# Patient Record
Sex: MALE | Race: WHITE | Employment: FULL TIME | URBAN - METROPOLITAN AREA
[De-identification: names, ages, dates, MRNs, and addresses within clinical notes are randomized per-mention and may not be internally consistent; named-entity substitution may affect disease eponyms.]

---

## 2018-07-02 PROBLEM — L08.9 DIABETIC FOOT INFECTION (HCC): Status: ACTIVE | Noted: 2018-07-02

## 2018-07-02 PROBLEM — E11.628 DIABETIC FOOT INFECTION (HCC): Status: ACTIVE | Noted: 2018-07-02

## 2018-07-02 PROBLEM — A48.0 GAS GANGRENE (HCC): Status: ACTIVE | Noted: 2018-07-02

## 2022-09-09 ENCOUNTER — HOSPITAL ENCOUNTER (INPATIENT)
Age: 52
LOS: 33 days | Discharge: NURSING, REHAB OR LONG-TERM CARE | DRG: 853 | End: 2022-10-13
Attending: EMERGENCY MEDICINE | Admitting: INTERNAL MEDICINE
Payer: COMMERCIAL

## 2022-09-09 ENCOUNTER — APPOINTMENT (OUTPATIENT)
Dept: CT IMAGING | Age: 52
DRG: 853 | End: 2022-09-09
Attending: EMERGENCY MEDICINE
Payer: COMMERCIAL

## 2022-09-09 ENCOUNTER — APPOINTMENT (OUTPATIENT)
Dept: GENERAL RADIOLOGY | Age: 52
DRG: 853 | End: 2022-09-09
Attending: EMERGENCY MEDICINE
Payer: COMMERCIAL

## 2022-09-09 DIAGNOSIS — R19.7 DIARRHEA, UNSPECIFIED TYPE: ICD-10-CM

## 2022-09-09 DIAGNOSIS — K62.89 RECTAL PAIN: ICD-10-CM

## 2022-09-09 DIAGNOSIS — K60.4 RECTAL FISTULA: ICD-10-CM

## 2022-09-09 DIAGNOSIS — K66.8 PNEUMOPERITONEUM: Primary | ICD-10-CM

## 2022-09-09 DIAGNOSIS — R62.7 FAILURE TO THRIVE IN ADULT: ICD-10-CM

## 2022-09-09 DIAGNOSIS — E11.69 TYPE 2 DIABETES MELLITUS WITH OTHER SPECIFIED COMPLICATION, UNSPECIFIED WHETHER LONG TERM INSULIN USE (HCC): ICD-10-CM

## 2022-09-09 DIAGNOSIS — R10.32 ABDOMINAL PAIN, LLQ (LEFT LOWER QUADRANT): ICD-10-CM

## 2022-09-09 DIAGNOSIS — A41.9 SEPSIS, DUE TO UNSPECIFIED ORGANISM, UNSPECIFIED WHETHER ACUTE ORGAN DYSFUNCTION PRESENT (HCC): ICD-10-CM

## 2022-09-09 DIAGNOSIS — D64.9 CHRONIC ANEMIA: ICD-10-CM

## 2022-09-09 DIAGNOSIS — N18.6 ESRD (END STAGE RENAL DISEASE) (HCC): ICD-10-CM

## 2022-09-09 DIAGNOSIS — R10.84 GENERALIZED ABDOMINAL PAIN: ICD-10-CM

## 2022-09-09 LAB
ALBUMIN SERPL-MCNC: 1.5 G/DL (ref 3.5–5)
ALBUMIN/GLOB SERPL: 0.3 {RATIO} (ref 1.1–2.2)
ALP SERPL-CCNC: 115 U/L (ref 45–117)
ALT SERPL-CCNC: <6 U/L (ref 12–78)
ANION GAP SERPL CALC-SCNC: 3 MMOL/L (ref 5–15)
AST SERPL-CCNC: 12 U/L (ref 15–37)
BASOPHILS # BLD: 0 K/UL (ref 0–0.1)
BASOPHILS NFR BLD: 0 % (ref 0–1)
BILIRUB SERPL-MCNC: 0.7 MG/DL (ref 0.2–1)
BUN SERPL-MCNC: 12 MG/DL (ref 6–20)
BUN/CREAT SERPL: 7 (ref 12–20)
CALCIUM SERPL-MCNC: 7.7 MG/DL (ref 8.5–10.1)
CHLORIDE SERPL-SCNC: 97 MMOL/L (ref 97–108)
CO2 SERPL-SCNC: 35 MMOL/L (ref 21–32)
COMMENT, HOLDF: NORMAL
CREAT SERPL-MCNC: 1.8 MG/DL (ref 0.7–1.3)
DIFFERENTIAL METHOD BLD: ABNORMAL
EOSINOPHIL # BLD: 0.3 K/UL (ref 0–0.4)
EOSINOPHIL NFR BLD: 1 % (ref 0–7)
ERYTHROCYTE [DISTWIDTH] IN BLOOD BY AUTOMATED COUNT: 17.2 % (ref 11.5–14.5)
GLOBULIN SER CALC-MCNC: 5.4 G/DL (ref 2–4)
GLUCOSE SERPL-MCNC: 133 MG/DL (ref 65–100)
HCT VFR BLD AUTO: 27.7 % (ref 36.6–50.3)
HEMOCCULT STL QL: POSITIVE
HGB BLD-MCNC: 8.7 G/DL (ref 12.1–17)
IMM GRANULOCYTES # BLD AUTO: 0.3 K/UL (ref 0–0.04)
IMM GRANULOCYTES NFR BLD AUTO: 1 % (ref 0–0.5)
LACTATE SERPL-SCNC: 1.2 MMOL/L (ref 0.4–2)
LYMPHOCYTES # BLD: 1 K/UL (ref 0.8–3.5)
LYMPHOCYTES NFR BLD: 4 % (ref 12–49)
MCH RBC QN AUTO: 30.7 PG (ref 26–34)
MCHC RBC AUTO-ENTMCNC: 31.4 G/DL (ref 30–36.5)
MCV RBC AUTO: 97.9 FL (ref 80–99)
MONOCYTES # BLD: 1.2 K/UL (ref 0–1)
MONOCYTES NFR BLD: 5 % (ref 5–13)
NEUTS SEG # BLD: 22.1 K/UL (ref 1.8–8)
NEUTS SEG NFR BLD: 89 % (ref 32–75)
NRBC # BLD: 0 K/UL (ref 0–0.01)
NRBC BLD-RTO: 0 PER 100 WBC
PLATELET # BLD AUTO: 463 K/UL (ref 150–400)
PMV BLD AUTO: 9.7 FL (ref 8.9–12.9)
POTASSIUM SERPL-SCNC: 3.4 MMOL/L (ref 3.5–5.1)
PROT SERPL-MCNC: 6.9 G/DL (ref 6.4–8.2)
RBC # BLD AUTO: 2.83 M/UL (ref 4.1–5.7)
RBC MORPH BLD: ABNORMAL
SAMPLES BEING HELD,HOLD: NORMAL
SODIUM SERPL-SCNC: 135 MMOL/L (ref 136–145)
WBC # BLD AUTO: 24.9 K/UL (ref 4.1–11.1)

## 2022-09-09 PROCEDURE — 82272 OCCULT BLD FECES 1-3 TESTS: CPT

## 2022-09-09 PROCEDURE — 86900 BLOOD TYPING SEROLOGIC ABO: CPT

## 2022-09-09 PROCEDURE — 83605 ASSAY OF LACTIC ACID: CPT

## 2022-09-09 PROCEDURE — 87040 BLOOD CULTURE FOR BACTERIA: CPT

## 2022-09-09 PROCEDURE — 80053 COMPREHEN METABOLIC PANEL: CPT

## 2022-09-09 PROCEDURE — 86920 COMPATIBILITY TEST SPIN: CPT

## 2022-09-09 PROCEDURE — 99285 EMERGENCY DEPT VISIT HI MDM: CPT

## 2022-09-09 PROCEDURE — 36415 COLL VENOUS BLD VENIPUNCTURE: CPT

## 2022-09-09 PROCEDURE — 71045 X-RAY EXAM CHEST 1 VIEW: CPT

## 2022-09-09 PROCEDURE — 85025 COMPLETE CBC W/AUTO DIFF WBC: CPT

## 2022-09-10 ENCOUNTER — ANESTHESIA (OUTPATIENT)
Dept: SURGERY | Age: 52
DRG: 853 | End: 2022-09-10
Payer: COMMERCIAL

## 2022-09-10 ENCOUNTER — ANESTHESIA EVENT (OUTPATIENT)
Dept: SURGERY | Age: 52
DRG: 853 | End: 2022-09-10
Payer: COMMERCIAL

## 2022-09-10 ENCOUNTER — APPOINTMENT (OUTPATIENT)
Dept: CT IMAGING | Age: 52
DRG: 853 | End: 2022-09-10
Attending: INTERNAL MEDICINE
Payer: COMMERCIAL

## 2022-09-10 PROBLEM — B99.9 INTRA-ABDOMINAL INFECTION: Status: ACTIVE | Noted: 2022-09-10

## 2022-09-10 LAB
ALBUMIN SERPL-MCNC: 1.7 G/DL (ref 3.5–5)
ALBUMIN/GLOB SERPL: 0.4 {RATIO} (ref 1.1–2.2)
ALP SERPL-CCNC: 86 U/L (ref 45–117)
ALT SERPL-CCNC: <6 U/L (ref 12–78)
ANION GAP SERPL CALC-SCNC: 7 MMOL/L (ref 5–15)
AST SERPL-CCNC: 9 U/L (ref 15–37)
BASOPHILS # BLD: 0 K/UL (ref 0–0.1)
BASOPHILS # BLD: 0 K/UL (ref 0–0.1)
BASOPHILS NFR BLD: 0 % (ref 0–1)
BASOPHILS NFR BLD: 0 % (ref 0–1)
BILIRUB SERPL-MCNC: 0.7 MG/DL (ref 0.2–1)
BNP SERPL-MCNC: 5733 PG/ML
BUN SERPL-MCNC: 17 MG/DL (ref 6–20)
BUN/CREAT SERPL: 8 (ref 12–20)
C DIFF GDH STL QL: NEGATIVE
C DIFF TOX A+B STL QL IA: NEGATIVE
CALCIUM SERPL-MCNC: 7.4 MG/DL (ref 8.5–10.1)
CHLORIDE SERPL-SCNC: 106 MMOL/L (ref 97–108)
CO2 SERPL-SCNC: 27 MMOL/L (ref 21–32)
CREAT SERPL-MCNC: 2.16 MG/DL (ref 0.7–1.3)
DIFFERENTIAL METHOD BLD: ABNORMAL
DIFFERENTIAL METHOD BLD: ABNORMAL
EOSINOPHIL # BLD: 0 K/UL (ref 0–0.4)
EOSINOPHIL # BLD: 0 K/UL (ref 0–0.4)
EOSINOPHIL NFR BLD: 0 % (ref 0–7)
EOSINOPHIL NFR BLD: 0 % (ref 0–7)
ERYTHROCYTE [DISTWIDTH] IN BLOOD BY AUTOMATED COUNT: 17.9 % (ref 11.5–14.5)
ERYTHROCYTE [DISTWIDTH] IN BLOOD BY AUTOMATED COUNT: 18.6 % (ref 11.5–14.5)
EST. AVERAGE GLUCOSE BLD GHB EST-MCNC: ABNORMAL MG/DL
FERRITIN SERPL-MCNC: 715 NG/ML (ref 26–388)
FOLATE SERPL-MCNC: 4.1 NG/ML (ref 5–21)
GLOBULIN SER CALC-MCNC: 4.1 G/DL (ref 2–4)
GLUCOSE BLD STRIP.AUTO-MCNC: 100 MG/DL (ref 65–117)
GLUCOSE BLD STRIP.AUTO-MCNC: 105 MG/DL (ref 65–117)
GLUCOSE BLD STRIP.AUTO-MCNC: 112 MG/DL (ref 65–117)
GLUCOSE BLD STRIP.AUTO-MCNC: 151 MG/DL (ref 65–117)
GLUCOSE BLD STRIP.AUTO-MCNC: 163 MG/DL (ref 65–117)
GLUCOSE SERPL-MCNC: 95 MG/DL (ref 65–100)
HBA1C MFR BLD: <3.8 % (ref 4–5.6)
HCT VFR BLD AUTO: 22.5 % (ref 36.6–50.3)
HCT VFR BLD AUTO: 23.5 % (ref 36.6–50.3)
HGB BLD-MCNC: 6.9 G/DL (ref 12.1–17)
HGB BLD-MCNC: 7.6 G/DL (ref 12.1–17)
HISTORY CHECKED?,CKHIST: NORMAL
IMM GRANULOCYTES # BLD AUTO: 0 K/UL
IMM GRANULOCYTES # BLD AUTO: 0.2 K/UL (ref 0–0.04)
IMM GRANULOCYTES NFR BLD AUTO: 0 %
IMM GRANULOCYTES NFR BLD AUTO: 1 % (ref 0–0.5)
INTERPRETATION: NORMAL
IRON SATN MFR SERPL: 9 % (ref 20–50)
IRON SERPL-MCNC: 12 UG/DL (ref 35–150)
IRON SERPL-MCNC: 12 UG/DL (ref 35–150)
LIPASE SERPL-CCNC: 54 U/L (ref 73–393)
LYMPHOCYTES # BLD: 0.4 K/UL (ref 0.8–3.5)
LYMPHOCYTES # BLD: 0.7 K/UL (ref 0.8–3.5)
LYMPHOCYTES NFR BLD: 2 % (ref 12–49)
LYMPHOCYTES NFR BLD: 4 % (ref 12–49)
MAGNESIUM SERPL-MCNC: 1.5 MG/DL (ref 1.6–2.4)
MCH RBC QN AUTO: 30 PG (ref 26–34)
MCH RBC QN AUTO: 30.5 PG (ref 26–34)
MCHC RBC AUTO-ENTMCNC: 30.7 G/DL (ref 30–36.5)
MCHC RBC AUTO-ENTMCNC: 32.3 G/DL (ref 30–36.5)
MCV RBC AUTO: 92.9 FL (ref 80–99)
MCV RBC AUTO: 99.6 FL (ref 80–99)
MONOCYTES # BLD: 0.6 K/UL (ref 0–1)
MONOCYTES # BLD: 0.7 K/UL (ref 0–1)
MONOCYTES NFR BLD: 3 % (ref 5–13)
MONOCYTES NFR BLD: 4 % (ref 5–13)
NEUTS BAND NFR BLD MANUAL: 1 % (ref 0–6)
NEUTS SEG # BLD: 15.6 K/UL (ref 1.8–8)
NEUTS SEG # BLD: 18.7 K/UL (ref 1.8–8)
NEUTS SEG NFR BLD: 91 % (ref 32–75)
NEUTS SEG NFR BLD: 94 % (ref 32–75)
NRBC # BLD: 0 K/UL (ref 0–0.01)
NRBC # BLD: 0 K/UL (ref 0–0.01)
NRBC BLD-RTO: 0 PER 100 WBC
NRBC BLD-RTO: 0 PER 100 WBC
PHOSPHATE SERPL-MCNC: 1.3 MG/DL (ref 2.6–4.7)
PLATELET # BLD AUTO: 324 K/UL (ref 150–400)
PLATELET # BLD AUTO: 361 K/UL (ref 150–400)
PMV BLD AUTO: 9.4 FL (ref 8.9–12.9)
PMV BLD AUTO: 9.8 FL (ref 8.9–12.9)
POTASSIUM SERPL-SCNC: 3 MMOL/L (ref 3.5–5.1)
PROT SERPL-MCNC: 5.8 G/DL (ref 6.4–8.2)
RBC # BLD AUTO: 2.26 M/UL (ref 4.1–5.7)
RBC # BLD AUTO: 2.53 M/UL (ref 4.1–5.7)
RBC MORPH BLD: ABNORMAL
RBC MORPH BLD: ABNORMAL
SERVICE CMNT-IMP: ABNORMAL
SERVICE CMNT-IMP: ABNORMAL
SERVICE CMNT-IMP: NORMAL
SODIUM SERPL-SCNC: 140 MMOL/L (ref 136–145)
TIBC SERPL-MCNC: 141 UG/DL (ref 250–450)
TROPONIN-HIGH SENSITIVITY: 6 NG/L (ref 0–76)
TSH SERPL DL<=0.05 MIU/L-ACNC: 1.43 UIU/ML (ref 0.36–3.74)
VIT B12 SERPL-MCNC: 729 PG/ML (ref 193–986)
WBC # BLD AUTO: 17.2 K/UL (ref 4.1–11.1)
WBC # BLD AUTO: 19.7 K/UL (ref 4.1–11.1)

## 2022-09-10 PROCEDURE — 77030008462 HC STPLR SKN PROX J&J -A: Performed by: SURGERY

## 2022-09-10 PROCEDURE — 74011250636 HC RX REV CODE- 250/636: Performed by: NURSE ANESTHETIST, CERTIFIED REGISTERED

## 2022-09-10 PROCEDURE — 83735 ASSAY OF MAGNESIUM: CPT

## 2022-09-10 PROCEDURE — 74011636637 HC RX REV CODE- 636/637: Performed by: SURGERY

## 2022-09-10 PROCEDURE — 83880 ASSAY OF NATRIURETIC PEPTIDE: CPT

## 2022-09-10 PROCEDURE — 83550 IRON BINDING TEST: CPT

## 2022-09-10 PROCEDURE — 83036 HEMOGLOBIN GLYCOSYLATED A1C: CPT

## 2022-09-10 PROCEDURE — 74011000250 HC RX REV CODE- 250: Performed by: NURSE ANESTHETIST, CERTIFIED REGISTERED

## 2022-09-10 PROCEDURE — 0DPD4UZ REMOVAL OF FEEDING DEVICE FROM LOWER INTESTINAL TRACT, PERCUTANEOUS ENDOSCOPIC APPROACH: ICD-10-PCS | Performed by: SURGERY

## 2022-09-10 PROCEDURE — 83540 ASSAY OF IRON: CPT

## 2022-09-10 PROCEDURE — 88304 TISSUE EXAM BY PATHOLOGIST: CPT

## 2022-09-10 PROCEDURE — 77030002966 HC SUT PDS J&J -A: Performed by: SURGERY

## 2022-09-10 PROCEDURE — 77030009968 HC RELD STPLR ENDOSC J&J -D: Performed by: SURGERY

## 2022-09-10 PROCEDURE — 0JD83ZZ EXTRACTION OF ABDOMEN SUBCUTANEOUS TISSUE AND FASCIA, PERCUTANEOUS APPROACH: ICD-10-PCS | Performed by: SURGERY

## 2022-09-10 PROCEDURE — 10061 I&D ABSCESS COMP/MULTIPLE: CPT | Performed by: SURGERY

## 2022-09-10 PROCEDURE — 77030008606 HC TRCR ENDOSC KII AMR -B: Performed by: SURGERY

## 2022-09-10 PROCEDURE — 77030008756 HC TU IRR SUC STRY -B: Performed by: SURGERY

## 2022-09-10 PROCEDURE — 77030026438 HC STYL ET INTUB CARD -A: Performed by: ANESTHESIOLOGY

## 2022-09-10 PROCEDURE — 71250 CT THORAX DX C-: CPT

## 2022-09-10 PROCEDURE — 65660000001 HC RM ICU INTERMED STEPDOWN

## 2022-09-10 PROCEDURE — 77030031139 HC SUT VCRL2 J&J -A: Performed by: SURGERY

## 2022-09-10 PROCEDURE — 76060000037 HC ANESTHESIA 3 TO 3.5 HR: Performed by: SURGERY

## 2022-09-10 PROCEDURE — 0D1N4Z4 BYPASS SIGMOID COLON TO CUTANEOUS, PERCUTANEOUS ENDOSCOPIC APPROACH: ICD-10-PCS | Performed by: SURGERY

## 2022-09-10 PROCEDURE — 82746 ASSAY OF FOLIC ACID SERUM: CPT

## 2022-09-10 PROCEDURE — 74011250636 HC RX REV CODE- 250/636: Performed by: INTERNAL MEDICINE

## 2022-09-10 PROCEDURE — P9016 RBC LEUKOCYTES REDUCED: HCPCS

## 2022-09-10 PROCEDURE — 76210000017 HC OR PH I REC 1.5 TO 2 HR: Performed by: SURGERY

## 2022-09-10 PROCEDURE — 82728 ASSAY OF FERRITIN: CPT

## 2022-09-10 PROCEDURE — 74011000258 HC RX REV CODE- 258: Performed by: SURGERY

## 2022-09-10 PROCEDURE — 96374 THER/PROPH/DIAG INJ IV PUSH: CPT

## 2022-09-10 PROCEDURE — 99254 IP/OBS CNSLTJ NEW/EST MOD 60: CPT | Performed by: SURGERY

## 2022-09-10 PROCEDURE — 36430 TRANSFUSION BLD/BLD COMPNT: CPT

## 2022-09-10 PROCEDURE — 83690 ASSAY OF LIPASE: CPT

## 2022-09-10 PROCEDURE — 84100 ASSAY OF PHOSPHORUS: CPT

## 2022-09-10 PROCEDURE — 77030013079 HC BLNKT BAIR HGGR 3M -A: Performed by: ANESTHESIOLOGY

## 2022-09-10 PROCEDURE — 74011250636 HC RX REV CODE- 250/636: Performed by: ANESTHESIOLOGY

## 2022-09-10 PROCEDURE — 77030007955 HC PCH ENDOSC SPEC J&J -B: Performed by: SURGERY

## 2022-09-10 PROCEDURE — 84484 ASSAY OF TROPONIN QUANT: CPT

## 2022-09-10 PROCEDURE — 76010000133 HC OR TIME 3 TO 3.5 HR: Performed by: SURGERY

## 2022-09-10 PROCEDURE — 2709999900 HC NON-CHARGEABLE SUPPLY: Performed by: SURGERY

## 2022-09-10 PROCEDURE — 77030002933 HC SUT MCRYL J&J -A: Performed by: SURGERY

## 2022-09-10 PROCEDURE — 82607 VITAMIN B-12: CPT

## 2022-09-10 PROCEDURE — 84443 ASSAY THYROID STIM HORMONE: CPT

## 2022-09-10 PROCEDURE — 74011250636 HC RX REV CODE- 250/636: Performed by: EMERGENCY MEDICINE

## 2022-09-10 PROCEDURE — 44188 LAP COLOSTOMY: CPT | Performed by: SURGERY

## 2022-09-10 PROCEDURE — 80053 COMPREHEN METABOLIC PANEL: CPT

## 2022-09-10 PROCEDURE — 77030011808 HC STPLR ENDOSCOPIC J&J -D: Performed by: SURGERY

## 2022-09-10 PROCEDURE — 74011250636 HC RX REV CODE- 250/636: Performed by: SURGERY

## 2022-09-10 PROCEDURE — 77030008684 HC TU ET CUF COVD -B: Performed by: ANESTHESIOLOGY

## 2022-09-10 PROCEDURE — C9113 INJ PANTOPRAZOLE SODIUM, VIA: HCPCS | Performed by: INTERNAL MEDICINE

## 2022-09-10 PROCEDURE — 82962 GLUCOSE BLOOD TEST: CPT

## 2022-09-10 PROCEDURE — 36415 COLL VENOUS BLD VENIPUNCTURE: CPT

## 2022-09-10 PROCEDURE — P9045 ALBUMIN (HUMAN), 5%, 250 ML: HCPCS | Performed by: NURSE ANESTHETIST, CERTIFIED REGISTERED

## 2022-09-10 PROCEDURE — 87324 CLOSTRIDIUM AG IA: CPT

## 2022-09-10 PROCEDURE — 77030036731 HC STPLR ENDOSC J&J -F: Performed by: SURGERY

## 2022-09-10 PROCEDURE — 74011250636 HC RX REV CODE- 250/636: Performed by: STUDENT IN AN ORGANIZED HEALTH CARE EDUCATION/TRAINING PROGRAM

## 2022-09-10 PROCEDURE — 77030033639 HC SHR ENDO COAG HARM 36 J&J -E: Performed by: SURGERY

## 2022-09-10 PROCEDURE — 74011000250 HC RX REV CODE- 250: Performed by: INTERNAL MEDICINE

## 2022-09-10 PROCEDURE — 77030010513 HC APPL CLP LIG J&J -C: Performed by: SURGERY

## 2022-09-10 PROCEDURE — 74011000258 HC RX REV CODE- 258: Performed by: EMERGENCY MEDICINE

## 2022-09-10 PROCEDURE — 77030020829: Performed by: SURGERY

## 2022-09-10 PROCEDURE — 85025 COMPLETE CBC W/AUTO DIFF WBC: CPT

## 2022-09-10 RX ORDER — ONDANSETRON 2 MG/ML
4 INJECTION INTRAMUSCULAR; INTRAVENOUS
Status: DISCONTINUED | OUTPATIENT
Start: 2022-09-10 | End: 2022-09-16

## 2022-09-10 RX ORDER — KETAMINE HYDROCHLORIDE 10 MG/ML
INJECTION, SOLUTION INTRAMUSCULAR; INTRAVENOUS AS NEEDED
Status: DISCONTINUED | OUTPATIENT
Start: 2022-09-10 | End: 2022-09-10 | Stop reason: HOSPADM

## 2022-09-10 RX ORDER — ONDANSETRON 4 MG/1
4 TABLET, ORALLY DISINTEGRATING ORAL
Status: DISCONTINUED | OUTPATIENT
Start: 2022-09-10 | End: 2022-10-13 | Stop reason: HOSPADM

## 2022-09-10 RX ORDER — ACETAMINOPHEN 325 MG/1
650 TABLET ORAL
Status: DISCONTINUED | OUTPATIENT
Start: 2022-09-10 | End: 2022-09-10

## 2022-09-10 RX ORDER — SODIUM CHLORIDE 0.9 % (FLUSH) 0.9 %
5-40 SYRINGE (ML) INJECTION AS NEEDED
Status: DISCONTINUED | OUTPATIENT
Start: 2022-09-10 | End: 2022-10-13 | Stop reason: HOSPADM

## 2022-09-10 RX ORDER — ONDANSETRON 2 MG/ML
4 INJECTION INTRAMUSCULAR; INTRAVENOUS AS NEEDED
Status: DISCONTINUED | OUTPATIENT
Start: 2022-09-10 | End: 2022-09-10 | Stop reason: HOSPADM

## 2022-09-10 RX ORDER — FENTANYL CITRATE 50 UG/ML
INJECTION, SOLUTION INTRAMUSCULAR; INTRAVENOUS AS NEEDED
Status: DISCONTINUED | OUTPATIENT
Start: 2022-09-10 | End: 2022-09-10 | Stop reason: HOSPADM

## 2022-09-10 RX ORDER — SODIUM CHLORIDE 9 MG/ML
250 INJECTION, SOLUTION INTRAVENOUS AS NEEDED
Status: DISCONTINUED | OUTPATIENT
Start: 2022-09-10 | End: 2022-09-23 | Stop reason: ALTCHOICE

## 2022-09-10 RX ORDER — ROCURONIUM BROMIDE 10 MG/ML
INJECTION, SOLUTION INTRAVENOUS AS NEEDED
Status: DISCONTINUED | OUTPATIENT
Start: 2022-09-10 | End: 2022-09-10 | Stop reason: HOSPADM

## 2022-09-10 RX ORDER — FENTANYL CITRATE 50 UG/ML
50 INJECTION, SOLUTION INTRAMUSCULAR; INTRAVENOUS ONCE
Status: DISPENSED | OUTPATIENT
Start: 2022-09-10 | End: 2022-09-10

## 2022-09-10 RX ORDER — DIPHENHYDRAMINE HYDROCHLORIDE 50 MG/ML
12.5 INJECTION, SOLUTION INTRAMUSCULAR; INTRAVENOUS
Status: DISCONTINUED | OUTPATIENT
Start: 2022-09-10 | End: 2022-10-13 | Stop reason: HOSPADM

## 2022-09-10 RX ORDER — SODIUM CHLORIDE 0.9 % (FLUSH) 0.9 %
5-40 SYRINGE (ML) INJECTION EVERY 8 HOURS
Status: DISCONTINUED | OUTPATIENT
Start: 2022-09-10 | End: 2022-09-10 | Stop reason: HOSPADM

## 2022-09-10 RX ORDER — ACETAMINOPHEN 325 MG/1
650 TABLET ORAL
Status: DISCONTINUED | OUTPATIENT
Start: 2022-09-10 | End: 2022-10-13 | Stop reason: HOSPADM

## 2022-09-10 RX ORDER — SODIUM CHLORIDE, SODIUM LACTATE, POTASSIUM CHLORIDE, CALCIUM CHLORIDE 600; 310; 30; 20 MG/100ML; MG/100ML; MG/100ML; MG/100ML
50 INJECTION, SOLUTION INTRAVENOUS CONTINUOUS
Status: DISCONTINUED | OUTPATIENT
Start: 2022-09-10 | End: 2022-09-12

## 2022-09-10 RX ORDER — POLYETHYLENE GLYCOL 3350 17 G/17G
17 POWDER, FOR SOLUTION ORAL DAILY PRN
Status: DISCONTINUED | OUTPATIENT
Start: 2022-09-10 | End: 2022-10-13 | Stop reason: HOSPADM

## 2022-09-10 RX ORDER — SODIUM CHLORIDE 0.9 % (FLUSH) 0.9 %
5-40 SYRINGE (ML) INJECTION AS NEEDED
Status: CANCELLED | OUTPATIENT
Start: 2022-09-10

## 2022-09-10 RX ORDER — MIDAZOLAM HYDROCHLORIDE 1 MG/ML
INJECTION, SOLUTION INTRAMUSCULAR; INTRAVENOUS AS NEEDED
Status: DISCONTINUED | OUTPATIENT
Start: 2022-09-10 | End: 2022-09-10 | Stop reason: HOSPADM

## 2022-09-10 RX ORDER — SODIUM CHLORIDE 0.9 % (FLUSH) 0.9 %
5-40 SYRINGE (ML) INJECTION EVERY 8 HOURS
Status: DISCONTINUED | OUTPATIENT
Start: 2022-09-10 | End: 2022-10-13 | Stop reason: HOSPADM

## 2022-09-10 RX ORDER — MAGNESIUM SULFATE 100 %
4 CRYSTALS MISCELLANEOUS AS NEEDED
Status: DISCONTINUED | OUTPATIENT
Start: 2022-09-10 | End: 2022-09-25

## 2022-09-10 RX ORDER — HYDROMORPHONE HYDROCHLORIDE 1 MG/ML
.5-1 INJECTION, SOLUTION INTRAMUSCULAR; INTRAVENOUS; SUBCUTANEOUS
Status: DISCONTINUED | OUTPATIENT
Start: 2022-09-10 | End: 2022-09-12

## 2022-09-10 RX ORDER — ONDANSETRON 2 MG/ML
4 INJECTION INTRAMUSCULAR; INTRAVENOUS
Status: DISCONTINUED | OUTPATIENT
Start: 2022-09-10 | End: 2022-10-13 | Stop reason: HOSPADM

## 2022-09-10 RX ORDER — SODIUM CHLORIDE 0.9 % (FLUSH) 0.9 %
5-40 SYRINGE (ML) INJECTION AS NEEDED
Status: DISCONTINUED | OUTPATIENT
Start: 2022-09-10 | End: 2022-09-10 | Stop reason: HOSPADM

## 2022-09-10 RX ORDER — SODIUM CHLORIDE, SODIUM LACTATE, POTASSIUM CHLORIDE, CALCIUM CHLORIDE 600; 310; 30; 20 MG/100ML; MG/100ML; MG/100ML; MG/100ML
25 INJECTION, SOLUTION INTRAVENOUS CONTINUOUS
Status: CANCELLED | OUTPATIENT
Start: 2022-09-10 | End: 2022-09-11

## 2022-09-10 RX ORDER — INSULIN LISPRO 100 [IU]/ML
INJECTION, SOLUTION INTRAVENOUS; SUBCUTANEOUS
Status: DISCONTINUED | OUTPATIENT
Start: 2022-09-10 | End: 2022-09-10

## 2022-09-10 RX ORDER — ACETAMINOPHEN 650 MG/1
650 SUPPOSITORY RECTAL
Status: DISCONTINUED | OUTPATIENT
Start: 2022-09-10 | End: 2022-09-25

## 2022-09-10 RX ORDER — ONDANSETRON 2 MG/ML
INJECTION INTRAMUSCULAR; INTRAVENOUS AS NEEDED
Status: DISCONTINUED | OUTPATIENT
Start: 2022-09-10 | End: 2022-09-10 | Stop reason: HOSPADM

## 2022-09-10 RX ORDER — SODIUM CHLORIDE 0.9 % (FLUSH) 0.9 %
5-40 SYRINGE (ML) INJECTION EVERY 8 HOURS
Status: CANCELLED | OUTPATIENT
Start: 2022-09-10

## 2022-09-10 RX ORDER — ALBUMIN HUMAN 50 G/1000ML
SOLUTION INTRAVENOUS AS NEEDED
Status: DISCONTINUED | OUTPATIENT
Start: 2022-09-10 | End: 2022-09-10 | Stop reason: HOSPADM

## 2022-09-10 RX ORDER — SODIUM CHLORIDE 9 MG/ML
INJECTION, SOLUTION INTRAVENOUS
Status: DISCONTINUED | OUTPATIENT
Start: 2022-09-10 | End: 2022-09-10 | Stop reason: HOSPADM

## 2022-09-10 RX ORDER — CEFAZOLIN SODIUM 1 G/3ML
INJECTION, POWDER, FOR SOLUTION INTRAMUSCULAR; INTRAVENOUS AS NEEDED
Status: DISCONTINUED | OUTPATIENT
Start: 2022-09-10 | End: 2022-09-10 | Stop reason: HOSPADM

## 2022-09-10 RX ORDER — DEXAMETHASONE SODIUM PHOSPHATE 4 MG/ML
INJECTION, SOLUTION INTRA-ARTICULAR; INTRALESIONAL; INTRAMUSCULAR; INTRAVENOUS; SOFT TISSUE AS NEEDED
Status: DISCONTINUED | OUTPATIENT
Start: 2022-09-10 | End: 2022-09-10 | Stop reason: HOSPADM

## 2022-09-10 RX ORDER — FENTANYL CITRATE 50 UG/ML
25 INJECTION, SOLUTION INTRAMUSCULAR; INTRAVENOUS
Status: DISCONTINUED | OUTPATIENT
Start: 2022-09-10 | End: 2022-09-10 | Stop reason: HOSPADM

## 2022-09-10 RX ORDER — INSULIN LISPRO 100 [IU]/ML
INJECTION, SOLUTION INTRAVENOUS; SUBCUTANEOUS
Status: DISCONTINUED | OUTPATIENT
Start: 2022-09-10 | End: 2022-09-25

## 2022-09-10 RX ORDER — POTASSIUM CHLORIDE 7.45 MG/ML
10 INJECTION INTRAVENOUS ONCE
Status: COMPLETED | OUTPATIENT
Start: 2022-09-10 | End: 2022-09-10

## 2022-09-10 RX ORDER — VANCOMYCIN/0.9 % SOD CHLORIDE 1.5G/250ML
1500 PLASTIC BAG, INJECTION (ML) INTRAVENOUS ONCE
Status: COMPLETED | OUTPATIENT
Start: 2022-09-10 | End: 2022-09-10

## 2022-09-10 RX ORDER — PROPOFOL 10 MG/ML
INJECTION, EMULSION INTRAVENOUS AS NEEDED
Status: DISCONTINUED | OUTPATIENT
Start: 2022-09-10 | End: 2022-09-10 | Stop reason: HOSPADM

## 2022-09-10 RX ORDER — CALCIUM GLUCONATE 94 MG/ML
INJECTION, SOLUTION INTRAVENOUS AS NEEDED
Status: DISCONTINUED | OUTPATIENT
Start: 2022-09-10 | End: 2022-09-10 | Stop reason: HOSPADM

## 2022-09-10 RX ORDER — HYDROMORPHONE HYDROCHLORIDE 1 MG/ML
0.2 INJECTION, SOLUTION INTRAMUSCULAR; INTRAVENOUS; SUBCUTANEOUS
Status: DISCONTINUED | OUTPATIENT
Start: 2022-09-10 | End: 2022-09-10 | Stop reason: HOSPADM

## 2022-09-10 RX ORDER — MAGNESIUM SULFATE 100 %
4 CRYSTALS MISCELLANEOUS AS NEEDED
Status: DISCONTINUED | OUTPATIENT
Start: 2022-09-10 | End: 2022-09-21 | Stop reason: SDUPTHER

## 2022-09-10 RX ORDER — SODIUM CHLORIDE, SODIUM LACTATE, POTASSIUM CHLORIDE, CALCIUM CHLORIDE 600; 310; 30; 20 MG/100ML; MG/100ML; MG/100ML; MG/100ML
INJECTION, SOLUTION INTRAVENOUS
Status: DISCONTINUED | OUTPATIENT
Start: 2022-09-10 | End: 2022-09-10 | Stop reason: HOSPADM

## 2022-09-10 RX ORDER — LIDOCAINE HYDROCHLORIDE 20 MG/ML
INJECTION, SOLUTION EPIDURAL; INFILTRATION; INTRACAUDAL; PERINEURAL AS NEEDED
Status: DISCONTINUED | OUTPATIENT
Start: 2022-09-10 | End: 2022-09-10 | Stop reason: HOSPADM

## 2022-09-10 RX ORDER — SUCCINYLCHOLINE CHLORIDE 20 MG/ML
INJECTION INTRAMUSCULAR; INTRAVENOUS AS NEEDED
Status: DISCONTINUED | OUTPATIENT
Start: 2022-09-10 | End: 2022-09-10 | Stop reason: HOSPADM

## 2022-09-10 RX ADMIN — ROCURONIUM BROMIDE 45 MG: 10 SOLUTION INTRAVENOUS at 02:30

## 2022-09-10 RX ADMIN — Medication 50 MG: at 02:26

## 2022-09-10 RX ADMIN — FENTANYL CITRATE 25 MCG: 50 INJECTION, SOLUTION INTRAMUSCULAR; INTRAVENOUS at 06:27

## 2022-09-10 RX ADMIN — CALCIUM GLUCONATE 250 MG: 94 INJECTION, SOLUTION INTRAVENOUS at 03:34

## 2022-09-10 RX ADMIN — ALBUMIN (HUMAN) 250 ML: 12.5 INJECTION, SOLUTION INTRAVENOUS at 02:50

## 2022-09-10 RX ADMIN — SODIUM CHLORIDE, PRESERVATIVE FREE 40 MG: 5 INJECTION INTRAVENOUS at 09:28

## 2022-09-10 RX ADMIN — PROPOFOL 50 MG: 10 INJECTION, EMULSION INTRAVENOUS at 02:26

## 2022-09-10 RX ADMIN — PIPERACILLIN AND TAZOBACTAM 3.38 G: 3; .375 INJECTION, POWDER, FOR SOLUTION INTRAVENOUS at 07:08

## 2022-09-10 RX ADMIN — HYDROMORPHONE HYDROCHLORIDE 1 MG: 1 INJECTION, SOLUTION INTRAMUSCULAR; INTRAVENOUS; SUBCUTANEOUS at 18:37

## 2022-09-10 RX ADMIN — SODIUM CHLORIDE 2040 ML: 9 INJECTION, SOLUTION INTRAVENOUS at 01:06

## 2022-09-10 RX ADMIN — HYDROMORPHONE HYDROCHLORIDE 1 MG: 1 INJECTION, SOLUTION INTRAMUSCULAR; INTRAVENOUS; SUBCUTANEOUS at 11:18

## 2022-09-10 RX ADMIN — LIDOCAINE HYDROCHLORIDE 70 MG: 20 INJECTION, SOLUTION EPIDURAL; INFILTRATION; INTRACAUDAL; PERINEURAL at 02:26

## 2022-09-10 RX ADMIN — SUCCINYLCHOLINE CHLORIDE 140 MG: 20 INJECTION, SOLUTION INTRAMUSCULAR; INTRAVENOUS at 02:27

## 2022-09-10 RX ADMIN — MIDAZOLAM 2 MG: 1 INJECTION INTRAMUSCULAR; INTRAVENOUS at 02:18

## 2022-09-10 RX ADMIN — CEFAZOLIN 2 G: 330 INJECTION, POWDER, FOR SOLUTION INTRAMUSCULAR; INTRAVENOUS at 03:02

## 2022-09-10 RX ADMIN — ONDANSETRON 4 MG: 2 INJECTION INTRAMUSCULAR; INTRAVENOUS at 11:18

## 2022-09-10 RX ADMIN — POTASSIUM CHLORIDE 10 MEQ: 7.46 INJECTION, SOLUTION INTRAVENOUS at 15:41

## 2022-09-10 RX ADMIN — FENTANYL CITRATE 50 MCG: 50 INJECTION, SOLUTION INTRAMUSCULAR; INTRAVENOUS at 03:05

## 2022-09-10 RX ADMIN — PHENYLEPHRINE HYDROCHLORIDE 50 MCG/MIN: 10 INJECTION INTRAVENOUS at 02:26

## 2022-09-10 RX ADMIN — SODIUM CHLORIDE, PRESERVATIVE FREE 10 ML: 5 INJECTION INTRAVENOUS at 09:29

## 2022-09-10 RX ADMIN — SODIUM CHLORIDE: 900 INJECTION, SOLUTION INTRAVENOUS at 02:11

## 2022-09-10 RX ADMIN — ALBUMIN (HUMAN) 250 ML: 12.5 INJECTION, SOLUTION INTRAVENOUS at 04:28

## 2022-09-10 RX ADMIN — CALCIUM GLUCONATE 250 MG: 94 INJECTION, SOLUTION INTRAVENOUS at 03:09

## 2022-09-10 RX ADMIN — PIPERACILLIN AND TAZOBACTAM 3.38 G: 3; .375 INJECTION, POWDER, FOR SOLUTION INTRAVENOUS at 17:13

## 2022-09-10 RX ADMIN — VANCOMYCIN HYDROCHLORIDE 1500 MG: 10 INJECTION, POWDER, LYOPHILIZED, FOR SOLUTION INTRAVENOUS at 09:29

## 2022-09-10 RX ADMIN — FENTANYL CITRATE 50 MCG: 50 INJECTION, SOLUTION INTRAMUSCULAR; INTRAVENOUS at 02:26

## 2022-09-10 RX ADMIN — SODIUM CHLORIDE, POTASSIUM CHLORIDE, SODIUM LACTATE AND CALCIUM CHLORIDE: 600; 310; 30; 20 INJECTION, SOLUTION INTRAVENOUS at 02:11

## 2022-09-10 RX ADMIN — SODIUM CHLORIDE, PRESERVATIVE FREE 40 MG: 5 INJECTION INTRAVENOUS at 21:40

## 2022-09-10 RX ADMIN — PIPERACILLIN AND TAZOBACTAM 4.5 G: 4; .5 INJECTION, POWDER, LYOPHILIZED, FOR SOLUTION INTRAVENOUS at 00:56

## 2022-09-10 RX ADMIN — ROCURONIUM BROMIDE 10 MG: 10 SOLUTION INTRAVENOUS at 04:28

## 2022-09-10 RX ADMIN — CALCIUM GLUCONATE 250 MG: 94 INJECTION, SOLUTION INTRAVENOUS at 04:00

## 2022-09-10 RX ADMIN — SODIUM CHLORIDE 1000 ML: 9 INJECTION, SOLUTION INTRAVENOUS at 02:05

## 2022-09-10 RX ADMIN — Medication 2 UNITS: at 07:41

## 2022-09-10 RX ADMIN — SODIUM CHLORIDE, PRESERVATIVE FREE 10 ML: 5 INJECTION INTRAVENOUS at 14:56

## 2022-09-10 RX ADMIN — PIPERACILLIN AND TAZOBACTAM 3.38 G: 3; .375 INJECTION, POWDER, FOR SOLUTION INTRAVENOUS at 23:10

## 2022-09-10 RX ADMIN — CALCIUM GLUCONATE 250 MG: 94 INJECTION, SOLUTION INTRAVENOUS at 03:00

## 2022-09-10 RX ADMIN — SUGAMMADEX 200 MG: 100 INJECTION, SOLUTION INTRAVENOUS at 05:14

## 2022-09-10 RX ADMIN — DEXAMETHASONE SODIUM PHOSPHATE 4 MG: 4 INJECTION, SOLUTION INTRAMUSCULAR; INTRAVENOUS at 02:52

## 2022-09-10 RX ADMIN — FENTANYL CITRATE 25 MCG: 50 INJECTION, SOLUTION INTRAMUSCULAR; INTRAVENOUS at 07:42

## 2022-09-10 RX ADMIN — ROCURONIUM BROMIDE 5 MG: 10 SOLUTION INTRAVENOUS at 02:26

## 2022-09-10 RX ADMIN — ONDANSETRON HYDROCHLORIDE 4 MG: 2 INJECTION, SOLUTION INTRAMUSCULAR; INTRAVENOUS at 05:10

## 2022-09-10 RX ADMIN — SODIUM CHLORIDE, PRESERVATIVE FREE 10 ML: 5 INJECTION INTRAVENOUS at 21:31

## 2022-09-10 RX ADMIN — HYDROMORPHONE HYDROCHLORIDE 0.2 MG: 1 INJECTION, SOLUTION INTRAMUSCULAR; INTRAVENOUS; SUBCUTANEOUS at 06:28

## 2022-09-10 NOTE — PROGRESS NOTES
I have discussed with the spouse the rationale for blood component transfusion; its benefits in treating or preventing fatigue, organ damage, or death; and its risk which includes mild transfusion reactions, rare risk of blood borne infection, or more serious but rare reactions. I have discussed the alternatives to transfusion, including the risk and consequences of not receiving transfusion. The spouse had an opportunity to ask questions and had agreed to proceed with transfusion of blood components.

## 2022-09-10 NOTE — CONSULTS
Surgery Consult    Subjective:      Kevin Chris is a 45 y.o. male who was sent by [facility] for possible bowel perforation. The patient was recently at another hospital where he apparently underwent an amputation. Afterwards he had aspiration pneumonia with respiratory failure there required prolonged intubation. He was eventually extubated but then had a loculated effusion and underwent a decortication and right thoracotomy. Apparently during this time so went into renal failure it is now on dialysis. Additionally he has noted explosive persistent diarrhea throughout this time. Patient states that began after they used a rectal manager on him that did not go well. He underwent a J-tube placement 1 week ago shows J-tube as a feeding tube. They began to note drainage from around it and sent him for a CT scan that showed rectal perforation and possible intraperitoneal perforation. Patient Active Problem List    Diagnosis Date Noted    Gas gangrene ([facility] [address] 07/02/2018    Uncontrolled diabetes mellitus 07/02/2018    Diabetic foot infection ([facility] [address] 07/02/2018     Past Medical History:   Diagnosis Date    Diabetes ([facility] [address]       No past surgical history on file. Social History     Tobacco Use    Smoking status: Never    Smokeless tobacco: Never   Substance Use Topics    Alcohol use: No      No family history on file. Current Facility-Administered Medications   Medication Dose Route Frequency    piperacillin-tazobactam (ZOSYN) 4.5 g in 0.9% sodium chloride (MBP/ADV) 100 mL MBP  4.5 g IntraVENous NOW     Current Outpatient Medications   Medication Sig    acetaminophen (TYLENOL) 325 mg tablet Take 2 Tabs by mouth every four (4) hours as needed. insulin lispro (HUMALOG) 100 unit/mL injection 9 units Breakfast, 11 units with lunch and 9  Units at dinner    insulin glargine (LANTUS) 100 unit/mL injection 26 units QHS SC    ertapenem 1 gram 1 g IVPB 1 g by IntraVENous route every twenty-four (24) hours.     bisacodyl (DULCOLAX) 5 mg EC tablet Take 2 Tabs by mouth daily as needed for Constipation. Allergies   Allergen Reactions    Bee Sting [Sting, Bee] Unknown (comments)       Review of Systems:    Pertinent items are noted in the History of Present Illness. Objective:      Visit Vitals  BP (!) 95/48   Pulse 90   Temp 98.5 °F (36.9 °C)   Resp 13   Ht 5' 7\" (1.702 m)   Wt 68 kg (149 lb 14.6 oz)   SpO2 (!) 89%   BMI 23.48 kg/m²       Physical Exam:  GENERAL: alert, cooperative, no distress, appears stated age, EYE: negative, THROAT & NECK: normal and no erythema or exudates noted. , LUNG: clear to auscultation bilaterally, HEART: regular rate and rhythm, ABDOMEN: Soft very loose skin tenderness around the J-tube site with significant drainage, Rectum-there is a fistula in the prone 12 o'clock position and there is sacral decubitus ulcers that are excoriated with excoriated skin EXTREMITIES: Bilateral BKA, SKIN: Sacral decubitus ulcers with excoriation, NEUROLOGIC: negative, PSYCH: non focal    Imaging:  images and reports reviewed  Preliminary report. 1. Status post jejunostomy tube placement in the left lower quadrant. Significant amount of pneumoperitoneum which may in part be related to jejunostomy placement. No evidence of contrast extravasation. However, given the degree of pneumoperitoneum, cannot exclude viscus injury. 2. Circumferential rectal wall thickening with focal defect and free air within the perirectal region as detailed above. Findings are concerning for rectal wall perforation and/or fistulous connection with the subjacent posterior subcutaneous soft tissues given the degree of inflammatory changes seen. 3. Complex fluid and air collection within the right-sided pleural space given the appearance of a split pleura concerning for empyema. 4. Other nonacute findings as above. I agree with outside CT report. Pneumoperitoneum is out of proportion to recent J tube placement.  Concern for perforation of Jejunum at J tube entry site. Proctitis, Rectal fistula and contained gas are likely old. R pleural empyema or post surgical gas may be subacute or chronic. Lab/Data Review: All lab results for the last 24 hours reviewed. Recent Results (from the past 24 hour(s))   CBC WITH AUTOMATED DIFF    Collection Time: 09/09/22  8:54 PM   Result Value Ref Range    WBC 24.9 (H) 4.1 - 11.1 K/uL    RBC 2.83 (L) 4.10 - 5.70 M/uL    HGB 8.7 (L) 12.1 - 17.0 g/dL    HCT 27.7 (L) 36.6 - 50.3 %    MCV 97.9 80.0 - 99.0 FL    MCH 30.7 26.0 - 34.0 PG    MCHC 31.4 30.0 - 36.5 g/dL    RDW 17.2 (H) 11.5 - 14.5 %    PLATELET 042 (H) 829 - 400 K/uL    MPV 9.7 8.9 - 12.9 FL    NRBC 0.0 0  WBC    ABSOLUTE NRBC 0.00 0.00 - 0.01 K/uL    NEUTROPHILS 89 (H) 32 - 75 %    LYMPHOCYTES 4 (L) 12 - 49 %    MONOCYTES 5 5 - 13 %    EOSINOPHILS 1 0 - 7 %    BASOPHILS 0 0 - 1 %    IMMATURE GRANULOCYTES 1 (H) 0.0 - 0.5 %    ABS. NEUTROPHILS 22.1 (H) 1.8 - 8.0 K/UL    ABS. LYMPHOCYTES 1.0 0.8 - 3.5 K/UL    ABS. MONOCYTES 1.2 (H) 0.0 - 1.0 K/UL    ABS. EOSINOPHILS 0.3 0.0 - 0.4 K/UL    ABS. BASOPHILS 0.0 0.0 - 0.1 K/UL    ABS. IMM. GRANS. 0.3 (H) 0.00 - 0.04 K/UL    DF SMEAR SCANNED      RBC COMMENTS ANISOCYTOSIS  1+       METABOLIC PANEL, COMPREHENSIVE    Collection Time: 09/09/22  8:54 PM   Result Value Ref Range    Sodium 135 (L) 136 - 145 mmol/L    Potassium 3.4 (L) 3.5 - 5.1 mmol/L    Chloride 97 97 - 108 mmol/L    CO2 35 (H) 21 - 32 mmol/L    Anion gap 3 (L) 5 - 15 mmol/L    Glucose 133 (H) 65 - 100 mg/dL    BUN 12 6 - 20 MG/DL    Creatinine 1.80 (H) 0.70 - 1.30 MG/DL    BUN/Creatinine ratio 7 (L) 12 - 20      GFR est AA 48 (L) >60 ml/min/1.73m2    GFR est non-AA 40 (L) >60 ml/min/1.73m2    Calcium 7.7 (L) 8.5 - 10.1 MG/DL    Bilirubin, total 0.7 0.2 - 1.0 MG/DL    ALT (SGPT) <6 (L) 12 - 78 U/L    AST (SGOT) 12 (L) 15 - 37 U/L    Alk.  phosphatase 115 45 - 117 U/L    Protein, total 6.9 6.4 - 8.2 g/dL    Albumin 1.5 (L) 3.5 - 5.0 g/dL Globulin 5.4 (H) 2.0 - 4.0 g/dL    A-G Ratio 0.3 (L) 1.1 - 2.2     SAMPLES BEING HELD    Collection Time: 09/09/22  8:54 PM   Result Value Ref Range    SAMPLES BEING HELD 1 BLUE 1 RED     COMMENT        Add-on orders for these samples will be processed based on acceptable specimen integrity and analyte stability, which may vary by analyte. LACTIC ACID    Collection Time: 09/09/22  9:10 PM   Result Value Ref Range    Lactic acid 1.2 0.4 - 2.0 MMOL/L   OCCULT BLOOD, STOOL    Collection Time: 09/09/22 11:02 PM   Result Value Ref Range    Occult blood, stool Positive (A) NEG         Assessment:Plan    65-year-old male with multiple surgical issues. #1 at most immediate is the leaking J-tube. It is unclear to me why J-tube was chosen as opposed to PEG for his feedings. Additionally he has significant subcutaneous abscess in that area as well. This is also leading to pneumoperitoneum though he is not particularly peritonitis at this point. His second issue is this rectal fistula giving him diarrhea excoriation and making his sacral decubitus ulcers worse. Discussed with patient and family these issues. I think he needs to have his j -tube removed and his abdominal wall drained. We will start off laparoscopically and see if there is free perforation. If there is then the portion where his J-tube is may need to be removed. Additionally as the patient is a high risk surgical candidate given his possible empyema and recent thoracotomy and prolonged intubation we will proceed with colostomy even though this is unlikely his most immediate issue. Risks and benefits of the procedure have been discussed with him and his family at length and they agree to proceed. Operatively may need to be on TPN for some time depending on how he does with eating. Eventually if he really is unable to eat may need to have a PEG tube placed once the abdominal wall is a little more cleaned up.

## 2022-09-10 NOTE — ED NOTES
ED Course as of 09/10/22 0311   Fri Sep 09, 2022   2240 10:40 PM  Change of shift. Care of patient taken over from Dr. Faina Vang; H&P reviewed, bedside handoff complete. Awaiting surgery recommendations. [ZD]   Sat Sep 10, 2022   0014 J-tube was placed at [facility] on September 2nd [ZD]      ED Course User Index  [ZD] Mat Rondon MD     Patient with multiple medical problems including failure to thrive, diabetes with recent foot ulcer, end-stage renal disease on hemodialysis, recent J-tube placed now having complication concern for infection with outpatient CAT scan showing pneumoperitoneum. Had radiologist take a look at outpatient films confirming diagnosis. Patient also has an empyema which has previous history of. Patient started on broad-spectrum antibiotics for concern for sepsis. Patient going to the OR with general surgery and will be admitted to the hospitalist for further work-up and evaluation. Code Sepsis Reassessment & Plan    - Sepsis order set entered: NO  - Broad Spectrum Antibiotics given: Zosyn  - Repeat lactic acid ordered for time N/A  - Re-assessment performed at time 0035 and clinical condition stable.     - Actions taken: Surgery, IVF, ABX, admission.  - Hypotension or Lactic Acidosis present (SBP<90, MAP<65, Lactate >4): NO   - IVF: 30 cc/kg actual Body Weight  - Persistent Septic Shock present (Hypotension despite IVF resuscitation): NO  Vasopressors: Not indicated due to septic shock not present  - Disposition: Admit to Step down    Total critical care time (not including time spent performing separately reportable procedures): 40min                  Recent Results (from the past 24 hour(s))   CBC WITH AUTOMATED DIFF    Collection Time: 09/09/22  8:54 PM   Result Value Ref Range    WBC 24.9 (H) 4.1 - 11.1 K/uL    RBC 2.83 (L) 4.10 - 5.70 M/uL    HGB 8.7 (L) 12.1 - 17.0 g/dL    HCT 27.7 (L) 36.6 - 50.3 %    MCV 97.9 80.0 - 99.0 FL    MCH 30.7 26.0 - 34.0 PG    MCHC 31.4 30.0 - 36.5 g/dL    RDW 17.2 (H) 11.5 - 14.5 %    PLATELET 461 (H) 364 - 400 K/uL    MPV 9.7 8.9 - 12.9 FL    NRBC 0.0 0  WBC    ABSOLUTE NRBC 0.00 0.00 - 0.01 K/uL    NEUTROPHILS 89 (H) 32 - 75 %    LYMPHOCYTES 4 (L) 12 - 49 %    MONOCYTES 5 5 - 13 %    EOSINOPHILS 1 0 - 7 %    BASOPHILS 0 0 - 1 %    IMMATURE GRANULOCYTES 1 (H) 0.0 - 0.5 %    ABS. NEUTROPHILS 22.1 (H) 1.8 - 8.0 K/UL    ABS. LYMPHOCYTES 1.0 0.8 - 3.5 K/UL    ABS. MONOCYTES 1.2 (H) 0.0 - 1.0 K/UL    ABS. EOSINOPHILS 0.3 0.0 - 0.4 K/UL    ABS. BASOPHILS 0.0 0.0 - 0.1 K/UL    ABS. IMM. GRANS. 0.3 (H) 0.00 - 0.04 K/UL    DF SMEAR SCANNED      RBC COMMENTS ANISOCYTOSIS  1+       METABOLIC PANEL, COMPREHENSIVE    Collection Time: 09/09/22  8:54 PM   Result Value Ref Range    Sodium 135 (L) 136 - 145 mmol/L    Potassium 3.4 (L) 3.5 - 5.1 mmol/L    Chloride 97 97 - 108 mmol/L    CO2 35 (H) 21 - 32 mmol/L    Anion gap 3 (L) 5 - 15 mmol/L    Glucose 133 (H) 65 - 100 mg/dL    BUN 12 6 - 20 MG/DL    Creatinine 1.80 (H) 0.70 - 1.30 MG/DL    BUN/Creatinine ratio 7 (L) 12 - 20      GFR est AA 48 (L) >60 ml/min/1.73m2    GFR est non-AA 40 (L) >60 ml/min/1.73m2    Calcium 7.7 (L) 8.5 - 10.1 MG/DL    Bilirubin, total 0.7 0.2 - 1.0 MG/DL    ALT (SGPT) <6 (L) 12 - 78 U/L    AST (SGOT) 12 (L) 15 - 37 U/L    Alk. phosphatase 115 45 - 117 U/L    Protein, total 6.9 6.4 - 8.2 g/dL    Albumin 1.5 (L) 3.5 - 5.0 g/dL    Globulin 5.4 (H) 2.0 - 4.0 g/dL    A-G Ratio 0.3 (L) 1.1 - 2.2     SAMPLES BEING HELD    Collection Time: 09/09/22  8:54 PM   Result Value Ref Range    SAMPLES BEING HELD 1 BLUE 1 RED     COMMENT        Add-on orders for these samples will be processed based on acceptable specimen integrity and analyte stability, which may vary by analyte.    LACTIC ACID    Collection Time: 09/09/22  9:10 PM   Result Value Ref Range    Lactic acid 1.2 0.4 - 2.0 MMOL/L   OCCULT BLOOD, STOOL    Collection Time: 09/09/22 11:02 PM   Result Value Ref Range    Occult blood, stool Positive (A) NEG         XR CHEST PORT    Result Date: 9/9/2022  EXAM: XR CHEST PORT INDICATION: abdominal pain COMPARISON: None. FINDINGS: A portable AP radiograph of the chest was obtained at 2237 hours. The patient is on a cardiac monitor. There is a right subclavian line and a right IJ line, both of which terminate in the upper right atrium. There is no pneumothorax. There is a right pleural effusion with associated accentuation of the right lung interstitial markings. The heart size is normal. The bones and soft tissues are grossly within normal limits. Lines as described. No new pneumothorax. Right pleural effusion       Perfect Serve Consult for Admission  1:16 AM    ED Room Number: ER07/07  Patient Name and age:  Micheal Dennis 45 y.o.  male  Working Diagnosis:   1. Pneumoperitoneum    2. Diarrhea, unspecified type    3. Abdominal pain, LLQ (left lower quadrant)    4. Rectal fistula    5. Sepsis, due to unspecified organism, unspecified whether acute organ dysfunction present ([address]    6. Type 2 diabetes mellitus with other specified complication, unspecified whether long term insulin use ([address]    7. Chronic anemia    8. Failure to thrive in adult    9. ESRD (end stage renal disease) ([facility] [address]        COVID-19 Suspicion:  no  Sepsis present:  yes  Reassessment needed: yes  Code Status:  Full Code  Readmission: no  Isolation Requirements:  no  Recommended Level of Care:  step down  Department:[facility] Adult ED - 21   Other:         Total critical care time (not including time spent performing separately reportable procedures): 45min

## 2022-09-10 NOTE — ED NOTES
TRANSFER - OUT REPORT:    Verbal report given to Nicholas Haddad RN(name) on Violeta Weathers  being transferred to [facility] for routine progression of care       Report consisted of patients Situation, Background, Assessment and   Recommendations(SBAR). Information from the following report(s) SBAR, Kardex, ED Summary, [facility] and Recent Results was reviewed with the receiving nurse. Lines:   PICC Double Lumen 09/09/22 Right (Active)       Double Lumen 09/09/22 Anterior;Right Neck (Active)        Opportunity for questions and clarification was provided.       Patient transported with:   Monitor

## 2022-09-10 NOTE — OP NOTES
Operative Note    Patient: Colletta Clamp MRN: [id number]  SSN: [id number]    YOB: 1970  Age: 45 y.o. Sex: male      Date of Surgery: 9/10/2022     Preoperative Diagnosis: FREE AIR AND PERIRECTAL FISTULA     Postoperative Diagnosis: ABDOMINAL WALL ABCESS FROM LEAKING J TUBE AND PERIRECTAL FISTULA     Surgeon(s) and Role:     Nahum Ortega MD - Primary    Surgical Assistant:     Anesthesia: General     Procedure: Procedure(s):  LAPAROSCOPY GENERAL DIAGNOSTIC, LAPAROSCOPIC  TAKEDOWN AND REMOVAL J-TUBE, LAPAROSCOPIC COLOSTOMY, I & D ABDOMINAL WALL    Indications: The patient was admitted to the hospital with free air. He recently underwent NG tube placement. His white blood cell count had been going up he had a lot of of purulent and feculent drainage from his G-tube site. CT scan was concerning for free perforation secondary to the J-tube. Additionally the patient was noted to have a perianal fistula with leakage and excoriation of his skin with sacral decubitus ulcer. .   Exploration and colostomy for the above      Discussed the risk of surgery including infection, hematoma, bleeding, bile duct or bowel injury,The patient understands the risk that the procedure could be an open procedure. The patient understands the risks, any and all questions were answered to the patient's satisfaction, and they freely signed the consent for operation. Procedure in Detail: Patient was brought to the operating placed operative table supine position. General endotracheal anesthesia was then established. He was then prepped and draped in usual sterile fashion. 5 mm right subcostal incision was then made. A 5 mm Optiview trocar was then placed. The abdomen was insufflated to 15 mmHg an additional 5 mm and eventually a 12 mm trocar were all placed on the right side of the abdomen. The J-tube site was identified. There did not appear to be any leaking from it.   However given the amount of subcutaneous air and abscess from the tube itself the decision was made to remove it. Skin stitch at the tube was then cut and the tube was removed. The silk sutures holding up the bowel were then removed. We were able to free the piece of bowel off of the abdominal wall that the only thing holding up to the abdominal wall was the tube insertion site that was leaking into the abdominal wall. We ensured that this was all 1 piece of bowel and this was only one edge of it going up to the abdominal wall. We then introduced an Endo JUDITH blue load and fired thus freeing the bowel completely from the abdominal wall and closing the defect. We then inspected the bowel that we had removed and there did not appear to be any stenosis or leaking. Checked this several times throughout the case. The remaining portion of bowel that was stuck up into the abdominal wall was then removed from the abdominal wall placed in an Endobag and taken out of the abdomen. As previously noted the patient had a perirectal fistula that was excoriating his skin and constantly draining. We had decided to do a colostomy in order to help wound healing. The distal sigmoid colon was identified. We also identified an area on the abdominal wall that seemed amenable for the colostomy. A window was made in the distal sigmoid colon mesentery and a JUDITH blue load 2 firings were introduced and fired. We then mobilized the mesentery using the harmonic scalpel and cautery. We did have to mobilize the colon down off the white line of Toldt. Eventually we were able to free it up such that it was able to come up to our area for colostomy without tension. The distal stump was marked using clips. We irrigated no bleeding or leakage was seen. Once we had freed up the bowel we then turned our attention to the skin. The area was first cut circumferentially with a knife we then dissected down through the abdominal wall until we got onto the fascia.   We then once at the fascia made a cruciate incision and then once through that opened up the peritoneum. We initially brought the bowel up but it seems somewhat tight. We then began to open up the fascia a little bit more. During this we damaged it is assumed to be the epigastric vessel on that side. There was some bleeding. This was grabbed using clamps. Once the bleeding was under control. The vessel was tied off using 3-0 Vicryl suture. No further bleeding from this was identified. We then brought the bowel out through the whole and it seemed to come through easily without any sign of tension. We then turned our attention back into the abdomen and irrigated no further bleeding was identified we once again checked the bowel from the J-tube resection and this seemed to be okay. No bleeding was seen from the epigastric at this point. We then turned our attention to the abdominal wall where the abscess was. This was opened. There appeared to be some gangrenous soft tissue within the abdominal wall presumably from soilage from the J-tube. This was resected using cautery. We then turned our attention to the colostomy. Along the staple line we opened up the colon. The colostomy was then matured using 3-0 Vicryl suture grabbing skin seromuscular and mucosa. At the end of the procedure it appeared to be pink and viable. We once again looked inside did not appear to be any tension on the ostomy. The 12 mm trocar was removed and the fascia was closed using 0 Vicryl suture and the Endo fascial closure device. The abdomen was desufflated and the other trochars were removed. The ostomy appliance was applied the abscess site was packed with gauze staples and dressings were applied.   Patient was woken the OR and taken to PACU in stable condition    Estimated Blood Loss:  100    Findings  J-tube leaking into the abdominal wall  Rectal fistula    Tourniquet Time: * No tourniquets in log *      Implants: * No implants in log *            Specimens:   ID Type Source Tests Collected by Time Destination   1 : J-tube insertion sited Fresh Jejunum  Shae George MD 9/10/2022 4619 Pathology           Drains: None                Complications: None    Counts: Sponge and needle counts were correct times two.

## 2022-09-10 NOTE — PROGRESS NOTES
[address] [facility] Adult  Hospitalist Group                                                                                          Hospitalist Progress Note  Puja Pulliam MD  Answering service: [phone] or [phone] from in house phone        Date of Service:  9/10/2022  NAME:  Kevin Chris  :  1970  MRN:  [id number]      Admission Summary: This is a 76-year-old man with a past medical history significant for type 2 diabetes, who presented at the emergency room from [address] [place] [address] with abdominal pain. The patient was recently admitted to another hospital and underwent left below-knee amputation. The hospitalization became complicated with respiratory failure which required prolonged intubation. The respiratory failure was attributed to aspiration pneumonia. The hospitalization was also complicated with lobulated effusion, for which the patient underwent decortication and right thoracotomy. The patient also went into acute renal failure for which he has been started on hemodialysis. J-tube was placed for supplemental nutrition. The patient was subsequently transferred to [address] [place] [address] for continuation of care. He was doing relatively well in [address] [place] [address] until the day of presentation at the emergency room when the patient complained of abdominal pain at the facility. CT scan of the abdomen and pelvis was obtained. The CT scan shows evidence of bowel perforation. The patient was then sent to [facility] emergency room for further evaluation. When the patient arrived at the emergency room, based on his clinical presentation and lab work, Code Sepsis was triggered. The patient received fluid therapy as per sepsis protocol. The emergency room physician consulted general surgeon on-call. The patient was seen by the surgeon and the patient is planned for immediate surgical intervention.   No record of prior admission to this hospital, but the patient was recently admitted to another hospital.    Interval history / Subjective:   Patient seen and examined for follow up of perforated bowel. Patient is hypotensive and in pain. Pain treated within limits of BP. He has improved after a unit of PRBC. He is somnolent. Wife at bedside. Assessment & Plan:     Intractable abdominal pain  LAPAROSCOPY GENERAL DIAGNOSTIC, LAPAROSCOPIC  TAKEDOWN AND REMOVAL J-TUBE, LAPAROSCOPIC COLOSTOMY, I & D ABDOMINAL WALL today  [id number] zosyn  Continue pain control    Pneumoperitoneum  As above  General surgery on board    Abdominal wall abscess  S/p drainage  Continue zosyn    Possible empyema  H/o recent thoracotomy and prolonged intubation  Continue antibiotics  Pulmonology consult  patient may require chest tube placement. Rectal fistula  S/p surgery    B/L AKA  Stable    Severe sepsis  Resolved  Continue antibiotics    Anemia  Transfused 1 unit PRBC    HTN  Holding    DM2  SSI    Suspected acute GIB  GI consulted    Diarrhea  C. Diff     Code status: full  Prophylaxis: hold, SCDs  Care Plan discussed with: patient, family, nurse  Anticipated Disposition: TBD     Hospital Problems  Date Reviewed: 9/10/2022            Codes Class Noted POA    * (Principal) Intra-abdominal infection ICD-10-CM: B99.9  ICD-9-CM: 136.9  9/10/2022 Yes             Review of Systems:   A comprehensive review of systems was negative except for that written in the HPI. Vital Signs:    Last 24hrs VS reviewed since prior progress note.  Most recent are:  Visit Vitals  BP (!) 121/59   Pulse 72   Temp 98.8 °F (37.1 °C)   Resp 17   Ht 5' 7\" (1.702 m)   Wt 68 kg (149 lb 14.6 oz)   SpO2 100%   BMI 23.48 kg/m²         Intake/Output Summary (Last 24 hours) at 9/10/2022 1815  Last data filed at 9/10/2022 1700  Gross per 24 hour   Intake 2539.17 ml   Output 100 ml   Net 2439.17 ml        Physical Examination:     I had a face to face encounter with this patient and independently examined them on 9/10/2022 as outlined below:          Constitutional:  No acute distress, somnolent   ENT:  Oral mucosa moist, oropharynx benign. Resp:  CTA bilaterally. No wheezing/rhonchi/rales. No accessory muscle use. CV:  Regular rhythm, normal rate, no murmurs, gallops, rubs    GI:  Colostomy. Painful to palpation. Musculoskeletal:  No edema, warm, 2+ pulses throughout    Neurologic:  Moves all extremities. AAOx3, CN II-XII reviewed            Data Review:    Review and/or order of clinical lab test  Review and/or order of tests in the radiology section of CPT  Review and/or order of tests in the medicine section of CPT      Labs:     Recent Labs     09/10/22  1200 09/09/22  2054   WBC 19.7* 24.9*   HGB 6.9* 8.7*   HCT 22.5* 27.7*    463*     Recent Labs     09/10/22  1200 09/09/22  2054    135*   K 3.0* 3.4*    97   CO2 27 35*   BUN 17 12   CREA 2.16* 1.80*   GLU 95 133*   CA 7.4* 7.7*   MG 1.5*  --    PHOS 1.3*  --      Recent Labs     09/10/22  1200 09/09/22  2054   ALT <6* <6*   AP 86 115   TBILI 0.7 0.7   TP 5.8* 6.9   ALB 1.7* 1.5*   GLOB 4.1* 5.4*   LPSE 54*  --      No results for input(s): INR, PTP, APTT, INREXT in the last 72 hours. Recent Labs     09/10/22  1200   TIBC 141*   PSAT 9*   FERR 715*      Lab Results   Component Value Date/Time    Folate 4.1 (L) 09/10/2022 12:00 PM      No results for input(s): PH, PCO2, PO2 in the last 72 hours. No results for input(s): CPK, CKNDX, TROIQ in the last 72 hours.     No lab exists for component: CPKMB  No results found for: CHOL, CHOLX, CHLST, CHOLV, HDL, HDLP, LDL, LDLC, DLDLP, TGLX, TRIGL, TRIGP, CHHD, CHHDX  Lab Results   Component Value Date/Time    Glucose (POC) 112 09/10/2022 05:11 PM    Glucose (POC) 100 09/10/2022 12:23 PM    Glucose (POC) 163 (H) 09/10/2022 07:36 AM    Glucose (POC) 151 (H) 09/10/2022 06:02 AM    Glucose (POC) 106 (H) 07/06/2018 12:15 PM     No results found for: COLOR, APPRN, SPGRU, REFSG, WILL, PROTU, GLUCU, KETU, BILU, UROU, MISHA, Ayo Ma, EPSU, BACTU, WBCU, RBCU, CASTS, UCRY      Medications Reviewed:     Current Facility-Administered Medications   Medication Dose Route Frequency    0.9% sodium chloride infusion 250 mL  250 mL IntraVENous PRN    lactated Ringers infusion  75 mL/hr IntraVENous CONTINUOUS    HYDROmorphone (DILAUDID) injection 0.5-1 mg  0.5-1 mg IntraVENous Q3H PRN    piperacillin-tazobactam (ZOSYN) 3.375 g in 0.9% sodium chloride (MBP/ADV) 100 mL MBP  3.375 g IntraVENous Q8H    glucose chewable tablet 16 g  4 Tablet Oral PRN    glucagon (GLUCAGEN) injection 1 mg  1 mg IntraMUSCular PRN    dextrose 10 % infusion 0-250 mL  0-250 mL IntraVENous PRN    ondansetron (ZOFRAN) injection 4 mg  4 mg IntraVENous Q4H PRN    diphenhydrAMINE (BENADRYL) injection 12.5 mg  12.5 mg IntraVENous Q6H PRN    sodium chloride (NS) flush 5-40 mL  5-40 mL IntraVENous Q8H    sodium chloride (NS) flush 5-40 mL  5-40 mL IntraVENous PRN    acetaminophen (TYLENOL) tablet 650 mg  650 mg Oral Q6H PRN    Or    acetaminophen (TYLENOL) suppository 650 mg  650 mg Rectal Q6H PRN    polyethylene glycol (MIRALAX) packet 17 g  17 g Oral DAILY PRN    ondansetron (ZOFRAN ODT) tablet 4 mg  4 mg Oral Q8H PRN    Or    ondansetron (ZOFRAN) injection 4 mg  4 mg IntraVENous Q6H PRN    L.acidophilus-paracasei-S.thermophil-bifidobacter (RISAQUAD) 8 billion cell capsule  1 Capsule Oral DAILY    [START ON 9/11/2022] vancomycin (VANCOCIN) 1,000 mg in 0.9% sodium chloride 250 mL ([id number])  1 g IntraVENous Q24H    insulin lispro (HUMALOG) injection   SubCUTAneous AC&HS    glucose chewable tablet 16 g  4 Tablet Oral PRN    glucagon (GLUCAGEN) injection 1 mg  1 mg IntraMUSCular PRN    pantoprazole (PROTONIX) 40 mg in 0.9% sodium chloride 10 mL injection  40 mg IntraVENous Q12H    dextrose 10 % infusion 0-250 mL  0-250 mL IntraVENous PRN    Vancomycin - pharmacy to dose   Other Rx Dosing/Monitoring    0.9% sodium chloride infusion 250 mL  250 mL IntraVENous PRN ______________________________________________________________________  EXPECTED LENGTH OF STAY: - - -  ACTUAL LENGTH OF STAY:          0                 Angie Hickman MD

## 2022-09-10 NOTE — ANESTHESIA POSTPROCEDURE EVALUATION
Procedure(s):  LAPAROSCOPY GENERAL DIAGNOSTIC, LAPAROSCOPIC  TAKEDOWN AND REMOVAL J-TUBE, LAPAROSCOPIC COLOSTOMY, I & D ABDOMINAL WALL. general    Anesthesia Post Evaluation      Multimodal analgesia: multimodal analgesia not used between 6 hours prior to anesthesia start to PACU discharge  Patient location during evaluation: bedside  Patient participation: complete - patient participated  Level of consciousness: awake and sleepy but conscious  Pain score: 0  Pain management: adequate  Airway patency: patent  Anesthetic complications: no  Cardiovascular status: acceptable  Respiratory status: acceptable  Hydration status: acceptable  Post anesthesia nausea and vomiting:  none  Final Post Anesthesia Temperature Assessment:  Normothermia (36.0-37.5 degrees C)      INITIAL Post-op Vital signs:   Vitals Value Taken Time   /73 09/10/22 0600   Temp 36.8 °C (98.3 °F) 09/10/22 0540   Pulse 72 09/10/22 0613   Resp 17 09/10/22 0613   SpO2 100 % 09/10/22 0613   Vitals shown include unvalidated device data.

## 2022-09-10 NOTE — PERIOP NOTES
0716 TRANSFER - OUT REPORT:    Verbal report given to DOMINGA Navarro(name) on Owen Marrow  being transferred to Room 442(unit) for routine post - op       Report consisted of patients Situation, Background, Assessment and   Recommendations(SBAR). Time Pre op antibiotic given:0302  Anesthesia Stop time: 0533    Information from the following report(s) SBAR, Kardex, ED Summary, OR Summary, Intake/Output, MAR, Recent Results, and Cardiac Rhythm Sinus Rhythm with PAC's  was reviewed with the receiving nurse. Opportunity for questions and clarification was provided. Is the patient on 02? YES       L/Min 2       Other     Is the patient on a monitor? YES    Is the nurse transporting with the patient? YES    Surgical Waiting Area notified of patient's transfer from PACU? NO  MD updated family post op      The following personal items collected during your admission accompanied patient upon transfer:   Dental Appliance: Dental Appliances: None  Vision:    Hearing Aid:    Jewelry:    Clothing:    Other Valuables:    Valuables sent to safe:      Stool sent from ostomy bag for C-Diff. Pt ordered to be on enteric precautions.

## 2022-09-10 NOTE — PROGRESS NOTES
I agree with outside CT report. Pneumoperitoneum is out of proportion to recent J tube placement. Concern for perforation of Jejunum at J tube entry site. Proctitis, Rectal fistula and contained gas are likely old. R pleural empyema or post surgical gas may be subacute or chronic. I d/w Dr Judy Jim.

## 2022-09-10 NOTE — H&P
[address] [facility]  HISTORY AND PHYSICAL    Name:  Eliezer Velasco  MR#:  [id number]  :  1970  ACCOUNT #:  [de-identified]  ADMIT DATE:  2022      The patient was seen, evaluated, and admitted by me on 09/10/2022. PRIMARY CARE PHYSICIAN:  Unknown. SOURCE OF INFORMATION:  Patient and review of ED and old electronic medical record. CHIEF COMPLAINT:  Abdominal pain. HISTORY OF PRESENT ILLNESS:  This is a 58-year-old man with a past medical history significant for type 2 diabetes, who presented at the emergency room from [facility] with abdominal pain. The patient was recently admitted to another hospital and underwent left below-knee amputation. The hospitalization became complicated with respiratory failure which required prolonged intubation. The respiratory failure was attributed to aspiration pneumonia. The hospitalization was also complicated with lobulated effusion, for which the patient underwent decortication and right thoracotomy. The patient also went into acute renal failure for which he has been started on hemodialysis. J-tube was placed for supplemental nutrition. The patient was subsequently transferred to [facility] for continuation of care. He was doing relatively well in [facility] until the day of presentation at the emergency room when the patient complained of abdominal pain at the facility. CT scan of the abdomen and pelvis was obtained. The CT scan shows evidence of bowel perforation. The patient was then sent to [facility] emergency room for further evaluation. When the patient arrived at the emergency room, based on his clinical presentation and lab work, Code Sepsis was triggered. The patient received fluid therapy as per sepsis protocol. The emergency room physician consulted general surgeon on-call. The patient was seen by the surgeon and the patient is planned for immediate surgical intervention.   No record of prior admission to this hospital, but the patient was recently admitted to another hospital.    PAST MEDICAL HISTORY:  Type 2 diabetes. ALLERGIES:  THE PATIENT IS ALLERGIC TO BEE-STING. MEDICATIONS:  1. Bactrim Double Strength 1 tablet twice daily. 2.  Sucralfate 1 g, dosage as directed. 3.  Sodium bicarbonate 650 mg daily. 4.  Zantac 150 mg twice daily. 5.  Potassium chloride 20 mEq daily. 6.  Percocet 5/325 one tablet every 8 hours as needed for pain. 7.  Zofran 4 mg 3 times daily as needed for nausea. 8.  Prilosec 40 mg daily. 9.  Losartan/hydrochlorothiazide 100/25 one tablet daily. 10.  Lisinopril 20 mg daily. 11.  Neurontin 100 mg twice daily. 12.  Lasix 40 mg daily. 13.  Norvasc 10 mg daily. FAMILY HISTORY:  This was reviewed. His father had hypertension. PAST SURGICAL HISTORY:  This is significant for bilateral above-knee amputation. SOCIAL HISTORY:  No recent history of alcohol or tobacco abuse. REVIEW OF SYSTEMS:  HEAD, EYES, EARS, NOSE, AND THROAT:  No headache, no dizziness, no blurring of vision, no photophobia. RESPIRATORY:  This is positive for shortness of breath. No cough, no hemoptysis. CARDIOVASCULAR:  No chest pain, no orthopnea, no palpitation. GASTROINTESTINAL:  This is positive for abdominal pain and nausea. No vomiting. No diarrhea. No constipation. GENITOURINARY:  No dysuria, no urgency, and no frequency. All other systems are reviewed and they are negative. PHYSICAL EXAMINATION:  GENERAL APPEARANCE:  The patient appeared ill, in moderate distress. VITAL SIGNS:  On arrival at the emergency room, temperature 98.5, pulse 90, respiratory rate 22, blood pressure 115/52, and oxygen saturation 100% on room air. HEAD;  Normocephalic, atraumatic. EYES:  Normal eye movement. No redness, no drainage, no discharge. EARS:  Normal external ears with no obvious drainage. NOSE:  No deformity and no drainage. MOUTH AND THROAT:  No visible oral lesion. Dry oral mucosa. NECK:  Neck is supple. No JVD, no thyromegaly. CHEST:  Clear breath sounds. No wheezing, no crackles. HEART:  Normal S1 and S2, regular. No clinically appreciable murmur. ABDOMEN:  Diffuse tenderness. No rebound tenderness. No guarding. Slight drainage from the J-tube placement. CENTRAL NERVOUS SYSTEM:  Alert and oriented x3. No gross focal neurological deficit. EXTREMITIES:  Bilateral above-knee amputation noted. MUSCULOSKELETAL:  Bilateral above-knee amputation noted. SKIN:  Sacral decubitus ulcer noted. PSYCHIATRIC:  Normal mood and affect. LYMPHATIC:  No cervical lymphadenopathy. DIAGNOSTIC DATA:  Chest x-ray shows right pleural effusion, no pneumothorax. CT scan of the abdomen and pelvis done at an outside facility shows findings suggestive of bowel perforation. LABORATORY DATA:  Hematology, WBC 24.9, hemoglobin 8.7, hematocrit 27.7, platelets 553. Lactic acid level 1.2. Chemistry, sodium 135, potassium 3.4, chloride 97, CO2 of 35, glucose 133, BUN 12, creatinine 1.80, calcium 7.7. Total bilirubin 0.7, ALT less than 6, AST 12, alkaline phosphatase 115, total protein 6.9, albumin level 1.5, globulin 5.4. Stool occult blood positive. ASSESSMENT:  1. Intra-abdominal infection. 2.  Perforated bowel. 3.  Acute renal failure, on dialysis. 4.  Type 2 diabetes. 5.  Suspected acute gastrointestinal bleed. 6.  Acute blood loss anemia. 7.  Sacral decubitus ulcer. 8.  Thrombocytosis. 9.  Hypokalemia. 10.  Empyema. 11.  Bilateral above-knee amputation. 12.  Diarrhea. 13.  Suspected rectal fistula. 14.  Severe sepsis without septic shock. 15.  Hypertension. PLAN:  1. Intractable abdominal pain. We will admit the patient for further evaluation and treatment. This is THE most likely source of the patient's sepsis which will be discussed below. The patient will be started on vancomycin and Zosyn. We will await blood culture results. We will await further recommendation from the surgeon.   2. Perforated bowel. The patient has been scheduled for surgery for the perforated bowel. We will await further recommendation from the surgeon. 3.  Acute renal failure, on dialysis. The patient was recently started dialysis because of the acute renal failure. Nephrology group will be consulted for continuation of treatment during hospitalization. 4.  Type 2 diabetes. The patient will be placed on sliding scale with insulin coverage. We will check hemoglobin A1c level. 5.  Suspected acute GI bleed. The patient is guaiac positive. Gastroenterology consult will be requested to assist in further evaluation and treatment. We will start the patient on Protonix. The patient will be n.p.o. in anticipation of intervention by Gastroenterology. 6.  Acute blood loss anemia. We will monitor the patient's hemoglobin and hematocrit closely. We will transfuse as indicated. We will also carry out anemia workup. 7.  Sacral decubitus ulcer. Wound Care consult will be requested for local wound care. 8.  Thrombocytosis. This is most likely reactive thrombocytosis. We will monitor the patient's platelet count. 9.  Hypokalemia. We will replete potassium and repeat potassium level. We will also check magnesium level. 10.  Empyema. This is based on CT scan of the abdomen and pelvis result. We will obtain CT scan of the chest for further evaluation. Depending on the result of the CT scan of the chest, the patient may require chest tube placement. 11.  Bilateral above-knee amputation. This is secondary to recurrent infection. We will continue to monitor. 12.  Diarrhea. We will send stool for C. diff antigen. 13.  Suspected rectal fistula. The patient may require Colorectal Surgery consult eventually. 14.  Severe sepsis without septic shock. As stated above, intra-abdominal infection is the most likely source.   We will cover the patient with vancomycin and Zosyn while awaiting blood culture as well as urine culture results. We will also await the results of CT scan of the chest to evaluate the patient for other possible sources of infection. 15.  Hypertension. We will hold antihypertensive medication in the setting suspected sepsis and monitor the patient's blood pressure closely. 16.  Other issues; Code status, the patient is a full code. The patient has bilateral above-knee amputation. The patient will not be able to have compression stocking for DVT prophylaxis. FUNCTIONAL STATUS PRIOR TO ADMISSION; The patient came from [facility].  The patient is ambulatory with assistive device. COVID PRECAUTION:  The patient was wearing a face mask. I was wearing a face mask and gloves for this patient's encounter. SEPSIS REASSESSMENT:  This was completed at 01:00 a.m. on 09/10/2022. The patient has improved cardiopulmonary examination and normal capillary refill.         Che Segura MD      [id number]  D:  09/10/2022 5:52  T:  09/10/2022 7:27  JOB #:  [id number]

## 2022-09-10 NOTE — ED PROVIDER NOTES
This is a 50-year-old male with a history of diabetes. He was sent here by [facility] hospital for further evaluation by surgery for possible bowel perforation and/or rectal perforation. He has chronic renal disease and type 2 diabetes. He presents with right BKA and a chronic left leg wound from an amputation as well. These were as result of diabetic foot ulcers. Patient had developed explosive diarrhea and had been on antibiotics including vancomycin and cefepime in mid-to-late August.  Wai Nicole did an aspiration pneumonia with respiratory failure that required ventilation. Was eventually extubated had a loculated effusion in mid August and had a partial decortication and right thoracotomy. Patient has had multiple transfusions and developed gastritis and esophagitis was put on PPIs. He was admitted to [facility] for wounds and malnutrition. It is also noted that the patient has excoriated lesions around the sacrum and rectum due to this persistent diarrhea. Today, the patient was noted to have a white cell count of 20,000 with a hemoglobin of 8.7. His creatinine was 3.3 and BUN was 33. Electrolytes otherwise normal.  He had a CT done today at [facility] because of concerns of possible leakage from the PEG tube. There was some pus noted coming from around the PEG tube. Not running any fever. He was tender around the PEG tube site and subsequently underwent a CT of the abdomen pelvis with oral contrast only. He continues to have diarrhea and was noted to have some components in the stool that appeared to be food. Based on the CT, [facility] decided to send the patient to [facility] for surgical consultation being concerned that there was perforated bowel. It is also noted the patient has wounds in the sacral area which are chronic in nature. He continues to have diarrhea. Short, the patient is sent here for surgical consultation.        Past Medical History:   Diagnosis Date    Diabetes ([address]        No past surgical history on file. No family history on file. 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 He has 1 child and a wife. Social Determinants of Health     Financial Resource Strain: Not on file   Food Insecurity: Not on file   Transportation Needs: Not on file   Physical Activity: Not on file   Stress: Not on file   Social Connections: Not on file   Intimate Partner Violence: Not on file   Housing Stability: Not on file         ALLERGIES: Bee sting [sting, bee]    Review of Systems   Constitutional:  Negative for activity change, appetite change, chills, fatigue and fever. HENT:  Negative for ear pain, facial swelling, sore throat and trouble swallowing. Eyes:  Negative for pain, discharge and visual disturbance. Respiratory:  Negative for chest tightness, shortness of breath and wheezing. Cardiovascular:  Negative for chest pain and palpitations. Gastrointestinal:  Positive for abdominal pain, diarrhea and rectal pain. Negative for blood in stool, nausea and vomiting. Genitourinary:  Negative for difficulty urinating, flank pain and hematuria. Musculoskeletal:  Negative for arthralgias, joint swelling, myalgias and neck pain. Persistent stump pain     Skin:  Negative for color change and rash. Neurological:  Negative for dizziness, weakness, numbness and headaches. Hematological:  Negative for adenopathy. Does not bruise/bleed easily.    Psychiatric/Behavioral:  Negative for behavioral problems, confusion and sleep disturbance. Depression     All other systems reviewed and are negative. Vitals:    09/09/22 2008   BP: (!) 115/52   Pulse: 90   Resp: 22   Temp: 98.5 °F (36.9 °C)   SpO2: 100%   Weight: 68 kg (149 lb 14.6 oz)   Height: 5' 7\" (1.702 m)            Physical Exam  Vitals and nursing note reviewed. Constitutional:       General: He is in acute distress (With abdominal pain). Appearance: He is well-developed. He is ill-appearing (Chronic). Comments: This is a bilateral amputee whose patient currently at [facility] and was sent here specifically for evaluation of perforated viscus. Vital signs are as noted. HENT:      Head: Normocephalic and atraumatic. Nose: Nose normal.      Mouth/Throat:      Mouth: Mucous membranes are moist.   Eyes:      General: No scleral icterus. Conjunctiva/sclera: Conjunctivae normal.      Pupils: Pupils are equal, round, and reactive to light. Neck:      Thyroid: No thyromegaly. Vascular: No JVD. Trachea: No tracheal deviation. Comments: No carotid bruits noted. Cardiovascular:      Rate and Rhythm: Normal rate and regular rhythm. Heart sounds: Normal heart sounds. No murmur heard. No friction rub. No gallop. Pulmonary:      Effort: Pulmonary effort is normal. No respiratory distress. Breath sounds: Normal breath sounds. No wheezing, rhonchi or rales. Comments: Some diminished breath sounds on the right. Chest:      Chest wall: No tenderness. Abdominal:      General: Abdomen is flat. There is no distension. Palpations: Abdomen is soft. There is no mass. Tenderness: There is abdominal tenderness (PEG tube is noted in the right upper quadrant and there is localized pain around the base of the same. Pain LLQ and Suprapubically with gurding). There is guarding (Some voluntary guarding is noted). There is no rebound. Comments:  Bowel sounds are diminished   Genitourinary: Comments: There are sacral partial-thickness ulcerations noted. These are erythematous with some degree of eschar. Is extending down towards the rectum  There appears to be a rectal tear/fistula at 7 o'clock position. Could represent rectal perf and fistula  Musculoskeletal:         General: No tenderness. Cervical back: Normal range of motion and neck supple. Comments: Bilateral lower leg amputations noted. Pressure wound dressing on the left stump   Lymphadenopathy:      Cervical: No cervical adenopathy. Skin:     General: Skin is warm and dry. Capillary Refill: Capillary refill takes 2 to 3 seconds. Coloration: Skin is pale. Findings: No erythema or rash. Neurological:      General: No focal deficit present. Mental Status: He is alert and oriented to person, place, and time. Cranial Nerves: No cranial nerve deficit. Coordination: Coordination normal.      Deep Tendon Reflexes: Reflexes are normal and symmetric. Psychiatric:         Mood and Affect: Mood normal.         Behavior: Behavior normal.         Thought Content: Thought content normal.         Judgment: Judgment normal.        MDM     Amount and/or Complexity of Data Reviewed  Clinical lab tests: reviewed and ordered  Decide to obtain previous medical records or to obtain history from someone other than the patient: yes  Review and summarize past medical records: yes  Discuss the patient with other providers: yes    Risk of Complications, Morbidity, and/or Mortality  Presenting problems: high  Diagnostic procedures: high  Management options: high    Patient Progress  Patient progress: stable    ED Course as of 09/09/22 2248   Fri Sep 09, 2022   2240 10:40 PM  Change of shift. Care of patient taken over from Dr. Cheyanne Crowe; H&P reviewed, bedside handoff complete. Awaiting surgery recommendations.    [ZD]      ED Course User Index  [ZD] Imelda Camacho MD       Procedures    Is noted that the patient has been on vancomycin Monday Wednesday and Friday of this week and Azactam every day this week. Consult is placed for general surgery. Lauriemarkus Mable is attempting to send fax a copy of the CT scan report to the hospital here. I have spoken with Dr. Genie Rodriguez with general surgery and he would like repeat CT of the abdomen and pelvis since Osvaldo did not send any of the films or documentation with the patient. We will add 25 to 50 cc of water-soluble contrast to the PEG tube for scan. Dr. Elena Jimenez will see. Rectal contrast is also ordered for the scan. Patient may require colorectal surgery if fistula/rectal perf documented. Change of shift, care turned over to Dr. Linh Matthew.

## 2022-09-10 NOTE — CONSULTS
[facility]   [address] [facility], [address] [place] [address]  Phone: [phone]   [phone] NOTE     Patient: Colletta Clamp MRN: [id number]  PCP: Yuan Nguyen MD   :     1970  Age:   45 y.o. Sex:  male      Referring physician: Denzel Husain, Jennifer Ba, *  Reason for consultation: 45 y.o. male with Intra-abdominal infection [T42.1] complicated by JEMIMA   Admission Date: 2022  8:00 PM  LOS: 0 days      ASSESSMENT and PLAN :   1 Jemima/on HD Now   2 S/p lap sx for J tube removal and laproscopic Colostomy, I and D  of abd wall   3 Severe PCM   4 Anemia of acute blood loss   5 Sepsis  6 Empyema   7 h/o HTN   8 DM   9 Sacral decub   10 Above knee amputation     Plan  1 No acute indication for HD at this time   2 Plan HD Monday, unless dictated by labs and exam earlier   3 follow In and out   4 Has temp Kurt cath and need cath care  5 epogen eventually to assist with anemia   6 nutrition support   7 Nep will follow     Care Plan discussed with:  pts wife         Thank you for consulting [facility] in the care of your patient. Subjective:   HPI: Colletta Clamp is a 45 y.o.  male who has been admitted to the hospital for abd perforation and pneumoperitoneum. Pt was send from [place] for abd pain and CT scan showed pneumoperitoneum, empyema, as well as perianal fistula and decub ulcer. Pt was taken to or  and had lap sx for J tube removal and laproscopic Colostomy, I and D  of abd wall . Pt seen in step down. Sleepy at the time of eval.  Wife  at bed side . No new complains. No acute indication for HD today . Past Medical Hx:   Past Medical History:   Diagnosis Date    Diabetes [facility])         Past Surgical Hx:   History reviewed. No pertinent surgical history. Allergies   Allergen Reactions    Bee Sting [Sting, Bee] Unknown (comments)       Social Hx:  reports that he has never smoked.  He has never used smokeless tobacco. He reports that he does not drink alcohol and does not use drugs. History reviewed. No pertinent family history. Review of Systems:  A thorough twelve point review of system was performed today. Pertinent positives and negatives are mentioned in the HPI. The reminder of the ROS is negative and noncontributory. Objective:    Vitals:    Vitals:    09/10/22 1516 09/10/22 1531 09/10/22 1601 09/10/22 1630   BP: (!) 105/52 (!) 106/52 (!) 113/58 (!) 114/57   Pulse: 73 68 69 72   Resp: 20 19 19 20   Temp:  99.5 °F (37.5 °C)  98.6 °F (37 °C)   SpO2:  100%  100%   Weight:       Height:         I&O's:  09/09 0701 - 09/10 0700  In: 2073.8 [I.V.:2073.8]  Out: 100   Visit Vitals  BP (!) 114/57   Pulse 72   Temp 98.6 °F (37 °C)   Resp 20   Ht 5' 7\" (1.702 m)   Wt 68 kg (149 lb 14.6 oz)   SpO2 100%   BMI 23.48 kg/m²       Physical Exam:  General:  No apparent Distress  HEENT: PERRL,  No Pallor , No Icterus  Neck: Supple,no mass palpable  Lungs : CTA  CVS: RRR, S1 S2 normal, No murmur   Abdomen: abd dressing and also g tube removed. Colostomy   Extremities:  no Edema  Skin: No rash or lesions.   MS: No joint swelling, erythema, warmth  Neurologic: sleepy at the time of eval   Psych:  Unable to assess    Laboratory Results:    Recent Labs     09/10/22  1200 09/09/22  2054    135*   K 3.0* 3.4*    97   CO2 27 35*   GLU 95 133*   BUN 17 12   CREA 2.16* 1.80*   CA 7.4* 7.7*   MG 1.5*  --    PHOS 1.3*  --    ALB 1.7* 1.5*   ALT <6* <6*     Recent Labs     09/10/22  1200 09/09/22  2054   WBC 19.7* 24.9*   HGB 6.9* 8.7*   HCT 22.5* 27.7*    463*     No results found for: SDES  Lab Results   Component Value Date/Time    Culture result: NO GROWTH AFTER 7 HOURS 09/09/2022 08:52 PM     Recent Results (from the past 24 hour(s))   CULTURE, BLOOD, PAIRED    Collection Time: 09/09/22  8:52 PM    Specimen: Blood   Result Value Ref Range    Special Requests: NO SPECIAL REQUESTS      Culture result: NO GROWTH AFTER 7 HOURS     TYPE & SCREEN    Collection Time: 09/09/22  8:52 PM   Result Value Ref Range    Crossmatch Expiration 09/12/2022,2359     ABO/Rh(D) O POSITIVE     Antibody screen NEG     Unit number [id number]     Blood component type [facility]     Unit division 00     Status of unit ISSUED     Crossmatch result Compatible     Unit number [id number]     Blood component type [facility]     Unit division 00     Status of unit ALLOCATED     Crossmatch result Compatible    CBC WITH AUTOMATED DIFF    Collection Time: 09/09/22  8:54 PM   Result Value Ref Range    WBC 24.9 (H) 4.1 - 11.1 K/uL    RBC 2.83 (L) 4.10 - 5.70 M/uL    HGB 8.7 (L) 12.1 - 17.0 g/dL    HCT 27.7 (L) 36.6 - 50.3 %    MCV 97.9 80.0 - 99.0 FL    MCH 30.7 26.0 - 34.0 PG    MCHC 31.4 30.0 - 36.5 g/dL    RDW 17.2 (H) 11.5 - 14.5 %    PLATELET 031 (H) 589 - 400 K/uL    MPV 9.7 8.9 - 12.9 FL    NRBC 0.0 0  WBC    ABSOLUTE NRBC 0.00 0.00 - 0.01 K/uL    NEUTROPHILS 89 (H) 32 - 75 %    LYMPHOCYTES 4 (L) 12 - 49 %    MONOCYTES 5 5 - 13 %    EOSINOPHILS 1 0 - 7 %    BASOPHILS 0 0 - 1 %    IMMATURE GRANULOCYTES 1 (H) 0.0 - 0.5 %    ABS. NEUTROPHILS 22.1 (H) 1.8 - 8.0 K/UL    ABS. LYMPHOCYTES 1.0 0.8 - 3.5 K/UL    ABS. MONOCYTES 1.2 (H) 0.0 - 1.0 K/UL    ABS. EOSINOPHILS 0.3 0.0 - 0.4 K/UL    ABS. BASOPHILS 0.0 0.0 - 0.1 K/UL    ABS. IMM.  GRANS. 0.3 (H) 0.00 - 0.04 K/UL    DF SMEAR SCANNED      RBC COMMENTS ANISOCYTOSIS  1+       METABOLIC PANEL, COMPREHENSIVE    Collection Time: 09/09/22  8:54 PM   Result Value Ref Range    Sodium 135 (L) 136 - 145 mmol/L    Potassium 3.4 (L) 3.5 - 5.1 mmol/L    Chloride 97 97 - 108 mmol/L    CO2 35 (H) 21 - 32 mmol/L    Anion gap 3 (L) 5 - 15 mmol/L    Glucose 133 (H) 65 - 100 mg/dL    BUN 12 6 - 20 MG/DL    Creatinine 1.80 (H) 0.70 - 1.30 MG/DL    BUN/Creatinine ratio 7 (L) 12 - 20      GFR est AA 48 (L) >60 ml/min/1.73m2    GFR est non-AA 40 (L) >60 ml/min/1.73m2    Calcium 7.7 (L) 8.5 - 10.1 MG/DL    Bilirubin, total 0.7 0.2 - 1.0 MG/DL    ALT (SGPT) <6 (L) 12 - 78 U/L    AST (SGOT) 12 (L) 15 - 37 U/L    Alk. phosphatase 115 45 - 117 U/L    Protein, total 6.9 6.4 - 8.2 g/dL    Albumin 1.5 (L) 3.5 - 5.0 g/dL    Globulin 5.4 (H) 2.0 - 4.0 g/dL    A-G Ratio 0.3 (L) 1.1 - 2.2     SAMPLES BEING HELD    Collection Time: 09/09/22  8:54 PM   Result Value Ref Range    SAMPLES BEING HELD 1 BLUE 1 RED     COMMENT        Add-on orders for these samples will be processed based on acceptable specimen integrity and analyte stability, which may vary by analyte. LACTIC ACID    Collection Time: 09/09/22  9:10 PM   Result Value Ref Range    Lactic acid 1.2 0.4 - 2.0 MMOL/L   OCCULT BLOOD, STOOL    Collection Time: 09/09/22 11:02 PM   Result Value Ref Range    Occult blood, stool Positive (A) NEG     RBC, ALLOCATE    Collection Time: 09/10/22  2:00 AM   Result Value Ref Range    HISTORY CHECKED?  Historical check performed    HEMOGLOBIN A1C WITH EAG    Collection Time: 09/10/22  5:50 AM   Result Value Ref Range    Hemoglobin A1c <3.8 (L) 4.0 - 5.6 %    Est. average glucose Cannot be calculated mg/dL   GLUCOSE, POC    Collection Time: 09/10/22  6:02 AM   Result Value Ref Range    Glucose (POC) 151 (H) 65 - 117 mg/dL    Performed by Jorje Wilson    GLUCOSE, POC    Collection Time: 09/10/22  7:36 AM   Result Value Ref Range    Glucose (POC) 163 (H) 65 - 117 mg/dL    Performed by Vickie, COMPREHENSIVE    Collection Time: 09/10/22 12:00 PM   Result Value Ref Range    Sodium 140 136 - 145 mmol/L    Potassium 3.0 (L) 3.5 - 5.1 mmol/L    Chloride 106 97 - 108 mmol/L    CO2 27 21 - 32 mmol/L    Anion gap 7 5 - 15 mmol/L    Glucose 95 65 - 100 mg/dL    BUN 17 6 - 20 MG/DL    Creatinine 2.16 (H) 0.70 - 1.30 MG/DL    BUN/Creatinine ratio 8 (L) 12 - 20      GFR est AA 39 (L) >60 ml/min/1.73m2    GFR est non-AA 32 (L) >60 ml/min/1.73m2    Calcium 7.4 (L) 8.5 - 10.1 MG/DL    Bilirubin, total 0.7 0.2 - 1.0 MG/DL    ALT (SGPT) <6 (L) 12 - 78 U/L    AST (SGOT) 9 (L) 15 - 37 U/L    Alk. phosphatase 86 45 - 117 U/L    Protein, total 5.8 (L) 6.4 - 8.2 g/dL    Albumin 1.7 (L) 3.5 - 5.0 g/dL    Globulin 4.1 (H) 2.0 - 4.0 g/dL    A-G Ratio 0.4 (L) 1.1 - 2.2     CBC WITH AUTOMATED DIFF    Collection Time: 09/10/22 12:00 PM   Result Value Ref Range    WBC 19.7 (H) 4.1 - 11.1 K/uL    RBC 2.26 (L) 4.10 - 5.70 M/uL    HGB 6.9 (L) 12.1 - 17.0 g/dL    HCT 22.5 (L) 36.6 - 50.3 %    MCV 99.6 (H) 80.0 - 99.0 FL    MCH 30.5 26.0 - 34.0 PG    MCHC 30.7 30.0 - 36.5 g/dL    RDW 17.9 (H) 11.5 - 14.5 %    PLATELET 851 329 - 985 K/uL    MPV 9.8 8.9 - 12.9 FL    NRBC 0.0 0  WBC    ABSOLUTE NRBC 0.00 0.00 - 0.01 K/uL    NEUTROPHILS 94 (H) 32 - 75 %    BAND NEUTROPHILS 1 0 - 6 %    LYMPHOCYTES 2 (L) 12 - 49 %    MONOCYTES 3 (L) 5 - 13 %    EOSINOPHILS 0 0 - 7 %    BASOPHILS 0 0 - 1 %    IMMATURE GRANULOCYTES 0 %    ABS. NEUTROPHILS 18.7 (H) 1.8 - 8.0 K/UL    ABS. LYMPHOCYTES 0.4 (L) 0.8 - 3.5 K/UL    ABS. MONOCYTES 0.6 0.0 - 1.0 K/UL    ABS. EOSINOPHILS 0.0 0.0 - 0.4 K/UL    ABS. BASOPHILS 0.0 0.0 - 0.1 K/UL    ABS. IMM.  GRANS. 0.0 K/UL    DF MANUAL      RBC COMMENTS ANISOCYTOSIS  1+       TSH 3RD GENERATION    Collection Time: 09/10/22 12:00 PM   Result Value Ref Range    TSH 1.43 0.36 - 3.74 uIU/mL   MAGNESIUM    Collection Time: 09/10/22 12:00 PM   Result Value Ref Range    Magnesium 1.5 (L) 1.6 - 2.4 mg/dL   PHOSPHORUS    Collection Time: 09/10/22 12:00 PM   Result Value Ref Range    Phosphorus 1.3 (L) 2.6 - 4.7 MG/DL   LIPASE    Collection Time: 09/10/22 12:00 PM   Result Value Ref Range    Lipase 54 (L) 73 - 393 U/L   FOLATE    Collection Time: 09/10/22 12:00 PM   Result Value Ref Range    Folate 4.1 (L) 5.0 - 21.0 ng/mL   VITAMIN B12    Collection Time: 09/10/22 12:00 PM   Result Value Ref Range    Vitamin B12 729 193 - 986 pg/mL   IRON    Collection Time: 09/10/22 12:00 PM   Result Value Ref Range    Iron 12 (L) 35 - 150 ug/dL   IRON PROFILE Collection Time: 09/10/22 12:00 PM   Result Value Ref Range    Iron 12 (L) 35 - 150 ug/dL    TIBC 141 (L) 250 - 450 ug/dL    Iron % saturation 9 (L) 20 - 50 %   FERRITIN    Collection Time: 09/10/22 12:00 PM   Result Value Ref Range    Ferritin 715 (H) 26 - 388 NG/ML   NT-PRO BNP    Collection Time: 09/10/22 12:00 PM   Result Value Ref Range    NT pro-BNP 5,733 (H) <125 PG/ML   TROPONIN-HIGH SENSITIVITY    Collection Time: 09/10/22 12:00 PM   Result Value Ref Range    Troponin-High Sensitivity 6 0 - 76 ng/L   GLUCOSE, POC    Collection Time: 09/10/22 12:23 PM   Result Value Ref Range    Glucose (POC) 100 65 - 117 mg/dL    Performed by NAMITA FERRELL(CON)          Urine dipstick:   No results found for: COLOR, APPRN, SPGRU, REFSG, WILL, PROTU, GLUCU, KETU, BILU, UROU, MISHA, LEUKU, GLUKE, EPSU, BACTU, WBCU, RBCU, CASTS, UCRY    I have reviewed the following: All pertinent labs, microbiology data, radiology imaging for my assessment     Medications list Personally Reviewed   [x]      Yes     []               No       Medications:  Prior to Admission medications    Medication Sig Start Date End Date Taking? Authorizing Provider   acetaminophen (TYLENOL) 325 mg tablet Take 2 Tabs by mouth every four (4) hours as needed. 7/6/18   Gagan Diaz MD   insulin lispro (HUMALOG) 100 unit/mL injection 9 units Breakfast, 11 units with lunch and 9  Units at dinner 7/6/18   Gagan Diaz MD   insulin glargine (LANTUS) 100 unit/mL injection 26 units QHS SC 7/6/18   Gagan Diaz MD   ertapenem 1 gram 1 g IVPB 1 g by IntraVENous route every twenty-four (24) hours. 7/6/18   Gagan Diaz MD   bisacodyl (DULCOLAX) 5 mg EC tablet Take 2 Tabs by mouth daily as needed for Constipation. 7/6/18   Gagan Diaz MD        Thank you for allowing us to participate in the care of this patient. We will follow patient.  Please dont hesitate to call with any questions    Rachel Cannon MD  [facility]   [address] [facility] [address] [place] [address]  Phone - [phone]   Fax - [phone]  [email or web address]

## 2022-09-10 NOTE — ANESTHESIA PREPROCEDURE EVALUATION
Relevant Problems   ENDOCRINE   (+) Uncontrolled diabetes mellitus       Anesthetic History               Review of Systems / Medical History  Patient summary reviewed, nursing notes reviewed and pertinent labs reviewed    Pulmonary          Pneumonia      Comments: Hx thoracotmy, pleural effusions, recent pneumonia    A portable AP radiograph of the chest was obtained at 2237 hours. The  patient is on a cardiac monitor. There is a right subclavian line and a right IJ  line, both of which terminate in the upper right atrium. There is no  pneumothorax. There is a right pleural effusion with associated accentuation of  the right lung interstitial markings. The heart size is normal. The bones and  soft tissues are grossly within normal limits. Neuro/Psych              Cardiovascular                  Exercise tolerance: <4 METS  Comments: 1. Status post jejunostomy tube placement in the left lower quadrant. Significant amount of pneumoperitoneum which may in part be related to jejunostomy placement. No evidence of contrast extravasation. However, given the degree of pneumoperitoneum, cannot exclude viscus injury. 2. Circumferential rectal wall thickening with focal defect and free air within the perirectal region as detailed above. Findings are concerning for rectal wall perforation and/or fistulous connection with the subjacent posterior subcutaneous soft tissues given the degree of inflammatory changes seen. 3. Complex fluid and air collection within the right-sided pleural space given the appearance of a split pleura concerning for empyema. 4. Other nonacute findings as above. GI/Hepatic/Renal         Renal disease      Comments: abdominal pain, diarrhea and rectal pain    PEG tube is noted in the right upper quadrant and there is localized pain around the base of the same.  Pain LLQ and Suprapubically with gurding Endo/Other    Diabetes: poorly controlled         Other Findings   Comments: Labs:    9/9/22 20:54  HGB: 8.7 (L)  HCT: 27.7 (L)  MCV: 97.9  MCH: 30.7  MCHC: 31.4  RDW: 17.2 (H)  PLATELET: 812 (H)    9/9/22 20:54  Sodium: 135 (L)  Potassium: 3.4 (L)  Chloride: 97  CO2: 35 (H)  Anion gap: 3 (L)  Glucose: 133 (H)  BUN: 12  Creatinine: 1.80 (H)      (L): Data is abnormally low  (H): Data is abnormally high  (L): Data is abnormally low  (H): Data is abnormally high         Physical Exam    Airway  Mallampati: II  TM Distance: 4 - 6 cm         Cardiovascular    Rhythm: regular  Rate: normal         Dental         Pulmonary                 Abdominal         Other Findings            Anesthetic Plan    ASA: 4, emergent  Anesthesia type: general          Induction: Intravenous  Anesthetic plan and risks discussed with: Patient

## 2022-09-10 NOTE — ED NOTES
Ag Pierre, spoke w/ nursing supervisor. Per doctor's progress note, pt is here for peritoneal and/or rectal perforation evaluation. He will be sending over pt's abd CT imaging.

## 2022-09-10 NOTE — PROGRESS NOTES
Pharmacist Note - Vancomycin Dosing    Consult provided for this 45 y.o. male for indication of sepsis 2/2 bowel perforation. Antibiotic regimen(s): vanc + Zosyn  Patient on vancomycin PTA? NO     Recent Labs     22   WBC 24.9*   CREA 1.80*   BUN 12     Frequency of BMP: daily  Height: 170.2 cm  Weight: 68 kg  Est CrCl: 45 ml/min; UO: -- ml/kg/hr  Temp (24hrs), Av.5 °F (36.9 °C), Min:98 °F (36.7 °C), Max:99.1 °F (37.3 °C)    Cultures: pending      MRSA Swab ordered (if applicable)? N/A    The plan below is expected to result in a target range of AUC/COLE 400-600    Therapy will be initiated with a loading dose of 1500 mg IV x 1 to be followed by a maintenance dose of 1000 mg IV every 24 hours. Pharmacy to follow patient daily and order levels / make dose adjustments as appropriate. *Vancomycin has been dosed used Bayesian kinetics software to target an AUC/COLE of 400-600, which provides adequate exposure for an assumed infection due to MRSA with an COLE of 1 or less while reducing the risk of nephrotoxicity as seen with traditional trough based dosing goals.

## 2022-09-10 NOTE — PERIOP NOTES
TRANSFER - IN REPORT:    Verbal report received from DOMINGA Calwdell(name) on [place]  being received from ER(unit) for routine progression of care      Report consisted of patients Situation, Background, Assessment and   Recommendations(SBAR). Information from the following report(s) SBAR, Kardex, ED Summary, Intake/Output, MAR, and Cardiac Rhythm Sinus Rhythm  was reviewed with the receiving nurse. Opportunity for questions and clarification was provided. Assessment completed upon patients arrival to unit and care assumed.

## 2022-09-11 ENCOUNTER — APPOINTMENT (OUTPATIENT)
Dept: ULTRASOUND IMAGING | Age: 52
DRG: 853 | End: 2022-09-11
Attending: INTERNAL MEDICINE
Payer: COMMERCIAL

## 2022-09-11 LAB
ABO + RH BLD: NORMAL
ANION GAP SERPL CALC-SCNC: 5 MMOL/L (ref 5–15)
BASOPHILS # BLD: 0 K/UL (ref 0–0.1)
BASOPHILS NFR BLD: 0 % (ref 0–1)
BLD PROD TYP BPU: NORMAL
BLD PROD TYP BPU: NORMAL
BLOOD GROUP ANTIBODIES SERPL: NORMAL
BPU ID: NORMAL
BPU ID: NORMAL
BUN SERPL-MCNC: 23 MG/DL (ref 6–20)
BUN/CREAT SERPL: 9 (ref 12–20)
CALCIUM SERPL-MCNC: 7.5 MG/DL (ref 8.5–10.1)
CHLORIDE SERPL-SCNC: 107 MMOL/L (ref 97–108)
CO2 SERPL-SCNC: 29 MMOL/L (ref 21–32)
CREAT SERPL-MCNC: 2.67 MG/DL (ref 0.7–1.3)
CROSSMATCH RESULT,%XM: NORMAL
CROSSMATCH RESULT,%XM: NORMAL
DIFFERENTIAL METHOD BLD: ABNORMAL
EOSINOPHIL # BLD: 0.2 K/UL (ref 0–0.4)
EOSINOPHIL NFR BLD: 1 % (ref 0–7)
ERYTHROCYTE [DISTWIDTH] IN BLOOD BY AUTOMATED COUNT: 19.1 % (ref 11.5–14.5)
GLUCOSE BLD STRIP.AUTO-MCNC: 103 MG/DL (ref 65–117)
GLUCOSE BLD STRIP.AUTO-MCNC: 89 MG/DL (ref 65–117)
GLUCOSE BLD STRIP.AUTO-MCNC: 90 MG/DL (ref 65–117)
GLUCOSE BLD STRIP.AUTO-MCNC: 95 MG/DL (ref 65–117)
GLUCOSE SERPL-MCNC: 89 MG/DL (ref 65–100)
HCT VFR BLD AUTO: 23.8 % (ref 36.6–50.3)
HGB BLD-MCNC: 7.5 G/DL (ref 12.1–17)
IMM GRANULOCYTES # BLD AUTO: 0.1 K/UL (ref 0–0.04)
IMM GRANULOCYTES NFR BLD AUTO: 1 % (ref 0–0.5)
LYMPHOCYTES # BLD: 0.8 K/UL (ref 0.8–3.5)
LYMPHOCYTES NFR BLD: 5 % (ref 12–49)
MAGNESIUM SERPL-MCNC: 1.6 MG/DL (ref 1.6–2.4)
MCH RBC QN AUTO: 29.5 PG (ref 26–34)
MCHC RBC AUTO-ENTMCNC: 31.5 G/DL (ref 30–36.5)
MCV RBC AUTO: 93.7 FL (ref 80–99)
MONOCYTES # BLD: 0.6 K/UL (ref 0–1)
MONOCYTES NFR BLD: 4 % (ref 5–13)
NEUTS SEG # BLD: 14.3 K/UL (ref 1.8–8)
NEUTS SEG NFR BLD: 89 % (ref 32–75)
NRBC # BLD: 0 K/UL (ref 0–0.01)
NRBC BLD-RTO: 0 PER 100 WBC
PHOSPHATE SERPL-MCNC: 2.2 MG/DL (ref 2.6–4.7)
PLATELET # BLD AUTO: 352 K/UL (ref 150–400)
PMV BLD AUTO: 9.5 FL (ref 8.9–12.9)
POTASSIUM SERPL-SCNC: 3 MMOL/L (ref 3.5–5.1)
POTASSIUM SERPL-SCNC: 3.2 MMOL/L (ref 3.5–5.1)
RBC # BLD AUTO: 2.54 M/UL (ref 4.1–5.7)
SERVICE CMNT-IMP: NORMAL
SODIUM SERPL-SCNC: 141 MMOL/L (ref 136–145)
SPECIMEN EXP DATE BLD: NORMAL
STATUS OF UNIT,%ST: NORMAL
STATUS OF UNIT,%ST: NORMAL
UNIT DIVISION, %UDIV: 0
UNIT DIVISION, %UDIV: 0
WBC # BLD AUTO: 16 K/UL (ref 4.1–11.1)

## 2022-09-11 PROCEDURE — 80048 BASIC METABOLIC PNL TOTAL CA: CPT

## 2022-09-11 PROCEDURE — 36415 COLL VENOUS BLD VENIPUNCTURE: CPT

## 2022-09-11 PROCEDURE — 74011000250 HC RX REV CODE- 250: Performed by: INTERNAL MEDICINE

## 2022-09-11 PROCEDURE — 85025 COMPLETE CBC W/AUTO DIFF WBC: CPT

## 2022-09-11 PROCEDURE — 76770 US EXAM ABDO BACK WALL COMP: CPT

## 2022-09-11 PROCEDURE — C9113 INJ PANTOPRAZOLE SODIUM, VIA: HCPCS | Performed by: INTERNAL MEDICINE

## 2022-09-11 PROCEDURE — 65660000001 HC RM ICU INTERMED STEPDOWN

## 2022-09-11 PROCEDURE — 74011250637 HC RX REV CODE- 250/637: Performed by: INTERNAL MEDICINE

## 2022-09-11 PROCEDURE — 82962 GLUCOSE BLOOD TEST: CPT

## 2022-09-11 PROCEDURE — 74011250636 HC RX REV CODE- 250/636: Performed by: INTERNAL MEDICINE

## 2022-09-11 PROCEDURE — 83735 ASSAY OF MAGNESIUM: CPT

## 2022-09-11 PROCEDURE — 74011250636 HC RX REV CODE- 250/636: Performed by: STUDENT IN AN ORGANIZED HEALTH CARE EDUCATION/TRAINING PROGRAM

## 2022-09-11 PROCEDURE — 74011250636 HC RX REV CODE- 250/636: Performed by: SURGERY

## 2022-09-11 PROCEDURE — 84132 ASSAY OF SERUM POTASSIUM: CPT

## 2022-09-11 PROCEDURE — 84100 ASSAY OF PHOSPHORUS: CPT

## 2022-09-11 PROCEDURE — 99024 POSTOP FOLLOW-UP VISIT: CPT | Performed by: SURGERY

## 2022-09-11 PROCEDURE — 74011000258 HC RX REV CODE- 258: Performed by: SURGERY

## 2022-09-11 RX ORDER — POTASSIUM CHLORIDE 7.45 MG/ML
10 INJECTION INTRAVENOUS ONCE
Status: DISCONTINUED | OUTPATIENT
Start: 2022-09-11 | End: 2022-09-11

## 2022-09-11 RX ORDER — MAGNESIUM SULFATE 1 G/100ML
1 INJECTION INTRAVENOUS ONCE
Status: COMPLETED | OUTPATIENT
Start: 2022-09-11 | End: 2022-09-11

## 2022-09-11 RX ORDER — POTASSIUM CHLORIDE 7.45 MG/ML
10 INJECTION INTRAVENOUS ONCE
Status: COMPLETED | OUTPATIENT
Start: 2022-09-11 | End: 2022-09-11

## 2022-09-11 RX ADMIN — Medication 1 CAPSULE: at 10:00

## 2022-09-11 RX ADMIN — DIPHENHYDRAMINE HYDROCHLORIDE 12.5 MG: 50 INJECTION, SOLUTION INTRAMUSCULAR; INTRAVENOUS at 23:15

## 2022-09-11 RX ADMIN — VANCOMYCIN HYDROCHLORIDE 1000 MG: 1 INJECTION, POWDER, LYOPHILIZED, FOR SOLUTION INTRAVENOUS at 09:59

## 2022-09-11 RX ADMIN — POTASSIUM PHOSPHATE, MONOBASIC AND POTASSIUM PHOSPHATE, DIBASIC: 224; 236 INJECTION, SOLUTION, CONCENTRATE INTRAVENOUS at 15:35

## 2022-09-11 RX ADMIN — PIPERACILLIN AND TAZOBACTAM 3.38 G: 3; .375 INJECTION, POWDER, FOR SOLUTION INTRAVENOUS at 06:31

## 2022-09-11 RX ADMIN — PIPERACILLIN AND TAZOBACTAM 3.38 G: 3; .375 INJECTION, POWDER, FOR SOLUTION INTRAVENOUS at 23:08

## 2022-09-11 RX ADMIN — SODIUM CHLORIDE, PRESERVATIVE FREE 10 ML: 5 INJECTION INTRAVENOUS at 23:09

## 2022-09-11 RX ADMIN — HYDROMORPHONE HYDROCHLORIDE 1 MG: 1 INJECTION, SOLUTION INTRAMUSCULAR; INTRAVENOUS; SUBCUTANEOUS at 13:00

## 2022-09-11 RX ADMIN — HYDROMORPHONE HYDROCHLORIDE 0.5 MG: 1 INJECTION, SOLUTION INTRAMUSCULAR; INTRAVENOUS; SUBCUTANEOUS at 09:58

## 2022-09-11 RX ADMIN — POTASSIUM CHLORIDE 10 MEQ: 7.46 INJECTION, SOLUTION INTRAVENOUS at 13:00

## 2022-09-11 RX ADMIN — SODIUM CHLORIDE, PRESERVATIVE FREE 10 ML: 5 INJECTION INTRAVENOUS at 15:36

## 2022-09-11 RX ADMIN — SODIUM CHLORIDE, PRESERVATIVE FREE 40 MG: 5 INJECTION INTRAVENOUS at 23:08

## 2022-09-11 RX ADMIN — SODIUM CHLORIDE, PRESERVATIVE FREE 40 MG: 5 INJECTION INTRAVENOUS at 09:59

## 2022-09-11 RX ADMIN — MAGNESIUM SULFATE 1 G: 1 INJECTION INTRAVENOUS at 15:35

## 2022-09-11 RX ADMIN — SODIUM CHLORIDE, POTASSIUM CHLORIDE, SODIUM LACTATE AND CALCIUM CHLORIDE 75 ML/HR: 600; 310; 30; 20 INJECTION, SOLUTION INTRAVENOUS at 05:27

## 2022-09-11 RX ADMIN — PIPERACILLIN AND TAZOBACTAM 3.38 G: 3; .375 INJECTION, POWDER, FOR SOLUTION INTRAVENOUS at 15:47

## 2022-09-11 RX ADMIN — SODIUM CHLORIDE, PRESERVATIVE FREE 10 ML: 5 INJECTION INTRAVENOUS at 06:31

## 2022-09-11 RX ADMIN — HYDROMORPHONE HYDROCHLORIDE 0.5 MG: 1 INJECTION, SOLUTION INTRAMUSCULAR; INTRAVENOUS; SUBCUTANEOUS at 23:08

## 2022-09-11 RX ADMIN — HYDROMORPHONE HYDROCHLORIDE 1 MG: 1 INJECTION, SOLUTION INTRAMUSCULAR; INTRAVENOUS; SUBCUTANEOUS at 03:50

## 2022-09-11 RX ADMIN — ACETAMINOPHEN 650 MG: 325 TABLET, FILM COATED ORAL at 16:59

## 2022-09-11 NOTE — PROGRESS NOTES
0800: Bedside shift change report given to Rupal Byrne (oncoming nurse) by Vishal Gloria (offgoing nurse). Report included the following information SBAR, Kardex, ED Summary, Intake/Output, MAR, Recent Results, and Cardiac Rhythm NSR . Problem: Risk for Spread of Infection  Goal: Prevent transmission of infectious organism to others  Description: Prevent the transmission of infectious organisms to other patients, staff members, and visitors. Outcome: Progressing Towards Goal     Problem: Patient Education:  Go to Education Activity  Goal: Patient/Family Education  Outcome: Progressing Towards Goal     Problem: Falls - Risk of  Goal: *Absence of Falls  Description: Document Petermarquise Knox Fall Risk and appropriate interventions in the flowsheet.   Outcome: Progressing Towards Goal  Note: Fall Risk Interventions:     Medication Interventions: Patient to call before getting OOB, Teach patient to arise slowly    Problem: Patient Education: Go to Patient Education Activity  Goal: Patient/Family Education  Outcome: Progressing Towards Goal

## 2022-09-11 NOTE — CONSULTS
Pulmonary Consultation    Assessment / Plan:    Abnormal chest imaging with right pleural fluid collection with rind and air collections. While this may represent residual empyema, patient did have thoracic surgery and may be residual air in the space from that. According to family members, the case was difficult and unable to remove all of the fluid from the pleural space. He is currently on brought antibiotics including Zosyn and vancomycin for intra-abdominal process. This would provide additional coverage for the lung if there is ongoing infection. Unfortunately, I do not think any percutaneous drainage would be successful at removing fluid as I suppose it is very thick and organized at this time. Would only recommend following clinically and radiographically. If he was to worsen or his right-sided effusion worsened he would need to be reevaluated by his thoracic surgeon at [facility] for additional drainage. History of respiratory failure secondary to aspiration pneumonia and sepsis status post mechanical ventilation [id number] on room air  Pneumoperitoneum-General surgery following  Abdominal wall abscess  End-stage renal disease on dialysis  Recent left BKA  [facility]    --continue abx for abd process  --follow clinically and if needed repeat chest ct and if worsening would need re-evaluation by thoracic surgery as percutaneous drainage of the R pleural collection would be futile    Will be available to see again as needed    Will be a    History / Subjective:  Reason:  Empyema  Requesting Provider:  Dr Lida Crenshaw reviewed / labs and tests reviewed / pertinent images independently viewed. Records from [facility] reviewed  Records and images from [address] [place] [address] reviewed,    Gracie Carmona is a 45 y.o.  male admitted 9/9/2022 with abdominal pain and perforated viscus from [facility].   Procedure(s) with comments:  LAPAROSCOPY GENERAL DIAGNOSTIC, LAPAROSCOPIC  TAKEDOWN AND REMOVAL J-TUBE, LAPAROSCOPIC COLOSTOMY, I & D ABDOMINAL WALL - pt with recent discharge from [address] [place] [address].  leaking J-tube site and reddened and warm to touch abdomen. 9/10/2022    Chest CT - continued RLL pleural fluid collection with air bubbles and rind. Pulmonary consulted regarding CT findings    Patient evaluated and discussed with family member at bedside who provided hx    Patient has a history of diabetes mellitus and was initially treated for a diabetic foot infection. He eventually required left BKA. He had a long course at [facility].  His course was complicated by aspiration and respiratory failure. He required intubation x2 during that hospitalization. He also developed what appeared to be empyema in the right chest.  He underwent VATS with partial decortication and partial right thoracotomy. Apparently, according to family member at bedside it was difficult and unable to remove all the fluid. They were informed by thoracic surgery that all the fluid that could be removed was removed. He is currently on room air and not in any respiratory distress. He is on broad antibiotics including Zosyn and vancomycin for his intra-abdominal process. Allergies   Allergen Reactions    Bee Sting [Sting, Bee] Unknown (comments)     Past Medical History:   Diagnosis Date    Diabetes ([facility] [address]       History reviewed. No pertinent surgical history. History reviewed. No pertinent family history. Social History     Tobacco Use    Smoking status: Never    Smokeless tobacco: Never   Substance Use Topics    Alcohol use: No      ROS:  Review of systems not obtained due to patient factors.     Objective:  Patient Vitals for the past 4 hrs:   BP Temp Pulse Resp SpO2 Weight   22 0814 (!) 121/55 97.8 °F (36.6 °C) 67 25 98 % --   22 0638 -- -- -- -- -- 74.9 kg (165 lb 2 oz)   22 0600 -- -- 70 -- -- --     Temp (24hrs), Av.6 °F (37 °C), Min:97.8 °F (36.6 °C), Max:99.5 °F (37.5 °C)      Intake/Output Summary (Last 24 hours) at 9/11/2022 0851  Last data filed at 9/10/2022 1700  Gross per 24 hour   Intake 265.42 ml   Output --   Net 265.42 ml       Exam:  Lying comfortably in bed on RA and in no resp dsitress  Anicteric  MMM  No accessory use  Chest symmetrical in expansion  Diminished bs at R base  RRR  Warm and dry  L BKA    Data:     XR Results :  Results from Hospital Encounter encounter on 09/09/22    XR CHEST PORT    Narrative  EXAM: XR CHEST PORT    INDICATION: abdominal pain    COMPARISON: None. FINDINGS: A portable AP radiograph of the chest was obtained at 2237 hours. The  patient is on a cardiac monitor. There is a right subclavian line and a right IJ  line, both of which terminate in the upper right atrium. There is no  pneumothorax. There is a right pleural effusion with associated accentuation of  the right lung interstitial markings. The heart size is normal. The bones and  soft tissues are grossly within normal limits. Impression  Lines as described. No new pneumothorax. Right pleural effusion      CT Results :  Results from Hospital Encounter encounter on 09/09/22    CT CHEST WO CONT    Narrative  INDICATION: Empyema    Direct comparison is made to prior chest x-ray dated September 9, 2022 and prior  CT of the abdomen and pelvis, including the lower chest, dated September 19, 2022. Chest:    Lungs/pleura: There is right lung volume loss There is a small right basilar gas  and fluid containing pleural collection with a peripheral soft tissue rind. There is right basilar patchy airspace consolidation. There is very mild left  basilar atelectasis. There is no left pleural fluid. Lymph nodes: There is no axillary, mediastinal or hilar lymphadenopathy. Heart: The heart is of normal size and there is no pericardial effusion. Bones: Visualized osseous structures are intact. Upper abdomen:  There is free intraperitoneal gas within the visualized upper  abdomen, as seen on prior CT dated September 9, 2022. The visualized abdominal  structures are otherwise normal.    Impression  1. Small right basilar gas and fluid containing pleural collection with a  peripheral soft tissue rind. Finding may represent an empyema and clinical  correlation is recommended. Recommend direct comparison to any additional prior  imaging. 2. Free intraperitoneal gas, seen on prior CT of the abdomen and pelvis. Lab:  Recent Labs     09/11/22  0432 09/10/22  1930 09/10/22  1200 09/09/22 2110 09/09/22 2054   WBC 16.0* 17.2* 19.7*  --  24.9*   HGB 7.5* 7.6* 6.9*  --  8.7*    324 361  --  463*     --  140  --  135*   K 3.0*  --  3.0*  --  3.4*     --  106  --  97   CO2 29  --  27  --  35*   BUN 23*  --  17  --  12   CREA 2.67*  --  2.16*  --  1.80*   GLU 89  --  95  --  133*   CA 7.5*  --  7.4*  --  7.7*   MG 1.6  --  1.5*  --   --    PHOS 2.2*  --  1.3*  --   --    TROPHS  --   --  6  --   --    BNPNT  --   --  5,733*  --   --    TBILI  --   --  0.7  --  0.7   LAC  --   --   --  1.2  --      ABG:  No results for input(s): PHI, PCO2I, PO2I, HCO3I, SO2I, FIO2I in the last 72 hours.   Results       Procedure Component Value Units Date/Time    C. DIFFICILE AG & TOXIN A/B [[id number]] Collected: 09/10/22 0639    Order Status: Completed Specimen: Stool Updated: 09/10/22 1914     GDH ANTIGEN Negative        C. difficile toxin Negative        INTERPRETATION       NEGATIVE FOR TOXIGENIC C. DIFFICILE          CULTURE, BLOOD, PAIRED [[id number]] Collected: 09/09/22 2052    Order Status: Completed Specimen: Blood Updated: 09/11/22 0521     Special Requests: NO SPECIAL REQUESTS        Culture result: NO GROWTH 2 Reg Lim MD

## 2022-09-11 NOTE — PROGRESS NOTES
[facility]   [address], [address] [facility]  Phone: [phone]   [phone]       Nephrology Progress Note  [place]     1970     [id number]  Date of Admission : 9/9/2022 09/11/22    CC:  Follow up for ALFREDO    Assessment and Plan   1 Alfredo/on HD Now   2 S/p lap sx for J tube removal and laproscopic Colostomy, I and D  of abd wall   3 Severe PCM   4 Anemia of acute blood loss   5 Sepsis  6 Empyema   7 h/o HTN   8 DM   9 Sacral decub   10 Above knee amputation      Plan  1 No acute indication for HD at this time   2 Plan HD Monday, pt anuric   3 follow In and out   4 Has temp Kurt cath and need cath care  5 epogen eventually to assist with anemia   6 nutrition support   7 Nep will follow   8 k replaced         Interval History:    Seen in am. Has pain from sx. Tolerating clears so far   Review of Systems: A comprehensive review of systems was negative. Current Medications:   Current Facility-Administered Medications   Medication Dose Route Frequency    potassium chloride 10 mEq in 100 ml IVPB  10 mEq IntraVENous ONCE    potassium phosphate 20 mmol in 0.9% sodium chloride 250 mL infusion   IntraVENous ONCE    magnesium sulfate 1 g/100 ml IVPB (premix or compounded)  1 g IntraVENous ONCE    [START ON 9/12/2022] Vancomycin, Random - Please draw on Monday, 9/12 @ 0400. Thanks!    Other ONCE    0.9% sodium chloride infusion 250 mL  250 mL IntraVENous PRN    lactated Ringers infusion  75 mL/hr IntraVENous CONTINUOUS    HYDROmorphone (DILAUDID) injection 0.5-1 mg  0.5-1 mg IntraVENous Q3H PRN    piperacillin-tazobactam (ZOSYN) 3.375 g in 0.9% sodium chloride (MBP/ADV) 100 mL MBP  3.375 g IntraVENous Q8H    glucose chewable tablet 16 g  4 Tablet Oral PRN    glucagon (GLUCAGEN) injection 1 mg  1 mg IntraMUSCular PRN    dextrose 10 % infusion 0-250 mL  0-250 mL IntraVENous PRN    ondansetron (ZOFRAN) injection 4 mg  4 mg IntraVENous Q4H PRN    diphenhydrAMINE (BENADRYL) injection 12.5 mg  12.5 mg IntraVENous Q6H PRN    sodium chloride (NS) flush 5-40 mL  5-40 mL IntraVENous Q8H    sodium chloride (NS) flush 5-40 mL  5-40 mL IntraVENous PRN    acetaminophen (TYLENOL) tablet 650 mg  650 mg Oral Q6H PRN    Or    acetaminophen (TYLENOL) suppository 650 mg  650 mg Rectal Q6H PRN    polyethylene glycol (MIRALAX) packet 17 g  17 g Oral DAILY PRN    ondansetron (ZOFRAN ODT) tablet 4 mg  4 mg Oral Q8H PRN    Or    ondansetron (ZOFRAN) injection 4 mg  4 mg IntraVENous Q6H PRN    L.acidophilus-paracasei-S.thermophil-bifidobacter (RISAQUAD) 8 billion cell capsule  1 Capsule Oral DAILY    insulin lispro (HUMALOG) injection   SubCUTAneous AC&HS    glucose chewable tablet 16 g  4 Tablet Oral PRN    glucagon (GLUCAGEN) injection 1 mg  1 mg IntraMUSCular PRN    pantoprazole (PROTONIX) 40 mg in 0.9% sodium chloride 10 mL injection  40 mg IntraVENous Q12H    dextrose 10 % infusion 0-250 mL  0-250 mL IntraVENous PRN    Vancomycin - pharmacy to dose   Other Rx Dosing/Monitoring    0.9% sodium chloride infusion 250 mL  250 mL IntraVENous PRN      Allergies   Allergen Reactions    Bee Sting [Sting, Bee] Unknown (comments)       Objective:  Vitals:    Vitals:    09/11/22 0814 09/11/22 1000 09/11/22 1118 09/11/22 1200   BP: (!) 121/55 106/75 (!) 95/50 (!) 94/52   Pulse: 67 74 65 68   Resp: 25 27 17 17   Temp: 97.8 °F (36.6 °C)  98.5 °F (36.9 °C)    SpO2: 98%  97%    Weight:       Height:         Intake and Output:  09/11 0701 - 09/11 1900  In: 600 [I.V.:600]  Out: -   09/09 1901 - 09/11 0700  In: 2539.2 [I.V.:2273.8]  Out: 100     Physical Examination:  Pt intubated   No  General: NAD,Conversant   Neck:  Supple, no mass  Resp:  Lungs CTA B/L, no wheezing , normal respiratory effort  CV:  RRR,  no murmur or rub, no LE edema  GI:  Soft, NT, + Bowel sounds, no hepatosplenomegaly  Neurologic:  Non focal  Psych:             AAO x 3 appropriate affect   Skin:  No Rash  : No zimmer     [x]    High complexity decision making was performed  [x]    Patient is at high-risk of decompensation with multiple organ involvement    Lab Data Personally Reviewed: I have reviewed all the pertinent labs, microbiology data and radiology studies during assessment. Recent Labs     09/11/22  0432 09/10/22  1200 09/09/22  2054    140 135*   K 3.0* 3.0* 3.4*    106 97   CO2 29 27 35*   GLU 89 95 133*   BUN 23* 17 12   CREA 2.67* 2.16* 1.80*   CA 7.5* 7.4* 7.7*   MG 1.6 1.5*  --    PHOS 2.2* 1.3*  --    ALB  --  1.7* 1.5*   ALT  --  <6* <6*     Recent Labs     09/11/22  0432 09/10/22  1930 09/10/22  1200 09/09/22  2054   WBC 16.0* 17.2* 19.7* 24.9*   HGB 7.5* 7.6* 6.9* 8.7*   HCT 23.8* 23.5* 22.5* 27.7*    324 361 463*     No results found for: SDES  Lab Results   Component Value Date/Time    Culture result: NO GROWTH 2 DAYS 09/09/2022 08:52 PM     Recent Results (from the past 24 hour(s))   GLUCOSE, POC    Collection Time: 09/10/22  5:11 PM   Result Value Ref Range    Glucose (POC) 112 65 - 117 mg/dL    Performed by Aleja Parnell    CBC WITH AUTOMATED DIFF    Collection Time: 09/10/22  7:30 PM   Result Value Ref Range    WBC 17.2 (H) 4.1 - 11.1 K/uL    RBC 2.53 (L) 4.10 - 5.70 M/uL    HGB 7.6 (L) 12.1 - 17.0 g/dL    HCT 23.5 (L) 36.6 - 50.3 %    MCV 92.9 80.0 - 99.0 FL    MCH 30.0 26.0 - 34.0 PG    MCHC 32.3 30.0 - 36.5 g/dL    RDW 18.6 (H) 11.5 - 14.5 %    PLATELET 604 145 - 672 K/uL    MPV 9.4 8.9 - 12.9 FL    NRBC 0.0 0  WBC    ABSOLUTE NRBC 0.00 0.00 - 0.01 K/uL    NEUTROPHILS 91 (H) 32 - 75 %    LYMPHOCYTES 4 (L) 12 - 49 %    MONOCYTES 4 (L) 5 - 13 %    EOSINOPHILS 0 0 - 7 %    BASOPHILS 0 0 - 1 %    IMMATURE GRANULOCYTES 1 (H) 0.0 - 0.5 %    ABS. NEUTROPHILS 15.6 (H) 1.8 - 8.0 K/UL    ABS. LYMPHOCYTES 0.7 (L) 0.8 - 3.5 K/UL    ABS. MONOCYTES 0.7 0.0 - 1.0 K/UL    ABS. EOSINOPHILS 0.0 0.0 - 0.4 K/UL    ABS. BASOPHILS 0.0 0.0 - 0.1 K/UL    ABS. IMM.  GRANS. 0.2 (H) 0.00 - 0.04 K/UL    DF SMEAR SCANNED RBC COMMENTS ANISOCYTOSIS  1+       GLUCOSE, POC    Collection Time: 09/10/22  9:30 PM   Result Value Ref Range    Glucose (POC) 105 65 - 117 mg/dL    Performed by Aurelia Lee    CBC WITH AUTOMATED DIFF    Collection Time: 09/11/22  4:32 AM   Result Value Ref Range    WBC 16.0 (H) 4.1 - 11.1 K/uL    RBC 2.54 (L) 4.10 - 5.70 M/uL    HGB 7.5 (L) 12.1 - 17.0 g/dL    HCT 23.8 (L) 36.6 - 50.3 %    MCV 93.7 80.0 - 99.0 FL    MCH 29.5 26.0 - 34.0 PG    MCHC 31.5 30.0 - 36.5 g/dL    RDW 19.1 (H) 11.5 - 14.5 %    PLATELET 784 747 - 796 K/uL    MPV 9.5 8.9 - 12.9 FL    NRBC 0.0 0  WBC    ABSOLUTE NRBC 0.00 0.00 - 0.01 K/uL    NEUTROPHILS 89 (H) 32 - 75 %    LYMPHOCYTES 5 (L) 12 - 49 %    MONOCYTES 4 (L) 5 - 13 %    EOSINOPHILS 1 0 - 7 %    BASOPHILS 0 0 - 1 %    IMMATURE GRANULOCYTES 1 (H) 0.0 - 0.5 %    ABS. NEUTROPHILS 14.3 (H) 1.8 - 8.0 K/UL    ABS. LYMPHOCYTES 0.8 0.8 - 3.5 K/UL    ABS. MONOCYTES 0.6 0.0 - 1.0 K/UL    ABS. EOSINOPHILS 0.2 0.0 - 0.4 K/UL    ABS. BASOPHILS 0.0 0.0 - 0.1 K/UL    ABS. IMM.  GRANS. 0.1 (H) 0.00 - 0.04 K/UL    DF AUTOMATED     METABOLIC PANEL, BASIC    Collection Time: 09/11/22  4:32 AM   Result Value Ref Range    Sodium 141 136 - 145 mmol/L    Potassium 3.0 (L) 3.5 - 5.1 mmol/L    Chloride 107 97 - 108 mmol/L    CO2 29 21 - 32 mmol/L    Anion gap 5 5 - 15 mmol/L    Glucose 89 65 - 100 mg/dL    BUN 23 (H) 6 - 20 MG/DL    Creatinine 2.67 (H) 0.70 - 1.30 MG/DL    BUN/Creatinine ratio 9 (L) 12 - 20      GFR est AA 31 (L) >60 ml/min/1.73m2    GFR est non-AA 25 (L) >60 ml/min/1.73m2    Calcium 7.5 (L) 8.5 - 10.1 MG/DL   MAGNESIUM    Collection Time: 09/11/22  4:32 AM   Result Value Ref Range    Magnesium 1.6 1.6 - 2.4 mg/dL   PHOSPHORUS    Collection Time: 09/11/22  4:32 AM   Result Value Ref Range    Phosphorus 2.2 (L) 2.6 - 4.7 MG/DL   GLUCOSE, POC    Collection Time: 09/11/22  6:30 AM   Result Value Ref Range    Glucose (POC) 89 65 - 117 mg/dL    Performed by [address], POC Collection Time: 09/11/22 11:45 AM   Result Value Ref Range    Glucose (POC) 95 65 - 117 mg/dL    Performed by [address]            Total time spent with patient:  xxx   min. Care Plan discussed with:  Patient     Family      RN      Consulting Physician [facility],         I have reviewed the flowsheets. Chart and Pertinent Notes have been reviewed. No change in PMH ,family and social history from Consult note.       Adina Short MD

## 2022-09-11 NOTE — PROGRESS NOTES
[address] [facility] Adult  Hospitalist Group                                                                                          Hospitalist Progress Note  Ken Knight MD  Answering service: [phone] from in house phone        Date of Service:  2022  NAME:  León Brunner  :  1970  MRN:  [id number]      Admission Summary: This is a 58-year-old man with a past medical history significant for type 2 diabetes, who presented at the emergency room from [facility] with abdominal pain. The patient was recently admitted to another hospital and underwent left below-knee amputation. The hospitalization became complicated with respiratory failure which required prolonged intubation. The respiratory failure was attributed to aspiration pneumonia. The hospitalization was also complicated with lobulated effusion, for which the patient underwent decortication and right thoracotomy. The patient also went into acute renal failure for which he has been started on hemodialysis. J-tube was placed for supplemental nutrition. The patient was subsequently transferred to [facility] for continuation of care. He was doing relatively well in [facility] until the day of presentation at the emergency room when the patient complained of abdominal pain at the facility. CT scan of the abdomen and pelvis was obtained. The CT scan shows evidence of bowel perforation. The patient was then sent to [facility] emergency room for further evaluation. When the patient arrived at the emergency room, based on his clinical presentation and lab work, Code Sepsis was triggered. The patient received fluid therapy as per sepsis protocol. The emergency room physician consulted general surgeon on-call. The patient was seen by the surgeon and the patient is planned for immediate surgical intervention.   No record of prior admission to this hospital, but the patient was recently admitted to another hospital.    Interval history / Subjective:   Patient seen and examined for follow up of perforated bowel. Patient is hypotensive and in pain. Pain treated within limits of BP. Patient looking somewhat improved today. Improved color and more awake. Still in a good amount of pain. Assessment & Plan:     Intractable abdominal pain  LAPAROSCOPY GENERAL DIAGNOSTIC, LAPAROSCOPIC  TAKEDOWN AND REMOVAL J-TUBE, LAPAROSCOPIC COLOSTOMY, I & D ABDOMINAL WALL today  [id number] zosyn  Continue pain control    Pneumoperitoneum  As above  General surgery on board    Abdominal wall abscess  S/p drainage  Continue zosyn and vancomycin    Possible empyema  H/o recent thoracotomy and prolonged intubation  Continue antibiotics  Appreciate pulmonology recommendations. Continue antibiotics. We will follow with chest x-rays. Rectal fistula  S/p surgery    B/L AKA  Stable    Severe sepsis  Resolved  Continue antibiotics    Hypokalemia  Gave 10 mEq IV potassium yesterday and another 10 mEq today  Recheck pending  I cannot give the patient oral potassium at this time due to his abdominal surgery  Monitor carefully on telemetry as IV potassium and renal disease is risky    Anemia  Transfused 1 unit PRBC 9/10    HTN  Holding    DM2  SSI    Suspected acute GIB  GI consulted    Diarrhea  C. Diff     Code status: full  Prophylaxis: hold, SCDs  Care Plan discussed with: patient, family, nurse  Anticipated Disposition: TBD     Hospital Problems  Date Reviewed: 9/10/2022            Codes Class Noted POA    * (Principal) Intra-abdominal infection ICD-10-CM: B99.9  ICD-9-CM: 136.9  9/10/2022 Yes           Review of Systems:   A comprehensive review of systems was negative except for that written in the HPI. Vital Signs:    Last 24hrs VS reviewed since prior progress note.  Most recent are:  Visit Vitals  BP (!) 91/56   Pulse 63   Temp 98.3 °F (36.8 °C)   Resp 13   Ht 5' 7\" (1.702 m)   Wt 74.9 kg (165 lb 2 oz)   SpO2 95%   BMI 25.86 kg/m²         Intake/Output Summary (Last 24 hours) at 9/11/2022 1643  Last data filed at 9/11/2022 1547  Gross per 24 hour   Intake 1785.84 ml   Output --   Net 1785.84 ml          Physical Examination:     I had a face to face encounter with this patient and independently examined them on 9/11/2022 as outlined below:          Constitutional:  No acute distress, somnolent   ENT:  Oral mucosa moist, oropharynx benign. Resp:  CTA bilaterally. No wheezing/rhonchi/rales. No accessory muscle use. CV:  Regular rhythm, normal rate, no murmurs, gallops, rubs    GI:  Colostomy. Painful to palpation. Musculoskeletal:  No edema, warm, 2+ pulses throughout    Neurologic:  Moves all extremities. AAOx3, CN II-XII reviewed            Data Review:    Review and/or order of clinical lab test  Review and/or order of tests in the radiology section of CPT  Review and/or order of tests in the medicine section of CPT      Labs:     Recent Labs     09/11/22  0432 09/10/22  1930   WBC 16.0* 17.2*   HGB 7.5* 7.6*   HCT 23.8* 23.5*    324       Recent Labs     09/11/22  0432 09/10/22  1200 09/09/22  2054    140 135*   K 3.0* 3.0* 3.4*    106 97   CO2 29 27 35*   BUN 23* 17 12   CREA 2.67* 2.16* 1.80*   GLU 89 95 133*   CA 7.5* 7.4* 7.7*   MG 1.6 1.5*  --    PHOS 2.2* 1.3*  --        Recent Labs     09/10/22  1200 09/09/22 2054   ALT <6* <6*   AP 86 115   TBILI 0.7 0.7   TP 5.8* 6.9   ALB 1.7* 1.5*   GLOB 4.1* 5.4*   LPSE 54*  --        No results for input(s): INR, PTP, APTT, INREXT, INREXT in the last 72 hours. Recent Labs     09/10/22  1200   TIBC 141*   PSAT 9*   FERR 715*        Lab Results   Component Value Date/Time    Folate 4.1 (L) 09/10/2022 12:00 PM        No results for input(s): PH, PCO2, PO2 in the last 72 hours. No results for input(s): CPK, CKNDX, TROIQ in the last 72 hours.     No lab exists for component: CPKMB  No results found for: CHOL, CHOLX, CHLST, CHOLV, HDL, HDLP, LDL, LDLC, DLDLP, [facility], [facility], [facility]  Lab Results   Component Value Date/Time    Glucose (POC) 95 09/11/2022 11:45 AM    Glucose (POC) 89 09/11/2022 06:30 AM    Glucose (POC) 105 09/10/2022 09:30 PM    Glucose (POC) 112 09/10/2022 05:11 PM    Glucose (POC) 100 09/10/2022 12:23 PM     No results found for: COLOR, APPRN, SPGRU, REFSG, WILL, PROTU, GLUCU, KETU, BILU, UROU, MISHA, LEUKU, GLUKE, EPSU, BACTU, WBCU, RBCU, CASTS, UCRY      Medications Reviewed:     Current Facility-Administered Medications   Medication Dose Route Frequency    potassium phosphate 20 mmol in 0.9% sodium chloride 250 mL infusion   IntraVENous ONCE    magnesium sulfate 1 g/100 ml IVPB (premix or compounded)  1 g IntraVENous ONCE    [START ON 9/12/2022] Vancomycin, Random - Please draw on Monday, 9/12 @ 0400. Thanks!    Other ONCE    0.9% sodium chloride infusion 250 mL  250 mL IntraVENous PRN    lactated Ringers infusion  50 mL/hr IntraVENous CONTINUOUS    HYDROmorphone (DILAUDID) injection 0.5-1 mg  0.5-1 mg IntraVENous Q3H PRN    piperacillin-tazobactam (ZOSYN) 3.375 g in 0.9% sodium chloride (MBP/ADV) 100 mL MBP  3.375 g IntraVENous Q8H    glucose chewable tablet 16 g  4 Tablet Oral PRN    glucagon (GLUCAGEN) injection 1 mg  1 mg IntraMUSCular PRN    dextrose 10 % infusion 0-250 mL  0-250 mL IntraVENous PRN    ondansetron (ZOFRAN) injection 4 mg  4 mg IntraVENous Q4H PRN    diphenhydrAMINE (BENADRYL) injection 12.5 mg  12.5 mg IntraVENous Q6H PRN    sodium chloride (NS) flush 5-40 mL  5-40 mL IntraVENous Q8H    sodium chloride (NS) flush 5-40 mL  5-40 mL IntraVENous PRN    acetaminophen (TYLENOL) tablet 650 mg  650 mg Oral Q6H PRN    Or    acetaminophen (TYLENOL) suppository 650 mg  650 mg Rectal Q6H PRN    polyethylene glycol (MIRALAX) packet 17 g  17 g Oral DAILY PRN    ondansetron (ZOFRAN ODT) tablet 4 mg  4 mg Oral Q8H PRN    Or    ondansetron (ZOFRAN) injection 4 mg  4 mg IntraVENous Q6H PRN    L.acidophilus-paracasei-S.thermophil-bifidobacter (RISAQUAD) 8 billion cell capsule  1 Capsule Oral DAILY    insulin lispro (HUMALOG) injection   SubCUTAneous AC&HS    glucose chewable tablet 16 g  4 Tablet Oral PRN    glucagon (GLUCAGEN) injection 1 mg  1 mg IntraMUSCular PRN    pantoprazole (PROTONIX) 40 mg in 0.9% sodium chloride 10 mL injection  40 mg IntraVENous Q12H    dextrose 10 % infusion 0-250 mL  0-250 mL IntraVENous PRN    Vancomycin - pharmacy to dose   Other Rx Dosing/Monitoring    0.9% sodium chloride infusion 250 mL  250 mL IntraVENous PRN     ______________________________________________________________________  EXPECTED LENGTH OF STAY: - - -  ACTUAL LENGTH OF STAY:          [address] Ether Chris, MD

## 2022-09-11 NOTE — PROGRESS NOTES
Bedside shift change report given to Genevieve Barber RN (oncoming nurse) by Escobar chi RN (offgoing nurse). Report included the following information SBAR, Kardex, ED Summary, OR Summary, Procedure Summary, Intake/Output, MAR, Accordion, Recent Results, and Cardiac Rhythm NSR .

## 2022-09-11 NOTE — PROGRESS NOTES
Progress Note    Patient: Rozina Godwin MRN: [id number]  SSN: [id number]    YOB: 1970  Age: 45 y.o. Sex: male      Admit Date: 2022    1 Day Post-Op    Procedure:  Procedure(s):  LAPAROSCOPY GENERAL DIAGNOSTIC, LAPAROSCOPIC  TAKEDOWN AND REMOVAL J-TUBE, LAPAROSCOPIC COLOSTOMY, I & D ABDOMINAL WALL    Subjective:     Patient complaining of buttocks pain and some abdominal pain. Apparently has been eating a few frosted flakes and they have been going well    Objective:     Visit Vitals  BP (!) 95/50 (BP 1 Location: Left upper arm, BP Patient Position: At rest;Lying)   Pulse 65   Temp 98.5 °F (36.9 °C)   Resp 17   Ht 5' 7\" (1.702 m)   Wt 74.9 kg (165 lb 2 oz)   SpO2 97%   BMI 25.86 kg/m²       Temp (24hrs), Av.6 °F (37 °C), Min:97.8 °F (36.6 °C), Max:99.5 °F (37.5 °C)      Physical Exam:    General alert no acute distress  Abdomen-left upper quadrant I&D site still with a small amount of necrotic tissue minimal drainage repacked  Ostomy is dark red but viable currently draining stool  Sacrum and buttocks-still with drainage from the fistula site and some excoriation  Sacral decubitus unchanged  Left leg amputation site with some necrotic tissue along the staple line but otherwise intact    Data Review: images and reports reviewed    Lab Review: All lab results for the last 24 hours reviewed.   Recent Results (from the past 24 hour(s))   GLUCOSE, POC    Collection Time: 09/10/22 12:23 PM   Result Value Ref Range    Glucose (POC) 100 65 - 117 mg/dL    Performed by Michael FERRELL(CON)    GLUCOSE, POC    Collection Time: 09/10/22  5:11 PM   Result Value Ref Range    Glucose (POC) 112 65 - 117 mg/dL    Performed by Amy Gaviria    CBC WITH AUTOMATED DIFF    Collection Time: 09/10/22  7:30 PM   Result Value Ref Range    WBC 17.2 (H) 4.1 - 11.1 K/uL    RBC 2.53 (L) 4.10 - 5.70 M/uL    HGB 7.6 (L) 12.1 - 17.0 g/dL    HCT 23.5 (L) 36.6 - 50.3 %    MCV 92.9 80.0 - 99.0 FL    MCH 30.0 26.0 - 34.0 PG MCHC 32.3 30.0 - 36.5 g/dL    RDW 18.6 (H) 11.5 - 14.5 %    PLATELET 324 471 - 138 K/uL    MPV 9.4 8.9 - 12.9 FL    NRBC 0.0 0  WBC    ABSOLUTE NRBC 0.00 0.00 - 0.01 K/uL    NEUTROPHILS 91 (H) 32 - 75 %    LYMPHOCYTES 4 (L) 12 - 49 %    MONOCYTES 4 (L) 5 - 13 %    EOSINOPHILS 0 0 - 7 %    BASOPHILS 0 0 - 1 %    IMMATURE GRANULOCYTES 1 (H) 0.0 - 0.5 %    ABS. NEUTROPHILS 15.6 (H) 1.8 - 8.0 K/UL    ABS. LYMPHOCYTES 0.7 (L) 0.8 - 3.5 K/UL    ABS. MONOCYTES 0.7 0.0 - 1.0 K/UL    ABS. EOSINOPHILS 0.0 0.0 - 0.4 K/UL    ABS. BASOPHILS 0.0 0.0 - 0.1 K/UL    ABS. IMM. GRANS. 0.2 (H) 0.00 - 0.04 K/UL    DF SMEAR SCANNED      RBC COMMENTS ANISOCYTOSIS  1+       GLUCOSE, POC    Collection Time: 09/10/22  9:30 PM   Result Value Ref Range    Glucose (POC) 105 65 - 117 mg/dL    Performed by Tang Mike    CBC WITH AUTOMATED DIFF    Collection Time: 09/11/22  4:32 AM   Result Value Ref Range    WBC 16.0 (H) 4.1 - 11.1 K/uL    RBC 2.54 (L) 4.10 - 5.70 M/uL    HGB 7.5 (L) 12.1 - 17.0 g/dL    HCT 23.8 (L) 36.6 - 50.3 %    MCV 93.7 80.0 - 99.0 FL    MCH 29.5 26.0 - 34.0 PG    MCHC 31.5 30.0 - 36.5 g/dL    RDW 19.1 (H) 11.5 - 14.5 %    PLATELET 850 107 - 240 K/uL    MPV 9.5 8.9 - 12.9 FL    NRBC 0.0 0  WBC    ABSOLUTE NRBC 0.00 0.00 - 0.01 K/uL    NEUTROPHILS 89 (H) 32 - 75 %    LYMPHOCYTES 5 (L) 12 - 49 %    MONOCYTES 4 (L) 5 - 13 %    EOSINOPHILS 1 0 - 7 %    BASOPHILS 0 0 - 1 %    IMMATURE GRANULOCYTES 1 (H) 0.0 - 0.5 %    ABS. NEUTROPHILS 14.3 (H) 1.8 - 8.0 K/UL    ABS. LYMPHOCYTES 0.8 0.8 - 3.5 K/UL    ABS. MONOCYTES 0.6 0.0 - 1.0 K/UL    ABS. EOSINOPHILS 0.2 0.0 - 0.4 K/UL    ABS. BASOPHILS 0.0 0.0 - 0.1 K/UL    ABS. IMM.  GRANS. 0.1 (H) 0.00 - 0.04 K/UL    DF AUTOMATED     METABOLIC PANEL, BASIC    Collection Time: 09/11/22  4:32 AM   Result Value Ref Range    Sodium 141 136 - 145 mmol/L    Potassium 3.0 (L) 3.5 - 5.1 mmol/L    Chloride 107 97 - 108 mmol/L    CO2 29 21 - 32 mmol/L    Anion gap 5 5 - 15 mmol/L Glucose 89 65 - 100 mg/dL    BUN 23 (H) 6 - 20 MG/DL    Creatinine 2.67 (H) 0.70 - 1.30 MG/DL    BUN/Creatinine ratio 9 (L) 12 - 20      GFR est AA 31 (L) >60 ml/min/1.73m2    GFR est non-AA 25 (L) >60 ml/min/1.73m2    Calcium 7.5 (L) 8.5 - 10.1 MG/DL   MAGNESIUM    Collection Time: 09/11/22  4:32 AM   Result Value Ref Range    Magnesium 1.6 1.6 - 2.4 mg/dL   PHOSPHORUS    Collection Time: 09/11/22  4:32 AM   Result Value Ref Range    Phosphorus 2.2 (L) 2.6 - 4.7 MG/DL   GLUCOSE, POC    Collection Time: 09/11/22  6:30 AM   Result Value Ref Range    Glucose (POC) 89 65 - 117 mg/dL    Performed by [address] Pr[address] Barrera, POC    Collection Time: 09/11/22 11:45 AM   Result Value Ref Range    Glucose (POC) 95 65 - 117 mg/dL    Performed by [facility]        Assessment:     Hospital Problems  Date Reviewed: 9/10/2022            Codes Class Noted POA    * (Principal) Intra-abdominal infection ICD-10-CM: B99.9  ICD-9-CM: 136.9  9/10/2022 Yes           Plan/Recommendations/Medical Decision Making:   Overall seems to be doing as expected. Will advance diet to full liquids and see how he does.   Continue ostomy care-WOCN to see tomorrow  Continue wet-to-dry to abdominal wound WOCN tomorrow  Skin protector and to perianal area dry dressing to ulcers until WOCN can see tomorrow  Continue antibiotics  Will eventually need colorectal for perirectal fistula

## 2022-09-12 LAB
ANION GAP SERPL CALC-SCNC: 9 MMOL/L (ref 5–15)
BASOPHILS # BLD: 0.1 K/UL (ref 0–0.1)
BASOPHILS NFR BLD: 0 % (ref 0–1)
BUN SERPL-MCNC: 29 MG/DL (ref 6–20)
BUN/CREAT SERPL: 9 (ref 12–20)
CALCIUM SERPL-MCNC: 7.3 MG/DL (ref 8.5–10.1)
CHLORIDE SERPL-SCNC: 110 MMOL/L (ref 97–108)
CO2 SERPL-SCNC: 24 MMOL/L (ref 21–32)
CREAT SERPL-MCNC: 3.15 MG/DL (ref 0.7–1.3)
DIFFERENTIAL METHOD BLD: ABNORMAL
EOSINOPHIL # BLD: 0.3 K/UL (ref 0–0.4)
EOSINOPHIL NFR BLD: 2 % (ref 0–7)
ERYTHROCYTE [DISTWIDTH] IN BLOOD BY AUTOMATED COUNT: 18.9 % (ref 11.5–14.5)
GLUCOSE BLD STRIP.AUTO-MCNC: 71 MG/DL (ref 65–117)
GLUCOSE BLD STRIP.AUTO-MCNC: 76 MG/DL (ref 65–117)
GLUCOSE BLD STRIP.AUTO-MCNC: 77 MG/DL (ref 65–117)
GLUCOSE BLD STRIP.AUTO-MCNC: 87 MG/DL (ref 65–117)
GLUCOSE SERPL-MCNC: 81 MG/DL (ref 65–100)
HBV SURFACE AB SER QL: NONREACTIVE
HBV SURFACE AB SER-ACNC: <3.1 MIU/ML
HBV SURFACE AG SER QL: <0.1 INDEX
HBV SURFACE AG SER QL: NEGATIVE
HCT VFR BLD AUTO: 23.9 % (ref 36.6–50.3)
HGB BLD-MCNC: 7.5 G/DL (ref 12.1–17)
IMM GRANULOCYTES # BLD AUTO: 0.1 K/UL (ref 0–0.04)
IMM GRANULOCYTES NFR BLD AUTO: 1 % (ref 0–0.5)
LYMPHOCYTES # BLD: 0.9 K/UL (ref 0.8–3.5)
LYMPHOCYTES NFR BLD: 6 % (ref 12–49)
MAGNESIUM SERPL-MCNC: 1.8 MG/DL (ref 1.6–2.4)
MCH RBC QN AUTO: 30 PG (ref 26–34)
MCHC RBC AUTO-ENTMCNC: 31.4 G/DL (ref 30–36.5)
MCV RBC AUTO: 95.6 FL (ref 80–99)
MONOCYTES # BLD: 0.5 K/UL (ref 0–1)
MONOCYTES NFR BLD: 3 % (ref 5–13)
NEUTS SEG # BLD: 13.8 K/UL (ref 1.8–8)
NEUTS SEG NFR BLD: 88 % (ref 32–75)
NRBC # BLD: 0 K/UL (ref 0–0.01)
NRBC BLD-RTO: 0 PER 100 WBC
PHOSPHATE SERPL-MCNC: 3.6 MG/DL (ref 2.6–4.7)
PLATELET # BLD AUTO: 380 K/UL (ref 150–400)
PMV BLD AUTO: 9.5 FL (ref 8.9–12.9)
POTASSIUM SERPL-SCNC: 3.3 MMOL/L (ref 3.5–5.1)
RBC # BLD AUTO: 2.5 M/UL (ref 4.1–5.7)
SERVICE CMNT-IMP: NORMAL
SODIUM SERPL-SCNC: 143 MMOL/L (ref 136–145)
VANCOMYCIN SERPL-MCNC: 33.5 UG/ML
WBC # BLD AUTO: 15.8 K/UL (ref 4.1–11.1)

## 2022-09-12 PROCEDURE — 80202 ASSAY OF VANCOMYCIN: CPT

## 2022-09-12 PROCEDURE — 74011250636 HC RX REV CODE- 250/636: Performed by: INTERNAL MEDICINE

## 2022-09-12 PROCEDURE — 74011250636 HC RX REV CODE- 250/636: Performed by: HOSPITALIST

## 2022-09-12 PROCEDURE — 74011250636 HC RX REV CODE- 250/636: Performed by: SURGERY

## 2022-09-12 PROCEDURE — 77030037878 HC DRSG MEPILEX >48IN BORD MOLN -B

## 2022-09-12 PROCEDURE — 65660000001 HC RM ICU INTERMED STEPDOWN

## 2022-09-12 PROCEDURE — 74011000258 HC RX REV CODE- 258: Performed by: SURGERY

## 2022-09-12 PROCEDURE — 82962 GLUCOSE BLOOD TEST: CPT

## 2022-09-12 PROCEDURE — 85025 COMPLETE CBC W/AUTO DIFF WBC: CPT

## 2022-09-12 PROCEDURE — 83735 ASSAY OF MAGNESIUM: CPT

## 2022-09-12 PROCEDURE — 74011250637 HC RX REV CODE- 250/637: Performed by: INTERNAL MEDICINE

## 2022-09-12 PROCEDURE — 90935 HEMODIALYSIS ONE EVALUATION: CPT

## 2022-09-12 PROCEDURE — 87340 HEPATITIS B SURFACE AG IA: CPT

## 2022-09-12 PROCEDURE — 74011000250 HC RX REV CODE- 250: Performed by: INTERNAL MEDICINE

## 2022-09-12 PROCEDURE — 84100 ASSAY OF PHOSPHORUS: CPT

## 2022-09-12 PROCEDURE — 99356 PR PROLONGED SVC I/P OR OBS SETTING 1ST HOUR: CPT | Performed by: CLINICAL NURSE SPECIALIST

## 2022-09-12 PROCEDURE — 36415 COLL VENOUS BLD VENIPUNCTURE: CPT

## 2022-09-12 PROCEDURE — 77030037877 HC DRSG MEPILEX >48IN BORD MOLN -A

## 2022-09-12 PROCEDURE — 86706 HEP B SURFACE ANTIBODY: CPT

## 2022-09-12 PROCEDURE — C9113 INJ PANTOPRAZOLE SODIUM, VIA: HCPCS | Performed by: INTERNAL MEDICINE

## 2022-09-12 PROCEDURE — 80048 BASIC METABOLIC PNL TOTAL CA: CPT

## 2022-09-12 PROCEDURE — 51798 US URINE CAPACITY MEASURE: CPT

## 2022-09-12 PROCEDURE — 5A1D70Z PERFORMANCE OF URINARY FILTRATION, INTERMITTENT, LESS THAN 6 HOURS PER DAY: ICD-10-PCS | Performed by: INTERNAL MEDICINE

## 2022-09-12 PROCEDURE — 99024 POSTOP FOLLOW-UP VISIT: CPT | Performed by: SURGERY

## 2022-09-12 PROCEDURE — 99233 SBSQ HOSP IP/OBS HIGH 50: CPT | Performed by: CLINICAL NURSE SPECIALIST

## 2022-09-12 RX ORDER — HYDROMORPHONE HYDROCHLORIDE 1 MG/ML
.5-1 INJECTION, SOLUTION INTRAMUSCULAR; INTRAVENOUS; SUBCUTANEOUS
Status: DISCONTINUED | OUTPATIENT
Start: 2022-09-12 | End: 2022-09-20

## 2022-09-12 RX ADMIN — HYDROMORPHONE HYDROCHLORIDE 1 MG: 1 INJECTION, SOLUTION INTRAMUSCULAR; INTRAVENOUS; SUBCUTANEOUS at 12:30

## 2022-09-12 RX ADMIN — ACETAMINOPHEN 650 MG: 325 TABLET, FILM COATED ORAL at 22:17

## 2022-09-12 RX ADMIN — SODIUM CHLORIDE, PRESERVATIVE FREE 10 ML: 5 INJECTION INTRAVENOUS at 06:33

## 2022-09-12 RX ADMIN — SODIUM CHLORIDE, PRESERVATIVE FREE 40 MG: 5 INJECTION INTRAVENOUS at 12:30

## 2022-09-12 RX ADMIN — HYDROMORPHONE HYDROCHLORIDE 1 MG: 1 INJECTION, SOLUTION INTRAMUSCULAR; INTRAVENOUS; SUBCUTANEOUS at 17:28

## 2022-09-12 RX ADMIN — SODIUM CHLORIDE, POTASSIUM CHLORIDE, SODIUM LACTATE AND CALCIUM CHLORIDE 50 ML/HR: 600; 310; 30; 20 INJECTION, SOLUTION INTRAVENOUS at 06:35

## 2022-09-12 RX ADMIN — HYDROMORPHONE HYDROCHLORIDE 1 MG: 1 INJECTION, SOLUTION INTRAMUSCULAR; INTRAVENOUS; SUBCUTANEOUS at 06:23

## 2022-09-12 RX ADMIN — EPOETIN ALFA-EPBX 10000 UNITS: 10000 INJECTION, SOLUTION INTRAVENOUS; SUBCUTANEOUS at 21:50

## 2022-09-12 RX ADMIN — HYDROMORPHONE HYDROCHLORIDE 1 MG: 1 INJECTION, SOLUTION INTRAMUSCULAR; INTRAVENOUS; SUBCUTANEOUS at 19:17

## 2022-09-12 RX ADMIN — HYDROMORPHONE HYDROCHLORIDE 1 MG: 1 INJECTION, SOLUTION INTRAMUSCULAR; INTRAVENOUS; SUBCUTANEOUS at 15:14

## 2022-09-12 RX ADMIN — PIPERACILLIN AND TAZOBACTAM 3.38 G: 3; .375 INJECTION, POWDER, FOR SOLUTION INTRAVENOUS at 19:17

## 2022-09-12 RX ADMIN — PIPERACILLIN AND TAZOBACTAM 3.38 G: 3; .375 INJECTION, POWDER, FOR SOLUTION INTRAVENOUS at 06:32

## 2022-09-12 RX ADMIN — Medication 1 CAPSULE: at 12:30

## 2022-09-12 RX ADMIN — DIPHENHYDRAMINE HYDROCHLORIDE 12.5 MG: 50 INJECTION, SOLUTION INTRAMUSCULAR; INTRAVENOUS at 06:23

## 2022-09-12 RX ADMIN — SODIUM CHLORIDE, PRESERVATIVE FREE 10 ML: 5 INJECTION INTRAVENOUS at 14:00

## 2022-09-12 RX ADMIN — HYDROMORPHONE HYDROCHLORIDE 1 MG: 1 INJECTION, SOLUTION INTRAMUSCULAR; INTRAVENOUS; SUBCUTANEOUS at 22:17

## 2022-09-12 RX ADMIN — SODIUM CHLORIDE, PRESERVATIVE FREE 40 MG: 5 INJECTION INTRAVENOUS at 22:11

## 2022-09-12 RX ADMIN — HYDROMORPHONE HYDROCHLORIDE 1 MG: 1 INJECTION, SOLUTION INTRAMUSCULAR; INTRAVENOUS; SUBCUTANEOUS at 03:37

## 2022-09-12 NOTE — PROGRESS NOTES
Day #3 of Vancomycin  Indication:  sepsis 2/2 bowel perforation. Current regimen:  1000 mg IV Q 24hrs - ON HOLD  Abx regimen:  Vancomycin and Zosyn    Recent Labs     22  0341 22  0432 09/10/22  1930 09/10/22  1200   WBC 15.8* 16.0* 17.2* 19.7*   CREA 3.15* 2.67*  --  2.16*   BUN 29* 23*  --  17     Est CrCl: JEMIMA on HD  Temp (24hrs), Av.3 °F (36.8 °C), Min:97.8 °F (36.6 °C), Max:98.7 °F (37.1 °C)    Cultures:    Blood - NGTD     Goal target range 20 - 25 mcg/mL for therapeutic goal of 15 - 20 mcg/mL as approximately 35% of drug will be removed by HD filtration. Date Pre HD level Dialysis Dose    33.5                                Assessment/Plan:    Random level this am = 33.5 ~ 18 hrs p last dose. Patient to have a HD treatment today. Will hold p tx dose as pre HD level is higher than goal.  Pharmacy to monitor patient daily for dosage adjustments as needed.

## 2022-09-12 NOTE — PROGRESS NOTES
Nutrition Note    Pt screened this AM and noted to be on clear liquids with mildly thick restriction. Chart reviewed. Noted he presented from [facility] with J-tube in place which was being used for supplemental feeds. Was also on pureed diet and mildly thick liquids while at [facility]. J-tube removed this admission on 9/10 d/t abdominal wall abscess. Colostomy placed. Multiple other medical problems including recent BKA and PCM that would warrant RD involvement. Currently pt ordered for clear liquids, mildly thick, but has several packages of dry cereal at bedside and a red Gatorade (unthickened). Discussed in rounds with RN and with Dr. Sade Figueroa. Pt is reportedly tolerating both of these. I discussed with SLP - recommends unless MD, surgery, or pulmonary thinks SLP warranted, to continue to advance diet as tolerated. Per surgery note yesterday, OK for fulls, but remains on clears. Surgery note today also states to advance diet. Therefore, recommend diet advancement as medically feasible/tolerated. If any current concerns for dysphagia or aspiration, recommend SLP involvement. RD will see pt for full assessment tomorrow.     Roel Owens RD  Available via [employer]

## 2022-09-12 NOTE — DIABETES MGMT
I attest that I personally saw this patient along with RUEL Parnell. I individually reviewed their medical record, assessed the patient and agree with  assessment and treatment recommendations as stated below. [facility]    CLINICAL NURSE SPECIALIST CONSULT     Initial Presentation   Krystyna Patel is a 45 y.o. male admitted on 09/09/22 after experiencing abdominal pain. After a recent prolonged and complicated hospitalization (at a different hospital), he has been residing at [facility]. CT scan at [facility] showed bowel perforation and he was sent to [facility] for emergency surgery. LAB: , A1c< 3.8%, GFR 40, WBC 24.9, Hgb 8.7, Lactic 1.2  CXR:There is a right pleural effusion with associated accentuation of  the right lung interstitial markings  CT/MRI: from [facility]    HX:   Past Medical History:   Diagnosis Date    Diabetes ([facility] [address]         INITIAL DX:   Intra-abdominal infection [B99.9]     Current Treatment     TX: IVF per sepsis protocol, Abx., Diagnostic Laparoscopy - takedown of J-tube, colostomy, I&D of abdominal wall, pain management    Consulted by Provider for advanced diabetes nursing assessment and care for:   [] Transitioning off [facility] [place]   [x] Inpatient management strategy  [x] Home management assessment  [] Survival skill education    Hospital Course   Clinical progress has been complicated by sepsis, which is now resolving. Diabetes History   Type 2 diabetes for about 15 years. Strong family history.     Diabetes-related Medical History  Microvascular disease  ESRD - on HD  Macrovascular disease  Foot wounds , left BKA 2022, right BKA 2021      Diabetes Medication History  Key Antihyperglycemic Medications               insulin lispro (HUMALOG) 100 unit/mL injection 9 units Breakfast, 11 units with lunch and 9  Units at dinner    insulin glargine (LANTUS) 100 unit/mL injection 26 units QHS SC             Diabetes self-management practices:   Eating pattern   [x] Not eating a carbohydrate-controlled mealplan  [x] Breakfast Cereal, breakfast sandwich  [x] Lunch  [place], hamburger with french fries  [x] Dinner  Frozen pizza  [x] Bedtime Sometimes ice cream or popcorn    [x] Beverages Pepsi, Gatorade (regular)  Physical activity pattern   [x] Not employing a physical activity program to control BG  Bilateral BKAs  Monitoring pattern   [x] Not testing BGs sufficiently to inform self-management adjustments  Only checking when he is not feeling well  Taking medications pattern  Has not taken insulin in 3 or 4 years    Overall evaluation:    [x] A1c inaccurate at this time due to transfusions and HD    Subjective   I want to start eating some real food.      Objective   Physical exam  General Normal weight male in no acute distress. Conversant and cooperative  Neuro  Alert, oriented   Vital Signs Visit Vitals  BP (!) 157/65   Pulse 66   Temp 97.9 °F (36.6 °C)   Resp 20   Ht 5' 7\" (1.702 m)   Wt 75.2 kg (165 lb 12.6 oz)   SpO2 98%   BMI 25.97 kg/m²     Skin  Warm and dry. Extremities Bilateral BKA        Laboratory  Recent Labs     09/12/22  0341 09/11/22  0432 09/10/22  1930 09/10/22  1200 09/09/22 2054   GLU 81 89  --  95 133*   AGAP 9 5  --  7 3*   WBC 15.8* 16.0* 17.2* 19.7* 24.9*   CREA 3.15* 2.67*  --  2.16* 1.80*   GFRNA 21* 25*  --  32* 40*   AST  --   --   --  9* 12*   ALT  --   --   --  <6* <6*       Factors impacting BG management  Factor Dose Comments   Nutrition:  Standard meals     Clear liquid (nectar consistency)    Drugs:  Epogen  Blood transfusion(s)     HD MWF  1 unit on 09/10   A1cs inaccurate  A1cs inaccurate   Pain PRN Dilaudid    Infection Zosyn and Vancomycin Leukocytosis improving   Other:   Kidney function   ESRD      Blood glucose pattern      Significant diabetes-related events over the past 24-72 hours  Requiring minimal correctional insulin; BG     Evaluation   This 45 y.o.  [place]uc[place] male with Type 2 diabetes and ESRD after prolonged hospitalization earlier this year was admitted from [address] [place] [address] for emergent abdominal surgery. He states he has not been on insulin for about 3 or 4 years and has not seen his PCP since last year. He is currently requiring only minimal correctional insulin, if any insulin at all, and BGs have been running in the low 100's. He has been started on liquid diet since surgery but has not advanced to regular food. Would suggest just continuing corrective approach unless his needs change going forward. Difficult to truly assess home management since he has been hospitalized or at [address] [place] [address] since May, but will most certainly need to make sure he has follow up with managing his diabetes. This patient would benefit from diabetes self-management education and support RONNY RAMÍREZ [facility]) after discharge. Recommendations     [x] Use of Subcutaneous Insulin Order set (5977)  Insulin Dosing Specific recommendation   Corrective                                       (Useful in adjusting insulin dosing) [] Normal sensitivity  [x] HIGH sensitivity  [] Insulin-resistant sensitivity      A1c not accurate - consider sending serum fructosamine with next set of labs         Billing Code(s)   [x] 87237 IP subsequent hospital care - 35 minutes [x] 82333 Prolonged Services - 65 minutes [] 42576 Prolonged Services - 110 minutes  [] 56005 IP subsequent hospital care - 25 minutes [] 33691 Prolonged Services - 55 minutes [] 56092 Prolonged Services - 100 minutes  [] 05189 IP subsequent hospital care - 15 minutes [] 03508 Prolonged Services - 45 minutes [] 79574 Prolonged Services - 90 minutes    Before making these care recommendations, I personally reviewed the hospitalization record, including notes, laboratory & diagnostic data and current medications, and examined the patient at the bedside (circumstances permitting) before making care recommendations.  More than fifty (50) percent of the time was spent in patient counseling and/or care coordination.   Total minutes: 75    RUEL Walter  Diabetes Clinical Nurse Specialist  [facility]  Access via [employer]

## 2022-09-12 NOTE — PROGRESS NOTES
Faculty or Preceptor Review of Student Work    9/12/2022  - Shift times - 0730 to 1300    The student documentation of patient care for Kevin Chris has been reviewed and approved. All medications have been administered under the direct supervision of the faculty or preceptor.     Yoko Minor RN

## 2022-09-12 NOTE — PROGRESS NOTES
Physician Progress Note      Mary Barragan  CSN #:                  [id number]  :                       1970  ADMIT DATE:       2022 8:00 PM  [address] [place] DATE:  RESPONDING  PROVIDER #:        Lesia Nation MD          QUERY TEXT:    Patient admitted with sepsis. Noted documentation of Jemima/on HD Now on Hospitalist and Nephrology notes and 'End-stage renal disease on dialysis' on pulmonary consult notes. If possible, please document in progress notes and discharge summary if you are evaluating and /or treating any of the following: The medical record reflects the following:  Risk Factors: 52yo with HTN and DM    Clinical Indicators:  H&P  pt was recently admitted to another hospital...  also went into acute renal failure for which he has been started on hemodialysis. Nephrology  JEMIMA on HD:  - HD MWF for now while here  - daily labs     Pulmonary consult note  End-stage renal disease on dialysis    Treatment: HD, nephrology following, daily labs    Thank you,  [facility]  [phone]  I am also available via Perfect Serve. Options provided:  -- Acute kidney injury only, note ESRD  -- ESRD only, not JEMIMA  -- Other - I will add my own diagnosis  -- Disagree - Not applicable / Not valid  -- Disagree - Clinically unable to determine / Unknown  -- Refer to Clinical Documentation Reviewer    PROVIDER RESPONSE TEXT:    This patient has ESRD only, not acute kindey injury.     Query created by: Janelle Chandler on 2022 1:14 PM      Electronically signed by:  Lesia Nation MD 2022 3:39 PM

## 2022-09-12 NOTE — PROGRESS NOTES
Comprehensive Nutrition Assessment    Type and Reason for Visit: Initial, Wound, Positive nutrition screen    Nutrition Recommendations/Plan:     Recommend Phos repletion    Recommend starting folic acid for deficiency    Recommend supplemental iron     Recommend Nephrocap    Diet advancement to full liquids today, continue to advance as tolerated. Monitor PO intake closely to determine if needs supplemental alternative means of nutrition (J-tube was placed for supplemental feeds per chart and pt)    If dysphagia or aspiration of concern, recommend SLP eval (was on pureed/ mildly thick liquids per [facility] records, but has been eating regular texture/ thin liquids here without overt signs of intolerance). Malnutrition Assessment:  Malnutrition Status:  Severe malnutrition (09/13/22 1116)    Context:  Chronic illness     Findings of the 6 clinical characteristics of malnutrition:   Energy Intake:  Unable to assess  Weight Loss:  Unable to assess     Body Fat Loss:  Severe body fat loss, Triceps, Orbital   Muscle Mass Loss:  Severe muscle mass loss, Temples (temporalis), Clavicles (pectoralis &deltoids)  Fluid Accumulation:  No significant fluid accumulation,     Strength:  Not performed        Nutrition Assessment:    PMHx includes DM, bilateral BKAs, JEMIMA on HD  HPI: pt presented at the emergency room from [facility] with abdominal pain. . was recently admitted to another hospital and underwent L BKA, hospitalization c/b respiratory failure which required prolonged intubation. The respiratory failure was attributed to aspiration pneumonia as well as lobulated effusion, for which the patient underwent decortication and R thoracotomy. The patient also went into acute renal failure for which he has been started on HD. J-tube was placed for supplemental nutrition. The patient was subsequently transferred to [facility] for continuation of care.   He was doing relatively well at [facility] until the day of presentation to our ER with c/o abdominal pain. CTAP showed evidence of bowel perforation. Admitted with pneumoperitoneum, abdominal wall abscess, severe sepsis, possible empyema, FOBT+, JEMIMA on HD, rectal fistula. Surgery consulted on admission. Underwent takedown and removal of J-tube, lap colostomy placement, and I&D of abdominal wall on 9/10/22. Recommends colorectal surgery, likely OP, for management of rectal fistula. Pt was initially screen d/t diet order thickened clear liquids x 3 days. [facility], pt was on pureed diet with mildly thick liquids. However, despite our diet order, pt had dried cereal (brought in from outside) he was eating yesterday and drinking thin liquids. RN stated he was tolerating fine. I addressed this with Dr. Amy Escudero yesterday and asked about SLP eval and he also did not seem that concerned as he also saw that pt was tolerating these items. I did reach out to speech and chart was reviewed, but given MD, surgery, and pulmonary all without concerns for aspiration or dysphagia, they did not see an indication for evaluation and to advance diet as tolerated as we usually would. I spoke with pt in room today. He states he eats chicken, burgers, steaks, etc - basically everything. He stated he coughed once and they placed him on a modified diet weeks ago, but has been doing fine eating whatever he wants. He said J-tube was placed (not sure why J-tube over PEG) for supplemental nutrition, but they only used it 3 times since he was still eating. Pt does have significant muscle wasting and fat loss. He states he used to weigh >350 lb and started to try and lose weight many years ago, however it is unclear how long ago. He stated more recently it has not been intentional weight loss, but he is unable to state when all this occurred. Wife not present at the time. He refused any ONS and just wants to eat. He did state he cannot eat a lot at once.   I suspect maybe tube feeds started to help promote wound healing of BKA and other Pis with poor intake, severe PCM, but this is still unclear. I reached to Dr. Ramos November as surgery had said advance diet for past 2 days and it had not been done. Again, asked if he wanted SLP eval first and he declined, just to advance to full liquids and then regular as tolerated. Multiple PI wounds as below. Confirmed height of 5'7\" before amputations. Adjust IBW with bilat BKA ~ 130 lb. Suspect pt's admission weight of 149 lb is closer to his ABW. Therefore, he is ~114% of his adj IBW and will use for assessment purposes. NFPE reveals he is clearly malnourished and would not put him in \"overweight\" category despite what the flowsheets are classifying him as (even using admission weight of 149 lb). Nutritionally Significant Medications:  Epo, risaquad, protonix. Zosyn, vancomyin, Effer-K  PRN: dilaudid, zofran    Estimated Daily Nutrient Needs:  Energy Requirements Based On: Kcal/kg  Weight Used for Energy Requirements: Admission (68 kg)  Energy (kcal/day): 2619-1267 (30-33 kcal/kg)  Weight Used for Protein Requirements: Admission  Protein (g/day): 100 (1.5 gm/kg)  Method Used for Fluid Requirements: Standard renal  Fluid (ml/day): 500 mL + OP    Nutrition Related Findings:   Edema: No data recorded    Last BM: 09/13/22, Watery, Loose - via colostomy    Wounds: Multiple, Pressure injury, Stage III, Partial thickness  Per WOCN on 9/12:  1. Left abdomen, open surgical wound/ former J tube site:  2. POA Sacrum, Pressure Injury Stage 3  3. POA Left buttocks, Pressure Injury Stage 3  4. POA Right buttocks, Pressure Injury Stage 3  5. POA Right upper back, partial thickness wound  6. POA Right upper back, partial thickness wound  7.   POA Left BKA site with dried crusted incision with sutures      Current Nutrition Therapies:  Diet: clear liquids, mildly thick  Supplements: none  Meal intake: Patient Vitals for the past 168 hrs:   % Diet Eaten 09/13/22 1038 0%     Supplement intake: No data found. Nutrition Support: none at this time      Anthropometric Measures:  Height: 5' 7\" (170.2 cm)  Ideal Body Weight (IBW): 148 lbs (67 kg)  Admission Body Weight: 149 lb 14.6 oz (68 kg)  Current Body Wt:  75.5 kg (166 lb 7.2 oz), 112.5 % IBW.  Bed scale  Current BMI (kg/m2): 26.1        Weight Adjustment: Amputation  Total Amputation Percentage: 11.8  Adjusted Ideal Body Weight (lbs) (Calculated): 130.5  Adjusted Ideal Body Weight (kg) (Calculated): 59.32 kg  Adjusted % IBW (Calculated): 127.5  Adjusted BMI (Calculated): 29.2  BMI Category: Underweight (BMI less than 18.5) (clinical judgement)    Wt Readings from Last 15 Encounters:   09/12/22 75.2 kg (165 lb 12.6 oz) - bed   09/09/22 68 kg (149 lb 14.6 oz) - admission, no source   07/02/18 104 kg (229 lb 4.5 oz)     Weight hx from other outside records:  04/2021 = 189 lb  10/2020 = 221 lb      Nutrition Diagnosis:   Inadequate oral intake related to increased demand for energy/nutrients, renal dysfunction, acute injury/trauma as evidenced by poor intake prior to admission, dialysis, Criteria as identified in malnutrition assessment  Increased nutrient needs related to increased demand for energy/nutrients as evidenced by wounds, dialysis    Nutrition Interventions:   Food and/or Nutrient Delivery: Modify current diet  Nutrition Education/Counseling: No recommendations at this time  Coordination of Nutrition Care: Continue to monitor while inpatient, Speech therapy, Interdisciplinary rounds       Goals:     Goals: other (specify)  Specify Other Goals: PO intake >/=75% of meals over next 7 days    Nutrition Monitoring and Evaluation:   Behavioral-Environmental Outcomes: None identified  Food/Nutrient Intake Outcomes: Diet advancement/tolerance, Food and nutrient intake, Vitamin/mineral intake  Physical Signs/Symptoms Outcomes: Biochemical data, GI status, Meal time behavior, Nutrition focused physical findings, Weight, Skin, Chewing or swallowing    Discharge Planning: Too soon to determine    Recent Labs     09/13/22  0528 09/12/22  0341 09/11/22  2310 09/11/22  0432   GLU 78 81  --  89   BUN 16 29*  --  23*   CREA 2.13* 3.15*  --  2.67*    143  --  141   K 2.9* 3.3* 3.2* 3.0*    110*  --  107   CO2 28 24  --  29   CA 7.3* 7.3*  --  7.5*   PHOS 1.9* 3.6  --  2.2*   MG 1.7 1.8  --  1.6       No results for input(s): ALT, AST, AP, TBIL, TBILI, TP, ALB, GLOB, GGT, AML, LPSE in the last 72 hours. No lab exists for component: SGOT, GPT, AMYP, HLPSE      No results for input(s): LAC in the last 72 hours.       Recent Labs     09/13/22 0528 09/12/22  0341   WBC 15.6* 15.8*   HGB 7.3* 7.5*   HCT 23.0* 23.9*    380       Recent Labs     09/13/22  1106 09/13/22  0837 09/12/22  2224 09/12/22  1737 09/12/22  1203 09/12/22  0707 09/11/22  2313 09/11/22  1805 09/11/22  1145 09/11/22  0630 09/10/22  2130 09/10/22  1711   GLUCPOC 76 73 71 77 76 87 90 103 95 89 105 112       Lab Results   Component Value Date/Time    Hemoglobin A1c <3.8 (L) 09/10/2022 05:50 AM    Hemoglobin A1c 14.7 (H) 07/03/2018 07:13 AM       Iron   Date Value Ref Range Status   09/10/2022 12 (L) 35 - 150 ug/dL Final   09/10/2022 12 (L) 35 - 150 ug/dL Final     TIBC   Date Value Ref Range Status   09/10/2022 141 (L) 250 - 450 ug/dL Final     Iron % saturation   Date Value Ref Range Status   09/10/2022 9 (L) 20 - 50 % Final       Ferritin   Date Value Ref Range Status   09/10/2022 715 (H) 26 - 388 NG/ML Final       B Vitamins  Folate   Date Value Ref Range Status   09/10/2022 4.1 (L) 5.0 - 21.0 ng/mL Final     Vitamin B12   Date Value Ref Range Status   09/10/2022 729 193 - 986 pg/mL Final         Jyoti Mackey RD  Available via FND

## 2022-09-12 NOTE — PROGRESS NOTES
8155 Pt is calling out in pain 30 mins before pain medication is due. States abdominal pain is uncontrolled. MD notified via perfect serve.

## 2022-09-12 NOTE — PROGRESS NOTES
Renal Dosing/Monitoring  Medication: Zosyn   Current regimen:  3.375 gm IV every 8 hr  Recent Labs     09/12/22  0341 09/11/22  0432 09/10/22  1200   CREA 3.15* 2.67* 2.16*   BUN 29* 23* 17     Estimated CrCl:  JEMIMA on IHD  Plan:   Adjust to 3.375 gm IV q12h over 4 hrs per protocol for dialysis patients.

## 2022-09-12 NOTE — PROGRESS NOTES
Nephrology Progress Note  Carlie Flower  Date of Admission : 9/9/2022    CC:  Follow up for JEMIMA       Assessment and Plan     JEMIMA on HD:  - HD MWF for now while here  - daily labs    Hypokalemia:  - 4 k bath    Anemia:  - start MICHAEL MWF    PAD s/p BKA    Recent sepsis  Empyema  J-tube removal, colostomy, I&D of abd wound  Sacral decub       Interval History:  Seen and examined on dialysis. Feeling ok. No complaints. BP stable. No cp, sob, n/v/d reported. Current Medications: all current  Medications have been eviewed in EPIC  Review of Systems: Pertinent items are noted in HPI. Objective:  Vitals:    Vitals:    09/12/22 0845 09/12/22 0900 09/12/22 0915 09/12/22 0930   BP: 139/86 (!) 162/75 (!) 151/65 (!) 164/79   Pulse: 63 61 66 63   Resp: 16 20 17 20   Temp:       SpO2: 98% 98% 94% 97%   Weight:       Height:         Intake and Output:  No intake/output data recorded. 09/10 1901 - 09/12 0700  In: 1520.4 [I.V.:1520.4]  Out: -     Physical Examination:  Pt intubated     no  General: NAD,Conversant   Neck:  Supple, no mass  Resp:  Lungs CTA B/L, no wheezing , normal respiratory effort  CV:  RRR,  no murmur or rub, no LE edema  GI:  Soft, NT, + Bowel sounds, no hepatosplenomegaly  Neurologic:  Non focal  Psych:             AAO x 3 appropriate affect  Skin:  No Rash  Access:           KRYS zambrano    []    High complexity decision making was performed  []    Patient is at high-risk of decompensation with multiple organ involvement    Lab Data Personally Reviewed: I have reviewed all the pertinent labs, microbiology data and radiology studies during assessment.     Recent Labs     09/12/22  0341 09/11/22  2310 09/11/22  0432 09/10/22  1200 09/09/22 2054     --  141 140 135*   K 3.3* 3.2* 3.0* 3.0* 3.4*   *  --  107 106 97   CO2 24  --  29 27 35*   GLU 81  --  89 95 133*   BUN 29*  --  23* 17 12   CREA 3.15*  --  2.67* 2.16* 1.80*   CA 7.3*  --  7.5* 7.4* 7.7*   MG 1.8  --  1.6 1.5*  --    PHOS 3.6 --  2.2* 1.3*  --    ALB  --   --   --  1.7* 1.5*   ALT  --   --   --  <6* <6*     Recent Labs     09/12/22  0341 09/11/22  0432 09/10/22  1930   WBC 15.8* 16.0* 17.2*   HGB 7.5* 7.5* 7.6*   HCT 23.9* 23.8* 23.5*    352 324     No results found for: [facility]  Lab Results   Component Value Date/Time    Culture result: NO GROWTH 3 DAYS 09/09/2022 08:52 PM     Recent Results (from the past 24 hour(s))   GLUCOSE, POC    Collection Time: 09/11/22 11:45 AM   Result Value Ref Range    Glucose (POC) 95 65 - 117 mg/dL    Performed by [address], POC    Collection Time: 09/11/22  6:05 PM   Result Value Ref Range    Glucose (POC) 103 65 - 117 mg/dL    Performed by [address] [place] [address]    POTASSIUM    Collection Time: 09/11/22 11:10 PM   Result Value Ref Range    Potassium 3.2 (L) 3.5 - 5.1 mmol/L   GLUCOSE, POC    Collection Time: 09/11/22 11:13 PM   Result Value Ref Range    Glucose (POC) 90 65 - 117 mg/dL    Performed by Norman ferguson RN    CBC WITH AUTOMATED DIFF    Collection Time: 09/12/22  3:41 AM   Result Value Ref Range    WBC 15.8 (H) 4.1 - 11.1 K/uL    RBC 2.50 (L) 4.10 - 5.70 M/uL    HGB 7.5 (L) 12.1 - 17.0 g/dL    HCT 23.9 (L) 36.6 - 50.3 %    MCV 95.6 80.0 - 99.0 FL    MCH 30.0 26.0 - 34.0 PG    MCHC 31.4 30.0 - 36.5 g/dL    RDW 18.9 (H) 11.5 - 14.5 %    PLATELET 173 047 - 089 K/uL    MPV 9.5 8.9 - 12.9 FL    NRBC 0.0 0  WBC    ABSOLUTE NRBC 0.00 0.00 - 0.01 K/uL    NEUTROPHILS 88 (H) 32 - 75 %    LYMPHOCYTES 6 (L) 12 - 49 %    MONOCYTES 3 (L) 5 - 13 %    EOSINOPHILS 2 0 - 7 %    BASOPHILS 0 0 - 1 %    IMMATURE GRANULOCYTES 1 (H) 0.0 - 0.5 %    ABS. NEUTROPHILS 13.8 (H) 1.8 - 8.0 K/UL    ABS. LYMPHOCYTES 0.9 0.8 - 3.5 K/UL    ABS. MONOCYTES 0.5 0.0 - 1.0 K/UL    ABS. EOSINOPHILS 0.3 0.0 - 0.4 K/UL    ABS. BASOPHILS 0.1 0.0 - 0.1 K/UL    ABS. IMM.  GRANS. 0.1 (H) 0.00 - 0.04 K/UL    DF AUTOMATED     METABOLIC PANEL, BASIC    Collection Time: 09/12/22  3:41 AM   Result Value Ref Range    Sodium 143 136 - 145 mmol/L    Potassium 3.3 (L) 3.5 - 5.1 mmol/L    Chloride 110 (H) 97 - 108 mmol/L    CO2 24 21 - 32 mmol/L    Anion gap 9 5 - 15 mmol/L    Glucose 81 65 - 100 mg/dL    BUN 29 (H) 6 - 20 MG/DL    Creatinine 3.15 (H) 0.70 - 1.30 MG/DL    BUN/Creatinine ratio 9 (L) 12 - 20      GFR est AA 25 (L) >60 ml/min/1.73m2    GFR est non-AA 21 (L) >60 ml/min/1.73m2    Calcium 7.3 (L) 8.5 - 10.1 MG/DL   MAGNESIUM    Collection Time: 09/12/22  3:41 AM   Result Value Ref Range    Magnesium 1.8 1.6 - 2.4 mg/dL   VANCOMYCIN, RANDOM    Collection Time: 09/12/22  3:41 AM   Result Value Ref Range    Vancomycin, random 33.5 UG/ML   PHOSPHORUS    Collection Time: 09/12/22  3:41 AM   Result Value Ref Range    Phosphorus 3.6 2.6 - 4.7 MG/DL   GLUCOSE, POC    Collection Time: 09/12/22  7:07 AM   Result Value Ref Range    Glucose (POC) 87 65 - 117 mg/dL    Performed by Saravanan Velasquez MD  [address]  Phone - [phone]   Fax - [phone]  www. [facility]

## 2022-09-12 NOTE — WOUND CARE
WOCN Note:     New consult placed for assessment of wounds to sacrum, buttocks, back, left abdomen, left BKA and new colostomy care. Chart reviewed. Assessed in room 442. Admitted DX:  Intra-abdominal infection  Past Medical History:   Diagnosis Date    Diabetes ([facility] [address]      Assessment:   Patient is A&O x 4, communicative and mobile independently in bed. Bed: issac gel  Patient pre-medicated for pain by RN. 1. Left abdomen, open surgical wound/ former J tube site:  3 x 2 x 2.6 cm; 90% yellow 10% red; small serous exudate; no odor or erythema. 2.   POA Sacrum, Pressure Injury Stage 3: 4 x 4 x 2 cm  100% yellow. Small serous exudate; no odor. Periwound with blanchng red erythema. 3.  POA Left buttocks, Pressure Injury Stage 3:  5 x 3 x 0.2 cm; 50% yellow 50% pink; small serous exudate; no odor. Periwound without erythema. 4. POA Right buttocks, Pressure Injury Stage 3:  1 x 1 x 0.1 cm; 75% pink; 25% yellow; small serous exudate; no odor. Periwound without erythema. 5.  POA Right upper back, partial thickness wound:  0.5 x 1 x 0.1 cm; 100% yellow; small serous exudate; no odor. Periwound without erythema. 6.  POA Right upper back, partial thickness wound:  1.5 x 0.5 x 0.3 cm; 100% yellow; small serous exudate; no odor. Periwound without erythema. 7. POA Left BKA site with dried crusted incision with sutures:  19 x 1 x 0.1 cm. Recommendations:   Left abdomen:  Daily irrigate with saline; pack with saline moist gauze; cover with dry dressing. Sacrum and buttocks:  Daily cleanse with saline; apply Santyl and saline moist gauze; cover with dry dressing. Right upper back:  Daily cleanse with saline; apply honey gel; cover with dry dressing. Left BKA site:  Daily cleanse with saline; apply Xeroform; cover with dry dressing. Ostomy  First postoperative visit.   Surgery: Colostomy  Date of Surgery: 9.10.22      Surgeon: Dr. Mendoza  Location: left quadrant    Ostomy Assessment:  Stoma appearance: maroon/red; slightly dusky  Mucocutaneous Junction: intact  Peristomal skin: intact  Wound: trocar sites  Output: small tan  Current appliance:  2 piece pouch with ring    Treatments:  Pouch removed, stoma and peristomal skin cleaned and assessed, new pouch prepared and applied. Recommendations:  1. Empty appliance when 1/3 full and PRN. Encourage patient/family to notify nurse and  assist w/ pouch emptying to promote self-care. Change appliance twice weekly and PRN for leaking ASAP. DO NOT REINFORCE LEAKS to avoid skin breakdown. Transition of Care: Will follow routinely.     ARNOLDO NevilleN RN [facility] Inpatient Wound [facility]  Available on Perfect Serve  Office [phone]

## 2022-09-12 NOTE — PROCEDURES
Hemodialysis / [phone]    Vitals Pre Post Assessment Pre Post   BP BP: (!) 155/84 (09/12/22 0830)   157/65 LOC A&Ox4 A&Ox4   HR Pulse (Heart Rate): 60 (09/12/22 0830) 68 Lungs Diminished Diminished   Resp Resp Rate: 20 (09/12/22 0830) 20 Cardiac Nrr  Nrr    Temp Temp: 98.9 °F (37.2 °C) (09/12/22 0800) 97.9 Skin Warm and dry Warm and dry   Weight  N/a N/a Edema Trace  Trace    Tele status Monitor  Monitor  Pain Pain Intensity 1: 0 (09/11/22 2103) 0     Orders   Duration: Start: 0830 End: 1693 Total: 3 hrs   Dialyzer: Dialyzer/Set Up Inspection: Revaclear (09/12/22 0830)   K Bath: Dialysate K (mEq/L): 4 (09/12/22 0830)   Ca Bath: Dialysate CA (mEq/L): 2.5 (09/12/22 0830)   Na: Dialysate NA (mEq/L): 138 (09/12/22 0830)   Bicarb: Dialysate HCO3 (mEq/L): 35 (09/12/22 0830)   Target Fluid Removal: Goal/Amount of Fluid to Remove (mL): 1000 mL (09/12/22 0830)     Access   Type & Location:    Comments:                                  Ozzy Cui / PC : Dressing CDI. No s/s of infection. Both lumens aspirate & flush well. Running well at .        Labs   HBsAg (Antigen) / date:                       Neg 9/12/22                        HBsAb (Antibody) / date: Billie 9/12/22   Source: Epic    Obtained/Reviewed  Critical Results Called HGB   Date Value Ref Range Status   09/12/2022 7.5 (L) 12.1 - 17.0 g/dL Final     Potassium   Date Value Ref Range Status   09/12/2022 3.3 (L) 3.5 - 5.1 mmol/L Final     Calcium   Date Value Ref Range Status   09/12/2022 7.3 (L) 8.5 - 10.1 MG/DL Final     BUN   Date Value Ref Range Status   09/12/2022 29 (H) 6 - 20 MG/DL Final     Creatinine   Date Value Ref Range Status   09/12/2022 3.15 (H) 0.70 - 1.30 MG/DL Final        Meds Given   Name Dose Route   None                  Adequacy / Fluid    Total Liters Process: 61L   Net Fluid Removed: 1000 ml      Comments   Time Out Done:   (Time) 0825   Admitting Diagnosis: JEMIMA, S/p lap sx for J tube removal and laproscopic Colostomy, I and D  of abd wall    Consent obtained/signed: Informed Consent Verified: Yes (09/12/22 0830)   Machine / RO # Machine Number: [id number] (09/12/22 0830)   Primary Nurse Rpt Pre: Tre Pearl RN   Primary Nurse Rpt Post: Tre Pearl RN   Pt Education: Access care   Care Plan: To continue hd   Pts outpatient clinic: N/a     Tx Summary   Comments:                     SBAR received from Primary RN. Pt at bedside A&Ox4. Consent signed & on file. 0830: Each catheter limb disinfected per p&p, caps removed, hubs disinfected per p&p. Each lumen aspirated for blood return and flushed with Normal Saline per policy. Labs drawn per request/ order. VSS. Dialysis Tx initiated. 1136: Tx ended. VSS. Each dialysis catheter limb disinfected per p&p, all possible blood returned per p&p, and each dialysis hub disinfected per p&p. Each lumen flushed, post dialysis catheter Heparin dwell instilled per order, and caps applied. Bed locked and in the lowest position, call bell and belongings in reach. SBAR given to Primary, RN. Patient is stable at time of their/ my departure. All Dialysis related medications have been reviewed.

## 2022-09-12 NOTE — PROGRESS NOTES
Admit Date: 2022      POD 2 Days Post-Op  9/10/2022      Procedure:  Procedure(s) with comments:  LAPAROSCOPY GENERAL DIAGNOSTIC, LAPAROSCOPIC  TAKEDOWN AND REMOVAL J-TUBE, LAPAROSCOPIC COLOSTOMY, I & D ABDOMINAL WALL - pt with recent discharge from [address] [place] [address].  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 Cell count 15,000. Patient having some problems with ostomy leaking. Wound care to see patient later today  Temp:  [97.8 °F (36.6 °C)-99.5 °F (37.5 °C)]   Pulse (Heart Rate):  [57-90]   BP: ()/()   Resp Rate:  [12-27]   O2 Sat (%):  [89 %-100 %]   Weight:  [68 kg (149 lb 14.6 oz)-75.2 kg (165 lb 12.6 oz)]       Intake and Output:  Current Shift: No intake/output data recorded. Last three shifts: 09/10 1901 -  0700  In: 1520.4 [I.V.:1520.4]  Out: -      Blood pressure (!) 152/69, pulse 66, temperature 98.9 °F (37.2 °C), resp. rate 17, height 5' 7\" (1.702 m), weight 75.2 kg (165 lb 12.6 oz), SpO2 98 %. Temp (24hrs), Av.5 °F (36.9 °C), Min:98.2 °F (36.8 °C), Max:98.9 °F (37.2 °C)        Review of Systems   Constitutional:         See HPI, normal expected abdominal pain no nausea and vomiting       Physical Exam  Vitals and nursing note reviewed. Exam conducted with a chaperone present (NP Tete Andrade). Cardiovascular:      Rate and Rhythm: Normal rate. Pulmonary:      Effort: Pulmonary effort is normal.   Abdominal:      Comments: Flat and soft, colostomy appliance leaking, ostomy covered with stool not well visualized, wound care to see later today and let me know if any concerns for ostomy issues.   Left upper abdomen jejunostomy previous site, with some dusky tissue at the periphery of a 1-1/2 cm open wound, abdomen mildly tender   Neurological:      General: No focal deficit present. Mental Status: He is alert and oriented to person, place, and time. Recent Results (from the past 48 hour(s))   METABOLIC PANEL, COMPREHENSIVE    Collection Time: 09/10/22 12:00 PM   Result Value Ref Range    Sodium 140 136 - 145 mmol/L    Potassium 3.0 (L) 3.5 - 5.1 mmol/L    Chloride 106 97 - 108 mmol/L    CO2 27 21 - 32 mmol/L    Anion gap 7 5 - 15 mmol/L    Glucose 95 65 - 100 mg/dL    BUN 17 6 - 20 MG/DL    Creatinine 2.16 (H) 0.70 - 1.30 MG/DL    BUN/Creatinine ratio 8 (L) 12 - 20      GFR est AA 39 (L) >60 ml/min/1.73m2    GFR est non-AA 32 (L) >60 ml/min/1.73m2    Calcium 7.4 (L) 8.5 - 10.1 MG/DL    Bilirubin, total 0.7 0.2 - 1.0 MG/DL    ALT (SGPT) <6 (L) 12 - 78 U/L    AST (SGOT) 9 (L) 15 - 37 U/L    Alk. phosphatase 86 45 - 117 U/L    Protein, total 5.8 (L) 6.4 - 8.2 g/dL    Albumin 1.7 (L) 3.5 - 5.0 g/dL    Globulin 4.1 (H) 2.0 - 4.0 g/dL    A-G Ratio 0.4 (L) 1.1 - 2.2     CBC WITH AUTOMATED DIFF    Collection Time: 09/10/22 12:00 PM   Result Value Ref Range    WBC 19.7 (H) 4.1 - 11.1 K/uL    RBC 2.26 (L) 4.10 - 5.70 M/uL    HGB 6.9 (L) 12.1 - 17.0 g/dL    HCT 22.5 (L) 36.6 - 50.3 %    MCV 99.6 (H) 80.0 - 99.0 FL    MCH 30.5 26.0 - 34.0 PG    MCHC 30.7 30.0 - 36.5 g/dL    RDW 17.9 (H) 11.5 - 14.5 %    PLATELET 774 687 - 486 K/uL    MPV 9.8 8.9 - 12.9 FL    NRBC 0.0 0  WBC    ABSOLUTE NRBC 0.00 0.00 - 0.01 K/uL    NEUTROPHILS 94 (H) 32 - 75 %    BAND NEUTROPHILS 1 0 - 6 %    LYMPHOCYTES 2 (L) 12 - 49 %    MONOCYTES 3 (L) 5 - 13 %    EOSINOPHILS 0 0 - 7 %    BASOPHILS 0 0 - 1 %    IMMATURE GRANULOCYTES 0 %    ABS. NEUTROPHILS 18.7 (H) 1.8 - 8.0 K/UL    ABS. LYMPHOCYTES 0.4 (L) 0.8 - 3.5 K/UL    ABS. MONOCYTES 0.6 0.0 - 1.0 K/UL    ABS. EOSINOPHILS 0.0 0.0 - 0.4 K/UL    ABS. BASOPHILS 0.0 0.0 - 0.1 K/UL    ABS. IMM.  GRANS. 0.0 K/UL    DF MANUAL      RBC COMMENTS ANISOCYTOSIS  1+       TSH 3RD GENERATION    Collection Time: 09/10/22 12:00 PM   Result Value Ref Range    TSH 1.43 0.36 - 3.74 uIU/mL   MAGNESIUM    Collection Time: 09/10/22 12:00 PM   Result Value Ref Range    Magnesium 1.5 (L) 1.6 - 2.4 mg/dL   PHOSPHORUS    Collection Time: 09/10/22 12:00 PM   Result Value Ref Range    Phosphorus 1.3 (L) 2.6 - 4.7 MG/DL   LIPASE    Collection Time: 09/10/22 12:00 PM   Result Value Ref Range    Lipase 54 (L) 73 - 393 U/L   FOLATE    Collection Time: 09/10/22 12:00 PM   Result Value Ref Range    Folate 4.1 (L) 5.0 - 21.0 ng/mL   VITAMIN B12    Collection Time: 09/10/22 12:00 PM   Result Value Ref Range    Vitamin B12 729 193 - 986 pg/mL   IRON    Collection Time: 09/10/22 12:00 PM   Result Value Ref Range    Iron 12 (L) 35 - 150 ug/dL   IRON PROFILE    Collection Time: 09/10/22 12:00 PM   Result Value Ref Range    Iron 12 (L) 35 - 150 ug/dL    TIBC 141 (L) 250 - 450 ug/dL    Iron % saturation 9 (L) 20 - 50 %   FERRITIN    Collection Time: 09/10/22 12:00 PM   Result Value Ref Range    Ferritin 715 (H) 26 - 388 NG/ML   NT-PRO BNP    Collection Time: 09/10/22 12:00 PM   Result Value Ref Range    NT pro-BNP 5,733 (H) <125 PG/ML   TROPONIN-HIGH SENSITIVITY    Collection Time: 09/10/22 12:00 PM   Result Value Ref Range    Troponin-High Sensitivity 6 0 - 76 ng/L   GLUCOSE, POC    Collection Time: 09/10/22 12:23 PM   Result Value Ref Range    Glucose (POC) 100 65 - 117 mg/dL    Performed by Iman FERRELL(CON)    GLUCOSE, POC    Collection Time: 09/10/22  5:11 PM   Result Value Ref Range    Glucose (POC) 112 65 - 117 mg/dL    Performed by Anali Mahoney    CBC WITH AUTOMATED DIFF    Collection Time: 09/10/22  7:30 PM   Result Value Ref Range    WBC 17.2 (H) 4.1 - 11.1 K/uL    RBC 2.53 (L) 4.10 - 5.70 M/uL    HGB 7.6 (L) 12.1 - 17.0 g/dL    HCT 23.5 (L) 36.6 - 50.3 %    MCV 92.9 80.0 - 99.0 FL    MCH 30.0 26.0 - 34.0 PG    MCHC 32.3 30.0 - 36.5 g/dL    RDW 18.6 (H) 11.5 - 14.5 %    PLATELET 654 678 - 130 K/uL    MPV 9.4 8.9 - 12.9 FL    NRBC 0.0 0  WBC    ABSOLUTE NRBC 0.00 0.00 - 0.01 K/uL    NEUTROPHILS 91 (H) 32 - 75 %    LYMPHOCYTES 4 (L) 12 - 49 %    MONOCYTES 4 (L) 5 - 13 %    EOSINOPHILS 0 0 - 7 %    BASOPHILS 0 0 - 1 %    IMMATURE GRANULOCYTES 1 (H) 0.0 - 0.5 %    ABS. NEUTROPHILS 15.6 (H) 1.8 - 8.0 K/UL    ABS. LYMPHOCYTES 0.7 (L) 0.8 - 3.5 K/UL    ABS. MONOCYTES 0.7 0.0 - 1.0 K/UL    ABS. EOSINOPHILS 0.0 0.0 - 0.4 K/UL    ABS. BASOPHILS 0.0 0.0 - 0.1 K/UL    ABS. IMM. GRANS. 0.2 (H) 0.00 - 0.04 K/UL    DF SMEAR SCANNED      RBC COMMENTS ANISOCYTOSIS  1+       GLUCOSE, POC    Collection Time: 09/10/22  9:30 PM   Result Value Ref Range    Glucose (POC) 105 65 - 117 mg/dL    Performed by Oz Fraga    CBC WITH AUTOMATED DIFF    Collection Time: 09/11/22  4:32 AM   Result Value Ref Range    WBC 16.0 (H) 4.1 - 11.1 K/uL    RBC 2.54 (L) 4.10 - 5.70 M/uL    HGB 7.5 (L) 12.1 - 17.0 g/dL    HCT 23.8 (L) 36.6 - 50.3 %    MCV 93.7 80.0 - 99.0 FL    MCH 29.5 26.0 - 34.0 PG    MCHC 31.5 30.0 - 36.5 g/dL    RDW 19.1 (H) 11.5 - 14.5 %    PLATELET 237 022 - 207 K/uL    MPV 9.5 8.9 - 12.9 FL    NRBC 0.0 0  WBC    ABSOLUTE NRBC 0.00 0.00 - 0.01 K/uL    NEUTROPHILS 89 (H) 32 - 75 %    LYMPHOCYTES 5 (L) 12 - 49 %    MONOCYTES 4 (L) 5 - 13 %    EOSINOPHILS 1 0 - 7 %    BASOPHILS 0 0 - 1 %    IMMATURE GRANULOCYTES 1 (H) 0.0 - 0.5 %    ABS. NEUTROPHILS 14.3 (H) 1.8 - 8.0 K/UL    ABS. LYMPHOCYTES 0.8 0.8 - 3.5 K/UL    ABS. MONOCYTES 0.6 0.0 - 1.0 K/UL    ABS. EOSINOPHILS 0.2 0.0 - 0.4 K/UL    ABS. BASOPHILS 0.0 0.0 - 0.1 K/UL    ABS. IMM.  GRANS. 0.1 (H) 0.00 - 0.04 K/UL    DF AUTOMATED     METABOLIC PANEL, BASIC    Collection Time: 09/11/22  4:32 AM   Result Value Ref Range    Sodium 141 136 - 145 mmol/L    Potassium 3.0 (L) 3.5 - 5.1 mmol/L    Chloride 107 97 - 108 mmol/L    CO2 29 21 - 32 mmol/L    Anion gap 5 5 - 15 mmol/L    Glucose 89 65 - 100 mg/dL    BUN 23 (H) 6 - 20 MG/DL Creatinine 2.67 (H) 0.70 - 1.30 MG/DL    BUN/Creatinine ratio 9 (L) 12 - 20      GFR est AA 31 (L) >60 ml/min/1.73m2    GFR est non-AA 25 (L) >60 ml/min/1.73m2    Calcium 7.5 (L) 8.5 - 10.1 MG/DL   MAGNESIUM    Collection Time: 09/11/22  4:32 AM   Result Value Ref Range    Magnesium 1.6 1.6 - 2.4 mg/dL   PHOSPHORUS    Collection Time: 09/11/22  4:32 AM   Result Value Ref Range    Phosphorus 2.2 (L) 2.6 - 4.7 MG/DL   GLUCOSE, POC    Collection Time: 09/11/22  6:30 AM   Result Value Ref Range    Glucose (POC) 89 65 - 117 mg/dL    Performed by [address], POC    Collection Time: 09/11/22 11:45 AM   Result Value Ref Range    Glucose (POC) 95 65 - 117 mg/dL    Performed by [address], POC    Collection Time: 09/11/22  6:05 PM   Result Value Ref Range    Glucose (POC) 103 65 - 117 mg/dL    Performed by [facility]    POTASSIUM    Collection Time: 09/11/22 11:10 PM   Result Value Ref Range    Potassium 3.2 (L) 3.5 - 5.1 mmol/L   GLUCOSE, POC    Collection Time: 09/11/22 11:13 PM   Result Value Ref Range    Glucose (POC) 90 65 - 117 mg/dL    Performed by Abraham ferguson RN    CBC WITH AUTOMATED DIFF    Collection Time: 09/12/22  3:41 AM   Result Value Ref Range    WBC 15.8 (H) 4.1 - 11.1 K/uL    RBC 2.50 (L) 4.10 - 5.70 M/uL    HGB 7.5 (L) 12.1 - 17.0 g/dL    HCT 23.9 (L) 36.6 - 50.3 %    MCV 95.6 80.0 - 99.0 FL    MCH 30.0 26.0 - 34.0 PG    MCHC 31.4 30.0 - 36.5 g/dL    RDW 18.9 (H) 11.5 - 14.5 %    PLATELET 780 715 - 938 K/uL    MPV 9.5 8.9 - 12.9 FL    NRBC 0.0 0  WBC    ABSOLUTE NRBC 0.00 0.00 - 0.01 K/uL    NEUTROPHILS 88 (H) 32 - 75 %    LYMPHOCYTES 6 (L) 12 - 49 %    MONOCYTES 3 (L) 5 - 13 %    EOSINOPHILS 2 0 - 7 %    BASOPHILS 0 0 - 1 %    IMMATURE GRANULOCYTES 1 (H) 0.0 - 0.5 %    ABS. NEUTROPHILS 13.8 (H) 1.8 - 8.0 K/UL    ABS. LYMPHOCYTES 0.9 0.8 - 3.5 K/UL    ABS. MONOCYTES 0.5 0.0 - 1.0 K/UL    ABS. EOSINOPHILS 0.3 0.0 - 0.4 K/UL    ABS.  BASOPHILS 0.1 0.0 - 0.1 K/UL    ABS. IMM. GRANS. 0.1 (H) 0.00 - 0.04 K/UL    DF AUTOMATED     METABOLIC PANEL, BASIC    Collection Time: 09/12/22  3:41 AM   Result Value Ref Range    Sodium 143 136 - 145 mmol/L    Potassium 3.3 (L) 3.5 - 5.1 mmol/L    Chloride 110 (H) 97 - 108 mmol/L    CO2 24 21 - 32 mmol/L    Anion gap 9 5 - 15 mmol/L    Glucose 81 65 - 100 mg/dL    BUN 29 (H) 6 - 20 MG/DL    Creatinine 3.15 (H) 0.70 - 1.30 MG/DL    BUN/Creatinine ratio 9 (L) 12 - 20      GFR est AA 25 (L) >60 ml/min/1.73m2    GFR est non-AA 21 (L) >60 ml/min/1.73m2    Calcium 7.3 (L) 8.5 - 10.1 MG/DL   MAGNESIUM    Collection Time: 09/12/22  3:41 AM   Result Value Ref Range    Magnesium 1.8 1.6 - 2.4 mg/dL   VANCOMYCIN, RANDOM    Collection Time: 09/12/22  3:41 AM   Result Value Ref Range    Vancomycin, random 33.5 UG/ML   PHOSPHORUS    Collection Time: 09/12/22  3:41 AM   Result Value Ref Range    Phosphorus 3.6 2.6 - 4.7 MG/DL   GLUCOSE, POC    Collection Time: 09/12/22  7:07 AM   Result Value Ref Range    Glucose (POC) 87 65 - 117 mg/dL    Performed by Salazar Yoon    HEP B SURFACE AB    Collection Time: 09/12/22  8:29 AM   Result Value Ref Range    Hepatitis B surface Ab <3.10 mIU/mL    Hep B surface Ab Interp. NONREACTIVE NR     HEP B SURFACE AG    Collection Time: 09/12/22  8:29 AM   Result Value Ref Range    Hepatitis B surface Ag <0.10 Index    Hep B surface Ag Interp. Negative NEG           XR Results (maximum last 3): Results from [place] encounter on 09/09/22    XR CHEST PORT    Impression  Lines as described. No new pneumothorax. Right pleural effusion      CT Results (maximum last 3): Results from [place] encounter on 09/09/22    CT CHEST WO CONT    Impression  1. Small right basilar gas and fluid containing pleural collection with a  peripheral soft tissue rind. Finding may represent an empyema and clinical  correlation is recommended. Recommend direct comparison to any additional prior  imaging.   2. Free intraperitoneal gas, seen on prior CT of the abdomen and pelvis. MRI Results (maximum last 3): Results from [place] encounter on 07/01/18    MRI LOW EXT LT W WO CONT    Impression  IMPRESSION:  1. Severe cellulitis. 2.  Extensive edema with multiple foci of low signal intensity seen throughout  the soft tissues and plantar fascial regions. Findings are concerning for soft  tissue gas in the setting of necrotizing soft tissue infection/fasciitis. Please  correlate clinically. 3.  Severe plantar myositis. 4.  Severe disorganization, destruction, and erosive change involving the  midfoot and tarsometatarsal junctions. Findings likely reflect neuropathic joint  (Charcot joint). Other differential considerations could include chronic  osteomyelitis and sequela from previous trauma. 5.  Patchy bone marrow edema involving the cuboid and bases of fourth/fifth  metatarsals. This probably reflects reactive stress changes. Early osteomyelitis  is not excluded. Nuclear Medicine Results (maximum last 3): No results found for this or any previous visit. US Results (maximum last 3): Results from [place] encounter on 09/09/22    US RETROPERITONEUM COMP    Impression  No hydronephrosis or stones. Principal Problem:    Intra-abdominal infection (9/10/2022)          ASSESSMENT/PLAN    Continue wound care to the left upper abdomen open wound, wound care to see patient regarding this sacral and BKA skin breakdown sites, and ostomy reappliance, will continue to follow, continue antibiotics, follow-up white blood cell count. Advance diet.     FACE TO FACE time including review of any indicated imaging, discussion with patient, and other providers, exam and discussion with patient:  22          minutes    END:

## 2022-09-12 NOTE — PROGRESS NOTES
CAROLYN:  RUR: 13%    Disposition:   IPR [facility] ([place] [phone])    11:04am  CM received call from Chalo Lamar ([facility] [phone]) and was informed that patient admitted here from [facility]. And has been approved for admit to [facility] (once medically stable). Camille is the contact for [facility] ([place]). Care Management Interventions  PCP Verified by CM: No  Mode of Transport at Discharge: BLS  Transition of Care Consult (CM Consult): Acute Rehab  Discharge Durable Medical Equipment: No  Physical Therapy Consult: Yes  Occupational Therapy Consult: Yes  Speech Therapy Consult: No  Support Systems: Spouse/Significant Other  Confirm Follow Up Transport: Family  The Plan for Transition of Care is Related to the Following Treatment Goals : transfer to [facility] once medically stable  The Patient and/or Patient Representative was Provided with a Choice of Provider and Agrees with the Discharge Plan?: Yes  Discharge Location  Patient Expects to be Discharged to[de-identified] Rehab hospital/unit acute     Medicare pt has received, reviewed, and signed 1st IM letter informing them of their right to appeal the discharge. Signed copied has been placed on pt bedside chart. Chart reviewed. Patient's demographic sheet has no insurance listed. However, per this CM's review (account notes-[facility] and Transfer form from [facility]), the patient has insurance via his spouse's employer ([employer]) and his Medicare Part A only. CM met with the patient and his wife and introduced self and role. The patient has been hospitalized in the [place] area since the last week of June 2022. .  This started at [facility]. The patient was transferred from [facility] to [facility] last week. Per the wife, \"He was only there 2-3 days and d/c'd to [facility]. CM reviewed the demographic sheet. The patient is no longer employed and receives disability.   He lives with his wife Brandon)-(m)[phone] in a one story home. He was independent in ADL's (prior to June of this year). Following his first orthopedic surgery (r) AKA he received a (r) prosthetic device and ambulated with a cane. Pharmacy:  [facility]  Insurance:  Medicare [id number] effec. 2/1/22    Reason for Admission:  intra-abdominal infection  PMHX:  CAD, HD, DM , (R) AKA       THE PATIENT HAS PART A ONLY. RUR Score:   13% LOW                  Plan for utilizing home health:   not indicated at this time       PCP: First and Last name:  Other, Kenroy, MD     Name of Practice:    Are you a current patient: Yes/No:    Approximate date of last visit:    Can you participate in a virtual visit with your PCP:                     Current Advanced Directive/Advance Care Plan: Full Code      Healthcare Decision Maker:   Click here to complete [address] including selection of the Healthcare Decision Maker Relationship (ie \"Primary\")  Wife--Lindsay Ross                             Transition of Care Plan:    Per call from [facility], plan [facility] once medically stable. CM will continue to follow.     Mindy Canavan, [address], [address]

## 2022-09-12 NOTE — PROGRESS NOTES
[address] [facility] Adult  Hospitalist Group                                                                                          Hospitalist Progress Note  Lauren Youngblood MD  Answering service: [phone] OR [phone] from in house phone        Date of Service:  2022  NAME:  Holly Milton  :  1970  MRN:  [id number]      Admission Summary: This is a 58-year-old man with a past medical history significant for type 2 diabetes, who presented at the emergency room from [facility] with abdominal pain. The patient was recently admitted to another hospital and underwent left below-knee amputation. The hospitalization became complicated with respiratory failure which required prolonged intubation. The respiratory failure was attributed to aspiration pneumonia. The hospitalization was also complicated with lobulated effusion, for which the patient underwent decortication and right thoracotomy. The patient also went into acute renal failure for which he has been started on hemodialysis. J-tube was placed for supplemental nutrition. The patient was subsequently transferred to [facility] for continuation of care. He was doing relatively well in [facility] until the day of presentation at the emergency room when the patient complained of abdominal pain at the facility. CT scan of the abdomen and pelvis was obtained. The CT scan shows evidence of bowel perforation. The patient was then sent to [facility] emergency room for further evaluation. When the patient arrived at the emergency room, based on his clinical presentation and lab work, Code Sepsis was triggered. The patient received fluid therapy as per sepsis protocol. The emergency room physician consulted general surgeon on-call. The patient was seen by the surgeon and the patient is planned for immediate surgical intervention.   No record of prior admission to this hospital, but the patient was recently admitted to another hospital.    Interval history / Subjective: Follow up Intraperitoneal perforation  S/p surgery 9/10  Getting HD  Comfortably laying in bed     Assessment & Plan:     Intractable abdominal pain  Pneumoperitoneum  Abdominal wall abscess  Severe sepsis  -s/p 9/10 LAPAROSCOPY GENERAL DIAGNOSTIC, LAPAROSCOPIC  TAKEDOWN AND REMOVAL J-TUBE, LAPAROSCOPIC COLOSTOMY, I & D ABDOMINAL WALL   -no fever, leukocytosis improving  -Cultures no growth   Continue zosyn/Vanc  Continue pain control    Possible empyema  -H/o recent thoracotomy and prolonged intubation at [facility]  -Continue antibiotics  -Appreciate pulmonology recommendations. Continue antibiotics. We will follow with chest x-rays. FOBT positive, awaiting GI. Protonix BID    JEMIMA now on HD, appreciate Nephrology, HD today  Hypokalemia: replace as needed  Rectal fistula: S/p surgery  B/L AKA: Stable  Acute blood loss Anemia: s/p 1 unit PRBC 9/10. Transfuse for hb<7  HTN: holding antihypertensives for now  DM2:SSI, monitor    Diarrhea: C. Diff negative    Clears     Code status: full  Prophylaxis: hold, SCDs  PTA: Vibra    Plan: Continue IV antibiotics, HD per nephrology  Care Plan discussed with: patient, family, nurse  Anticipated Disposition: TBD     Hospital Problems  Date Reviewed: 9/10/2022            Codes Class Noted POA    * (Principal) Intra-abdominal infection ICD-10-CM: B99.9  ICD-9-CM: 136.9  9/10/2022 Yes         Review of Systems:   A comprehensive review of systems was negative except for that written in the HPI. Vital Signs:    Last 24hrs VS reviewed since prior progress note.  Most recent are:  Visit Vitals  BP (!) 159/81   Pulse 72   Temp 98.9 °F (37.2 °C)   Resp 21   Ht 5' 7\" (1.702 m)   Wt 75.2 kg (165 lb 12.6 oz)   SpO2 99%   BMI 25.97 kg/m²         Intake/Output Summary (Last 24 hours) at 9/12/2022 1033  Last data filed at 9/11/2022 1547  Gross per 24 hour   Intake 920.42 ml   Output --   Net 920.42 ml          Physical Examination: I had a face to face encounter with this patient and independently examined them on 9/12/2022 as outlined below:          Constitutional:  No acute distress,   ENT:  Oral mucosa moist, oropharynx benign. Resp:  CTA bilaterally. No wheezing/rhonchi/rales. No accessory muscle use. CV:  Regular rhythm, normal rate, no murmurs, gallops, rubs    GI:  Colostomy. Painful to palpation. Musculoskeletal:  No edema, warm, 2+ pulses throughout    Neurologic:  Moves all extremities. AAOx3, CN II-XII reviewed            Data Review:    Review and/or order of clinical lab test  Review and/or order of tests in the radiology section of CPT  Review and/or order of tests in the medicine section of CPT      Labs:     Recent Labs     09/12/22  0341 09/11/22  0432   WBC 15.8* 16.0*   HGB 7.5* 7.5*   HCT 23.9* 23.8*    352       Recent Labs     09/12/22  0341 09/11/22  2310 09/11/22  0432 09/10/22  1200     --  141 140   K 3.3* 3.2* 3.0* 3.0*   *  --  107 106   CO2 24  --  29 27   BUN 29*  --  23* 17   CREA 3.15*  --  2.67* 2.16*   GLU 81  --  89 95   CA 7.3*  --  7.5* 7.4*   MG 1.8  --  1.6 1.5*   PHOS 3.6  --  2.2* 1.3*       Recent Labs     09/10/22  1200 09/09/22 2054   ALT <6* <6*   AP 86 115   TBILI 0.7 0.7   TP 5.8* 6.9   ALB 1.7* 1.5*   GLOB 4.1* 5.4*   LPSE 54*  --        No results for input(s): INR, PTP, APTT, INREXT, INREXT in the last 72 hours. Recent Labs     09/10/22  1200   TIBC 141*   PSAT 9*   FERR 715*        Lab Results   Component Value Date/Time    Folate 4.1 (L) 09/10/2022 12:00 PM        No results for input(s): PH, PCO2, PO2 in the last 72 hours. No results for input(s): CPK, CKNDX, TROIQ in the last 72 hours.     No lab exists for component: CPKMB  No results found for: CHOL, CHOLX, CHLST, CHOLV, HDL, HDLP, LDL, LDLC, DLDLP, TGLX, TRIGL, TRIGP, CHHD, CHHDX  Lab Results   Component Value Date/Time    Glucose (POC) 87 09/12/2022 07:07 AM    Glucose (POC) 90 09/11/2022 11:13 PM Glucose (POC) 103 09/11/2022 06:05 PM    Glucose (POC) 95 09/11/2022 11:45 AM    Glucose (POC) 89 09/11/2022 06:30 AM     No results found for: COLOR, APPRN, SPGRU, REFSG, WILL, PROTU, GLUCU, KETU, BILU, UROU, MISHA, LEUKU, GLUKE, EPSU, BACTU, WBCU, RBCU, CASTS, UCRY      Medications Reviewed:     Current Facility-Administered Medications   Medication Dose Route Frequency    epoetin vera-epbx (RETACRIT) injection 10,000 Units  10,000 Units SubCUTAneous DIALYSIS MON, WED & FRI    0.9% sodium chloride infusion 250 mL  250 mL IntraVENous PRN    HYDROmorphone (DILAUDID) injection 0.5-1 mg  0.5-1 mg IntraVENous Q3H PRN    piperacillin-tazobactam (ZOSYN) 3.375 g in 0.9% sodium chloride (MBP/ADV) 100 mL MBP  3.375 g IntraVENous Q8H    glucose chewable tablet 16 g  4 Tablet Oral PRN    glucagon (GLUCAGEN) injection 1 mg  1 mg IntraMUSCular PRN    dextrose 10 % infusion 0-250 mL  0-250 mL IntraVENous PRN    ondansetron (ZOFRAN) injection 4 mg  4 mg IntraVENous Q4H PRN    diphenhydrAMINE (BENADRYL) injection 12.5 mg  12.5 mg IntraVENous Q6H PRN    sodium chloride (NS) flush 5-40 mL  5-40 mL IntraVENous Q8H    sodium chloride (NS) flush 5-40 mL  5-40 mL IntraVENous PRN    acetaminophen (TYLENOL) tablet 650 mg  650 mg Oral Q6H PRN    Or    acetaminophen (TYLENOL) suppository 650 mg  650 mg Rectal Q6H PRN    polyethylene glycol (MIRALAX) packet 17 g  17 g Oral DAILY PRN    ondansetron (ZOFRAN ODT) tablet 4 mg  4 mg Oral Q8H PRN    Or    ondansetron (ZOFRAN) injection 4 mg  4 mg IntraVENous Q6H PRN    L.acidophilus-paracasei-S.thermophil-bifidobacter (RISAQUAD) 8 billion cell capsule  1 Capsule Oral DAILY    insulin lispro (HUMALOG) injection   SubCUTAneous AC&HS    glucose chewable tablet 16 g  4 Tablet Oral PRN    glucagon (GLUCAGEN) injection 1 mg  1 mg IntraMUSCular PRN    pantoprazole (PROTONIX) 40 mg in 0.9% sodium chloride 10 mL injection  40 mg IntraVENous Q12H    dextrose 10 % infusion 0-250 mL  0-250 mL IntraVENous PRN Vancomycin - pharmacy to dose   Other Rx Dosing/Monitoring    0.9% sodium chloride infusion 250 mL  250 mL IntraVENous PRN     ______________________________________________________________________  EXPECTED LENGTH OF STAY: - - -  ACTUAL LENGTH OF STAY:          2                 Aneta Thompson MD

## 2022-09-13 PROBLEM — E43 SEVERE PROTEIN-CALORIE MALNUTRITION (HCC): Status: ACTIVE | Noted: 2022-09-13

## 2022-09-13 LAB
ANION GAP SERPL CALC-SCNC: 5 MMOL/L (ref 5–15)
BASOPHILS # BLD: 0.1 K/UL (ref 0–0.1)
BASOPHILS NFR BLD: 0 % (ref 0–1)
BUN SERPL-MCNC: 16 MG/DL (ref 6–20)
BUN/CREAT SERPL: 8 (ref 12–20)
CALCIUM SERPL-MCNC: 7.3 MG/DL (ref 8.5–10.1)
CHLORIDE SERPL-SCNC: 107 MMOL/L (ref 97–108)
CO2 SERPL-SCNC: 28 MMOL/L (ref 21–32)
CREAT SERPL-MCNC: 2.13 MG/DL (ref 0.7–1.3)
DIFFERENTIAL METHOD BLD: ABNORMAL
EOSINOPHIL # BLD: 0.2 K/UL (ref 0–0.4)
EOSINOPHIL NFR BLD: 2 % (ref 0–7)
ERYTHROCYTE [DISTWIDTH] IN BLOOD BY AUTOMATED COUNT: 18.4 % (ref 11.5–14.5)
GLUCOSE BLD STRIP.AUTO-MCNC: 110 MG/DL (ref 65–117)
GLUCOSE BLD STRIP.AUTO-MCNC: 73 MG/DL (ref 65–117)
GLUCOSE BLD STRIP.AUTO-MCNC: 76 MG/DL (ref 65–117)
GLUCOSE BLD STRIP.AUTO-MCNC: 85 MG/DL (ref 65–117)
GLUCOSE SERPL-MCNC: 78 MG/DL (ref 65–100)
HCT VFR BLD AUTO: 23 % (ref 36.6–50.3)
HGB BLD-MCNC: 7.3 G/DL (ref 12.1–17)
IMM GRANULOCYTES # BLD AUTO: 0.1 K/UL (ref 0–0.04)
IMM GRANULOCYTES NFR BLD AUTO: 1 % (ref 0–0.5)
LYMPHOCYTES # BLD: 0.9 K/UL (ref 0.8–3.5)
LYMPHOCYTES NFR BLD: 6 % (ref 12–49)
MAGNESIUM SERPL-MCNC: 1.7 MG/DL (ref 1.6–2.4)
MCH RBC QN AUTO: 29.8 PG (ref 26–34)
MCHC RBC AUTO-ENTMCNC: 31.7 G/DL (ref 30–36.5)
MCV RBC AUTO: 93.9 FL (ref 80–99)
MONOCYTES # BLD: 0.7 K/UL (ref 0–1)
MONOCYTES NFR BLD: 4 % (ref 5–13)
NEUTS SEG # BLD: 13.7 K/UL (ref 1.8–8)
NEUTS SEG NFR BLD: 87 % (ref 32–75)
NRBC # BLD: 0 K/UL (ref 0–0.01)
NRBC BLD-RTO: 0 PER 100 WBC
PHOSPHATE SERPL-MCNC: 1.9 MG/DL (ref 2.6–4.7)
PLATELET # BLD AUTO: 328 K/UL (ref 150–400)
PMV BLD AUTO: 9.3 FL (ref 8.9–12.9)
POTASSIUM SERPL-SCNC: 2.9 MMOL/L (ref 3.5–5.1)
RBC # BLD AUTO: 2.45 M/UL (ref 4.1–5.7)
SERVICE CMNT-IMP: NORMAL
SODIUM SERPL-SCNC: 140 MMOL/L (ref 136–145)
WBC # BLD AUTO: 15.6 K/UL (ref 4.1–11.1)

## 2022-09-13 PROCEDURE — 84100 ASSAY OF PHOSPHORUS: CPT

## 2022-09-13 PROCEDURE — 74011250636 HC RX REV CODE- 250/636: Performed by: HOSPITALIST

## 2022-09-13 PROCEDURE — 36415 COLL VENOUS BLD VENIPUNCTURE: CPT

## 2022-09-13 PROCEDURE — C9113 INJ PANTOPRAZOLE SODIUM, VIA: HCPCS | Performed by: INTERNAL MEDICINE

## 2022-09-13 PROCEDURE — 74011000250 HC RX REV CODE- 250: Performed by: SURGERY

## 2022-09-13 PROCEDURE — 74011000250 HC RX REV CODE- 250: Performed by: INTERNAL MEDICINE

## 2022-09-13 PROCEDURE — 83735 ASSAY OF MAGNESIUM: CPT

## 2022-09-13 PROCEDURE — 74011250637 HC RX REV CODE- 250/637: Performed by: INTERNAL MEDICINE

## 2022-09-13 PROCEDURE — 80048 BASIC METABOLIC PNL TOTAL CA: CPT

## 2022-09-13 PROCEDURE — 74011000258 HC RX REV CODE- 258: Performed by: SURGERY

## 2022-09-13 PROCEDURE — 74011250636 HC RX REV CODE- 250/636: Performed by: SURGERY

## 2022-09-13 PROCEDURE — 82962 GLUCOSE BLOOD TEST: CPT

## 2022-09-13 PROCEDURE — 85025 COMPLETE CBC W/AUTO DIFF WBC: CPT

## 2022-09-13 PROCEDURE — 65660000001 HC RM ICU INTERMED STEPDOWN

## 2022-09-13 PROCEDURE — 82985 ASSAY OF GLYCATED PROTEIN: CPT

## 2022-09-13 PROCEDURE — 74011250636 HC RX REV CODE- 250/636: Performed by: INTERNAL MEDICINE

## 2022-09-13 PROCEDURE — 99024 POSTOP FOLLOW-UP VISIT: CPT | Performed by: SURGERY

## 2022-09-13 RX ADMIN — Medication 1 CAPSULE: at 09:29

## 2022-09-13 RX ADMIN — SODIUM CHLORIDE, PRESERVATIVE FREE 10 ML: 5 INJECTION INTRAVENOUS at 16:23

## 2022-09-13 RX ADMIN — HYDROMORPHONE HYDROCHLORIDE 1 MG: 1 INJECTION, SOLUTION INTRAMUSCULAR; INTRAVENOUS; SUBCUTANEOUS at 18:15

## 2022-09-13 RX ADMIN — HYDROMORPHONE HYDROCHLORIDE 1 MG: 1 INJECTION, SOLUTION INTRAMUSCULAR; INTRAVENOUS; SUBCUTANEOUS at 00:11

## 2022-09-13 RX ADMIN — HYDROMORPHONE HYDROCHLORIDE 1 MG: 1 INJECTION, SOLUTION INTRAMUSCULAR; INTRAVENOUS; SUBCUTANEOUS at 06:02

## 2022-09-13 RX ADMIN — HYDROMORPHONE HYDROCHLORIDE 1 MG: 1 INJECTION, SOLUTION INTRAMUSCULAR; INTRAVENOUS; SUBCUTANEOUS at 12:15

## 2022-09-13 RX ADMIN — ONDANSETRON 4 MG: 2 INJECTION INTRAMUSCULAR; INTRAVENOUS at 09:42

## 2022-09-13 RX ADMIN — HYDROMORPHONE HYDROCHLORIDE 1 MG: 1 INJECTION, SOLUTION INTRAMUSCULAR; INTRAVENOUS; SUBCUTANEOUS at 20:24

## 2022-09-13 RX ADMIN — HYDROMORPHONE HYDROCHLORIDE 1 MG: 1 INJECTION, SOLUTION INTRAMUSCULAR; INTRAVENOUS; SUBCUTANEOUS at 16:22

## 2022-09-13 RX ADMIN — PIPERACILLIN AND TAZOBACTAM 3.38 G: 3; .375 INJECTION, POWDER, FOR SOLUTION INTRAVENOUS at 06:01

## 2022-09-13 RX ADMIN — SODIUM CHLORIDE, PRESERVATIVE FREE 40 MG: 5 INJECTION INTRAVENOUS at 20:24

## 2022-09-13 RX ADMIN — POTASSIUM BICARBONATE 40 MEQ: 782 TABLET, EFFERVESCENT ORAL at 11:19

## 2022-09-13 RX ADMIN — ONDANSETRON 4 MG: 2 INJECTION INTRAMUSCULAR; INTRAVENOUS at 05:47

## 2022-09-13 RX ADMIN — SODIUM CHLORIDE, PRESERVATIVE FREE 40 MG: 5 INJECTION INTRAVENOUS at 09:29

## 2022-09-13 RX ADMIN — PIPERACILLIN AND TAZOBACTAM 3.38 G: 3; .375 INJECTION, POWDER, FOR SOLUTION INTRAVENOUS at 18:07

## 2022-09-13 RX ADMIN — HYDROMORPHONE HYDROCHLORIDE 1 MG: 1 INJECTION, SOLUTION INTRAMUSCULAR; INTRAVENOUS; SUBCUTANEOUS at 21:58

## 2022-09-13 RX ADMIN — HYDROMORPHONE HYDROCHLORIDE 1 MG: 1 INJECTION, SOLUTION INTRAMUSCULAR; INTRAVENOUS; SUBCUTANEOUS at 09:29

## 2022-09-13 RX ADMIN — COLLAGENASE SANTYL: 250 OINTMENT TOPICAL at 11:17

## 2022-09-13 NOTE — PROGRESS NOTES
Faculty or Preceptor Review of Student Work    9/13/2022  - Shift times - 0730 to 1300    The student documentation of patient care for Carlie Flower has been reviewed and approved. All medications have been administered under the direct supervision of the faculty or preceptor.     Josef Bella RN

## 2022-09-13 NOTE — PROGRESS NOTES
Progress Note    Patient: Haze Dakin MRN: [id number]  SSN: [id number]    YOB: 1970  Age: 45 y.o. Sex: male      Admit Date: 2022    3 Days Post-Op    Procedure:  Procedure(s):  LAPAROSCOPY GENERAL DIAGNOSTIC, LAPAROSCOPIC  TAKEDOWN AND REMOVAL J-TUBE, LAPAROSCOPIC COLOSTOMY, I & D ABDOMINAL WALL    Subjective:     Patient complains of some nausea but mainly complaining of pain. States he is tolerating clear liquids. Objective:     Visit Vitals  /67 (BP 1 Location: Left upper arm, BP Patient Position: At rest;Supine)   Pulse 76   Temp 99.3 °F (37.4 °C)   Resp 18   Ht 5' 7\" (1.702 m)   Wt 75.5 kg (166 lb 7.2 oz)   SpO2 96%   BMI 26.07 kg/m²       Temp (24hrs), Av.9 °F (37.2 °C), Min:98.3 °F (36.8 °C), Max:99.3 °F (37.4 °C)      Physical Exam:    General alert no acute distress  Lungs clear  Heart regular rate and rhythm  Abdomen soft-left upper quadrant wound site still with some necrotic tissue but otherwise clean. Ostomy red but viable  Rectal fistula unchanged    Data Review: images and reports reviewed    Lab Review: All lab results for the last 24 hours reviewed.   Recent Results (from the past 24 hour(s))   GLUCOSE, POC    Collection Time: 22 12:03 PM   Result Value Ref Range    Glucose (POC) 76 65 - 117 mg/dL    Performed by Janak Coelho  PCT    GLUCOSE, POC    Collection Time: 22  5:37 PM   Result Value Ref Range    Glucose (POC) 77 65 - 117 mg/dL    Performed by Casandra Avila   PCT    GLUCOSE, POC    Collection Time: 22 10:24 PM   Result Value Ref Range    Glucose (POC) 71 65 - 117 mg/dL    Performed by Ilya Marcelo    CBC WITH AUTOMATED DIFF    Collection Time: 22  5:28 AM   Result Value Ref Range    WBC 15.6 (H) 4.1 - 11.1 K/uL    RBC 2.45 (L) 4.10 - 5.70 M/uL    HGB 7.3 (L) 12.1 - 17.0 g/dL    HCT 23.0 (L) 36.6 - 50.3 %    MCV 93.9 80.0 - 99.0 FL    MCH 29.8 26.0 - 34.0 PG    MCHC 31.7 30.0 - 36.5 g/dL    RDW 18.4 (H) 11.5 - 14.5 % PLATELET 329 103 - 538 K/uL    MPV 9.3 8.9 - 12.9 FL    NRBC 0.0 0  WBC    ABSOLUTE NRBC 0.00 0.00 - 0.01 K/uL    NEUTROPHILS 87 (H) 32 - 75 %    LYMPHOCYTES 6 (L) 12 - 49 %    MONOCYTES 4 (L) 5 - 13 %    EOSINOPHILS 2 0 - 7 %    BASOPHILS 0 0 - 1 %    IMMATURE GRANULOCYTES 1 (H) 0.0 - 0.5 %    ABS. NEUTROPHILS 13.7 (H) 1.8 - 8.0 K/UL    ABS. LYMPHOCYTES 0.9 0.8 - 3.5 K/UL    ABS. MONOCYTES 0.7 0.0 - 1.0 K/UL    ABS. EOSINOPHILS 0.2 0.0 - 0.4 K/UL    ABS. BASOPHILS 0.1 0.0 - 0.1 K/UL    ABS. IMM. GRANS. 0.1 (H) 0.00 - 0.04 K/UL    DF AUTOMATED     METABOLIC PANEL, BASIC    Collection Time: 09/13/22  5:28 AM   Result Value Ref Range    Sodium 140 136 - 145 mmol/L    Potassium 2.9 (L) 3.5 - 5.1 mmol/L    Chloride 107 97 - 108 mmol/L    CO2 28 21 - 32 mmol/L    Anion gap 5 5 - 15 mmol/L    Glucose 78 65 - 100 mg/dL    BUN 16 6 - 20 MG/DL    Creatinine 2.13 (H) 0.70 - 1.30 MG/DL    BUN/Creatinine ratio 8 (L) 12 - 20      GFR est AA 40 (L) >60 ml/min/1.73m2    GFR est non-AA 33 (L) >60 ml/min/1.73m2    Calcium 7.3 (L) 8.5 - 10.1 MG/DL   MAGNESIUM    Collection Time: 09/13/22  5:28 AM   Result Value Ref Range    Magnesium 1.7 1.6 - 2.4 mg/dL   PHOSPHORUS    Collection Time: 09/13/22  5:28 AM   Result Value Ref Range    Phosphorus 1.9 (L) 2.6 - 4.7 MG/DL   GLUCOSE, POC    Collection Time: 09/13/22  8:37 AM   Result Value Ref Range    Glucose (POC) 73 65 - 117 mg/dL    Performed by [address] [place] [address], POC    Collection Time: 09/13/22 11:06 AM   Result Value Ref Range    Glucose (POC) 76 65 - 117 mg/dL    Performed by [facility] Jillian Peter Pelaez:     Hospital Problems  Date Reviewed: 9/10/2022            Codes Class Noted POA    * (Principal) Intra-abdominal infection ICD-10-CM: B99.9  ICD-9-CM: 136.9  9/10/2022 Yes           Plan/Recommendations/Medical Decision Making:   Seems to be doing okay on clear liquid diet. Will advance diet. And see how he does. Continue wound and ostomy care.   Discussed with Dr. Calvin Remy probably worthwhile having colorectal surgery see him while he is here in order to develop a plan. Explained to patient that they may not perform any surgery during this admission but at least the beginnings of a plan can start to develop. Watch how he does on a diet hopefully will not require further feeding tube.

## 2022-09-13 NOTE — DIABETES MGMT
Diabetes Management Team to sign off at this point as patient's blood glucose remains stable. Please re-consult us if patient needs change. Thank you for including us in their care.

## 2022-09-13 NOTE — PROGRESS NOTES
Physical Therapy - defer  Order received, chart reviewed in prep for evaluation, RN cleared for activity. Received pt in bed with staff in the room. Pt declined therapy assessment today citing pain and colostomy leaking (earlier today) stating \"I'm not going to do any moving today\". Will follow for evaluation as pt allows.

## 2022-09-13 NOTE — PROGRESS NOTES
[facility]  [facility]ist Group                                                                                          Hospitalist Progress Note  Mary Moore MD  Answering service: [phone] from in house phone        Date of Service:  2022  NAME:  Kevin Chris  :  1970  MRN:  [id number]      Admission Summary: This is a 42-year-old man with a past medical history significant for type 2 diabetes, who presented at the emergency room from [facility] with abdominal pain. The patient was recently admitted to another hospital and underwent left below-knee amputation. The hospitalization became complicated with respiratory failure which required prolonged intubation. The respiratory failure was attributed to aspiration pneumonia. The hospitalization was also complicated with lobulated effusion, for which the patient underwent decortication and right thoracotomy. The patient also went into acute renal failure for which he has been started on hemodialysis. J-tube was placed for supplemental nutrition. The patient was subsequently transferred to [facility] for continuation of care. He was doing relatively well in [facility] until the day of presentation at the emergency room when the patient complained of abdominal pain at the facility. CT scan of the abdomen and pelvis was obtained. The CT scan shows evidence of bowel perforation. The patient was then sent to [facility] emergency room for further evaluation. When the patient arrived at the emergency room, based on his clinical presentation and lab work, Code Sepsis was triggered. The patient received fluid therapy as per sepsis protocol. The emergency room physician consulted general surgeon on-call. The patient was seen by the surgeon and the patient is planned for immediate surgical intervention.   No record of prior admission to this hospital, but the patient was recently admitted to another hospital.    Interval history / Subjective: Follow up Intraperitoneal perforation  S/p surgery 9/10  Getting HD  Still requiring IV pain meds  Comfortably laying in bed     Assessment & Plan:     Intractable abdominal pain  Pneumoperitoneum  Abdominal wall abscess  Severe sepsis  -s/p 9/10 LAPAROSCOPY GENERAL DIAGNOSTIC, LAPAROSCOPIC  TAKEDOWN AND REMOVAL J-TUBE, LAPAROSCOPIC COLOSTOMY, I & D ABDOMINAL WALL   -no fever, leukocytosis improving  -Cultures no growth   -Pain control, will consider switching to oral in a day or two  Continue zosyn/Vanc  Continue pain control  -Appreciate discussion with Surgery    Possible empyema  -H/o recent thoracotomy and prolonged intubation at [facility]  -Continue antibiotics  -Appreciate pulmonology recommendations. Continue antibiotics. We will follow with chest x-rays. FOBT positive Protonix BID  JEMIMA now on HD, appreciate Nephrology  Hypokalemia: replace as needed  Rectal fistula: likely would need outpatient follow up. Consult CRS  B/L AKA: Stable  Acute blood loss Anemia: s/p 1 unit PRBC 9/10. Transfuse for hb<7  HTN: holding antihypertensives for now  DM2:SSI, monitor    Diarrhea: C. Diff negative    Full Liquids and advance as tolerated     Code status: full  Prophylaxis: hold, SCDs  PTA: Vibra    Plan: Continue IV antibiotics, HD per nephrology  Care Plan discussed with: patient, family, nurse  Anticipated Disposition: TBD     Hospital Problems  Date Reviewed: 9/10/2022            Codes Class Noted POA    * (Principal) Intra-abdominal infection ICD-10-CM: B99.9  ICD-9-CM: 136.9  9/10/2022 Yes         Review of Systems:   A comprehensive review of systems was negative except for that written in the HPI. Vital Signs:    Last 24hrs VS reviewed since prior progress note.  Most recent are:  Visit Vitals  /67 (BP 1 Location: Left upper arm, BP Patient Position: At rest;Supine)   Pulse 76   Temp 99.3 °F (37.4 °C)   Resp 18   Ht 5' 7\" (1.702 m)   Wt 75.5 kg (166 lb 7.2 oz)   SpO2 96%   BMI 26.07 kg/m²         Intake/Output Summary (Last 24 hours) at 9/13/2022 1140  Last data filed at 9/13/2022 1038  Gross per 24 hour   Intake --   Output 200 ml   Net -200 ml          Physical Examination:     I had a face to face encounter with this patient and independently examined them on 9/13/2022 as outlined below:          Constitutional:  No acute distress,   ENT:  Oral mucosa moist, oropharynx benign. Resp:  CTA bilaterally. No wheezing/rhonchi/rales. No accessory muscle use. CV:  Regular rhythm, normal rate, no murmurs, gallops, rubs    GI:  Colostomy. Painful to palpation. Musculoskeletal:  No edema, warm, 2+ pulses throughout    Neurologic:  Moves all extremities. AAOx3, CN II-XII reviewed            Data Review:    Review and/or order of clinical lab test  Review and/or order of tests in the radiology section of CPT  Review and/or order of tests in the medicine section of CPT      Labs:     Recent Labs     09/13/22 0528 09/12/22 0341   WBC 15.6* 15.8*   HGB 7.3* 7.5*   HCT 23.0* 23.9*    380       Recent Labs     09/13/22  0528 09/12/22  0341 09/11/22  2310 09/11/22  0432    143  --  141   K 2.9* 3.3* 3.2* 3.0*    110*  --  107   CO2 28 24  --  29   BUN 16 29*  --  23*   CREA 2.13* 3.15*  --  2.67*   GLU 78 81  --  89   CA 7.3* 7.3*  --  7.5*   MG 1.7 1.8  --  1.6   PHOS 1.9* 3.6  --  2.2*       Recent Labs     09/10/22  1200   ALT <6*   AP 86   TBILI 0.7   TP 5.8*   ALB 1.7*   GLOB 4.1*   LPSE 54*       No results for input(s): INR, PTP, APTT, INREXT, INREXT in the last 72 hours. Recent Labs     09/10/22  1200   TIBC 141*   PSAT 9*   FERR 715*        Lab Results   Component Value Date/Time    Folate 4.1 (L) 09/10/2022 12:00 PM        No results for input(s): PH, PCO2, PO2 in the last 72 hours. No results for input(s): CPK, CKNDX, TROIQ in the last 72 hours.     No lab exists for component: CPKMB  No results found for: CHOL, [facility], [facility], [facility], HDL, HDLP, LDL, LDLC, DLDLP, TGLX, TRIGL, TRIGP, CHHD, CHHDX  Lab Results   Component Value Date/Time    Glucose (POC) 76 09/13/2022 11:06 AM    Glucose (POC) 73 09/13/2022 08:37 AM    Glucose (POC) 71 09/12/2022 10:24 PM    Glucose (POC) 77 09/12/2022 05:37 PM    Glucose (POC) 76 09/12/2022 12:03 PM     No results found for: COLOR, APPRN, SPGRU, REFSG, WILL, PROTU, GLUCU, KETU, BILU, UROU, MISHA, LEUKU, GLUKE, EPSU, BACTU, WBCU, RBCU, CASTS, UCRY      Medications Reviewed:     Current Facility-Administered Medications   Medication Dose Route Frequency    [START ON 9/14/2022] vancomycin random level with am labs on 9/14 @ 04:00   Other ONCE    epoetin vera-epbx (RETACRIT) injection 10,000 Units  10,000 Units SubCUTAneous DIALYSIS MON, WED & FRI    piperacillin-tazobactam (ZOSYN) 3.375 g in 0.9% sodium chloride (MBP/ADV) 100 mL MBP  3.375 g IntraVENous Q12H    HYDROmorphone (DILAUDID) injection 0.5-1 mg  0.5-1 mg IntraVENous Q2H PRN    collagenase (SANTYL) 250 unit/gram ointment   Topical DAILY    0.9% sodium chloride infusion 250 mL  250 mL IntraVENous PRN    glucose chewable tablet 16 g  4 Tablet Oral PRN    glucagon (GLUCAGEN) injection 1 mg  1 mg IntraMUSCular PRN    dextrose 10 % infusion 0-250 mL  0-250 mL IntraVENous PRN    ondansetron (ZOFRAN) injection 4 mg  4 mg IntraVENous Q4H PRN    diphenhydrAMINE (BENADRYL) injection 12.5 mg  12.5 mg IntraVENous Q6H PRN    sodium chloride (NS) flush 5-40 mL  5-40 mL IntraVENous Q8H    sodium chloride (NS) flush 5-40 mL  5-40 mL IntraVENous PRN    acetaminophen (TYLENOL) tablet 650 mg  650 mg Oral Q6H PRN    Or    acetaminophen (TYLENOL) suppository 650 mg  650 mg Rectal Q6H PRN    polyethylene glycol (MIRALAX) packet 17 g  17 g Oral DAILY PRN    ondansetron (ZOFRAN ODT) tablet 4 mg  4 mg Oral Q8H PRN    Or    ondansetron (ZOFRAN) injection 4 mg  4 mg IntraVENous Q6H PRN    L.acidophilus-paracasei-S.thermophil-bifidobacter (RISAQUAD) 8 billion cell capsule  1 Capsule Oral DAILY    insulin lispro (HUMALOG) injection   SubCUTAneous AC&HS    glucose chewable tablet 16 g  4 Tablet Oral PRN    glucagon (GLUCAGEN) injection 1 mg  1 mg IntraMUSCular PRN    pantoprazole (PROTONIX) 40 mg in 0.9% sodium chloride 10 mL injection  40 mg IntraVENous Q12H    dextrose 10 % infusion 0-250 mL  0-250 mL IntraVENous PRN    Vancomycin - pharmacy to dose   Other Rx Dosing/Monitoring    0.9% sodium chloride infusion 250 mL  250 mL IntraVENous PRN     ______________________________________________________________________  EXPECTED LENGTH OF STAY: 9d 14h  ACTUAL LENGTH OF STAY:          Mortimer Arrow, MD

## 2022-09-13 NOTE — WOUND CARE
Ostomy Visit    Second postoperative visit. Surgery: Colostomy  Date of Surgery: 9.10.22      Surgeon: Dr. Katie Sadler Location: left quadrant  Wife at bedside for ostomy education. Ostomy Assessment:  Stoma appearance: maroon/red; slightly dusky  Mucocutaneous Junction: intact  Peristomal skin: intact  Wound: trocar sites with staples  Output: brown  Current appliance:  2 piece pouch with ring/paste     Treatments:  Pouch removed, stoma and peristomal skin cleaned and assessed, new pouch prepared and applied. Teaching/Subjects covered today:  Encouraged pt to review handout given to patient/family and ask questions regarding material on next ostomy nurse visit. - General anatomy and expectations of output  - Normal & abnormal stoma characteristics  - Normal & abnormal peristomal characteristics & changes over time  - When/how to clean stoma & peristomal skin  - When/how to empty pouch  - Crusting technique; shaving around stoma;   - When/how to change appliance  - When to notify nurse/physician  - Dietary guidelines  - Flatus management  - Where/how to obtain supplies  - Reviewed ADL's     Recommendations:  1. Empty appliance when 1/3 full and PRN. Encourage patient/family to notify nurse and  assist w/ pouch emptying to promote self-care. Change appliance twice weekly and PRN for leaking ASAP. DO NOT REINFORCE LEAKS to avoid skin breakdown. Transition of Care: Will follow routinely.      ARNOLDO GreenwoodN DOMINGA [facility]  Available on [employer]  Office [phone]

## 2022-09-13 NOTE — PROGRESS NOTES
CAROLYN:  RUR: 13%     Disposition:   [facility] [address] [facility] ([place] [phone]). CM called Camille ([facility] liaison) and left a voicemail message to return call. This patient is insured via his wife's employer and has medicare ins  Insurance:  primary: [facility] policy # [id number]   secondary: Medicare [id number] effec. 2/1/22    CM continuing to follow.   Grant Patterson, [address], [address] [place] [address]

## 2022-09-13 NOTE — PROGRESS NOTES
Occupational Therapy    Chart reviewed and pt cleared for therapy by nursing. Pt resting in bed reporting \"greater than 10/10 pain\" and frequent leaking of new colostomy bag. Requesting to defer therapy services at this time. Will follow up tomorrow as able/appropriate.      Thank you for the opportunity to participate in this pts plan of care    Reina MCFARLAND/MELISSA

## 2022-09-13 NOTE — PROGRESS NOTES
Nephrology Progress Note  Neto Duran  Date of Admission : 9/9/2022    CC:  Follow up for JEMIMA       Assessment and Plan     JEMIMA on HD:  - HD MWF for now while here  - daily labs    Hypokalemia:  - 4 k bath with HD  - oral KCL x 1 today    Anemia:  - MICHAEL MWF    PAD s/p BKA    Recent sepsis  Empyema  J-tube removal, colostomy, I&D of abd wound  Sacral decub       Interval History:  Seen and examined . Resting in bed. BP stable. No cp, sob, n/v/d reported. Tolerated hemo yesterday, UF 1 L    Current Medications: all current  Medications have been eviewed in EPIC  Review of Systems: Pertinent items are noted in HPI. Objective:  Vitals:    Vitals:    09/13/22 0500 09/13/22 0600 09/13/22 0747 09/13/22 1011   BP:   136/67    Pulse:  75 74 76   Resp:   18    Temp:   99.3 °F (37.4 °C)    SpO2:   96%    Weight: 75.5 kg (166 lb 7.2 oz)      Height:         Intake and Output:  No intake/output data recorded. 09/11 1901 - 09/13 0700  In: -   Out: 1000     Physical Examination:  Pt intubated     no  General: NAD,Conversant   Neck:  Supple, no mass  Resp:  Lungs CTA B/L, no wheezing , normal respiratory effort  CV:  RRR,  no murmur or rub, no LE edema  GI:  Soft, NT, + Bowel sounds, no hepatosplenomegaly  Neurologic:  Non focal  Psych:             AAO x 3 appropriate affect  Skin:  No Rash  Access:           KRYS zambrano    []    High complexity decision making was performed  []    Patient is at high-risk of decompensation with multiple organ involvement    Lab Data Personally Reviewed: I have reviewed all the pertinent labs, microbiology data and radiology studies during assessment.     Recent Labs     09/13/22  0528 09/12/22  0341 09/11/22  2310 09/11/22  0432 09/10/22  1200    143  --  141 140   K 2.9* 3.3* 3.2* 3.0* 3.0*    110*  --  107 106   CO2 28 24  --  29 27   GLU 78 81  --  89 95   BUN 16 29*  --  23* 17   CREA 2.13* 3.15*  --  2.67* 2.16*   CA 7.3* 7.3*  --  7.5* 7.4*   MG 1.7 1.8  --  1.6 1.5* PHOS 1.9* 3.6  --  2.2* 1.3*   ALB  --   --   --   --  1.7*   ALT  --   --   --   --  <6*       Recent Labs     09/13/22  0528 09/12/22  0341 09/11/22  0432   WBC 15.6* 15.8* 16.0*   HGB 7.3* 7.5* 7.5*   HCT 23.0* 23.9* 23.8*    380 352       No results found for: SDES  Lab Results   Component Value Date/Time    Culture result: NO GROWTH 3 DAYS 09/09/2022 08:52 PM     Recent Results (from the past 24 hour(s))   GLUCOSE, POC    Collection Time: 09/12/22 12:03 PM   Result Value Ref Range    Glucose (POC) 76 65 - 117 mg/dL    Performed by Christina Wilson  PCT    GLUCOSE, POC    Collection Time: 09/12/22  5:37 PM   Result Value Ref Range    Glucose (POC) 77 65 - 117 mg/dL    Performed by Kp Horn   PCT    GLUCOSE, POC    Collection Time: 09/12/22 10:24 PM   Result Value Ref Range    Glucose (POC) 71 65 - 117 mg/dL    Performed by Roxana Roach    CBC WITH AUTOMATED DIFF    Collection Time: 09/13/22  5:28 AM   Result Value Ref Range    WBC 15.6 (H) 4.1 - 11.1 K/uL    RBC 2.45 (L) 4.10 - 5.70 M/uL    HGB 7.3 (L) 12.1 - 17.0 g/dL    HCT 23.0 (L) 36.6 - 50.3 %    MCV 93.9 80.0 - 99.0 FL    MCH 29.8 26.0 - 34.0 PG    MCHC 31.7 30.0 - 36.5 g/dL    RDW 18.4 (H) 11.5 - 14.5 %    PLATELET 160 820 - 412 K/uL    MPV 9.3 8.9 - 12.9 FL    NRBC 0.0 0  WBC    ABSOLUTE NRBC 0.00 0.00 - 0.01 K/uL    NEUTROPHILS 87 (H) 32 - 75 %    LYMPHOCYTES 6 (L) 12 - 49 %    MONOCYTES 4 (L) 5 - 13 %    EOSINOPHILS 2 0 - 7 %    BASOPHILS 0 0 - 1 %    IMMATURE GRANULOCYTES 1 (H) 0.0 - 0.5 %    ABS. NEUTROPHILS 13.7 (H) 1.8 - 8.0 K/UL    ABS. LYMPHOCYTES 0.9 0.8 - 3.5 K/UL    ABS. MONOCYTES 0.7 0.0 - 1.0 K/UL    ABS. EOSINOPHILS 0.2 0.0 - 0.4 K/UL    ABS. BASOPHILS 0.1 0.0 - 0.1 K/UL    ABS. IMM.  GRANS. 0.1 (H) 0.00 - 0.04 K/UL    DF AUTOMATED     METABOLIC PANEL, BASIC    Collection Time: 09/13/22  5:28 AM   Result Value Ref Range    Sodium 140 136 - 145 mmol/L    Potassium 2.9 (L) 3.5 - 5.1 mmol/L    Chloride 107 97 - 108 mmol/L CO2 28 21 - 32 mmol/L    Anion gap 5 5 - 15 mmol/L    Glucose 78 65 - 100 mg/dL    BUN 16 6 - 20 MG/DL    Creatinine 2.13 (H) 0.70 - 1.30 MG/DL    BUN/Creatinine ratio 8 (L) 12 - 20      GFR est AA 40 (L) >60 ml/min/1.73m2    GFR est non-AA 33 (L) >60 ml/min/1.73m2    Calcium 7.3 (L) 8.5 - 10.1 MG/DL   MAGNESIUM    Collection Time: 09/13/22  5:28 AM   Result Value Ref Range    Magnesium 1.7 1.6 - 2.4 mg/dL   PHOSPHORUS    Collection Time: 09/13/22  5:28 AM   Result Value Ref Range    Phosphorus 1.9 (L) 2.6 - 4.7 MG/DL   GLUCOSE, POC    Collection Time: 09/13/22  8:37 AM   Result Value Ref Range    Glucose (POC) 73 65 - 117 mg/dL    Performed by Matt Tirado MD  [address]  Phone - [phone]   Fax - [phone]  www. [facility]

## 2022-09-14 ENCOUNTER — APPOINTMENT (OUTPATIENT)
Dept: CT IMAGING | Age: 52
DRG: 853 | End: 2022-09-14
Attending: COLON & RECTAL SURGERY
Payer: COMMERCIAL

## 2022-09-14 LAB
ANION GAP SERPL CALC-SCNC: 7 MMOL/L (ref 5–15)
BASOPHILS # BLD: 0.1 K/UL (ref 0–0.1)
BASOPHILS NFR BLD: 0 % (ref 0–1)
BUN SERPL-MCNC: 20 MG/DL (ref 6–20)
BUN/CREAT SERPL: 7 (ref 12–20)
CALCIUM SERPL-MCNC: 7.2 MG/DL (ref 8.5–10.1)
CHLORIDE SERPL-SCNC: 109 MMOL/L (ref 97–108)
CO2 SERPL-SCNC: 27 MMOL/L (ref 21–32)
CREAT SERPL-MCNC: 2.75 MG/DL (ref 0.7–1.3)
DIFFERENTIAL METHOD BLD: ABNORMAL
EOSINOPHIL # BLD: 0.2 K/UL (ref 0–0.4)
EOSINOPHIL NFR BLD: 1 % (ref 0–7)
ERYTHROCYTE [DISTWIDTH] IN BLOOD BY AUTOMATED COUNT: 18.6 % (ref 11.5–14.5)
FRUCTOSAMINE SERPL-SCNC: 186 UMOL/L (ref 0–285)
GLUCOSE BLD STRIP.AUTO-MCNC: 104 MG/DL (ref 65–117)
GLUCOSE BLD STRIP.AUTO-MCNC: 123 MG/DL (ref 65–117)
GLUCOSE BLD STRIP.AUTO-MCNC: 70 MG/DL (ref 65–117)
GLUCOSE BLD STRIP.AUTO-MCNC: 96 MG/DL (ref 65–117)
GLUCOSE SERPL-MCNC: 106 MG/DL (ref 65–100)
HCT VFR BLD AUTO: 22.6 % (ref 36.6–50.3)
HGB BLD-MCNC: 7.1 G/DL (ref 12.1–17)
IMM GRANULOCYTES # BLD AUTO: 0.1 K/UL (ref 0–0.04)
IMM GRANULOCYTES NFR BLD AUTO: 1 % (ref 0–0.5)
LYMPHOCYTES # BLD: 1 K/UL (ref 0.8–3.5)
LYMPHOCYTES NFR BLD: 7 % (ref 12–49)
MAGNESIUM SERPL-MCNC: 1.7 MG/DL (ref 1.6–2.4)
MCH RBC QN AUTO: 29.7 PG (ref 26–34)
MCHC RBC AUTO-ENTMCNC: 31.4 G/DL (ref 30–36.5)
MCV RBC AUTO: 94.6 FL (ref 80–99)
MONOCYTES # BLD: 0.6 K/UL (ref 0–1)
MONOCYTES NFR BLD: 4 % (ref 5–13)
NEUTS SEG # BLD: 14.1 K/UL (ref 1.8–8)
NEUTS SEG NFR BLD: 87 % (ref 32–75)
NRBC # BLD: 0 K/UL (ref 0–0.01)
NRBC BLD-RTO: 0 PER 100 WBC
PHOSPHATE SERPL-MCNC: 1.9 MG/DL (ref 2.6–4.7)
PLATELET # BLD AUTO: 370 K/UL (ref 150–400)
PMV BLD AUTO: 9.6 FL (ref 8.9–12.9)
POTASSIUM SERPL-SCNC: 3.3 MMOL/L (ref 3.5–5.1)
RBC # BLD AUTO: 2.39 M/UL (ref 4.1–5.7)
SERVICE CMNT-IMP: ABNORMAL
SERVICE CMNT-IMP: NORMAL
SODIUM SERPL-SCNC: 143 MMOL/L (ref 136–145)
VANCOMYCIN SERPL-MCNC: 23.7 UG/ML
WBC # BLD AUTO: 16 K/UL (ref 4.1–11.1)

## 2022-09-14 PROCEDURE — 74011000258 HC RX REV CODE- 258: Performed by: SURGERY

## 2022-09-14 PROCEDURE — 74011250636 HC RX REV CODE- 250/636: Performed by: INTERNAL MEDICINE

## 2022-09-14 PROCEDURE — 36415 COLL VENOUS BLD VENIPUNCTURE: CPT

## 2022-09-14 PROCEDURE — 74011250637 HC RX REV CODE- 250/637: Performed by: HOSPITALIST

## 2022-09-14 PROCEDURE — 90935 HEMODIALYSIS ONE EVALUATION: CPT

## 2022-09-14 PROCEDURE — 74011250637 HC RX REV CODE- 250/637: Performed by: INTERNAL MEDICINE

## 2022-09-14 PROCEDURE — 74011000258 HC RX REV CODE- 258: Performed by: INTERNAL MEDICINE

## 2022-09-14 PROCEDURE — 74011000250 HC RX REV CODE- 250: Performed by: SURGERY

## 2022-09-14 PROCEDURE — 74011250636 HC RX REV CODE- 250/636: Performed by: HOSPITALIST

## 2022-09-14 PROCEDURE — 74011000250 HC RX REV CODE- 250: Performed by: INTERNAL MEDICINE

## 2022-09-14 PROCEDURE — 80202 ASSAY OF VANCOMYCIN: CPT

## 2022-09-14 PROCEDURE — 83735 ASSAY OF MAGNESIUM: CPT

## 2022-09-14 PROCEDURE — 80048 BASIC METABOLIC PNL TOTAL CA: CPT

## 2022-09-14 PROCEDURE — 99024 POSTOP FOLLOW-UP VISIT: CPT | Performed by: SURGERY

## 2022-09-14 PROCEDURE — 74011250637 HC RX REV CODE- 250/637: Performed by: PHYSICAL MEDICINE & REHABILITATION

## 2022-09-14 PROCEDURE — 84100 ASSAY OF PHOSPHORUS: CPT

## 2022-09-14 PROCEDURE — 74011250636 HC RX REV CODE- 250/636: Performed by: SURGERY

## 2022-09-14 PROCEDURE — 99223 1ST HOSP IP/OBS HIGH 75: CPT | Performed by: PHYSICAL MEDICINE & REHABILITATION

## 2022-09-14 PROCEDURE — C9113 INJ PANTOPRAZOLE SODIUM, VIA: HCPCS | Performed by: INTERNAL MEDICINE

## 2022-09-14 PROCEDURE — 65660000001 HC RM ICU INTERMED STEPDOWN

## 2022-09-14 PROCEDURE — 82962 GLUCOSE BLOOD TEST: CPT

## 2022-09-14 PROCEDURE — 74176 CT ABD & PELVIS W/O CONTRAST: CPT

## 2022-09-14 PROCEDURE — 85025 COMPLETE CBC W/AUTO DIFF WBC: CPT

## 2022-09-14 RX ORDER — LORAZEPAM 0.5 MG/1
0.25 TABLET ORAL
Status: DISCONTINUED | OUTPATIENT
Start: 2022-09-14 | End: 2022-09-19

## 2022-09-14 RX ORDER — HYDROMORPHONE HYDROCHLORIDE 2 MG/ML
1.5 INJECTION, SOLUTION INTRAMUSCULAR; INTRAVENOUS; SUBCUTANEOUS ONCE
Status: COMPLETED | OUTPATIENT
Start: 2022-09-14 | End: 2022-09-14

## 2022-09-14 RX ORDER — HYDROMORPHONE HYDROCHLORIDE 5 MG/5ML
2 SOLUTION ORAL
Status: DISCONTINUED | OUTPATIENT
Start: 2022-09-14 | End: 2022-09-15

## 2022-09-14 RX ORDER — SERTRALINE HYDROCHLORIDE 50 MG/1
25 TABLET, FILM COATED ORAL DAILY
Status: DISCONTINUED | OUTPATIENT
Start: 2022-09-15 | End: 2022-10-13 | Stop reason: HOSPADM

## 2022-09-14 RX ADMIN — SODIUM CHLORIDE, PRESERVATIVE FREE 10 ML: 5 INJECTION INTRAVENOUS at 14:34

## 2022-09-14 RX ADMIN — LORAZEPAM 0.25 MG: 0.5 TABLET ORAL at 22:01

## 2022-09-14 RX ADMIN — SODIUM CHLORIDE 200 MG: 900 INJECTION, SOLUTION INTRAVENOUS at 19:10

## 2022-09-14 RX ADMIN — HYDROMORPHONE HYDROCHLORIDE 1 MG: 1 INJECTION, SOLUTION INTRAMUSCULAR; INTRAVENOUS; SUBCUTANEOUS at 06:27

## 2022-09-14 RX ADMIN — Medication 2 MG: at 14:34

## 2022-09-14 RX ADMIN — COLLAGENASE SANTYL: 250 OINTMENT TOPICAL at 10:05

## 2022-09-14 RX ADMIN — PIPERACILLIN AND TAZOBACTAM 3.38 G: 3; .375 INJECTION, POWDER, FOR SOLUTION INTRAVENOUS at 06:28

## 2022-09-14 RX ADMIN — HYDROMORPHONE HYDROCHLORIDE 1 MG: 1 INJECTION, SOLUTION INTRAMUSCULAR; INTRAVENOUS; SUBCUTANEOUS at 16:23

## 2022-09-14 RX ADMIN — Medication 2 MG: at 21:36

## 2022-09-14 RX ADMIN — EPOETIN ALFA-EPBX 10000 UNITS: 10000 INJECTION, SOLUTION INTRAVENOUS; SUBCUTANEOUS at 21:39

## 2022-09-14 RX ADMIN — Medication 1 CAPSULE: at 10:05

## 2022-09-14 RX ADMIN — SODIUM CHLORIDE, PRESERVATIVE FREE 40 MG: 5 INJECTION INTRAVENOUS at 10:05

## 2022-09-14 RX ADMIN — HYDROMORPHONE HYDROCHLORIDE 1 MG: 1 INJECTION, SOLUTION INTRAMUSCULAR; INTRAVENOUS; SUBCUTANEOUS at 22:01

## 2022-09-14 RX ADMIN — ACETAMINOPHEN 650 MG: 325 TABLET, FILM COATED ORAL at 13:15

## 2022-09-14 RX ADMIN — ONDANSETRON 4 MG: 2 INJECTION INTRAMUSCULAR; INTRAVENOUS at 16:23

## 2022-09-14 RX ADMIN — HYDROMORPHONE HYDROCHLORIDE 1 MG: 1 INJECTION, SOLUTION INTRAMUSCULAR; INTRAVENOUS; SUBCUTANEOUS at 01:10

## 2022-09-14 RX ADMIN — Medication 2 MG: at 18:18

## 2022-09-14 RX ADMIN — PIPERACILLIN AND TAZOBACTAM 3.38 G: 3; .375 INJECTION, POWDER, FOR SOLUTION INTRAVENOUS at 18:18

## 2022-09-14 RX ADMIN — HYDROMORPHONE HYDROCHLORIDE 1.5 MG: 2 INJECTION, SOLUTION INTRAMUSCULAR; INTRAVENOUS; SUBCUTANEOUS at 10:04

## 2022-09-14 RX ADMIN — VANCOMYCIN HYDROCHLORIDE 750 MG: 750 INJECTION, POWDER, LYOPHILIZED, FOR SOLUTION INTRAVENOUS at 16:23

## 2022-09-14 RX ADMIN — ONDANSETRON 4 MG: 2 INJECTION INTRAMUSCULAR; INTRAVENOUS at 10:09

## 2022-09-14 RX ADMIN — SODIUM CHLORIDE, PRESERVATIVE FREE 40 MG: 5 INJECTION INTRAVENOUS at 21:37

## 2022-09-14 RX ADMIN — HYDROMORPHONE HYDROCHLORIDE 1 MG: 1 INJECTION, SOLUTION INTRAMUSCULAR; INTRAVENOUS; SUBCUTANEOUS at 19:10

## 2022-09-14 NOTE — PROGRESS NOTES
Physical Therapy - defer  Chart reviewed in prep for therapy evaluation. Noted pt continues with increased pain now w/ necrotic stump, purulent drainage, sutures pulled. Discussed w/ RN. Will defer again today and follow.

## 2022-09-14 NOTE — PROGRESS NOTES
Colorectal Surgery Progress Note    I have reviewed the CT scan of the abdomen and pelvis. It does not reveal any pelvic abscess or definite fistula. I would like to perform an examination under anesthesia with possible debridement of the perineal wound tomorrow afternoon. I understand that the patient will be having a dialysis catheter placed tomorrow, so I will have to find out what the schedule is for that and see whether or not we can do something in the OR afterwards. I have discussed the procedure with the patient, and he is in favor of proceeding when possible.

## 2022-09-14 NOTE — PROGRESS NOTES
Progress Note    Patient: Randa Napier MRN: [id number]  SSN: [id number]    YOB: 1970  Age: 45 y.o. Sex: male      Admit Date: 2022    4 Days Post-Op    Procedure:  Procedure(s):  LAPAROSCOPY GENERAL DIAGNOSTIC, LAPAROSCOPIC  TAKEDOWN AND REMOVAL J-TUBE, LAPAROSCOPIC COLOSTOMY, I & D ABDOMINAL WALL    Subjective:     Patient still complains of some pain. Seen by colorectal surgery yesterday. On regular diet but does not like the food. Objective:     Visit Vitals  BP (!) 126/52   Pulse 73   Temp 100.4 °F (38 °C) (Oral)   Resp 18   Ht 5' 7\" (1.702 m)   Wt 79.6 kg (175 lb 7.8 oz)   SpO2 95%   BMI 27.49 kg/m²       Temp (24hrs), Av.1 °F (37.3 °C), Min:98.2 °F (36.8 °C), Max:100.4 °F (38 °C)      Physical Exam:    General alert no acute distress  Lungs clear  Heart regular rate rhythm  Abdomen soft appropriately tender mild tenderness left lower quadrant no rebound or guarding    Data Review: images and reports reviewed  CT- IMPRESSION  1. No focal intra-abdominal fluid collection to suggest abscess. 2. Free intraperitoneal gas consistent with recent intra-abdominal surgery. 3. Interval improvement in rectal thickening. Prior CT dated 2022  demonstrated a probable right posterior rectal wall defect which is no longer  visualized on today's examination. 4. Persistent, small right basilar gas and fluid containing pleural collection,  unchanged. Lab Review: All lab results for the last 24 hours reviewed.   Recent Results (from the past 24 hour(s))   GLUCOSE, POC    Collection Time: 22  4:20 PM   Result Value Ref Range    Glucose (POC) 85 65 - 117 mg/dL    Performed by Fausto Lockett, POC    Collection Time: 22  9:57 PM   Result Value Ref Range    Glucose (POC) 110 65 - 117 mg/dL    Performed by Kaylee ferguson RN    CBC WITH AUTOMATED DIFF    Collection Time: 22  1:06 AM   Result Value Ref Range    WBC 16.0 (H) 4.1 - 11.1 K/uL    RBC 2.39 (L) 4.10 - 5.70 M/uL    HGB 7.1 (L) 12.1 - 17.0 g/dL    HCT 22.6 (L) 36.6 - 50.3 %    MCV 94.6 80.0 - 99.0 FL    MCH 29.7 26.0 - 34.0 PG    MCHC 31.4 30.0 - 36.5 g/dL    RDW 18.6 (H) 11.5 - 14.5 %    PLATELET 157 966 - 748 K/uL    MPV 9.6 8.9 - 12.9 FL    NRBC 0.0 0  WBC    ABSOLUTE NRBC 0.00 0.00 - 0.01 K/uL    NEUTROPHILS 87 (H) 32 - 75 %    LYMPHOCYTES 7 (L) 12 - 49 %    MONOCYTES 4 (L) 5 - 13 %    EOSINOPHILS 1 0 - 7 %    BASOPHILS 0 0 - 1 %    IMMATURE GRANULOCYTES 1 (H) 0.0 - 0.5 %    ABS. NEUTROPHILS 14.1 (H) 1.8 - 8.0 K/UL    ABS. LYMPHOCYTES 1.0 0.8 - 3.5 K/UL    ABS. MONOCYTES 0.6 0.0 - 1.0 K/UL    ABS. EOSINOPHILS 0.2 0.0 - 0.4 K/UL    ABS. BASOPHILS 0.1 0.0 - 0.1 K/UL    ABS. IMM.  GRANS. 0.1 (H) 0.00 - 0.04 K/UL    DF AUTOMATED     METABOLIC PANEL, BASIC    Collection Time: 09/14/22  1:06 AM   Result Value Ref Range    Sodium 143 136 - 145 mmol/L    Potassium 3.3 (L) 3.5 - 5.1 mmol/L    Chloride 109 (H) 97 - 108 mmol/L    CO2 27 21 - 32 mmol/L    Anion gap 7 5 - 15 mmol/L    Glucose 106 (H) 65 - 100 mg/dL    BUN 20 6 - 20 MG/DL    Creatinine 2.75 (H) 0.70 - 1.30 MG/DL    BUN/Creatinine ratio 7 (L) 12 - 20      GFR est AA 30 (L) >60 ml/min/1.73m2    GFR est non-AA 24 (L) >60 ml/min/1.73m2    Calcium 7.2 (L) 8.5 - 10.1 MG/DL   MAGNESIUM    Collection Time: 09/14/22  1:06 AM   Result Value Ref Range    Magnesium 1.7 1.6 - 2.4 mg/dL   PHOSPHORUS    Collection Time: 09/14/22  1:06 AM   Result Value Ref Range    Phosphorus 1.9 (L) 2.6 - 4.7 MG/DL   VANCOMYCIN, RANDOM    Collection Time: 09/14/22  1:06 AM   Result Value Ref Range    Vancomycin, random 23.7 UG/ML   GLUCOSE, POC    Collection Time: 09/14/22  6:25 AM   Result Value Ref Range    Glucose (POC) 104 65 - 117 mg/dL    Performed by Yogi ferguson RN    GLUCOSE, POC    Collection Time: 09/14/22  1:07 PM   Result Value Ref Range    Glucose (POC) 96 65 - 117 mg/dL    Performed by Alis Figueroa (DOMINGA)        Assessment:     Hospital Problems  Date Reviewed: 9/10/2022            Codes Class Noted POA    Severe protein-calorie malnutrition ([facility] [address] ICD-10-CM: E43  ICD-9-CM: 981  9/13/2022 Yes        * (Principal) Intra-abdominal infection ICD-10-CM: B99.9  ICD-9-CM: 136.9  9/10/2022 Yes           Plan/Recommendations/Medical Decision Making:   Seems to slowly be improving. Appreciate colorectal input. Will add dietary supplements to his diet. Encourage p.o. intake. Should consider vascular consult if issues with the BKA stump.   Continue dressing changes to left upper quadrant incision  Wound ostomy care

## 2022-09-14 NOTE — PROGRESS NOTES
Occupational Therapy:     Chart reviewed in prep for therapy evaluation. Noted, pt continues with increased pain now w/ necrotic stump, purulent drainage, sutures pulled. Discussed with PT and RN. Will defer at this time and follow up as able and medically appropriate.      Thank you,   Mario Aguayo, OTD, OTR/L

## 2022-09-14 NOTE — PROGRESS NOTES
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 Resting in bed. BP stable. No cp, sob, n/v/d reported. For hemodialysis today    Current Medications: all current  Medications have been eviewed in EPIC  Review of Systems: Pertinent items are noted in HPI. Objective:  Vitals:    Vitals:    09/14/22 0410 09/14/22 0600 09/14/22 0647 09/14/22 0805   BP: (!) 143/71   117/71   Pulse: 71 75  76   Resp: 16   16   Temp: 98.9 °F (37.2 °C)   98.2 °F (36.8 °C)   SpO2: 97%   95%   Weight:   79.6 kg (175 lb 7.8 oz)    Height:         Intake and Output:  No intake/output data recorded. 09/12 1901 - 09/14 0700  In: 500 [P.O.:200; I.V.:300]  Out: 450 [Urine:200]    Physical Examination:  Pt intubated     no  General: NAD,Conversant   Neck:  Supple, no mass  Resp:  Lungs CTA B/L, no wheezing , normal respiratory effort  CV:  RRR,  no murmur or rub, no LE edema  GI:  Soft, NT, + Bowel sounds, + ostomy  Neurologic:  Non focal  Psych:             AAO x 3 appropriate affect  Skin:  No Rash  Access:           KRYS zambrano    []    High complexity decision making was performed  []    Patient is at high-risk of decompensation with multiple organ involvement    Lab Data Personally Reviewed: I have reviewed all the pertinent labs, microbiology data and radiology studies during assessment.     Recent Labs     09/14/22  0106 09/13/22  0528 09/12/22  0341    140 143   K 3.3* 2.9* 3.3*   * 107 110*   CO2 27 28 24   * 78 81   BUN 20 16 29*   CREA 2.75* 2.13* 3.15*   CA 7.2* 7.3* 7.3* MG 1.7 1.7 1.8   PHOS 1.9* 1.9* 3.6       Recent Labs     09/14/22  0106 09/13/22  0528 09/12/22  0341   WBC 16.0* 15.6* 15.8*   HGB 7.1* 7.3* 7.5*   HCT 22.6* 23.0* 23.9*    328 380       No results found for: SDES  Lab Results   Component Value Date/Time    Culture result: NO GROWTH 5 DAYS 09/09/2022 08:52 PM     Recent Results (from the past 24 hour(s))   GLUCOSE, POC    Collection Time: 09/13/22 11:06 AM   Result Value Ref Range    Glucose (POC) 76 65 - 117 mg/dL    Performed by Christina Wall, POC    Collection Time: 09/13/22  4:20 PM   Result Value Ref Range    Glucose (POC) 85 65 - 117 mg/dL    Performed by Justin Wasserman, POC    Collection Time: 09/13/22  9:57 PM   Result Value Ref Range    Glucose (POC) 110 65 - 117 mg/dL    Performed by Julio ferguson RN    CBC WITH AUTOMATED DIFF    Collection Time: 09/14/22  1:06 AM   Result Value Ref Range    WBC 16.0 (H) 4.1 - 11.1 K/uL    RBC 2.39 (L) 4.10 - 5.70 M/uL    HGB 7.1 (L) 12.1 - 17.0 g/dL    HCT 22.6 (L) 36.6 - 50.3 %    MCV 94.6 80.0 - 99.0 FL    MCH 29.7 26.0 - 34.0 PG    MCHC 31.4 30.0 - 36.5 g/dL    RDW 18.6 (H) 11.5 - 14.5 %    PLATELET 719 378 - 305 K/uL    MPV 9.6 8.9 - 12.9 FL    NRBC 0.0 0  WBC    ABSOLUTE NRBC 0.00 0.00 - 0.01 K/uL    NEUTROPHILS 87 (H) 32 - 75 %    LYMPHOCYTES 7 (L) 12 - 49 %    MONOCYTES 4 (L) 5 - 13 %    EOSINOPHILS 1 0 - 7 %    BASOPHILS 0 0 - 1 %    IMMATURE GRANULOCYTES 1 (H) 0.0 - 0.5 %    ABS. NEUTROPHILS 14.1 (H) 1.8 - 8.0 K/UL    ABS. LYMPHOCYTES 1.0 0.8 - 3.5 K/UL    ABS. MONOCYTES 0.6 0.0 - 1.0 K/UL    ABS. EOSINOPHILS 0.2 0.0 - 0.4 K/UL    ABS. BASOPHILS 0.1 0.0 - 0.1 K/UL    ABS. IMM.  GRANS. 0.1 (H) 0.00 - 0.04 K/UL    DF AUTOMATED     METABOLIC PANEL, BASIC    Collection Time: 09/14/22  1:06 AM   Result Value Ref Range    Sodium 143 136 - 145 mmol/L    Potassium 3.3 (L) 3.5 - 5.1 mmol/L    Chloride 109 (H) 97 - 108 mmol/L    CO2 27 21 - 32 mmol/L    Anion gap 7 5 - 15 mmol/L    Glucose 106 (H) 65 - 100 mg/dL    BUN 20 6 - 20 MG/DL    Creatinine 2.75 (H) 0.70 - 1.30 MG/DL    BUN/Creatinine ratio 7 (L) 12 - 20      GFR est AA 30 (L) >60 ml/min/1.73m2    GFR est non-AA 24 (L) >60 ml/min/1.73m2    Calcium 7.2 (L) 8.5 - 10.1 MG/DL   MAGNESIUM    Collection Time: 09/14/22  1:06 AM   Result Value Ref Range    Magnesium 1.7 1.6 - 2.4 mg/dL   PHOSPHORUS    Collection Time: 09/14/22  1:06 AM   Result Value Ref Range    Phosphorus 1.9 (L) 2.6 - 4.7 MG/DL   VANCOMYCIN, RANDOM    Collection Time: 09/14/22  1:06 AM   Result Value Ref Range    Vancomycin, random 23.7 UG/ML   GLUCOSE, POC    Collection Time: 09/14/22  6:25 AM   Result Value Ref Range    Glucose (POC) 104 65 - 117 mg/dL    Performed by Maria De Jesus Fajardo MD  [address]  Phone - [phone]   Fax - [phone]  www. [facility]

## 2022-09-14 NOTE — ROUTINE PROCESS
Patient has had large amounts of mucous leaking from the wounds near his rectum. He complains of pain and burning with the discharge. The patient also has peed only one time today. The urine looked tan/brown and was thick and very cloudy. While changing the dressing on his left leg stump I noted that there was a good amount of purulent drainage. The incision is not intact. The sutures are pulled. There is black necrotic tissue at the site and around the lateral side of the leg further up. The stump also feels boggy. I cleaned it and changed the dressing.

## 2022-09-14 NOTE — CONSULTS
----- Message from George Goodrich MD sent at 5/11/2021 12:07 PM CDT -----  Recall colonoscopy in 5 years due to family history of colon cancer     Palliative Medicine Consult  Christofer: [phone] ([phone])    Patient Name: Micheal Dennis  YOB: 1970    Date of Initial Consult: 9/14/22  Reason for Consult: Other  Requesting Provider: Romie House    Primary Care Physician: Erin, MD Kenroy     SUMMARY:   Micheal Dennis is a 45 y.o. with a past history of DM, peripheral artery disease w/ b/l BKA who was admitted on 9/9/2022 from [facility] with abdominal pain. Had recent prolonged hospital stay at [facility] (only at [facility] for several days) where he had LLE amputation, complicated by resp failure, aspiration PNA, effusion that required decortication and R thoracotomy and prolonged intubation. Also w/ JEMIMA and started on dialysis, J tube placed for TFs. Concern for bowel perforation- taken to surgery and found to have abdominal wall abscess from leaking J tube and perirectal fistula w/p laparoscopy colostomy, removal of J tube and I&D of abdominal wall. On IV abx , other issues incl sacral decub and skin breakdown at [address] sites. Current medical issues leading to Palliative Medicine involvement include: overwhelming sx. Pain control has been difficult. No hx of chronic pain or opioid use-  reviewed. Social:  to Jamil- prior to June 2022 was able to do all ADLs. They have 1 daughter and 1 grandchild. PALLIATIVE DIAGNOSES:   Acute abdominal pain s/p surgery for abscess and colectomy - slowly improving  Loose stool w/ abx   Rectal pain - may require anorectal exam under anesthesia  Debility/generalized weakness after prolonged hospital stay       PLAN:   Met w/ pt, consulted for acute pain in patient with complex hospital course. On dialysis. Pt has not had chronic pain issues, no hx of chronic opioid use. Pain started while in [facility] when had mult procedures and prolonged ICU stay.  Uncertain what medication he was taking for pain there, but states at each hospital and [facility] his pain escalates quickly sabrina when he calls out for his medications and there is a delay in getting them. Pt currently on Dilaudid 0.5-1mg IV every 2h prn and has received 7mg IV Dilaudid in past 24h w/out any sedation or confusion. States that it helps the pain as long as he stays on top of the dosing. Discuss side effects of opioids including sedation, confusion, dropping RR and BP and that we have to be safe about using them- especially now as he is on hemodialysis. Now that pt cleared for diet, discuss po pain medications as they last longer. Can schedule them to ensure they are on time. Will start at a low dose, realizing that it may not be enough to cover pain and can increase tmrw. Do not see role in long acting pain medication as acute pain has already started to improve since surgery. Acute pain:   Schedule Dilaudid liquid 2mg po every 4h while awake. Hold for sedation, confusion, RR <12, SBP >95 and patient may refuse. May give along w/ IV Dilaudid as long as doses are >30 min apart. If tolerates likely to increase to 4mg tmrw. Cont current dose of Dilaudid 0.5-1mg IV every 2h prn. No bowel regimen while on abx- already w/ loose stool. Monitor for constipation. As pt continues to heal, opioids will need to be tapered and pt aware he is not going to need these long term.    Initial consult note routed to primary continuity provider and/or primary health care team members  Communicated plan of care with: Palliative Camilo  Team incl Dr Kath Pantoja / TREATMENT PREFERENCES:     GOALS OF CARE:  Patient/Health Care Proxy Stated Goals: Prolong life    TREATMENT PREFERENCES:   Code Status: Full Code    Patient and family's personal goals include: recover       Primary Decision MakerElrenée Katz - Spouse - [phone]    Advance Care Planning:  [x] The [facility] Interdisciplinary Team has updated the ACP Navigator with [address] and Patient Capacity      Advance Care Planning 7/2/2018   Patient's Healthcare Decision Maker is: Legal Next of Kin   Primary Decision Maker Name Ganga Joyce   Primary Decision Maker Phone Number [phone]   Primary Decision Maker Relationship to Patient Spouse   Confirm Advance Directive None   Patient Would Like to Complete Advance Directive No       Medical Interventions: Full interventions       Other:    As far as possible, the palliative care team has discussed with patient / health care proxy about goals of care / treatment preferences for patient. HISTORY:     History obtained from: Pt, chart, staff    CHIEF COMPLAINT: pain     HPI/SUBJECTIVE:    The patient is:   [x] Verbal and participatory  [] Non-participatory due to:     Pt currently getting dialysis, see below for pain hx. Started prior to admission and since surgery slowly improving. Denies side effects from pain medications. +stool in ostomy bag.      Clinical Pain Assessment (nonverbal scale for severity on nonverbal patients):   Clinical Pain Assessment  Severity: 8  Location: abdomen, rectum  Character: sharp, stabbing  Duration: a few weeks  Effect: harder to sleep and move  Factors: better w/ medications, worse w/ exam  Frequency: constant     Activity (Movement): Lying quietly, normal position    Duration: for how long has pt been experiencing pain (e.g., 2 days, 1 month, years)  Frequency: how often pain is an issue (e.g., several times per day, once every few days, constant)     FUNCTIONAL ASSESSMENT:     Palliative Performance Scale (PPS):  PPS: 50       PSYCHOSOCIAL/SPIRITUAL SCREENING:     Palliative IDT has assessed this patient for cultural preferences / practices and a referral made as appropriate to needs (Cultural Services, Patient Advocacy, Ethics, etc.)    Any spiritual / Methodist concerns:  [] Yes /  [x] No   If \"Yes\" to discuss with pastoral care during IDT     Does caregiver feel burdened by caring for their loved one:   [] Yes /  [x] No /  [] No Caregiver Present/Available [] No Caregiver [] Pt [facility]  If \"Yes\" to discuss with social work during IDT    Anticipatory grief assessment:   [x] Normal  / [] Maladaptive     If \"Maladaptive\" to discuss with social work during IDT    ESAS Anxiety: Anxiety: 0    ESAS Depression: Depression: 0        REVIEW OF SYSTEMS:     Positive and pertinent negative findings in ROS are noted above in HPI. The following systems were [x] reviewed / [] unable to be reviewed as noted in HPI  Other findings are noted below. Systems: constitutional, ears/nose/mouth/throat, respiratory, gastrointestinal, genitourinary, musculoskeletal, integumentary, neurologic, psychiatric, endocrine. Positive findings noted below. Modified ESAS Completed by: provider   Fatigue: 5 Drowsiness: 0   Depression: 0 Pain: 8   Anxiety: 0 Nausea: 0   Anorexia: 7 Dyspnea: 0     Constipation: No     Stool Occurrence(s): 1        PHYSICAL EXAM:     From RN flowsheet:  Wt Readings from Last 3 Encounters:   09/14/22 175 lb 7.8 oz (79.6 kg)   07/02/18 229 lb 4.5 oz (104 kg)     Blood pressure (!) 136/43, pulse 70, temperature 100.4 °F (38 °C), temperature source Oral, resp. rate 18, height 5' 7\" (1.702 m), weight 175 lb 7.8 oz (79.6 kg), SpO2 95 %.     Pain Scale 1: Numeric (0 - 10)  Pain Intensity 1: 8  Pain Onset 1: post op  Pain Location 1: Abdomen, Rectal  Pain Orientation 1:  (general)  Pain Description 1: Aching, Sore  Pain Intervention(s) 1: Medication (see MAR)  Last bowel movement, if known: today    Constitutional: awake, alert, oriented, no sedation or confusion   Eyes: pupils equal, anicteric  ENMT: no nasal discharge, moist mucous membranes  Cardiovascular: regular rhythm  Respiratory: breathing not labored, symmetric  Gastrointestinal: soft, non distended, ostomy bag in place w/ loose green stool, incisions bandaged, TTP diffusely   Musculoskeletal: b/l BKA, LLE w/ bandage  Skin: warm, dry  Neurologic: following commands, moving all extremities  Psychiatric: full affect, no hallucinations  :       HISTORY:     Principal Problem:    Intra-abdominal infection (9/10/2022)    Active Problems:    Severe protein-calorie malnutrition ([facility] (9/13/2022)    Past Medical History:   Diagnosis Date    Diabetes ([facility]       History reviewed. No pertinent surgical history. History reviewed. No pertinent family history. History reviewed, no pertinent family history.   Social History     Tobacco Use    Smoking status: Never    Smokeless tobacco: Never   Substance Use Topics    Alcohol use: No     Allergies   Allergen Reactions    Bee Sting [Sting, Bee] Unknown (comments)      Current Facility-Administered Medications   Medication Dose Route Frequency    HYDROmorphone (DILAUDID) 1 mg/mL oral solution 2 mg  2 mg Oral Q4HWA    epoetin vera-epbx (RETACRIT) injection 10,000 Units  10,000 Units SubCUTAneous DIALYSIS MON, WED & FRI    piperacillin-tazobactam (ZOSYN) 3.375 g in 0.9% sodium chloride (MBP/ADV) 100 mL MBP  3.375 g IntraVENous Q12H    HYDROmorphone (DILAUDID) injection 0.5-1 mg  0.5-1 mg IntraVENous Q2H PRN    collagenase (SANTYL) 250 unit/gram ointment   Topical DAILY    0.9% sodium chloride infusion 250 mL  250 mL IntraVENous PRN    glucose chewable tablet 16 g  4 Tablet Oral PRN    glucagon (GLUCAGEN) injection 1 mg  1 mg IntraMUSCular PRN    dextrose 10 % infusion 0-250 mL  0-250 mL IntraVENous PRN    ondansetron (ZOFRAN) injection 4 mg  4 mg IntraVENous Q4H PRN    diphenhydrAMINE (BENADRYL) injection 12.5 mg  12.5 mg IntraVENous Q6H PRN    sodium chloride (NS) flush 5-40 mL  5-40 mL IntraVENous Q8H    sodium chloride (NS) flush 5-40 mL  5-40 mL IntraVENous PRN    acetaminophen (TYLENOL) tablet 650 mg  650 mg Oral Q6H PRN    Or    acetaminophen (TYLENOL) suppository 650 mg  650 mg Rectal Q6H PRN    polyethylene glycol (MIRALAX) packet 17 g  17 g Oral DAILY PRN    ondansetron (ZOFRAN ODT) tablet 4 mg  4 mg Oral Q8H PRN    Or    ondansetron (ZOFRAN) injection 4 mg  4 mg IntraVENous Q6H PRN L.acidophilus-paracasei-S.thermophil-bifidobacter (RISAQUAD) 8 billion cell capsule  1 Capsule Oral DAILY    insulin lispro (HUMALOG) injection   SubCUTAneous AC&HS    glucose chewable tablet 16 g  4 Tablet Oral PRN    glucagon (GLUCAGEN) injection 1 mg  1 mg IntraMUSCular PRN    pantoprazole (PROTONIX) 40 mg in 0.9% sodium chloride 10 mL injection  40 mg IntraVENous Q12H    dextrose 10 % infusion 0-250 mL  0-250 mL IntraVENous PRN    Vancomycin - pharmacy to dose   Other Rx Dosing/Monitoring    0.9% sodium chloride infusion 250 mL  250 mL IntraVENous PRN          LAB AND IMAGING FINDINGS:     Lab Results   Component Value Date/Time    WBC 16.0 (H) 09/14/2022 01:06 AM    HGB 7.1 (L) 09/14/2022 01:06 AM    PLATELET 835 20/64/0816 01:06 AM     Lab Results   Component Value Date/Time    Sodium 143 09/14/2022 01:06 AM    Potassium 3.3 (L) 09/14/2022 01:06 AM    Chloride 109 (H) 09/14/2022 01:06 AM    CO2 27 09/14/2022 01:06 AM    BUN 20 09/14/2022 01:06 AM    Creatinine 2.75 (H) 09/14/2022 01:06 AM    Calcium 7.2 (L) 09/14/2022 01:06 AM    Magnesium 1.7 09/14/2022 01:06 AM    Phosphorus 1.9 (L) 09/14/2022 01:06 AM      Lab Results   Component Value Date/Time    Alk.  phosphatase 86 09/10/2022 12:00 PM    Protein, total 5.8 (L) 09/10/2022 12:00 PM    Albumin 1.7 (L) 09/10/2022 12:00 PM    Globulin 4.1 (H) 09/10/2022 12:00 PM     No results found for: INR, PTMR, PTP, PT1, PT2, APTT, INREXT, INREXT   Lab Results   Component Value Date/Time    Iron 12 (L) 09/10/2022 12:00 PM    Iron 12 (L) 09/10/2022 12:00 PM    TIBC 141 (L) 09/10/2022 12:00 PM    Iron % saturation 9 (L) 09/10/2022 12:00 PM    Ferritin 715 (H) 09/10/2022 12:00 PM      No results found for: PH, PCO2, PO2  No components found for: GLPOC   No results found for: CPK, CKMB             Total time:   Counseling / coordination time, spent as noted above:   > 50% counseling / coordination?:     Prolonged service was provided for  []30 min   []75 min in face to face time in the presence of the patient, spent as noted above. Time Start:   Time End:   Note: this can only be billed with 61729 (initial) or 07005 (follow up). If multiple start / stop times, list each separately.

## 2022-09-14 NOTE — PROGRESS NOTES
Spiritual Care Assessment/Progress Note  [address]. [address]      NAME: Melanie Pavon      MRN: [id number]  AGE: 45 y.o.  SEX: male  Sikh Affiliation: No preference   Language: English     9/14/2022     Total Time (in minutes): 40     Spiritual Assessment begun in [address] [facility] [address] through conversation with:         [x]Patient        [] Family    [] Friend(s)        Reason for Consult: Palliative Care, Initial/Spiritual Assessment     Spiritual beliefs: (Please include comment if needed)     [x] Identifies with a azul tradition:    Zoroastrianism/Jehovah's witness      [] Supported by a azul community:       none      [] Claims no spiritual orientation:           [] Seeking spiritual identity:                [] Adheres to an individual form of spirituality:           [] Not able to assess:                           Identified resources for coping:      [x] Prayer                               [] Music                  [] Guided Imagery     [x] Family/friends                 [] Pet visits     [] Devotional reading                         [] Unknown     [] Other:                                               Interventions offered during this visit: (See comments for more details)    Patient Interventions: Affirmation of emotions/emotional suffering, Affirmation of azul, Buddhist/blessing, Catharsis/review of pertinent events in supportive environment, Coping skills reviewed/reinforced, Initial/Spiritual assessment, patient floor, Life review/legacy, Normalization of emotional/spiritual concerns, Prayer (assurance of), Prayer (actual), Sikh beliefs/image of God discussed           Plan of Care:     [] Support spiritual and/or cultural needs    [] Support AMD and/or advance care planning process      [] Support grieving process   [] Coordinate Rites and/or Rituals    [] Coordination with community clergy   [] No spiritual needs identified at this time   [] Detailed Plan of Care below (See Comments)  [] Make referral to Music Therapy  [] Make referral to Pet Therapy     [] Make referral to Addiction services  [] Make referral to [facility]  [] Make referral to Spiritual Care Partner  [] No future visits requested        [x] Contact Spiritual Care for further referrals     Comments:   Spiritual care assessment with Mr. Kayla Pelletier on 4R-485 Telemetry unit. Mr Gopi Montejo is lying in bed in discomfort and pain he said. He is welcoming to [employer] and engages in much life review. He is  with 1 grown daughter and 1 grandchild. He said he is Levels, only attending Buddhist occasionally, yet he shared that his azul is very important to him. That if it were not for his azul and family, he would not want to live. He actively pursues nourishing his azul by [address] [facility] and catching a Buddhist service on TV. He also expressed that his wife will be bringing in his laptop so he can listen to  more azul building programing through platforms such as [employer], and others. He expressed feeling that once getting the pain under control, he feels hopeful to recover to find a normal life without his feet that have been amputated. He feels like he has the support from family and friends. He asked for prayer and welcomes future visits.  listened empathetically with a caring, supportive presence and prayed for him. He expressed much gratitude.      JACKY Cha.Div.  [phone]

## 2022-09-14 NOTE — CONSULTS
Colorectal Surgery Consultation Note          NAME:  Pily Dubois   :   1970   MRN:   [id number]     Referring Physician:  Francheska Davis MD    Consultation Date: 2022    I was asked to evaluate this patient for a possible rectal fistula. I reviewed what appeared to be the patient's pertinent recent medical history and interviewed him. From what I can tell, he had a fecal management system in use at another facility and he might have sustained an iatrogenic proctologic injury. He is difficult to examine because of pain, and the last CT scan of the abdomen and pelvis was performed elsewhere prior to his admission here.       Hospital Medications:  Current Facility-Administered Medications   Medication Dose Route Frequency    [START ON 2022] vancomycin random level with am labs on  @ 04:00   Other ONCE    epoetin vera-epbx (RETACRIT) injection 10,000 Units  10,000 Units SubCUTAneous DIALYSIS MON, WED & FRI    piperacillin-tazobactam (ZOSYN) 3.375 g in 0.9% sodium chloride (MBP/ADV) 100 mL MBP  3.375 g IntraVENous Q12H    HYDROmorphone (DILAUDID) injection 0.5-1 mg  0.5-1 mg IntraVENous Q2H PRN    collagenase (SANTYL) 250 unit/gram ointment   Topical DAILY    0.9% sodium chloride infusion 250 mL  250 mL IntraVENous PRN    glucose chewable tablet 16 g  4 Tablet Oral PRN    glucagon (GLUCAGEN) injection 1 mg  1 mg IntraMUSCular PRN    dextrose 10 % infusion 0-250 mL  0-250 mL IntraVENous PRN    ondansetron (ZOFRAN) injection 4 mg  4 mg IntraVENous Q4H PRN    diphenhydrAMINE (BENADRYL) injection 12.5 mg  12.5 mg IntraVENous Q6H PRN    sodium chloride (NS) flush 5-40 mL  5-40 mL IntraVENous Q8H    sodium chloride (NS) flush 5-40 mL  5-40 mL IntraVENous PRN    acetaminophen (TYLENOL) tablet 650 mg  650 mg Oral Q6H PRN    Or    acetaminophen (TYLENOL) suppository 650 mg  650 mg Rectal Q6H PRN    polyethylene glycol (MIRALAX) packet 17 g  17 g Oral DAILY PRN    ondansetron (ZOFRAN ODT) tablet 4 mg  4 mg Oral Q8H PRN    Or    ondansetron (ZOFRAN) injection 4 mg  4 mg IntraVENous Q6H PRN    L.acidophilus-paracasei-S.thermophil-bifidobacter (RISAQUAD) 8 billion cell capsule  1 Capsule Oral DAILY    insulin lispro (HUMALOG) injection   SubCUTAneous AC&HS    glucose chewable tablet 16 g  4 Tablet Oral PRN    glucagon (GLUCAGEN) injection 1 mg  1 mg IntraMUSCular PRN    pantoprazole (PROTONIX) 40 mg in 0.9% sodium chloride 10 mL injection  40 mg IntraVENous Q12H    dextrose 10 % infusion 0-250 mL  0-250 mL IntraVENous PRN    Vancomycin - pharmacy to dose   Other Rx Dosing/Monitoring    0.9% sodium chloride infusion 250 mL  250 mL IntraVENous PRN       Allergies: Allergies   Allergen Reactions    Bee Sting [Sting, Bee] Unknown (comments)       Family History:  History reviewed. No pertinent family history. Social History:  Social History     Tobacco Use    Smoking status: Never    Smokeless tobacco: Never   Substance Use Topics    Alcohol use: No           Objective:   Patient Vitals for the past 8 hrs:   BP Temp Pulse Resp SpO2   09/13/22 1813 -- -- 84 -- --   09/13/22 1542 123/65 99 °F (37.2 °C) 81 16 96 %   09/13/22 1416 -- -- 82 -- --     No intake/output data recorded. 09/12 0701 - 09/13 1900  In: 200 [P.O.:200]  Out: 1200 [Urine:200]       Data Review     Recent Labs     09/13/22  0528 09/12/22  0341   WBC 15.6* 15.8*   HGB 7.3* 7.5*   HCT 23.0* 23.9*    380     Recent Labs     09/13/22  0528 09/12/22  0341    143   K 2.9* 3.3*    110*   CO2 28 24   BUN 16 29*   CREA 2.13* 3.15*   GLU 78 81   PHOS 1.9* 3.6   CA 7.3* 7.3*                         Assessment and Plan:      I will order a CT scan of the abdomen and pelvis and then see the patient again after those results are available. He might need an anorectal examination under anesthesia during this hospitalization. For now, local perineal/wound care should be continued.     Principal Problem:    Intra-abdominal infection (9/10/2022)    Active Problems:    Severe protein-calorie malnutrition ([facility] (9/13/2022)

## 2022-09-14 NOTE — DIALYSIS
Hemodialysis / [phone]    Vitals Pre Post Assessment Pre Post   BP BP: 121/68 (09/14/22 1240) 138/52 LOC AxO X4 AxO X4   HR Pulse (Heart Rate): 76 (09/14/22 1240) 75 Lungs Unlabored, RA Unlabored, RA   Resp Resp Rate: 18 (09/14/22 1240) 18 Cardiac RRR RRR   Temp Temp: 100.4 °F (38 °C) (09/14/22 1240) 99.3 Skin Warm, dry Warm, dry   Weight  N/A N/A Edema None None   Tele status Bedside Tele Bedside Tele Pain Pain Intensity 1: 8 (09/13/22 1815) 8     Orders   Duration: Start: 1240 End: 1540 Total: 3 hrs   Dialyzer: Dialyzer/Set Up Inspection: Revaclear (09/14/22 1240)   K Bath: Dialysate K (mEq/L): 4 (09/14/22 1240)   Ca Bath: Dialysate CA (mEq/L): 2.5 (09/14/22 1240)   Na: Dialysate NA (mEq/L): 138 (09/14/22 1240)   Bicarb: Dialysate HCO3 (mEq/L): 35 (09/14/22 1240)   Target Fluid Removal: Goal/Amount of Fluid to Remove (mL): 1000 mL (09/14/22 1240)     Access   Type & Location: RIJ temporary CVC   Comments: Dressing CDI dated 9/7/22.  Each catheter limb disinfected per p&p, caps removed, hubs disinfected per p&p. +aspiration/+flush x2 ports                                       Labs   HBsAg (Antigen) / date: Negative 9/12/22                                              HBsAb (Antibody) / date: Susceptible 9/12/22   Source: [facility] Care   Obtained/Reviewed  Critical Results Called HGB   Date Value Ref Range Status   09/14/2022 7.1 (L) 12.1 - 17.0 g/dL Final     Potassium   Date Value Ref Range Status   09/14/2022 3.3 (L) 3.5 - 5.1 mmol/L Final     Calcium   Date Value Ref Range Status   09/14/2022 7.2 (L) 8.5 - 10.1 MG/DL Final     BUN   Date Value Ref Range Status   09/14/2022 20 6 - 20 MG/DL Final     Creatinine   Date Value Ref Range Status   09/14/2022 2.75 (H) 0.70 - 1.30 MG/DL Final        Meds Given   Name Dose Route                    Adequacy / Fluid    Total Liters Process: 63.0 L   Net Fluid Removed: 1000 ml      Comments   Time Out Done:   (Time) 1200   Admitting Diagnosis: sepsis   Consent obtained/signed: Informed Consent Verified: Yes (09/14/22 1240)   Machine / RO # Machine Number: [id number] (09/14/22 1240)   Primary Nurse Rpt Pre: Jose Ramon Felipe RN   Primary Nurse Rpt Post: Jose Ramon Felipe RN   Pt Education: CVC infection prevention & control. Care Plan: Continue current hemodialysis plan of care. Pts outpatient clinic: N/A     Tx Summary   Comments: 6355 Hemodialysis initiated. 1540 Hemodialysis completed with no issues . Each dialysis catheter limb disinfected per p&p, blood returned per p&p, each dialysis hub disinfected per p&p, post dialysis catheter dwell instilled per order, and caps applied. CVC dressing changed per policy & procedure, CDI dated 9/14/22. SBAR report given to primary RN.

## 2022-09-14 NOTE — PROGRESS NOTES
[facility] [facility]  Hospitalist Group                                                                                          Hospitalist Progress Note  Aneta Thompson MD  Answering service: [phone] from in house phone        Date of Service:  2022  NAME:  Colletta Clamp  :  1970  MRN:  [id number]      Admission Summary: This is a 74-year-old man with a past medical history significant for type 2 diabetes, who presented at the emergency room from [facility] with abdominal pain. The patient was recently admitted to another hospital and underwent left below-knee amputation. The hospitalization became complicated with respiratory failure which required prolonged intubation. The respiratory failure was attributed to aspiration pneumonia. The hospitalization was also complicated with lobulated effusion, for which the patient underwent decortication and right thoracotomy. The patient also went into acute renal failure for which he has been started on hemodialysis. J-tube was placed for supplemental nutrition. The patient was subsequently transferred to [facility] for continuation of care. He was doing relatively well in [facility] until the day of presentation at the emergency room when the patient complained of abdominal pain at the facility. CT scan of the abdomen and pelvis was obtained. The CT scan shows evidence of bowel perforation. The patient was then sent to [facility] emergency room for further evaluation. When the patient arrived at the emergency room, based on his clinical presentation and lab work, Code Sepsis was triggered. The patient received fluid therapy as per sepsis protocol. The emergency room physician consulted general surgeon on-call. The patient was seen by the surgeon and the patient is planned for immediate surgical intervention.   No record of prior admission to this hospital, but the patient was recently admitted to another hospital.    Interval history / Subjective: Follow up Intraperitoneal perforation  S/p surgery 9/10  Getting HD  Still requiring IV pain meds  Comfortably laying in bed     Assessment & Plan:     Intractable abdominal pain  Pneumoperitoneum  Abdominal wall abscess  Severe sepsis  -s/p 9/10 LAPAROSCOPY GENERAL DIAGNOSTIC, LAPAROSCOPIC  TAKEDOWN AND REMOVAL J-TUBE, LAPAROSCOPIC COLOSTOMY, I & D ABDOMINAL WALL   -no fever, leukocytosis improving  -Cultures no growth   -Pain control, will consider switching to oral in a day or two  -Continue zosyn/Vanc  -Appreciate Palliative care input for pain control  -Appreciate discussion with Surgery  -Awaiting ID    Possible empyema  -H/o recent thoracotomy and prolonged intubation at [facility]  -Continue antibiotics  -Appreciate pulmonology recommendations. Continue antibiotics. We will follow with chest x-rays. Rectal fistula:   -repeat CT abd 9/14 improvement in rectal thickening  -likely would need outpatient follow up  -Appreciate CRS    B/L AKA with ?necrosis/discharge, will consult vascular  FOBT positive Protonix BID  JEMIMA now on HD, appreciate discussion with Nephrology, not yet ESRD but permacath tomorrow  Hypokalemia: replace as needed  Acute blood loss Anemia: s/p 1 unit PRBC 9/10. Transfuse for hb<7  HTN: holding antihypertensives for now  DM2:SSI, monitor  Diarrhea: C.  Diff negative    Now on regular diet     Code status: full  Prophylaxis: hold, SCDs  PTA: Vibra    Plan: Continue IV antibiotics, follow ID rec. permacath tomorrow,  discharge to [facility] when medically stable  Care Plan discussed with: patient, family, nurse  Anticipated Disposition: TBD     Hospital Problems  Date Reviewed: 9/10/2022            Codes Class Noted POA    Severe protein-calorie malnutrition ([facility] [address] ICD-10-CM: V99  ICD-9-CM: 262  9/13/2022 Yes        * (Principal) Intra-abdominal infection ICD-10-CM: B99.9  ICD-9-CM: 136.9  9/10/2022 Yes       Review of Systems:   A comprehensive review of systems was negative except for that written in the HPI. Vital Signs:    Last 24hrs VS reviewed since prior progress note. Most recent are:  Visit Vitals  BP (!) 160/60   Pulse 66   Temp 100.4 °F (38 °C) (Oral)   Resp 18   Ht 5' 7\" (1.702 m)   Wt 79.6 kg (175 lb 7.8 oz)   SpO2 95%   BMI 27.49 kg/m²         Intake/Output Summary (Last 24 hours) at 9/14/2022 1506  Last data filed at 9/14/2022 0227  Gross per 24 hour   Intake 300 ml   Output 250 ml   Net 50 ml          Physical Examination:     I had a face to face encounter with this patient and independently examined them on 9/14/2022 as outlined below:          Constitutional:  No acute distress,   ENT:  Oral mucosa moist, oropharynx benign. Resp:  CTA bilaterally. No wheezing/rhonchi/rales. No accessory muscle use. CV:  Regular rhythm, normal rate, no murmurs, gallops, rubs    GI:  Colostomy. Painful to palpation. Musculoskeletal:  No edema, warm, 2+ pulses throughout    Neurologic:  Moves all extremities. AAOx3, CN II-XII reviewed            Data Review:    Review and/or order of clinical lab test  Review and/or order of tests in the radiology section of CPT  Review and/or order of tests in the medicine section of CPT      Labs:     Recent Labs     09/14/22 0106 09/13/22  0528   WBC 16.0* 15.6*   HGB 7.1* 7.3*   HCT 22.6* 23.0*    328       Recent Labs     09/14/22 0106 09/13/22  0528 09/12/22  0341    140 143   K 3.3* 2.9* 3.3*   * 107 110*   CO2 27 28 24   BUN 20 16 29*   CREA 2.75* 2.13* 3.15*   * 78 81   CA 7.2* 7.3* 7.3*   MG 1.7 1.7 1.8   PHOS 1.9* 1.9* 3.6       No results for input(s): ALT, AP, TBIL, TBILI, TP, ALB, GLOB, GGT, AML, LPSE in the last 72 hours. No lab exists for component: SGOT, GPT, AMYP, HLPSE    No results for input(s): INR, PTP, APTT, INREXT, INREXT in the last 72 hours. No results for input(s): FE, TIBC, PSAT, FERR in the last 72 hours.      Lab Results   Component Value Date/Time    Folate 4.1 (L) 09/10/2022 12:00 PM        No results for input(s): PH, PCO2, PO2 in the last 72 hours. No results for input(s): CPK, CKNDX, TROIQ in the last 72 hours.     No lab exists for component: CPKMB  No results found for: CHOL, CHOLX, CHLST, CHOLV, HDL, HDLP, LDL, LDLC, DLDLP, TGLX, TRIGL, TRIGP, CHHD, CHHDX  Lab Results   Component Value Date/Time    Glucose (POC) 96 09/14/2022 01:07 PM    Glucose (POC) 104 09/14/2022 06:25 AM    Glucose (POC) 110 09/13/2022 09:57 PM    Glucose (POC) 85 09/13/2022 04:20 PM    Glucose (POC) 76 09/13/2022 11:06 AM     No results found for: COLOR, APPRN, SPGRU, REFSG, WILL, PROTU, GLUCU, KETU, BILU, UROU, MISHA, LEUKU, GLUKE, EPSU, BACTU, WBCU, RBCU, CASTS, UCRY      Medications Reviewed:     Current Facility-Administered Medications   Medication Dose Route Frequency    HYDROmorphone (DILAUDID) 1 mg/mL oral solution 2 mg  2 mg Oral Q4HWA    epoetin vera-epbx (RETACRIT) injection 10,000 Units  10,000 Units SubCUTAneous DIALYSIS MON, WED & FRI    piperacillin-tazobactam (ZOSYN) 3.375 g in 0.9% sodium chloride (MBP/ADV) 100 mL MBP  3.375 g IntraVENous Q12H    HYDROmorphone (DILAUDID) injection 0.5-1 mg  0.5-1 mg IntraVENous Q2H PRN    collagenase (SANTYL) 250 unit/gram ointment   Topical DAILY    0.9% sodium chloride infusion 250 mL  250 mL IntraVENous PRN    glucose chewable tablet 16 g  4 Tablet Oral PRN    glucagon (GLUCAGEN) injection 1 mg  1 mg IntraMUSCular PRN    dextrose 10 % infusion 0-250 mL  0-250 mL IntraVENous PRN    ondansetron (ZOFRAN) injection 4 mg  4 mg IntraVENous Q4H PRN    diphenhydrAMINE (BENADRYL) injection 12.5 mg  12.5 mg IntraVENous Q6H PRN    sodium chloride (NS) flush 5-40 mL  5-40 mL IntraVENous Q8H    sodium chloride (NS) flush 5-40 mL  5-40 mL IntraVENous PRN    acetaminophen (TYLENOL) tablet 650 mg  650 mg Oral Q6H PRN    Or    acetaminophen (TYLENOL) suppository 650 mg  650 mg Rectal Q6H PRN    polyethylene glycol (MIRALAX) packet 17 g  17 g Oral DAILY PRN    ondansetron (ZOFRAN ODT) tablet 4 mg  4 mg Oral Q8H PRN    Or    ondansetron (ZOFRAN) injection 4 mg  4 mg IntraVENous Q6H PRN    L.acidophilus-paracasei-S.thermophil-bifidobacter (RISAQUAD) 8 billion cell capsule  1 Capsule Oral DAILY    insulin lispro (HUMALOG) injection   SubCUTAneous AC&HS    glucose chewable tablet 16 g  4 Tablet Oral PRN    glucagon (GLUCAGEN) injection 1 mg  1 mg IntraMUSCular PRN    pantoprazole (PROTONIX) 40 mg in 0.9% sodium chloride 10 mL injection  40 mg IntraVENous Q12H    dextrose 10 % infusion 0-250 mL  0-250 mL IntraVENous PRN    Vancomycin - pharmacy to dose   Other Rx Dosing/Monitoring    0.9% sodium chloride infusion 250 mL  250 mL IntraVENous PRN     ______________________________________________________________________  EXPECTED LENGTH OF STAY: 9d 14h  ACTUAL LENGTH OF STAY:          Ramy Root MD

## 2022-09-14 NOTE — PROGRESS NOTES
CAROLYN:  RUR: 13%     Disposition:   IPR [facility] ([place] [phone]) With HD    This patient is insured via his wife's employer and has medicare ins    Insurance:    primary: Pending sale to [facility] policy # [id number]  secondary: Medicare [id number] effec. 2/1/22  CM awaiting update on facesheet. CM received call from Camille (liaison). CM provided clinical updates and send via [address] [place] [address],[facility]. Facility fax number is [phone]. Patient started  HD on 9/12/22. [facility] is requiring that patient be established with a community HD site prior to being fully accepted for IPR. Patient can have HD done onsite at [facility] during his rehab stay. Referrals being sent to community HD sites. CM continuing to follow.     Dean Goncalves, [address], [address] [place] [address]

## 2022-09-14 NOTE — WOUND CARE
WOCN Note    Attempted follow up visit. Patient on dialysis. Will follow tomorrow.     KATHY Bruce RN [facility] Inpatient Wound Care  Available on [facility]  Office [phone]

## 2022-09-14 NOTE — PROGRESS NOTES
Problem: Risk for Spread of Infection  Goal: Prevent transmission of infectious organism to others  Description: Prevent the transmission of infectious organisms to other patients, staff members, and visitors. Outcome: Progressing Towards Goal     Problem: Falls - Risk of  Goal: *Absence of Falls  Description: Document Yumiko Don Fall Risk and appropriate interventions in the flowsheet. Outcome: Progressing Towards Goal  Note: Fall Risk Interventions:  Mobility Interventions: Communicate number of staff needed for ambulation/transfer         Medication Interventions: Bed/chair exit alarm    Elimination Interventions: Call light in reach              Problem: Nutrition Deficit  Goal: *Optimize nutritional status  Outcome: Progressing Towards Goal     Problem: Pressure Injury - Risk of  Goal: *Prevention of pressure injury  Description: Document Barber Scale and appropriate interventions in the flowsheet. Outcome: Progressing Towards Goal  Note: Pressure Injury Interventions:  Sensory Interventions: Assess need for specialty bed    Moisture Interventions: Absorbent underpads    Activity Interventions: Assess need for specialty bed    Mobility Interventions: Turn and reposition approx.  every two hours(pillow and wedges)    Nutrition Interventions: Document food/fluid/supplement intake    Friction and Shear Interventions: Apply protective barrier, creams and emollients                Problem: Patient Education: Go to Patient Education Activity  Goal: Patient/Family Education  Outcome: Progressing Towards Goal

## 2022-09-14 NOTE — CONSULTS
Infectious Disease Consult    Today's Date: 9/14/2022   Admit Date: 9/9/2022    Impression:   Dislodged feeding tube with some associated peritonitis  Recent right thoracotomy/decortication  Recent left BKA/history of right BKA  Leukocytosis   Pain issues  Dialysis    Plan:   IV antibiotic therapy  OR prn  Will add antifungal coverage of abdomen  OR prn  Left leg wound may need debriding     Anti-infectives:   Vancomycin   Zosyn     Subjective:   Date of Consultation:  September 14, 2022  Referring Physician: Dr Catina Moses    Patient is a 45 y.o. male with extremely complicated recent history. He has had a left BKA for PVD, right thoracotomy for loculated pleural fluid, VDRF after aspiration, feeding tube placement, renal failure--now on HD and he is admitted with dislodgement of the feeding tube and associated peritonitis--he has persistent leukocytosis and we are asked to see him in consultation. Lots of complaints of abdominal pain. Patient Active Problem List   Diagnosis Code    Gas gangrene ([facility] [address] A48.0    Uncontrolled diabetes mellitus [id number]    Diabetic foot infection ([facility] [address] E11.628, L08.9    Intra-abdominal infection B99.9    Severe protein-calorie malnutrition ([address] E43     Past Medical History:   Diagnosis Date    Diabetes ([facility] [address]       History reviewed. No pertinent family history. Social History     Tobacco Use    Smoking status: Never    Smokeless tobacco: Never   Substance Use Topics    Alcohol use: No     History reviewed. No pertinent surgical history. Prior to Admission medications    Medication Sig Start Date End Date Taking? Authorizing Provider   acetaminophen (TYLENOL) 325 mg tablet Take 2 Tabs by mouth every four (4) hours as needed.  7/6/18   Linda Arguello MD   insulin lispro (HUMALOG) 100 unit/mL injection 9 units Breakfast, 11 units with lunch and 9  Units at dinner 7/6/18   Linda Arguello MD   insulin glargine (LANTUS) 100 unit/mL injection 26 units QHS SC 7/6/18   Linda Arguello MD ertapenem 1 gram 1 g IVPB 1 g by IntraVENous route every twenty-four (24) hours. 18   Yelitza Hawley MD   bisacodyl (DULCOLAX) 5 mg EC tablet Take 2 Tabs by mouth daily as needed for Constipation. 18   Yelitza Hawley MD       Allergies   Allergen Reactions    Bee Sting [Sting, Bee] Unknown (comments)        Review of Systems:  Pertinent items are noted in the History of Present Illness. Objective:     Visit Vitals  BP (!) 138/52   Pulse 75   Temp 100.4 °F (38 °C) (Oral)   Resp 18   Ht 5' 7\" (1.702 m)   Wt 79.6 kg (175 lb 7.8 oz)   SpO2 99%   BMI 27.49 kg/m²     Temp (24hrs), Av.2 °F (37.3 °C), Min:98.2 °F (36.8 °C), Max:100.4 °F (38 °C)       Lines:  Hemodialysis Catheter:    and PICC:       Physical Exam:  General:  alert, cooperative, cachectic  Lungs:  diminished breath sounds R base  Heart:  regular rate and rhythm  Abdomen:  abnormal findings:  tenderness moderate in the lower abdomen  Skin:  no rash or abnormalities  L BKA wound with eschar and some drainage in the dressing    Data Review:     CBC:  Recent Labs     22  0341   WBC 16.0* 15.6* 15.8*   GRANS 87* 87* 88*   MONOS 4* 4* 3*   EOS 1 2 2   ANEU 14.1* 13.7* 13.8*   ABL 1.0 0.9 0.9   HGB 7.1* 7.3* 7.5*   HCT 22.6* 23.0* 23.9*    328 380       BMP:  Recent Labs     22  0528 22  0341   CREA 2.75* 2.13* 3.15*   BUN 20 16 29*    140 143   K 3.3* 2.9* 3.3*   * 107 110*   CO2 27 28 24   AGAP 7 5 9   * 78 81       LFTS:  No results for input(s): TBILI, ALT, AP, TP, ALB in the last 72 hours.     No lab exists for component: SGOT    Microbiology:     All Micro Results       Procedure Component Value Units Date/Time    CULTURE, BLOOD, PAIRED [[id number]] Collected: 22    Order Status: Completed Specimen: Blood Updated: 22     Special Requests: NO SPECIAL REQUESTS        Culture result: NO GROWTH 5 DAYS       C. DIFFICILE AG & TOXIN A/B [[id number]] Collected: 09/10/22 0639    Order Status: Completed Specimen: Stool Updated: 09/10/22 1914     GDH ANTIGEN Negative        C. difficile toxin Negative        INTERPRETATION       NEGATIVE FOR TOXIGENIC C. DIFFICILE                  Imaging:   Reviewed     Signed By: Leland Jacinto MD     September 14, 2022

## 2022-09-15 ENCOUNTER — ANESTHESIA EVENT (OUTPATIENT)
Dept: SURGERY | Age: 52
DRG: 853 | End: 2022-09-15
Payer: COMMERCIAL

## 2022-09-15 ENCOUNTER — ANESTHESIA (OUTPATIENT)
Dept: SURGERY | Age: 52
DRG: 853 | End: 2022-09-15
Payer: COMMERCIAL

## 2022-09-15 LAB
ABO + RH BLD: NORMAL
ANION GAP SERPL CALC-SCNC: 6 MMOL/L (ref 5–15)
BACTERIA SPEC CULT: NORMAL
BASOPHILS # BLD: 0.1 K/UL (ref 0–0.1)
BASOPHILS NFR BLD: 0 % (ref 0–1)
BLOOD GROUP ANTIBODIES SERPL: NORMAL
BUN SERPL-MCNC: 11 MG/DL (ref 6–20)
BUN/CREAT SERPL: 6 (ref 12–20)
CALCIUM SERPL-MCNC: 7.7 MG/DL (ref 8.5–10.1)
CHLORIDE SERPL-SCNC: 107 MMOL/L (ref 97–108)
CO2 SERPL-SCNC: 29 MMOL/L (ref 21–32)
CREAT SERPL-MCNC: 1.81 MG/DL (ref 0.7–1.3)
DIFFERENTIAL METHOD BLD: ABNORMAL
EOSINOPHIL # BLD: 0.3 K/UL (ref 0–0.4)
EOSINOPHIL NFR BLD: 2 % (ref 0–7)
ERYTHROCYTE [DISTWIDTH] IN BLOOD BY AUTOMATED COUNT: 18 % (ref 11.5–14.5)
GLUCOSE BLD STRIP.AUTO-MCNC: 70 MG/DL (ref 65–117)
GLUCOSE BLD STRIP.AUTO-MCNC: 79 MG/DL (ref 65–117)
GLUCOSE BLD STRIP.AUTO-MCNC: 90 MG/DL (ref 65–117)
GLUCOSE BLD STRIP.AUTO-MCNC: 99 MG/DL (ref 65–117)
GLUCOSE SERPL-MCNC: 100 MG/DL (ref 65–100)
HCT VFR BLD AUTO: 22.8 % (ref 36.6–50.3)
HGB BLD-MCNC: 7.2 G/DL (ref 12.1–17)
IMM GRANULOCYTES # BLD AUTO: 0.2 K/UL (ref 0–0.04)
IMM GRANULOCYTES NFR BLD AUTO: 1 % (ref 0–0.5)
INR PPP: 1.1 (ref 0.9–1.1)
LYMPHOCYTES # BLD: 1.1 K/UL (ref 0.8–3.5)
LYMPHOCYTES NFR BLD: 6 % (ref 12–49)
MAGNESIUM SERPL-MCNC: 1.7 MG/DL (ref 1.6–2.4)
MCH RBC QN AUTO: 29.8 PG (ref 26–34)
MCHC RBC AUTO-ENTMCNC: 31.6 G/DL (ref 30–36.5)
MCV RBC AUTO: 94.2 FL (ref 80–99)
MONOCYTES # BLD: 0.7 K/UL (ref 0–1)
MONOCYTES NFR BLD: 4 % (ref 5–13)
NEUTS SEG # BLD: 14.6 K/UL (ref 1.8–8)
NEUTS SEG NFR BLD: 87 % (ref 32–75)
NRBC # BLD: 0 K/UL (ref 0–0.01)
NRBC BLD-RTO: 0 PER 100 WBC
PHOSPHATE SERPL-MCNC: 1.2 MG/DL (ref 2.6–4.7)
PLATELET # BLD AUTO: 401 K/UL (ref 150–400)
PMV BLD AUTO: 9.7 FL (ref 8.9–12.9)
POTASSIUM SERPL-SCNC: 3.4 MMOL/L (ref 3.5–5.1)
PROTHROMBIN TIME: 11.9 SEC (ref 9–11.1)
RBC # BLD AUTO: 2.42 M/UL (ref 4.1–5.7)
SERVICE CMNT-IMP: NORMAL
SODIUM SERPL-SCNC: 142 MMOL/L (ref 136–145)
SPECIMEN EXP DATE BLD: NORMAL
WBC # BLD AUTO: 16.9 K/UL (ref 4.1–11.1)

## 2022-09-15 PROCEDURE — C9113 INJ PANTOPRAZOLE SODIUM, VIA: HCPCS | Performed by: INTERNAL MEDICINE

## 2022-09-15 PROCEDURE — 99233 SBSQ HOSP IP/OBS HIGH 50: CPT | Performed by: PHYSICAL MEDICINE & REHABILITATION

## 2022-09-15 PROCEDURE — 82962 GLUCOSE BLOOD TEST: CPT

## 2022-09-15 PROCEDURE — 99024 POSTOP FOLLOW-UP VISIT: CPT | Performed by: SURGERY

## 2022-09-15 PROCEDURE — 74011250636 HC RX REV CODE- 250/636: Performed by: NURSE ANESTHETIST, CERTIFIED REGISTERED

## 2022-09-15 PROCEDURE — 93005 ELECTROCARDIOGRAM TRACING: CPT

## 2022-09-15 PROCEDURE — 84100 ASSAY OF PHOSPHORUS: CPT

## 2022-09-15 PROCEDURE — 0DJD8ZZ INSPECTION OF LOWER INTESTINAL TRACT, VIA NATURAL OR ARTIFICIAL OPENING ENDOSCOPIC: ICD-10-PCS | Performed by: COLON & RECTAL SURGERY

## 2022-09-15 PROCEDURE — 74011250636 HC RX REV CODE- 250/636: Performed by: SURGERY

## 2022-09-15 PROCEDURE — 83735 ASSAY OF MAGNESIUM: CPT

## 2022-09-15 PROCEDURE — 85025 COMPLETE CBC W/AUTO DIFF WBC: CPT

## 2022-09-15 PROCEDURE — 80048 BASIC METABOLIC PNL TOTAL CA: CPT

## 2022-09-15 PROCEDURE — 36415 COLL VENOUS BLD VENIPUNCTURE: CPT

## 2022-09-15 PROCEDURE — 77030026438 HC STYL ET INTUB CARD -A: Performed by: NURSE ANESTHETIST, CERTIFIED REGISTERED

## 2022-09-15 PROCEDURE — 77030008684 HC TU ET CUF COVD -B: Performed by: NURSE ANESTHETIST, CERTIFIED REGISTERED

## 2022-09-15 PROCEDURE — 74011000258 HC RX REV CODE- 258: Performed by: SURGERY

## 2022-09-15 PROCEDURE — 74011000258 HC RX REV CODE- 258: Performed by: INTERNAL MEDICINE

## 2022-09-15 PROCEDURE — 65270000046 HC RM TELEMETRY

## 2022-09-15 PROCEDURE — 74011250637 HC RX REV CODE- 250/637: Performed by: HOSPITALIST

## 2022-09-15 PROCEDURE — 76210000017 HC OR PH I REC 1.5 TO 2 HR: Performed by: COLON & RECTAL SURGERY

## 2022-09-15 PROCEDURE — 97530 THERAPEUTIC ACTIVITIES: CPT

## 2022-09-15 PROCEDURE — 74011000250 HC RX REV CODE- 250: Performed by: NURSE ANESTHETIST, CERTIFIED REGISTERED

## 2022-09-15 PROCEDURE — 74011000250 HC RX REV CODE- 250: Performed by: COLON & RECTAL SURGERY

## 2022-09-15 PROCEDURE — 77030042556 HC PNCL CAUT -B: Performed by: COLON & RECTAL SURGERY

## 2022-09-15 PROCEDURE — 74011250636 HC RX REV CODE- 250/636: Performed by: ANESTHESIOLOGY

## 2022-09-15 PROCEDURE — 74011250636 HC RX REV CODE- 250/636: Performed by: HOSPITALIST

## 2022-09-15 PROCEDURE — 86900 BLOOD TYPING SEROLOGIC ABO: CPT

## 2022-09-15 PROCEDURE — 97161 PT EVAL LOW COMPLEX 20 MIN: CPT

## 2022-09-15 PROCEDURE — 2709999900 HC NON-CHARGEABLE SUPPLY: Performed by: COLON & RECTAL SURGERY

## 2022-09-15 PROCEDURE — 77030013079 HC BLNKT BAIR HGGR 3M -A: Performed by: NURSE ANESTHETIST, CERTIFIED REGISTERED

## 2022-09-15 PROCEDURE — 74011000250 HC RX REV CODE- 250: Performed by: INTERNAL MEDICINE

## 2022-09-15 PROCEDURE — 74011250636 HC RX REV CODE- 250/636: Performed by: INTERNAL MEDICINE

## 2022-09-15 PROCEDURE — 97165 OT EVAL LOW COMPLEX 30 MIN: CPT

## 2022-09-15 PROCEDURE — 76010000149 HC OR TIME 1 TO 1.5 HR: Performed by: COLON & RECTAL SURGERY

## 2022-09-15 PROCEDURE — 85610 PROTHROMBIN TIME: CPT

## 2022-09-15 PROCEDURE — 74011250636 HC RX REV CODE- 250/636: Performed by: STUDENT IN AN ORGANIZED HEALTH CARE EDUCATION/TRAINING PROGRAM

## 2022-09-15 PROCEDURE — 74011000250 HC RX REV CODE- 250: Performed by: STUDENT IN AN ORGANIZED HEALTH CARE EDUCATION/TRAINING PROGRAM

## 2022-09-15 PROCEDURE — 74011250637 HC RX REV CODE- 250/637: Performed by: INTERNAL MEDICINE

## 2022-09-15 PROCEDURE — 77030040361 HC SLV COMPR DVT MDII -B: Performed by: COLON & RECTAL SURGERY

## 2022-09-15 PROCEDURE — 65660000001 HC RM ICU INTERMED STEPDOWN

## 2022-09-15 PROCEDURE — 76060000033 HC ANESTHESIA 1 TO 1.5 HR: Performed by: COLON & RECTAL SURGERY

## 2022-09-15 PROCEDURE — 74011250637 HC RX REV CODE- 250/637: Performed by: PHYSICAL MEDICINE & REHABILITATION

## 2022-09-15 RX ORDER — BACITRACIN 500 UNIT/G
PACKET (EA) TOPICAL AS NEEDED
Status: DISCONTINUED | OUTPATIENT
Start: 2022-09-15 | End: 2022-09-15 | Stop reason: HOSPADM

## 2022-09-15 RX ORDER — HYDROMORPHONE HYDROCHLORIDE 5 MG/5ML
4 SOLUTION ORAL
Status: DISCONTINUED | OUTPATIENT
Start: 2022-09-15 | End: 2022-09-23

## 2022-09-15 RX ORDER — MIDAZOLAM HYDROCHLORIDE 1 MG/ML
0.5 INJECTION, SOLUTION INTRAMUSCULAR; INTRAVENOUS
Status: DISCONTINUED | OUTPATIENT
Start: 2022-09-15 | End: 2022-09-15 | Stop reason: HOSPADM

## 2022-09-15 RX ORDER — MIDAZOLAM HYDROCHLORIDE 1 MG/ML
1 INJECTION, SOLUTION INTRAMUSCULAR; INTRAVENOUS AS NEEDED
Status: DISCONTINUED | OUTPATIENT
Start: 2022-09-15 | End: 2022-09-15 | Stop reason: HOSPADM

## 2022-09-15 RX ORDER — NEOSTIGMINE METHYLSULFATE 1 MG/ML
INJECTION, SOLUTION INTRAVENOUS AS NEEDED
Status: DISCONTINUED | OUTPATIENT
Start: 2022-09-15 | End: 2022-09-15 | Stop reason: HOSPADM

## 2022-09-15 RX ORDER — FENTANYL CITRATE 50 UG/ML
INJECTION, SOLUTION INTRAMUSCULAR; INTRAVENOUS AS NEEDED
Status: DISCONTINUED | OUTPATIENT
Start: 2022-09-15 | End: 2022-09-15 | Stop reason: HOSPADM

## 2022-09-15 RX ORDER — SODIUM CHLORIDE 9 MG/ML
INJECTION, SOLUTION INTRAVENOUS
Status: DISCONTINUED | OUTPATIENT
Start: 2022-09-15 | End: 2022-09-15 | Stop reason: HOSPADM

## 2022-09-15 RX ORDER — PROPOFOL 10 MG/ML
INJECTION, EMULSION INTRAVENOUS AS NEEDED
Status: DISCONTINUED | OUTPATIENT
Start: 2022-09-15 | End: 2022-09-15 | Stop reason: HOSPADM

## 2022-09-15 RX ORDER — SODIUM CHLORIDE 0.9 % (FLUSH) 0.9 %
5-40 SYRINGE (ML) INJECTION AS NEEDED
Status: DISCONTINUED | OUTPATIENT
Start: 2022-09-15 | End: 2022-09-15 | Stop reason: HOSPADM

## 2022-09-15 RX ORDER — ONDANSETRON 2 MG/ML
4 INJECTION INTRAMUSCULAR; INTRAVENOUS AS NEEDED
Status: DISCONTINUED | OUTPATIENT
Start: 2022-09-15 | End: 2022-09-15 | Stop reason: HOSPADM

## 2022-09-15 RX ORDER — SODIUM CHLORIDE, SODIUM LACTATE, POTASSIUM CHLORIDE, CALCIUM CHLORIDE 600; 310; 30; 20 MG/100ML; MG/100ML; MG/100ML; MG/100ML
125 INJECTION, SOLUTION INTRAVENOUS CONTINUOUS
Status: DISCONTINUED | OUTPATIENT
Start: 2022-09-15 | End: 2022-09-15 | Stop reason: HOSPADM

## 2022-09-15 RX ORDER — MORPHINE SULFATE 2 MG/ML
2 INJECTION, SOLUTION INTRAMUSCULAR; INTRAVENOUS
Status: DISCONTINUED | OUTPATIENT
Start: 2022-09-15 | End: 2022-09-15 | Stop reason: HOSPADM

## 2022-09-15 RX ORDER — SODIUM CHLORIDE 9 MG/ML
25 INJECTION, SOLUTION INTRAVENOUS CONTINUOUS
Status: DISCONTINUED | OUTPATIENT
Start: 2022-09-15 | End: 2022-09-15 | Stop reason: HOSPADM

## 2022-09-15 RX ORDER — LIDOCAINE HYDROCHLORIDE 10 MG/ML
0.1 INJECTION, SOLUTION EPIDURAL; INFILTRATION; INTRACAUDAL; PERINEURAL AS NEEDED
Status: DISCONTINUED | OUTPATIENT
Start: 2022-09-15 | End: 2022-09-15 | Stop reason: HOSPADM

## 2022-09-15 RX ORDER — SODIUM CHLORIDE 0.9 % (FLUSH) 0.9 %
5-40 SYRINGE (ML) INJECTION EVERY 8 HOURS
Status: DISCONTINUED | OUTPATIENT
Start: 2022-09-15 | End: 2022-09-15 | Stop reason: HOSPADM

## 2022-09-15 RX ORDER — FENTANYL CITRATE 50 UG/ML
25 INJECTION, SOLUTION INTRAMUSCULAR; INTRAVENOUS
Status: DISCONTINUED | OUTPATIENT
Start: 2022-09-15 | End: 2022-09-15 | Stop reason: HOSPADM

## 2022-09-15 RX ORDER — DIPHENHYDRAMINE HYDROCHLORIDE 50 MG/ML
12.5 INJECTION, SOLUTION INTRAMUSCULAR; INTRAVENOUS AS NEEDED
Status: DISCONTINUED | OUTPATIENT
Start: 2022-09-15 | End: 2022-09-15 | Stop reason: HOSPADM

## 2022-09-15 RX ORDER — ROCURONIUM BROMIDE 10 MG/ML
INJECTION, SOLUTION INTRAVENOUS AS NEEDED
Status: DISCONTINUED | OUTPATIENT
Start: 2022-09-15 | End: 2022-09-15 | Stop reason: HOSPADM

## 2022-09-15 RX ORDER — MIDAZOLAM HYDROCHLORIDE 1 MG/ML
INJECTION, SOLUTION INTRAMUSCULAR; INTRAVENOUS AS NEEDED
Status: DISCONTINUED | OUTPATIENT
Start: 2022-09-15 | End: 2022-09-15 | Stop reason: HOSPADM

## 2022-09-15 RX ORDER — OXYCODONE AND ACETAMINOPHEN 5; 325 MG/1; MG/1
1 TABLET ORAL AS NEEDED
Status: DISCONTINUED | OUTPATIENT
Start: 2022-09-15 | End: 2022-09-15 | Stop reason: HOSPADM

## 2022-09-15 RX ORDER — GLYCOPYRROLATE 0.2 MG/ML
INJECTION INTRAMUSCULAR; INTRAVENOUS AS NEEDED
Status: DISCONTINUED | OUTPATIENT
Start: 2022-09-15 | End: 2022-09-15 | Stop reason: HOSPADM

## 2022-09-15 RX ORDER — ONDANSETRON 2 MG/ML
INJECTION INTRAMUSCULAR; INTRAVENOUS AS NEEDED
Status: DISCONTINUED | OUTPATIENT
Start: 2022-09-15 | End: 2022-09-15 | Stop reason: HOSPADM

## 2022-09-15 RX ORDER — DEXAMETHASONE SODIUM PHOSPHATE 4 MG/ML
INJECTION, SOLUTION INTRA-ARTICULAR; INTRALESIONAL; INTRAMUSCULAR; INTRAVENOUS; SOFT TISSUE AS NEEDED
Status: DISCONTINUED | OUTPATIENT
Start: 2022-09-15 | End: 2022-09-15 | Stop reason: HOSPADM

## 2022-09-15 RX ORDER — HYDROMORPHONE HYDROCHLORIDE 1 MG/ML
0.2 INJECTION, SOLUTION INTRAMUSCULAR; INTRAVENOUS; SUBCUTANEOUS
Status: DISCONTINUED | OUTPATIENT
Start: 2022-09-15 | End: 2022-09-15 | Stop reason: HOSPADM

## 2022-09-15 RX ORDER — LIDOCAINE HYDROCHLORIDE 20 MG/ML
INJECTION, SOLUTION EPIDURAL; INFILTRATION; INTRACAUDAL; PERINEURAL AS NEEDED
Status: DISCONTINUED | OUTPATIENT
Start: 2022-09-15 | End: 2022-09-15 | Stop reason: HOSPADM

## 2022-09-15 RX ORDER — ACETAMINOPHEN 325 MG/1
650 TABLET ORAL ONCE
Status: DISCONTINUED | OUTPATIENT
Start: 2022-09-15 | End: 2022-09-15 | Stop reason: HOSPADM

## 2022-09-15 RX ORDER — FENTANYL CITRATE 50 UG/ML
50 INJECTION, SOLUTION INTRAMUSCULAR; INTRAVENOUS AS NEEDED
Status: DISCONTINUED | OUTPATIENT
Start: 2022-09-15 | End: 2022-09-15 | Stop reason: HOSPADM

## 2022-09-15 RX ADMIN — HYDROMORPHONE HYDROCHLORIDE 1 MG: 1 INJECTION, SOLUTION INTRAMUSCULAR; INTRAVENOUS; SUBCUTANEOUS at 12:45

## 2022-09-15 RX ADMIN — Medication 1 CAPSULE: at 09:34

## 2022-09-15 RX ADMIN — ONDANSETRON HYDROCHLORIDE 4 MG: 2 INJECTION, SOLUTION INTRAMUSCULAR; INTRAVENOUS at 19:41

## 2022-09-15 RX ADMIN — POTASSIUM PHOSPHATE, MONOBASIC AND POTASSIUM PHOSPHATE, DIBASIC: 224; 236 INJECTION, SOLUTION, CONCENTRATE INTRAVENOUS at 09:45

## 2022-09-15 RX ADMIN — SODIUM CHLORIDE, PRESERVATIVE FREE 40 MG: 5 INJECTION INTRAVENOUS at 09:33

## 2022-09-15 RX ADMIN — Medication 2 MG: at 19:56

## 2022-09-15 RX ADMIN — HYDROMORPHONE HYDROCHLORIDE 1 MG: 1 INJECTION, SOLUTION INTRAMUSCULAR; INTRAVENOUS; SUBCUTANEOUS at 05:20

## 2022-09-15 RX ADMIN — PROPOFOL 150 MG: 10 INJECTION, EMULSION INTRAVENOUS at 19:02

## 2022-09-15 RX ADMIN — PIPERACILLIN AND TAZOBACTAM 3.38 G: 3; .375 INJECTION, POWDER, FOR SOLUTION INTRAVENOUS at 19:26

## 2022-09-15 RX ADMIN — ONDANSETRON 4 MG: 2 INJECTION INTRAMUSCULAR; INTRAVENOUS at 12:45

## 2022-09-15 RX ADMIN — FENTANYL CITRATE 50 MCG: 50 INJECTION, SOLUTION INTRAMUSCULAR; INTRAVENOUS at 19:35

## 2022-09-15 RX ADMIN — ROCURONIUM BROMIDE 20 MG: 10 SOLUTION INTRAVENOUS at 19:02

## 2022-09-15 RX ADMIN — SODIUM CHLORIDE, PRESERVATIVE FREE 10 ML: 5 INJECTION INTRAVENOUS at 14:28

## 2022-09-15 RX ADMIN — GLYCOPYRROLATE 0.4 MG: 0.2 INJECTION INTRAMUSCULAR; INTRAVENOUS at 19:56

## 2022-09-15 RX ADMIN — MIDAZOLAM 2 MG: 1 INJECTION INTRAMUSCULAR; INTRAVENOUS at 18:49

## 2022-09-15 RX ADMIN — COLLAGENASE SANTYL: 250 OINTMENT TOPICAL at 09:35

## 2022-09-15 RX ADMIN — Medication 2 MG: at 06:14

## 2022-09-15 RX ADMIN — SODIUM CHLORIDE 100 MG: 9 INJECTION, SOLUTION INTRAVENOUS at 22:10

## 2022-09-15 RX ADMIN — HYDROMORPHONE HYDROCHLORIDE 1 MG: 1 INJECTION, SOLUTION INTRAMUSCULAR; INTRAVENOUS; SUBCUTANEOUS at 02:30

## 2022-09-15 RX ADMIN — HYDROMORPHONE HYDROCHLORIDE 1 MG: 1 INJECTION, SOLUTION INTRAMUSCULAR; INTRAVENOUS; SUBCUTANEOUS at 23:12

## 2022-09-15 RX ADMIN — PHENYLEPHRINE HYDROCHLORIDE 40 MCG/MIN: 10 INJECTION INTRAVENOUS at 19:02

## 2022-09-15 RX ADMIN — Medication 2 MG: at 09:45

## 2022-09-15 RX ADMIN — DEXAMETHASONE SODIUM PHOSPHATE 4 MG: 4 INJECTION, SOLUTION INTRAMUSCULAR; INTRAVENOUS at 19:41

## 2022-09-15 RX ADMIN — SODIUM CHLORIDE 25 ML/HR: 9 INJECTION, SOLUTION INTRAVENOUS at 17:37

## 2022-09-15 RX ADMIN — LIDOCAINE HYDROCHLORIDE 40 MG: 20 INJECTION, SOLUTION EPIDURAL; INFILTRATION; INTRACAUDAL; PERINEURAL at 19:02

## 2022-09-15 RX ADMIN — Medication 4 MG: at 14:27

## 2022-09-15 RX ADMIN — SERTRALINE 25 MG: 50 TABLET, FILM COATED ORAL at 09:34

## 2022-09-15 RX ADMIN — PIPERACILLIN AND TAZOBACTAM 3.38 G: 3; .375 INJECTION, POWDER, FOR SOLUTION INTRAVENOUS at 06:15

## 2022-09-15 RX ADMIN — SODIUM CHLORIDE: 900 INJECTION, SOLUTION INTRAVENOUS at 18:49

## 2022-09-15 NOTE — PROGRESS NOTES
Problem: Mobility Impaired (Adult and Pediatric)  Goal: *Acute Goals and Plan of Care (Insert Text)  Description: FUNCTIONAL STATUS PRIOR TO ADMISSION: The patient was functional at the wheelchair level and required minimal assistance for transfers to the chair. HOME SUPPORT PRIOR TO ADMISSION: The patient lived with wife, daughter and required up to moderate assistance  for tranfers bed <> w/c and ADLs. Patient has spent extensive period of time hospitalized recently and has been admitted from [facility]. Physical Therapy Goals  Initiated 9/15/2022  1. Patient will roll left/right in bed for pressure relief mod I with use of bed rails within 7 day(s). 2.  Patient will move from supine to sit and sit to supine  in bed with moderate assistance  within 7 day(s). 3.  Patient will transfer from bed to chair and chair to bed with moderate assistance  using the least restrictive device within 7 day(s). 4  Patient will sit EOB x 10 minutes without UE support with CGA-min A within 7 day(s). Outcome: Progressing Towards Goal   PHYSICAL THERAPY EVALUATION  Patient: Haze Dakin (89 y.o. male)  Date: 9/15/2022  Primary Diagnosis: Intra-abdominal infection [B99.9]  Procedure(s) (LRB):  LAPAROSCOPY GENERAL DIAGNOSTIC, LAPAROSCOPIC  TAKEDOWN AND REMOVAL J-TUBE, LAPAROSCOPIC COLOSTOMY, I & D ABDOMINAL WALL (N/A) 5 Days Post-Op   Precautions:   Fall, Other (comment) (bilateral BKA)      ASSESSMENT  Based on the objective data described below, the patient presents with decreased activity tolerance, increased pain, increased need for assistance for all mobility, sacral wound, bilateral BKA with L stump wound, and high risk for falls. Patient admitted from [facility] and found to have bowel perforation along with L LE stump wound and sacral wound. Patient has history of bilateral BKA, with L BKA completed recently 5/2022 per patient. Patient limited in participation with bed mobility 2/2 sacral wound pain.  Patient requires max A x 2 for rolling and drawing to [facility] but follows cues for use of UEs for pulling on bed rails. Patient educated on positioning for pressure relief and skin protection including limiting use of pillows under knees to prevent knee flexion contractures in setting of bilateral BKAs. Patient has goal to D/C to IPR; will need to increase activity tolerance in order to tolerate 3 hours of therapy/day. Will trial 5x/week frequency to progress activity tolerance for goal of IPR at discharge. Current Level of Function Impacting Discharge (mobility/balance): max A x 2 rolling     Functional Outcome Measure: The patient scored Total: 15/100 on the Barthel Index outcome measure which is indicative of being totally dependent in basic self-care. Other factors to consider for discharge: bilateral BKAs, sacral wound, L stump wound      Patient will benefit from skilled therapy intervention to address the above noted impairments. PLAN :  Recommendations and Planned Interventions: bed mobility training, transfer training, gait training, therapeutic exercises, neuromuscular re-education, patient and family training/education, and therapeutic activities      Frequency/Duration: Patient will be followed by physical therapy:  5 times a week to address goals. Recommendation for discharge: (in order for the patient to meet his/her long term goals)  Therapy 3 hours per day 5-7 days per week pending progress    This discharge recommendation:  A follow-up discussion with the attending provider and/or case management is planned    IF patient discharges home will need the following DME: to be determined         SUBJECTIVE:   Patient stated I can't do this today I'm in a lot of pain.     OBJECTIVE DATA SUMMARY:   HISTORY:    Past Medical History:   Diagnosis Date    Diabetes ([facility] [address]    History reviewed. No pertinent surgical history.     Personal factors and/or comorbidities impacting plan of care: bilateral BKAs, sepsis Home Situation  Home Environment: Private residence  Wheelchair Ramp: Yes  One/Two Story Residence: One story  Living Alone: No  Support Systems: Spouse/Significant Other, Child(marli)  Patient Expects to be Discharged to[de-identified] Rehab Unit Subacute  Current DME Used/Available at Home: Wheelchair, [place], [address] [facility] chair, Other (comment) (sliding board)  Tub or Shower Type: Shower    EXAMINATION/PRESENTATION/DECISION MAKING:   Critical Behavior:  Neurologic State: Alert  Orientation Level: Oriented X4  Cognition: Follows commands, Decreased attention/concentration  Safety/Judgement: Fall prevention, Home safety  Hearing:     Skin:  dressings intact L LE stump and sacrum   Edema: none noted  Range Of Motion:  AROM: Generally decreased, functional           PROM: Within functional limits           Strength:    Strength: Generally decreased, functional                    Tone & Sensation:   Tone: Normal              Sensation: Impaired (paresthesias bilateral LE stumps)               Coordination:  Coordination: Generally decreased, functional  Vision:      Functional Mobility:  Bed Mobility:  Rolling: Maximum assistance;Assist x2        Scooting: Maximum assistance;Assist x2      Functional Measure:  Barthel Index:    Bathin  Bladder: 0  Bowels: 0  Groomin  Dressin  Feeding: 10  Mobility: 0  Stairs: 0  Toilet Use: 0  Transfer (Bed to Chair and Back): 0  Total: 15/100       The Barthel ADL Index: Guidelines  1. The index should be used as a record of what a patient does, not as a record of what a patient could do. 2. The main aim is to establish degree of independence from any help, physical or verbal, however minor and for whatever reason. 3. The need for supervision renders the patient not independent. 4. A patient's performance should be established using the best available evidence.  Asking the patient, friends/relatives and nurses are the usual sources, but direct observation and common sense are also important. However direct testing is not needed. 5. Usually the patient's performance over the preceding 24-48 hours is important, but occasionally longer periods will be relevant. 6. Middle categories imply that the patient supplies over 50 per cent of the effort. 7. Use of aids to be independent is allowed. Score Interpretation (from [address] [facility])    Independent   60-79 Minimally independent   40-59 Partially dependent   20-39 Very dependent   <20 Totally dependent     -Luke Sands., Barthel, DKeriW. (1965). Functional evaluation: the Barthel Index. [address] ([address] (1997). The Barthel activities of daily living index: self-reporting versus actual performance in the old (> or = 75 years). Journal [address] 45(7), [address] [facility], [place], Antonio Laughlin., Brie Rodríguez. (1999). Measuring the change in disability after inpatient rehabilitation; comparison of the responsiveness of the Barthel Index and Functional [place] Measure. Journal of Neurology, Neurosurgery, and Psychiatry, 66(4), 381-399. Anthony Pat, N.J.A, ORION Chen, & Rajan Alonzo, M.A. (2004) Assessment of post-stroke quality of life in cost-effectiveness studies: The usefulness of the Barthel Index and the EuroQoL-5D.  Quality of Life Research, 15, 337-36        Physical Therapy Evaluation Charge Determination   History Examination Presentation Decision-Making   HIGH Complexity :3+ comorbidities / personal factors will impact the outcome/ POC  LOW Complexity : 1-2 Standardized tests and measures addressing body structure, function, activity limitation and / or participation in recreation  LOW Complexity : Stable, uncomplicated  Other outcome measures Barthel Index  HIGH       Based on the above components, the patient evaluation is determined to be of the following complexity level: LOW     Pain Rating:  10/10 sacral pain     Activity Tolerance:   Poor and limited by severe sacral pain     After treatment patient left in no apparent distress:   Supine in bed, Heels elevated for pressure relief, Call bell within reach, Bed / chair alarm activated, and RN present     COMMUNICATION/EDUCATION:   The patients plan of care was discussed with: Occupational therapist and Registered nurse. Fall prevention education was provided and the patient/caregiver indicated understanding.     Thank you for this referral.  Gillian Love, PT   Time Calculation: 16 mins

## 2022-09-15 NOTE — PROGRESS NOTES
Problem: Risk for Spread of Infection  Goal: Prevent transmission of infectious organism to others  Description: Prevent the transmission of infectious organisms to other patients, staff members, and visitors. Outcome: Progressing Towards Goal     Problem: Patient Education:  Go to Education Activity  Goal: Patient/Family Education  Outcome: Progressing Towards Goal     Problem: Falls - Risk of  Goal: *Absence of Falls  Description: Document Vernia Colder Fall Risk and appropriate interventions in the flowsheet.   Outcome: Progressing Towards Goal  Note: Fall Risk Interventions:  Mobility Interventions: Bed/chair exit alarm    Mentation Interventions: Adequate sleep, hydration, pain control    Medication Interventions: Bed/chair exit alarm    Elimination Interventions: Call light in reach

## 2022-09-15 NOTE — PROGRESS NOTES
Progress Note    Patient: Cindy Forte MRN: [id number]  SSN: [id number]    YOB: 1970  Age: 45 y.o. Sex: male      Admit Date: 2022    5 Days Post-Op    Procedure:  Procedure(s):  LAPAROSCOPY GENERAL DIAGNOSTIC, LAPAROSCOPIC  TAKEDOWN AND REMOVAL J-TUBE, LAPAROSCOPIC COLOSTOMY, I & D ABDOMINAL WALL    Subjective:     Patient states that he has been eating well once he got a menu and was able to choose his diet. .  Of note states he was 350 pounds 2 years ago and has purposely lost weight    Objective:     Visit Vitals  BP (!) 136/59 (BP 1 Location: Left arm, BP Patient Position: At rest)   Pulse 66   Temp 99.5 °F (37.5 °C)   Resp 17   Ht 5' 7\" (1.702 m)   Wt 74.4 kg (164 lb 0.4 oz)   SpO2 98%   BMI 25.69 kg/m²       Temp (24hrs), Av.7 °F (37.6 °C), Min:99.1 °F (37.3 °C), Max:100.4 °F (38 °C)      Physical Exam:    Gen- Alert in Nad  Abd- soft - Upper wound still with some necrotic tissue-   Ostomy -more pink with leakage. ( Supected exacerbated by loose skin)      Data Review: images and reports reviewed    Lab Review: All lab results for the last 24 hours reviewed.   Recent Results (from the past 24 hour(s))   GLUCOSE, POC    Collection Time: 22  1:07 PM   Result Value Ref Range    Glucose (POC) 96 65 - 117 mg/dL    Performed by Derek CARRION)    GLUCOSE, POC    Collection Time: 22  5:01 PM   Result Value Ref Range    Glucose (POC) 70 65 - 117 mg/dL    Performed by Derek Chavis (DOMINGA)    GLUCOSE, POC    Collection Time: 22  9:06 PM   Result Value Ref Range    Glucose (POC) 123 (H) 65 - 117 mg/dL    Performed by ALEJANDRA Mane    CBC WITH AUTOMATED DIFF    Collection Time: 09/15/22  2:24 AM   Result Value Ref Range    WBC 16.9 (H) 4.1 - 11.1 K/uL    RBC 2.42 (L) 4.10 - 5.70 M/uL    HGB 7.2 (L) 12.1 - 17.0 g/dL    HCT 22.8 (L) 36.6 - 50.3 %    MCV 94.2 80.0 - 99.0 FL    MCH 29.8 26.0 - 34.0 PG    MCHC 31.6 30.0 - 36.5 g/dL    RDW 18.0 (H) 11.5 - 14.5 %    PLATELET 969 (H) 150 - 400 K/uL    MPV 9.7 8.9 - 12.9 FL    NRBC 0.0 0  WBC    ABSOLUTE NRBC 0.00 0.00 - 0.01 K/uL    NEUTROPHILS 87 (H) 32 - 75 %    LYMPHOCYTES 6 (L) 12 - 49 %    MONOCYTES 4 (L) 5 - 13 %    EOSINOPHILS 2 0 - 7 %    BASOPHILS 0 0 - 1 %    IMMATURE GRANULOCYTES 1 (H) 0.0 - 0.5 %    ABS. NEUTROPHILS 14.6 (H) 1.8 - 8.0 K/UL    ABS. LYMPHOCYTES 1.1 0.8 - 3.5 K/UL    ABS. MONOCYTES 0.7 0.0 - 1.0 K/UL    ABS. EOSINOPHILS 0.3 0.0 - 0.4 K/UL    ABS. BASOPHILS 0.1 0.0 - 0.1 K/UL    ABS. IMM. GRANS. 0.2 (H) 0.00 - 0.04 K/UL    DF AUTOMATED     METABOLIC PANEL, BASIC    Collection Time: 09/15/22  2:24 AM   Result Value Ref Range    Sodium 142 136 - 145 mmol/L    Potassium 3.4 (L) 3.5 - 5.1 mmol/L    Chloride 107 97 - 108 mmol/L    CO2 29 21 - 32 mmol/L    Anion gap 6 5 - 15 mmol/L    Glucose 100 65 - 100 mg/dL    BUN 11 6 - 20 MG/DL    Creatinine 1.81 (H) 0.70 - 1.30 MG/DL    BUN/Creatinine ratio 6 (L) 12 - 20      GFR est AA 48 (L) >60 ml/min/1.73m2    GFR est non-AA 40 (L) >60 ml/min/1.73m2    Calcium 7.7 (L) 8.5 - 10.1 MG/DL   MAGNESIUM    Collection Time: 09/15/22  2:24 AM   Result Value Ref Range    Magnesium 1.7 1.6 - 2.4 mg/dL   PHOSPHORUS    Collection Time: 09/15/22  2:24 AM   Result Value Ref Range    Phosphorus 1.2 (L) 2.6 - 4.7 MG/DL   PROTHROMBIN TIME + INR    Collection Time: 09/15/22  2:24 AM   Result Value Ref Range    INR 1.1 0.9 - 1.1      Prothrombin time 11.9 (H) 9.0 - 11.1 sec   GLUCOSE, POC    Collection Time: 09/15/22  6:47 AM   Result Value Ref Range    Glucose (POC) 99 65 - 117 mg/dL    Performed by Philippe ferguson RN        Assessment:     Hospital Problems  Date Reviewed: 9/10/2022            Codes Class Noted POA    Severe protein-calorie malnutrition ([facility] [address] ICD-10-CM: K50  ICD-9-CM: 538  9/13/2022 Yes        * (Principal) Intra-abdominal infection ICD-10-CM: B99.9  ICD-9-CM: 136.9  9/10/2022 Yes           Plan/Recommendations/Medical Decision Making:   Plan for eua with CRS today. Continue diet as tolerated  after procedure. Will need to have nursing do dressing changes as ordered. Additionally will need to change ostomy bag when leaking per wound care orders.

## 2022-09-15 NOTE — ADT AUTH CERT NOTES
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 YOUR FAX ALERTED ME, SO THANK YOU! ATTACHED IS CLINICAL FOR 9/10/22-09/11/22 - THIS PATIENT IS STILL IN HOUSE     MY RETURN FAX [phone] PLEASE FAX AN UPDATE AND ALL CLINICAL NEEDS TO ME - IF YOU ARE ABLE, ATTN: ELIA TKeri    THANKS SO MUCH! Gastroenterology [facility] - Care Day 2 (9/11/2022) by Desiree Rajput RN       Review Status Review Entered   Completed 9/12/2022 12:57      Criteria Review      Care Day: 2 Care Date: 9/11/2022 Level of Care: Intermediate Care    Guideline Day 2    Level Of Care    ( ) Floor    9/12/2022 12:57:23 EDT by Desiree Rajput      Intermediate care    Clinical Status    ( ) * No ICU or intermediate care needs    Interventions    (X) Inpatient interventions continue    ( ) Transition to oral routes    9/12/2022 12:57:23 EDT by Desiree Rajput      clear liquid diet    * Milestone   Additional Notes   9/11/22      Vitals: 98.3, 63, 13, 91/56, 95% 2L NC   Meds: tylenol 650mg PO X1, benadryl 12.5mg IV X1, dilaudid 1mg IV Q3h PRN X4, protonix 40mg IV Q12h, zosyn 3.375g IV Q8h, mag sulfate 1g IV X1, potassium chloride 10 meq IV X1, potassium phos 20 mmol IV X1, LR infusion 50mL/hr, vancomycin 1000mg IV Q24h   Labs: WBC 16.0, hgb 7.5, hct 23.8, k 3.0, BUN 23, Cr 2.67, Ca 7.5, phos 2.2, gfr 25   US retroperitoneum: No hydronephrosis or stones. Plan: clear liquid diet, activity as tolerated with assist      GS:   1 Day Post-Op       Procedure:  Procedure(s):   LAPAROSCOPY GENERAL DIAGNOSTIC, LAPAROSCOPIC  TAKEDOWN AND REMOVAL J-TUBE, LAPAROSCOPIC COLOSTOMY, I & D ABDOMINAL WALL      Patient complaining of buttocks pain and some abdominal pain. Apparently has been eating a few frosted flakes and they have been going well. Overall seems to be doing as expected.    Will advance diet to full liquids and see how he does.   Continue ostomy care-WOCN to see tomorrow   Continue wet-to-dry to abdominal wound WOCN tomorrow   Skin protector and to perianal area dry dressing to ulcers until WOCN can see tomorrow   Continue antibiotics   Will eventually need colorectal for perirectal fistula         IM:   Patient seen and examined for follow up of perforated bowel. Patient is hypotensive and in pain. Pain treated within limits of BP. Patient looking somewhat improved today. Improved color and more awake. Still in a good amount of pain. Intractable abdominal pain   LAPAROSCOPY GENERAL DIAGNOSTIC, LAPAROSCOPIC  TAKEDOWN AND REMOVAL J-TUBE, LAPAROSCOPIC COLOSTOMY, I & D ABDOMINAL WALL today   [id number] zosyn   Continue pain control       Pneumoperitoneum   As above   General surgery on board       Abdominal wall abscess   S/p drainage   Continue zosyn and vancomycin       Possible empyema   H/o recent thoracotomy and prolonged intubation   Continue antibiotics   Appreciate pulmonology recommendations. Continue antibiotics. We will follow with chest x-rays. Rectal fistula   S/p surgery       B/L AKA   Stable       Severe sepsis   Resolved   Continue antibiotics       Hypokalemia   Gave 10 mEq IV potassium yesterday and another 10 mEq today   Recheck pending   I cannot give the patient oral potassium at this time due to his abdominal surgery   Monitor carefully on telemetry as IV potassium and renal disease is risky       Anemia   Transfused 1 unit PRBC 9/10       HTN   Holding       DM2   SSI       Suspected acute GIB   GI consulted       Diarrhea   C. Diff            Nephrology:   Has pain from sx.  Tolerating clears so far.   1 Alfredo/on HD Now    2 S/p lap sx for J tube removal and laproscopic Colostomy, I and D  of abd wall    3 Severe PCM    4 Anemia of acute blood loss    5 Sepsis   6 Empyema    7 h/o HTN    8 DM    9 Sacral decub    10 Above knee amputation        Plan   1 No acute indication for HD at this time    2 Plan HD Monday, pt anuric    3 follow In and out    4 Has temp Kurt cath and need cath care   5 epogen eventually to assist with anemia    6 nutrition support    7 Nep will follow    8 k replaced           Pulmonary:   --continue abx for abd process   --follow clinically and if needed repeat chest ct and if worsening would need re-evaluation by thoracic surgery as percutaneous drainage of the R pleural collection would be futile        Gastroenterology [facility] - Care Day 1 (9/10/2022) by Soni Trinh       Review Status Review Entered   Completed 9/11/2022 13:00      Criteria Review      Care Day: 1 Care Date: 9/10/2022 Level of Care: Intermediate Care    Guideline Day 1    Clinical Status    (X) * Clinical Indications met    Interventions    (X) Inpatient interventions as needed    9/11/2022 13:00:43 EDT by Elvis Alvarado, [address] [place] [address], LAPAROSCOPIC  TAKEDOWN AND REMOVAL J-TUBE, LAPAROSCOPIC COLOSTOMY, I & D ABDOMINAL WALL    * Milestone   Additional Notes   DATE: 9/10/2022   [facility] DAY 1         Chief Complaint/ED Presentation:   Randa Napier is a 45 y.o. male who was sent by Viky for possible bowel perforation. The patient was recently at another hospital where he apparently underwent an amputation. Afterwards he had aspiration pneumonia with respiratory failure there required prolonged intubation. He was eventually extubated but then had a loculated effusion and underwent a decortication and right thoracotomy. Apparently during this time so went into renal failure it is now on dialysis. Additionally he has noted explosive persistent diarrhea throughout this time. Patient states that began after they used a rectal manager on him that did not go well. He underwent a J-tube placement 1 week ago shows J-tube as a feeding tube. They began to note drainage from around it and sent him for a CT scan that showed rectal perforation and possible intraperitoneal perforation. Vitals:   113/50   99.5F   76   19   94% 2L NC   89% ROOM AIR (BASELINE)            Abnl/Pertinent Labs/Radiology/Diagnostic Studies:   9/10/22 12:00   WBC: 19.7 (H)   NRBC: 0.0   RBC: 2.26 (L)   HGB: 6.9 (L)   HCT: 22.5 (L)   MCV: 99.6 (H)   MCH: 30.5   MCHC: 30.7   RDW: 17.9 (H)   PLATELET: 470   MPV: 9.8   NEUTROPHILS: 94 (H)   BAND NEUTROPHILS: 1   LYMPHOCYTES: 2 (L)   MONOCYTES: 3 (L)   EOSINOPHILS: 0   BASOPHILS: 0   IMMATURE GRANULOCYTES: 0   DF: MANUAL   ABSOLUTE NRBC: 0.00   ABS. NEUTROPHILS: 18.7 (H)   ABS. IMM. GRANS.: 0.0   ABS. LYMPHOCYTES: 0.4 (L)   ABS. MONOCYTES: 0.6   ABS. EOSINOPHILS: 0.0   ABS. BASOPHILS: 0.0   RBC COMMENTS: ANISOCYTOSIS   1+      Sodium: 140   Potassium: 3.0 (L)   Chloride: 106   CO2: 27   Anion gap: 7   Glucose: 95   BUN: 17   Creatinine: 2.16 (H)   BUN/Creatinine ratio: 8 (L)   Calcium: 7.4 (L)   Phosphorus: 1.3 (L)   Magnesium: 1.5 (L)   GFR est non-AA: 32 (L)   GFR est AA: 39 (L)   Bilirubin, total: 0.7   Protein, total: 5.8 (L)   Albumin: 1.7 (L)   Globulin: 4.1 (H)   A-G Ratio: 0.4 (L)   ALT: <6 (L)   AST: 9 (L)   Alk. phosphatase: 86   Lipase: 54 (L)   Troponin-High Sensitivity: 6   NT pro-BNP: 5,733 (H)   Vitamin B12: 729   Folate: 4.1 (L)   Iron: 12 (L)    12 (L)   TIBC: 141 (L)   Iron % saturation: 9 (L)   Ferritin: 715 (H)         CXR:   R pleural effusion            ED treatment:   Is noted that the patient has been on vancomycin Monday Wednesday and Friday of this week and Azactam every day this week. Consult is placed for general surgery. Juan Ferguson is attempting to send fax a copy of the CT scan report to the hospital here. I have spoken with Dr. Roxy Jorgensen with general surgery and he would like repeat CT of the abdomen and pelvis since [facility] did not send any of the films or documentation with the patient. We will add 25 to 50 cc of water-soluble contrast to the PEG tube for scan. Dr. Maye Key will see.         Rectal contrast is also ordered for the scan.  Patient may require colorectal surgery if fistula/rectal perf documented. Physical Exam:   Constitutional:        General: He is in acute distress (With abdominal pain). Appearance: He is well-developed. He is ill-appearing (Chronic). Comments: This is a bilateral amputee whose patient currently at [facility] and was sent here specifically for evaluation of perforated viscus. Vital signs are as noted. HENT:       Head: Normocephalic and atraumatic. Nose: Nose normal.       Mouth/Throat:       Mouth: Mucous membranes are moist.    Eyes:       General: No scleral icterus. Conjunctiva/sclera: Conjunctivae normal.       Pupils: Pupils are equal, round, and reactive to light. Neck:       Thyroid: No thyromegaly. Vascular: No JVD. Trachea: No tracheal deviation. Comments: No carotid bruits noted. Cardiovascular:       Rate and Rhythm: Normal rate and regular rhythm. Heart sounds: Normal heart sounds. No murmur heard. No friction rub. No gallop. Pulmonary:       Effort: Pulmonary effort is normal. No respiratory distress. Breath sounds: Normal breath sounds. No wheezing, rhonchi or rales. Comments: Some diminished breath sounds on the right. Chest:       Chest wall: No tenderness. Abdominal:       General: Abdomen is flat. There is no distension. Palpations: Abdomen is soft. There is no mass. Tenderness: There is abdominal tenderness (PEG tube is noted in the right upper quadrant and there is localized pain around the base of the same. Pain LLQ and Suprapubically with gurding). There is guarding (Some voluntary guarding is noted). There is no rebound. Comments: Bowel sounds are diminished    Genitourinary:      Comments: There are sacral partial-thickness ulcerations noted. These are erythematous with some degree of eschar.   Is extending down towards the rectum   There appears to be a rectal tear/fistula at 7 o'clock position. Could represent rectal perf and fistula   Musculoskeletal:          General: No tenderness. Cervical back: Normal range of motion and neck supple. Comments: Bilateral lower leg amputations noted. Pressure wound dressing on the left stump    Lymphadenopathy:       Cervical: No cervical adenopathy. Skin:      General: Skin is warm and dry. Capillary Refill: Capillary refill takes 2 to 3 seconds. Coloration: Skin is pale. Findings: No erythema or rash. Neurological:       General: No focal deficit present. Mental Status: He is alert and oriented to person, place, and time. Cranial Nerves: No cranial nerve deficit. Coordination: Coordination normal.       Deep Tendon Reflexes: Reflexes are normal and symmetric. Psychiatric:          Mood and Affect: Mood normal.          Behavior: Behavior normal.          Thought Content: Thought content normal.          Judgment: Judgment normal.                    MD Consults/Assessments & Plans:   #1 at most immediate is the leaking J-tube. It is unclear to me why J-tube was chosen as opposed to PEG for his feedings. Additionally he has significant subcutaneous abscess in that area as well. This is also leading to pneumoperitoneum though he is not particularly peritonitis at this point. His second issue is this rectal fistula giving him diarrhea excoriation and making his sacral decubitus ulcers worse. Discussed with patient and family these issues. I think he needs to have his j -tube removed and his abdominal wall drained. We will start off laparoscopically and see if there is free perforation. If there is then the portion where his J-tube is may need to be removed. Additionally as the patient is a high risk surgical candidate given his possible empyema and recent thoracotomy and prolonged intubation we will proceed with colostomy even though this is unlikely his most immediate issue.    Risks and benefits of the procedure have been discussed with him and his family at length and they agree to proceed. Operatively may need to be on TPN for some time depending on how he does with eating. Eventually if he really is unable to eat may need to have a PEG tube placed once the abdominal wall is a little more cleaned up. Operative Note   Date of Surgery: 9/10/2022       Preoperative Diagnosis: FREE AIR AND PERIRECTAL FISTULA        Postoperative Diagnosis: ABDOMINAL WALL ABCESS FROM LEAKING J TUBE AND PERIRECTAL FISTULA        Surgeon(s) and Role:      Nahum Ortega MD - Primary       Surgical Assistant:        Anesthesia: General        Procedure: Procedure(s):   LAPAROSCOPY GENERAL DIAGNOSTIC, LAPAROSCOPIC  TAKEDOWN AND REMOVAL J-TUBE, LAPAROSCOPIC COLOSTOMY, I & D ABDOMINAL WALL       Indications: The patient was admitted to the hospital with free air. He recently underwent NG tube placement. His white blood cell count had been going up he had a lot of of purulent and feculent drainage from his G-tube site. CT scan was concerning for free perforation secondary to the J-tube. Additionally the patient was noted to have a perianal fistula with leakage and excoriation of his skin with sacral decubitus ulcer. .   Exploration and colostomy for the above        Discussed the risk of surgery including infection, hematoma, bleeding, bile duct or bowel injury,The patient understands the risk that the procedure could be an open procedure. The patient understands the risks, any and all questions were answered to the patient's satisfaction, and they freely signed the consent for operation. Procedure in Detail: Patient was brought to the operating placed operative table supine position. General endotracheal anesthesia was then established. He was then prepped and draped in usual sterile fashion. 5 mm right subcostal incision was then made. A 5 mm Optiview trocar was then placed.   The abdomen was insufflated to 15 mmHg an additional 5 mm and eventually a 12 mm trocar were all placed on the right side of the abdomen. The J-tube site was identified. There did not appear to be any leaking from it. However given the amount of subcutaneous air and abscess from the tube itself the decision was made to remove it. Skin stitch at the tube was then cut and the tube was removed. The silk sutures holding up the bowel were then removed. We were able to free the piece of bowel off of the abdominal wall that the only thing holding up to the abdominal wall was the tube insertion site that was leaking into the abdominal wall. We ensured that this was all 1 piece of bowel and this was only one edge of it going up to the abdominal wall. We then introduced an Endo JUDITH blue load and fired thus freeing the bowel completely from the abdominal wall and closing the defect. We then inspected the bowel that we had removed and there did not appear to be any stenosis or leaking. Checked this several times throughout the case. The remaining portion of bowel that was stuck up into the abdominal wall was then removed from the abdominal wall placed in an Endobag and taken out of the abdomen. As previously noted the patient had a perirectal fistula that was excoriating his skin and constantly draining. We had decided to do a colostomy in order to help wound healing. The distal sigmoid colon was identified. We also identified an area on the abdominal wall that seemed amenable for the colostomy. A window was made in the distal sigmoid colon mesentery and a JUDITH blue load 2 firings were introduced and fired. We then mobilized the mesentery using the harmonic scalpel and cautery. We did have to mobilize the colon down off the white line of Toldt. Eventually we were able to free it up such that it was able to come up to our area for colostomy without tension. The distal stump was marked using clips.   We irrigated no bleeding or leakage was seen. Once we had freed up the bowel we then turned our attention to the skin. The area was first cut circumferentially with a knife we then dissected down through the abdominal wall until we got onto the fascia. We then once at the fascia made a cruciate incision and then once through that opened up the peritoneum. We initially brought the bowel up but it seems somewhat tight. We then began to open up the fascia a little bit more. During this we damaged it is assumed to be the epigastric vessel on that side. There was some bleeding. This was grabbed using clamps. Once the bleeding was under control. The vessel was tied off using 3-0 Vicryl suture. No further bleeding from this was identified. We then brought the bowel out through the whole and it seemed to come through easily without any sign of tension. We then turned our attention back into the abdomen and irrigated no further bleeding was identified we once again checked the bowel from the J-tube resection and this seemed to be okay. No bleeding was seen from the epigastric at this point. We then turned our attention to the abdominal wall where the abscess was. This was opened. There appeared to be some gangrenous soft tissue within the abdominal wall presumably from soilage from the J-tube. This was resected using cautery. We then turned our attention to the colostomy. Along the staple line we opened up the colon. The colostomy was then matured using 3-0 Vicryl suture grabbing skin seromuscular and mucosa. At the end of the procedure it appeared to be pink and viable. We once again looked inside did not appear to be any tension on the ostomy. The 12 mm trocar was removed and the fascia was closed using 0 Vicryl suture and the Endo fascial closure device. The abdomen was desufflated and the other trochars were removed.   The ostomy appliance was applied the abscess site was packed with gauze staples and dressings were applied. Patient was woken the OR and taken to PACU in stable condition       Estimated Blood Loss:  100       Findings   J-tube leaking into the abdominal wall   Rectal fistula       Tourniquet Time: * No tourniquets in log *         Implants: * No implants in log *              Specimens:    ID Type Source Tests Collected by Time Destination   1 : J-tube insertion sited Fresh Jejunum   Maycol Mooney MD 9/10/2022 5391 Pathology              Drains: None                  Complications: None       Counts: Sponge and needle counts were correct times two. PLAN:   1. Intractable abdominal pain. We will admit the patient for further evaluation and treatment. This is THE most likely source of the patient's sepsis which will be discussed below. The patient will be started on vancomycin and Zosyn. We will await blood culture results. We will await further recommendation from the surgeon. 2.  Perforated bowel. The patient has been scheduled for surgery for the perforated bowel. We will await further recommendation from the surgeon. 3.  Acute renal failure, on dialysis. The patient was recently started dialysis because of the acute renal failure. Nephrology group will be consulted for continuation of treatment during hospitalization. 4.  Type 2 diabetes. The patient will be placed on sliding scale with insulin coverage. We will check hemoglobin A1c level. 5.  Suspected acute GI bleed. The patient is guaiac positive. Gastroenterology consult will be requested to assist in further evaluation and treatment. We will start the patient on Protonix. The patient will be n.p.o. in anticipation of intervention by Gastroenterology. 6.  Acute blood loss anemia. We will monitor the patient's hemoglobin and hematocrit closely. We will transfuse as indicated. We will also carry out anemia workup. 7.  Sacral decubitus ulcer. Wound Care consult will be requested for local wound care. 8.  Thrombocytosis. This is most likely reactive thrombocytosis. We will monitor the patient's platelet count. 9.  Hypokalemia. We will replete potassium and repeat potassium level. We will also check magnesium level. 10.  Empyema. This is based on CT scan of the abdomen and pelvis result. We will obtain CT scan of the chest for further evaluation. Depending on the result of the CT scan of the chest, the patient may require chest tube placement. 11.  Bilateral above-knee amputation. This is secondary to recurrent infection. We will continue to monitor. 12.  Diarrhea. We will send stool for C. diff antigen. 13.  Suspected rectal fistula. The patient may require Colorectal Surgery consult eventually. 14.  Severe sepsis without septic shock. As stated above, intra-abdominal infection is the most likely source. We will cover the patient with vancomycin and Zosyn while awaiting blood culture as well as urine culture results. We will also await the results of CT scan of the chest to evaluate the patient for other possible sources of infection. 15.  Hypertension. We will hold antihypertensive medication in the setting suspected sepsis and monitor the patient's blood pressure closely. 16.  Other issues; Code status, the patient is a full code. The patient has bilateral above-knee amputation. The patient will not be able to have compression stocking for DVT prophylaxis.                NEPHROLOGY CONSULT:   1 Alfredo/on HD Now    2 S/p lap sx for J tube removal and laproscopic Colostomy, I and D  of abd wall    3 Severe PCM    4 Anemia of acute blood loss    5 Sepsis   6 Empyema    7 h/o HTN    8 DM    9 Sacral decub    10 Above knee amputation        Plan   1 No acute indication for HD at this time    2 Plan HD Monday, unless dictated by labs and exam earlier    3 follow In and out    4 Has temp Kurt cath and need cath care   5 epogen eventually to assist with anemia    6 nutrition support 7 Nep will follow                Medications:   HYDROmorphone (DILAUDID) injection 1 mg IV X2   sodium chloride 0.9 % bolus infusion 2,040 mL IV X1      ondansetron (ZOFRAN) injection 4 mg   Dose: 4 mg   Freq: EVERY 4 HOURS AS NEEDED Route: IV X1      pantoprazole (PROTONIX) 40 mg in 0.9% sodium chloride 10 mL injection   Dose: 40 mg   Freq: EVERY 12 HOURS Route: IV      piperacillin-tazobactam (ZOSYN) 3.375 g in 0.9% sodium chloride (MBP/ADV) 100 mL MBP   Dose: 3.375 g   Freq: EVERY 8 HOURS Route: IV      potassium chloride 10 mEq in 100 ml IVPB   Dose: 10 mEq   Freq: ONCE Route: IV X1      vancomycin (VANCOCIN) 1500 mg in  ml infusion   Dose: 1,500 mg   Freq: ONCE Route: IV      piperacillin-tazobactam (ZOSYN) 4.5 g in 0.9% sodium chloride (MBP/ADV) 100 mL MBP   Dose: 4.5 g   Freq: NOW Route: IV X1      fentaNYL citrate (PF) injection 25 mcg   Dose: 25 mcg   Freq: MULTIPLE Route: IV X2      HYDROmorphone (DILAUDID) injection 0.2 mg   Dose: 0.2 mg   Freq: MULTIPLE Route: IV X1              Gastroenterology [facility] - Clinical Indications for Admission to Inpatient Care by Karina Etienne       Review Status Review Entered   Completed 9/11/2022 12:59      Criteria Review      Clinical Indications for Admission to Inpatient Care    Most Recent : Karina Etienne Most Recent Date: 9/11/2022 12:59:58 EDT    (X) Hospital admission is needed for appropriate care of the patient because of  1 or more  of    the following :       (X) Suspected acute intra-abdominal process, as indicated by  1 or more  of the following  (1)       (2) (3) (4) (5):          (X) Other signs or symptoms of acute abdominal disease (eg, severe pain, free air) (12)          9/11/2022 12:59:58 EDT by Karina Etienne            Pneumoperitoneum is out of proportion to recent J tube placement. Concern for perforation of Jejunum at J tube entry site. Proctitis, Rectal fistula and contained gas are likely old.   R pleural empyema or post surgical gas may be subacute or chronic.        (X) Complication of gastrostomy or jejunostomy feeding tube, as indicated by  1 or more  of the       following  (46) (52):          (X) Luminal perforation          (X) Gastrocolonic or gastrocutaneous fistula

## 2022-09-15 NOTE — CONSULTS
Consult    Patient: Milena Soni MRN: [id number]  SSN: [id number]    YOB: 1970  Age: 45 y.o. Sex: male      Subjective:      Milena Soni is a 45 y.o. male who is being seen for evaluation of his bilateral amputation sites. He had prior R BKA and a more recent L BKA. This was done at [facility]. He now presents with sepsis. Past Medical History:   Diagnosis Date    Diabetes ([address]      History reviewed. No pertinent surgical history. History reviewed. No pertinent family history.   Social History     Tobacco Use    Smoking status: Never    Smokeless tobacco: Never   Substance Use Topics    Alcohol use: No      Current Facility-Administered Medications   Medication Dose Route Frequency Provider Last Rate Last Admin    potassium phosphate 15 mmol in 0.9% sodium chloride 250 mL infusion   IntraVENous CONTINUOUS Juan Scott MD        HYDROmorphone (DILAUDID) 1 mg/mL oral solution 2 mg  2 mg Oral Q4HWA Nadege RICHARD MD   2 mg at 09/15/22 8662    anidulafungin (ERAXIS) 100 mg in 0.9% sodium chloride 130 mL IVPB  100 mg IntraVENous Q24H Jayne Nichole MD        LORazepam (ATIVAN) tablet 0.25 mg  0.25 mg Oral QHS PRN Ludin Zuluaga MD   0.25 mg at 09/14/22 2201    sertraline (ZOLOFT) tablet 25 mg  25 mg Oral DAILY Ludin Zuluaga MD   25 mg at 09/15/22 0934    epoetin vera-epbx (RETACRIT) injection 10,000 Units  10,000 Units SubCUTAneous DIALYSIS MON, WED & FRI Florecita Malagon MD   10,000 Units at 09/14/22 2139    piperacillin-tazobactam (ZOSYN) 3.375 g in 0.9% sodium chloride (MBP/ADV) 100 mL MBP  3.375 g IntraVENous Q12H Marge Norman MD 25 mL/hr at 09/15/22 0615 3.375 g at 09/15/22 0615    HYDROmorphone (DILAUDID) injection 0.5-1 mg  0.5-1 mg IntraVENous Q2H PRN Ludin Zuluaga MD   1 mg at 09/15/22 0520    collagenase (SANTYL) 250 unit/gram ointment   Topical David Velez MD   Given at 09/15/22 0914    0.9% sodium chloride infusion 250 mL  250 mL IntraVENous PRN Early, Hernandez Long CRNA 15 mL/hr at 09/10/22 0205 1,000 mL at 09/10/22 0205    glucose chewable tablet 16 g  4 Tablet Oral PRN Anibal Mahan MD        glucagon (GLUCAGEN) injection 1 mg  1 mg IntraMUSCular PRN Anibal Mahan MD        dextrose 10 % infusion 0-250 mL  0-250 mL IntraVENous PRN Anibal Mahan MD        ondansetron [facility]) injection 4 mg  4 mg IntraVENous Q4H PRN Anibal Mahan MD   4 mg at 09/14/22 1623    diphenhydrAMINE (BENADRYL) injection 12.5 mg  12.5 mg IntraVENous Q6H PRN Anibal Mahan MD   12.5 mg at 09/12/22 3657    sodium chloride (NS) flush 5-40 mL  5-40 mL IntraVENous Q8H Pat Valdivia MD   10 mL at 09/14/22 1434    sodium chloride (NS) flush 5-40 mL  5-40 mL IntraVENous PRN Pat Valdivia MD        acetaminophen (TYLENOL) tablet 650 mg  650 mg Oral Q6H PRN Pat Valdivia MD   650 mg at 09/14/22 1315    Or    acetaminophen (TYLENOL) suppository 650 mg  650 mg Rectal Q6H PRN Pat Valdivia MD        polyethylene glycol (MIRALAX) packet 17 g  17 g Oral DAILY PRN Pat Valdivia MD        ondansetron (ZOFRAN ODT) tablet 4 mg  4 mg Oral Q8H PRN Pat Valdivia MD        Or    ondansetron (ZOFRAN) injection 4 mg  4 mg IntraVENous Q6H PRN Pat Valdivia MD   4 mg at 09/13/22 0547    L.acidophilus-paracasei-S.thermophil-bifidobacter (RISAQUAD) 8 billion cell capsule  1 Capsule Oral DAILY Pat Valdivia MD   1 Capsule at 09/15/22 0934    insulin lispro (HUMALOG) injection   SubCUTAneous AC&HS Doretha Jesus MD        glucose chewable tablet 16 g  4 Tablet Oral PRN Pat Valdivia MD        glucagon (GLUCAGEN) injection 1 mg  1 mg IntraMUSCular PRN Pat Valdivia MD        pantoprazole (PROTONIX) 40 mg in 0.9% sodium chloride 10 mL injection  40 mg IntraVENous Q12H Pat Valdivia MD   40 mg at 09/15/22 0933    dextrose 10 % infusion 0-250 mL  0-250 mL IntraVENous PRN Pat Valdivia MD        Vancomycin - pharmacy to dose   Other Rx Dosing/Monitoring Shea Alannah Brenner MD        0.9% sodium chloride infusion 250 mL  250 mL IntraVENous PRN Trever Dawson MD            Allergies   Allergen Reactions    Bee Sting [Sting, Bee] Unknown (comments)       Review of Systems:  A comprehensive review of systems was negative except for that written in the History of Present Illness. Objective:     Vitals:    09/15/22 0358 09/15/22 0524 09/15/22 0551 09/15/22 0649   BP:  (!) 136/59     Pulse: 77 74 71    Resp:  17     Temp:  99.5 °F (37.5 °C)     TempSrc:       SpO2:  98%     Weight:    74.4 kg (164 lb 0.4 oz)   Height:            Physical Exam:  General: Patient is pleasant and cooperative and appears to be in no acute distress. HEENT: Normocephalic atraumatic. Appropriate tracking. No scleral icterus. Nares patent. Trachea is midline. Chest: Unlabored respirations. Symmetrical chest expansion. Cardiac: Regular rate and rhythm. Acyanotic  Abdomen: Abdomen is soft, nontender, nondistended. Extremities: Moves all extremities. Vascular: R BKA site well healed. Left BKA site dressing in place. Has some escar over the incision line but no cellulitis or fluctuence. Assessment:     Hospital Problems  Date Reviewed: 9/10/2022            Codes Class Noted POA    Severe protein-calorie malnutrition ([facility] [address] ICD-10-CM: C62  ICD-9-CM: 641  9/13/2022 Yes        * (Principal) Intra-abdominal infection ICD-10-CM: B99.9  ICD-9-CM: 136.9  9/10/2022 Yes         Bilateral BKA    Plan:     R BKA site well healed  L BKA site with some incisional breakdown but no apparent infection. Do not suspect this is his source. --Continue local wound care to L BKA site  --Offload BL stumps to avoid pressure ulcer development  --No need for any vascular surgery intervention at this time. At time of discharge patient should follow up with his operative surgeon for continued wound care. Thank you very much for allowing me to participate in the care of this patient.  If there are any questions or concerns please do not hesitate to call or re-consult. Office number: [phone]    Vascular surgery will sign-off at this time.       Signed By: Melody Marsh MD     September 15, 2022

## 2022-09-15 NOTE — PROGRESS NOTES
Nephrology Progress Note  Anup Wilson  Date of Admission : 9/9/2022    CC:  Follow up for JEMIMA       Assessment and Plan     JEMIMA on HD:  - 2/2 ATN  - HD dependent MWF for now while here  - no signs of recovery yet  - will have PC placed tomorrow  - daily labs for now    Hypokalemia:  - 4 k bath with HD    Anemia:  - MICHAEL MWF    PAD s/p b/l BKA    Possible rectal fistula:  - CT neg  - to OR for exam and debridement of perineal wound     Recent sepsis    Empyema    ABD wall abscess:  - J-tube removal, colostomy, I&D of abd wound    Sacral decub       Interval History:  Seen and examined . Confused. HD yesterday. UOP of 200cc reported. To OR today for wound debridement. Unable to obtain ROS this AM.    Current Medications: all current  Medications have been eviewed in EPIC  Review of Systems: Pertinent items are noted in HPI. Objective:  Vitals:    Vitals:    09/15/22 0358 09/15/22 0524 09/15/22 0551 09/15/22 0649   BP:  (!) 136/59     Pulse: 77 74 71    Resp:  17     Temp:  99.5 °F (37.5 °C)     TempSrc:       SpO2:  98%     Weight:    74.4 kg (164 lb 0.4 oz)   Height:         Intake and Output:  No intake/output data recorded. 09/13 1901 - 09/15 0700  In: 760 [I.V.:760]  Out: 1050 [Urine:200]    Physical Examination:  Pt intubated     no  General: NAD,Conversant   Neck:  Supple, no mass  Resp:  Lungs CTA B/L, no wheezing , normal respiratory effort  CV:  RRR,  no murmur or rub, no LE edema  GI:  Soft, NT, + Bowel sounds, + ostomy  Neurologic:  Non focal  Psych:             AAO x 3 appropriate affect  Skin:  No Rash  Access:           KRYS zambrano    []    High complexity decision making was performed  []    Patient is at high-risk of decompensation with multiple organ involvement    Lab Data Personally Reviewed: I have reviewed all the pertinent labs, microbiology data and radiology studies during assessment.     Recent Labs     09/15/22  0224 09/14/22  0106 09/13/22  0528    143 140   K 3.4* 3.3* 2.9*  109* 107   CO2 29 27 28    106* 78   BUN 11 20 16   CREA 1.81* 2.75* 2.13*   CA 7.7* 7.2* 7.3*   MG 1.7 1.7 1.7   PHOS 1.2* 1.9* 1.9*   INR 1.1  --   --        Recent Labs     09/15/22  0224 09/14/22  0106 09/13/22  0528   WBC 16.9* 16.0* 15.6*   HGB 7.2* 7.1* 7.3*   HCT 22.8* 22.6* 23.0*   * 370 328       No results found for: SDES  Lab Results   Component Value Date/Time    Culture result: NO GROWTH 6 DAYS 09/09/2022 08:52 PM     Recent Results (from the past 24 hour(s))   GLUCOSE, POC    Collection Time: 09/14/22  1:07 PM   Result Value Ref Range    Glucose (POC) 96 65 - 117 mg/dL    Performed by Raul Willingham (DOMINGA)    GLUCOSE, POC    Collection Time: 09/14/22  5:01 PM   Result Value Ref Range    Glucose (POC) 70 65 - 117 mg/dL    Performed by Raul Willingham (DOMINGA)    GLUCOSE, POC    Collection Time: 09/14/22  9:06 PM   Result Value Ref Range    Glucose (POC) 123 (H) 65 - 117 mg/dL    Performed by ALEJANDRA Mane    CBC WITH AUTOMATED DIFF    Collection Time: 09/15/22  2:24 AM   Result Value Ref Range    WBC 16.9 (H) 4.1 - 11.1 K/uL    RBC 2.42 (L) 4.10 - 5.70 M/uL    HGB 7.2 (L) 12.1 - 17.0 g/dL    HCT 22.8 (L) 36.6 - 50.3 %    MCV 94.2 80.0 - 99.0 FL    MCH 29.8 26.0 - 34.0 PG    MCHC 31.6 30.0 - 36.5 g/dL    RDW 18.0 (H) 11.5 - 14.5 %    PLATELET 503 (H) 190 - 400 K/uL    MPV 9.7 8.9 - 12.9 FL    NRBC 0.0 0  WBC    ABSOLUTE NRBC 0.00 0.00 - 0.01 K/uL    NEUTROPHILS 87 (H) 32 - 75 %    LYMPHOCYTES 6 (L) 12 - 49 %    MONOCYTES 4 (L) 5 - 13 %    EOSINOPHILS 2 0 - 7 %    BASOPHILS 0 0 - 1 %    IMMATURE GRANULOCYTES 1 (H) 0.0 - 0.5 %    ABS. NEUTROPHILS 14.6 (H) 1.8 - 8.0 K/UL    ABS. LYMPHOCYTES 1.1 0.8 - 3.5 K/UL    ABS. MONOCYTES 0.7 0.0 - 1.0 K/UL    ABS. EOSINOPHILS 0.3 0.0 - 0.4 K/UL    ABS. BASOPHILS 0.1 0.0 - 0.1 K/UL    ABS. IMM.  GRANS. 0.2 (H) 0.00 - 0.04 K/UL    DF AUTOMATED     METABOLIC PANEL, BASIC    Collection Time: 09/15/22  2:24 AM   Result Value Ref Range    Sodium 142 136 - 145 mmol/L    Potassium 3.4 (L) 3.5 - 5.1 mmol/L    Chloride 107 97 - 108 mmol/L    CO2 29 21 - 32 mmol/L    Anion gap 6 5 - 15 mmol/L    Glucose 100 65 - 100 mg/dL    BUN 11 6 - 20 MG/DL    Creatinine 1.81 (H) 0.70 - 1.30 MG/DL    BUN/Creatinine ratio 6 (L) 12 - 20      GFR est AA 48 (L) >60 ml/min/1.73m2    GFR est non-AA 40 (L) >60 ml/min/1.73m2    Calcium 7.7 (L) 8.5 - 10.1 MG/DL   MAGNESIUM    Collection Time: 09/15/22  2:24 AM   Result Value Ref Range    Magnesium 1.7 1.6 - 2.4 mg/dL   PHOSPHORUS    Collection Time: 09/15/22  2:24 AM   Result Value Ref Range    Phosphorus 1.2 (L) 2.6 - 4.7 MG/DL   PROTHROMBIN TIME + INR    Collection Time: 09/15/22  2:24 AM   Result Value Ref Range    INR 1.1 0.9 - 1.1      Prothrombin time 11.9 (H) 9.0 - 11.1 sec   GLUCOSE, POC    Collection Time: 09/15/22  6:47 AM   Result Value Ref Range    Glucose (POC) 99 65 - 117 mg/dL    Performed by Lizett Sharma travel DOMINGA Apple MD  [address]  Phone - [phone]   Fax - [phone]  [email or web address]

## 2022-09-15 NOTE — WOUND CARE
Wound/Ostomy Visit     Postoperative visit with Dr Subha Bejarano  Surgery: Colostomy  Date of Surgery: 9.10.22      Surgeon: Dr. Live Quiles Location: left quadrant     Ostomy Assessment:  Stoma appearance: Red/Maroon; slightly dusky  Mucocutaneous Junction: intact  Peristomal skin: intact  Wound: trocar sites with staples  Output: brown  Current appliance:  2 piece pouch with ring/paste    Treatments:  Pouch removed, stoma and peristomal skin cleaned and assessed, new pouch prepared and applied. Teaching/Subjects covered today:  Encouraged pt to review handout given to patient/family and ask questions regarding material on next ostomy nurse visit. - When/how to clean stoma & peristomal skin  - When/how to change appliance        Left abdomen, open surgical wound/ former J tube site:  3 x 2 x 3.6 cm; 90% yellow 10% red; small serous exudate; no odor; mild wilbur wound erythema. Cleansed with VASHE; Applied Santyl and packed with VASHE moistened roll gauze. Covered with dry dressing. POA Left BKA site with dried crusted incision with sutures and dehisced area to lateral side:  19 x 1 x 2.8 cm. The depth is on the lateral side of the incision. Cleansed with VASHE; filled depth with VASHE moistened gauze; covered incision with XEROFORM and ABD pad. Patient declined Sacral and buttock assessment because he is going to surgery today. Plan to assess tomorrow. Recommendations:   Left abdomen:  Daily irrigate with saline; pack with saline moist gauze; cover with dry dressing. Sacrum and buttocks:  Daily cleanse with saline; apply Santyl and saline moist gauze; cover with dry dressing. Right upper back:  Daily cleanse with saline; apply honey gel; cover with dry dressing. Left BKA site:  Daily cleanse with saline; apply Xeroform; cover with dry dressing. 5.   Empty appliance when 1/3 full and PRN. Encourage patient/family to notify nurse and  assist w/ pouch emptying to promote self-care. Change appliance twice weekly and PRN for leaking ASAP. DO NOT REINFORCE LEAKS to avoid skin breakdown. Transition of Care: Will follow routinely.      ARNOLDO GreenwoodN RN [facility]patient Wound Care  Available on [employer]  Office [phone]

## 2022-09-15 NOTE — PROGRESS NOTES
Orders received, chart reviewed and patient evaluated by physical therapy. Pending progression with skilled acute physical therapy, recommend:  Therapy 3 hours per day 5-7 days per week    Recommend with nursing bed in chair position/ changing position (rolling, quarter turning) every 2 hours daily with maximum assist. Thank you for completing as able in order to maintain patient strength, endurance and independence. Full evaluation to follow.

## 2022-09-15 NOTE — PROGRESS NOTES
[facility]  Hospitalist Group                                                                                          Hospitalist Progress Note  Peter Lovelace MD  Answering service: [phone] from in house phone        Date of Service:  9/15/2022  NAME:  Karli Matthew  :  1970  MRN:  [id number]      Admission Summary: This is a 25-year-old man with a past medical history significant for type 2 diabetes, who presented at the emergency room from [facility] with abdominal pain. The patient was recently admitted to another hospital and underwent left below-knee amputation. The hospitalization became complicated with respiratory failure which required prolonged intubation. The respiratory failure was attributed to aspiration pneumonia. The hospitalization was also complicated with lobulated effusion, for which the patient underwent decortication and right thoracotomy. The patient also went into acute renal failure for which he has been started on hemodialysis. J-tube was placed for supplemental nutrition. The patient was subsequently transferred to [facility] for continuation of care. He was doing relatively well in [facility] until the day of presentation at the emergency room when the patient complained of abdominal pain at the facility. CT scan of the abdomen and pelvis was obtained. The CT scan shows evidence of bowel perforation. The patient was then sent to [facility] emergency room for further evaluation. When the patient arrived at the emergency room, based on his clinical presentation and lab work, Code Sepsis was triggered. The patient received fluid therapy as per sepsis protocol. The emergency room physician consulted general surgeon on-call. The patient was seen by the surgeon and the patient is planned for immediate surgical intervention.   No record of prior admission to this hospital, but the patient was recently admitted to another hospital.    Interval history / Subjective:   Endorses generalized pain, particularly abdominal pain. Also states that vancomycin causes him to hallucinate temporarily. Assessment & Plan:     Intractable abdominal pain  Pneumoperitoneum  Abdominal wall abscess  Severe sepsis  -s/p 9/10 LAPAROSCOPY GENERAL DIAGNOSTIC, LAPAROSCOPIC  TAKEDOWN AND REMOVAL J-TUBE, LAPAROSCOPIC COLOSTOMY, I & D ABDOMINAL WALL   -Cultures no growth   -Persistent leukocytosis   -Pain control, appreciate Dr Isabella Espinosa input   -Continue zosyn/Vanc, eraxis added by ID   -Plan for OR today with CRS and general surgery     Possible empyema  -H/o recent thoracotomy and prolonged intubation at [facility]  -Continue antibiotics  -Appreciate pulmonology recommendations, will continue abx and repeat imaging if worsening     Rectal fistula:   -repeat CT abd 9/14 improvement in rectal thickening  -likely would need outpatient follow up  -Appreciate CRS    B/L AKA   - Wound care on board  - Vascular consulted, no acute surgical intervention indicated     JEMIMA now on HD, appreciate discussion with Nephrology, not yet ESRD but permacath tomorrow    FOBT positive Protonix BID    Hypokalemia: replace as needed  Acute blood loss Anemia: s/p 1 unit PRBC 9/10. Transfuse for hb<7  HTN: holding antihypertensives for now  DM2:SSI, monitor  Diarrhea: C.  Diff negative    Now on regular diet     Code status: full  Prophylaxis: hold, SCDs  PTA: Vibra    Plan: Continue IV antibiotics, follow ID rec. permacath today,  discharge to [facility] when medically stable  Care Plan discussed with: patient, nurse  Anticipated Disposition: TBD     Hospital Problems  Date Reviewed: 9/10/2022            Codes Class Noted POA    Severe protein-calorie malnutrition ([facility] [address] ICD-10-CM: H16  ICD-9-CM: 258  9/13/2022 Yes        * (Principal) Intra-abdominal infection ICD-10-CM: B99.9  ICD-9-CM: 136.9  9/10/2022 Yes       Review of Systems:   A comprehensive review of systems was negative except for that written in the HPI. Vital Signs:    Last 24hrs VS reviewed since prior progress note. Most recent are:  Visit Vitals  BP (!) 136/59 (BP 1 Location: Left arm, BP Patient Position: At rest)   Pulse 71   Temp 99.5 °F (37.5 °C)   Resp 17   Ht 5' 7\" (1.702 m)   Wt 74.4 kg (164 lb 0.4 oz)   SpO2 98%   BMI 25.69 kg/m²         Intake/Output Summary (Last 24 hours) at 9/15/2022 0857  Last data filed at 9/15/2022 0345  Gross per 24 hour   Intake 460 ml   Output 650 ml   Net -190 ml        Physical Examination:     I had a face to face encounter with this patient and independently examined them on 9/15/2022 as outlined below:          Constitutional:  No acute distress,   ENT:  Oral mucosa moist, oropharynx benign. Resp:  CTA bilaterally. No wheezing/rhonchi/rales. No accessory muscle use. CV:  Regular rhythm, normal rate, no murmurs, gallops, rubs    GI:  Colostomy. Painful to palpation. Erythema of abdominal skin surrounding previous PEG site. Musculoskeletal:  No edema, warm, 2+ pulses throughout    Neurologic:  Moves all extremities. AAOx3, CN II-XII reviewed            Data Review:    Review and/or order of clinical lab test  Review and/or order of tests in the radiology section of CPT  Review and/or order of tests in the medicine section of CPT      Labs:     Recent Labs     09/15/22  0224 09/14/22  0106   WBC 16.9* 16.0*   HGB 7.2* 7.1*   HCT 22.8* 22.6*   * 370     Recent Labs     09/15/22  0224 09/14/22  0106 09/13/22  0528    143 140   K 3.4* 3.3* 2.9*    109* 107   CO2 29 27 28   BUN 11 20 16   CREA 1.81* 2.75* 2.13*    106* 78   CA 7.7* 7.2* 7.3*   MG 1.7 1.7 1.7   PHOS 1.2* 1.9* 1.9*     No results for input(s): ALT, AP, TBIL, TBILI, TP, ALB, GLOB, GGT, AML, LPSE in the last 72 hours.     No lab exists for component: SGOT, GPT, AMYP, HLPSE    Recent Labs     09/15/22  0224   INR 1.1   PTP 11.9*      No results for input(s): FE, TIBC, PSAT, FERR in the last 72 hours. Lab Results   Component Value Date/Time    Folate 4.1 (L) 09/10/2022 12:00 PM        No results for input(s): PH, PCO2, PO2 in the last 72 hours. No results for input(s): CPK, CKNDX, TROIQ in the last 72 hours.     No lab exists for component: CPKMB  No results found for: CHOL, CHOLX, CHLST, CHOLV, HDL, HDLP, LDL, LDLC, DLDLP, TGLX, TRIGL, TRIGP, CHHD, CHHDX  Lab Results   Component Value Date/Time    Glucose (POC) 99 09/15/2022 06:47 AM    Glucose (POC) 123 (H) 09/14/2022 09:06 PM    Glucose (POC) 70 09/14/2022 05:01 PM    Glucose (POC) 96 09/14/2022 01:07 PM    Glucose (POC) 104 09/14/2022 06:25 AM     No results found for: COLOR, APPRN, SPGRU, REFSG, WILL, PROTU, GLUCU, KETU, BILU, UROU, MISHA, LEUKU, GLUKE, EPSU, BACTU, WBCU, RBCU, CASTS, UCRY      Medications Reviewed:     Current Facility-Administered Medications   Medication Dose Route Frequency    potassium phosphate 15 mmol in 0.9% sodium chloride 250 mL infusion   IntraVENous ONCE    HYDROmorphone (DILAUDID) 1 mg/mL oral solution 2 mg  2 mg Oral Q4HWA    anidulafungin (ERAXIS) 100 mg in 0.9% sodium chloride 130 mL IVPB  100 mg IntraVENous Q24H    LORazepam (ATIVAN) tablet 0.25 mg  0.25 mg Oral QHS PRN    sertraline (ZOLOFT) tablet 25 mg  25 mg Oral DAILY    epoetin vera-epbx (RETACRIT) injection 10,000 Units  10,000 Units SubCUTAneous DIALYSIS MON, WED & FRI    piperacillin-tazobactam (ZOSYN) 3.375 g in 0.9% sodium chloride (MBP/ADV) 100 mL MBP  3.375 g IntraVENous Q12H    HYDROmorphone (DILAUDID) injection 0.5-1 mg  0.5-1 mg IntraVENous Q2H PRN    collagenase (SANTYL) 250 unit/gram ointment   Topical DAILY    0.9% sodium chloride infusion 250 mL  250 mL IntraVENous PRN    glucose chewable tablet 16 g  4 Tablet Oral PRN    glucagon (GLUCAGEN) injection 1 mg  1 mg IntraMUSCular PRN    dextrose 10 % infusion 0-250 mL  0-250 mL IntraVENous PRN    ondansetron (ZOFRAN) injection 4 mg  4 mg IntraVENous Q4H PRN diphenhydrAMINE (BENADRYL) injection 12.5 mg  12.5 mg IntraVENous Q6H PRN    sodium chloride (NS) flush 5-40 mL  5-40 mL IntraVENous Q8H    sodium chloride (NS) flush 5-40 mL  5-40 mL IntraVENous PRN    acetaminophen (TYLENOL) tablet 650 mg  650 mg Oral Q6H PRN    Or    acetaminophen (TYLENOL) suppository 650 mg  650 mg Rectal Q6H PRN    polyethylene glycol (MIRALAX) packet 17 g  17 g Oral DAILY PRN    ondansetron (ZOFRAN ODT) tablet 4 mg  4 mg Oral Q8H PRN    Or    ondansetron (ZOFRAN) injection 4 mg  4 mg IntraVENous Q6H PRN    L.acidophilus-paracasei-S.thermophil-bifidobacter (RISAQUAD) 8 billion cell capsule  1 Capsule Oral DAILY    insulin lispro (HUMALOG) injection   SubCUTAneous AC&HS    glucose chewable tablet 16 g  4 Tablet Oral PRN    glucagon (GLUCAGEN) injection 1 mg  1 mg IntraMUSCular PRN    pantoprazole (PROTONIX) 40 mg in 0.9% sodium chloride 10 mL injection  40 mg IntraVENous Q12H    dextrose 10 % infusion 0-250 mL  0-250 mL IntraVENous PRN    Vancomycin - pharmacy to dose   Other Rx Dosing/Monitoring    0.9% sodium chloride infusion 250 mL  250 mL IntraVENous PRN     ______________________________________________________________________  EXPECTED LENGTH OF STAY: 9d 14h  ACTUAL LENGTH OF STAY:          5                 Fiona Vega MD

## 2022-09-15 NOTE — ANESTHESIA PREPROCEDURE EVALUATION
Relevant Problems   ENDOCRINE   (+) Uncontrolled diabetes mellitus       Anesthetic History               Review of Systems / Medical History  Patient summary reviewed, nursing notes reviewed and pertinent labs reviewed    Pulmonary          Pneumonia         Neuro/Psych              Cardiovascular                  Exercise tolerance: <4 METS     GI/Hepatic/Renal         Renal disease: dialysis       Endo/Other    Diabetes: poorly controlled         Other Findings   Comments:          Physical Exam    Airway  Mallampati: II  TM Distance: 4 - 6 cm         Cardiovascular    Rhythm: regular  Rate: normal         Dental         Pulmonary                 Abdominal         Other Findings            Anesthetic Plan    ASA: 3  Anesthesia type: general          Induction: Intravenous  Anesthetic plan and risks discussed with: Patient

## 2022-09-15 NOTE — PROGRESS NOTES
Physician Progress Note      PATIENTPerlita Barrios  CSN #:                  [id number]  :                       1970  ADMIT DATE:       2022 8:00 PM  [address] [place] DATE:  RESPONDING  PROVIDER #:        Aria Aparicio MD          QUERY TEXT:    Patient admitted with sepsis. Since admission multiple notes mentioned patient had a sacral decubitus with no stage noted. Per  wound care nurse, patient has a stage 3 pressure injury on their sacrum, left bottocks, and right buttocks. If possible, please document in progress notes and discharge summary the location, present on admission status and stage of the pressure ulcer: The medical record reflects the following:  Risk Factors: 50yo with severe malnutrition    Clinical Indicators:  2. POA Sacrum, Pressure Injury Stage 3: 4 x 4 x 2 cm  100% yellow. Small serous exudate; no odor. Periwound with blanchng red erythema. 3.  POA Left buttocks, Pressure Injury Stage 3:  5 x 3 x 0.2 cm; 50% yellow 50% pink; small serous exudate; no odor. Periwound without erythema. 4. POA Right buttocks, Pressure Injury Stage 3:  1 x 1 x 0.1 cm; 75% pink; 25% yellow; small serous exudate; no odor. Periwound without erythema. Treatment: General surgery and WCN following, Daily cleanse with saline; apply Santyl and saline moist gauze; cover with dry dressing, mobility services, PT/OT, turn and reposition, float heels    Stage 1:  Non-blanchable erythema of intact skin  Stage 2:  Abrasion, Blister, Partial-thickness skin loss, with exposed dermis  Stage 3:  Full-thickness skin loss with damage or necrosis of subcutaneous tissue  Stage 4:  Full-thickness skin & soft tissue loss through to underlying muscle, tendon or bone  Unstageable: Obscured full-thickness skin & tissue loss    Thank you,  Maira Fox  [phone]  I am also available via [employer].   Options provided:  -- Stage 3 Pressure injury on the sacrum, left bottocks, and right buttocks present on admission  -- Other - I will add my own diagnosis  -- Disagree - Not applicable / Not valid  -- Disagree - Clinically unable to determine / Unknown  -- Refer to Clinical Documentation Reviewer    PROVIDER RESPONSE TEXT:    This patient has a stage 3 pressure injury of the sacrum, left buttocks, and right buttocks which was present on admission.     Query created by: Serg Vela on 9/13/2022 2:56 PM      Electronically signed by:  Soraida Felton MD 9/15/2022 12:17 PM

## 2022-09-15 NOTE — PROGRESS NOTES
Problem: Self Care Deficits Care Plan (Adult)  Goal: *Acute Goals and Plan of Care (Insert Text)  Description: FUNCTIONAL STATUS PRIOR TO ADMISSION: Prior to previous hospitalization, d/c to [facility], and current admission to [facility] pt was utilizing w/c for functional mobility and required minimal assistance for transfers. HOME SUPPORT: The patient lived with his wife and daughter and required assistance for all ADL/IADL tasks. Occupational Therapy Goals  Initiated 9/15/2022  1. Patient will perform basic grooming in unsupported sitting with minimal assistance/contact guard assist within 7 day(s). 2.  Patient will perform anterior neck to thigh bathing while in unsupported sitting with minimal assistance/contact guard assist within 7 day(s). 3.  Patient will perform BSC transfers with moderate assistance within 7 day(s). 4.  Patient will perform all aspects of toileting with moderate assistance  within 7 day(s). 5.  Patient will participate in upper extremity therapeutic exercise/activities with supervision/set-up for 10 minutes within 7 day(s). 9/15/2022 1244 by Shelly Wilcox OT  Outcome: Not Met     OCCUPATIONAL THERAPY EVALUATION  Patient: Rozina Godwin (77 y.o. male)  Date: 9/15/2022  Primary Diagnosis: Intra-abdominal infection [B99.9]  Procedure(s) (LRB):  LAPAROSCOPY GENERAL DIAGNOSTIC, LAPAROSCOPIC  TAKEDOWN AND REMOVAL J-TUBE, LAPAROSCOPIC COLOSTOMY, I & D ABDOMINAL WALL (N/A) 5 Days Post-Op   Precautions:   Fall, Other (comment) (bilateral BKA)    ASSESSMENT  Based on the objective data described below, the patient presents with impaired activity tolerance, volition, generalized weakness, and significant pain (sacrum) s/p admission from [facility] for abdominal pain. Pt received supine, RN present to assist with integrity of colostomy bag. Pt minimally amenable to participation in therapy session d/t severe pain.  With gentle encouragement and additional time, pt completed repositioning in bed with max A x2. Educated pt on importance of maintaining activity tolerance and actively completing ADL tasks with assistance during admission. Noted, current plan for pt to d/c to IPR, therefore working towards tolerating three hours of therapy/day. Left in care of RN. Current Level of Function Impacting Discharge (ADLs/self-care): up to total A inferred, max A x2 lateral rolling and scooting towards [facility]    Functional Outcome Measure: The patient scored Total: 15/100 on the Barthel Index outcome measure which is indicative of being totally dependent in basic self-care. Other factors to consider for discharge: PLOF, bilateral BKAs (L BKA with infection/wound)     Patient will benefit from skilled therapy intervention to address the above noted impairments. PLAN :  Recommendations and Planned Interventions: self care training, functional mobility training, therapeutic exercise, balance training, therapeutic activities, endurance activities, patient education, and home safety training    Frequency/Duration: Patient will be followed by occupational therapy 5 times a week to address goals. Recommendation for discharge: (in order for the patient to meet his/her long term goals)  Therapy 3 hours per day 5-7 days per week, working towards tolerating 3 hours    This discharge recommendation:  Has been made in collaboration with the attending provider and/or case management    IF patient discharges home will need the following DME: TBD       SUBJECTIVE:   Patient stated Don't move me any more, they're going to take me down to operate and they need to get it right the first time.     OBJECTIVE DATA SUMMARY:   HISTORY:   Past Medical History:   Diagnosis Date    Diabetes ([facility] [address]    History reviewed. No pertinent surgical history.     Expanded or extensive additional review of patient history:     Home Situation  Home Environment: Private residence  Wheelchair Ramp: Yes  One/Two Story Residence: One story  Living Alone: No  Support Systems: Spouse/Significant Other, Child(marli)  Patient Expects to be Discharged to[de-identified] Rehab Unit Subacute  Current DME Used/Available at Home: Wheelchair, [place], Shower chair, Other (comment) (sliding board)  Tub or Shower Type: Shower      EXAMINATION OF PERFORMANCE DEFICITS:  Cognitive/Behavioral Status:  Neurologic State: Alert  Orientation Level: Oriented X4  Cognition: Follows commands;Decreased attention/concentration  Perception: Appears intact  Perseveration: No perseveration noted  Safety/Judgement: Fall prevention;Home safety    Skin: visually intact, dressings on LLE stump and sacrum     Edema: none noted     Hearing: WDL    Vision/Perceptual:                           Acuity: Able to read clock/calendar on wall without difficulty; Within Defined Limits         Range of Motion:    AROM: Generally decreased, functional  PROM: Within functional limits                      Strength:    Strength: Generally decreased, functional                Coordination:  Coordination: Generally decreased, functional            Tone & Sensation:    Tone: Normal  Sensation: Impaired (paresthesias bilateral LE stumps)                      Balance:       Functional Mobility and Transfers for ADLs:  Bed Mobility:  Rolling: Maximum assistance;Assist x2  Scooting: Maximum assistance;Assist x2    Transfers:       ADL Assessment:  Feeding: Modified independent    Oral Facial Hygiene/Grooming: Setup (inferred, in supported sitting)    Bathing: Maximum assistance (inferred, seated)    Type of Bath: Chlorhexidine (CHG)    Upper Body Dressing: Maximum assistance (inferred)    Lower Body Dressing: Maximum assistance (inferred)    Toileting:  Total assistance                ADL Intervention and task modifications:                 Type of Bath: Chlorhexidine (CHG)                        Cognitive Retraining  Safety/Judgement: Fall prevention;Home safety    Functional Measure:    Barthel Index:  Bathing: 0  Bladder: 0  Bowels: 0  Groomin  Dressin  Feeding: 10  Mobility: 0  Stairs: 0  Toilet Use: 0  Transfer (Bed to Chair and Back): 0  Total: 15/100      The Barthel ADL Index: Guidelines  1. The index should be used as a record of what a patient does, not as a record of what a patient could do. 2. The main aim is to establish degree of independence from any help, physical or verbal, however minor and for whatever reason. 3. The need for supervision renders the patient not independent. 4. A patient's performance should be established using the best available evidence. Asking the patient, friends/relatives and nurses are the usual sources, but direct observation and common sense are also important. However direct testing is not needed. 5. Usually the patient's performance over the preceding 24-48 hours is important, but occasionally longer periods will be relevant. 6. Middle categories imply that the patient supplies over 50 per cent of the effort. 7. Use of aids to be independent is allowed. Score Interpretation (from [facility] [address])    Independent   60-79 Minimally independent   40-59 Partially dependent   20-39 Very dependent   <20 Totally dependent     -Luke Sands., Barthel, DKeriW. (1965). Functional evaluation: the Barthel Index. [facility] ([address] (). The Barthel activities of daily living index: self-reporting versus actual performance in the old (> or = 75 years). Journal of [address] [place] [address] 45(7), [facility], J.MATIM.F, Kimberli Og., Nunu Miller. (1999). Measuring the change in disability after inpatient rehabilitation; comparison of the responsiveness of the Barthel Index and Functional Rhea Measure. Journal of Neurology, Neurosurgery, and Psychiatry, 66(4), 188-517.   Viri Muñiz, N.J.A, ORION Chen, & Kaitlyn Smith MKeriA. (2004) Assessment of post-stroke quality of life in cost-effectiveness studies: The usefulness of the Barthel Index and the EuroQoL-5D. Quality of Life Research, 15, 152-07     Occupational Therapy Evaluation Charge Determination   History Examination Decision-Making   LOW Complexity : Brief history review  LOW Complexity : 1-3 performance deficits relating to physical, cognitive , or psychosocial skils that result in activity limitations and / or participation restrictions  MEDIUM Complexity : Patient may present with comorbidities that affect occupational performnce. Miniml to moderate modification of tasks or assistance (eg, physical or verbal ) with assesment(s) is necessary to enable patient to complete evaluation       Based on the above components, the patient evaluation is determined to be of the following complexity level: LOW   Pain Rating:  Pt reporting severe pain at rest and with repositioning     Activity Tolerance:   Poor    After treatment patient left in no apparent distress:    Supine in bed, Call bell within reach, Bed / chair alarm activated, and Side rails x 3    COMMUNICATION/EDUCATION:   The patients plan of care was discussed with: Physical therapist and Registered nurse. Patient/family have participated as able in goal setting and plan of care. and Patient/family agree to work toward stated goals and plan of care. This patients plan of care is appropriate for delegation to [facility].     Thank you for this referral.  Sada Wellington, ANSLEY, OTR/L  Time Calculation: 14 mins

## 2022-09-15 NOTE — PROGRESS NOTES
Colorectal Surgery Note    I have added the patient on for surgery this afternoon.     Hopefully, we will be able to begin the operation between 2:00 PM and 3:00 PM.

## 2022-09-15 NOTE — ROUTINE PROCESS
Patient allowed me to change dressing on his left leg. The patient refused to let me change the sacral dressing stating that he was in too much pain and there was no way he could let me do it right then. Even after his pain medications were changed, he was unable to tolerate the dressing change.

## 2022-09-15 NOTE — PERIOP NOTES
TRANSFER - IN REPORT:    Verbal report received from Lolita RN(name) on Marylu Oconnor  being received from 4S(unit) for ordered procedure      Report consisted of patients Situation, Background, Assessment and   Recommendations(SBAR). Information from the following report(s) SBAR and Kardex was reviewed with the receiving nurse. Opportunity for questions and clarification was provided. Assessment completed upon patients arrival to unit and care assumed.

## 2022-09-16 ENCOUNTER — APPOINTMENT (OUTPATIENT)
Dept: INTERVENTIONAL RADIOLOGY/VASCULAR | Age: 52
DRG: 853 | End: 2022-09-16
Attending: COLON & RECTAL SURGERY
Payer: COMMERCIAL

## 2022-09-16 LAB
ANION GAP SERPL CALC-SCNC: 8 MMOL/L (ref 5–15)
ATRIAL RATE: 65 BPM
BASOPHILS # BLD: 0 K/UL (ref 0–0.1)
BASOPHILS NFR BLD: 0 % (ref 0–1)
BUN SERPL-MCNC: 18 MG/DL (ref 6–20)
BUN/CREAT SERPL: 7 (ref 12–20)
CALCIUM SERPL-MCNC: 7.4 MG/DL (ref 8.5–10.1)
CALCULATED P AXIS, ECG09: -20 DEGREES
CALCULATED R AXIS, ECG10: -6 DEGREES
CALCULATED T AXIS, ECG11: 97 DEGREES
CHLORIDE SERPL-SCNC: 108 MMOL/L (ref 97–108)
CO2 SERPL-SCNC: 25 MMOL/L (ref 21–32)
CREAT SERPL-MCNC: 2.48 MG/DL (ref 0.7–1.3)
DIAGNOSIS, 93000: NORMAL
DIFFERENTIAL METHOD BLD: ABNORMAL
EOSINOPHIL # BLD: 0 K/UL (ref 0–0.4)
EOSINOPHIL NFR BLD: 0 % (ref 0–7)
ERYTHROCYTE [DISTWIDTH] IN BLOOD BY AUTOMATED COUNT: 18.4 % (ref 11.5–14.5)
GLUCOSE BLD STRIP.AUTO-MCNC: 78 MG/DL (ref 65–117)
GLUCOSE BLD STRIP.AUTO-MCNC: 84 MG/DL (ref 65–117)
GLUCOSE BLD STRIP.AUTO-MCNC: 89 MG/DL (ref 65–117)
GLUCOSE BLD STRIP.AUTO-MCNC: 92 MG/DL (ref 65–117)
GLUCOSE SERPL-MCNC: 90 MG/DL (ref 65–100)
HCT VFR BLD AUTO: 23.1 % (ref 36.6–50.3)
HGB BLD-MCNC: 7.2 G/DL (ref 12.1–17)
IMM GRANULOCYTES # BLD AUTO: 0.2 K/UL (ref 0–0.04)
IMM GRANULOCYTES NFR BLD AUTO: 1 % (ref 0–0.5)
LYMPHOCYTES # BLD: 0.7 K/UL (ref 0.8–3.5)
LYMPHOCYTES NFR BLD: 4 % (ref 12–49)
MAGNESIUM SERPL-MCNC: 1.7 MG/DL (ref 1.6–2.4)
MCH RBC QN AUTO: 30 PG (ref 26–34)
MCHC RBC AUTO-ENTMCNC: 31.2 G/DL (ref 30–36.5)
MCV RBC AUTO: 96.3 FL (ref 80–99)
MONOCYTES # BLD: 0.2 K/UL (ref 0–1)
MONOCYTES NFR BLD: 1 % (ref 5–13)
NEUTS SEG # BLD: 16 K/UL (ref 1.8–8)
NEUTS SEG NFR BLD: 94 % (ref 32–75)
NRBC # BLD: 0 K/UL (ref 0–0.01)
NRBC BLD-RTO: 0 PER 100 WBC
P-R INTERVAL, ECG05: 124 MS
PHOSPHATE SERPL-MCNC: 3.6 MG/DL (ref 2.6–4.7)
PLATELET # BLD AUTO: 458 K/UL (ref 150–400)
PMV BLD AUTO: 9.4 FL (ref 8.9–12.9)
POTASSIUM SERPL-SCNC: 3.9 MMOL/L (ref 3.5–5.1)
Q-T INTERVAL, ECG07: 452 MS
QRS DURATION, ECG06: 88 MS
QTC CALCULATION (BEZET), ECG08: 470 MS
RBC # BLD AUTO: 2.4 M/UL (ref 4.1–5.7)
RBC MORPH BLD: ABNORMAL
RBC MORPH BLD: ABNORMAL
SERVICE CMNT-IMP: NORMAL
SODIUM SERPL-SCNC: 141 MMOL/L (ref 136–145)
VANCOMYCIN SERPL-MCNC: 23.1 UG/ML
VENTRICULAR RATE, ECG03: 65 BPM
WBC # BLD AUTO: 17.1 K/UL (ref 4.1–11.1)

## 2022-09-16 PROCEDURE — 02H633Z INSERTION OF INFUSION DEVICE INTO RIGHT ATRIUM, PERCUTANEOUS APPROACH: ICD-10-PCS | Performed by: STUDENT IN AN ORGANIZED HEALTH CARE EDUCATION/TRAINING PROGRAM

## 2022-09-16 PROCEDURE — 0JH63XZ INSERTION OF TUNNELED VASCULAR ACCESS DEVICE INTO CHEST SUBCUTANEOUS TISSUE AND FASCIA, PERCUTANEOUS APPROACH: ICD-10-PCS | Performed by: STUDENT IN AN ORGANIZED HEALTH CARE EDUCATION/TRAINING PROGRAM

## 2022-09-16 PROCEDURE — 85025 COMPLETE CBC W/AUTO DIFF WBC: CPT

## 2022-09-16 PROCEDURE — 2709999900 HC NON-CHARGEABLE SUPPLY

## 2022-09-16 PROCEDURE — 74011250636 HC RX REV CODE- 250/636: Performed by: COLON & RECTAL SURGERY

## 2022-09-16 PROCEDURE — 74011250637 HC RX REV CODE- 250/637: Performed by: COLON & RECTAL SURGERY

## 2022-09-16 PROCEDURE — 36415 COLL VENOUS BLD VENIPUNCTURE: CPT

## 2022-09-16 PROCEDURE — 65270000046 HC RM TELEMETRY

## 2022-09-16 PROCEDURE — 74011000258 HC RX REV CODE- 258: Performed by: COLON & RECTAL SURGERY

## 2022-09-16 PROCEDURE — 77030010507 HC ADH SKN DERMBND J&J -B

## 2022-09-16 PROCEDURE — C9113 INJ PANTOPRAZOLE SODIUM, VIA: HCPCS | Performed by: COLON & RECTAL SURGERY

## 2022-09-16 PROCEDURE — 74011250636 HC RX REV CODE- 250/636: Performed by: STUDENT IN AN ORGANIZED HEALTH CARE EDUCATION/TRAINING PROGRAM

## 2022-09-16 PROCEDURE — 74011000250 HC RX REV CODE- 250: Performed by: STUDENT IN AN ORGANIZED HEALTH CARE EDUCATION/TRAINING PROGRAM

## 2022-09-16 PROCEDURE — C1750 CATH, HEMODIALYSIS,LONG-TERM: HCPCS

## 2022-09-16 PROCEDURE — 05PY33Z REMOVAL OF INFUSION DEVICE FROM UPPER VEIN, PERCUTANEOUS APPROACH: ICD-10-PCS | Performed by: STUDENT IN AN ORGANIZED HEALTH CARE EDUCATION/TRAINING PROGRAM

## 2022-09-16 PROCEDURE — 84100 ASSAY OF PHOSPHORUS: CPT

## 2022-09-16 PROCEDURE — 83735 ASSAY OF MAGNESIUM: CPT

## 2022-09-16 PROCEDURE — 90935 HEMODIALYSIS ONE EVALUATION: CPT

## 2022-09-16 PROCEDURE — 74011000250 HC RX REV CODE- 250: Performed by: COLON & RECTAL SURGERY

## 2022-09-16 PROCEDURE — 82962 GLUCOSE BLOOD TEST: CPT

## 2022-09-16 PROCEDURE — 87040 BLOOD CULTURE FOR BACTERIA: CPT

## 2022-09-16 PROCEDURE — 77030037878 HC DRSG MEPILEX >48IN BORD MOLN -B

## 2022-09-16 PROCEDURE — 80048 BASIC METABOLIC PNL TOTAL CA: CPT

## 2022-09-16 PROCEDURE — 80202 ASSAY OF VANCOMYCIN: CPT

## 2022-09-16 PROCEDURE — 36558 INSERT TUNNELED CV CATH: CPT

## 2022-09-16 RX ORDER — SODIUM CHLORIDE 9 MG/ML
25 INJECTION, SOLUTION INTRAVENOUS
Status: DISCONTINUED | OUTPATIENT
Start: 2022-09-16 | End: 2022-09-16 | Stop reason: ALTCHOICE

## 2022-09-16 RX ORDER — LIDOCAINE HYDROCHLORIDE 20 MG/ML
20 INJECTION, SOLUTION INFILTRATION; PERINEURAL
Status: COMPLETED | OUTPATIENT
Start: 2022-09-16 | End: 2022-09-16

## 2022-09-16 RX ORDER — FENTANYL CITRATE 50 UG/ML
25-200 INJECTION, SOLUTION INTRAMUSCULAR; INTRAVENOUS
Status: DISCONTINUED | OUTPATIENT
Start: 2022-09-16 | End: 2022-09-16 | Stop reason: ALTCHOICE

## 2022-09-16 RX ORDER — MIDAZOLAM HYDROCHLORIDE 1 MG/ML
.5-5 INJECTION, SOLUTION INTRAMUSCULAR; INTRAVENOUS
Status: DISCONTINUED | OUTPATIENT
Start: 2022-09-16 | End: 2022-09-16 | Stop reason: ALTCHOICE

## 2022-09-16 RX ORDER — HEPARIN SODIUM 1000 [USP'U]/ML
10000 INJECTION, SOLUTION INTRAVENOUS; SUBCUTANEOUS
Status: COMPLETED | OUTPATIENT
Start: 2022-09-16 | End: 2022-09-16

## 2022-09-16 RX ADMIN — HYDROMORPHONE HYDROCHLORIDE 0.5 MG: 1 INJECTION, SOLUTION INTRAMUSCULAR; INTRAVENOUS; SUBCUTANEOUS at 22:28

## 2022-09-16 RX ADMIN — FENTANYL CITRATE 50 MCG: 50 INJECTION, SOLUTION INTRAMUSCULAR; INTRAVENOUS at 13:18

## 2022-09-16 RX ADMIN — PIPERACILLIN AND TAZOBACTAM 3.38 G: 3; .375 INJECTION, POWDER, FOR SOLUTION INTRAVENOUS at 06:09

## 2022-09-16 RX ADMIN — FENTANYL CITRATE 50 MCG: 50 INJECTION, SOLUTION INTRAMUSCULAR; INTRAVENOUS at 12:52

## 2022-09-16 RX ADMIN — SODIUM CHLORIDE, PRESERVATIVE FREE 40 MG: 5 INJECTION INTRAVENOUS at 22:16

## 2022-09-16 RX ADMIN — Medication 4 MG: at 08:44

## 2022-09-16 RX ADMIN — COLLAGENASE SANTYL: 250 OINTMENT TOPICAL at 08:43

## 2022-09-16 RX ADMIN — SODIUM CHLORIDE, PRESERVATIVE FREE 10 ML: 5 INJECTION INTRAVENOUS at 22:28

## 2022-09-16 RX ADMIN — MIDAZOLAM 1 MG: 1 INJECTION INTRAMUSCULAR; INTRAVENOUS at 13:18

## 2022-09-16 RX ADMIN — Medication 4 MG: at 17:56

## 2022-09-16 RX ADMIN — MIDAZOLAM 1 MG: 1 INJECTION INTRAMUSCULAR; INTRAVENOUS at 13:01

## 2022-09-16 RX ADMIN — SERTRALINE 25 MG: 50 TABLET, FILM COATED ORAL at 08:43

## 2022-09-16 RX ADMIN — SODIUM CHLORIDE 100 MG: 9 INJECTION, SOLUTION INTRAVENOUS at 22:39

## 2022-09-16 RX ADMIN — LIDOCAINE HYDROCHLORIDE 20 ML: 20 INJECTION, SOLUTION INFILTRATION; PERINEURAL at 13:19

## 2022-09-16 RX ADMIN — FENTANYL CITRATE 50 MCG: 50 INJECTION, SOLUTION INTRAMUSCULAR; INTRAVENOUS at 12:56

## 2022-09-16 RX ADMIN — MIDAZOLAM 1 MG: 1 INJECTION INTRAMUSCULAR; INTRAVENOUS at 12:52

## 2022-09-16 RX ADMIN — Medication 1 CAPSULE: at 08:43

## 2022-09-16 RX ADMIN — Medication 4 MG: at 22:28

## 2022-09-16 RX ADMIN — FENTANYL CITRATE 50 MCG: 50 INJECTION, SOLUTION INTRAMUSCULAR; INTRAVENOUS at 13:13

## 2022-09-16 RX ADMIN — HYDROMORPHONE HYDROCHLORIDE 1 MG: 1 INJECTION, SOLUTION INTRAMUSCULAR; INTRAVENOUS; SUBCUTANEOUS at 11:36

## 2022-09-16 RX ADMIN — MIDAZOLAM 1 MG: 1 INJECTION INTRAMUSCULAR; INTRAVENOUS at 12:55

## 2022-09-16 RX ADMIN — HYDROMORPHONE HYDROCHLORIDE 1 MG: 1 INJECTION, SOLUTION INTRAMUSCULAR; INTRAVENOUS; SUBCUTANEOUS at 15:34

## 2022-09-16 RX ADMIN — HEPARIN SODIUM 3500 UNITS: 1000 INJECTION INTRAVENOUS; SUBCUTANEOUS at 13:18

## 2022-09-16 RX ADMIN — SODIUM CHLORIDE 25 ML/HR: 9 INJECTION, SOLUTION INTRAVENOUS at 12:51

## 2022-09-16 RX ADMIN — HYDROMORPHONE HYDROCHLORIDE 1 MG: 1 INJECTION, SOLUTION INTRAMUSCULAR; INTRAVENOUS; SUBCUTANEOUS at 05:33

## 2022-09-16 RX ADMIN — PIPERACILLIN AND TAZOBACTAM 3.38 G: 3; .375 INJECTION, POWDER, FOR SOLUTION INTRAVENOUS at 22:39

## 2022-09-16 RX ADMIN — EPOETIN ALFA-EPBX 10000 UNITS: 10000 INJECTION, SOLUTION INTRAVENOUS; SUBCUTANEOUS at 22:16

## 2022-09-16 RX ADMIN — HYDROMORPHONE HYDROCHLORIDE 1 MG: 1 INJECTION, SOLUTION INTRAMUSCULAR; INTRAVENOUS; SUBCUTANEOUS at 08:52

## 2022-09-16 RX ADMIN — LORAZEPAM 0.25 MG: 0.5 TABLET ORAL at 23:35

## 2022-09-16 RX ADMIN — Medication 4 MG: at 06:09

## 2022-09-16 RX ADMIN — SODIUM CHLORIDE, PRESERVATIVE FREE 40 MG: 5 INJECTION INTRAVENOUS at 08:44

## 2022-09-16 NOTE — PROGRESS NOTES
Bedside shift change report given to [facility] (oncoming nurse) by Sandra Grewal (offgoing nurse). Report included the following information SBAR, ED Summary, Procedure Summary, Intake/Output, MAR, Recent Results, and Med Rec Status.

## 2022-09-16 NOTE — PROGRESS NOTES
Nephrology Progress Note  Owen Marrow  Date of Admission : 9/9/2022    CC:  Follow up for JEMIMA       Assessment and Plan     JEMIMA on HD:  - 2/2 ATN  - HD dependent MWF for now while here  - no signs of recovery yet  - will have PC placed next week  - HD later today    Hypokalemia:  - 3k bath    Anemia:  - MICHAEL MWF    PAD s/p b/l BKA    Recent sepsis    Empyema    ABD wall abscess:  - J-tube removal, colostomy, I&D of abd wound    Sacral decub       Interval History:  Seen and examined . Getting wound dressing changed now. For HD later today. No cp, sob reported. Current Medications: all current  Medications have been eviewed in EPIC  Review of Systems: Pertinent items are noted in HPI. Objective:  Vitals:    Vitals:    09/16/22 0604 09/16/22 0850 09/16/22 0919 09/16/22 1000   BP:  (!) 172/68     Pulse: 63 65  78   Resp:  17     Temp:  98.8 °F (37.1 °C)     TempSrc:       SpO2:  99% 99%    Weight:       Height:         Intake and Output:  09/16 0701 - 09/16 1900  In: -   Out: 50   09/14 1901 - 09/16 0700  In: 990 [I.V.:990]  Out: 450 [Urine:200]    Physical Examination:  Pt intubated     no  General: NAD,Conversant   Neck:  Supple, no mass  Resp:  Lungs CTA B/L, no wheezing , normal respiratory effort  CV:  RRR,  no murmur or rub, no LE edema  GI:  Soft, NT, + Bowel sounds, + ostomy  Neurologic:  Non focal  Psych:             AAO x 3 appropriate affect  Skin:  No Rash  Access:           KRYS zambrano    []    High complexity decision making was performed  []    Patient is at high-risk of decompensation with multiple organ involvement    Lab Data Personally Reviewed: I have reviewed all the pertinent labs, microbiology data and radiology studies during assessment.     Recent Labs     09/16/22  0128 09/15/22  0224 09/14/22  0106    142 143   K 3.9 3.4* 3.3*    107 109*   CO2 25 29 27   GLU 90 100 106*   BUN 18 11 20   CREA 2.48* 1.81* 2.75*   CA 7.4* 7.7* 7.2*   MG 1.7 1.7 1.7   PHOS 3.6 1.2* 1.9* INR  --  1.1  --        Recent Labs     09/16/22  0128 09/15/22  0224 09/14/22  0106   WBC 17.1* 16.9* 16.0*   HGB 7.2* 7.2* 7.1*   HCT 23.1* 22.8* 22.6*   * 401* 370       No results found for: SDES  Lab Results   Component Value Date/Time    Culture result: NO GROWTH 6 DAYS 09/09/2022 08:52 PM     Recent Results (from the past 24 hour(s))   GLUCOSE, POC    Collection Time: 09/15/22 12:49 PM   Result Value Ref Range    Glucose (POC) 90 65 - 117 mg/dL    Performed by Simon Mchugh    TYPE & SCREEN    Collection Time: 09/15/22  5:54 PM   Result Value Ref Range    Crossmatch Expiration 09/18/2022,2359     ABO/Rh(D) Yordy Finn POSITIVE     Antibody screen NEG    GLUCOSE, POC    Collection Time: 09/15/22  6:01 PM   Result Value Ref Range    Glucose (POC) 79 65 - 117 mg/dL    Performed by Micaela Smith    EKG, 12 LEAD, INITIAL    Collection Time: 09/15/22  6:25 PM   Result Value Ref Range    Ventricular Rate 65 BPM    Atrial Rate 65 BPM    P-R Interval 124 ms    QRS Duration 88 ms    Q-T Interval 452 ms    QTC Calculation (Bezet) 470 ms    Calculated P Axis -20 degrees    Calculated R Axis -6 degrees    Calculated T Axis 97 degrees    Diagnosis       Normal sinus rhythm  Abnormal QRS-T angle, consider primary T wave abnormality  Abnormal ECG  No previous ECGs available     GLUCOSE, POC    Collection Time: 09/15/22  8:13 PM   Result Value Ref Range    Glucose (POC) 70 65 - 117 mg/dL    Performed by Connie Parry    CBC WITH AUTOMATED DIFF    Collection Time: 09/16/22  1:28 AM   Result Value Ref Range    WBC 17.1 (H) 4.1 - 11.1 K/uL    RBC 2.40 (L) 4.10 - 5.70 M/uL    HGB 7.2 (L) 12.1 - 17.0 g/dL    HCT 23.1 (L) 36.6 - 50.3 %    MCV 96.3 80.0 - 99.0 FL    MCH 30.0 26.0 - 34.0 PG    MCHC 31.2 30.0 - 36.5 g/dL    RDW 18.4 (H) 11.5 - 14.5 %    PLATELET 549 (H) 741 - 400 K/uL    MPV 9.4 8.9 - 12.9 FL    NRBC 0.0 0  WBC    ABSOLUTE NRBC 0.00 0.00 - 0.01 K/uL    NEUTROPHILS 94 (H) 32 - 75 %    LYMPHOCYTES 4 (L) 12 - 49 % MONOCYTES 1 (L) 5 - 13 %    EOSINOPHILS 0 0 - 7 %    BASOPHILS 0 0 - 1 %    IMMATURE GRANULOCYTES 1 (H) 0.0 - 0.5 %    ABS. NEUTROPHILS 16.0 (H) 1.8 - 8.0 K/UL    ABS. LYMPHOCYTES 0.7 (L) 0.8 - 3.5 K/UL    ABS. MONOCYTES 0.2 0.0 - 1.0 K/UL    ABS. EOSINOPHILS 0.0 0.0 - 0.4 K/UL    ABS. BASOPHILS 0.0 0.0 - 0.1 K/UL    ABS. IMM. GRANS. 0.2 (H) 0.00 - 0.04 K/UL    DF SMEAR SCANNED      RBC COMMENTS ANISOCYTOSIS  1+        RBC COMMENTS MACROCYTOSIS  1+       METABOLIC PANEL, BASIC    Collection Time: 09/16/22  1:28 AM   Result Value Ref Range    Sodium 141 136 - 145 mmol/L    Potassium 3.9 3.5 - 5.1 mmol/L    Chloride 108 97 - 108 mmol/L    CO2 25 21 - 32 mmol/L    Anion gap 8 5 - 15 mmol/L    Glucose 90 65 - 100 mg/dL    BUN 18 6 - 20 MG/DL    Creatinine 2.48 (H) 0.70 - 1.30 MG/DL    BUN/Creatinine ratio 7 (L) 12 - 20      GFR est AA 33 (L) >60 ml/min/1.73m2    GFR est non-AA 28 (L) >60 ml/min/1.73m2    Calcium 7.4 (L) 8.5 - 10.1 MG/DL   MAGNESIUM    Collection Time: 09/16/22  1:28 AM   Result Value Ref Range    Magnesium 1.7 1.6 - 2.4 mg/dL   PHOSPHORUS    Collection Time: 09/16/22  1:28 AM   Result Value Ref Range    Phosphorus 3.6 2.6 - 4.7 MG/DL   VANCOMYCIN, RANDOM    Collection Time: 09/16/22  1:28 AM   Result Value Ref Range    Vancomycin, random 23.1 UG/ML   GLUCOSE, POC    Collection Time: 09/16/22  6:08 AM   Result Value Ref Range    Glucose (POC) 89 65 - 117 mg/dL    Performed by Darrin Lewis MD  [address]  Phone - [phone]   Fax - [phone]  [email or web address]

## 2022-09-16 NOTE — PROGRESS NOTES
Problem: Risk for Spread of Infection  Goal: Prevent transmission of infectious organism to others  Description: Prevent the transmission of infectious organisms to other patients, staff members, and visitors. Outcome: Progressing Towards Goal     Problem: Falls - Risk of  Goal: *Absence of Falls  Description: Document Bradford Shea Fall Risk and appropriate interventions in the flowsheet. Outcome: Progressing Towards Goal  Note: Fall Risk Interventions:  Mobility Interventions: PT Consult for mobility concerns    Mentation Interventions: Bed/chair exit alarm    Medication Interventions: Bed/chair exit alarm    Elimination Interventions: Call light in reach              Problem: Pressure Injury - Risk of  Goal: *Prevention of pressure injury  Description: Document Barber Scale and appropriate interventions in the flowsheet.   Outcome: Progressing Towards Goal  Note: Pressure Injury Interventions:  Sensory Interventions: Assess need for specialty bed    Moisture Interventions: Absorbent underpads    Activity Interventions: Assess need for specialty bed    Mobility Interventions: PT/OT evaluation, Pressure redistribution bed/mattress (bed type), HOB 30 degrees or less    Nutrition Interventions: Document food/fluid/supplement intake    Friction and Shear Interventions: Apply protective barrier, creams and emollients                Problem: Nutrition Deficit  Goal: *Optimize nutritional status  Outcome: Progressing Towards Goal

## 2022-09-16 NOTE — PROGRESS NOTES
Bedside shift change report given to [facility] (oncoming nurse) by Herlinda Ann (offgoing nurse). Report included the following information SBAR, ED Summary, Procedure Summary, Intake/Output, MAR, Recent Results, and Med Rec Status.

## 2022-09-16 NOTE — ANESTHESIA POSTPROCEDURE EVALUATION
Procedure(s):  EUA/ PROCTOSIGMOIDOSCOPY. general    Anesthesia Post Evaluation      Multimodal analgesia: multimodal analgesia used between 6 hours prior to anesthesia start to PACU discharge  Patient location during evaluation: bedside  Patient participation: complete - patient participated  Level of consciousness: awake  Pain score: 0  Pain management: satisfactory to patient  Airway patency: patent  Anesthetic complications: no  Cardiovascular status: acceptable and blood pressure returned to baseline  Respiratory status: acceptable  Hydration status: acceptable  Comments: I have evaluated the patient and meets criteria for discharge from PACU. Nima Null DO. Post anesthesia nausea and vomiting:  none  Final Post Anesthesia Temperature Assessment:  Normothermia (36.0-37.5 degrees C)      INITIAL Post-op Vital signs:   Vitals Value Taken Time   /70 09/15/22 2032   Temp 36.1 °C (96.9 °F) 09/15/22 2015   Pulse 68 09/15/22 2034   Resp 16 09/15/22 2034   SpO2 100 % 09/15/22 2034   Vitals shown include unvalidated device data.

## 2022-09-16 NOTE — PROGRESS NOTES
Palliative medicine    Was going to do a quick check on pt's pain, however has been off floor in IR. Primary team to reach out to me as needed.

## 2022-09-16 NOTE — PROCEDURES
[facility] / [phone]    Vitals Pre Post Assessment Pre Post   BP BP: (!) 181/75 (09/16/22 1900)   150/89 LOC Awake, alert, oriented x 4. Awake, alert, oriented x 4. HR Pulse (Heart Rate): 64 (09/16/22 1900) 63 Lungs Diminished Diminished   Resp Resp Rate: 14 (09/16/22 1400) 18 Cardiac RRR RRR   Temp Temp: 98.8 °F (37.1 °C) (09/16/22 1224) Not obtained Skin CDI, tattoos noted. CDI, tattoos noted. Weight  Not obtained Not obtained Edema Trace edema LLE Trace edema LLE   Tele status Bedside Bedside Pain Pain Intensity 1: 7 (09/16/22 1855) C/o pain relayed to primary RN. Orders   Duration: Start: 4629 End: 2200 Total: 3   Dialyzer: Dialyzer/Set Up Inspection: Mica Lester (09/16/22 1857)   K Bath: Dialysate K (mEq/L): 3 (09/16/22 1857)   Ca Bath: Dialysate CA (mEq/L): 2.5 (09/16/22 1857)   Na: Dialysate NA (mEq/L): 138 (09/16/22 1857)   Bicarb: Dialysate HCO3 (mEq/L): 35 (09/16/22 1857)   Target Fluid Removal: Goal/Amount of Fluid to Remove (mL): 1000 mL (09/16/22 1857)     Access   Type & Location: R CVC   Comments:              Line inserted today. Placement confirmed reviewing imaging results (IR Imaging 9/16/22). Dressing intact with moderate serous drainage noted. CVC limbs cleaned per P&P.  5cc blood aspirated swiftly from each limb, followed by 10cc NS flush, no resistance noted.                        Labs   HBsAg (Antigen) / date:  Negative     9/12/22                            HBsAb (Antibody) / date: Susceptible  9/12/22   Source: Epic   Obtained/Reviewed  Critical Results Called HGB   Date Value Ref Range Status   09/16/2022 7.2 (L) 12.1 - 17.0 g/dL Final     Potassium   Date Value Ref Range Status   09/16/2022 3.9 3.5 - 5.1 mmol/L Final     Calcium   Date Value Ref Range Status   09/16/2022 7.4 (L) 8.5 - 10.1 MG/DL Final     BUN   Date Value Ref Range Status   09/16/2022 18 6 - 20 MG/DL Final     Creatinine   Date Value Ref Range Status   09/16/2022 2.48 (H) 0.70 - 1.30 MG/DL Final Meds Given   Name Dose Route                    Adequacy / Fluid    Total Liters Process: 65.6   Net Fluid Removed: 500      Comments   Time Out Done:   (Time) 1845   Admitting Diagnosis: JEMIMA   Consent obtained/signed: Informed Consent Verified: Yes (09/16/22 1857)   Thiago Washington / Dafne Belle # Machine Number: [id number] (09/16/22 1857)   Primary Nurse Rpt Pre: Velma Daley RN   Primary Nurse Rpt Post: Floridalma Trimble RN   Pt Education: Infection prevention   Care Plan: Continue HD plan of care   Pts outpatient clinic: TBD     Tx Summary   Comments:                 1857 - HD initiated without incident. Blood pump running well at 400.           2015 - BP 92/50. UF off. Patient is awake and alert at this time. UF goal dropped to 500.    2200 - All possible blood returned to patient. CVC limbs cleaned per P&P, flushed with 10cc NS, clamped, and secured with new q-sites and clean caps. Report to primary RN HARPREET Sutton.

## 2022-09-16 NOTE — PROGRESS NOTES
Occupational Therapy    Reviewed chart and attempted to treat pt. Pt is currently EDINSON in IR. Will defer at this time and continue to follow. ADRIANE Akhtar  Regarding student involvement in patient care:  A student participated in this treatment session. Per CMS Medicare statements and AOTA guidelines I certify that the following was true:  1. I was present and directly observed the entire session. 2. I made all skilled judgments and clinical decisions regarding care. 3. I am the practitioner responsible for assessment, treatment, and documentation.

## 2022-09-16 NOTE — WOUND CARE
Wound/Ostomy Visit     Postoperative visit. Surgery: Colostomy  Date of Surgery: 9.10.22      Surgeon: Dr. Greg Ramon Location: left quadrant  Wife not at bedside. Ostomy Assessment:  Stoma appearance: Red/Maroon; slightly dusky; with crease at 7-10 o'clock. Wound: trocar sites with staples  Output: liquid brown  Current appliance:  2 piece convex pouch with ring & paste applied 9.15.22. Attached firmly without leakage. Left abdomen, open surgical wound/ former J tube site:  3 x 2 x 3.6 cm; 80% yellow 20% red; small serous exudate; no odor; mild jennifer wound erythema. Cleansed with VASHE; Applied Santyl and packed with VASHE moistened roll gauze. Covered with dry dressing. POA Left BKA site with dried crusted incision with sutures and dehisced area to lateral side:  19 x 1 x 2.8 cm. The depth is on the lateral side of the incision. Cleansed with VASHE; filled depth with VASHE moistened gauze; covered incision with XEROFORM and ABD pad. Sacrum, Pressure Injury Unstageable:  4 x 4 x 2 cm; 100% yellow. Small serous exudate; no odor; tender to touch; jennifer wound blanching red erythema. Left buttock, Pressure Injury Stage 3:  5 x 3 x 0.2 cm; 75% pink 25% yellow; scant serous exudate; no odor; tender to touch; jennifer wound blanching pink erythema. Right buttock, improving Pressure Injury Stage 3:  1 x 1 x 0.1 cm;  100% pink; no exudate, odor or erythema. Jennifer rectal Xeroform dressing in place from OR anorectal exam performed on 9.15.22. See Dr Carlos Lopez note. Right upper back, partial thickness wound:  0.5 x 1 x 0.1 cm; 100% moist yellow; no exudate, odor or erythema. Right upper posterior/lateral back, partial thickness wound:  1.5 x 0.5 x 0.3 cm; 100% yellow with suture material in wound bed; no exudate, odor or erythema. Suture material in wound bed (notified Dr Willy Johnson; will notify general surgery)  Notified Dr Jose R Clarke.   Suture to be left in place for now.    Recommendations:   Left abdominal wound:  Daily irrigate with saline; apply Santyl; pack with VASHE moist gauze; cover with dry dressing. Sacrum and buttocks:  Daily cleanse with saline; apply Santyl and saline moist gauze; cover with dry dressing. Right upper back wounds:  Daily cleanse with saline; apply Santyl; cover with dry dressing. Left BKA site:  Daily cleanse with saline; pack dehisced area with VASHE moistened roll gauze; apply Xeroform over incision line; cover with dry dressing. 5.   Empty appliance when 1/3 full and PRN. Encourage patient/family to notify nurse and  assist w/ pouch emptying to promote self-care. Change appliance twice weekly and PRN for leaking ASAP. DO NOT REINFORCE LEAKS to avoid skin breakdown. Transition of Care: Will follow routinely. Discharge disposition plan is to go to [facility]. Colostomy education:  wife has been present for one pouch change.      ARNOLDO CrockerN RN [facility] Inpatient Wound Care  Available on Perfect Serve  Office [phone]

## 2022-09-16 NOTE — PROGRESS NOTES
Physical Therapy    Reviewed chart and attempted to treat pt. Pt is currently EDINSON in IR. Will defer at this time and continue to follow.

## 2022-09-16 NOTE — ROUTINE PROCESS
Pt arrives via bed to angio department accompanied by transport for perm cath procedure. All assessments completed and consent was reviewed. Education given was regarding procedure, conscious sedation, post-procedure care and  management/follow-up. Opportunity for questions was provided and all questions and concerns were addressed.

## 2022-09-16 NOTE — PROGRESS NOTES
TRANSFER - OUT REPORT:    Verbal report given to [facility] RN(name) on Owen Marrow  being transferred to (unit) for routine post - op       Report consisted of patients Situation, Background, Assessment and   Recommendations(SBAR). Information from the following report(s) SBAR, Kardex, Procedure Summary, MAR, and Quality Measures was reviewed with the receiving nurse. Lines:   PICC Double Lumen 09/09/22 Right (Active)   Central Line Being Utilized Yes 09/15/22 2225   Criteria for Appropriate Use Long term IV/antibiotic administration 09/15/22 2225   Site Assessment Clean, dry, & intact 09/15/22 2225   Phlebitis Assessment 0 09/15/22 2040   Infiltration Assessment 0 09/15/22 2040   Date of Last Dressing Change 09/08/22 09/15/22 2040   Dressing Status Clean, dry, & intact 09/15/22 2225   Action Taken Open ports on tubing capped 09/15/22 2040   Dressing Type Transparent 09/15/22 2225   Hub Color/Line Status Red;Flushed 09/15/22 2015   Positive Blood Return (Site #1) No 09/15/22 2040   Hub Color/Line Status Purple;Flushed 09/15/22 2015   Positive Blood Return (Site #2) No 09/15/22 2040   Alcohol Cap Used Yes 09/15/22 2225       Peripheral IV 09/15/22 Right Antecubital (Active)   Site Assessment Clean, dry, & intact 09/15/22 2225   Dressing Status Clean, dry, & intact 09/15/22 2225   Dressing Type Transparent 09/15/22 2225   Hub Color/Line Status Green;Capped 09/15/22 2140        Opportunity for questions and clarification was provided.       Patient transported with:   [employer]

## 2022-09-16 NOTE — BRIEF OP NOTE
Brief Postoperative Note    Patient: León Brunner  YOB: 1970  MRN: [id number]    Date of Procedure: 9/15/2022     Pre-Op Diagnosis: Anorectal wound    Post-Op Diagnosis:  Anorectal wound    Procedure: Anorectal examination under anesthesia including rigid proctosigmoidoscopy. Surgeon:  Janay Lozada MD    Surgical Assistant:  None    Anesthesia: General endotracheal    Specimens:  None    Drains: None    Estimated Blood Loss:  < 1 mL    Crystalloid:  307 mL    Complications: Small skin tear to dorsum of right hand  Implants: None    Findings: There is a clean 3 cm x 2.5 cm posterior anal canal wound exposing the sphincter muscles. There is a full thickness 1 cm defect of the right posterolateral distal rectal wall beginning at approximately 4 cm from the anal verge and resulting in communication of the distal rectal lumen with the extraperitoneal pelvis where there is a clean, narrow cavity extending superiorly for approximately 2 cm. There was mucoid discharge but no purulence and no fecal material.  All of the visible tissues appeared to be viable. Rigid proctosigmoidoscopy to approximately 15 cm from the anal verge revealed that the remainder of the rectum appeared to  be healthy.        Electronically Signed by Janay Lozada MD

## 2022-09-16 NOTE — PROGRESS NOTES
[facility]    Pt in IR for [facility]. No new recommendations currently. Following a long.     Taz Blanco

## 2022-09-16 NOTE — PROGRESS NOTES
ID Progress Note  2022    Subjective:     Procedure went well, it appears    Objective:     Antibiotics:  Vancomycin   Zosyn   Eraxis      Vitals: Visit Vitals  /67 (BP 1 Location: Left upper arm, BP Patient Position: Supine)   Pulse 71   Temp 98.8 °F (37.1 °C)   Resp 14   Ht 5' 7\" (1.702 m)   Wt 71 kg (156 lb 8.4 oz)   SpO2 95%   BMI 24.52 kg/m²        Tmax:  Temp (24hrs), Av.3 °F (36.8 °C), Min:96.9 °F (36.1 °C), Max:98.8 °F (37.1 °C)      Exam:  Lungs:  clear to auscultation bilaterally  Heart:  regular rate and rhythm  Abdomen:  mildly tender     Labs:      Recent Labs     22  0128 09/15/22  0224 22  0106   WBC 17.1* 16.9* 16.0*   HGB 7.2* 7.2* 7.1*   * 401* 370   BUN 18 11 20   CREA 2.48* 1.81* 2.75*       Cultures:     No results found for: SDES  Lab Results   Component Value Date/Time    Culture result: NO GROWTH 6 DAYS 2022 08:52 PM       Radiology:     Line/Insert Date:           Assessment:     Dislodged feeding tube/peritonitis   Recent right thoracotomy  Bilateral BKAs    Objective:     Continue current therapy  Work up fevers  Follow up cultures and studies    Artist MD Juan Jose

## 2022-09-16 NOTE — PROGRESS NOTES
Spiritual Care Assessment/Progress Note  [address]      NAME: Holly Milton      MRN: [id number]  AGE: 45 y.o. SEX: male  Mandaen Affiliation: No preference   Language: English     9/16/2022     Total Time (in minutes): 15     Spiritual Assessment begun in [address] through conversation with:         [x]Patient        [] Family    [] Friend(s)        Reason for Consult:  Follow-up, routine     Spiritual beliefs: (Please include comment if needed)     [x] Identifies with a azul tradition:         [x] Supported by a azul community:            [] Claims no spiritual orientation:           [] Seeking spiritual identity:                [] Adheres to an individual form of spirituality:           [] Not able to assess:                           Identified resources for coping:      [x] Prayer                               [] Music                  [] Guided Imagery     [x] Family/friends                 [] Pet visits     [] Devotional reading                         [] Unknown     [] Other:                                               Interventions offered during this visit: (See comments for more details)    Patient Interventions: Affirmation of emotions/emotional suffering, Affirmation of azul, Catharsis/review of pertinent events in supportive environment, Life review/legacy, Normalization of emotional/spiritual concerns, Prayer (assurance of)           Plan of Care:     [] Support spiritual and/or cultural needs    [] Support AMD and/or advance care planning process      [] Support grieving process   [] Coordinate Rites and/or Rituals    [] Coordination with community clergy   [] No spiritual needs identified at this time   [] Detailed Plan of Care below (See Comments)  [] Make referral to Music Therapy  [] Make referral to Pet Therapy     [] Make referral to Addiction services  [] Make referral to [facility]  [] Make referral to Spiritual Care Partner  [] No future visits requested        [x] Contact Spiritual Care for further referrals     Comments: Follow up with Mr. Holly Milton on 4W Telemetry unit. Mr Andrez Sequeira remembers [address] from previous visit. He shared updates and that he is feeling better, but hungry due to emptying the stomach for surgery. He shared that his family has not been able to make many visits due to living 2 hours away. His  has visited him twice he said. No specific needs requested except prayer that good progress continues to be made and recovery. He expressed much appreciation for the continued care and support.      Chaplain Alvino Simms M.Div.  Shen Solis ([id number])

## 2022-09-16 NOTE — PROGRESS NOTES
CAROLYN:  RUR:12%    Disposition:  Referrals sent to [facility] and [facility] HD sites in patient's locality. CM continuing to follow.     Henry Calderon, [address], [address]

## 2022-09-16 NOTE — PROGRESS NOTES
[facility]  Hospitalist Group                                                                                          Hospitalist Progress Note  Mini Nicole MD  Answering service: [phone] from in house phone        Date of Service:  2022  NAME:  Mary Alice De Paz  :  1970  MRN:  [id number]      Admission Summary: This is a 18-year-old man with a past medical history significant for type 2 diabetes, who presented at the emergency room from [facility] with abdominal pain. The patient was recently admitted to another hospital and underwent left below-knee amputation. The hospitalization became complicated with respiratory failure which required prolonged intubation. The respiratory failure was attributed to aspiration pneumonia. The hospitalization was also complicated with lobulated effusion, for which the patient underwent decortication and right thoracotomy. The patient also went into acute renal failure for which he has been started on hemodialysis. J-tube was placed for supplemental nutrition. The patient was subsequently transferred to [facility] for continuation of care. He was doing relatively well in [facility] until the day of presentation at the emergency room when the patient complained of abdominal pain at the facility. CT scan of the abdomen and pelvis was obtained. The CT scan shows evidence of bowel perforation. The patient was then sent to [facility] emergency room for further evaluation. When the patient arrived at the emergency room, based on his clinical presentation and lab work, Code Sepsis was triggered. The patient received fluid therapy as per sepsis protocol. The emergency room physician consulted general surgeon on-call. The patient was seen by the surgeon and the patient is planned for immediate surgical intervention.   No record of prior admission to this hospital, but the patient was recently admitted to another hospital.    Interval history / Subjective:   Frustrated that he didn't get perm-cath yesterday and had to be NPO again today. Otherwise no new concerns or complaints. Patient accepted to [facility] for IP Rehab once secured HD chair. CM working on securing HD chair for pt. Perm-cath to be placed today. Assessment & Plan:     Intractable abdominal pain  Pneumoperitoneum  Abdominal wall abscess  Severe sepsis  Rectal fistula:   -s/p 9/10 LAPAROSCOPY GENERAL DIAGNOSTIC, LAPAROSCOPIC  TAKEDOWN AND REMOVAL J-TUBE, LAPAROSCOPIC COLOSTOMY, I & D ABDOMINAL WALL   -Cultures no growth   -repeat CT abd 9/14 improvement in rectal thickening  -Persistent leukocytosis, worsening   -Pain control, appreciate Dr Heraclio Vela input   -Continue zosyn/Vanc, eraxis, duration per ID   -Went to OR with CRS and general surgery 9/15 for anorectal examination under anesthesia including rigid proctosignmoidoscopy   - Repeat Blood cultures given worsening leukocytosis     Possible empyema  -H/o recent thoracotomy and prolonged intubation at [facility]  -Continue antibiotics  -Appreciate pulmonology recommendations, will continue abx and repeat imaging if worsening       B/L AKA   - Wound care on board  - Vascular consulted, no acute surgical intervention indicated     JEMIMA now on HD  - MWF HD   - Perm cath placement today   - HD today   - Nephrology on board, appreciate recommendations     FOBT positive Protonix BID    Hypokalemia: replace as needed with dialysis   Acute blood loss Anemia: s/p 1 unit PRBC 9/10. Transfuse for hb<7  HTN: holding antihypertensives for now  DM2:SSI, monitor  Diarrhea: C.  Diff negative    Now on regular diet     Code status: full  Prophylaxis: hold, SCDs  PTA: Vibra    Plan: Continue IV antibiotics, follow ID rec. permacath today,  discharge to [facility] when medically stable  Care Plan discussed with: patient, nurse  Anticipated Disposition: Accepted at [facility], needs OP HD chair      Hospital Problems  Date Reviewed: 9/10/2022            Codes Class Noted POA    Severe protein-calorie malnutrition ([facility] [address] ICD-10-CM: W52  ICD-9-CM: 264  9/13/2022 Yes        * (Principal) Intra-abdominal infection ICD-10-CM: B99.9  ICD-9-CM: 136.9  9/10/2022 Yes       Review of Systems:   A comprehensive review of systems was negative except for that written in the HPI. Vital Signs:    Last 24hrs VS reviewed since prior progress note. Most recent are:  Visit Vitals  BP (!) 172/68   Pulse 65   Temp 98.8 °F (37.1 °C)   Resp 17   Ht 5' 7\" (1.702 m)   Wt 71 kg (156 lb 8.4 oz)   SpO2 99%   BMI 24.52 kg/m²         Intake/Output Summary (Last 24 hours) at 9/16/2022 1001  Last data filed at 9/15/2022 2225  Gross per 24 hour   Intake 530 ml   Output --   Net 530 ml        Physical Examination:     I had a face to face encounter with this patient and independently examined them on 9/16/2022 as outlined below:          Constitutional:  No acute distress,   ENT:  Oral mucosa moist, oropharynx benign. Resp:  CTA bilaterally. No wheezing/rhonchi/rales. No accessory muscle use. CV:  Regular rhythm, normal rate, no murmurs, gallops, rubs    GI:  Colostomy. Painful to palpation. Erythema of abdominal skin surrounding previous PEG site. Musculoskeletal:  No edema, warm, 2+ pulses throughout    Neurologic:  Moves all extremities.   AAOx3, CN II-XII reviewed            Data Review:    Review and/or order of clinical lab test  Review and/or order of tests in the radiology section of CPT  Review and/or order of tests in the medicine section of CPT      Labs:     Recent Labs     09/16/22  0128 09/15/22  0224   WBC 17.1* 16.9*   HGB 7.2* 7.2*   HCT 23.1* 22.8*   * 401*     Recent Labs     09/16/22  0128 09/15/22  0224 09/14/22  0106    142 143   K 3.9 3.4* 3.3*    107 109*   CO2 25 29 27   BUN 18 11 20   CREA 2.48* 1.81* 2.75*   GLU 90 100 106*   CA 7.4* 7.7* 7.2*   MG 1.7 1.7 1.7   PHOS 3.6 1.2* 1.9*     No results for input(s): ALT, AP, TBIL, TBILI, TP, ALB, GLOB, GGT, AML, LPSE in the last 72 hours. No lab exists for component: SGOT, GPT, AMYP, HLPSE    Recent Labs     09/15/22  0224   INR 1.1   PTP 11.9*      No results for input(s): FE, TIBC, PSAT, FERR in the last 72 hours. Lab Results   Component Value Date/Time    Folate 4.1 (L) 09/10/2022 12:00 PM        No results for input(s): PH, PCO2, PO2 in the last 72 hours. No results for input(s): CPK, CKNDX, TROIQ in the last 72 hours.     No lab exists for component: CPKMB  No results found for: CHOL, CHOLX, CHLST, CHOLV, HDL, HDLP, LDL, LDLC, DLDLP, TGLX, TRIGL, TRIGP, CHHD, CHHDX  Lab Results   Component Value Date/Time    Glucose (POC) 89 09/16/2022 06:08 AM    Glucose (POC) 70 09/15/2022 08:13 PM    Glucose (POC) 79 09/15/2022 06:01 PM    Glucose (POC) 90 09/15/2022 12:49 PM    Glucose (POC) 99 09/15/2022 06:47 AM     No results found for: COLOR, APPRN, SPGRU, REFSG, WILL, PROTU, GLUCU, KETU, BILU, UROU, MISHA, LEUKU, GLUKE, EPSU, BACTU, WBCU, RBCU, CASTS, UCRY      Medications Reviewed:     Current Facility-Administered Medications   Medication Dose Route Frequency    potassium phosphate 15 mmol in 0.9% sodium chloride 250 mL infusion   IntraVENous CONTINUOUS    HYDROmorphone (DILAUDID) 1 mg/mL oral solution 4 mg  4 mg Oral Q4HWA    anidulafungin (ERAXIS) 100 mg in 0.9% sodium chloride 130 mL IVPB  100 mg IntraVENous Q24H    LORazepam (ATIVAN) tablet 0.25 mg  0.25 mg Oral QHS PRN    sertraline (ZOLOFT) tablet 25 mg  25 mg Oral DAILY    epoetin vera-epbx (RETACRIT) injection 10,000 Units  10,000 Units SubCUTAneous DIALYSIS MON, WED & FRI    piperacillin-tazobactam (ZOSYN) 3.375 g in 0.9% sodium chloride (MBP/ADV) 100 mL MBP  3.375 g IntraVENous Q12H    HYDROmorphone (DILAUDID) injection 0.5-1 mg  0.5-1 mg IntraVENous Q2H PRN    collagenase (SANTYL) 250 unit/gram ointment   Topical DAILY    0.9% sodium chloride infusion 250 mL  250 mL IntraVENous PRN    glucose chewable tablet 16 g  4 Tablet Oral PRN    glucagon (GLUCAGEN) injection 1 mg  1 mg IntraMUSCular PRN    dextrose 10 % infusion 0-250 mL  0-250 mL IntraVENous PRN    diphenhydrAMINE (BENADRYL) injection 12.5 mg  12.5 mg IntraVENous Q6H PRN    sodium chloride (NS) flush 5-40 mL  5-40 mL IntraVENous Q8H    sodium chloride (NS) flush 5-40 mL  5-40 mL IntraVENous PRN    acetaminophen (TYLENOL) tablet 650 mg  650 mg Oral Q6H PRN    Or    acetaminophen (TYLENOL) suppository 650 mg  650 mg Rectal Q6H PRN    polyethylene glycol (MIRALAX) packet 17 g  17 g Oral DAILY PRN    ondansetron (ZOFRAN ODT) tablet 4 mg  4 mg Oral Q8H PRN    Or    ondansetron (ZOFRAN) injection 4 mg  4 mg IntraVENous Q6H PRN    L.acidophilus-paracasei-S.thermophil-bifidobacter (RISAQUAD) 8 billion cell capsule  1 Capsule Oral DAILY    insulin lispro (HUMALOG) injection   SubCUTAneous AC&HS    glucose chewable tablet 16 g  4 Tablet Oral PRN    glucagon (GLUCAGEN) injection 1 mg  1 mg IntraMUSCular PRN    pantoprazole (PROTONIX) 40 mg in 0.9% sodium chloride 10 mL injection  40 mg IntraVENous Q12H    dextrose 10 % infusion 0-250 mL  0-250 mL IntraVENous PRN    Vancomycin - pharmacy to dose   Other Rx Dosing/Monitoring    0.9% sodium chloride infusion 250 mL  250 mL IntraVENous PRN     ______________________________________________________________________  EXPECTED LENGTH OF STAY: 9d 14h  ACTUAL LENGTH OF STAY:          6                 Wallace Kuhn MD

## 2022-09-16 NOTE — PROGRESS NOTES
Follow spiritual care for Mr. Almas Lilly on 4W Telemetry. Pt saw  walking by and waved him down for help. Mr Matti Jaimes seemed on the sad or discomfort side, he updated  on how he is feeling and asked for help to find the remote to call the nurse.  found remote for him and he called for the nurse. Mr. Matti Jaimes expressed his thanks.     Chaplain Charo Prasad M.Div.  Eduardo Rodgers ([id number])

## 2022-09-16 NOTE — PERIOP NOTES
TRANSFER - OUT REPORT:    Verbal report given to DOMINGA Mesa(name) on Kevin Chris  being transferred to Room 442(unit) for routine post - op       Report consisted of patients Situation, Background, Assessment and   Recommendations(SBAR). Time Pre op antibiotic given:1926  Anesthesia Stop time: 62046  Information from the following report(s) Procedure Summary was reviewed with the receiving nurse. Opportunity for questions and clarification was provided. Is the patient on 02? NO       L/Min        Other     Is the patient on a monitor? YES    Is the nurse transporting with the patient? YES    Surgical Waiting Area notified of patient's transfer from PACU? NO  MD spoke with wife by phone.        The following personal items collected during your admission accompanied patient upon transfer:   Dental Appliance: Dental Appliances: None  Vision:    Hearing Aid:    Jewelry:    Clothing:    Other Valuables:    Valuables sent to safe:

## 2022-09-17 LAB
ANION GAP SERPL CALC-SCNC: 6 MMOL/L (ref 5–15)
BASOPHILS # BLD: 0.1 K/UL (ref 0–0.1)
BASOPHILS NFR BLD: 0 % (ref 0–1)
BUN SERPL-MCNC: 12 MG/DL (ref 6–20)
BUN/CREAT SERPL: 7 (ref 12–20)
CALCIUM SERPL-MCNC: 7.3 MG/DL (ref 8.5–10.1)
CHLORIDE SERPL-SCNC: 106 MMOL/L (ref 97–108)
CO2 SERPL-SCNC: 29 MMOL/L (ref 21–32)
CREAT SERPL-MCNC: 1.69 MG/DL (ref 0.7–1.3)
DIFFERENTIAL METHOD BLD: ABNORMAL
EOSINOPHIL # BLD: 0.2 K/UL (ref 0–0.4)
EOSINOPHIL NFR BLD: 1 % (ref 0–7)
ERYTHROCYTE [DISTWIDTH] IN BLOOD BY AUTOMATED COUNT: 18.1 % (ref 11.5–14.5)
GLUCOSE BLD STRIP.AUTO-MCNC: 67 MG/DL (ref 65–117)
GLUCOSE BLD STRIP.AUTO-MCNC: 85 MG/DL (ref 65–117)
GLUCOSE BLD STRIP.AUTO-MCNC: 94 MG/DL (ref 65–117)
GLUCOSE SERPL-MCNC: 70 MG/DL (ref 65–100)
HCT VFR BLD AUTO: 25.2 % (ref 36.6–50.3)
HGB BLD-MCNC: 7.9 G/DL (ref 12.1–17)
IMM GRANULOCYTES # BLD AUTO: 0.2 K/UL (ref 0–0.04)
IMM GRANULOCYTES NFR BLD AUTO: 1 % (ref 0–0.5)
LYMPHOCYTES # BLD: 1.2 K/UL (ref 0.8–3.5)
LYMPHOCYTES NFR BLD: 6 % (ref 12–49)
MAGNESIUM SERPL-MCNC: 1.5 MG/DL (ref 1.6–2.4)
MCH RBC QN AUTO: 29.8 PG (ref 26–34)
MCHC RBC AUTO-ENTMCNC: 31.3 G/DL (ref 30–36.5)
MCV RBC AUTO: 95.1 FL (ref 80–99)
MONOCYTES # BLD: 0.8 K/UL (ref 0–1)
MONOCYTES NFR BLD: 4 % (ref 5–13)
NEUTS SEG # BLD: 16.3 K/UL (ref 1.8–8)
NEUTS SEG NFR BLD: 88 % (ref 32–75)
NRBC # BLD: 0 K/UL (ref 0–0.01)
NRBC BLD-RTO: 0 PER 100 WBC
PHOSPHATE SERPL-MCNC: 1.9 MG/DL (ref 2.6–4.7)
PLATELET # BLD AUTO: 527 K/UL (ref 150–400)
PMV BLD AUTO: 9.3 FL (ref 8.9–12.9)
POTASSIUM SERPL-SCNC: 3.4 MMOL/L (ref 3.5–5.1)
RBC # BLD AUTO: 2.65 M/UL (ref 4.1–5.7)
SERVICE CMNT-IMP: NORMAL
SODIUM SERPL-SCNC: 141 MMOL/L (ref 136–145)
WBC # BLD AUTO: 18.7 K/UL (ref 4.1–11.1)

## 2022-09-17 PROCEDURE — 83735 ASSAY OF MAGNESIUM: CPT

## 2022-09-17 PROCEDURE — 85025 COMPLETE CBC W/AUTO DIFF WBC: CPT

## 2022-09-17 PROCEDURE — 74011000250 HC RX REV CODE- 250: Performed by: COLON & RECTAL SURGERY

## 2022-09-17 PROCEDURE — 74011000250 HC RX REV CODE- 250: Performed by: NURSE PRACTITIONER

## 2022-09-17 PROCEDURE — 74011000258 HC RX REV CODE- 258: Performed by: COLON & RECTAL SURGERY

## 2022-09-17 PROCEDURE — 74011250637 HC RX REV CODE- 250/637: Performed by: COLON & RECTAL SURGERY

## 2022-09-17 PROCEDURE — 84100 ASSAY OF PHOSPHORUS: CPT

## 2022-09-17 PROCEDURE — 82962 GLUCOSE BLOOD TEST: CPT

## 2022-09-17 PROCEDURE — 74011250637 HC RX REV CODE- 250/637: Performed by: STUDENT IN AN ORGANIZED HEALTH CARE EDUCATION/TRAINING PROGRAM

## 2022-09-17 PROCEDURE — 74011250636 HC RX REV CODE- 250/636: Performed by: COLON & RECTAL SURGERY

## 2022-09-17 PROCEDURE — 80048 BASIC METABOLIC PNL TOTAL CA: CPT

## 2022-09-17 PROCEDURE — 65270000046 HC RM TELEMETRY

## 2022-09-17 PROCEDURE — C9113 INJ PANTOPRAZOLE SODIUM, VIA: HCPCS | Performed by: COLON & RECTAL SURGERY

## 2022-09-17 PROCEDURE — 74011250636 HC RX REV CODE- 250/636: Performed by: NURSE PRACTITIONER

## 2022-09-17 RX ORDER — LANOLIN ALCOHOL/MO/W.PET/CERES
400 CREAM (GRAM) TOPICAL EVERY 4 HOURS
Status: COMPLETED | OUTPATIENT
Start: 2022-09-17 | End: 2022-09-18

## 2022-09-17 RX ADMIN — SODIUM CHLORIDE, PRESERVATIVE FREE 10 ML: 5 INJECTION INTRAVENOUS at 03:50

## 2022-09-17 RX ADMIN — Medication 4 MG: at 10:44

## 2022-09-17 RX ADMIN — SODIUM CHLORIDE, PRESERVATIVE FREE 10 ML: 5 INJECTION INTRAVENOUS at 21:20

## 2022-09-17 RX ADMIN — COLLAGENASE SANTYL: 250 OINTMENT TOPICAL at 10:44

## 2022-09-17 RX ADMIN — HYDROMORPHONE HYDROCHLORIDE 0.5 MG: 1 INJECTION, SOLUTION INTRAMUSCULAR; INTRAVENOUS; SUBCUTANEOUS at 11:36

## 2022-09-17 RX ADMIN — Medication 4 MG: at 06:49

## 2022-09-17 RX ADMIN — Medication 1 CAPSULE: at 10:43

## 2022-09-17 RX ADMIN — VANCOMYCIN HYDROCHLORIDE 750 MG: 750 INJECTION, POWDER, LYOPHILIZED, FOR SOLUTION INTRAVENOUS at 03:49

## 2022-09-17 RX ADMIN — SERTRALINE 25 MG: 50 TABLET, FILM COATED ORAL at 10:43

## 2022-09-17 RX ADMIN — MAGNESIUM OXIDE 400 MG (241.3 MG MAGNESIUM) TABLET 400 MG: TABLET at 21:19

## 2022-09-17 RX ADMIN — WATER 1 MG: 1 INJECTION INTRAMUSCULAR; INTRAVENOUS; SUBCUTANEOUS at 04:03

## 2022-09-17 RX ADMIN — SODIUM CHLORIDE 100 MG: 9 INJECTION, SOLUTION INTRAVENOUS at 19:06

## 2022-09-17 RX ADMIN — PIPERACILLIN AND TAZOBACTAM 3.38 G: 3; .375 INJECTION, POWDER, FOR SOLUTION INTRAVENOUS at 06:49

## 2022-09-17 RX ADMIN — SODIUM CHLORIDE, PRESERVATIVE FREE 40 MG: 5 INJECTION INTRAVENOUS at 10:43

## 2022-09-17 RX ADMIN — HYDROMORPHONE HYDROCHLORIDE 0.5 MG: 1 INJECTION, SOLUTION INTRAMUSCULAR; INTRAVENOUS; SUBCUTANEOUS at 23:34

## 2022-09-17 RX ADMIN — Medication 4 MG: at 15:43

## 2022-09-17 RX ADMIN — PIPERACILLIN AND TAZOBACTAM 3.38 G: 3; .375 INJECTION, POWDER, FOR SOLUTION INTRAVENOUS at 19:06

## 2022-09-17 RX ADMIN — HYDROMORPHONE HYDROCHLORIDE 0.5 MG: 1 INJECTION, SOLUTION INTRAMUSCULAR; INTRAVENOUS; SUBCUTANEOUS at 03:50

## 2022-09-17 RX ADMIN — HYDROMORPHONE HYDROCHLORIDE 0.5 MG: 1 INJECTION, SOLUTION INTRAMUSCULAR; INTRAVENOUS; SUBCUTANEOUS at 06:58

## 2022-09-17 RX ADMIN — SODIUM CHLORIDE, PRESERVATIVE FREE 40 MG: 5 INJECTION INTRAVENOUS at 21:19

## 2022-09-17 RX ADMIN — ONDANSETRON 4 MG: 2 INJECTION INTRAMUSCULAR; INTRAVENOUS at 11:37

## 2022-09-17 RX ADMIN — LORAZEPAM 0.25 MG: 0.5 TABLET ORAL at 23:34

## 2022-09-17 RX ADMIN — SODIUM CHLORIDE, PRESERVATIVE FREE 10 ML: 5 INJECTION INTRAVENOUS at 15:43

## 2022-09-17 RX ADMIN — HYDROMORPHONE HYDROCHLORIDE 1 MG: 1 INJECTION, SOLUTION INTRAMUSCULAR; INTRAVENOUS; SUBCUTANEOUS at 19:13

## 2022-09-17 RX ADMIN — Medication 4 MG: at 19:06

## 2022-09-17 NOTE — PROGRESS NOTES
[facility] [facility]  Hospitalist Group                                                                                          Hospitalist Progress Note  Yvette Villela MD  Answering service: [phone] from in house phone        Date of Service:  2022  NAME:  Colletta Clamp  :  1970  MRN:  [id number]      Admission Summary: This is a 58-year-old man with a past medical history significant for type 2 diabetes, who presented at the emergency room from [address] [place] [address] with abdominal pain. The patient was recently admitted to another hospital and underwent left below-knee amputation. The hospitalization became complicated with respiratory failure which required prolonged intubation. The respiratory failure was attributed to aspiration pneumonia. The hospitalization was also complicated with lobulated effusion, for which the patient underwent decortication and right thoracotomy. The patient also went into acute renal failure for which he has been started on hemodialysis. J-tube was placed for supplemental nutrition. The patient was subsequently transferred to [address] [place] [address] for continuation of care. He was doing relatively well in [address] [place] [address] until the day of presentation at the emergency room when the patient complained of abdominal pain at the facility. CT scan of the abdomen and pelvis was obtained. The CT scan shows evidence of bowel perforation. The patient was then sent to [facility] emergency room for further evaluation. When the patient arrived at the emergency room, based on his clinical presentation and lab work, Code Sepsis was triggered. The patient received fluid therapy as per sepsis protocol. The emergency room physician consulted general surgeon on-call. The patient was seen by the surgeon and the patient is planned for immediate surgical intervention.   No record of prior admission to this hospital, but the patient was recently admitted to another hospital.    Interval history / Subjective:   Pt states he feels well today. He is looking forward to going to rehab and getting stronger. Assessment & Plan:     Intractable abdominal pain  Pneumoperitoneum  Abdominal wall abscess  Severe sepsis  Rectal fistula:   -s/p 9/10 LAPAROSCOPY GENERAL DIAGNOSTIC, LAPAROSCOPIC  TAKEDOWN AND REMOVAL J-TUBE, LAPAROSCOPIC COLOSTOMY, I & D ABDOMINAL WALL   -Cultures no growth   -repeat CT abd 9/14 improvement in rectal thickening  -Persistent leukocytosis, worsening   -Pain control, appreciate Dr Joyce Verduzco input   -Continue zosyn/Vanc, eraxis, duration per ID   -Went to OR with CRS and general surgery 9/15 for anorectal examination under anesthesia including rigid proctosignmoidoscopy   - Repeat Blood cultures and Ucx given worsening leukocytosis   - Appreciate ID's input   - worsening leukocytosis may be 2/2 recent procedure     Possible empyema  -H/o recent thoracotomy and prolonged intubation at [facility]  -Continue antibiotics  -Appreciate pulmonology recommendations, will continue abx and repeat imaging if worsening     B/L AKA   - Wound care on board  - Vascular consulted, no acute surgical intervention indicated     JEMIMA now on HD  - MWF HD   - Perm cath placement today   - HD today   - Nephrology on board, appreciate recommendations     FOBT positive Protonix BID    Hypokalemia: replace as needed with dialysis   Acute blood loss Anemia: s/p 1 unit PRBC 9/10. Transfuse for hb<7  HTN: holding antihypertensives for now  DM2:SSI, monitor  Diarrhea: C.  Diff negative    Now on regular diet     Code status: full  Prophylaxis: hold, SCDs  PTA: Vibra    Plan: Continue IV antibiotics, follow ID rec. permacath today,  discharge to [facility] when medically stable  Care Plan discussed with: patient, nurse  Anticipated Disposition: Accepted at [facility], needs OP HD chair      Hospital Problems  Date Reviewed: 9/10/2022            Codes Class Noted POA    Severe protein-calorie malnutrition ([facility] [address] ICD-10-CM: E43  ICD-9-CM: 337  9/13/2022 Yes        * (Principal) Intra-abdominal infection ICD-10-CM: B99.9  ICD-9-CM: 136.9  9/10/2022 Yes       Review of Systems:   A comprehensive review of systems was negative except for that written in the HPI. Vital Signs:    Last 24hrs VS reviewed since prior progress note. Most recent are:  Visit Vitals  BP (!) 144/90 (BP 1 Location: Left upper arm, BP Patient Position: At rest;Lying)   Pulse 80   Temp 98.5 °F (36.9 °C)   Resp 16   Ht 5' 7\" (1.702 m)   Wt 70 kg (154 lb 5.2 oz)   SpO2 99%   BMI 24.17 kg/m²         Intake/Output Summary (Last 24 hours) at 9/17/2022 1539  Last data filed at 9/17/2022 1220  Gross per 24 hour   Intake 0 ml   Output 650 ml   Net -650 ml        Physical Examination:     I had a face to face encounter with this patient and independently examined them on 9/17/2022 as outlined below:          Constitutional:  No acute distress,   ENT:  Oral mucosa moist, oropharynx benign. Resp:  CTA bilaterally. No wheezing/rhonchi/rales. No accessory muscle use. CV:  Regular rhythm, normal rate, no murmurs, gallops, rubs    GI:  Colostomy. Bandages clean and dry. Mild tenderness to palpation. Musculoskeletal:  No edema, warm, 2+ pulses throughout    Neurologic:  Moves all extremities.   AAOx3, CN II-XII reviewed            Data Review:    Review and/or order of clinical lab test  Review and/or order of tests in the radiology section of CPT  Review and/or order of tests in the medicine section of CPT      Labs:     Recent Labs     09/17/22  1058 09/16/22  0128   WBC 18.7* 17.1*   HGB 7.9* 7.2*   HCT 25.2* 23.1*   * 458*     Recent Labs     09/17/22  1058 09/16/22  0128 09/15/22  0224    141 142   K 3.4* 3.9 3.4*    108 107   CO2 29 25 29   BUN 12 18 11   CREA 1.69* 2.48* 1.81*   GLU 70 90 100   CA 7.3* 7.4* 7.7*   MG 1.5* 1.7 1.7   PHOS 1.9* 3.6 1.2*     No results for input(s): ALT, AP, TBIL, TBILI, TP, ALB, GLOB, GGT, AML, LPSE in the last 72 hours. No lab exists for component: SGOT, GPT, AMYP, HLPSE    Recent Labs     09/15/22  0224   INR 1.1   PTP 11.9*      No results for input(s): FE, TIBC, PSAT, FERR in the last 72 hours. Lab Results   Component Value Date/Time    Folate 4.1 (L) 09/10/2022 12:00 PM        No results for input(s): PH, PCO2, PO2 in the last 72 hours. No results for input(s): CPK, CKNDX, TROIQ in the last 72 hours.     No lab exists for component: CPKMB  No results found for: CHOL, CHOLX, CHLST, CHOLV, HDL, HDLP, LDL, LDLC, DLDLP, TGLX, TRIGL, TRIGP, CHHD, CHHDX  Lab Results   Component Value Date/Time    Glucose (POC) 67 09/17/2022 11:17 AM    Glucose (POC) 84 09/16/2022 11:22 PM    Glucose (POC) 92 09/16/2022 05:59 PM    Glucose (POC) 78 09/16/2022 11:42 AM    Glucose (POC) 89 09/16/2022 06:08 AM     No results found for: COLOR, APPRN, SPGRU, REFSG, WILL, PROTU, GLUCU, KETU, BILU, UROU, MISHA, LEUKU, GLUKE, EPSU, BACTU, WBCU, RBCU, CASTS, UCRY      Medications Reviewed:     Current Facility-Administered Medications   Medication Dose Route Frequency    HYDROmorphone (DILAUDID) 1 mg/mL oral solution 4 mg  4 mg Oral Q4HWA    anidulafungin (ERAXIS) 100 mg in 0.9% sodium chloride 130 mL IVPB  100 mg IntraVENous Q24H    LORazepam (ATIVAN) tablet 0.25 mg  0.25 mg Oral QHS PRN    sertraline (ZOLOFT) tablet 25 mg  25 mg Oral DAILY    epoetin vera-epbx (RETACRIT) injection 10,000 Units  10,000 Units SubCUTAneous DIALYSIS MON, WED & FRI    piperacillin-tazobactam (ZOSYN) 3.375 g in 0.9% sodium chloride (MBP/ADV) 100 mL MBP  3.375 g IntraVENous Q12H    HYDROmorphone (DILAUDID) injection 0.5-1 mg  0.5-1 mg IntraVENous Q2H PRN    collagenase (SANTYL) 250 unit/gram ointment   Topical DAILY    0.9% sodium chloride infusion 250 mL  250 mL IntraVENous PRN    glucose chewable tablet 16 g  4 Tablet Oral PRN    glucagon (GLUCAGEN) injection 1 mg  1 mg IntraMUSCular PRN    dextrose 10 % infusion 0-250 mL  0-250 mL IntraVENous PRN    diphenhydrAMINE (BENADRYL) injection 12.5 mg  12.5 mg IntraVENous Q6H PRN    sodium chloride (NS) flush 5-40 mL  5-40 mL IntraVENous Q8H    sodium chloride (NS) flush 5-40 mL  5-40 mL IntraVENous PRN    acetaminophen (TYLENOL) tablet 650 mg  650 mg Oral Q6H PRN    Or    acetaminophen (TYLENOL) suppository 650 mg  650 mg Rectal Q6H PRN    polyethylene glycol (MIRALAX) packet 17 g  17 g Oral DAILY PRN    ondansetron (ZOFRAN ODT) tablet 4 mg  4 mg Oral Q8H PRN    Or    ondansetron (ZOFRAN) injection 4 mg  4 mg IntraVENous Q6H PRN    L.acidophilus-paracasei-S.thermophil-bifidobacter (RISAQUAD) 8 billion cell capsule  1 Capsule Oral DAILY    insulin lispro (HUMALOG) injection   SubCUTAneous AC&HS    glucose chewable tablet 16 g  4 Tablet Oral PRN    glucagon (GLUCAGEN) injection 1 mg  1 mg IntraMUSCular PRN    pantoprazole (PROTONIX) 40 mg in 0.9% sodium chloride 10 mL injection  40 mg IntraVENous Q12H    dextrose 10 % infusion 0-250 mL  0-250 mL IntraVENous PRN    Vancomycin - pharmacy to dose   Other Rx Dosing/Monitoring    0.9% sodium chloride infusion 250 mL  250 mL IntraVENous PRN     ______________________________________________________________________  EXPECTED LENGTH OF STAY: 9d 14h  ACTUAL LENGTH OF STAY:          7                 Irma Hardy MD

## 2022-09-17 NOTE — OP NOTES
[address]  OPERATIVE REPORT    Name:  Ofe Euceda  MR#:  [id number]  :  1970  ACCOUNT #:  [de-identified]    DATE OF SERVICE:  09/15/2022    PREOPERATIVE DIAGNOSIS:  Anorectal wound    POSTOPERATIVE DIAGNOSIS:  Anorectal wound    PROCEDURE PERFORMED:  Anorectal examination under anesthesia including rigid proctosigmoidoscopy    SURGEON:  Janay Lozada MD    ASSISTANT:  None    ANESTHESIA:  General endotracheal    SPECIMENS REMOVED:  None    TUBES AND DRAINS:  None. ESTIMATED BLOOD LOSS:  Less than 1 mL    IV FLUIDS:  Crystalloid 058 mL    COMPLICATIONS:  Small skin tear to the dorsum of the right hand that occurred during positioning    IMPLANTS:  None    INDICATIONS FOR PROCEDURE:  The patient is a 53-year-old male with a complicated recent medical history who was admitted to [facility] from a nearby facility for evaluation and treatment of sepsis. Among other things, he underwent an urgent laparoscopic abdominal exploration, the removal of a jejunostomy tube, and the creation of a sigmoid colostomy with a Rohit's pouch. This colon procedure was performed by Dr. Lance Oconnell because the patient was found to have what appeared to be a rectal fistula most likely related to the use of a fecal management system (rectal tube) at one of these other facilities. The abdominal operation was performed early in the morning on 09/10/2022. The patient was subsequently stabilized and he has been treated with parenteral antibiotics. I was asked to see him to evaluate this possible rectal fistula. A CT scan of the abdomen and pelvis was obtained on 2022, and it was compared to an outside study performed on 2022. Review of these images with the radiologist revealed that there was rectal thickening that appeared to be improved and there was no clear rectal wall defect.   There were presacral soft tissue changes, and there was no focal fluid collection to suggest an abscess. Because the patient was in too much pain to be thoroughly examined at the bedside or without anesthesia, an anorectal examination in the operating room was recommended. It was explained to the patient that, depending upon the findings, debridement of devitalized tissue might be performed. He was also informed that it might be necessary to incise and drain an abscess or perhaps place a draining seton. He understood and agreed to proceed. OPERATIVE FINDINGS:  There was a clean 3 cm x 2.5 cm posterior anal canal wound exposing the sphincter muscles. This wound appeared to be most consistent with pressure-related ischemia and erosion, probably from a rectal tube. There was a full-thickness 1 cm defect of the right posterolateral distal rectal wall beginning at approximately 4 cm from the anal verge and resulting in communication of the distal rectal lumen with the extraperitoneal pelvis where there was a clean and narrow cavity extending superiorly for approximately 2 cm. There was mucoid discharge, but no purulence and no fecal material.  All of the visible tissues appeared to be viable. Rigid proctosigmoidoscopy to approximately 15 cm from the anal verge revealed that the remainder of the rectum appeared to be healthy. PROCEDURE:  After informed consent was obtained, the patient was taken to the operating room where standard monitoring devices were attached and adequate general endotracheal anesthesia was induced. He was then placed in the prone jackknife position on the operating table with all pressure points padded appropriately. Pneumatic compression stockings were not used as the patient has bilateral below-knee amputations one of which is relatively fresh. The buttocks were gently retracted bilaterally using tape but no Mastisol. The perineum was prepped and draped in the usual sterile fashion.   3.375 g of Zosyn, which were already scheduled to be administered at that time, were given parenterally by the anesthetist.    Visual inspection and digital rectal examination were performed. The operation was mostly conducted under 2.5 X magnification. A thorough inspection of the anorectum was facilitated using the Hill-Kaye and Thacker retractors and, later, the rigid proctosigmoidoscope. The findings were as noted above. The rigid scope had been lubricated and carefully inserted under direct vision to approximately 10 cm from the anal verge with minimal inflation of the Rohit's pouch. The right-sided perirectal tract was inspected to the extent possible. It was gently probed and it was also irrigated with saline. As noted above, there was no purulence and no stool was identified. The diverting colostomy appeared to have been quite effective. There was no active bleeding. As there was no abscess present and there was no devitalized tissue, there did not appear to be any therapeutic procedure that could be done here. The operation was concluded by infiltration of the area with 0.25% bupivacaine with epinephrine, then the application of a large sheet of Xeroform to the anal wound followed by 4x4s and a large sacral Mepilex dressing. There were no apparent complications, and the patient appeared to have tolerated the procedure well. At its conclusion, he was extubated and transported in stable condition to the recovery room.         Nacho Croft MD      [id number]  D:  09/17/2022 8:02  T:  09/17/2022 9:16  JOB #:  [id number]  CC:  Radha Roman MD

## 2022-09-17 NOTE — PROGRESS NOTES
0800: Bedside shift change report given to [address] [place] [address] (oncoming nurse) by Arnoldo Balderrama (offgoing nurse). Report included the following information SBAR, Kardex, ED Summary, Intake/Output, MAR, Recent Results, and Cardiac Rhythm NSR . Problem: Risk for Spread of Infection  Goal: Prevent transmission of infectious organism to others  Description: Prevent the transmission of infectious organisms to other patients, staff members, and visitors. Outcome: Progressing Towards Goal     Problem: Patient Education:  Go to Education Activity  Goal: Patient/Family Education  Outcome: Progressing Towards Goal     Problem: Falls - Risk of  Goal: *Absence of Falls  Description: Document Chelita Marking Fall Risk and appropriate interventions in the flowsheet. Outcome: Progressing Towards Goal  Note: Fall Risk Interventions:  Mobility Interventions: OT consult for ADLs, PT Consult for mobility concerns    Mentation Interventions: Adequate sleep, hydration, pain control, Bed/chair exit alarm    Medication Interventions: Bed/chair exit alarm    Elimination Interventions: Call light in reach, Patient to call for help with toileting needs, Bed/chair exit alarm       Problem: Patient Education: Go to Patient Education Activity  Goal: Patient/Family Education  Outcome: Progressing Towards Goal     Problem: Nutrition Deficit  Goal: *Optimize nutritional status  Outcome: Progressing Towards Goal     Problem: Pressure Injury - Risk of  Goal: *Prevention of pressure injury  Description: Document Barber Scale and appropriate interventions in the flowsheet.   Outcome: Progressing Towards Goal  Note: Pressure Injury Interventions:  Sensory Interventions: Assess changes in LOC, Assess need for specialty bed    Moisture Interventions: Absorbent underpads, Check for incontinence Q2 hours and as needed    Activity Interventions: Assess need for specialty bed    Mobility Interventions: PT/OT evaluation    Nutrition Interventions: Document food/fluid/supplement intake    Friction and Shear Interventions: Apply protective barrier, creams and emollients    Problem: Patient Education: Go to Patient Education Activity  Goal: Patient/Family Education  Outcome: Progressing Towards Goal     Problem: Patient Education: Go to Patient Education Activity  Goal: Patient/Family Education  Outcome: Progressing Towards Goal     Problem: Patient Education: Go to Patient Education Activity  Goal: Patient/Family Education  Outcome: Progressing Towards Goal

## 2022-09-18 LAB
ANION GAP SERPL CALC-SCNC: 4 MMOL/L (ref 5–15)
BASOPHILS # BLD: 0.1 K/UL (ref 0–0.1)
BASOPHILS NFR BLD: 0 % (ref 0–1)
BUN SERPL-MCNC: 14 MG/DL (ref 6–20)
BUN/CREAT SERPL: 6 (ref 12–20)
CALCIUM SERPL-MCNC: 7.5 MG/DL (ref 8.5–10.1)
CHLORIDE SERPL-SCNC: 109 MMOL/L (ref 97–108)
CO2 SERPL-SCNC: 28 MMOL/L (ref 21–32)
CREAT SERPL-MCNC: 2.22 MG/DL (ref 0.7–1.3)
DIFFERENTIAL METHOD BLD: ABNORMAL
EOSINOPHIL # BLD: 0.2 K/UL (ref 0–0.4)
EOSINOPHIL NFR BLD: 1 % (ref 0–7)
ERYTHROCYTE [DISTWIDTH] IN BLOOD BY AUTOMATED COUNT: 18.4 % (ref 11.5–14.5)
GLUCOSE BLD STRIP.AUTO-MCNC: 111 MG/DL (ref 65–117)
GLUCOSE BLD STRIP.AUTO-MCNC: 121 MG/DL (ref 65–117)
GLUCOSE BLD STRIP.AUTO-MCNC: 93 MG/DL (ref 65–117)
GLUCOSE BLD STRIP.AUTO-MCNC: 98 MG/DL (ref 65–117)
GLUCOSE SERPL-MCNC: 119 MG/DL (ref 65–100)
HCT VFR BLD AUTO: 23.8 % (ref 36.6–50.3)
HGB BLD-MCNC: 7.3 G/DL (ref 12.1–17)
IMM GRANULOCYTES # BLD AUTO: 0.2 K/UL (ref 0–0.04)
IMM GRANULOCYTES NFR BLD AUTO: 1 % (ref 0–0.5)
LYMPHOCYTES # BLD: 1.4 K/UL (ref 0.8–3.5)
LYMPHOCYTES NFR BLD: 9 % (ref 12–49)
MAGNESIUM SERPL-MCNC: 1.7 MG/DL (ref 1.6–2.4)
MCH RBC QN AUTO: 29.8 PG (ref 26–34)
MCHC RBC AUTO-ENTMCNC: 30.7 G/DL (ref 30–36.5)
MCV RBC AUTO: 97.1 FL (ref 80–99)
MONOCYTES # BLD: 0.8 K/UL (ref 0–1)
MONOCYTES NFR BLD: 5 % (ref 5–13)
NEUTS SEG # BLD: 13.4 K/UL (ref 1.8–8)
NEUTS SEG NFR BLD: 84 % (ref 32–75)
NRBC # BLD: 0 K/UL (ref 0–0.01)
NRBC BLD-RTO: 0 PER 100 WBC
PHOSPHATE SERPL-MCNC: 2.1 MG/DL (ref 2.6–4.7)
PLATELET # BLD AUTO: 533 K/UL (ref 150–400)
PMV BLD AUTO: 9.5 FL (ref 8.9–12.9)
POTASSIUM SERPL-SCNC: 3.1 MMOL/L (ref 3.5–5.1)
RBC # BLD AUTO: 2.45 M/UL (ref 4.1–5.7)
SERVICE CMNT-IMP: ABNORMAL
SERVICE CMNT-IMP: NORMAL
SODIUM SERPL-SCNC: 141 MMOL/L (ref 136–145)
WBC # BLD AUTO: 16 K/UL (ref 4.1–11.1)

## 2022-09-18 PROCEDURE — 74011000250 HC RX REV CODE- 250: Performed by: COLON & RECTAL SURGERY

## 2022-09-18 PROCEDURE — 84100 ASSAY OF PHOSPHORUS: CPT

## 2022-09-18 PROCEDURE — 74011000258 HC RX REV CODE- 258: Performed by: COLON & RECTAL SURGERY

## 2022-09-18 PROCEDURE — 85025 COMPLETE CBC W/AUTO DIFF WBC: CPT

## 2022-09-18 PROCEDURE — 74011250636 HC RX REV CODE- 250/636: Performed by: COLON & RECTAL SURGERY

## 2022-09-18 PROCEDURE — 74011250637 HC RX REV CODE- 250/637: Performed by: COLON & RECTAL SURGERY

## 2022-09-18 PROCEDURE — 80048 BASIC METABOLIC PNL TOTAL CA: CPT

## 2022-09-18 PROCEDURE — 82962 GLUCOSE BLOOD TEST: CPT

## 2022-09-18 PROCEDURE — 83735 ASSAY OF MAGNESIUM: CPT

## 2022-09-18 PROCEDURE — 65270000046 HC RM TELEMETRY

## 2022-09-18 PROCEDURE — 74011250637 HC RX REV CODE- 250/637: Performed by: STUDENT IN AN ORGANIZED HEALTH CARE EDUCATION/TRAINING PROGRAM

## 2022-09-18 PROCEDURE — C9113 INJ PANTOPRAZOLE SODIUM, VIA: HCPCS | Performed by: COLON & RECTAL SURGERY

## 2022-09-18 PROCEDURE — 36415 COLL VENOUS BLD VENIPUNCTURE: CPT

## 2022-09-18 RX ADMIN — POTASSIUM BICARBONATE 20 MEQ: 782 TABLET, EFFERVESCENT ORAL at 13:43

## 2022-09-18 RX ADMIN — PIPERACILLIN AND TAZOBACTAM 3.38 G: 3; .375 INJECTION, POWDER, FOR SOLUTION INTRAVENOUS at 18:18

## 2022-09-18 RX ADMIN — PIPERACILLIN AND TAZOBACTAM 3.38 G: 3; .375 INJECTION, POWDER, FOR SOLUTION INTRAVENOUS at 06:20

## 2022-09-18 RX ADMIN — Medication 4 MG: at 22:07

## 2022-09-18 RX ADMIN — HYDROMORPHONE HYDROCHLORIDE 1 MG: 1 INJECTION, SOLUTION INTRAMUSCULAR; INTRAVENOUS; SUBCUTANEOUS at 13:44

## 2022-09-18 RX ADMIN — SODIUM CHLORIDE, PRESERVATIVE FREE 40 MG: 5 INJECTION INTRAVENOUS at 22:07

## 2022-09-18 RX ADMIN — SODIUM CHLORIDE, PRESERVATIVE FREE 10 ML: 5 INJECTION INTRAVENOUS at 22:07

## 2022-09-18 RX ADMIN — SODIUM CHLORIDE, PRESERVATIVE FREE 10 ML: 5 INJECTION INTRAVENOUS at 13:43

## 2022-09-18 RX ADMIN — Medication 1 CAPSULE: at 10:23

## 2022-09-18 RX ADMIN — SODIUM CHLORIDE, PRESERVATIVE FREE 10 ML: 5 INJECTION INTRAVENOUS at 06:20

## 2022-09-18 RX ADMIN — HYDROMORPHONE HYDROCHLORIDE 0.5 MG: 1 INJECTION, SOLUTION INTRAMUSCULAR; INTRAVENOUS; SUBCUTANEOUS at 01:38

## 2022-09-18 RX ADMIN — HYDROMORPHONE HYDROCHLORIDE 0.5 MG: 1 INJECTION, SOLUTION INTRAMUSCULAR; INTRAVENOUS; SUBCUTANEOUS at 06:20

## 2022-09-18 RX ADMIN — SODIUM CHLORIDE 100 MG: 9 INJECTION, SOLUTION INTRAVENOUS at 18:18

## 2022-09-18 RX ADMIN — Medication 4 MG: at 13:43

## 2022-09-18 RX ADMIN — HYDROMORPHONE HYDROCHLORIDE 1 MG: 1 INJECTION, SOLUTION INTRAMUSCULAR; INTRAVENOUS; SUBCUTANEOUS at 19:28

## 2022-09-18 RX ADMIN — MAGNESIUM OXIDE 400 MG (241.3 MG MAGNESIUM) TABLET 400 MG: TABLET at 01:39

## 2022-09-18 RX ADMIN — SERTRALINE 25 MG: 50 TABLET, FILM COATED ORAL at 10:22

## 2022-09-18 RX ADMIN — Medication 4 MG: at 06:20

## 2022-09-18 RX ADMIN — COLLAGENASE SANTYL: 250 OINTMENT TOPICAL at 10:23

## 2022-09-18 RX ADMIN — SODIUM CHLORIDE, PRESERVATIVE FREE 40 MG: 5 INJECTION INTRAVENOUS at 10:23

## 2022-09-18 RX ADMIN — Medication 4 MG: at 10:23

## 2022-09-18 RX ADMIN — POTASSIUM BICARBONATE 20 MEQ: 782 TABLET, EFFERVESCENT ORAL at 10:23

## 2022-09-18 RX ADMIN — Medication 4 MG: at 18:18

## 2022-09-18 NOTE — PROGRESS NOTES
[facility] Adult  Hospitalist Group                                                                                          Hospitalist Progress Note  Eliza Goodson MD  Answering service: [phone] from in house phone        Date of Service:  2022  NAME:  Randa Napier  :  1970  MRN:  [id number]      Admission Summary: This is a 79-year-old man with a past medical history significant for type 2 diabetes, who presented at the emergency room from [facility] with abdominal pain. The patient was recently admitted to another hospital and underwent left below-knee amputation. The hospitalization became complicated with respiratory failure which required prolonged intubation. The respiratory failure was attributed to aspiration pneumonia. The hospitalization was also complicated with lobulated effusion, for which the patient underwent decortication and right thoracotomy. The patient also went into acute renal failure for which he has been started on hemodialysis. J-tube was placed for supplemental nutrition. The patient was subsequently transferred to [facility] for continuation of care. He was doing relatively well in [facility] until the day of presentation at the emergency room when the patient complained of abdominal pain at the facility. CT scan of the abdomen and pelvis was obtained. The CT scan shows evidence of bowel perforation. The patient was then sent to [facility] emergency room for further evaluation. When the patient arrived at the emergency room, based on his clinical presentation and lab work, Code Sepsis was triggered. The patient received fluid therapy as per sepsis protocol. The emergency room physician consulted general surgeon on-call. The patient was seen by the surgeon and the patient is planned for immediate surgical intervention.   No record of prior admission to this hospital, but the patient was recently admitted to another hospital.    Interval history / Subjective:   Continues to be feeling improved. No abdominal pain, chest pain, SOB. No concerns today. Assessment & Plan:     Intractable abdominal pain  Pneumoperitoneum  Abdominal wall abscess  Severe sepsis  Rectal fistula:   -s/p 9/10 LAPAROSCOPY GENERAL DIAGNOSTIC, LAPAROSCOPIC  TAKEDOWN AND REMOVAL J-TUBE, LAPAROSCOPIC COLOSTOMY, I & D ABDOMINAL WALL   -Cultures no growth   -repeat CT abd 9/14 improvement in rectal thickening  -Persistent leukocytosis, improved on labs this morning  -Went to OR with CRS and general surgery 9/15 for anorectal examination under anesthesia including rigid proctosignmoidoscopy, leukocytosis may be 2/2 recent procedure   Plan:  -Pain control, appreciate Dr Kit Lu input   -Continue zosyn/Vanc, eraxis, duration per ID   - Follow-up repeat blood cultures and urine culture     Possible empyema  -H/o recent thoracotomy and prolonged intubation at [facility]  -Continue antibiotics  -Appreciate pulmonology recommendations, will continue abx and repeat imaging if worsening     B/L AKA   - Wound care on board  - Vascular consulted, no acute surgical intervention indicated     JEMIMA now on HD  - MWF HD   - Perm cath placement today   - HD today   - Nephrology on board, appreciate recommendations     FOBT positive Protonix BID    Hypokalemia: replace as needed with dialysis   Acute blood loss Anemia: s/p 1 unit PRBC 9/10. Transfuse for hb<7  HTN: holding antihypertensives for now  DM2:SSI, monitor  Diarrhea: C.  Diff negative    Now on regular diet     Code status: full  Prophylaxis: hold, SCDs  PTA: V[facility]ra    Plan: Continue IV antibiotics, follow ID rec. permacath today,  discharge to [facility] when medically stable  Care Plan discussed with: patient, nurse  Anticipated Disposition: Accepted at [facility], needs OP HD chair      Hospital Problems  Date Reviewed: 9/10/2022            Codes Class Noted POA    Severe protein-calorie malnutrition ([facility] [address] ICD-10-CM: E43  ICD-9-CM: 220  9/13/2022 Yes        * (Principal) Intra-abdominal infection ICD-10-CM: B99.9  ICD-9-CM: 136.9  9/10/2022 Yes       Review of Systems:   A comprehensive review of systems was negative except for that written in the HPI. Vital Signs:    Last 24hrs VS reviewed since prior progress note. Most recent are:  Visit Vitals  BP (!) 153/98 (BP 1 Location: Left upper arm, BP Patient Position: Lying; At rest)   Pulse 65   Temp 98.3 °F (36.8 °C)   Resp 12   Ht 5' 7\" (1.702 m)   Wt 70 kg (154 lb 5.2 oz)   SpO2 99%   BMI 24.17 kg/m²         Intake/Output Summary (Last 24 hours) at 9/18/2022 1429  Last data filed at 9/18/2022 0835  Gross per 24 hour   Intake --   Output 50 ml   Net -50 ml        Physical Examination:     I had a face to face encounter with this patient and independently examined them on 9/18/2022 as outlined below:          Constitutional:  No acute distress,   ENT:  Oral mucosa moist, oropharynx benign. Resp:  CTA bilaterally. No wheezing/rhonchi/rales. No accessory muscle use. CV:  Regular rhythm, normal rate, no murmurs, gallops, rubs    GI:  Colostomy. Bandages clean and dry. Mild tenderness to palpation. Musculoskeletal:  No edema, warm, 2+ pulses throughout    Neurologic:  Moves all extremities.   AAOx3, CN II-XII reviewed            Data Review:    Review and/or order of clinical lab test  Review and/or order of tests in the radiology section of CPT  Review and/or order of tests in the medicine section of CPT      Labs:     Recent Labs     09/18/22  0145 09/17/22  1058   WBC 16.0* 18.7*   HGB 7.3* 7.9*   HCT 23.8* 25.2*   * 527*     Recent Labs     09/18/22  0145 09/17/22  1058 09/16/22  0128    141 141   K 3.1* 3.4* 3.9   * 106 108   CO2 28 29 25   BUN 14 12 18   CREA 2.22* 1.69* 2.48*   * 70 90   CA 7.5* 7.3* 7.4*   MG 1.7 1.5* 1.7   PHOS 2.1* 1.9* 3.6     No results for input(s): ALT, AP, TBIL, TBILI, TP, ALB, GLOB, GGT, AML, LPSE in the last 72 hours. No lab exists for component: SGOT, GPT, AMYP, HLPSE    No results for input(s): INR, PTP, APTT, INREXT, INREXT in the last 72 hours. No results for input(s): FE, TIBC, PSAT, FERR in the last 72 hours. Lab Results   Component Value Date/Time    Folate 4.1 (L) 09/10/2022 12:00 PM        No results for input(s): PH, PCO2, PO2 in the last 72 hours. No results for input(s): CPK, CKNDX, TROIQ in the last 72 hours.     No lab exists for component: CPKMB  No results found for: CHOL, CHOLX, CHLST, CHOLV, HDL, HDLP, LDL, LDLC, DLDLP, TGLX, TRIGL, TRIGP, CHHD, CHHDX  Lab Results   Component Value Date/Time    Glucose (POC) 121 (H) 09/18/2022 11:45 AM    Glucose (POC) 111 09/18/2022 06:18 AM    Glucose (POC) 85 09/17/2022 11:32 PM    Glucose (POC) 94 09/17/2022 04:00 PM    Glucose (POC) 67 09/17/2022 11:17 AM     No results found for: COLOR, APPRN, SPGRU, REFSG, WILL, PROTU, GLUCU, KETU, BILU, UROU, MISHA, LEUKU, GLUKE, EPSU, BACTU, WBCU, RBCU, CASTS, UCRY      Medications Reviewed:     Current Facility-Administered Medications   Medication Dose Route Frequency    HYDROmorphone (DILAUDID) 1 mg/mL oral solution 4 mg  4 mg Oral Q4HWA    anidulafungin (ERAXIS) 100 mg in 0.9% sodium chloride 130 mL IVPB  100 mg IntraVENous Q24H    LORazepam (ATIVAN) tablet 0.25 mg  0.25 mg Oral QHS PRN    sertraline (ZOLOFT) tablet 25 mg  25 mg Oral DAILY    epoetin vera-epbx (RETACRIT) injection 10,000 Units  10,000 Units SubCUTAneous DIALYSIS MON, WED & FRI    piperacillin-tazobactam (ZOSYN) 3.375 g in 0.9% sodium chloride (MBP/ADV) 100 mL MBP  3.375 g IntraVENous Q12H    HYDROmorphone (DILAUDID) injection 0.5-1 mg  0.5-1 mg IntraVENous Q2H PRN    collagenase (SANTYL) 250 unit/gram ointment   Topical DAILY    0.9% sodium chloride infusion 250 mL  250 mL IntraVENous PRN    glucose chewable tablet 16 g  4 Tablet Oral PRN    glucagon (GLUCAGEN) injection 1 mg  1 mg IntraMUSCular PRN dextrose 10 % infusion 0-250 mL  0-250 mL IntraVENous PRN    diphenhydrAMINE (BENADRYL) injection 12.5 mg  12.5 mg IntraVENous Q6H PRN    sodium chloride (NS) flush 5-40 mL  5-40 mL IntraVENous Q8H    sodium chloride (NS) flush 5-40 mL  5-40 mL IntraVENous PRN    acetaminophen (TYLENOL) tablet 650 mg  650 mg Oral Q6H PRN    Or    acetaminophen (TYLENOL) suppository 650 mg  650 mg Rectal Q6H PRN    polyethylene glycol (MIRALAX) packet 17 g  17 g Oral DAILY PRN    ondansetron (ZOFRAN ODT) tablet 4 mg  4 mg Oral Q8H PRN    Or    ondansetron (ZOFRAN) injection 4 mg  4 mg IntraVENous Q6H PRN    L.acidophilus-paracasei-S.thermophil-bifidobacter (RISAQUAD) 8 billion cell capsule  1 Capsule Oral DAILY    insulin lispro (HUMALOG) injection   SubCUTAneous AC&HS    glucose chewable tablet 16 g  4 Tablet Oral PRN    glucagon (GLUCAGEN) injection 1 mg  1 mg IntraMUSCular PRN    pantoprazole (PROTONIX) 40 mg in 0.9% sodium chloride 10 mL injection  40 mg IntraVENous Q12H    dextrose 10 % infusion 0-250 mL  0-250 mL IntraVENous PRN    Vancomycin - pharmacy to dose   Other Rx Dosing/Monitoring    0.9% sodium chloride infusion 250 mL  250 mL IntraVENous PRN     ______________________________________________________________________  EXPECTED LENGTH OF STAY: 9d 14h  ACTUAL LENGTH OF STAY:          8                 Irma Black MD

## 2022-09-18 NOTE — PROGRESS NOTES
0800: Bedside shift change report given to [address] [place] [address] (oncoming nurse) by Billie Smith (offgoing nurse). Report included the following information SBAR, Kardex, ED Summary, Intake/Output, MAR, Recent Results, and Cardiac Rhythm NSR/Afib . Problem: Risk for Spread of Infection  Goal: Prevent transmission of infectious organism to others  Description: Prevent the transmission of infectious organisms to other patients, staff members, and visitors. Outcome: Progressing Towards Goal     Problem: Patient Education:  Go to Education Activity  Goal: Patient/Family Education  Outcome: Progressing Towards Goal     Problem: Falls - Risk of  Goal: *Absence of Falls  Description: Document Munguia Blades Fall Risk and appropriate interventions in the flowsheet. Outcome: Progressing Towards Goal  Note: Fall Risk Interventions:  Mobility Interventions: Bed/chair exit alarm, Communicate number of staff needed for ambulation/transfer, Patient to call before getting OOB    Mentation Interventions: Adequate sleep, hydration, pain control, Bed/chair exit alarm    Medication Interventions: Bed/chair exit alarm, Patient to call before getting OOB    Elimination Interventions: Call light in reach       Problem: Patient Education: Go to Patient Education Activity  Goal: Patient/Family Education  Outcome: Progressing Towards Goal     Problem: Nutrition Deficit  Goal: *Optimize nutritional status  Outcome: Progressing Towards Goal     Problem: Pressure Injury - Risk of  Goal: *Prevention of pressure injury  Description: Document Barber Scale and appropriate interventions in the flowsheet. Outcome: Progressing Towards Goal  Note: Pressure Injury Interventions:  Sensory Interventions: Assess changes in LOC, Keep linens dry and wrinkle-free, Minimize linen layers, Pad between skin to skin, Turn and reposition approx.  every two hours (pillows and wedges if needed)    Moisture Interventions: Absorbent underpads, Assess need for specialty bed, Minimize layers    Activity Interventions: Pressure redistribution bed/mattress(bed type)    Mobility Interventions: HOB 30 degrees or less, Turn and reposition approx.  every two hours(pillow and wedges), Pressure redistribution bed/mattress (bed type)    Nutrition Interventions: Discuss nutritional consult with provider    Friction and Shear Interventions: Foam dressings/transparent film/skin sealants, Minimize layers       Problem: Patient Education: Go to Patient Education Activity  Goal: Patient/Family Education  Outcome: Progressing Towards Goal     Problem: Patient Education: Go to Patient Education Activity  Goal: Patient/Family Education  Outcome: Progressing Towards Goal     Problem: Patient Education: Go to Patient Education Activity  Goal: Patient/Family Education  Outcome: Progressing Towards Goal

## 2022-09-19 ENCOUNTER — APPOINTMENT (OUTPATIENT)
Dept: GENERAL RADIOLOGY | Age: 52
DRG: 853 | End: 2022-09-19
Attending: STUDENT IN AN ORGANIZED HEALTH CARE EDUCATION/TRAINING PROGRAM
Payer: COMMERCIAL

## 2022-09-19 LAB
ANION GAP SERPL CALC-SCNC: 9 MMOL/L (ref 5–15)
BASOPHILS # BLD: 0.1 K/UL (ref 0–0.1)
BASOPHILS NFR BLD: 0 % (ref 0–1)
BUN SERPL-MCNC: 16 MG/DL (ref 6–20)
BUN/CREAT SERPL: 6 (ref 12–20)
CALCIUM SERPL-MCNC: 7.4 MG/DL (ref 8.5–10.1)
CHLORIDE SERPL-SCNC: 108 MMOL/L (ref 97–108)
CO2 SERPL-SCNC: 25 MMOL/L (ref 21–32)
CREAT SERPL-MCNC: 2.78 MG/DL (ref 0.7–1.3)
DIFFERENTIAL METHOD BLD: ABNORMAL
EOSINOPHIL # BLD: 0.3 K/UL (ref 0–0.4)
EOSINOPHIL NFR BLD: 2 % (ref 0–7)
ERYTHROCYTE [DISTWIDTH] IN BLOOD BY AUTOMATED COUNT: 18.3 % (ref 11.5–14.5)
GLUCOSE BLD STRIP.AUTO-MCNC: 105 MG/DL (ref 65–117)
GLUCOSE BLD STRIP.AUTO-MCNC: 81 MG/DL (ref 65–117)
GLUCOSE BLD STRIP.AUTO-MCNC: 85 MG/DL (ref 65–117)
GLUCOSE BLD STRIP.AUTO-MCNC: 89 MG/DL (ref 65–117)
GLUCOSE SERPL-MCNC: 91 MG/DL (ref 65–100)
HCT VFR BLD AUTO: 22.9 % (ref 36.6–50.3)
HGB BLD-MCNC: 7.1 G/DL (ref 12.1–17)
IMM GRANULOCYTES # BLD AUTO: 0.2 K/UL (ref 0–0.04)
IMM GRANULOCYTES NFR BLD AUTO: 1 % (ref 0–0.5)
LYMPHOCYTES # BLD: 1.3 K/UL (ref 0.8–3.5)
LYMPHOCYTES NFR BLD: 7 % (ref 12–49)
MAGNESIUM SERPL-MCNC: 1.7 MG/DL (ref 1.6–2.4)
MCH RBC QN AUTO: 29.5 PG (ref 26–34)
MCHC RBC AUTO-ENTMCNC: 31 G/DL (ref 30–36.5)
MCV RBC AUTO: 95 FL (ref 80–99)
MONOCYTES # BLD: 0.8 K/UL (ref 0–1)
MONOCYTES NFR BLD: 4 % (ref 5–13)
NEUTS SEG # BLD: 16.1 K/UL (ref 1.8–8)
NEUTS SEG NFR BLD: 86 % (ref 32–75)
NRBC # BLD: 0 K/UL (ref 0–0.01)
NRBC BLD-RTO: 0 PER 100 WBC
PHOSPHATE SERPL-MCNC: 2.5 MG/DL (ref 2.6–4.7)
PLATELET # BLD AUTO: 559 K/UL (ref 150–400)
PMV BLD AUTO: 9.4 FL (ref 8.9–12.9)
POTASSIUM SERPL-SCNC: 3 MMOL/L (ref 3.5–5.1)
RBC # BLD AUTO: 2.41 M/UL (ref 4.1–5.7)
SARS-COV-2, XPLCVT: NOT DETECTED
SERVICE CMNT-IMP: NORMAL
SODIUM SERPL-SCNC: 142 MMOL/L (ref 136–145)
SOURCE, COVRS: NORMAL
WBC # BLD AUTO: 18.8 K/UL (ref 4.1–11.1)

## 2022-09-19 PROCEDURE — 74011250637 HC RX REV CODE- 250/637: Performed by: COLON & RECTAL SURGERY

## 2022-09-19 PROCEDURE — 36415 COLL VENOUS BLD VENIPUNCTURE: CPT

## 2022-09-19 PROCEDURE — 77030037878 HC DRSG MEPILEX >48IN BORD MOLN -B

## 2022-09-19 PROCEDURE — 86704 HEP B CORE ANTIBODY TOTAL: CPT

## 2022-09-19 PROCEDURE — 74011250636 HC RX REV CODE- 250/636: Performed by: COLON & RECTAL SURGERY

## 2022-09-19 PROCEDURE — 83735 ASSAY OF MAGNESIUM: CPT

## 2022-09-19 PROCEDURE — 74011000258 HC RX REV CODE- 258: Performed by: COLON & RECTAL SURGERY

## 2022-09-19 PROCEDURE — 90935 HEMODIALYSIS ONE EVALUATION: CPT

## 2022-09-19 PROCEDURE — 74011250637 HC RX REV CODE- 250/637: Performed by: STUDENT IN AN ORGANIZED HEALTH CARE EDUCATION/TRAINING PROGRAM

## 2022-09-19 PROCEDURE — 93005 ELECTROCARDIOGRAM TRACING: CPT

## 2022-09-19 PROCEDURE — 71045 X-RAY EXAM CHEST 1 VIEW: CPT

## 2022-09-19 PROCEDURE — 74011000250 HC RX REV CODE- 250: Performed by: COLON & RECTAL SURGERY

## 2022-09-19 PROCEDURE — 80048 BASIC METABOLIC PNL TOTAL CA: CPT

## 2022-09-19 PROCEDURE — C9113 INJ PANTOPRAZOLE SODIUM, VIA: HCPCS | Performed by: COLON & RECTAL SURGERY

## 2022-09-19 PROCEDURE — 85025 COMPLETE CBC W/AUTO DIFF WBC: CPT

## 2022-09-19 PROCEDURE — 84100 ASSAY OF PHOSPHORUS: CPT

## 2022-09-19 PROCEDURE — U0005 INFEC AGEN DETEC AMPLI PROBE: HCPCS

## 2022-09-19 PROCEDURE — 65270000046 HC RM TELEMETRY

## 2022-09-19 PROCEDURE — 82962 GLUCOSE BLOOD TEST: CPT

## 2022-09-19 RX ORDER — PANTOPRAZOLE SODIUM 40 MG/1
40 TABLET, DELAYED RELEASE ORAL
Status: DISCONTINUED | OUTPATIENT
Start: 2022-09-19 | End: 2022-10-13 | Stop reason: HOSPADM

## 2022-09-19 RX ORDER — HEPARIN SODIUM 5000 [USP'U]/ML
5000 INJECTION, SOLUTION INTRAVENOUS; SUBCUTANEOUS EVERY 8 HOURS
Status: DISCONTINUED | OUTPATIENT
Start: 2022-09-19 | End: 2022-09-19

## 2022-09-19 RX ADMIN — SODIUM CHLORIDE 100 MG: 9 INJECTION, SOLUTION INTRAVENOUS at 18:39

## 2022-09-19 RX ADMIN — Medication 4 MG: at 15:42

## 2022-09-19 RX ADMIN — POTASSIUM BICARBONATE 20 MEQ: 782 TABLET, EFFERVESCENT ORAL at 18:26

## 2022-09-19 RX ADMIN — PANTOPRAZOLE SODIUM 40 MG: 40 TABLET, DELAYED RELEASE ORAL at 18:27

## 2022-09-19 RX ADMIN — Medication 4 MG: at 07:11

## 2022-09-19 RX ADMIN — COLLAGENASE SANTYL: 250 OINTMENT TOPICAL at 13:19

## 2022-09-19 RX ADMIN — Medication 4 MG: at 22:05

## 2022-09-19 RX ADMIN — SODIUM CHLORIDE, PRESERVATIVE FREE 10 ML: 5 INJECTION INTRAVENOUS at 07:11

## 2022-09-19 RX ADMIN — EPOETIN ALFA-EPBX 10000 UNITS: 10000 INJECTION, SOLUTION INTRAVENOUS; SUBCUTANEOUS at 22:05

## 2022-09-19 RX ADMIN — SODIUM CHLORIDE, PRESERVATIVE FREE 40 MG: 5 INJECTION INTRAVENOUS at 09:00

## 2022-09-19 RX ADMIN — SODIUM CHLORIDE, PRESERVATIVE FREE 10 ML: 5 INJECTION INTRAVENOUS at 13:20

## 2022-09-19 RX ADMIN — PIPERACILLIN AND TAZOBACTAM 3.38 G: 3; .375 INJECTION, POWDER, FOR SOLUTION INTRAVENOUS at 22:07

## 2022-09-19 RX ADMIN — SODIUM CHLORIDE, PRESERVATIVE FREE 10 ML: 5 INJECTION INTRAVENOUS at 22:06

## 2022-09-19 RX ADMIN — HYDROMORPHONE HYDROCHLORIDE 1 MG: 1 INJECTION, SOLUTION INTRAMUSCULAR; INTRAVENOUS; SUBCUTANEOUS at 19:22

## 2022-09-19 RX ADMIN — DIPHENHYDRAMINE HYDROCHLORIDE 12.5 MG: 50 INJECTION, SOLUTION INTRAMUSCULAR; INTRAVENOUS at 03:57

## 2022-09-19 RX ADMIN — POTASSIUM BICARBONATE 20 MEQ: 782 TABLET, EFFERVESCENT ORAL at 13:20

## 2022-09-19 RX ADMIN — HYDROMORPHONE HYDROCHLORIDE 1 MG: 1 INJECTION, SOLUTION INTRAMUSCULAR; INTRAVENOUS; SUBCUTANEOUS at 03:32

## 2022-09-19 RX ADMIN — Medication 4 MG: at 18:24

## 2022-09-19 RX ADMIN — SERTRALINE 25 MG: 50 TABLET, FILM COATED ORAL at 13:19

## 2022-09-19 RX ADMIN — PIPERACILLIN AND TAZOBACTAM 3.38 G: 3; .375 INJECTION, POWDER, FOR SOLUTION INTRAVENOUS at 07:11

## 2022-09-19 RX ADMIN — HYDROMORPHONE HYDROCHLORIDE 1 MG: 1 INJECTION, SOLUTION INTRAMUSCULAR; INTRAVENOUS; SUBCUTANEOUS at 13:25

## 2022-09-19 RX ADMIN — Medication 1 CAPSULE: at 13:20

## 2022-09-19 RX ADMIN — APIXABAN 5 MG: 5 TABLET, FILM COATED ORAL at 18:27

## 2022-09-19 RX ADMIN — VANCOMYCIN HYDROCHLORIDE 750 MG: 750 INJECTION, POWDER, LYOPHILIZED, FOR SOLUTION INTRAVENOUS at 18:28

## 2022-09-19 NOTE — PROGRESS NOTES
CAROLYN:  RUR: 12%    Disposition:  IPR [facility]    IPR:  Patient will received HD treatments at this IPR while in rehab  Following his rehab stay, he will receive    Community HD treatments:  [facility] [address] [facility] [facility] pending the following:  HepB Total Core Antibody--need MD orders  Covid neg test-- need Covid PCR    CM received call from [facility] ext [phone], fax [phone];  Email: [email or web address]. [email or web address]    CM answered questions re peg tube. Informed  her that patient no longer has the peg. Noted ostomy. Patient's estimated d/c (Per IDR is Wed 9/21/22). CM sending perfect serve message to Dr. Jerrie Najjar requesting orders for Hep B Total Core Antibody and Covid PCR. CM faxing  CXR and H&P as requested. CM continuing to follow.     Danielle Yang, [address], [address]

## 2022-09-19 NOTE — PROGRESS NOTES
Bedside and Verbal shift change report given to [facility] (oncoming nurse) by Poppy (offgoing nurse).  Report included the following information SBAR, Kardex, MAR, Accordion, and Cardiac Rhythm SB-SR .

## 2022-09-19 NOTE — PROGRESS NOTES
Nephrology Progress Note  Saloni Subramanian  Date of Admission : 9/9/2022    CC:  Follow up for JEMIMA       Assessment and Plan     JEMIMA on HD:  - 2/2 ATN  - HD dependent MWF for now while here  - no signs of recovery yet  - PC placed 9/16  - HD now    Hypokalemia:  - 3k bath    Anemia:  - MICHAEL MWF    PAD s/p b/l BKA    Recent sepsis    Empyema    ABD wall abscess:  - J-tube removal, colostomy, I&D of abd wound    Sacral decub       Interval History:  Seen and examined on dialysis. Resting in NAD. BP stable. No cp, sob reported. Current Medications: all current  Medications have been eviewed in EPIC  Review of Systems: Pertinent items are noted in HPI. Objective:  Vitals:    Vitals:    09/19/22 0930 09/19/22 0945 09/19/22 1000 09/19/22 1015   BP: (!) 184/95 (!) 174/91 (!) 173/86 (!) 168/98   Pulse: 63 68 63 62   Resp: 16 16 16 16   Temp:       TempSrc:       SpO2:       Weight:       Height:         Intake and Output:  No intake/output data recorded. 09/17 1901 - 09/19 0700  In: -   Out: 350 [Urine:175]    Physical Examination:  Pt intubated     no  General: NAD,Conversant   Neck:  Supple, no mass  Resp:  Lungs CTA B/L, no wheezing , normal respiratory effort  CV:  RRR,  no murmur or rub, no LE edema  GI:  Soft, NT, + Bowel sounds, + ostomy  Neurologic:  Non focal  Psych:             AAO x 3 appropriate affect  Skin:  No Rash  Access:           RIJ PC    []    High complexity decision making was performed  []    Patient is at high-risk of decompensation with multiple organ involvement    Lab Data Personally Reviewed: I have reviewed all the pertinent labs, microbiology data and radiology studies during assessment.     Recent Labs     09/19/22  0343 09/18/22  0145 09/17/22  1058    141 141   K 3.0* 3.1* 3.4*    109* 106   CO2 25 28 29   GLU 91 119* 70   BUN 16 14 12   CREA 2.78* 2.22* 1.69*   CA 7.4* 7.5* 7.3*   MG 1.7 1.7 1.5*   PHOS 2.5* 2.1* 1.9*       Recent Labs     09/19/22  0343 09/18/22  0145 09/17/22  1058   WBC 18.8* 16.0* 18.7*   HGB 7.1* 7.3* 7.9*   HCT 22.9* 23.8* 25.2*   * 533* 527*       No results found for: SDES  Lab Results   Component Value Date/Time    Culture result: NO GROWTH AFTER 7 HOURS 09/16/2022 08:45 PM    Culture result: NO GROWTH 6 DAYS 09/09/2022 08:52 PM     Recent Results (from the past 24 hour(s))   GLUCOSE, POC    Collection Time: 09/18/22 11:45 AM   Result Value Ref Range    Glucose (POC) 121 (H) 65 - 117 mg/dL    Performed by [address] [place] [address],[address], POC    Collection Time: 09/18/22  4:09 PM   Result Value Ref Range    Glucose (POC) 93 65 - 117 mg/dL    Performed by [address] [place] [address],[address], POC    Collection Time: 09/18/22 10:05 PM   Result Value Ref Range    Glucose (POC) 98 65 - 117 mg/dL    Performed by Marck ferguson RN    CBC WITH AUTOMATED DIFF    Collection Time: 09/19/22  3:43 AM   Result Value Ref Range    WBC 18.8 (H) 4.1 - 11.1 K/uL    RBC 2.41 (L) 4.10 - 5.70 M/uL    HGB 7.1 (L) 12.1 - 17.0 g/dL    HCT 22.9 (L) 36.6 - 50.3 %    MCV 95.0 80.0 - 99.0 FL    MCH 29.5 26.0 - 34.0 PG    MCHC 31.0 30.0 - 36.5 g/dL    RDW 18.3 (H) 11.5 - 14.5 %    PLATELET 681 (H) 530 - 400 K/uL    MPV 9.4 8.9 - 12.9 FL    NRBC 0.0 0  WBC    ABSOLUTE NRBC 0.00 0.00 - 0.01 K/uL    NEUTROPHILS 86 (H) 32 - 75 %    LYMPHOCYTES 7 (L) 12 - 49 %    MONOCYTES 4 (L) 5 - 13 %    EOSINOPHILS 2 0 - 7 %    BASOPHILS 0 0 - 1 %    IMMATURE GRANULOCYTES 1 (H) 0.0 - 0.5 %    ABS. NEUTROPHILS 16.1 (H) 1.8 - 8.0 K/UL    ABS. LYMPHOCYTES 1.3 0.8 - 3.5 K/UL    ABS. MONOCYTES 0.8 0.0 - 1.0 K/UL    ABS. EOSINOPHILS 0.3 0.0 - 0.4 K/UL    ABS. BASOPHILS 0.1 0.0 - 0.1 K/UL    ABS. IMM.  GRANS. 0.2 (H) 0.00 - 0.04 K/UL    DF AUTOMATED     METABOLIC PANEL, BASIC    Collection Time: 09/19/22  3:43 AM   Result Value Ref Range    Sodium 142 136 - 145 mmol/L    Potassium 3.0 (L) 3.5 - 5.1 mmol/L    Chloride 108 97 - 108 mmol/L    CO2 25 21 - 32 mmol/L    Anion gap 9 5 - 15 mmol/L Glucose 91 65 - 100 mg/dL    BUN 16 6 - 20 MG/DL    Creatinine 2.78 (H) 0.70 - 1.30 MG/DL    BUN/Creatinine ratio 6 (L) 12 - 20      GFR est AA 29 (L) >60 ml/min/1.73m2    GFR est non-AA 24 (L) >60 ml/min/1.73m2    Calcium 7.4 (L) 8.5 - 10.1 MG/DL   MAGNESIUM    Collection Time: 09/19/22  3:43 AM   Result Value Ref Range    Magnesium 1.7 1.6 - 2.4 mg/dL   PHOSPHORUS    Collection Time: 09/19/22  3:43 AM   Result Value Ref Range    Phosphorus 2.5 (L) 2.6 - 4.7 MG/DL   GLUCOSE, POC    Collection Time: 09/19/22  7:04 AM   Result Value Ref Range    Glucose (POC) 81 65 - 117 mg/dL    Performed by [address] [place] [address], POC    Collection Time: 09/19/22 11:21 AM   Result Value Ref Range    Glucose (POC) 85 65 - 117 mg/dL    Performed by Prasanna Ch MD  [address]  Phone - [phone]   Fax - [phone]  [email or web address]

## 2022-09-19 NOTE — PROGRESS NOTES
Vascular:    Left BKA at another hospital several weeks ago. Skin breakdown along incision line. Not infected. Mostly dry black eschar that is slowly starting to separate. No surgical intervention needed at present. Sutures remain in place. Would continue current wound care at present. Will follow.

## 2022-09-19 NOTE — PROGRESS NOTES
Occupational Therapy  Chart reviewed for tx, patient having dialysis. Will continue to follow.    Tai Yanes OTR/L

## 2022-09-19 NOTE — PROCEDURES
Hemodialysis / [phone]    Vitals Pre Post Assessment Pre Post   /57 144/97 LOC A/O x 3 A/O x 3   HR 67 61 Lungs Clear/ denies SOB Clear   Resp 16 16 Cardiac HRR HRR   Temp 97.8 98.5 Skin CDI/ Bilat BKA-new drsg on left stump CDI-stump drsg intact   Weight 69.5kg 68.5 kg Edema None noted None noted   Tele status BS/ remote BS/ remote Pain denies 8/10-getting dilaudid by primary RN     Orders   Duration: Start: 0930 End: 1230 Total: 3 hrs   Dialyzer: Dialyzer/Set Up Inspection: Revaclear ([id number]/ [id number]) (09/19/22 0925)   K Bath: Dialysate K (mEq/L): 3 (09/19/22 0925)   Ca Bath: Dialysate CA (mEq/L): 2.5 (09/19/22 0925)   Na: Dialysate NA (mEq/L): 138 (09/19/22 0925)   Bicarb: Dialysate HCO3 (mEq/L): 35 (09/19/22 0925)   Target Fluid Removal: Goal/Amount of Fluid to Remove (mL): 1000 mL (09/19/22 0925)     Access   Type & Location: RIJ cath   Comments:    +aspir/ NS flushes after cleaning ports w/ alcohol and CHG. Cath drsg changed after cleaning access w/ alcohol and CHG. No redness or drainage noted.                                     Labs   HBsAg (Antigen) / date:   Neg 9.12.22                                            HBsAb (Antibody) / date: Susc  9.12.22   Source: EPIC   Obtained/Reviewed  Critical Results Called HGB   Date Value Ref Range Status   09/19/2022 7.1 (L) 12.1 - 17.0 g/dL Final     Potassium   Date Value Ref Range Status   09/19/2022 3.0 (L) 3.5 - 5.1 mmol/L Final     Calcium   Date Value Ref Range Status   09/19/2022 7.4 (L) 8.5 - 10.1 MG/DL Final     BUN   Date Value Ref Range Status   09/19/2022 16 6 - 20 MG/DL Final     Creatinine   Date Value Ref Range Status   09/19/2022 2.78 (H) 0.70 - 1.30 MG/DL Final        Meds Given   Name Dose Route        No HD meds ordered or given            Adequacy / Fluid    Total Liters Process: 63 L   Net Fluid Removed: 1000 ml      Comments   Time Out Done:   (Time) 8095 mjb   Admitting Diagnosis: Intra-Abd infection   Consent obtained/signed: Informed Consent Verified: Yes (09/19/22 2852)   Machine / RO # Machine Number: Brock Valladares (09/19/22 [id number])   Primary Nurse Rpt Pre: Farheen Lanza RN   Primary Nurse Rpt Post: Sheridan Hughes RN   Pt Education: Discussed proced and cath care   Care Plan: Continue to do HD txs as ordered   Pts outpatient clinic: TBD     Tx Summary   Comments: Tolerated tx well. At end, blood in circuit returned w/ 300ml NS. Ports flushed w/ NS and sterile q-syte/ purehub caps applied. CHG drsg still CDI. Remains in 442  Bed in lowest position  All rails up.   Call lite in easy reach

## 2022-09-19 NOTE — PROGRESS NOTES
[facility]                                                                                          Hospitalist Progress Note  Shasta Thomas MD  Answering service: [phone] from in house phone        Date of Service:  2022  NAME:  Cirilo Frias  :  1970  MRN:  [id number]      Admission Summary: This is a 58-year-old man with a past medical history significant for type 2 diabetes, who presented at the emergency room from [facility] with abdominal pain. The patient was recently admitted to another hospital and underwent left below-knee amputation. The hospitalization became complicated with respiratory failure which required prolonged intubation. The respiratory failure was attributed to aspiration pneumonia. The hospitalization was also complicated with lobulated effusion, for which the patient underwent decortication and right thoracotomy. The patient also went into acute renal failure for which he has been started on hemodialysis. J-tube was placed for supplemental nutrition. The patient was subsequently transferred to [facility] for continuation of care. He was doing relatively well in [facility] until the day of presentation at the emergency room when the patient complained of abdominal pain at the facility. CT scan of the abdomen and pelvis was obtained. The CT scan shows evidence of bowel perforation. The patient was then sent to [facility] emergency room for further evaluation. When the patient arrived at the emergency room, based on his clinical presentation and lab work, Code Sepsis was triggered. The patient received fluid therapy as per sepsis protocol. The emergency room physician consulted general surgeon on-call. The patient was seen by the surgeon and the patient is planned for immediate surgical intervention.   No record of prior admission to this hospital, but the patient was recently admitted to another hospital.    Interval history / Subjective:   Denies any concerns today. States pain is improved from when he came in. Concern for persistent leukocytosis. Vascular consulted for wound dehiscence of BKA, no surgical intervention at this time. Will discuss with ID and pulm given empyema persistent on imaging. Assessment & Plan:     Intractable abdominal pain  Pneumoperitoneum  Abdominal wall abscess  Severe sepsis  Rectal fistula:   -s/p 9/10 LAPAROSCOPY GENERAL DIAGNOSTIC, LAPAROSCOPIC  TAKEDOWN AND REMOVAL J-TUBE, LAPAROSCOPIC COLOSTOMY, I & D ABDOMINAL WALL   -Cultures no growth   -repeat CT abd 9/14 improvement in rectal thickening  -Persistent leukocytosis, etiology possibly from procedure v below   -Went to OR with CRS and general surgery 9/15 for anorectal examination under anesthesia including rigid proctosignmoidoscopy, leukocytosis may be 2/2 recent procedure   Plan:  -Pain control has been sufficient   -Continue zosyn/Vanc, eraxis, duration per ID   - Follow-up repeat blood cultures and urine culture     Possible empyema  -H/o recent thoracotomy and prolonged intubation at [facility]  -Continue antibiotics  - Repeat CXR 9/19 demonstrates persistent right basilar airspace disease and right-sided loculated effusion/empyema   -Appreciate pulmonology recommendations    B/L AKA   - Wound care on board  - Concern for wound dehiscence today, will discuss with vascular surgereon again 9/19  - Vascular consulted    JEMIMA now on HD  - MWF HD   - Perm cath placed  - Nephrology on board, appreciate recommendations     FOBT positive Protonix BID    Hypokalemia: replace as needed with dialysis   Acute blood loss Anemia: s/p 1 unit PRBC 9/10. Transfuse for hb<7  HTN: holding antihypertensives for now  DM2:SSI, monitor  Diarrhea: C.  Diff negative      Code status: full  Prophylaxis: SCDs given anemia   PTA: [facility]    Plan: Continue IV antibiotics, follow ID rec. permacath today,  discharge to [facility] when medically stable  Care Plan discussed with: patient, nurse  Anticipated Disposition: Accepted at [facility], needs OP HD chair      Hospital Problems  Date Reviewed: 9/10/2022            Codes Class Noted POA    Severe protein-calorie malnutrition ([facility] [address] ICD-10-CM: Q96  ICD-9-CM: 796  9/13/2022 Yes        * (Principal) Intra-abdominal infection ICD-10-CM: B99.9  ICD-9-CM: 136.9  9/10/2022 Yes       Review of Systems:   A comprehensive review of systems was negative except for that written in the HPI. Vital Signs:    Last 24hrs VS reviewed since prior progress note. Most recent are:  Visit Vitals  BP (!) 155/95   Pulse 61   Temp 98.2 °F (36.8 °C)   Resp 16   Ht 5' 7\" (1.702 m)   Wt 69.8 kg (153 lb 14.1 oz)   SpO2 100%   BMI 24.10 kg/m²         Intake/Output Summary (Last 24 hours) at 9/19/2022 1308  Last data filed at 9/19/2022 0550  Gross per 24 hour   Intake --   Output 300 ml   Net -300 ml        Physical Examination:     I had a face to face encounter with this patient and independently examined them on 9/19/2022 as outlined below:          Constitutional:  No acute distress,   ENT:  Oral mucosa moist, oropharynx benign. Resp:  CTA bilaterally. No wheezing/rhonchi/rales. No accessory muscle use. CV:  Regular rhythm, normal rate, no murmurs, gallops, rubs    GI:  Colostomy. Bandages clean and dry. Mild tenderness to palpation. Musculoskeletal:  No edema, warm, 2+ pulses throughout    Neurologic:  Moves all extremities.   AAOx3, CN II-XII reviewed            Data Review:    Review and/or order of clinical lab test  Review and/or order of tests in the radiology section of CPT  Review and/or order of tests in the medicine section of CPT      Labs:     Recent Labs     09/19/22 0343 09/18/22  0145   WBC 18.8* 16.0*   HGB 7.1* 7.3*   HCT 22.9* 23.8*   * 533*     Recent Labs     09/19/22 0343 09/18/22  0145 09/17/22  1058    141 141   K 3.0* 3.1* 3.4*    109* 106   CO2 25 28 29   BUN 16 14 12   CREA 2.78* 2.22* 1.69*   GLU 91 119* 70   CA 7.4* 7.5* 7.3*   MG 1.7 1.7 1.5*   PHOS 2.5* 2.1* 1.9*     No results for input(s): ALT, AP, TBIL, TBILI, TP, ALB, GLOB, GGT, AML, LPSE in the last 72 hours. No lab exists for component: SGOT, GPT, AMYP, HLPSE    No results for input(s): INR, PTP, APTT, INREXT, INREXT in the last 72 hours. No results for input(s): FE, TIBC, PSAT, FERR in the last 72 hours. Lab Results   Component Value Date/Time    Folate 4.1 (L) 09/10/2022 12:00 PM        No results for input(s): PH, PCO2, PO2 in the last 72 hours. No results for input(s): CPK, CKNDX, TROIQ in the last 72 hours.     No lab exists for component: CPKMB  No results found for: CHOL, CHOLX, CHLST, CHOLV, HDL, HDLP, LDL, LDLC, DLDLP, TGLX, TRIGL, TRIGP, CHHD, CHHDX  Lab Results   Component Value Date/Time    Glucose (POC) 85 09/19/2022 11:21 AM    Glucose (POC) 81 09/19/2022 07:04 AM    Glucose (POC) 98 09/18/2022 10:05 PM    Glucose (POC) 93 09/18/2022 04:09 PM    Glucose (POC) 121 (H) 09/18/2022 11:45 AM     No results found for: COLOR, APPRN, SPGRU, REFSG, WILL, PROTU, GLUCU, KETU, BILU, UROU, MISHA, LEUKU, GLUKE, EPSU, BACTU, WBCU, RBCU, CASTS, UCRY      Medications Reviewed:     Current Facility-Administered Medications   Medication Dose Route Frequency    potassium bicarb-citric acid (EFFER-K) tablet 20 mEq  20 mEq Oral Q4H    pantoprazole (PROTONIX) tablet 40 mg  40 mg Oral ACB&D    HYDROmorphone (DILAUDID) 1 mg/mL oral solution 4 mg  4 mg Oral Q4HWA    anidulafungin (ERAXIS) 100 mg in 0.9% sodium chloride 130 mL IVPB  100 mg IntraVENous Q24H    sertraline (ZOLOFT) tablet 25 mg  25 mg Oral DAILY    epoetin vera-epbx (RETACRIT) injection 10,000 Units  10,000 Units SubCUTAneous DIALYSIS MON, WED & FRI    piperacillin-tazobactam (ZOSYN) 3.375 g in 0.9% sodium chloride (MBP/ADV) 100 mL MBP  3.375 g IntraVENous Q12H    HYDROmorphone (DILAUDID) injection 0.5-1 mg  0.5-1 mg IntraVENous Q2H PRN    collagenase (SANTYL) 250 unit/gram ointment   Topical DAILY    0.9% sodium chloride infusion 250 mL  250 mL IntraVENous PRN    glucose chewable tablet 16 g  4 Tablet Oral PRN    glucagon (GLUCAGEN) injection 1 mg  1 mg IntraMUSCular PRN    dextrose 10 % infusion 0-250 mL  0-250 mL IntraVENous PRN    diphenhydrAMINE (BENADRYL) injection 12.5 mg  12.5 mg IntraVENous Q6H PRN    sodium chloride (NS) flush 5-40 mL  5-40 mL IntraVENous Q8H    sodium chloride (NS) flush 5-40 mL  5-40 mL IntraVENous PRN    acetaminophen (TYLENOL) tablet 650 mg  650 mg Oral Q6H PRN    Or    acetaminophen (TYLENOL) suppository 650 mg  650 mg Rectal Q6H PRN    polyethylene glycol (MIRALAX) packet 17 g  17 g Oral DAILY PRN    ondansetron (ZOFRAN ODT) tablet 4 mg  4 mg Oral Q8H PRN    Or    ondansetron (ZOFRAN) injection 4 mg  4 mg IntraVENous Q6H PRN    L.acidophilus-paracasei-S.thermophil-bifidobacter (RISAQUAD) 8 billion cell capsule  1 Capsule Oral DAILY    insulin lispro (HUMALOG) injection   SubCUTAneous AC&HS    glucose chewable tablet 16 g  4 Tablet Oral PRN    glucagon (GLUCAGEN) injection 1 mg  1 mg IntraMUSCular PRN    dextrose 10 % infusion 0-250 mL  0-250 mL IntraVENous PRN    Vancomycin - pharmacy to dose   Other Rx Dosing/Monitoring    0.9% sodium chloride infusion 250 mL  250 mL IntraVENous PRN     ______________________________________________________________________  EXPECTED LENGTH OF STAY: 9d 14h  ACTUAL LENGTH OF STAY:          9                 Irma Acuña MD

## 2022-09-19 NOTE — ADT AUTH CERT NOTES
General Surgery or Procedure GRG - Care Day 6 (9/15/2022) by Val Johnson       Review Status Review Entered   Completed 9/16/2022 11:23      Criteria Review      Care Day: 6 Care Date: 9/15/2022 Level of Care: Telemetry    Guideline Day 1    Level Of Care    (X) OR to ICU or intermediate care    9/16/2022 11:22:01 EDT by Tracy Chowdary      inpatient telemetry    Clinical Status    (X) * Clinical Indications met    9/16/2022 11:23:10 EDT by Tracy Chowdary      VS:  98.2 °F, 64, 145/70, 6-20, 97 % RA    9/16/2022 11:22:01 EDT by Tracy Chowdary      Pre-Op Diagnosis: Anorectal wound   Post-Op Diagnosis:  Anorectal wound  VS:    (X) * Procedure completed    9/16/2022 11:22:01 EDT by Tracy Chowdary      Procedure: Anorectal examination under anesthesia including rigid proctosigmoidoscopy. Interventions    (X) Inpatient interventions as needed    9/16/2022 11:22:01 EDT by Tracy Chowdary      normal saline 25ml/hr continuous IV    (X) Possible prophylactic antiemetic for postoperative nausea and vomiting prophylaxis    9/16/2022 11:22:01 EDT by Tracy Chowdary      zosyn 3.375g q12 IV, zofran 4mg q4 prn IV 1x,    (X) Multimodal analgesia    9/16/2022 11:22:01 EDT by Tracy Chowdary      dilaudid 4mg q4 PO 1x and 0.5-1mg q2 prn IV 4x and 2mg q4 PO 2x    * Milestone   Additional Notes    DATE: 09/15/2022      Pertinent Updates:   S/p Anorectal examination under anesthesia including rigid proctosigmoidoscopy      Abnl/Pertinent Labs/Radiology/Diagnostic Studies:   WBC: 16.9 (H)   RBC: 2.42 (L)   HGB: 7.2 (L)   HCT: 22.8 (L)   RDW: 18.0 (H)   PLATELET: 853 (H)   NEUTROPHILS: 87 (H)   LYMPHOCYTES: 6 (L)   MONOCYTES: 4 (L)   IMMATURE GRANULOCYTES: 1 (H)   ABS. NEUTROPHILS: 14.6 (H)   ABS. IMM.  GRANS.: 0.2 (H)   Prothrombin time: 11.9 (H)   Potassium: 3.4 (L)   Creatinine: 1.81 (H)   BUN/Creatinine ratio: 6 (L)   Calcium: 7.7 (L)   Phosphorus: 1.2 (L)   GFR est non-AA: 40 (L)         Physical Exam:    Gen- Alert in Nad   Abd- soft - Upper wound still with some necrotic tissue-    Ostomy -more pink with leakage. ( Supected exacerbated by loose skin)      MD Consults/Assessments & Plans:   **Hospitalist**   Assessment & Plan:   Intractable abdominal pain   Pneumoperitoneum   Abdominal wall abscess   Severe sepsis   -s/p 9/10 LAPAROSCOPY GENERAL DIAGNOSTIC, LAPAROSCOPIC  TAKEDOWN AND REMOVAL J-TUBE, LAPAROSCOPIC COLOSTOMY, I & D ABDOMINAL WALL    -Cultures no growth    -Persistent leukocytosis    -Pain control, appreciate Dr Cook Manifold input    -Continue zosyn/Vanc, eraxis added by ID    -Plan for OR today with CRS and general surgery        Possible empyema   -H/o recent thoracotomy and prolonged intubation at [facility]   -Continue antibiotics   -Appreciate pulmonology recommendations, will continue abx and repeat imaging if worsening        Rectal fistula:    -repeat CT abd 9/14 improvement in rectal thickening   -likely would need outpatient follow up   -Appreciate CRS       B/L AKA    - Wound care on board   - Vascular consulted, no acute surgical intervention indicated       **Nephrology**   Assessment and Plan   JEMIMA on HD:   - 2/2 ATN   - HD dependent MWF for now while here   - no signs of recovery yet   - will have PC placed tomorrow   - daily labs for now       Hypokalemia:   - 4 k bath with HD       Anemia:   - MICHAEL MWF       PAD s/p b/l BKA       Possible rectal fistula:   - CT neg   - to OR for exam and debridement of perineal wound        Recent sepsis       Empyema      ABD wall abscess:   - J-tube removal, colostomy, I&D of abd wound       **general Surgery**   Plan/Recommendations/Medical Decision Making:   Plan for eua with CRS today. Continue diet as tolerated  after procedure. Will need to have nursing do dressing changes as ordered. Additionally will need to change ostomy bag when leaking per wound care orders. **wound ostomy**   Recommendations:    1.  Left abdomen:  Daily irrigate with saline; pack with saline moist gauze; cover with dry dressing. 2. Sacrum and buttocks:  Daily cleanse with saline; apply Santyl and saline moist gauze; cover with dry dressing. 3. Right upper back:  Daily cleanse with saline; apply honey gel; cover with dry dressing. 4. Left BKA site:  Daily cleanse with saline; apply Xeroform; cover with dry dressing. 5.   Empty appliance when 1/3 full and PRN. Encourage patient/family to notify nurse and   assist w/ pouch emptying to promote self-care. Change appliance twice weekly and PRN for leaking ASAP. DO NOT REINFORCE LEAKS to avoid skin breakdown      **Palliative Medicine**    PLAN:   1. Opioid safety discussed yesterday and today added to it that these medications are for his acute and surgical pain that will resolve- he is not going to need to take these medications for a prolonged period of time. Pt will need rehab after hospital stay - they will be able to taper off opioids. Pt understands and agrees with plan. 2. Continues to report pain, but does feel that the \"spikes\" of pain not as intense w/ the scheduled po Dilaudid. Note that in the past 24h he has used 6 doses of the 1mg IV  Dilaudid rather than 8 doses which he used the previous 24h. 3. No sedation , confusion, decr in respirations or other side effects. While relatively opioid naive before stay at [facility], I think he developed some tolerance while getting opioids in the ICU. 4. Patient with multiple reasons for severe and acute pain although I would not rely on his numeric pain score alone, would also look at nonverbal pain signs. Acute pain:    5. Incr the scheduled Dilaudid liquid  4mg po every 4h while awake. Hold for sedation, confusion, RR <12, SBP >95 and patient may refuse. May give along w/ IV Dilaudid as long as doses are >30 min apart. 6. Cont current dose of Dilaudid 0.5-1mg IV every 2h prn.   7. Would not anticipate needing to increase opioids further.  Primary team to continue to manage, as our team does typically not manage non malignant pain but here to assist if we can. 8. No bowel regimen while on abx- already w/ loose stool. Monitor for constipation. Communicated plan of care with: Palliative IDT, [facility] [address] Team incl Dr Willy Johnson and floor       **Brief OP Note**   Pre-Op Diagnosis: Anorectal wound   Post-Op Diagnosis:  Anorectal wound   Procedure: Anorectal examination under anesthesia including rigid proctosigmoidoscopy. Anesthesia: General endotracheal   Specimens:  None   Drains: None   Estimated Blood Loss:  < 1 mL   Crystalloid:  560 mL   Complications: Small skin tear to dorsum of right hand   Implants: None   Findings: There is a clean 3 cm x 2.5 cm posterior anal canal wound exposing the sphincter muscles. There is a full thickness 1 cm defect of the right posterolateral distal rectal wall beginning at approximately 4 cm from the anal verge and resulting in communication of the distal rectal lumen with the extraperitoneal pelvis where there is a clean, narrow cavity extending superiorly for approximately 2 cm. There was mucoid discharge but no purulence and no fecal material.  All of the visible tissues appeared to be viable. Rigid proctosigmoidoscopy to approximately 15 cm from the anal verge revealed that the remainder of the rectum appeared to  be healthy. Medications:   Eraxis 100mg q24 IV   Risaquad 1cap every day PO   Protonix 40mg q12 IV 1x   Zoloft 25mg every day PO   Potassium phosphate 42.5ml/hr continuous IV         PT/OT/SLP/CM Assessments or Notes:   **Physical therapy**   ASSESSMENT    presents with decreased activity tolerance, increased pain, increased need for assistance for all mobility, sacral wound, bilateral BKA with L stump wound, and high risk for falls. Patient limited in participation with bed mobility 2/2 sacral wound pain.  Patient requires max A x 2 for rolling and drawing to [facility] but follows cues for use of UEs for pulling on bed rails. Patient educated on positioning for pressure relief and skin protection including limiting use of pillows under knees to prevent knee flexion contractures in setting of bilateral BKAs. Patient has goal to D/C to IPR; will need to increase activity tolerance in order to tolerate 3 hours of therapy/day. Will trial 5x/week frequency to progress activity tolerance for goal of IPR at discharge. PLAN :   Recommendations and Planned Interventions: bed mobility training, transfer training, gait training, therapeutic exercises, neuromuscular re-education, patient and family training/education, and therapeutic activities        **Occupational therapy**   ASSESSMENT   Pt minimally amenable to participation in therapy session d/t severe pain. With gentle encouragement and additional time, pt completed repositioning in bed with max A x2. Educated pt on importance of maintaining activity tolerance and actively completing ADL tasks with assistance during admission. Noted, current plan for pt to d/c to IPR, therefore working towards tolerating three hours of therapy/day.         PLAN :   Recommendations and Planned Interventions: self care training, functional mobility training, therapeutic exercise, balance training, therapeutic activities, endurance activities, patient education, and home safety training              Gastroenterology [address] Day 5 (9/14/2022) by Nat Atkins       Review Status Review Entered   Completed 9/16/2022 10:49      Criteria Review      Care Day: 5 Care Date: 9/14/2022 Level of Care: Intermediate Care    Guideline Day 2    Level Of Care    (X) Floor    9/16/2022 10:47:52 EDT by Elizabeth Amin 139    Clinical Status    ( ) * No ICU or intermediate care needs    9/16/2022 10:49:54 EDT by Jalen Blancoable: 80    9/16/2022 10:47:52 EDT by Nicky Maynard      still admitted in intermediate care  VS: 100.4 °F,  130/37, 18, 95% RA    Interventions    (X) Inpatient interventions continue    9/16/2022 10:47:52 EDT by Ammon, [id number], WOUND CARE, DRESSING CHANGE, OSTOMY CARE    * Milestone   Additional Notes    DATE: 09/14/2022      Pertinent Updates:   He has had a left BKA for PVD, right thoracotomy for loculated pleural fluid, VDRF after aspiration, feeding tube placement, renal failure--now on HD and he is admitted with dislodgement of the feeding tube and associated peritonitis--he has persistent leukocytosis and we are asked to see him in consultation. Lots of complaints of abdominal pain. Abnl/Pertinent Labs/Radiology/Diagnostic Studies:   WBC: 16.9 (H)   RBC: 2.42 (L)   HGB: 7.2 (L)   HCT: 22.8 (L)   RDW: 18.0 (H)   PLATELET: 504 (H)   NEUTROPHILS: 87 (H)   LYMPHOCYTES: 6 (L)   MONOCYTES: 4 (L)   IMMATURE GRANULOCYTES: 1 (H)   ABS. NEUTROPHILS: 14.6 (H)   ABS. IMM. GRANS.: 0.2 (H)   Prothrombin time: 11.9 (H)   Potassium: 3.4 (L)   Creatinine: 1.81 (H)   BUN/Creatinine ratio: 6 (L)   Calcium: 7.7 (L)   Phosphorus: 1.2 (L)   GFR est non-AA: 40 (L)      Physical Exam:   Vascular: R BKA site well healed. Left BKA site dressing in place.  Has some escar over the incision line but no cellulitis or fluctuence      MD Consults/Assessments & Plans:   **Nephrology**   Assessment and Plan   JEMIMA on HD:   - HD MWF for now while here   - no signs of recovery yet   - daily labs   - will have PC placed tomorrow       Hypokalemia:   - 4 k bath with HD       Anemia:   - MICHAEL MWF       PAD s/p b/l BKA       Possible rectal fistula:   - for CT scan today       Recent sepsis   Empyema   J-tube removal, colostomy, I&D of abd wound   Sacral decub      **Palliative Medicine**   PALLIATIVE DIAGNOSES:   Acute abdominal pain s/p surgery for abscess and colectomy - slowly improving   Loose stool w/ abx    Rectal pain - may require anorectal exam under anesthesia   Debility/generalized weakness after prolonged hospital stay PLAN:   Met w/ pt, consulted for acute pain in patient with complex hospital course. On dialysis. Pt has not had chronic pain issues, no hx of chronic opioid use. Pain started while in [facility] when had mult procedures and prolonged ICU stay. Uncertain what medication he was taking for pain there, but states at each hospital and 2 [facility] his pain escalates quickly sabrina when he calls out for his medications and there is a delay in getting them. Pt currently on Dilaudid 0.5-1mg IV every 2h prn and has received 7mg IV Dilaudid in past 24h w/out any sedation or confusion. States that it helps the pain as long as he stays on top of the dosing. Discuss side effects of opioids including sedation, confusion, dropping RR and BP and that we have to be safe about using them- especially now as he is on hemodialysis. Now that pt cleared for diet, discuss po pain medications as they last longer. Can schedule them to ensure they are on time. Will start at a low dose, realizing that it may not be enough to cover pain and can increase tmrw. Do not see role in long acting pain medication as acute pain has already started to improve since surgery. Acute pain:    Schedule Dilaudid liquid 2mg po every 4h while awake. Hold for sedation, confusion, RR <12, SBP >95 and patient may refuse. May give along w/ IV Dilaudid as long as doses are >30 min apart. If tolerates likely to increase to 4mg tmrw. Cont current dose of Dilaudid 0.5-1mg IV every 2h prn. No bowel regimen while on abx- already w/ loose stool. Monitor for constipation. As pt continues to heal, opioids will need to be tapered and pt aware he is not going to need these long term. Initial consult note routed to primary continuity provider and/or primary health care team members      **general surgery**   Plan/Recommendations/Medical Decision Making:   Seems to slowly be improving. Appreciate colorectal input.    Will add dietary supplements to his diet. Encourage p.o. intake. Should consider vascular consult if issues with the BKA stump. Continue dressing changes to left upper quadrant incision   Wound ostomy care      **Hospitalist**   Assessment & Plan:       Intractable abdominal pain   Pneumoperitoneum   Abdominal wall abscess   Severe sepsis   -s/p 9/10 LAPAROSCOPY GENERAL DIAGNOSTIC, LAPAROSCOPIC  TAKEDOWN AND REMOVAL J-TUBE, LAPAROSCOPIC COLOSTOMY, I & D ABDOMINAL WALL    -no fever, leukocytosis improving   -Cultures no growth    -Pain control, will consider switching to oral in a day or two   -Continue zosyn/Vanc   -Appreciate Palliative care input for pain control   -Appreciate discussion with Surgery   -Awaiting ID       Possible empyema   -H/o recent thoracotomy and prolonged intubation at [facility]   -Continue antibiotics   -Appreciate pulmonology recommendations. Continue antibiotics. We will follow with chest x-rays. Rectal fistula:    -repeat CT abd 9/14 improvement in rectal thickening   -likely would need outpatient follow up   -Appreciate CRS       B/L AKA with ?necrosis/discharge, will consult vascular   FOBT positive Protonix BID   JEMIMA now on HD, appreciate discussion with Nephrology, not yet ESRD but permacath tomorrow   Hypokalemia: replace as needed   Acute blood loss Anemia: s/p 1 unit PRBC 9/10. Transfuse for hb<7   HTN: holding antihypertensives for now   DM2:SSI, monitor   Diarrhea: C. Diff negative       Now on regular diet       Code status: full   Prophylaxis: hold, SCDs   PTA: [facility]       Plan: Continue IV antibiotics, follow ID rec. permacath tomorrow,  discharge to [facility] when medically stable      **Colorectal Surgery**   I have reviewed the CT scan of the abdomen and pelvis. It does not reveal any pelvic abscess or definite fistula. I would like to perform an examination under anesthesia with possible debridement of the perineal wound tomorrow afternoon.   I understand that the patient will be having a dialysis catheter placed tomorrow, so I will have to find out what the schedule is for that and see whether or not we can do something in the OR afterwards. **Infectious Disease**   Impression:   Dislodged feeding tube with some associated peritonitis   Recent right thoracotomy/decortication   Recent left BKA/history of right BKA   Leukocytosis    Pain issues   Dialysis       Plan:   IV antibiotic therapy   OR prn   Will add antifungal coverage of abdomen   OR prn   Left leg wound may need debriding       **Vascular Surgery**   Plan:   R BKA site well healed   L BKA site with some incisional breakdown but no apparent infection. Do not suspect this is his source. --Continue local wound care to L BKA site   --Offload BL stumps to avoid pressure ulcer development   --No need for any vascular surgery intervention at this time. At time of discharge patient should follow up with his operative surgeon for continued wound care. Medications:   Tylenol 650mg q6 prn PO 1x   Retacrit 10,000unit SQ 1x   Dilaudid 0.5mg-1mg q2 prn IV 5x and 1.5mg IV 1x and 2mg q4 PO 3x   Risaquad 40mg q12 IV    Zosyn 3.375g q12 IV   Eraxis 200mg IV 1x   Vancocin 750mg IV 1x   Zofran 4mg q4 prn IV 2x         PT/OT/SLP/CM Assessments or Notes:   Occupational Therapy/Physical therapy   Chart reviewed in prep for therapy evaluation. Noted, pt continues with increased pain now w/ necrotic stump, purulent drainage, sutures pulled. Discussed with RN.  Will defer at this time and follow up as able and medically appropriate              Gastroenterology [address] Day 2 (9/11/2022) by Alex Varela RN       Review Status Review Entered   Completed 9/12/2022 12:57      Criteria Review      Care Day: 2 Care Date: 9/11/2022 Level of Care: Intermediate Care    Guideline Day 2    Level Of Care    ( ) Floor    9/12/2022 12:57:23 EDT by Alex Varela      Intermediate care    Clinical Status    ( ) * No ICU or intermediate care needs    Interventions    (X) Inpatient interventions continue    ( ) Transition to oral routes    9/12/2022 12:57:23 EDT by Galileo Luong      clear liquid diet    * Milestone   Additional Notes   9/11/22      Vitals: 98.3, 63, 13, 91/56, 95% 2L NC   Meds: tylenol 650mg PO X1, benadryl 12.5mg IV X1, dilaudid 1mg IV Q3h PRN X4, protonix 40mg IV Q12h, zosyn 3.375g IV Q8h, mag sulfate 1g IV X1, potassium chloride 10 meq IV X1, potassium phos 20 mmol IV X1, LR infusion 50mL/hr, vancomycin 1000mg IV Q24h   Labs: WBC 16.0, hgb 7.5, hct 23.8, k 3.0, BUN 23, Cr 2.67, Ca 7.5, phos 2.2, gfr 25   US retroperitoneum: No hydronephrosis or stones. Plan: clear liquid diet, activity as tolerated with assist      GS:   1 Day Post-Op       Procedure:  Procedure(s):   LAPAROSCOPY GENERAL DIAGNOSTIC, LAPAROSCOPIC  TAKEDOWN AND REMOVAL J-TUBE, LAPAROSCOPIC COLOSTOMY, I & D ABDOMINAL WALL      Patient complaining of buttocks pain and some abdominal pain. Apparently has been eating a few frosted flakes and they have been going well. Overall seems to be doing as expected. Will advance diet to full liquids and see how he does. Continue ostomy care-WOCN to see tomorrow   Continue wet-to-dry to abdominal wound WOCN tomorrow   Skin protector and to perianal area dry dressing to ulcers until WOCN can see tomorrow   Continue antibiotics   Will eventually need colorectal for perirectal fistula         IM:   Patient seen and examined for follow up of perforated bowel. Patient is hypotensive and in pain. Pain treated within limits of BP. Patient looking somewhat improved today. Improved color and more awake. Still in a good amount of pain.       Intractable abdominal pain   LAPAROSCOPY GENERAL DIAGNOSTIC, LAPAROSCOPIC  TAKEDOWN AND REMOVAL J-TUBE, LAPAROSCOPIC COLOSTOMY, I & D ABDOMINAL WALL today   [id number] zosyn   Continue pain control       Pneumoperitoneum   As above   General surgery on board Abdominal wall abscess   S/p drainage   Continue zosyn and vancomycin       Possible empyema   H/o recent thoracotomy and prolonged intubation   Continue antibiotics   Appreciate pulmonology recommendations. Continue antibiotics. We will follow with chest x-rays. Rectal fistula   S/p surgery       B/L AKA   Stable       Severe sepsis   Resolved   Continue antibiotics       Hypokalemia   Gave 10 mEq IV potassium yesterday and another 10 mEq today   Recheck pending   I cannot give the patient oral potassium at this time due to his abdominal surgery   Monitor carefully on telemetry as IV potassium and renal disease is risky       Anemia   Transfused 1 unit PRBC 9/10       HTN   Holding       DM2   SSI       Suspected acute GIB   GI consulted       Diarrhea   C. Diff            Nephrology:   Has pain from sx.  Tolerating clears so far.   1 Alfredo/on HD Now    2 S/p lap sx for J tube removal and laproscopic Colostomy, I and D  of abd wall    3 Severe PCM    4 Anemia of acute blood loss    5 Sepsis   6 Empyema    7 h/o HTN    8 DM    9 Sacral decub    10 Above knee amputation        Plan   1 No acute indication for HD at this time    2 Plan HD Monday, pt anuric    3 follow In and out    4 Has temp Kurt cath and need cath care   5 epogen eventually to assist with anemia    6 nutrition support    7 Nep will follow    8 k replaced           Pulmonary:   --continue abx for abd process   --follow clinically and if needed repeat chest ct and if worsening would need re-evaluation by thoracic surgery as percutaneous drainage of the R pleural collection would be futile

## 2022-09-19 NOTE — WOUND CARE
WOCN Note:     Follow-up visit for multiple wounds and colostomy. Previously seen by Arabella Bro. Chart shows:  Admitted on 9/10/22. Admitted for abdominal pain with perforation and colostomy creation on 9/10/22  History of bilateral BKA's, DM, HD, aspiration pneumonia with intubation, peg. Assessment:   Communicative and medicated by RN. Able to turn independently onto left side; EKG leads are on his back and RN moved them to the front. .    Surface: Kayode gel mattress with blower added    1. POA unstageable pressure injury to sacrum     2. POA stage 3 pressure injury to left buttock    Both sacrum and left buttock have 100% soft yellow tissue; no exudate  Tx: cleaned with Vashe, applied Santyl and covered with foam dressing. 3. POA stage 3 pressure injury to right buttock  100% pink  Santyl and foam cover applied    4. LUQ drain site  100% soft yellow/tan necrosis  Flushed with saline  Santyl applied with moist packing and dry cover dressing    5. Left BKA site  1 x 2 x 2 cm dehiscence (seen today by Dr. uGero Storey)  Rest of incision has skip areas of dry brown eschar  Cleaned incision, applied Santyl to eschar, packed open area with moist gauze. Dry gauze over all of incision. Colostomy:  Stoma is red and abdomen rises around it requiring convexity  Brown output  Some distant skin stripping with tape protected with Marathon  2-piece convex pouch with protective ring applied  He is not able to comprehend care of ostomy or education at this time. Wound Recommendations:    Continue wound care as ordered to sacrum, buttocks, and abdomen  Left BKA incision: clean with Vashe, apply Santyl to brown tissue, pack open area with moist gauze and cover all with dry gauze. Change daily. PI Prevention:  Turn/reposition approximately every 2 hours  Low Air Loss mattress: Use only flat sheet and one incontinence pad.      Discussed with RN. Transition of Care: Plan to follow weekly and as needed while admitted to hospital. Notes indicate he will discharge to a facility in [facility].      ARNOLDO MasonN, RN, [facility] [place] [place]  Certified Wound, Ostomy, Continence Nurse  office [phone]  Available via Real Time Translation

## 2022-09-19 NOTE — PROGRESS NOTES
Chart reviewed in preparation for PT tx. PT currently undergoing HD. Will defer at this time and follow up as able and appropriate.     Gabi Vogel PT, DPT

## 2022-09-19 NOTE — ROUTINE PROCESS
0800:Bedside shift change report given to You Whiting (oncoming nurse) by [facility] (offgoing nurse). Report included the following information SBAR, Kardex, Procedure Summary, Intake/Output, MAR, Accordion, Recent Results, Med Rec Status, Cardiac Rhythm NSR, Alarm Parameters , Quality Measures, and Dual Neuro Assessment. Approx 0845: Dr. Carley Roach at - updated to clinical status per PM RN and myslef; ostomy bag with stool leakage- ostomy changed. Will monitor. Awaiting med admin until HD complete. 0930: HD run tarted per Zee Decker, HD RN    1100: Dr Ronak Wynn at - removed Left BKA drsg for assessment; reported no changes in plan of care at this time- RN to redress wound. 1255: Touched base with HD RN- reported 1 L off;VSS at end of run; medications now to be admin    Approx: 1415: Wound Care/ Ostomy RN at - dressings changed and ostomy changed;  and assessments charted- see note and wound FS    1745: Made Dr Carley Roach aware of patients AFIB status. 1815: EKG performed- Afib noted; Dr Carley Roach aware Afib education given to patient per RN; pt stated understanding    2030:Bedside shift change report given to oncoming RN (oncoming nurse) by You Whiting (offgoing nurse). Report included the following information SBAR, Kardex, ED Summary, OR Summary, Procedure Summary, Intake/Output, MAR, Accordion, Recent Results, Med Rec Status, Cardiac Rhythm AFIB, Quality Measures, and Dual Neuro Assessment. Pt awake and alert.

## 2022-09-20 PROBLEM — S36.60XA: Status: ACTIVE | Noted: 2022-09-20

## 2022-09-20 PROBLEM — S31.839A OPEN WOUND OF ANUS: Status: ACTIVE | Noted: 2022-09-20

## 2022-09-20 LAB
ANION GAP SERPL CALC-SCNC: 7 MMOL/L (ref 5–15)
BASOPHILS # BLD: 0 K/UL (ref 0–0.1)
BASOPHILS NFR BLD: 0 % (ref 0–1)
BUN SERPL-MCNC: 8 MG/DL (ref 6–20)
BUN/CREAT SERPL: 5 (ref 12–20)
CALCIUM SERPL-MCNC: 7.6 MG/DL (ref 8.5–10.1)
CHLORIDE SERPL-SCNC: 106 MMOL/L (ref 97–108)
CO2 SERPL-SCNC: 28 MMOL/L (ref 21–32)
CREAT SERPL-MCNC: 1.71 MG/DL (ref 0.7–1.3)
DIFFERENTIAL METHOD BLD: ABNORMAL
EOSINOPHIL # BLD: 0.3 K/UL (ref 0–0.4)
EOSINOPHIL NFR BLD: 2 % (ref 0–7)
ERYTHROCYTE [DISTWIDTH] IN BLOOD BY AUTOMATED COUNT: 18.9 % (ref 11.5–14.5)
GLUCOSE BLD STRIP.AUTO-MCNC: 85 MG/DL (ref 65–117)
GLUCOSE BLD STRIP.AUTO-MCNC: 89 MG/DL (ref 65–117)
GLUCOSE BLD STRIP.AUTO-MCNC: 96 MG/DL (ref 65–117)
GLUCOSE SERPL-MCNC: 99 MG/DL (ref 65–100)
HBV CORE AB SERPL QL IA: NEGATIVE
HCT VFR BLD AUTO: 24.2 % (ref 36.6–50.3)
HGB BLD-MCNC: 7.5 G/DL (ref 12.1–17)
IMM GRANULOCYTES # BLD AUTO: 0.2 K/UL (ref 0–0.04)
IMM GRANULOCYTES NFR BLD AUTO: 1 % (ref 0–0.5)
LYMPHOCYTES # BLD: 1.2 K/UL (ref 0.8–3.5)
LYMPHOCYTES NFR BLD: 7 % (ref 12–49)
MAGNESIUM SERPL-MCNC: 1.6 MG/DL (ref 1.6–2.4)
MCH RBC QN AUTO: 29.6 PG (ref 26–34)
MCHC RBC AUTO-ENTMCNC: 31 G/DL (ref 30–36.5)
MCV RBC AUTO: 95.7 FL (ref 80–99)
MONOCYTES # BLD: 0.9 K/UL (ref 0–1)
MONOCYTES NFR BLD: 5 % (ref 5–13)
NEUTS SEG # BLD: 14.7 K/UL (ref 1.8–8)
NEUTS SEG NFR BLD: 85 % (ref 32–75)
NRBC # BLD: 0 K/UL (ref 0–0.01)
NRBC BLD-RTO: 0 PER 100 WBC
PHOSPHATE SERPL-MCNC: 1.8 MG/DL (ref 2.6–4.7)
PLATELET # BLD AUTO: 646 K/UL (ref 150–400)
PMV BLD AUTO: 9.2 FL (ref 8.9–12.9)
POTASSIUM SERPL-SCNC: 2.8 MMOL/L (ref 3.5–5.1)
RBC # BLD AUTO: 2.53 M/UL (ref 4.1–5.7)
RBC MORPH BLD: ABNORMAL
RBC MORPH BLD: ABNORMAL
SERVICE CMNT-IMP: NORMAL
SODIUM SERPL-SCNC: 141 MMOL/L (ref 136–145)
WBC # BLD AUTO: 17.3 K/UL (ref 4.1–11.1)

## 2022-09-20 PROCEDURE — 74011250637 HC RX REV CODE- 250/637: Performed by: COLON & RECTAL SURGERY

## 2022-09-20 PROCEDURE — 74011000250 HC RX REV CODE- 250: Performed by: COLON & RECTAL SURGERY

## 2022-09-20 PROCEDURE — 83735 ASSAY OF MAGNESIUM: CPT

## 2022-09-20 PROCEDURE — 74011000258 HC RX REV CODE- 258: Performed by: COLON & RECTAL SURGERY

## 2022-09-20 PROCEDURE — 97530 THERAPEUTIC ACTIVITIES: CPT

## 2022-09-20 PROCEDURE — 36415 COLL VENOUS BLD VENIPUNCTURE: CPT

## 2022-09-20 PROCEDURE — 74011250636 HC RX REV CODE- 250/636: Performed by: COLON & RECTAL SURGERY

## 2022-09-20 PROCEDURE — 84100 ASSAY OF PHOSPHORUS: CPT

## 2022-09-20 PROCEDURE — 85025 COMPLETE CBC W/AUTO DIFF WBC: CPT

## 2022-09-20 PROCEDURE — 74011250636 HC RX REV CODE- 250/636: Performed by: STUDENT IN AN ORGANIZED HEALTH CARE EDUCATION/TRAINING PROGRAM

## 2022-09-20 PROCEDURE — 82962 GLUCOSE BLOOD TEST: CPT

## 2022-09-20 PROCEDURE — 74011250637 HC RX REV CODE- 250/637: Performed by: STUDENT IN AN ORGANIZED HEALTH CARE EDUCATION/TRAINING PROGRAM

## 2022-09-20 PROCEDURE — 65270000046 HC RM TELEMETRY

## 2022-09-20 PROCEDURE — 80048 BASIC METABOLIC PNL TOTAL CA: CPT

## 2022-09-20 RX ORDER — MIRTAZAPINE 15 MG/1
7.5 TABLET, FILM COATED ORAL
Status: DISCONTINUED | OUTPATIENT
Start: 2022-09-20 | End: 2022-10-13 | Stop reason: HOSPADM

## 2022-09-20 RX ORDER — HYDROMORPHONE HYDROCHLORIDE 1 MG/ML
0.75 INJECTION, SOLUTION INTRAMUSCULAR; INTRAVENOUS; SUBCUTANEOUS
Status: DISCONTINUED | OUTPATIENT
Start: 2022-09-20 | End: 2022-09-23

## 2022-09-20 RX ORDER — POTASSIUM CHLORIDE 7.45 MG/ML
10 INJECTION INTRAVENOUS ONCE
Status: COMPLETED | OUTPATIENT
Start: 2022-09-20 | End: 2022-09-20

## 2022-09-20 RX ORDER — LANOLIN ALCOHOL/MO/W.PET/CERES
400 CREAM (GRAM) TOPICAL EVERY 4 HOURS
Status: COMPLETED | OUTPATIENT
Start: 2022-09-20 | End: 2022-09-20

## 2022-09-20 RX ADMIN — Medication 2 TABLET: at 13:02

## 2022-09-20 RX ADMIN — PIPERACILLIN AND TAZOBACTAM 3.38 G: 3; .375 INJECTION, POWDER, FOR SOLUTION INTRAVENOUS at 19:03

## 2022-09-20 RX ADMIN — APIXABAN 5 MG: 5 TABLET, FILM COATED ORAL at 13:04

## 2022-09-20 RX ADMIN — DIPHENHYDRAMINE HYDROCHLORIDE 12.5 MG: 50 INJECTION, SOLUTION INTRAMUSCULAR; INTRAVENOUS at 00:36

## 2022-09-20 RX ADMIN — Medication 4 MG: at 18:08

## 2022-09-20 RX ADMIN — PANTOPRAZOLE SODIUM 40 MG: 40 TABLET, DELAYED RELEASE ORAL at 07:19

## 2022-09-20 RX ADMIN — PIPERACILLIN AND TAZOBACTAM 3.38 G: 3; .375 INJECTION, POWDER, FOR SOLUTION INTRAVENOUS at 06:06

## 2022-09-20 RX ADMIN — HYDROMORPHONE HYDROCHLORIDE 0.75 MG: 1 INJECTION, SOLUTION INTRAMUSCULAR; INTRAVENOUS; SUBCUTANEOUS at 13:03

## 2022-09-20 RX ADMIN — Medication 4 MG: at 14:50

## 2022-09-20 RX ADMIN — SODIUM CHLORIDE, PRESERVATIVE FREE 10 ML: 5 INJECTION INTRAVENOUS at 06:06

## 2022-09-20 RX ADMIN — HYDROMORPHONE HYDROCHLORIDE 0.75 MG: 1 INJECTION, SOLUTION INTRAMUSCULAR; INTRAVENOUS; SUBCUTANEOUS at 22:11

## 2022-09-20 RX ADMIN — Medication 1 CAPSULE: at 10:23

## 2022-09-20 RX ADMIN — POTASSIUM CHLORIDE 10 MEQ: 7.46 INJECTION, SOLUTION INTRAVENOUS at 10:24

## 2022-09-20 RX ADMIN — MAGNESIUM OXIDE TAB 400 MG (241.3 MG ELEMENTAL MG) 400 MG: 400 (241.3 MG) TAB at 13:03

## 2022-09-20 RX ADMIN — MAGNESIUM OXIDE TAB 400 MG (241.3 MG ELEMENTAL MG) 400 MG: 400 (241.3 MG) TAB at 16:00

## 2022-09-20 RX ADMIN — SODIUM CHLORIDE, PRESERVATIVE FREE 10 ML: 5 INJECTION INTRAVENOUS at 14:50

## 2022-09-20 RX ADMIN — Medication 2 TABLET: at 16:00

## 2022-09-20 RX ADMIN — MIRTAZAPINE 7.5 MG: 15 TABLET, FILM COATED ORAL at 22:12

## 2022-09-20 RX ADMIN — Medication 4 MG: at 10:23

## 2022-09-20 RX ADMIN — PANTOPRAZOLE SODIUM 40 MG: 40 TABLET, DELAYED RELEASE ORAL at 17:03

## 2022-09-20 RX ADMIN — SERTRALINE 25 MG: 50 TABLET, FILM COATED ORAL at 10:23

## 2022-09-20 RX ADMIN — ACETAMINOPHEN 650 MG: 325 TABLET, FILM COATED ORAL at 04:42

## 2022-09-20 RX ADMIN — APIXABAN 5 MG: 5 TABLET, FILM COATED ORAL at 18:08

## 2022-09-20 RX ADMIN — HYDROMORPHONE HYDROCHLORIDE 0.75 MG: 1 INJECTION, SOLUTION INTRAMUSCULAR; INTRAVENOUS; SUBCUTANEOUS at 17:08

## 2022-09-20 RX ADMIN — SODIUM CHLORIDE 100 MG: 9 INJECTION, SOLUTION INTRAVENOUS at 18:08

## 2022-09-20 RX ADMIN — COLLAGENASE SANTYL: 250 OINTMENT TOPICAL at 10:23

## 2022-09-20 NOTE — PROGRESS NOTES
Pt remains with poor appetite at times. Multiple wounds also present and requiring care. Pt will need moderate assist with care.

## 2022-09-20 NOTE — PROGRESS NOTES
Pt continues to be interactive but noted hallucinations that require some reorientation. Pt thought someone was in bed with him and that things were falling from the ceiling. Have even noted patient talking to someone that is not in the room. During initial assessment also had some education and dialogue regarding pain management. Pt ha only wanted IV Dilaudid for pain management refusing to take it by mouth. Encouraged pt to try at least one dose of the Dilaudid elixir.

## 2022-09-20 NOTE — ADT AUTH CERT NOTES
General Surgery or Procedure GRG - Care Day 10 (9/19/2022) by Keanu Abel       Review Status Review Entered   Completed 9/20/2022 11:19      Criteria Review      Care Day: 10 Care Date: 9/19/2022 Level of Care: Telemetry    Guideline Day 3    Level Of Care    (X) * Activity level acceptable    9/20/2022 11:19:24 EDT by Tia Ortiz      able to turn self on left side    ( ) Floor to discharge    9/20/2022 11:19:24 EDT by Tia Ortiz      inpatient telemetry; droplet plus, airborne, enteric contact, contact, droplet precaution    Clinical Status    (X) * Operative site and other wounds acceptable    9/20/2022 11:19:24 EDT by Tia Ortiz      Left BKA at another hospital several weeks ago. Skin breakdown along incision line. Not infected.  Mostly dry black eschar that is slowly starting to separate; Colostomy:  Stoma is red and abdomen rises around it requiring convexity    ( ) * Pain and nausea absent or adequately managed    9/20/2022 11:19:24 EDT by Tia Ortiz      reports pain scale 2-8/10    (X) * Temperature status acceptable    9/20/2022 11:19:24 EDT by Tia Ortiz      97.8 °F,  80, 176/96, 16, 97% RA    ( ) * No infection, or status acceptable    9/20/2022 11:19:24 EDT by Tia Ortiz      WBC: 18.8 (H), continues antibiotics    (X) * No blood loss, or problem resolved    9/20/2022 11:19:24 EDT by Tia Ortiz      none noted    (X) * Abdominal status acceptable    9/20/2022 11:19:24 EDT by Tia Ortiz      status acceptable    (X) * Hepatic and biliary abnormalities absent or acceptable    9/20/2022 11:19:24 EDT by Tia Ortiz      none noted    (X) * Inflammation absent or stable (eg, colitis)    9/20/2022 11:19:24 EDT by Tia Ortiz      none noted    (X) * No transplant done, or status acceptable    9/20/2022 11:19:24 EDT by Tia Ortiz      none noted    ( ) * General Discharge Criteria met    Interventions    (X) * Intake acceptable    9/20/2022 11:19:24 EDT by Sumit Zaidi      ADULT DIET Regular; 4 carb choices (60 gm/meal)     meds: eliquis 5mg BID PO 1x, dilaudid 4mg q4 PO 4x, risaquad 1cap PO, protonix 40mg BID PO 1x, zoloft 25mg QD PO, effer-k 20meq q4 PO 2x    ( ) * No inpatient interventions needed    9/20/2022 11:19:24 EDT by Sumit Zaidi      eraxis 100mg q24 IV, benadryl 12.5mg q6 prn IV 1x, retacrit 98770 SQ 1x,  zosyn 3.375g q12 IV, vancocin 750mg IV 1x, dilaudid 0.5mg-1mg q2 prn IV 3x, protonix 40mg q12 IV 1x    * Milestone   Additional Notes    DATE: 09/19/2022      Pertinent Updates:   -Persistent leukocytosis, etiology possibly from procedure anorectal examination under anesthesia including rigid proctosignmoidoscopy   -Concern for persistent leukocytosis. Vascular consulted for wound dehiscence of BKA, no surgical intervention at this time. Will discuss with ID and pulm given empyema persistent on imaging. -PT/OT cancelled today due to HD      Abnl/Pertinent Labs/Radiology/Diagnostic Studies:   WBC: 18.8 (H)   RBC: 2.41 (L)   HGB: 7.1 (L)   HCT: 22.9 (L)   RDW: 18.3 (H)   PLATELET: 976 (H)   NEUTROPHILS: 86 (H)   LYMPHOCYTES: 7 (L)   MONOCYTES: 4 (L)   IMMATURE GRANULOCYTES: 1 (H)   ABS. NEUTROPHILS: 16.1 (H)   ABS. IMM. GRANS.: 0.2 (H)   Potassium: 3.0 (L)   Creatinine: 2.78 (H)   BUN/Creatinine ratio: 6 (L)   Calcium: 7.4 (L)   Phosphorus: 2.5 (L)   GFR est non-AA: 24 (L)      Physical Exam:   Resp:  Lungs CTA B/L, no wheezing , normal respiratory effort   CV:   RRR,  no murmur or rub, no LE edema   GI:   Soft, NT, + Bowel sounds, + ostomy   Access:  KRYS ESPINOSA MD Consults/Assessments & Plans:   **Vascular Surgery**   Left BKA at another hospital several weeks ago. Skin breakdown along incision line. Not infected. Mostly dry black eschar that is slowly starting to separate. No surgical intervention needed at present. Sutures remain in place. Would continue current wound care at present. Will follow.       **Nephrology** Assessment and Plan   JEMIMA on HD:   - 2/2 ATN   - HD dependent MWF for now while here   - no signs of recovery yet   - PC placed 9/16   - HD now       Hypokalemia:   - 3k bath       Anemia:   - MICHAEL MWF   PAD s/p b/l BKA   Recent sepsis   Empyema   ABD wall abscess:   - J-tube removal, colostomy, I&D of abd wound   Sacral decub      **Internal Medicine**   Assessment & Plan:   Intractable abdominal pain   Pneumoperitoneum   Abdominal wall abscess   Severe sepsis   Rectal fistula:    -s/p 9/10 LAPAROSCOPY GENERAL DIAGNOSTIC, LAPAROSCOPIC  TAKEDOWN AND REMOVAL J-TUBE, LAPAROSCOPIC COLOSTOMY, I & D ABDOMINAL WALL    -Cultures no growth    -repeat CT abd 9/14 improvement in rectal thickening   -Persistent leukocytosis, etiology possibly from procedure v below    -Went to OR with CRS and general surgery 9/15 for anorectal examination under anesthesia including rigid proctosignmoidoscopy, leukocytosis may be 2/2 recent procedure    Plan:   -Pain control has been sufficient    -Continue zosyn/Vanc, eraxis, duration per ID    - Follow-up repeat blood cultures and urine culture        Possible empyema   -H/o recent thoracotomy and prolonged intubation at [facility]   -Continue antibiotics   - Repeat CXR 9/19 demonstrates persistent right basilar airspace disease and right-sided loculated effusion/empyema    -Appreciate pulmonology recommendations       B/L AKA    - Wound care on board   - Concern for wound dehiscence today, will discuss with vascular surgereon again 9/19   - Vascular consulted       JEMIMA now on HD   - MWF HD    - Perm cath placed   - Nephrology on board, appreciate recommendations        FOBT positive Protonix BID       Hypokalemia: replace as needed with dialysis    Acute blood loss Anemia: s/p 1 unit PRBC 9/10. Transfuse for hb<7   HTN: holding antihypertensives for now   DM2:SSI, monitor   Diarrhea: C.  Diff negative       Code status: full   Prophylaxis: SCDs given anemia    PTA: Vibra       Plan: Continue IV antibiotics, follow ID rec. permacath today,  discharge to [facility] when medically stable   Care Plan discussed with: patient, nurse   Anticipated Disposition: Accepted at [facility], needs OP HD chair      **Wound ostomy**   Assessment:    Communicative and medicated by RN. Able to turn independently onto left side; EKG leads are on his back and RN moved them to the front. .     Surface: [place] gel mattress with blower added       1. POA unstageable pressure injury to sacrum       2. POA stage 3 pressure injury to left buttock       Both sacrum and left buttock have 100% soft yellow tissue; no exudate   Tx: cleaned with Vashe, applied Santyl and covered with foam dressing. 3. POA stage 3 pressure injury to right buttock   100% pink   Santyl and foam cover applied       4. LUQ drain site   100% soft yellow/tan necrosis   Flushed with saline   Santyl applied with moist packing and dry cover dressing       5. Left BKA site   1 x 2 x 2 cm dehiscence (seen today by Dr. Dima Galvez)   Rest of incision has skip areas of dry brown eschar   Cleaned incision, applied Santyl to eschar, packed open area with moist gauze. Dry gauze over all of incision. Colostomy:   Stoma is red and abdomen rises around it requiring convexity   Brown output   Some distant skin stripping with tape protected with [place]   2-piece convex pouch with protective ring applied   He is not able to comprehend care of ostomy or education at this time. Wound Recommendations:     Continue wound care as ordered to sacrum, buttocks, and abdomen   Left BKA incision: clean with Vashe, apply Santyl to brown tissue, pack open area with moist gauze and cover all with dry gauze. Change daily.            PT/OT/SLP/CM Assessments or Notes:   ****   Disposition:  IPR [facility]   IPR:   Patient will received HD treatments at this [facility] while in rehab   Following his rehab stay, he will receive              General Surgery or Procedure GRG - Care Day 9 (9/18/2022) by Betsy Eisenmenger       Review Status Review Entered   Completed 9/20/2022 10:56      Criteria Review      Care Day: 9 Care Date: 9/18/2022 Level of Care: Telemetry    Guideline Day 3    Level Of Care    ( ) * Activity level acceptable    9/20/2022 10:56:47 EDT by "[employer]      turning and positioning q2    ( ) Floor to discharge    9/20/2022 10:56:47 EDT by "[employer]      inpatient telemetry; droplet plus, airborne, enteric contact, contact, droplet precaution    Clinical Status    (X) * Operative site and other wounds acceptable    9/20/2022 10:56:47 EDT by "[employer]      Colostomy. Bandages clean and dry. Mild tenderness to palpation.     ( ) * Pain and nausea absent or adequately managed    9/20/2022 10:56:47 EDT by "[employer]      reports pain scale 5-9/10    (X) * Temperature status acceptable    9/20/2022 10:56:47 EDT by "[employer]      97.8 °F, 72, 177/88, 16, 98% RA    ( ) * No infection, or status acceptable    9/20/2022 10:56:47 EDT by "[employer]      WBC: 16.0 (H), continues antibiotics    (X) * No blood loss, or problem resolved    9/20/2022 10:56:47 EDT by "[employer]      none noted    (X) * Abdominal status acceptable    9/20/2022 10:56:47 EDT by "[employer]      status acceptable    (X) * Hepatic and biliary abnormalities absent or acceptable    9/20/2022 10:56:47 EDT by "[employer]      none noted    (X) * Inflammation absent or stable (eg, colitis)    9/20/2022 10:56:47 EDT by "[employer]      absent    (X) * No transplant done, or status acceptable    9/20/2022 10:56:47 EDT by "[employer]      none noted    ( ) * General Discharge Criteria met    Interventions    (X) * Intake acceptable    9/20/2022 10:56:47 EDT by "[employer]      ADULT DIET Regular; 4 carb choices (60 gm/meal)    meds: dilaudid 4mg q4 PO 5x, risaquad 1cap PO, zoloft 25mg QD PO, mag-ox 400mg q4 PO 1x, effer-k 20meq q4 PO 2x,    ( ) * No inpatient interventions needed    9/20/2022 10:56:47 EDT by Tia Ortiz      eraxis 100mg q24 IV, zosyn 3.375g q12 IV, dilaudid 0.5mg-1mg q2 prn IV 4x, protonix 40mg q12 IV    * Milestone   Additional Notes    DATE: 09/18/2022      Pertinent Updates: Follow-up repeat blood cultures and urine culture       Abnl/Pertinent Labs/Radiology/Diagnostic Studies:   WBC: 16.0 (H)   RBC: 2.45 (L)   HGB: 7.3 (L)   HCT: 23.8 (L)   RDW: 18.4 (H)   PLATELET: 635 (H)   NEUTROPHILS: 84 (H)   LYMPHOCYTES: 9 (L)   IMMATURE GRANULOCYTES: 1 (H)   ABS. NEUTROPHILS: 13.4 (H)   ABS. IMM.  GRANS.: 0.2 (H)   Potassium: 3.1 (L)   Chloride: 109 (H)   Anion gap: 4 (L)   Glucose: 119 (H)   Creatinine: 2.22 (H)   BUN/Creatinine ratio: 6 (L)   Calcium: 7.5 (L)   Phosphorus: 2.1 (L)   GFR est non-AA: 31 (L)   GLUCOSE,FAST - POC: 121 (H)      Physical Exam:   Same as yesterday      MD Consults/Assessments & Plans:   **Hospitalist**   Assessment & Plan:   Intractable abdominal pain   Pneumoperitoneum   Abdominal wall abscess   Severe sepsis   Rectal fistula:    -s/p 9/10 LAPAROSCOPY GENERAL DIAGNOSTIC, LAPAROSCOPIC  TAKEDOWN AND REMOVAL J-TUBE, LAPAROSCOPIC COLOSTOMY, I & D ABDOMINAL WALL    -Cultures no growth    -repeat CT abd 9/14 improvement in rectal thickening   -Persistent leukocytosis, improved on labs this morning   -Went to OR with CRS and general surgery 9/15 for anorectal examination under anesthesia including rigid proctosignmoidoscopy, leukocytosis may be 2/2 recent procedure    Plan:   -Pain control, appreciate Dr Alba Quezada input    -Continue zosyn/Vanc, eraxis, duration per ID    - Follow-up repeat blood cultures and urine culture        Possible empyema   -H/o recent thoracotomy and prolonged intubation at [facility]   -Continue antibiotics   -Appreciate pulmonology recommendations, will continue abx and repeat imaging if worsening        B/L AKA    - Wound care on board   - Vascular consulted, no acute surgical intervention indicated        JEMIMA now on HD   - MWF HD    - Perm cath placement today    - HD today    - Nephrology on board, appreciate recommendations        FOBT positive Protonix BID       Hypokalemia: replace as needed with dialysis    Acute blood loss Anemia: s/p 1 unit PRBC 9/10. Transfuse for hb<7   HTN: holding antihypertensives for now   DM2:SSI, monitor   Diarrhea: C.  Diff negative       Now on regular diet       Code status: full   Prophylaxis: hold, SCDs   PTA: [facility]ra       Plan: Continue IV antibiotics, follow ID rec. Dianelys Gonzaleza today,  discharge to [facility] when medically stable   Care Plan discussed with: patient, nurse   Anticipated Disposition: Accepted at [address], needs OP HD chair               General Surgery or Procedure GRG - Care Day 8 (9/17/2022) by Denisse Brower       Review Status Review Entered   Completed 9/20/2022 10:17      Criteria Review      Care Day: 8 Care Date: 9/17/2022 Level of Care: Telemetry    Guideline Day 2    Level Of Care    (X) Floor    9/20/2022 10:17:58 EDT by Ed Lafleur      transferred to inpatient telemetry 9/15    Clinical Status    (X) * No ICU or intermediate care needs    9/20/2022 10:17:58 EDT by Ed Lafleur      inpatient telemetry  VS: 98.5 F, 72, 144/90, 18, 99% RA    Interventions    (X) Inpatient interventions continue    9/20/2022 10:17:58 EDT by Sarahy Lucia, [address], [address] [facility] ,    * Milestone   Additional Notes    DATE: 09/17/2022      Pertinent Updates:   -Repeat Blood cultures and Ucx given worsening leukocytosis    - Appreciate ID's input    - worsening leukocytosis may be 2/2 recent procedure       Abnl/Pertinent Labs/Radiology/Diagnostic Studies:   WBC: 18.7 (H)   RBC: 2.65 (L)   HGB: 7.9 (L)   HCT: 25.2 (L)   RDW: 18.1 (H)   PLATELET: 638 (H)   NEUTROPHILS: 88 (H)   LYMPHOCYTES: 6 (L)   MONOCYTES: 4 (L)   IMMATURE GRANULOCYTES: 1 (H)   ABS. NEUTROPHILS: 16.3 (H)   ABS. IMM. GRANS.: 0.2 (H)   Potassium: 3.4 (L)   Creatinine: 1.69 (H)   BUN/Creatinine ratio: 7 (L)   Calcium: 7.3 (L)   Phosphorus: 1.9 (L)   Magnesium: 1.5 (L)   GFR est non-AA: 43 (L)      Physical Exam:   Resp:CTA bilaterally. No wheezing/rhonchi/rales. No accessory muscle use. CV:Regular rhythm, normal rate, no murmurs, gallops, rubs    GI:Colostomy. Bandages clean and dry. Mild tenderness to palpation      MD Consults/Assessments & Plans:   **Hospitalist**   Assessment & Plan:   Intractable abdominal pain   Pneumoperitoneum   Abdominal wall abscess   Severe sepsis   Rectal fistula:    -s/p 9/10 LAPAROSCOPY GENERAL DIAGNOSTIC, LAPAROSCOPIC  TAKEDOWN AND REMOVAL J-TUBE, LAPAROSCOPIC COLOSTOMY, I & D ABDOMINAL WALL    -Cultures no growth    -repeat CT abd 9/14 improvement in rectal thickening   -Persistent leukocytosis, worsening    -Pain control, appreciate Dr Isabella Espinosa input    -Continue zosyn/Vanc, eraxis, duration per ID    -Went to OR with CRS and general surgery 9/15 for anorectal examination under anesthesia including rigid proctosignmoidoscopy    - Repeat Blood cultures and Ucx given worsening leukocytosis    - Appreciate ID's input    - worsening leukocytosis may be 2/2 recent procedure        Possible empyema   -H/o recent thoracotomy and prolonged intubation at [facility]   -Continue antibiotics   -Appreciate pulmonology recommendations, will continue abx and repeat imaging if worsening        B/L AKA    - Wound care on board   - Vascular consulted, no acute surgical intervention indicated        JEMIMA now on HD   - MWF HD    - Perm cath placement today    - HD today    - Nephrology on board, appreciate recommendations        FOBT positive Protonix BID       Hypokalemia: replace as needed with dialysis    Acute blood loss Anemia: s/p 1 unit PRBC 9/10. Transfuse for hb<7   HTN: holding antihypertensives for now   DM2:SSI, monitor   Diarrhea: C.  Diff negative Now on regular diet       Code status: full   Prophylaxis: hold, SCDs   PTA: Vibra       Plan: Continue IV antibiotics, follow ID rec. permacath today,  discharge to [facility] when medically stable   Care Plan discussed with: patient, nurse   Anticipated Disposition: Accepted at [facility], needs OP HD chair      **OP NOTE**   DATE OF SERVICE:  09/15/2022   PREOPERATIVE DIAGNOSIS:  Anorectal wound. POSTOPERATIVE DIAGNOSIS:  Anorectal wound. PROCEDURE PERFORMED:  Anorectal examination under anesthesia including rigid proctosigmoidoscopy. ANESTHESIA:  General endotracheal.   COMPLICATIONS:  Small skin tear to the dorsum of the right hand that occurred during positioning. SPECIMENS REMOVED:  None. IMPLANTS:  None. ESTIMATED BLOOD LOSS:  Less than 1 mL. TUBES AND DRAINS:  None. IV FLUIDS:  Crystalloid 400 mL. INDICATIONS FOR PROCEDURE:  The patient is a 74-year-old male with a complicated recent medical history who was admitted to [facility] from a nearby facility for evaluation and treatment of sepsis. Among other things, he underwent an urgent laparoscopic abdominal exploration, the removal of a jejunostomy tube, and the creation of a sigmoid colostomy with a Rohit's pouch. This colon procedure was performed by Dr. Angel Peña because the patient was found to have what appeared to be a rectal fistula related most likely to the use of a fecal management system (rectal tube) at one of these other facilities. The abdominal operation was performed early in the morning on 09/10/2022. The patient was subsequently stabilized and he has been treated with parenteral antibiotics. I was asked to see him to evaluate this possible rectal fistula. A CT scan of the abdomen and pelvis was obtained on 09/14/2022, and it was compared to an outside study performed on 09/09/2022.   Review of these images with the radiologist revealed that there was rectal thickening that appeared to be improved and there was no clear rectal wall defect. There were presacral soft tissue changes and there was no focal fluid collection to suggest an abscess. Because the patient was in too much pain to be thoroughly examined at the bedside or without anesthesia, an anorectal examination in the operating room was recommended. It was explained to the patient that depending upon the findings, debridement of devitalized tissue might be performed. He was also informed that it might be necessary to incise and drain an abscess or perhaps place a draining seton. He understood and agreed to proceed. OPERATIVE FINDINGS:  There was a clean 3 cm x 2.5 cm posterior anal canal wound exposing the sphincter muscles. This wound appeared to be most consistent with pressure related ischemia and erosion, probably from a rectal tube. There was a full-thickness 1-cm defect of the right posterolateral distal rectal wall beginning at approximately 4 cm from the anal verge and resulting in communication of the distal rectal lumen with the extraperitoneal pelvis where there was a clean, and narrow cavity extending superiorly for approximately 2 cm. There was mucoid discharge, but no purulence and no fecal material.  All of the visible tissues appeared to be viable. Rigid proctosigmoidoscopy to approximately 15 cm from the anal verge revealed that the remainder of the rectum appeared to be healthy. PROCEDURE:  After informed consent was obtained, the patient was taken to the operating room where standard monitoring devices were attached and adequate general endotracheal anesthesia was induced. He was then placed in the prone jackknife position on the operating table with all pressure points padded appropriately. Pneumatic compression stockings were not used as the patient has bilateral below-knee amputations one of which is relatively fresh.   The buttocks were gently retracted bilaterally using tape but no Mastisol. The perineum was prepped and draped in the usual sterile fashion. 3.375 g of Zosyn, which were already scheduled to be administered at that time, were given parenterally by the anesthetist.       Visual inspection and digital rectal examination were performed. The operation was mostly conducted under 2.5 X magnification. A thorough inspection of the anorectum was facilitated using the Hill-Kaye and Thacker retractors and later the rigid proctosigmoidoscope. The findings were as noted above. The rigid scope had been lubricated and carefully inserted under direct vision to approximately 10 cm from the anal verge with minimal inflation of the Rohit's pouch. The right-sided perirectal tract was inspected to the extent possible. It was gently probed and that was also irrigated with saline. As noted above, there was no purulence and no stool was identified. The diverting colostomy appeared to have been quite effective. There was no active bleeding. As there was no abscess present and there was no devitalized tissue, it did not appear to be any therapeutic procedure that could be done here. The operation was concluded by infiltration of the area with 0.25% bupivacaine with epinephrine, then the application of a large sheet of Xeroform to the anal wound followed by 4x4s and a large sacral Mepilex dressing. There were no apparent complications, and the patient appeared to have tolerated the procedure well.   At its conclusion, he was extubated and transported in stable condition to the recovery room         Medications:   Eraxis 100mg q24 IV   Dilaudid 4mg q4 PO 4x   Risaquad 1cap every day PO   Zofran 4mg q6 prn IV 1x   Zosyn 3.375g q12 IV   Zoloft 25mg every day PO   Cathflo 1mg IK 1x   Mag-ox 400mg q4 PO 1x   Vancocin 750mg IV 1x   Dilaudid 0.5mg-1mg q2 prn IV 5x   Ativan 0.25mg HS prn PO 1x   Protonix 40mg q12 IV                 General Surgery or Procedure GRG - Care Day 7 (9/16/2022) by Launie Olszewski, RN       Review Status Review Entered   Completed 9/19/2022 16:56      Criteria Review      Care Day: 7 Care Date: 9/16/2022 Level of Care: Telemetry    Guideline Day 2    Clinical Status    ( ) * No ICU or intermediate care needs    9/19/2022 16:56:58 EDT by Launie Olszewski      98.1 66 20 144/71 99%RA    Interventions    (X) Inpatient interventions continue    9/19/2022 16:56:58 EDT by Launie Olszewski      meds: eraxis 100mg ivq24 retacrit [id number] sc mon wed fri dilaudid 4mg poq4 prnx4 dilaudid 1mg ivx4 dilaudid 0.5mg ivx1 zosyn 3.375g ivq12 fentanyl 50mcg ivx4 ativan 0.25mg pox1    * Milestone   Additional Notes   9/16 LOC IP Tele      Labs   WBC: 17.1 (H)   NRBC: 0.0   RBC: 2.40 (L)   HGB: 7.2 (L)   HCT: 23.1 (L)   MCV: 96.3   MCH: 30.0   MCHC: 31.2   RDW: 18.4 (H)   PLATELET: 342 (H)   MPV: 9.4   NEUTROPHILS: 94 (H)   LYMPHOCYTES: 4 (L)   MONOCYTES: 1 (L)   EOSINOPHILS: 0   BASOPHILS: 0   IMMATURE GRANULOCYTES: 1 (H)   DF: SMEAR SCANNED   ABSOLUTE NRBC: 0.00   ABS. NEUTROPHILS: 16.0 (H)   ABS. IMM. GRANS.: 0.2 (H)   ABS. LYMPHOCYTES: 0.7 (L)   ABS. MONOCYTES: 0.2   ABS. EOSINOPHILS: 0.0   ABS.  BASOPHILS: 0.0   Sodium: 141   Potassium: 3.9   Chloride: 108   CO2: 25   Anion gap: 8   Glucose: 90   BUN: 18   Creatinine: 2.48 (H)   BUN/Creatinine ratio: 7 (L)   Calcium: 7.4 (L)   Phosphorus: 3.6   Magnesium: 1.7   GFR est non-AA: 28 (L)   GFR est AA: 33 (L)   Vancomycin, random: 23.1      Attending   Assessment & Plan:   Intractable abdominal pain   Pneumoperitoneum   Abdominal wall abscess   Severe sepsis   Rectal fistula:    -s/p 9/10 LAPAROSCOPY GENERAL DIAGNOSTIC, LAPAROSCOPIC  TAKEDOWN AND REMOVAL J-TUBE, LAPAROSCOPIC COLOSTOMY, I & D ABDOMINAL WALL    -Cultures no growth    -repeat CT abd 9/14 improvement in rectal thickening   -Persistent leukocytosis, worsening    -Pain control, appreciate Dr Chiki Crow input    -Continue zosyn/Vanc, eraxis, duration per ID    -Went to OR with CRS and general surgery 9/15 for anorectal examination under anesthesia including rigid proctosignmoidoscopy    - Repeat Blood cultures given worsening leukocytosis    Possible empyema   -H/o recent thoracotomy and prolonged intubation at [facility]   -Continue antibiotics   -Appreciate pulmonology recommendations, will continue abx and repeat imaging if worsening    B/L AKA    - Wound care on board   - Vascular consulted, no acute surgical intervention indicated    JEMIMA now on HD   - MWF HD    - Perm cath placement today    - HD today    - Nephrology on board, appreciate recommendations    FOBT positive Protonix BID   Hypokalemia: replace as needed with dialysis    Acute blood loss Anemia: s/p 1 unit PRBC 9/10. Transfuse for hb<7   HTN: holding antihypertensives for now   DM2:SSI, monitor   Diarrhea: C. Diff negative   Now on regular diet   Code status: full   Prophylaxis: hold, SCDs   PTA: Vibra   Plan: Continue IV antibiotics, follow ID rec. permacath today,  discharge to [facility] when medically stable   Care Plan discussed with: patient, nurse   Anticipated Disposition: Accepted at [facility], needs OP HD chair       Nephro   Assessment and Plan   JEMIMA on HD:   - 2/2 ATN   - HD dependent MWF for now while here   - no signs of recovery yet   - will have PC placed next week   - HD later today   Hypokalemia:   - 3k bath   Anemia:   - MICHAEL MWF   PAD s/p b/l BKA   Recent sepsis   Empyema   ABD wall abscess:   - J-tube removal, colostomy, I&D of abd wound   Sacral decub      ID   Antibiotics:   1. Vancomycin    2. Zosyn    3. Eraxis                   Assessment:   1. Dislodged feeding tube/peritonitis    2. Recent right thoracotomy   3. Bilateral BKAs   Objective:   1. Continue current therapy   2. Work up fevers   3. Follow up cultures and studies      Exam   Constitutional: No acute distress,   ENT: Oral mucosa moist, oropharynx benign. Resp: CTA bilaterally. No wheezing/rhonchi/rales.  No accessory muscle use. CV: Regular rhythm, normal rate, no murmurs, gallops, rubs    GI: Colostomy. Painful to palpation. Erythema of abdominal skin surrounding previous PEG site. Musculoskeletal: No edema, warm, 2+ pulses throughout    Neurologic: Moves all extremities.   AAOx3, CN II-XII reviewed

## 2022-09-20 NOTE — PROGRESS NOTES
[facility]  Hospitalist Group                                                                                          Hospitalist Progress Note  Ana Gonzalez MD  Answering service: [phone] from in house phone        Date of Service:  2022  NAME:  Sumeet Mims  :  1970  MRN:  [id number]      Admission Summary: This is a 25-year-old man with a past medical history significant for type 2 diabetes, who presented at the emergency room from [address] [place] [address] with abdominal pain. The patient was recently admitted to another hospital and underwent left below-knee amputation. The hospitalization became complicated with respiratory failure which required prolonged intubation. The respiratory failure was attributed to aspiration pneumonia. The hospitalization was also complicated with lobulated effusion, for which the patient underwent decortication and right thoracotomy. The patient also went into acute renal failure for which he has been started on hemodialysis. J-tube was placed for supplemental nutrition. The patient was subsequently transferred to [address] [place] [address] for continuation of care. He was doing relatively well in [address] [place] [address] until the day of presentation at the emergency room when the patient complained of abdominal pain at the facility. CT scan of the abdomen and pelvis was obtained. The CT scan shows evidence of bowel perforation. The patient was then sent to [facility] emergency room for further evaluation. When the patient arrived at the emergency room, based on his clinical presentation and lab work, Code Sepsis was triggered. The patient received fluid therapy as per sepsis protocol. The emergency room physician consulted general surgeon on-call. The patient was seen by the surgeon and the patient is planned for immediate surgical intervention.   No record of prior admission to this hospital, but the patient was recently admitted to another hospital.    Interval history / Subjective:   Reports significant pain today. Had hallucinations with dilaudid last night. Still using oral and IV dilaudid. Assessment & Plan:     Intractable abdominal pain  Pneumoperitoneum  Abdominal wall abscess  Severe sepsis  Rectal fistula:   -s/p 9/10 LAPAROSCOPY GENERAL DIAGNOSTIC, LAPAROSCOPIC  TAKEDOWN AND REMOVAL J-TUBE, LAPAROSCOPIC COLOSTOMY, I & D ABDOMINAL WALL   -Cultures no growth   -repeat CT abd 9/14 improvement in rectal thickening  -Persistent leukocytosis, etiology possibly from procedure v below   -Went to OR with CRS and general surgery 9/15 for anorectal examination under anesthesia including rigid proctosignmoidoscopy, leukocytosis may be 2/2 recent procedure   Plan:  -Attempting to wean off IV dilaudid though pain with significant pain, requiring very slow taper   -Continue zosyn/Vanc, eraxis, duration per ID   - Follow-up repeat blood cultures and urine culture (NGTD)    A fib  - New a fib on telemetry and EKG  - No history of A fib   - CHADSVASC  of 2, started on eliquis   - Cardiology consulted, appreciate rec's     Possible empyema  -H/o recent thoracotomy and prolonged intubation at [facility]  -Continue antibiotics  - Repeat CXR 9/19 demonstrates persistent right basilar airspace disease and right-sided loculated effusion/empyema   -Appreciate pulmonology recommendations    B/L AKA   - Wound care on board  - Concern for wound dehiscence, discussed with vascular surgery, who feels no acute surgical intervention and patient can f/u with previous surgeon as an outpatient   - No infection requiring surgical debridement     JEMIMA now on HD  - MWF HD   - Perm cath placed  - Nephrology on board, appreciate recommendations     FOBT positive Protonix BID    Hypokalemia: replace as needed with dialysis   Acute blood loss Anemia: s/p 1 unit PRBC 9/10. Transfuse for hb<7  HTN: holding antihypertensives for now  DM2:SSI, monitor  Diarrhea: C.  Diff negative      Code status: full  Prophylaxis: SCDs given anemia   PTA: Vibra    Plan: Continue IV antibiotics, follow ID rec. Discharge to [facility] when medically stable, suspect 48h  Care Plan discussed with: patient, nurse  Anticipated Disposition: Accepted at [facility], needs OP HD chair      Hospital Problems  Date Reviewed: 9/10/2022            Codes Class Noted POA    Injury to rectum without open wound into cavity ICD-10-CM: S36.60XA  ICD-9-CM: 863.45  9/20/2022 Yes        Open wound of anus ICD-10-CM: E04.492J  ICD-9-CM: 863.89  9/20/2022 Yes        Severe protein-calorie malnutrition ([facility] [address] ICD-10-CM: E43  ICD-9-CM: 378  9/13/2022 Yes        * (Principal) Intra-abdominal infection ICD-10-CM: B99.9  ICD-9-CM: 136.9  9/10/2022 Yes       Review of Systems:   A comprehensive review of systems was negative except for that written in the HPI. Vital Signs:    Last 24hrs VS reviewed since prior progress note. Most recent are:  Visit Vitals  BP (!) 161/51 (BP 1 Location: Left upper arm, BP Patient Position: Lying right side)   Pulse 63   Temp 98 °F (36.7 °C)   Resp 18   Ht 5' 7\" (1.702 m)   Wt 69 kg (152 lb 1.9 oz)   SpO2 100%   BMI 23.82 kg/m²         Intake/Output Summary (Last 24 hours) at 9/20/2022 1414  Last data filed at 9/20/2022 1201  Gross per 24 hour   Intake 500 ml   Output 250 ml   Net 250 ml        Physical Examination:     I had a face to face encounter with this patient and independently examined them on 9/20/2022 as outlined below:          Constitutional:  No acute distress, endorses mild pain   ENT:  Oral mucosa moist, oropharynx benign. Resp:  CTA bilaterally. No wheezing/rhonchi/rales. No accessory muscle use. CV:  Regular rhythm, normal rate, no murmurs, gallops, rubs    GI:  Colostomy. Bandages clean and dry. Mild tenderness to palpation. Musculoskeletal:  No edema, warm, 2+ pulses throughout    Neurologic:  Moves all extremities.   AAOx3, CN II-XII reviewed            Data Review:    Review and/or order of clinical lab test  Review and/or order of tests in the radiology section of CPT  Review and/or order of tests in the medicine section of CPT      Labs:     Recent Labs     09/20/22 0430 09/19/22  0343   WBC 17.3* 18.8*   HGB 7.5* 7.1*   HCT 24.2* 22.9*   * 559*     Recent Labs     09/20/22  0430 09/19/22  0343 09/18/22  0145    142 141   K 2.8* 3.0* 3.1*    108 109*   CO2 28 25 28   BUN 8 16 14   CREA 1.71* 2.78* 2.22*   GLU 99 91 119*   CA 7.6* 7.4* 7.5*   MG 1.6 1.7 1.7   PHOS 1.8* 2.5* 2.1*     No results for input(s): ALT, AP, TBIL, TBILI, TP, ALB, GLOB, GGT, AML, LPSE in the last 72 hours. No lab exists for component: SGOT, GPT, AMYP, HLPSE    No results for input(s): INR, PTP, APTT, INREXT, INREXT in the last 72 hours. No results for input(s): FE, TIBC, PSAT, FERR in the last 72 hours. Lab Results   Component Value Date/Time    Folate 4.1 (L) 09/10/2022 12:00 PM        No results for input(s): PH, PCO2, PO2 in the last 72 hours. No results for input(s): CPK, CKNDX, TROIQ in the last 72 hours.     No lab exists for component: CPKMB  No results found for: CHOL, CHOLX, CHLST, CHOLV, HDL, HDLP, LDL, LDLC, DLDLP, TGLX, TRIGL, TRIGP, CHHD, CHHDX  Lab Results   Component Value Date/Time    Glucose (POC) 89 09/20/2022 11:15 AM    Glucose (POC) 89 09/19/2022 09:55 PM    Glucose (POC) 105 09/19/2022 04:24 PM    Glucose (POC) 85 09/19/2022 11:21 AM    Glucose (POC) 81 09/19/2022 07:04 AM     No results found for: COLOR, APPRN, SPGRU, REFSG, WILL, PROTU, GLUCU, KETU, BILU, UROU, MISHA, LEUKU, GLUKE, EPSU, BACTU, WBCU, RBCU, CASTS, UCRY      Medications Reviewed:     Current Facility-Administered Medications   Medication Dose Route Frequency    HYDROmorphone (DILAUDID) injection 0.75 mg  0.75 mg IntraVENous Q2H PRN    phosphorus (K PHOS NEUTRAL) 250 mg tablet 2 Tablet  2 Tablet Oral Q4H    magnesium oxide (MAG-OX) tablet 400 mg  400 mg Oral Q4H    mirtazapine (REMERON) tablet 7.5 mg  7.5 mg Oral QHS    pantoprazole (PROTONIX) tablet 40 mg  40 mg Oral ACB&D    apixaban (ELIQUIS) tablet 5 mg  5 mg Oral BID    HYDROmorphone (DILAUDID) 1 mg/mL oral solution 4 mg  4 mg Oral Q4HWA    anidulafungin (ERAXIS) 100 mg in 0.9% sodium chloride 130 mL IVPB  100 mg IntraVENous Q24H    sertraline (ZOLOFT) tablet 25 mg  25 mg Oral DAILY    epoetin vera-epbx (RETACRIT) injection 10,000 Units  10,000 Units SubCUTAneous DIALYSIS MON, WED & FRI    piperacillin-tazobactam (ZOSYN) 3.375 g in 0.9% sodium chloride (MBP/ADV) 100 mL MBP  3.375 g IntraVENous Q12H    collagenase (SANTYL) 250 unit/gram ointment   Topical DAILY    0.9% sodium chloride infusion 250 mL  250 mL IntraVENous PRN    glucose chewable tablet 16 g  4 Tablet Oral PRN    glucagon (GLUCAGEN) injection 1 mg  1 mg IntraMUSCular PRN    dextrose 10 % infusion 0-250 mL  0-250 mL IntraVENous PRN    diphenhydrAMINE (BENADRYL) injection 12.5 mg  12.5 mg IntraVENous Q6H PRN    sodium chloride (NS) flush 5-40 mL  5-40 mL IntraVENous Q8H    sodium chloride (NS) flush 5-40 mL  5-40 mL IntraVENous PRN    acetaminophen (TYLENOL) tablet 650 mg  650 mg Oral Q6H PRN    Or    acetaminophen (TYLENOL) suppository 650 mg  650 mg Rectal Q6H PRN    polyethylene glycol (MIRALAX) packet 17 g  17 g Oral DAILY PRN    ondansetron (ZOFRAN ODT) tablet 4 mg  4 mg Oral Q8H PRN    Or    ondansetron (ZOFRAN) injection 4 mg  4 mg IntraVENous Q6H PRN    L.acidophilus-paracasei-S.thermophil-bifidobacter (RISAQUAD) 8 billion cell capsule  1 Capsule Oral DAILY    insulin lispro (HUMALOG) injection   SubCUTAneous AC&HS    glucose chewable tablet 16 g  4 Tablet Oral PRN    glucagon (GLUCAGEN) injection 1 mg  1 mg IntraMUSCular PRN    dextrose 10 % infusion 0-250 mL  0-250 mL IntraVENous PRN    Vancomycin - pharmacy to dose   Other Rx Dosing/Monitoring    0.9% sodium chloride infusion 250 mL  250 mL IntraVENous PRN ______________________________________________________________________  EXPECTED LENGTH OF STAY: 9d 14h  ACTUAL LENGTH OF STAY:          Patricio Khan MD

## 2022-09-20 NOTE — PROGRESS NOTES
Problem: Mobility Impaired (Adult and Pediatric)  Goal: *Acute Goals and Plan of Care (Insert Text)  Description: FUNCTIONAL STATUS PRIOR TO ADMISSION: The patient was functional at the wheelchair level and required minimal assistance for transfers to the chair. HOME SUPPORT PRIOR TO ADMISSION: The patient lived with wife, daughter and required up to moderate assistance  for tranfers bed <> w/c and ADLs. Patient has spent extensive period of time hospitalized recently and has been admitted from [facility]. Physical Therapy Goals  Initiated 9/15/2022  1. Patient will roll left/right in bed for pressure relief mod I with use of bed rails within 7 day(s). 2.  Patient will move from supine to sit and sit to supine  in bed with moderate assistance  within 7 day(s). 3.  Patient will transfer from bed to chair and chair to bed with moderate assistance  using the least restrictive device within 7 day(s). 4  Patient will sit EOB x 10 minutes without UE support with CGA-min A within 7 day(s). Outcome: Progressing Towards Goal   PHYSICAL THERAPY TREATMENT  Patient: Marylu Oconnor (73 y.o. male)  Date: 9/20/2022  Diagnosis: Intra-abdominal infection [B99.9] Intra-abdominal infection  Procedure(s) (LRB):  EUA/ PROCTOSIGMOIDOSCOPY (N/A) 5 Days Post-Op  Precautions: Fall, Other (comment) (bilateral BKA)  Chart, physical therapy assessment, plan of care and goals were reviewed. ASSESSMENT  Patient continues with skilled PT services and is progressing towards goals. Pt alert, oriented x 4, but confused at times. Pt reported significant pain at rectum; willing to participate, but unable to tolerate sitting up. Pt rolled R/L with use of bed rail and min A for facilitation of linen change. Positioned on R side for pressure relief at end of session. Will benefit from con't PT for mobility progression as tolerated.  Pt working toward increased activity tolerance to facilitate d/c to IPR when medically appropriate. Current Level of Function Impacting Discharge (mobility/balance): rolling R/L with bed rail min A    Other factors to consider for discharge: bilat AKA         PLAN :  Patient continues to benefit from skilled intervention to address the above impairments. Continue treatment per established plan of care. to address goals. Recommendation for discharge: (in order for the patient to meet his/her long term goals)  Therapy 3 hours per day 5-7 days per week    This discharge recommendation:  Has been made in collaboration with the attending provider and/or case management    IF patient discharges home will need the following DME: to be determined (TBD)       SUBJECTIVE:   Patient stated Its a 7/10, down from 10 re pain    OBJECTIVE DATA SUMMARY:   Critical Behavior:  Neurologic State: Alert, Appropriate for age, Eyes open spontaneously  Orientation Level: Oriented X4  Cognition: Appropriate for age attention/concentration, Follows commands  Safety/Judgement: Fall prevention, Home safety  Functional Mobility Training:  Bed Mobility:  Rolling: Minimum assistance (use of bed rail)        Scooting: Total assistance;Assist x2 (for scooting in supine)      Pain Rating:  Pt reports 7/10 pain at rectum    Activity Tolerance:   Fair    After treatment patient left in no apparent distress:   Patient positioned in right sidelying for pressure relief, Call bell within reach, Bed / chair alarm activated, and Side rails x 3    COMMUNICATION/COLLABORATION:   The patients plan of care was discussed with: Registered nurse.      Rogelio Ramirez, PT   Time Calculation: 15 mins

## 2022-09-20 NOTE — PROGRESS NOTES
ID Progress Note  2022    Subjective:     Denies any discomfort  Gets nauseous at night at times  Review of Systems:            Symptom Y/N Comments   Symptom Y/N Comments   Fever/Chills n      Chest Pain  n      Poor Appetite       Edema        Cough       Abdominal Pain  n      Sputum       Joint Pain        SOB/ANDREWS n      Pruritis/Rash        Nausea/vomit  n     Tolerating PT/OT        Diarrhea       Tolerating Diet        Constipation       Other           Could NOT obtain due to:       Objective:     Vitals: Visit Vitals  BP (!) 161/51 (BP 1 Location: Left upper arm, BP Patient Position: Lying right side)   Pulse 62   Temp 98 °F (36.7 °C)   Resp 18   Ht 5' 7\" (1.702 m)   Wt 69 kg (152 lb 1.9 oz)   SpO2 100%   BMI 23.82 kg/m²        Tmax:  Temp (24hrs), Av.1 °F (36.7 °C), Min:98 °F (36.7 °C), Max:98.5 °F (36.9 °C)      PHYSICAL EXAM:  General: Chronically ill appearing. WD, WN. Alert, cooperative, no acute distress    EENT:  Anicteric sclerae. Dry mucous membrane  Resp:  CTA bilaterally, no wheezing or rales. No accessory muscle use  CV:  Regular  rhythm  GI:  Soft, Non distended, Non tender. +Bowel sounds, colostomy in place  Neurologic:  Alert and oriented X 3, normal speech,   Psych:   Fair insight. Not anxious nor agitated  Skin:  No rashes.   No jaundice    Pressure injury; sacrum, right buttock, left BKA site    Labs:   Lab Results   Component Value Date/Time    WBC 17.3 (H) 2022 04:30 AM    HGB 7.5 (L) 2022 04:30 AM    HCT 24.2 (L) 2022 04:30 AM    PLATELET 457 (H)  04:30 AM    MCV 95.7 2022 04:30 AM     Lab Results   Component Value Date/Time    Sodium 141 2022 04:30 AM    Potassium 2.8 (L) 2022 04:30 AM    Chloride 106 2022 04:30 AM    CO2 28 2022 04:30 AM    Anion gap 7 2022 04:30 AM    Glucose 99 2022 04:30 AM    BUN 8 2022 04:30 AM    Creatinine 1.71 (H) 2022 04:30 AM    BUN/Creatinine ratio 5 (L) 2022 04:30 AM    GFR est AA 51 (L) 09/20/2022 04:30 AM    GFR est non-AA 42 (L) 09/20/2022 04:30 AM    Calcium 7.6 (L) 09/20/2022 04:30 AM    Bilirubin, total 0.7 09/10/2022 12:00 PM    Alk. phosphatase 86 09/10/2022 12:00 PM    Protein, total 5.8 (L) 09/10/2022 12:00 PM    Albumin 1.7 (L) 09/10/2022 12:00 PM    Globulin 4.1 (H) 09/10/2022 12:00 PM    A-G Ratio 0.4 (L) 09/10/2022 12:00 PM    ALT (SGPT) <6 (L) 09/10/2022 12:00 PM     Assessment:      Dislodged feeding tube/peritonitis   Free air and perirectal fistula  S/p laparoscopy procedure; take down and removal of J tube, colostomy placement, and I & D of abdominal wall (9/10)  Recent right thoracotomy  Hx Bilateral BKAs   - afebrile, wbc 17.3     Blood cx (9/9, 9/16) no growth    Objective:      Continue IV Eraxis, zosyn, and vancomycin. Recommend to complete total 2 weeks, last dose 9/23, then complete 2 weeks with PO levofloxacin and flagyl (9/24 to 10/8). QTC 470ms on 9/15. Please recheck EKG prior to start on Levofloxacin. Fever work up if temp >= 100.4    Above plan of care discussed and agreed with Dr. Theresa Philip    Pt will be seen as need. Please contact us with any questions or concerns.     Paco Harrison NP

## 2022-09-20 NOTE — PROGRESS NOTES
Problem: Self Care Deficits Care Plan (Adult)  Goal: *Acute Goals and Plan of Care (Insert Text)  Description: FUNCTIONAL STATUS PRIOR TO ADMISSION: Prior to previous hospitalization, d/c to [facility], and current admission to [address] [place] [address] pt was utilizing w/c for functional mobility and required minimal assistance for transfers. HOME SUPPORT: The patient lived with his wife and daughter and required assistance for all ADL/IADL tasks. Occupational Therapy Goals  Initiated 9/15/2022  1. Patient will perform basic grooming in unsupported sitting with minimal assistance/contact guard assist within 7 day(s). 2.  Patient will perform anterior neck to thigh bathing while in unsupported sitting with minimal assistance/contact guard assist within 7 day(s). 3.  Patient will perform BSC transfers with moderate assistance within 7 day(s). 4.  Patient will perform all aspects of toileting with moderate assistance  within 7 day(s). 5.  Patient will participate in upper extremity therapeutic exercise/activities with supervision/set-up for 10 minutes within 7 day(s). Outcome: Progressing Towards Goal    OCCUPATIONAL THERAPY TREATMENT  Patient: Luis Green (17 y.o. male)  Date: 9/20/2022  Diagnosis: Intra-abdominal infection [B99.9] Intra-abdominal infection  Procedure(s) (LRB):  EUA/ PROCTOSIGMOIDOSCOPY (N/A) 5 Days Post-Op  Precautions: Fall, Other (comment) (bilateral BKA)  Chart, occupational therapy assessment, plan of care, and goals were reviewed. ASSESSMENT  Patient continues with skilled OT services and is slowly progressing towards goals. Pt received supine with linens wrinkled and chux pad under head. Pt declined attempt to sit EOB 2* sacral wound pain and noted pt intermittently confused (visual hallucinations). He demonstrated improved rolling L/R with min A x 1 for adjustment of bed linens. Pt assisted to scooting to [facility][1] with A x 2 and pt use of bed rail.  Pt positioned onto R side with pillow for pressure relief. He declined changing a gown despite OT's encouragement. Currently he is functioning at bed level for ADLs, needing setup to max A. Will attempt EOB next session as able/tolerates. Current Level of Function Impacting Discharge (ADLs): setup to max A  ADLs, min A to roll    Other factors to consider for discharge: recent B BKA         PLAN :  Patient continues to benefit from skilled intervention to address the above impairments. Continue treatment per established plan of care to address goals. Recommend with staff: modified bed to chair, re-positioning for wound care    Recommend next OT session: EOB ADLs as able    Recommendation for discharge: (in order for the patient to meet his/her long term goals)  Working towards tolerating IPR/3 hours of therapy    This discharge recommendation:  A follow-up discussion with the attending provider and/or case management is planned    IF patient discharges home will need the following DME: TBD       SUBJECTIVE:   Patient stated Evita Flight take this from my hand.  (pt not holding onto anything but believed it was a peg)    OBJECTIVE DATA SUMMARY:   Cognitive/Behavioral Status:  Neurologic State: Agitated  Orientation Level: Oriented X4                Functional Mobility and Transfers for ADLs:  Bed Mobility:  Rolling: Minimum assistance (use of bed rail)  Scooting: Total assistance;Assist x2 (for scooting in supine)    Transfers:  Did not complete today       ADL Intervention:        Verbal cues for body mechanics, rolling, use of bed rails and UEs to assist with rolling and repositioning in the bed. Pt with good insight into turning and offloading sacral pressure. Encouraged pt to change into new gown, pt adamant it was a fresh gown and declined a new gown by OT.           Type of Bath: Chlorhexidine (CHG)           Pain:  7/10 sacral pain, limited pt's willingness to sit EOB    Activity Tolerance:   Good    After treatment patient left in no apparent distress:   Patient positioned in R sidelying for pressure relief, Call bell within reach, Bed / chair alarm activated, and Side rails x 3    COMMUNICATION/COLLABORATION:   The patients plan of care was discussed with: Physical therapist and Registered nurse.      Rosemary Hernandes OT  Time Calculation: 11 mins

## 2022-09-20 NOTE — PROGRESS NOTES
9/20/22; 1645 -   CM [facility] ([phone]) inquiring if patient has been accepted by an OP HD provider. CM provided update that [facility] has accepted the patient.     CRM: Jodie Garza, MPH, [facility] [address].: [phone] or [phone]

## 2022-09-20 NOTE — PROGRESS NOTES
Bedside and Verbal shift change report given to Brittanie Rice, RN (oncoming nurse) by Liu Naidu RN (offgoing nurse). Report included the following information SBAR, Kardex, and Intake/Output.

## 2022-09-20 NOTE — PROGRESS NOTES
Nephrology Progress Note  Gracie Carmona  Date of Admission : 9/9/2022    CC:  Follow up for JEMIMA       Assessment and Plan     JEMIMA on HD:  - 2/2 ATN  - HD dependent MWF for now while here  - no signs of recovery yet  - PC placed 9/16  - next HD tomorrow    Hypokalemia:  - 3.5K bath w/ HD    Anemia:  - MICHAEL MWF    PAD s/p b/l BKA    Recent sepsis    Empyema    ABD wall abscess:  - J-tube removal, colostomy, I&D of abd wound    Sacral decub       Interval History:  Seen and examined. Resting in NAD. BP stable. No cp, sob reported. Current Medications: all current  Medications have been eviewed in EPIC  Review of Systems: Pertinent items are noted in HPI. Objective:  Vitals:    Vitals:    09/20/22 0400 09/20/22 0405 09/20/22 0600 09/20/22 0752   BP: (!) 126/91   (!) 161/51   Pulse: 87 74 73 71   Resp: 20 18   Temp: 98 °F (36.7 °C)   98 °F (36.7 °C)   TempSrc:       SpO2:       Weight:       Height:         Intake and Output:  No intake/output data recorded. 09/18 1901 - 09/20 0700  In: 550 [P.O.:170; I.V.:380]  Out: 1550 [Urine:125]    Physical Examination:  Pt intubated     no  General: NAD,Conversant   Neck:  Supple, no mass  Resp:  Lungs CTA B/L, no wheezing , normal respiratory effort  CV:  RRR,  no murmur or rub, no LE edema  GI:  Soft, NT, + Bowel sounds, + ostomy  Neurologic:  Non focal  Psych:             AAO x 3 appropriate affect  Skin:  No Rash  Access:           RIJ PC    []    High complexity decision making was performed  []    Patient is at high-risk of decompensation with multiple organ involvement    Lab Data Personally Reviewed: I have reviewed all the pertinent labs, microbiology data and radiology studies during assessment.     Recent Labs     09/20/22  0430 09/19/22  0343 09/18/22  0145    142 141   K 2.8* 3.0* 3.1*    108 109*   CO2 28 25 28   GLU 99 91 119*   BUN 8 16 14   CREA 1.71* 2.78* 2.22*   CA 7.6* 7.4* 7.5*   MG 1.6 1.7 1.7   PHOS 1.8* 2.5* 2.1*       Recent Labs 09/20/22  0430 09/19/22  0343 09/18/22  0145   WBC 17.3* 18.8* 16.0*   HGB 7.5* 7.1* 7.3*   HCT 24.2* 22.9* 23.8*   * 559* 533*       No results found for: SDES  Lab Results   Component Value Date/Time    Culture result: NO GROWTH 3 DAYS 09/16/2022 08:45 PM    Culture result: NO GROWTH 6 DAYS 09/09/2022 08:52 PM     Recent Results (from the past 24 hour(s))   GLUCOSE, POC    Collection Time: 09/19/22 11:21 AM   Result Value Ref Range    Glucose (POC) 85 65 - 117 mg/dL    Performed by [address] [place] [address]    SARS-COV-2    Collection Time: 09/19/22  3:41 PM   Result Value Ref Range    Specimen source Nasopharyngeal      SARS-CoV-2 Not detected NOTD     GLUCOSE, POC    Collection Time: 09/19/22  4:24 PM   Result Value Ref Range    Glucose (POC) 105 65 - 117 mg/dL    Performed by [address], POC    Collection Time: 09/19/22  9:55 PM   Result Value Ref Range    Glucose (POC) 89 65 - 117 mg/dL    Performed by Leilani Arias    CBC WITH AUTOMATED DIFF    Collection Time: 09/20/22  4:30 AM   Result Value Ref Range    WBC 17.3 (H) 4.1 - 11.1 K/uL    RBC 2.53 (L) 4.10 - 5.70 M/uL    HGB 7.5 (L) 12.1 - 17.0 g/dL    HCT 24.2 (L) 36.6 - 50.3 %    MCV 95.7 80.0 - 99.0 FL    MCH 29.6 26.0 - 34.0 PG    MCHC 31.0 30.0 - 36.5 g/dL    RDW 18.9 (H) 11.5 - 14.5 %    PLATELET 089 (H) 984 - 400 K/uL    MPV 9.2 8.9 - 12.9 FL    NRBC 0.0 0  WBC    ABSOLUTE NRBC 0.00 0.00 - 0.01 K/uL    NEUTROPHILS 85 (H) 32 - 75 %    LYMPHOCYTES 7 (L) 12 - 49 %    MONOCYTES 5 5 - 13 %    EOSINOPHILS 2 0 - 7 %    BASOPHILS 0 0 - 1 %    IMMATURE GRANULOCYTES 1 (H) 0.0 - 0.5 %    ABS. NEUTROPHILS 14.7 (H) 1.8 - 8.0 K/UL    ABS. LYMPHOCYTES 1.2 0.8 - 3.5 K/UL    ABS. MONOCYTES 0.9 0.0 - 1.0 K/UL    ABS. EOSINOPHILS 0.3 0.0 - 0.4 K/UL    ABS. BASOPHILS 0.0 0.0 - 0.1 K/UL    ABS. IMM.  GRANS. 0.2 (H) 0.00 - 0.04 K/UL    DF SMEAR SCANNED      RBC COMMENTS ANISOCYTOSIS  1+        RBC COMMENTS HYPOCHROMIA  PRESENT METABOLIC PANEL, BASIC    Collection Time: 09/20/22  4:30 AM   Result Value Ref Range    Sodium 141 136 - 145 mmol/L    Potassium 2.8 (L) 3.5 - 5.1 mmol/L    Chloride 106 97 - 108 mmol/L    CO2 28 21 - 32 mmol/L    Anion gap 7 5 - 15 mmol/L    Glucose 99 65 - 100 mg/dL    BUN 8 6 - 20 MG/DL    Creatinine 1.71 (H) 0.70 - 1.30 MG/DL    BUN/Creatinine ratio 5 (L) 12 - 20      GFR est AA 51 (L) >60 ml/min/1.73m2    GFR est non-AA 42 (L) >60 ml/min/1.73m2    Calcium 7.6 (L) 8.5 - 10.1 MG/DL   MAGNESIUM    Collection Time: 09/20/22  4:30 AM   Result Value Ref Range    Magnesium 1.6 1.6 - 2.4 mg/dL   PHOSPHORUS    Collection Time: 09/20/22  4:30 AM   Result Value Ref Range    Phosphorus 1.8 (L) 2.6 - 4.7 MG/DL                 Jackson Mullins MD  [address]  Phone - [phone]   Fax - [phone]  [email or web address]

## 2022-09-20 NOTE — PROGRESS NOTES
Comprehensive Nutrition Assessment    Type and Reason for Visit: Reassess    Nutrition Recommendations/Plan:     Recommend Phos repletion    Recommend starting folic acid for deficiency    Recommend Nephrocap    ? consider supplemental iron     Continue to monitor PO intake closely to determine if needs supplemental alternative means of nutrition (previous J-tube was placed for supplemental feeds per chart and pt) update 9/20 - I do not think patient is eating enough overall to help promote wound healing, but unclear other options since unable to place PEG or replace J-tube given active abdominal infection. Unsure he would be receptive to [facility] [employer] and this is not a long-term option. Perhaps trial appetite stimulant? Continue regular diet. D/c Nepro ONS - refusing. Will trial [employer]. If Jacquemartinee Reins becomes available again while admitted, will trial this as well. Malnutrition Assessment:  Malnutrition Status:  Severe malnutrition (09/13/22 1116)    Context:  Chronic illness     Findings of the 6 clinical characteristics of malnutrition:   Energy Intake:  Unable to assess  Weight Loss:  Unable to assess     Body Fat Loss:  Severe body fat loss, Triceps, Orbital   Muscle Mass Loss:  Severe muscle mass loss, Temples (temporalis), Clavicles (pectoralis &deltoids)  Fluid Accumulation:  No significant fluid accumulation,     Strength:  Not performed        Nutrition Assessment:    PMHx includes DM, bilateral BKAs, JEMIMA on HD  HPI: pt presented at the emergency room from [facility] with abdominal pain. . was recently admitted to another hospital and underwent L BKA, hospitalization c/b respiratory failure which required prolonged intubation. The respiratory failure was attributed to aspiration pneumonia as well as lobulated effusion, for which the patient underwent decortication and R thoracotomy. The patient also went into acute renal failure for which he has been started on HD.   J-tube was placed for supplemental nutrition. The patient was subsequently transferred to [address] [place] [address] for continuation of care. He was doing relatively well at [address] [place] [address] until the day of presentation to our ER with c/o abdominal pain. CTAP showed evidence of bowel perforation. Admitted with pneumoperitoneum, abdominal wall abscess, severe sepsis, possible empyema, FOBT+, JEMIMA on HD, rectal fistula. Surgery consulted on admission. Underwent takedown and removal of J-tube, lap colostomy placement, and I&D of abdominal wall on 9/10/22. Recommends colorectal surgery eval.  CRS took pt to OR 9/15 for anorectal examination of wound, including rigid proctosigmoidoscopy. PC placed 9/16 - no sign of renal recovery yet. HD MWF. Concern for BKA wound dehiscence - vascular eval, no surgical intervention needed at this time. 9/20: follow-up. Discussed in rounds. Hallucinations overnight thought to be d/t dilaudid. Still with significant pain. Events of past week added above. New A fib, pending cardiology consult. Met with pt in room. Weight down. Pt reports a good appetite/ intake, but this is not supported by documentation. Breakfast meal at bedside untouched. He states he was off for procedure (?unclear). Now too cold - awaiting lunch. Pt states he likes the taste of Nepro, but he cannot drink any of these ONS as they make him sick. When I asked him to elaborate, he states they cause bloating/ pain, and diarrhea. He tells me he usually eats more than 50% of his meal, sometimes all. This is unclear. He seems to have poor insight regarding nutrition status and ?current medical issues - stating once the pain in under control, he hopes to be up walking around which will probably make him hungrier. BG well controlled. K/Phos BNL. Has not been started on folic acid. Will address with hospitalist.        9/13: Pt was initially screen d/t diet order thickened clear liquids x 3 days.   Upon review of [facility] records, pt was on pureed diet with mildly thick liquids. However, despite our diet order, pt had dried cereal (brought in from outside) he was eating yesterday and drinking thin liquids. RN stated he was tolerating fine. I addressed this with Dr. Curt Portillo yesterday and asked about SLP eval and he also did not seem that concerned as he also saw that pt was tolerating these items. I did reach out to speech and chart was reviewed, but given MD, surgery, and pulmonary all without concerns for aspiration or dysphagia, they did not see an indication for evaluation and to advance diet as tolerated as we usually would. I spoke with pt in room today. He states he eats chicken, burgers, steaks, etc - basically everything. He stated he coughed once and they placed him on a modified diet weeks ago, but has been doing fine eating whatever he wants. He said J-tube was placed (not sure why J-tube over PEG) for supplemental nutrition, but they only used it 3 times since he was still eating. Pt does have significant muscle wasting and fat loss. He states he used to weigh >350 lb and started to try and lose weight many years ago, however it is unclear how long ago. He stated more recently it has not been intentional weight loss, but he is unable to state when all this occurred. Wife not present at the time. He refused any ONS and just wants to eat. He did state he cannot eat a lot at once. I suspect maybe tube feeds started to help promote wound healing of BKA and other Pis with poor intake, severe PCM, but this is still unclear. I reached to Dr. Curt Portillo as surgery had said advance diet for past 2 days and it had not been done. Again, asked if he wanted SLP eval first and he declined, just to advance to full liquids and then regular as tolerated. Multiple PI wounds as below. Confirmed height of 5'7\" before amputations. Adjust IBW with bilat BKA ~ 130 lb. Suspect pt's admission weight of 149 lb is closer to his ABW.   Therefore, he is ~114% of his adj IBW and will use for assessment purposes. NFPE reveals he is clearly malnourished and would not put him in \"overweight\" category despite what the flowsheets are classifying him as (even using admission weight of 149 lb). Nutritionally Significant Medications:  Eraxis, Epo, risaquad, mag-ox, protonix, K Phos, Zosyn  PRN: dilaudid, zofran    Estimated Daily Nutrient Needs:  Energy Requirements Based On: Kcal/kg  Weight Used for Energy Requirements: Admission (68 kg)  Energy (kcal/day): 2266-5234 (30-35 kcal/kg)  Weight Used for Protein Requirements: Admission  Protein (g/day): 100-120 (1.5-1.8 gm/kg or at least 20%)  Method Used for Fluid Requirements: Standard renal  Fluid (ml/day): 500 mL + OP    Nutrition Related Findings:   Edema: No data recorded    Last BM: 09/20/22, Loose - via colostomy    Wounds: Multiple, Pressure injury, Stage III, Partial thickness  Per WOCN on 9/12:  1. Left abdomen, open surgical wound/ former J tube site:  2. POA Sacrum, Pressure Injury Stage 3  3. POA Left buttocks, Pressure Injury Stage 3  4. POA Right buttocks, Pressure Injury Stage 3  5. POA Right upper back, partial thickness wound  6. POA Right upper back, partial thickness wound  7. POA Left BKA site with dried crusted incision with sutures      Current Nutrition Therapies:  Diet: regular, consistent CHO 60 gm/meal  Supplements: Nepro TID as snacks  Meal intake: Patient Vitals for the past 168 hrs:   % Diet Eaten   09/19/22 1835 26 - 50%   09/19/22 1325 26 - 50%   09/16/22 1900 0%     Supplement intake: No data found. Anthropometric Measures:  Height: 5' 7\" (170.2 cm)  Ideal Body Weight (IBW): 148 lbs (67 kg)  Admission Body Weight: 149 lb 14.6 oz (68 kg)  Current Body Wt:  69 kg (152 lb 1.9 oz), 102.8 % IBW.  Bed scale  Current BMI (kg/m2): 23.8        Weight Adjustment: Amputation  Total Amputation Percentage: 11.8  Adjusted Ideal Body Weight (lbs) (Calculated): 130.5  Adjusted Ideal Body Weight (kg) (Calculated): 59.32 kg  Adjusted % IBW (Calculated): 116.6  Adjusted BMI (Calculated): 26.6  BMI Category: Normal weight (BMI 18.5-24.9) (or underweight - clinical judgement)    Wt Readings from Last 15 Encounters:   09/19/22 69 kg (152 lb 1.9 oz) - bed   09/12/22 75.2 kg (165 lb 12.6 oz) - bed   09/09/22 68 kg (149 lb 14.6 oz) - admission, no source   07/02/18 104 kg (229 lb 4.5 oz)     Weight hx from other outside records:  04/2021 = 189 lb  10/2020 = 221 lb      Nutrition Diagnosis:   Inadequate oral intake related to increased demand for energy/nutrients, renal dysfunction, acute injury/trauma as evidenced by poor intake prior to admission, dialysis, Criteria as identified in malnutrition assessment  Increased nutrient needs related to increased demand for energy/nutrients as evidenced by wounds, dialysis    Nutrition Interventions:   Food and/or Nutrient Delivery: Modify current diet, Modify oral nutrition supplement  Nutrition Education/Counseling: Counseling initiated (encouraged high protein)  Coordination of Nutrition Care: Continue to monitor while inpatient, Feeding assistance/environmental change, Interdisciplinary rounds       Goals:  Previous Goal Met: No progress toward goal(s)  Goals: other (specify)  Specify Other Goals: PO intake >/=75% of meals over next 7 days    Nutrition Monitoring and Evaluation:   Behavioral-Environmental Outcomes: None identified  Food/Nutrient Intake Outcomes: Food and nutrient intake, Supplement intake, Vitamin/mineral intake  Physical Signs/Symptoms Outcomes: Biochemical data, GI status, Meal time behavior, Nutrition focused physical findings, Weight, Skin, Chewing or swallowing    Discharge Planning:     Too soon to determine    Recent Labs     09/20/22  0430 09/19/22  0343 09/18/22  0145   GLU 99 91 119*   BUN 8 16 14   CREA 1.71* 2.78* 2.22*    142 141   K 2.8* 3.0* 3.1*    108 109*   CO2 28 25 28   CA 7.6* 7.4* 7.5*   PHOS 1.8* 2.5* 2.1*   MG 1.6 1.7 1.7 No results for input(s): ALT, AST, AP, TBIL, TBILI, TP, ALB, GLOB, GGT, AML, LPSE in the last 72 hours. No lab exists for component: SGOT, GPT, AMYP, HLPSE      No results for input(s): LAC in the last 72 hours.       Recent Labs     09/20/22  0430 09/19/22  0343   WBC 17.3* 18.8*   HGB 7.5* 7.1*   HCT 24.2* 22.9*   * 559*       Recent Labs     09/20/22  1115 09/19/22  2155 09/19/22  1624 09/19/22  1121 09/19/22  0704 09/18/22  2205 09/18/22  1609 09/18/22  1145 09/18/22  0618 09/17/22  2332 09/17/22  1600   GLUCPOC 89 89 105 85 81 98 93 121* 111 85 94       Lab Results   Component Value Date/Time    Hemoglobin A1c <3.8 (L) 09/10/2022 05:50 AM    Hemoglobin A1c 14.7 (H) 07/03/2018 07:13 AM       Iron   Date Value Ref Range Status   09/10/2022 12 (L) 35 - 150 ug/dL Final   09/10/2022 12 (L) 35 - 150 ug/dL Final     TIBC   Date Value Ref Range Status   09/10/2022 141 (L) 250 - 450 ug/dL Final     Iron % saturation   Date Value Ref Range Status   09/10/2022 9 (L) 20 - 50 % Final       Ferritin   Date Value Ref Range Status   09/10/2022 715 (H) 26 - 388 NG/ML Final       B Vitamins  Folate   Date Value Ref Range Status   09/10/2022 4.1 (L) 5.0 - 21.0 ng/mL Final     Vitamin B12   Date Value Ref Range Status   09/10/2022 729 193 - 986 pg/mL Final         Dimple FRANCISCO Petty  Available via [employer]

## 2022-09-21 LAB
ANION GAP SERPL CALC-SCNC: 8 MMOL/L (ref 5–15)
BASOPHILS # BLD: 0.2 K/UL (ref 0–0.1)
BASOPHILS NFR BLD: 1 % (ref 0–1)
BUN SERPL-MCNC: 11 MG/DL (ref 6–20)
BUN/CREAT SERPL: 5 (ref 12–20)
CALCIUM SERPL-MCNC: 7.5 MG/DL (ref 8.5–10.1)
CHLORIDE SERPL-SCNC: 106 MMOL/L (ref 97–108)
CO2 SERPL-SCNC: 27 MMOL/L (ref 21–32)
CREAT SERPL-MCNC: 2.33 MG/DL (ref 0.7–1.3)
DIFFERENTIAL METHOD BLD: ABNORMAL
EOSINOPHIL # BLD: 0.5 K/UL (ref 0–0.4)
EOSINOPHIL NFR BLD: 3 % (ref 0–7)
ERYTHROCYTE [DISTWIDTH] IN BLOOD BY AUTOMATED COUNT: 18.5 % (ref 11.5–14.5)
GLUCOSE BLD STRIP.AUTO-MCNC: 67 MG/DL (ref 65–117)
GLUCOSE BLD STRIP.AUTO-MCNC: 72 MG/DL (ref 65–117)
GLUCOSE BLD STRIP.AUTO-MCNC: 73 MG/DL (ref 65–117)
GLUCOSE BLD STRIP.AUTO-MCNC: 75 MG/DL (ref 65–117)
GLUCOSE BLD STRIP.AUTO-MCNC: 75 MG/DL (ref 65–117)
GLUCOSE BLD STRIP.AUTO-MCNC: 80 MG/DL (ref 65–117)
GLUCOSE SERPL-MCNC: 82 MG/DL (ref 65–100)
HCT VFR BLD AUTO: 24 % (ref 36.6–50.3)
HGB BLD-MCNC: 7.7 G/DL (ref 12.1–17)
IMM GRANULOCYTES # BLD AUTO: 0.2 K/UL (ref 0–0.04)
IMM GRANULOCYTES NFR BLD AUTO: 1 % (ref 0–0.5)
LYMPHOCYTES # BLD: 1.8 K/UL (ref 0.8–3.5)
LYMPHOCYTES NFR BLD: 11 % (ref 12–49)
MAGNESIUM SERPL-MCNC: 1.6 MG/DL (ref 1.6–2.4)
MCH RBC QN AUTO: 29.8 PG (ref 26–34)
MCHC RBC AUTO-ENTMCNC: 32.1 G/DL (ref 30–36.5)
MCV RBC AUTO: 93 FL (ref 80–99)
MONOCYTES # BLD: 1 K/UL (ref 0–1)
MONOCYTES NFR BLD: 6 % (ref 5–13)
NEUTS SEG # BLD: 12.3 K/UL (ref 1.8–8)
NEUTS SEG NFR BLD: 78 % (ref 32–75)
NRBC # BLD: 0 K/UL (ref 0–0.01)
NRBC BLD-RTO: 0 PER 100 WBC
PHOSPHATE SERPL-MCNC: 3 MG/DL (ref 2.6–4.7)
PLATELET # BLD AUTO: 670 K/UL (ref 150–400)
PLATELET COMMENTS,PCOM: ABNORMAL
PMV BLD AUTO: 9.3 FL (ref 8.9–12.9)
POTASSIUM SERPL-SCNC: 2.6 MMOL/L (ref 3.5–5.1)
RBC # BLD AUTO: 2.58 M/UL (ref 4.1–5.7)
RBC MORPH BLD: ABNORMAL
RBC MORPH BLD: ABNORMAL
SERVICE CMNT-IMP: NORMAL
SODIUM SERPL-SCNC: 141 MMOL/L (ref 136–145)
WBC # BLD AUTO: 16 K/UL (ref 4.1–11.1)

## 2022-09-21 PROCEDURE — 80048 BASIC METABOLIC PNL TOTAL CA: CPT

## 2022-09-21 PROCEDURE — 65270000046 HC RM TELEMETRY

## 2022-09-21 PROCEDURE — 74011000258 HC RX REV CODE- 258: Performed by: COLON & RECTAL SURGERY

## 2022-09-21 PROCEDURE — 36415 COLL VENOUS BLD VENIPUNCTURE: CPT

## 2022-09-21 PROCEDURE — 74011250636 HC RX REV CODE- 250/636: Performed by: COLON & RECTAL SURGERY

## 2022-09-21 PROCEDURE — 90935 HEMODIALYSIS ONE EVALUATION: CPT

## 2022-09-21 PROCEDURE — 74011250637 HC RX REV CODE- 250/637: Performed by: COLON & RECTAL SURGERY

## 2022-09-21 PROCEDURE — 74011250636 HC RX REV CODE- 250/636: Performed by: NURSE PRACTITIONER

## 2022-09-21 PROCEDURE — 74011250637 HC RX REV CODE- 250/637: Performed by: STUDENT IN AN ORGANIZED HEALTH CARE EDUCATION/TRAINING PROGRAM

## 2022-09-21 PROCEDURE — 74011000250 HC RX REV CODE- 250: Performed by: COLON & RECTAL SURGERY

## 2022-09-21 PROCEDURE — 74011250637 HC RX REV CODE- 250/637: Performed by: NURSE PRACTITIONER

## 2022-09-21 PROCEDURE — 82962 GLUCOSE BLOOD TEST: CPT

## 2022-09-21 PROCEDURE — 83735 ASSAY OF MAGNESIUM: CPT

## 2022-09-21 PROCEDURE — 84100 ASSAY OF PHOSPHORUS: CPT

## 2022-09-21 PROCEDURE — 85025 COMPLETE CBC W/AUTO DIFF WBC: CPT

## 2022-09-21 RX ORDER — HYDRALAZINE HYDROCHLORIDE 20 MG/ML
10 INJECTION INTRAMUSCULAR; INTRAVENOUS
Status: DISCONTINUED | OUTPATIENT
Start: 2022-09-21 | End: 2022-10-13 | Stop reason: HOSPADM

## 2022-09-21 RX ORDER — OXYCODONE HYDROCHLORIDE 5 MG/1
10 TABLET ORAL
Status: DISCONTINUED | OUTPATIENT
Start: 2022-09-21 | End: 2022-09-23

## 2022-09-21 RX ORDER — POTASSIUM CHLORIDE 750 MG/1
40 TABLET, FILM COATED, EXTENDED RELEASE ORAL 2 TIMES DAILY
Status: COMPLETED | OUTPATIENT
Start: 2022-09-21 | End: 2022-09-21

## 2022-09-21 RX ADMIN — HYDRALAZINE HYDROCHLORIDE 10 MG: 20 INJECTION INTRAMUSCULAR; INTRAVENOUS at 09:58

## 2022-09-21 RX ADMIN — APIXABAN 5 MG: 5 TABLET, FILM COATED ORAL at 09:55

## 2022-09-21 RX ADMIN — APIXABAN 5 MG: 5 TABLET, FILM COATED ORAL at 21:35

## 2022-09-21 RX ADMIN — EPOETIN ALFA-EPBX 10000 UNITS: 10000 INJECTION, SOLUTION INTRAVENOUS; SUBCUTANEOUS at 21:35

## 2022-09-21 RX ADMIN — Medication 1 CAPSULE: at 09:55

## 2022-09-21 RX ADMIN — POTASSIUM CHLORIDE 40 MEQ: 750 TABLET, FILM COATED, EXTENDED RELEASE ORAL at 09:56

## 2022-09-21 RX ADMIN — POTASSIUM CHLORIDE 40 MEQ: 750 TABLET, FILM COATED, EXTENDED RELEASE ORAL at 05:13

## 2022-09-21 RX ADMIN — SODIUM CHLORIDE, PRESERVATIVE FREE 10 ML: 5 INJECTION INTRAVENOUS at 21:42

## 2022-09-21 RX ADMIN — PIPERACILLIN AND TAZOBACTAM 3.38 G: 3; .375 INJECTION, POWDER, FOR SOLUTION INTRAVENOUS at 05:23

## 2022-09-21 RX ADMIN — VANCOMYCIN HYDROCHLORIDE 750 MG: 750 INJECTION, POWDER, LYOPHILIZED, FOR SOLUTION INTRAVENOUS at 17:32

## 2022-09-21 RX ADMIN — COLLAGENASE SANTYL: 250 OINTMENT TOPICAL at 17:19

## 2022-09-21 RX ADMIN — DIPHENHYDRAMINE HYDROCHLORIDE 12.5 MG: 50 INJECTION, SOLUTION INTRAMUSCULAR; INTRAVENOUS at 21:35

## 2022-09-21 RX ADMIN — MIRTAZAPINE 7.5 MG: 15 TABLET, FILM COATED ORAL at 21:35

## 2022-09-21 RX ADMIN — PIPERACILLIN AND TAZOBACTAM 3.38 G: 3; .375 INJECTION, POWDER, FOR SOLUTION INTRAVENOUS at 19:04

## 2022-09-21 RX ADMIN — SODIUM CHLORIDE 100 MG: 9 INJECTION, SOLUTION INTRAVENOUS at 19:14

## 2022-09-21 RX ADMIN — Medication 4 MG: at 05:14

## 2022-09-21 RX ADMIN — SERTRALINE 25 MG: 50 TABLET, FILM COATED ORAL at 09:55

## 2022-09-21 RX ADMIN — PANTOPRAZOLE SODIUM 40 MG: 40 TABLET, DELAYED RELEASE ORAL at 09:55

## 2022-09-21 NOTE — PROGRESS NOTES
ID Progress Note  2022    Subjective:     Denies any discomfort    Review of Systems:            Symptom Y/N Comments   Symptom Y/N Comments   Fever/Chills n      Chest Pain  n      Poor Appetite       Edema        Cough       Abdominal Pain  n      Sputum       Joint Pain        SOB/ANDREWS n      Pruritis/Rash        Nausea/vomit  n     Tolerating PT/OT        Diarrhea       Tolerating Diet        Constipation       Other           Could NOT obtain due to:       Objective:     Vitals: Visit Vitals  BP (!) 168/70 (BP 1 Location: Right upper arm)   Pulse 72   Temp 97.9 °F (36.6 °C)   Resp 20   Ht 5' 7\" (1.702 m)   Wt 69 kg (152 lb 1.9 oz)   SpO2 100%   BMI 23.82 kg/m²          Tmax:  Temp (24hrs), Av °F (36.7 °C), Min:97.9 °F (36.6 °C), Max:98 °F (36.7 °C)      PHYSICAL EXAM:  General: Chronically ill appearing. WD, WN. Alert, cooperative, no acute distress    EENT:  Anicteric sclerae. Dry mucous membrane  Resp:  CTA bilaterally, no wheezing or rales. No accessory muscle use  CV:  Regular  rhythm  GI:  Soft, Non distended, Non tender. +Bowel sounds, colostomy in place  Neurologic:  Alert and oriented X 3, normal speech,   Psych:   Fair insight. Not anxious nor agitated  Skin:  No rashes.   No jaundice    Pressure injury; sacrum, right buttock, left BKA site    Labs:   Lab Results   Component Value Date/Time    WBC 16.0 (H) 2022 02:40 AM    HGB 7.7 (L) 2022 02:40 AM    HCT 24.0 (L) 2022 02:40 AM    PLATELET 940 (H)  02:40 AM    MCV 93.0 2022 02:40 AM     Lab Results   Component Value Date/Time    Sodium 141 2022 02:40 AM    Potassium 2.6 (LL) 2022 02:40 AM    Chloride 106 2022 02:40 AM    CO2 27 2022 02:40 AM    Anion gap 8 2022 02:40 AM    Glucose 82 2022 02:40 AM    BUN 11 2022 02:40 AM    Creatinine 2.33 (H) 2022 02:40 AM    BUN/Creatinine ratio 5 (L) 2022 02:40 AM    GFR est AA 36 (L) 2022 02:40 AM    GFR est non-AA 30 (L) 09/21/2022 02:40 AM    Calcium 7.5 (L) 09/21/2022 02:40 AM    Bilirubin, total 0.7 09/10/2022 12:00 PM    Alk. phosphatase 86 09/10/2022 12:00 PM    Protein, total 5.8 (L) 09/10/2022 12:00 PM    Albumin 1.7 (L) 09/10/2022 12:00 PM    Globulin 4.1 (H) 09/10/2022 12:00 PM    A-G Ratio 0.4 (L) 09/10/2022 12:00 PM    ALT (SGPT) <6 (L) 09/10/2022 12:00 PM     Assessment:      Dislodged feeding tube/peritonitis   Free air and perirectal fistula  S/p laparoscopy procedure; take down and removal of J tube, colostomy placement, and I & D of abdominal wall (9/10)  Recent right thoracotomy  Hx Bilateral BKAs   - afebrile, wbc 17.3->16     Blood cx (9/9, 9/16) no growth    Objective:      Continue IV Eraxis, zosyn, and vancomycin. Recommend to complete total 2 weeks, last dose 9/23, then complete 2 weeks with PO levofloxacin and flagyl (9/24 to 10/8). QTC 470ms on 9/15. Please recheck EKG prior to start on Levofloxacin. Fever work up if temp >= 100.4    Above plan of care discussed and agreed with Dr. Soo Santos    Pt will be seen as need. Please contact us with any questions or concerns.     Paco Andujar NP

## 2022-09-21 NOTE — DIALYSIS
Hemodialysis / [phone]    Vitals Pre Post Assessment Pre Post   BP BP: 123/78 (09/21/22 1035) 153/83 LOC AOX2 AOX2   HR Pulse (Heart Rate): 77 (09/21/22 1035) 94 Lungs CTA CTA   Resp Resp Rate: 16 (09/21/22 1035) 16   Cardiac REGULAR REGULAR   Temp Temp: 98.5 °F (36.9 °C) (09/21/22 1035) 98 Skin WARM DRY INTACT WARM DRY INTACT   Weight Pre-Dialysis Weight: 64.5 kg (142 lb 3.2 oz) (09/19/22 0925)  Edema none none   Tele status   Pain Pain Intensity 1: 4 (09/20/22 1333)      Orders   Duration: Start: Hemodialysis Start Time: 0136 (09/21/22 1035) End: 1340   Total:    Dialyzer: Dialyzer/Set Up Inspection: Dima Callahan (09/21/22 1035)   K Bath: Dialysate K (mEq/L): 3.5 (09/21/22 1035)   Ca Bath: Dialysate CA (mEq/L): 2.5 (09/21/22 1035)   Na: Dialysate NA (mEq/L): 138 (09/21/22 1035)   Bicarb: Dialysate HCO3 (mEq/L): 35 (09/21/22 1035)   Target Fluid Removal: Goal/Amount of Fluid to Remove (mL): 1000 mL (09/21/22 1035)     Access   Type & Location: Rij tunneled hd cvc-dressing cdi, +blood return, flushes with ease   Comments:                                        Labs   HBsAg (Antigen) / date: Hepatitis B surface Ag   Date Value Ref Range Status   09/12/2022 <0.10 Index Final                                       HBsAb (Antibody) / date: Hep B surface Ab Interp. Date Value Ref Range Status   09/12/2022 NONREACTIVE NR   Final     Comment:     (NOTE)  The [employer]aur Anti-HBs2 assay is traceable to the [address] [place] [address]) Hepatitis B Immunoglobulin 1st International   Reference Preparation (5167). Samples with a calculated value of 10   mIU/mL or greater are considered reactive (protective) in accordance   with the CDC guidelines. The accepted criteria for immunity to HBV is   anti-HBs activity greater than or equal to 10 mIU/mL, as defined by   the [facility] International Reference Preparation.   Assay performance has not been established in pregnant women,   patients who are immunosuppressed or immunocompromised, nor have   performance characteristics been established in conjunction with   other 's assays for specific HBV serologic markers. This   assay does not differentiate between vaccine induced immune response   and a response due to infection with HBV. Passively acquired anti-HBs   may be identified following patient transfusion, receipt of   immunoglobulin products, etc.        Source:    Obtained/Reviewed  Critical Results Called HGB   Date Value Ref Range Status   09/21/2022 7.7 (L) 12.1 - 17.0 g/dL Final     Potassium   Date Value Ref Range Status   09/21/2022 2.6 (LL) 3.5 - 5.1 mmol/L Final     Comment:     RESULTS VERIFIED, PHONED TO AND READ BACK BY  AUGUSTINE RN @0403/[facility]       Calcium   Date Value Ref Range Status   09/21/2022 7.5 (L) 8.5 - 10.1 MG/DL Final     BUN   Date Value Ref Range Status   09/21/2022 11 6 - 20 MG/DL Final     Creatinine   Date Value Ref Range Status   09/21/2022 2.33 (H) 0.70 - 1.30 MG/DL Final        Meds Given   Name Dose Route                    Adequacy / Fluid    Total Liters Process: 50   Net Fluid Removed: 1000ml      Comments   Time Out Done:   (Time) 1015   Admitting Diagnosis: tahmina   Consent obtained/signed: Informed Consent Verified: Yes (09/21/22 1035)   Machine / RO # Machine Number: Natalie Milton (09/21/22 1035)   Primary Nurse Rpt Pre: Rush Sanchez   Primary Nurse Rpt Post: Bren ORR   Pt Education: Procedural    Care Plan: hd   Pts outpatient clinic: na     Tx Summary   Comments: Tx progressed without incident. Patient tolerated well. All blood rinsed back to patient. writer left pts room with pt in no new acute distress, denying complaints, with stable vss, bed in lowest position with side rails upx3, wheels lockedx4, and call bell within reach.

## 2022-09-21 NOTE — PROGRESS NOTES
Nephrology Progress Note  Jignesh Cade  Date of Admission : 9/9/2022    CC:  Follow up for JEMIMA       Assessment and Plan     JEMIMA on HD:  - 2/2 ATN  - HD dependent MWF for now while here  - no signs of recovery yet  - PC placed 9/16  - HD now    Hypokalemia:  - 3.5K bath w/ HD    Anemia:  - MICHAEL MWF    PAD s/p b/l BKA    Recent sepsis    Empyema    ABD wall abscess:  - J-tube removal, colostomy, I&D of abd wound    Sacral decub       Interval History:  Seen and examined on dialysis. On 4k bath. Confused. No cp, sob reported. Awaiting placement. Current Medications: all current  Medications have been eviewed in EPIC  Review of Systems: Pertinent items are noted in HPI. Objective:  Vitals:    Vitals:    09/21/22 0400 09/21/22 0500 09/21/22 0851 09/21/22 1000   BP: (!) 182/60  (!) 195/98    Pulse: 64 72 70 72   Resp: 16  16    Temp: 98 °F (36.7 °C)  98.5 °F (36.9 °C)    TempSrc:       SpO2: 100%  100%    Weight:       Height:         Intake and Output:  No intake/output data recorded. 09/19 1901 - 09/21 0700  In: -   Out: 200     Physical Examination:  Pt intubated     no  General: NAD,Conversant   Neck:  Supple, no mass  Resp:  Lungs CTA B/L, no wheezing , normal respiratory effort  CV:  RRR,  no murmur or rub, no LE edema  GI:  Soft, NT, + Bowel sounds, + ostomy  Neurologic:  Non focal  Psych:             AAO x 3 appropriate affect  Skin:  No Rash  Access:           RIJ PC    []    High complexity decision making was performed  []    Patient is at high-risk of decompensation with multiple organ involvement    Lab Data Personally Reviewed: I have reviewed all the pertinent labs, microbiology data and radiology studies during assessment.     Recent Labs     09/21/22  0240 09/20/22  0430 09/19/22  0343    141 142   K 2.6* 2.8* 3.0*    106 108   CO2 27 28 25   GLU 82 99 91   BUN 11 8 16   CREA 2.33* 1.71* 2.78*   CA 7.5* 7.6* 7.4*   MG 1.6 1.6 1.7   PHOS 3.0 1.8* 2.5*       Recent Labs 09/21/22  0240 09/20/22  0430 09/19/22  0343   WBC 16.0* 17.3* 18.8*   HGB 7.7* 7.5* 7.1*   HCT 24.0* 24.2* 22.9*   * 646* 559*       No results found for: SDES  Lab Results   Component Value Date/Time    Culture result: NO GROWTH 4 DAYS 09/16/2022 08:45 PM    Culture result: NO GROWTH 6 DAYS 09/09/2022 08:52 PM     Recent Results (from the past 24 hour(s))   GLUCOSE, POC    Collection Time: 09/20/22 11:15 AM   Result Value Ref Range    Glucose (POC) 89 65 - 117 mg/dL    Performed by Radha Cervantes Nw, POC    Collection Time: 09/20/22  4:29 PM   Result Value Ref Range    Glucose (POC) 85 65 - 117 mg/dL    Performed by Maine Olivas RN    GLUCOSE, POC    Collection Time: 09/20/22 10:50 PM   Result Value Ref Range    Glucose (POC) 96 65 - 117 mg/dL    Performed by Cristal Holland  RN    CBC WITH AUTOMATED DIFF    Collection Time: 09/21/22  2:40 AM   Result Value Ref Range    WBC 16.0 (H) 4.1 - 11.1 K/uL    RBC 2.58 (L) 4.10 - 5.70 M/uL    HGB 7.7 (L) 12.1 - 17.0 g/dL    HCT 24.0 (L) 36.6 - 50.3 %    MCV 93.0 80.0 - 99.0 FL    MCH 29.8 26.0 - 34.0 PG    MCHC 32.1 30.0 - 36.5 g/dL    RDW 18.5 (H) 11.5 - 14.5 %    PLATELET 076 (H) 561 - 400 K/uL    MPV 9.3 8.9 - 12.9 FL    NRBC 0.0 0  WBC    ABSOLUTE NRBC 0.00 0.00 - 0.01 K/uL    NEUTROPHILS 78 (H) 32 - 75 %    LYMPHOCYTES 11 (L) 12 - 49 %    MONOCYTES 6 5 - 13 %    EOSINOPHILS 3 0 - 7 %    BASOPHILS 1 0 - 1 %    IMMATURE GRANULOCYTES 1 (H) 0.0 - 0.5 %    ABS. NEUTROPHILS 12.3 (H) 1.8 - 8.0 K/UL    ABS. LYMPHOCYTES 1.8 0.8 - 3.5 K/UL    ABS. MONOCYTES 1.0 0.0 - 1.0 K/UL    ABS. EOSINOPHILS 0.5 (H) 0.0 - 0.4 K/UL    ABS. BASOPHILS 0.2 (H) 0.0 - 0.1 K/UL    ABS. IMM.  GRANS. 0.2 (H) 0.00 - 0.04 K/UL    DF SMEAR SCANNED      PLATELET COMMENTS CLUMPED PLATELETS      RBC COMMENTS ANISOCYTOSIS  1+        RBC COMMENTS POLYCHROMASIA  PRESENT       METABOLIC PANEL, BASIC    Collection Time: 09/21/22  2:40 AM   Result Value Ref Range    Sodium 141 136 - 145 mmol/L    Potassium 2.6 (LL) 3.5 - 5.1 mmol/L    Chloride 106 97 - 108 mmol/L    CO2 27 21 - 32 mmol/L    Anion gap 8 5 - 15 mmol/L    Glucose 82 65 - 100 mg/dL    BUN 11 6 - 20 MG/DL    Creatinine 2.33 (H) 0.70 - 1.30 MG/DL    BUN/Creatinine ratio 5 (L) 12 - 20      GFR est AA 36 (L) >60 ml/min/1.73m2    GFR est non-AA 30 (L) >60 ml/min/1.73m2    Calcium 7.5 (L) 8.5 - 10.1 MG/DL   MAGNESIUM    Collection Time: 09/21/22  2:40 AM   Result Value Ref Range    Magnesium 1.6 1.6 - 2.4 mg/dL   PHOSPHORUS    Collection Time: 09/21/22  2:40 AM   Result Value Ref Range    Phosphorus 3.0 2.6 - 4.7 MG/DL   GLUCOSE, POC    Collection Time: 09/21/22  7:55 AM   Result Value Ref Range    Glucose (POC) 75 65 - 117 mg/dL    Performed by Ngozi Matos MD  [address]  Phone - [phone]   Fax - [phone]  [email or web address]

## 2022-09-21 NOTE — PROGRESS NOTES
Physical Therapy - defer  Chart reviewed in prep for therapy session, discussed with RN And OT. Pt reported to be hallucinating, unable to participate in therapy activity. Will follow.

## 2022-09-21 NOTE — PROGRESS NOTES
[facility] Adult  Hospitalist Group                                                                                          Hospitalist Progress Note  Murali Mcguire NP  Answering service: [phone] from in house phone        Date of Service:  2022  NAME:  Luis Green  :  1970  MRN:  [id number]      Admission Summary: This is a 50-year-old man with a past medical history significant for type 2 diabetes, who presented at the emergency room from [facility] with abdominal pain. The patient was recently admitted to another hospital and underwent left below-knee amputation. The hospitalization became complicated with respiratory failure which required prolonged intubation. The respiratory failure was attributed to aspiration pneumonia. The hospitalization was also complicated with lobulated effusion, for which the patient underwent decortication and right thoracotomy. The patient also went into acute renal failure for which he has been started on hemodialysis. J-tube was placed for supplemental nutrition. The patient was subsequently transferred to [facility] for continuation of care. He was doing relatively well in [facility] until the day of presentation at the emergency room when the patient complained of abdominal pain at the facility. CT scan of the abdomen and pelvis was obtained. The CT scan shows evidence of bowel perforation. The patient was then sent to [facility] emergency room for further evaluation. When the patient arrived at the emergency room, based on his clinical presentation and lab work, Code Sepsis was triggered. The patient received fluid therapy as per sepsis protocol. The emergency room physician consulted general surgeon on-call. The patient was seen by the surgeon and the patient is planned for immediate surgical intervention.   No record of prior admission to this hospital, but the patient was recently admitted to another hospital.    Interval history / Subjective:   I saw the patient this morning on rounds. With some complaints of pain this morning. Nursing reports he has been hallucinating overnight. Per medical record he had been hallucinating the night before due to IV hydromorphone       Assessment & Plan:     Intractable abdominal pain  Pneumoperitoneum  Abdominal wall abscess  Severe sepsis  Rectal fistula:   -s/p 9/10 LAPAROSCOPY GENERAL DIAGNOSTIC, LAPAROSCOPIC  TAKEDOWN AND REMOVAL J-TUBE, LAPAROSCOPIC COLOSTOMY, I & D ABDOMINAL WALL   -Cultures no growth   -repeat CT abd 9/14 improvement in rectal thickening  -Persistent leukocytosis, etiology possibly from procedure v below   -Went to OR with CRS and general surgery 9/15 for anorectal examination under anesthesia including rigid proctosignmoidoscopy, leukocytosis may be 2/2 recent procedure   Weaning off hydromorphone, starting oxycodone for pain.   Leaving on the IV hydromorphone for breakthrough pain  Has been evaluated by infectious disease, appreciate recommendations  Continuing PIP Yrn  Continuing vancomycin  Continuing anidulafungin          A fib  Atrial fibrillation  Has been evaluated by cardiology, appreciate your assistance  Heart rate currently controlled  Continuing apixaban        Possible empyema  -H/o recent thoracotomy and prolonged intubation at [facility]  9/19 with persistent right basilar airspace disease and right sided loculated effusion/empyema  Pulmonary following  Continuing the aforementioned antibiotics        B/L AKA   Wound specialist following  To follow-up with previous surgeon as outpatient  No need for surgical intervention at this time        JEMIMA now on HD  Hemodialysis Monday Wednesday Friday  Nephrology following  Avoiding nephrotoxins        FOBT positive   Hemoglobin stable  Continuing pantoprazole        Hypokalemia:   Potassium low, 2.6 today  Has been repleted with oral in the early morning hours  Will check another potassium later and replete as appropriate      Acute blood loss Anemia:   s/p 1 unit PRBC 9/10. Hemoglobin currently stable, 7.7 we will transfuse for hemoglobin less than 7      HTN:   Blood pressure elevated  Adding as needed hydralazine    Type 2 diabetes  Blood sugars currently controlled  SSI          Code status: full  Prophylaxis: SCDs     Care Plan discussed with: patient, nurse    Anticipated Disposition: Accepted at [facility], needs OP HD chair      Hospital Problems  Date Reviewed: 9/10/2022            Codes Class Noted POA    Injury to rectum without open wound into cavity ICD-10-CM: S36.60XA  ICD-9-CM: 863.45  9/20/2022 Yes        Open wound of anus ICD-10-CM: F61.033C  ICD-9-CM: 863.89  9/20/2022 Yes        Severe protein-calorie malnutrition ([facility] [address] ICD-10-CM: E43  ICD-9-CM: 814  9/13/2022 Yes        * (Principal) Intra-abdominal infection ICD-10-CM: B99.9  ICD-9-CM: 136.9  9/10/2022 Yes       Review of Systems:   A comprehensive review of systems was negative except for that written in the HPI. Vital Signs:    Last 24hrs VS reviewed since prior progress note. Most recent are:  Visit Vitals  /79   Pulse 84   Temp 98.5 °F (36.9 °C)   Resp 16   Ht 5' 7\" (1.702 m)   Wt 69 kg (152 lb 1.9 oz)   SpO2 100%   BMI 23.82 kg/m²         Intake/Output Summary (Last 24 hours) at 9/21/2022 1107  Last data filed at 9/20/2022 1201  Gross per 24 hour   Intake --   Output 50 ml   Net -50 ml          Physical Examination:     I had a face to face encounter with this patient and independently examined them on 9/21/2022 as outlined below:          Constitutional:  No acute distress, endorses mild pain   ENT:  Oral mucosa moist, oropharynx benign. Resp:  CTA bilaterally. No wheezing/rhonchi/rales. No accessory muscle use. CV:  Regular rhythm, normal rate, no murmurs, gallops, rubs    GI:  Colostomy. Bandages clean and dry. Mild tenderness to palpation.     Musculoskeletal:  No edema, warm, 2+ pulses throughout    Neurologic:  Moves all extremities. AAOx3, CN II-XII reviewed            Data Review:    Review and/or order of clinical lab test  Review and/or order of tests in the radiology section of CPT  Review and/or order of tests in the medicine section of CPT      Labs:     Recent Labs     09/21/22 0240 09/20/22  0430   WBC 16.0* 17.3*   HGB 7.7* 7.5*   HCT 24.0* 24.2*   * 646*       Recent Labs     09/21/22  0240 09/20/22  0430 09/19/22  0343    141 142   K 2.6* 2.8* 3.0*    106 108   CO2 27 28 25   BUN 11 8 16   CREA 2.33* 1.71* 2.78*   GLU 82 99 91   CA 7.5* 7.6* 7.4*   MG 1.6 1.6 1.7   PHOS 3.0 1.8* 2.5*       No results for input(s): ALT, AP, TBIL, TBILI, TP, ALB, GLOB, GGT, AML, LPSE in the last 72 hours. No lab exists for component: SGOT, GPT, AMYP, HLPSE    No results for input(s): INR, PTP, APTT, INREXT, INREXT in the last 72 hours. No results for input(s): FE, TIBC, PSAT, FERR in the last 72 hours. Lab Results   Component Value Date/Time    Folate 4.1 (L) 09/10/2022 12:00 PM        No results for input(s): PH, PCO2, PO2 in the last 72 hours. No results for input(s): CPK, CKNDX, TROIQ in the last 72 hours.     No lab exists for component: CPKMB  No results found for: CHOL, CHOLX, CHLST, CHOLV, HDL, HDLP, LDL, LDLC, DLDLP, TGLX, TRIGL, TRIGP, CHHD, CHHDX  Lab Results   Component Value Date/Time    Glucose (POC) 75 09/21/2022 07:55 AM    Glucose (POC) 96 09/20/2022 10:50 PM    Glucose (POC) 85 09/20/2022 04:29 PM    Glucose (POC) 89 09/20/2022 11:15 AM    Glucose (POC) 89 09/19/2022 09:55 PM     No results found for: COLOR, APPRN, SPGRU, REFSG, WILL, PROTU, GLUCU, KETU, BILU, UROU, MISHA, LEUKU, GLUKE, EPSU, BACTU, WBCU, RBCU, CASTS, UCRY      Medications Reviewed:     Current Facility-Administered Medications   Medication Dose Route Frequency    hydrALAZINE (APRESOLINE) 20 mg/mL injection 10 mg  10 mg IntraVENous Q6H PRN    oxyCODONE IR (ROXICODONE) tablet 10 mg  10 mg Oral Q4H PRN    [Held by provider] HYDROmorphone (DILAUDID) injection 0.75 mg  0.75 mg IntraVENous Q2H PRN    mirtazapine (REMERON) tablet 7.5 mg  7.5 mg Oral QHS    pantoprazole (PROTONIX) tablet 40 mg  40 mg Oral ACB&D    apixaban (ELIQUIS) tablet 5 mg  5 mg Oral BID    [Held by provider] HYDROmorphone (DILAUDID) 1 mg/mL oral solution 4 mg  4 mg Oral Q4HWA    anidulafungin (ERAXIS) 100 mg in 0.9% sodium chloride 130 mL IVPB  100 mg IntraVENous Q24H    sertraline (ZOLOFT) tablet 25 mg  25 mg Oral DAILY    epoetin vera-epbx (RETACRIT) injection 10,000 Units  10,000 Units SubCUTAneous DIALYSIS MON, WED & FRI    piperacillin-tazobactam (ZOSYN) 3.375 g in 0.9% sodium chloride (MBP/ADV) 100 mL MBP  3.375 g IntraVENous Q12H    collagenase (SANTYL) 250 unit/gram ointment   Topical DAILY    0.9% sodium chloride infusion 250 mL  250 mL IntraVENous PRN    diphenhydrAMINE (BENADRYL) injection 12.5 mg  12.5 mg IntraVENous Q6H PRN    sodium chloride (NS) flush 5-40 mL  5-40 mL IntraVENous Q8H    sodium chloride (NS) flush 5-40 mL  5-40 mL IntraVENous PRN    acetaminophen (TYLENOL) tablet 650 mg  650 mg Oral Q6H PRN    Or    acetaminophen (TYLENOL) suppository 650 mg  650 mg Rectal Q6H PRN    polyethylene glycol (MIRALAX) packet 17 g  17 g Oral DAILY PRN    ondansetron (ZOFRAN ODT) tablet 4 mg  4 mg Oral Q8H PRN    Or    ondansetron (ZOFRAN) injection 4 mg  4 mg IntraVENous Q6H PRN    L.acidophilus-paracasei-S.thermophil-bifidobacter (RISAQUAD) 8 billion cell capsule  1 Capsule Oral DAILY    insulin lispro (HUMALOG) injection   SubCUTAneous AC&HS    glucose chewable tablet 16 g  4 Tablet Oral PRN    glucagon (GLUCAGEN) injection 1 mg  1 mg IntraMUSCular PRN    dextrose 10 % infusion 0-250 mL  0-250 mL IntraVENous PRN    Vancomycin - pharmacy to dose   Other Rx Dosing/Monitoring    0.9% sodium chloride infusion 250 mL  250 mL IntraVENous PRN     ______________________________________________________________________  EXPECTED LENGTH OF STAY: 9d 14h  ACTUAL LENGTH OF STAY:          [address], NP

## 2022-09-21 NOTE — PROGRESS NOTES
CAROLYN:  RUR: 12%    Disposition:     Admit to [facility]  (with HD onsite )when medically stable  Community HD with [facility]          (chair has been established)    Chart reviewed. Patient has been approved for community HD chair with Bob Gr. Chair time is 3:15pm  M-W-F. Start date is  9/26/22 with appt scheduled for 3pm.     CM contacting [facility] [place] [facility] [phone]). 9:18a  Message left to  to have call returned. CM continuing to follow.     Christina Goodson, [address], [address]

## 2022-09-21 NOTE — PROGRESS NOTES
Occupational Therapy    Chart reviewed, spoke to RN. Pt currently on bedside HD. Will defer and follow up as able. ADRIANE Donohue  Regarding student involvement in patient care:  A student participated in this treatment session. Per CMS Medicare statements and AOTA guidelines I certify that the following was true:  1. I was present and directly observed the entire session. 2. I made all skilled judgments and clinical decisions regarding care. 3. I am the practitioner responsible for assessment, treatment, and documentation.

## 2022-09-21 NOTE — PROGRESS NOTES
1600- Bedside report received. Assessment performed. Patient intermittently confused. 1740- Patient's red port is difficult to flush and will not aspirate. YFN Funk NP for TPA. 1900- No response from NP. Paged Dr. Susan Irvin. Patient repositioned and incontinence care performed. 2000- Bedside and Verbal shift change report given to ONEL RN (oncoming nurse) by Rohan Delgado RN (offgoing nurse). Report given with SBAR, Kardex, Intake/Output and MAR. Discussed with Rahel that red port does not draw back and this nurse has paged 2 separate providers to get orders.

## 2022-09-22 LAB
ANION GAP SERPL CALC-SCNC: 11 MMOL/L (ref 5–15)
BASOPHILS # BLD: 0.2 K/UL (ref 0–0.1)
BASOPHILS NFR BLD: 1 % (ref 0–1)
BUN SERPL-MCNC: 8 MG/DL (ref 6–20)
BUN/CREAT SERPL: 4 (ref 12–20)
CALCIUM SERPL-MCNC: 7.7 MG/DL (ref 8.5–10.1)
CHLORIDE SERPL-SCNC: 106 MMOL/L (ref 97–108)
CO2 SERPL-SCNC: 23 MMOL/L (ref 21–32)
CREAT SERPL-MCNC: 1.88 MG/DL (ref 0.7–1.3)
DIFFERENTIAL METHOD BLD: ABNORMAL
EOSINOPHIL # BLD: 0.2 K/UL (ref 0–0.4)
EOSINOPHIL NFR BLD: 1 % (ref 0–7)
ERYTHROCYTE [DISTWIDTH] IN BLOOD BY AUTOMATED COUNT: 19.8 % (ref 11.5–14.5)
GLUCOSE BLD STRIP.AUTO-MCNC: 113 MG/DL (ref 65–117)
GLUCOSE BLD STRIP.AUTO-MCNC: 61 MG/DL (ref 65–117)
GLUCOSE BLD STRIP.AUTO-MCNC: 64 MG/DL (ref 65–117)
GLUCOSE BLD STRIP.AUTO-MCNC: 65 MG/DL (ref 65–117)
GLUCOSE BLD STRIP.AUTO-MCNC: 70 MG/DL (ref 65–117)
GLUCOSE BLD STRIP.AUTO-MCNC: 70 MG/DL (ref 65–117)
GLUCOSE BLD STRIP.AUTO-MCNC: 82 MG/DL (ref 65–117)
GLUCOSE BLD STRIP.AUTO-MCNC: 98 MG/DL (ref 65–117)
GLUCOSE SERPL-MCNC: 75 MG/DL (ref 65–100)
HCT VFR BLD AUTO: 27.5 % (ref 36.6–50.3)
HGB BLD-MCNC: 8.7 G/DL (ref 12.1–17)
IMM GRANULOCYTES # BLD AUTO: 0.2 K/UL (ref 0–0.04)
IMM GRANULOCYTES NFR BLD AUTO: 1 % (ref 0–0.5)
LYMPHOCYTES # BLD: 1.5 K/UL (ref 0.8–3.5)
LYMPHOCYTES NFR BLD: 10 % (ref 12–49)
MAGNESIUM SERPL-MCNC: 1.7 MG/DL (ref 1.6–2.4)
MCH RBC QN AUTO: 29.9 PG (ref 26–34)
MCHC RBC AUTO-ENTMCNC: 31.6 G/DL (ref 30–36.5)
MCV RBC AUTO: 94.5 FL (ref 80–99)
MONOCYTES # BLD: 1.1 K/UL (ref 0–1)
MONOCYTES NFR BLD: 7 % (ref 5–13)
NEUTS SEG # BLD: 12.2 K/UL (ref 1.8–8)
NEUTS SEG NFR BLD: 80 % (ref 32–75)
NRBC # BLD: 0 K/UL (ref 0–0.01)
NRBC BLD-RTO: 0 PER 100 WBC
PHOSPHATE SERPL-MCNC: 2.7 MG/DL (ref 2.6–4.7)
PLATELET # BLD AUTO: 815 K/UL (ref 150–400)
PLATELET COMMENTS,PCOM: ABNORMAL
PMV BLD AUTO: 9.1 FL (ref 8.9–12.9)
POTASSIUM SERPL-SCNC: 3.8 MMOL/L (ref 3.5–5.1)
RBC # BLD AUTO: 2.91 M/UL (ref 4.1–5.7)
RBC MORPH BLD: ABNORMAL
SERVICE CMNT-IMP: ABNORMAL
SERVICE CMNT-IMP: ABNORMAL
SERVICE CMNT-IMP: NORMAL
SODIUM SERPL-SCNC: 140 MMOL/L (ref 136–145)
WBC # BLD AUTO: 15.4 K/UL (ref 4.1–11.1)

## 2022-09-22 PROCEDURE — 65270000046 HC RM TELEMETRY

## 2022-09-22 PROCEDURE — 74011000250 HC RX REV CODE- 250: Performed by: COLON & RECTAL SURGERY

## 2022-09-22 PROCEDURE — 85025 COMPLETE CBC W/AUTO DIFF WBC: CPT

## 2022-09-22 PROCEDURE — 74011250636 HC RX REV CODE- 250/636: Performed by: COLON & RECTAL SURGERY

## 2022-09-22 PROCEDURE — 97530 THERAPEUTIC ACTIVITIES: CPT

## 2022-09-22 PROCEDURE — 74011250637 HC RX REV CODE- 250/637: Performed by: STUDENT IN AN ORGANIZED HEALTH CARE EDUCATION/TRAINING PROGRAM

## 2022-09-22 PROCEDURE — 74011250637 HC RX REV CODE- 250/637: Performed by: COLON & RECTAL SURGERY

## 2022-09-22 PROCEDURE — 74011250637 HC RX REV CODE- 250/637: Performed by: NURSE PRACTITIONER

## 2022-09-22 PROCEDURE — 82962 GLUCOSE BLOOD TEST: CPT

## 2022-09-22 PROCEDURE — 74011000258 HC RX REV CODE- 258: Performed by: COLON & RECTAL SURGERY

## 2022-09-22 PROCEDURE — 80048 BASIC METABOLIC PNL TOTAL CA: CPT

## 2022-09-22 PROCEDURE — 84100 ASSAY OF PHOSPHORUS: CPT

## 2022-09-22 PROCEDURE — 83735 ASSAY OF MAGNESIUM: CPT

## 2022-09-22 PROCEDURE — 36415 COLL VENOUS BLD VENIPUNCTURE: CPT

## 2022-09-22 RX ADMIN — MIRTAZAPINE 7.5 MG: 15 TABLET, FILM COATED ORAL at 21:15

## 2022-09-22 RX ADMIN — SERTRALINE 25 MG: 50 TABLET, FILM COATED ORAL at 08:31

## 2022-09-22 RX ADMIN — Medication 16 G: at 21:49

## 2022-09-22 RX ADMIN — Medication 1 CAPSULE: at 08:31

## 2022-09-22 RX ADMIN — PIPERACILLIN AND TAZOBACTAM 3.38 G: 3; .375 INJECTION, POWDER, FOR SOLUTION INTRAVENOUS at 18:17

## 2022-09-22 RX ADMIN — SODIUM CHLORIDE, PRESERVATIVE FREE 10 ML: 5 INJECTION INTRAVENOUS at 06:56

## 2022-09-22 RX ADMIN — PIPERACILLIN AND TAZOBACTAM 3.38 G: 3; .375 INJECTION, POWDER, FOR SOLUTION INTRAVENOUS at 06:56

## 2022-09-22 RX ADMIN — OXYCODONE 10 MG: 5 TABLET ORAL at 21:15

## 2022-09-22 RX ADMIN — DEXTROSE MONOHYDRATE 125 ML: 100 INJECTION, SOLUTION INTRAVENOUS at 12:37

## 2022-09-22 RX ADMIN — COLLAGENASE SANTYL: 250 OINTMENT TOPICAL at 08:32

## 2022-09-22 RX ADMIN — Medication 16 G: at 12:17

## 2022-09-22 RX ADMIN — APIXABAN 5 MG: 5 TABLET, FILM COATED ORAL at 21:15

## 2022-09-22 RX ADMIN — APIXABAN 5 MG: 5 TABLET, FILM COATED ORAL at 08:31

## 2022-09-22 RX ADMIN — SODIUM CHLORIDE, PRESERVATIVE FREE 10 ML: 5 INJECTION INTRAVENOUS at 22:15

## 2022-09-22 RX ADMIN — PANTOPRAZOLE SODIUM 40 MG: 40 TABLET, DELAYED RELEASE ORAL at 06:56

## 2022-09-22 RX ADMIN — SODIUM CHLORIDE 100 MG: 9 INJECTION, SOLUTION INTRAVENOUS at 18:18

## 2022-09-22 NOTE — PROGRESS NOTES
[facility] Adult  Hospitalist Group                                                                                          Hospitalist Progress Note  Lorna Thomas NP  Answering service: [phone] from in house phone        Date of Service:  2022  NAME:  Karli Matthew  :  1970  MRN:  [id number]      Admission Summary: This is a 54-year-old man with a PMH of T2DM, BKA bilaterally, JEMIMA on CKD requiring HD, Respiratory Failure requiring intubation who presented at the ED from [facility] with c/o abdominal pain. The patient was recently admitted to another hospital and underwent left below-knee amputation, hospitalization complicated with respiratory failure which required prolonged intubation- attributed to aspiration pneumonia, also developed lobulated effusion, for which the patient underwent decortication and right thoracotomy and acute renal failure for which he has been started on hemodialysis. He had a J-tube was placed for supplemental nutrition and was transferred to [facility] for continuation of care. He was doing relatively well in [facility] until the day of presentation at the ED when the patient complained of abdominal pain at the facility. CT scan of the abdomen and pelvis was obtained: shows evidence of bowel perforation, sent to [facility] fro further eval/tx. When the patient arrived at the emergency room, based on his clinical presentation and lab work, Code Sepsis was triggered. The patient received fluid therapy as per sepsis protocol. The emergency room physician consulted general surgeon on-call. The patient was seen by the surgeon and the patient is planned for immediate surgical intervention. No record of prior admission to this hospital.     Interval history / Subjective: Follow-up for issues listed below.   -Patient seen and examined, stable.       Assessment & Plan:     Intractable Abdominal Pain/Acute GIB, guaiac positive /Anorectal wound/Diarrhea/Free Air and Perirectal Fistula:    -PPI    -started on vancomycin and Zosyn  -Gen Sx: 9/10 Lap take down and removal J-tube, Lap colostomy, I&D abdominal wall  -Colorectal Sx: 9/15 anorectal wound exam including rigid proctosigmoidoscopy  -IV ABT Vanc, Zosyn, Eraxis  -monitor WBC  -ID following: plan is to continue Augmentin x2 weeks out patient.  -C Diff negative 9/10  -BCNGTD  -PPI  -CT chest 9/10: Small right basilar gas and fluid containing pleural collection with a  peripheral soft tissue rind. Finding may represent an empyema and clinical correlation is recommended. Recommend direct comparison to any additional prior imaging. Free intraperitoneal gas, seen on prior CT of the abdomen and pelvis. -CT abd/pel wo 9/14:No focal intra-abdominal fluid collection to suggest abscess. Free intraperitoneal gas consistent with recent intra-abdominal surgery. Interval improvement in rectal thickening. Prior CT dated September 9, 2022 demonstrated a probable right posterior rectal wall defect which is no longer visualized on today's examination. Persistent, small right basilar gas and fluid containing pleural collection, unchanged. Severe sepsis without septic shock: intra-abdominal infection likely source. -IV ABT's  -ID following  -Cultures    JEMIMA on CKD on dialysis: CVC 9/16/22- IR.  -renally dose meds  -HD MWF  -Nephrology following  -creatinine improved  -Renal US 9/11: No hydronephrosis or stones.  -Electrolyte Disturbances: monitor replete/treat as needed    T2DM: hypoglycemia  -ISS  -BGL ac&hs  -Hga1c:   -DM care team consult    AFIB: rate stable. -EKG 9/19: AFIB. Empyema: noted: recent thoracotomy and prolonged intubation at [facility]. -CXR9/19: Persistent right basilar airspace disease and right-sided loculated effusion/empyema. -CT chest 9/10: Small right basilar gas and fluid containing pleural collection with a  peripheral soft tissue rind.  Finding may represent an empyema and clinical correlation is recommended. Recommend direct comparison to any additional prior imaging. Free intraperitoneal gas, seen on prior CT of the abdomen and pelvis. -Pulmonary following  -IV ABT's    Acute blood loss anemia on chronic:  -monitor Hgb  -transfuse to keep Hgb > 7.0    Wounds: Bilateral BKA, open wound to LLE stump, noted recurrent infections. -wound care following  -IV ABT's  -Vascular Sx following    Thrombocytosis: noted reactive thrombocytosis. -monitor platelets      DVTppx: eliquis  GIppx: Pepcid  Code Status: Full Code  Diet: Regular, supplements BID  Activity: bedrest  Discharge: TBD  Ambulates:  nonambulatory  Discharge planning: Accepted at [facility] OP HD chair      Hospital Problems  Date Reviewed: 9/10/2022            Codes Class Noted POA    Injury to rectum without open wound into cavity ICD-10-CM: S36.60XA  ICD-9-CM: 863.45  9/20/2022 Yes        Open wound of anus ICD-10-CM: E55.315O  ICD-9-CM: 863.89  9/20/2022 Yes        Severe protein-calorie malnutrition ([address] ICD-10-CM: E43  ICD-9-CM: 040  9/13/2022 Yes        * (Principal) Intra-abdominal infection ICD-10-CM: B99.9  ICD-9-CM: 136.9  9/10/2022 Yes             Review of Systems:   A comprehensive review of systems was negative except for that written in the HPI. Vital Signs:    Last 24hrs VS reviewed since prior progress note. Most recent are:  Visit Vitals  BP (!) 170/139   Pulse 85   Temp 97.8 °F (36.6 °C)   Resp 18   Ht 5' 7\" (1.702 m)   Wt 68 kg (149 lb 14.6 oz)   SpO2 100%   BMI 23.48 kg/m²         Intake/Output Summary (Last 24 hours) at 9/22/2022 1028  Last data filed at 9/21/2022 1340  Gross per 24 hour   Intake --   Output 1000 ml   Net -1000 ml        Physical Examination:     I had a face to face encounter with this patient and independently examined them on 9/22/2022 as outlined below:          Constitutional:  No acute distress. ENT:  Oral mucosa moist.    Resp:  CTA bilaterally. No wheezing/rhonchi/rales. No accessory muscle use. CV:  Regular rate, S1,S2.    GI/:  Soft, non distended, non tender, no guarding, BS present, colostomy patent. Voids Freely. Musculoskeletal:  No edema, warm, bilateral BKA: open wounds to LLE stunp. Neurologic:  Moves all extremities. Awake and alert, disoriented. Skin:  multiple wounds, skin w/d otherwise. Psych:  Poor insight, Not anxious nor agitated. Data Review:    Review and/or order of clinical lab test      Labs:     Recent Labs     09/22/22 0424 09/21/22  0240   WBC 15.4* 16.0*   HGB 8.7* 7.7*   HCT 27.5* 24.0*   * 670*     Recent Labs     09/22/22 0424 09/21/22 0240 09/20/22  0430    141 141   K 3.8 2.6* 2.8*    106 106   CO2 23 27 28   BUN 8 11 8   CREA 1.88* 2.33* 1.71*   GLU 75 82 99   CA 7.7* 7.5* 7.6*   MG 1.7 1.6 1.6   PHOS 2.7 3.0 1.8*     No results for input(s): ALT, AP, TBIL, TBILI, TP, ALB, GLOB, GGT, AML, LPSE in the last 72 hours. No lab exists for component: SGOT, GPT, AMYP, HLPSE  No results for input(s): INR, PTP, APTT, INREXT in the last 72 hours. No results for input(s): FE, TIBC, PSAT, FERR in the last 72 hours. Lab Results   Component Value Date/Time    Folate 4.1 (L) 09/10/2022 12:00 PM      No results for input(s): PH, PCO2, PO2 in the last 72 hours. No results for input(s): CPK, CKNDX, TROIQ in the last 72 hours.     No lab exists for component: CPKMB  No results found for: CHOL, CHOLX, CHLST, CHOLV, HDL, HDLP, LDL, LDLC, DLDLP, TGLX, TRIGL, TRIGP, CHHD, CHHDX  Lab Results   Component Value Date/Time    Glucose (POC) 70 09/22/2022 06:41 AM    Glucose (POC) 70 09/22/2022 06:40 AM    Glucose (POC) 75 09/21/2022 09:15 PM    Glucose (POC) 67 09/21/2022 09:10 PM    Glucose (POC) 73 09/21/2022 05:18 PM     No results found for: COLOR, APPRN, SPGRU, REFSG, WILL, PROTU, GLUCU, KETU, BILU, UROU, MISHA, LEUKU, GLUKE, EPSU, BACTU, WBCU, RBCU, CASTS, UCRY      Medications Reviewed:     Current Facility-Administered Medications   Medication Dose Route Frequency    hydrALAZINE (APRESOLINE) 20 mg/mL injection 10 mg  10 mg IntraVENous Q6H PRN    oxyCODONE IR (ROXICODONE) tablet 10 mg  10 mg Oral Q4H PRN    [Held by provider] HYDROmorphone (DILAUDID) injection 0.75 mg  0.75 mg IntraVENous Q2H PRN    mirtazapine (REMERON) tablet 7.5 mg  7.5 mg Oral QHS    pantoprazole (PROTONIX) tablet 40 mg  40 mg Oral ACB&D    apixaban (ELIQUIS) tablet 5 mg  5 mg Oral BID    [Held by provider] HYDROmorphone (DILAUDID) 1 mg/mL oral solution 4 mg  4 mg Oral Q4HWA    anidulafungin (ERAXIS) 100 mg in 0.9% sodium chloride 130 mL IVPB  100 mg IntraVENous Q24H    sertraline (ZOLOFT) tablet 25 mg  25 mg Oral DAILY    epoetin vera-epbx (RETACRIT) injection 10,000 Units  10,000 Units SubCUTAneous DIALYSIS MON, WED & FRI    piperacillin-tazobactam (ZOSYN) 3.375 g in 0.9% sodium chloride (MBP/ADV) 100 mL MBP  3.375 g IntraVENous Q12H    collagenase (SANTYL) 250 unit/gram ointment   Topical DAILY    0.9% sodium chloride infusion 250 mL  250 mL IntraVENous PRN    diphenhydrAMINE (BENADRYL) injection 12.5 mg  12.5 mg IntraVENous Q6H PRN    sodium chloride (NS) flush 5-40 mL  5-40 mL IntraVENous Q8H    sodium chloride (NS) flush 5-40 mL  5-40 mL IntraVENous PRN    acetaminophen (TYLENOL) tablet 650 mg  650 mg Oral Q6H PRN    Or    acetaminophen (TYLENOL) suppository 650 mg  650 mg Rectal Q6H PRN    polyethylene glycol (MIRALAX) packet 17 g  17 g Oral DAILY PRN    ondansetron (ZOFRAN ODT) tablet 4 mg  4 mg Oral Q8H PRN    Or    ondansetron (ZOFRAN) injection 4 mg  4 mg IntraVENous Q6H PRN    L.acidophilus-paracasei-S.thermophil-bifidobacter (RISAQUAD) 8 billion cell capsule  1 Capsule Oral DAILY    insulin lispro (HUMALOG) injection   SubCUTAneous AC&HS    glucose chewable tablet 16 g  4 Tablet Oral PRN    glucagon (GLUCAGEN) injection 1 mg  1 mg IntraMUSCular PRN    dextrose 10 % infusion 0-250 mL  0-250 mL IntraVENous PRN    Vancomycin - pharmacy to dose   Other Rx Dosing/Monitoring    0.9% sodium chloride infusion 250 mL  250 mL IntraVENous PRN     ______________________________________________________________________  EXPECTED LENGTH OF STAY: 9d 14h  ACTUAL LENGTH OF STAY:          12                 Cass Amaro NP

## 2022-09-22 NOTE — PROGRESS NOTES
Problem: Self Care Deficits Care Plan (Adult)  Goal: *Acute Goals and Plan of Care (Insert Text)  Description: FUNCTIONAL STATUS PRIOR TO ADMISSION: Prior to previous hospitalization, d/c to [facility], and current admission to [address] [place] [address] pt was utilizing w/c for functional mobility and required minimal assistance for transfers. HOME SUPPORT: The patient lived with his wife and daughter and required assistance for all ADL/IADL tasks. Occupational Therapy Goals  Initiated 9/15/2022, OT weekly re-assessment 9/22  1. Patient will perform basic grooming in unsupported sitting with minimal assistance/contact guard assist within 7 day(s). (Continue 9/22)  2. Patient will perform anterior neck to thigh bathing while in unsupported sitting with minimal assistance/contact guard assist within 7 day(s). (Downgrade to mod A 9/22)  3. Patient will perform drop arm BSC via lateral transfers with moderate assistance within 7 day(s). (Continue 9/22)  4. Patient will perform all aspects of toileting with moderate assistance  within 7 day(s). (At bed level 9/22)  5. Patient will participate in upper extremity therapeutic exercise/activities with supervision/set-up for 10 minutes within 7 day(s). (Continue 9/22)      Outcome: Not Met    OCCUPATIONAL THERAPY TREATMENT/WEEKLY RE-ASSESSMENT  Patient: Lillian Neves (43 y.o. male)  Date: 9/22/2022  Diagnosis: Intra-abdominal infection [B99.9] Intra-abdominal infection  Procedure(s) (LRB):  EUA/ PROCTOSIGMOIDOSCOPY (N/A) 7 Days Post-Op  Precautions: Fall, Other (comment) (bilateral BKA)  Chart, occupational therapy assessment, plan of care, and goals were reviewed. ASSESSMENT  Patient continues with skilled OT services and is not progressing towards goals. Pt received in side lying with RN at bedside who was performing linen change and colostomy bag change 2* leakage.  Pt required min to mod A for rolling L/R pending on pain variability, mod A to don new gown in R side lying and total A for hygiene. Pt remains limited in progress by sacral pain, inability to tolerate sitting, confusion and need for assist at bed level for ADLs. Will attempt EOB  next session and recommend pt being pre-medicated prior to therapy. Recommend SNF rehab at this time. Current Level of Function Impacting Discharge (ADLs): setup to total A ADLs, min to mod A for rolling    Other factors to consider for discharge: B BKA         PLAN :  Goals have been updated based on progression since last assessment. Patient continues to benefit from skilled intervention to address the above impairments. Continue to follow patient 3 times a week to address goals. Recommend with staff: encourage A WITH     Recommend next OT session: EOB/ADLs, therapeutic exercises UE    Recommendation for discharge: (in order for the patient to meet his/her long term goals)  Therapy up to 5 days/week in SNF setting    This discharge recommendation:  A follow-up discussion with the attending provider and/or case management is planned    IF patient discharges home will need the following DME: TBD       SUBJECTIVE:   Patient stated I like to race cars. I used to have a mustang.     OBJECTIVE DATA SUMMARY:   Cognitive/Behavioral Status:  Neurologic State: Alert;Confused  Orientation Level: Oriented to person;Disoriented to situation;Disoriented to place; Disoriented to time  Cognition: Decreased command following;Decreased attention/concentration; Impulsive             Functional Mobility and Transfers for ADLs:  Bed Mobility:  Rolling: Minimum assistance; Moderate assistance (variable d/t pain)  Scooting: Total assistance;Assist x2        ADL Intervention:                 Type of Bath: Chlorhexidine (CHG)         Upper Body [address]: Moderate assistance         Toileting  Bowel Hygiene:  Total assistance (dependent) (colostomy)           Pain:  Pain at sacral site    Activity Tolerance:   Good    After treatment patient left in no apparent distress:   Supine in bed, Heels elevated for pressure relief, Patient positioned in L sidelying for pressure relief, Call bell within reach, and Caregiver / family present    COMMUNICATION/COLLABORATION:   The patients plan of care was discussed with: Physical therapist and Registered nurse.      Zunilda Cornejo OT  Time Calculation: 11 mins

## 2022-09-22 NOTE — PROGRESS NOTES
Nephrology Progress Note  Carlie Flower  Date of Admission : 9/9/2022    CC:  Follow up for JEMIMA       Assessment and Plan     JEMIMA on HD:  - 2/2 ATN  - HD dependent MWF for now while here  - no signs of recovery yet  - PC placed 9/16  - HD tomorrow     Hypokalemia:  - 3.5K bath w/ HD    Anemia:  - MICHAEL MWF    PAD s/p b/l BKA    Recent sepsis    Empyema    ABD wall abscess:  - J-tube removal, colostomy, I&D of abd wound    Sacral decub       Interval History:  Seen and examined. Anel not remember having HD. Confused and unable to get any ROS    Current Medications: all current  Medications have been eviewed in EPIC  Review of Systems: Review of systems not obtained due to patient factors. Objective:  Vitals:    Vitals:    09/22/22 0505 09/22/22 0600 09/22/22 0825 09/22/22 1011   BP: (!) 158/82  (!) 170/139    Pulse: 83 88 89 85   Resp: 18  18    Temp: 97.9 °F (36.6 °C)  97.8 °F (36.6 °C)    TempSrc:       SpO2: 100%  100%    Weight:       Height:         Intake and Output:  No intake/output data recorded. 09/20 1901 - 09/22 0700  In: -   Out: 1000     Physical Examination:  General: Confused   Neck:  Supple, no mass  Resp:  Lungs CTA B/L, no wheezing , normal respiratory effort  CV:  RRR,  no murmur or rub, no LE edema  GI:  Soft, NT, + Bowel sounds, + ostomy  Neurologic:  Confused   Access:           RIJ PC    []    High complexity decision making was performed  []    Patient is at high-risk of decompensation with multiple organ involvement    Lab Data Personally Reviewed: I have reviewed all the pertinent labs, microbiology data and radiology studies during assessment.     Recent Labs     09/22/22  0424 09/21/22  0240 09/20/22  0430    141 141   K 3.8 2.6* 2.8*    106 106   CO2 23 27 28   GLU 75 82 99   BUN 8 11 8   CREA 1.88* 2.33* 1.71*   CA 7.7* 7.5* 7.6*   MG 1.7 1.6 1.6   PHOS 2.7 3.0 1.8*       Recent Labs     09/22/22  0424 09/21/22  0240 09/20/22  0430   WBC 15.4* 16.0* 17.3*   HGB 8.7* 7.7* 7.5*   HCT 27.5* 24.0* 24.2*   * 670* 646*       No results found for: SDES  Lab Results   Component Value Date/Time    Culture result: NO GROWTH 5 DAYS 09/16/2022 08:45 PM    Culture result: NO GROWTH 6 DAYS 09/09/2022 08:52 PM     Recent Results (from the past 24 hour(s))   GLUCOSE, POC    Collection Time: 09/21/22 11:32 AM   Result Value Ref Range    Glucose (POC) 80 65 - 117 mg/dL    Performed by Jerrell Bennett    GLUCOSE, POC    Collection Time: 09/21/22  5:16 PM   Result Value Ref Range    Glucose (POC) 72 65 - 117 mg/dL    Performed by [address], POC    Collection Time: 09/21/22  5:18 PM   Result Value Ref Range    Glucose (POC) 73 65 - 117 mg/dL    Performed by Gifty Schafer    GLUCOSE, POC    Collection Time: 09/21/22  9:10 PM   Result Value Ref Range    Glucose (POC) 67 65 - 117 mg/dL    Performed by Brendia Dance    GLUCOSE, POC    Collection Time: 09/21/22  9:15 PM   Result Value Ref Range    Glucose (POC) 75 65 - 117 mg/dL    Performed by DOC Mcginnis    CBC WITH AUTOMATED DIFF    Collection Time: 09/22/22  4:24 AM   Result Value Ref Range    WBC 15.4 (H) 4.1 - 11.1 K/uL    RBC 2.91 (L) 4.10 - 5.70 M/uL    HGB 8.7 (L) 12.1 - 17.0 g/dL    HCT 27.5 (L) 36.6 - 50.3 %    MCV 94.5 80.0 - 99.0 FL    MCH 29.9 26.0 - 34.0 PG    MCHC 31.6 30.0 - 36.5 g/dL    RDW 19.8 (H) 11.5 - 14.5 %    PLATELET 375 (H) 549 - 400 K/uL    MPV 9.1 8.9 - 12.9 FL    NRBC 0.0 0  WBC    ABSOLUTE NRBC 0.00 0.00 - 0.01 K/uL    NEUTROPHILS 80 (H) 32 - 75 %    LYMPHOCYTES 10 (L) 12 - 49 %    MONOCYTES 7 5 - 13 %    EOSINOPHILS 1 0 - 7 %    BASOPHILS 1 0 - 1 %    IMMATURE GRANULOCYTES 1 (H) 0.0 - 0.5 %    ABS. NEUTROPHILS 12.2 (H) 1.8 - 8.0 K/UL    ABS. LYMPHOCYTES 1.5 0.8 - 3.5 K/UL    ABS. MONOCYTES 1.1 (H) 0.0 - 1.0 K/UL    ABS. EOSINOPHILS 0.2 0.0 - 0.4 K/UL    ABS. BASOPHILS 0.2 (H) 0.0 - 0.1 K/UL    ABS. IMM.  GRANS. 0.2 (H) 0.00 - 0.04 K/UL    DF SMEAR SCANNED      PLATELET COMMENTS CLUMPED PLATELETS      RBC COMMENTS ANISOCYTOSIS  1+       METABOLIC PANEL, BASIC    Collection Time: 09/22/22  4:24 AM   Result Value Ref Range    Sodium 140 136 - 145 mmol/L    Potassium 3.8 3.5 - 5.1 mmol/L    Chloride 106 97 - 108 mmol/L    CO2 23 21 - 32 mmol/L    Anion gap 11 5 - 15 mmol/L    Glucose 75 65 - 100 mg/dL    BUN 8 6 - 20 MG/DL    Creatinine 1.88 (H) 0.70 - 1.30 MG/DL    BUN/Creatinine ratio 4 (L) 12 - 20      GFR est AA 46 (L) >60 ml/min/1.73m2    GFR est non-AA 38 (L) >60 ml/min/1.73m2    Calcium 7.7 (L) 8.5 - 10.1 MG/DL   MAGNESIUM    Collection Time: 09/22/22  4:24 AM   Result Value Ref Range    Magnesium 1.7 1.6 - 2.4 mg/dL   PHOSPHORUS    Collection Time: 09/22/22  4:24 AM   Result Value Ref Range    Phosphorus 2.7 2.6 - 4.7 MG/DL   GLUCOSE, POC    Collection Time: 09/22/22  6:40 AM   Result Value Ref Range    Glucose (POC) 70 65 - 117 mg/dL    Performed by Karyle Minerva    GLUCOSE, POC    Collection Time: 09/22/22  6:41 AM   Result Value Ref Range    Glucose (POC) 70 65 - 117 mg/dL    Performed by Sawyer Reed MD  [address]  Phone - [phone]   Fax - [phone]  [email or web address]

## 2022-09-22 NOTE — PROGRESS NOTES
Vascular:    Left BKA wound evolving with gradual removal of eschar with dressing changes and widening of skin separation lateral portion - continue wound care.  If DC'ed to [place] will need follow up with vascular surgeon there, if he returns to [place] for follow up should see operating surgeon at [address] [place] [address].

## 2022-09-22 NOTE — PROGRESS NOTES
Problem: Mobility Impaired (Adult and Pediatric)  Goal: *Acute Goals and Plan of Care (Insert Text)  Description: FUNCTIONAL STATUS PRIOR TO ADMISSION: The patient was functional at the wheelchair level and required minimal assistance for transfers to the chair. HOME SUPPORT PRIOR TO ADMISSION: The patient lived with wife, daughter and required up to moderate assistance  for tranfers bed <> w/c and ADLs. Patient has spent extensive period of time hospitalized recently and has been admitted from [facility]. Physical Therapy Goals  Weekly Reassessment 9/22/22 (goals continued)  Initiated 9/15/2022  1. Patient will roll left/right in bed for pressure relief mod I with use of bed rails within 7 day(s). 2.  Patient will move from supine to sit and sit to supine  in bed with moderate assistance within 7 day(s). 3.  Patient will transfer from bed to chair and chair to bed with moderate assistance  using the least restrictive device within 7 day(s). 4  Patient will sit EOB x 10 minutes without UE support with CGA-min A within 7 day(s). 9/22/2022 1549 by Elie Console, PT  Outcome: Not Met      PHYSICAL THERAPY TREATMENT: WEEKLY REASSESSMENT  Patient: Sumeet Mims (68 y.o. male)  Date: 9/22/2022  Primary Diagnosis: Intra-abdominal infection [B99.9]  Procedure(s) (LRB):  EUA/ PROCTOSIGMOIDOSCOPY (N/A) 7 Days Post-Op   Precautions:  Fall, Other (comment) (bilateral BKA)      ASSESSMENT  Patient continues with skilled PT services and is slowly progressing towards goals. Patient's ability to transition positions and sit EOB is most limited by pain in addition to colostomy seal leaking, bilateral BKA, balance, and activity tolerance. Received pt with RN assisting him with colostomy and clothing/ linen change. Assisted RN while working w/ pt on bed mobility. Pt's ability to roll and level of assist required varied based on his pain.   Anticipate pt will make steady functional gains towards his PLOF once his pain improves. Patient's progression toward goals since last assessment: 0/4 goals achieved. Goals are attainable once pain is managed therefore continued. Downgraded therapy frequency d/t limited ability to participate with pain. Will increase frequency as pt's participation and pain improve. Current Level of Function Impacting Discharge (mobility/balance): Min-Mod A rolling in bed (level of assist varied based on pt's pain); Unable to sit EOB d/t pain and colostomy leaking at the seal    Other factors to consider for discharge: bilateral BKAs, sacral wound, L stump wound, colostomy, pain management         PLAN :  Goals have been updated based on progression since last assessment. Patient continues to benefit from skilled intervention to address the above impairments. Recommendations and Planned Interventions: bed mobility training, transfer training, therapeutic exercises, patient and family training/education, and therapeutic activities      Frequency/Duration: Patient will be followed by physical therapy:  3 times a week to address goals. Recommendation for discharge: (in order for the patient to meet his/her long term goals)  To be determined: Previously recommended IPR. At this time patient would not be able to tolerate 3 hours of therapy/ day due to his pain. Recomending SNF. Will continue to work towards [employer]. This discharge recommendation:  A follow-up discussion with the attending provider and/or case management is planned    IF patient discharges home will need the following DME: to be determined (TBD)         SUBJECTIVE:   Patient stated \"sure\" agreeable to participate in therapy session.       OBJECTIVE DATA SUMMARY:   HISTORY:    Past Medical History:   Diagnosis Date    Diabetes ([address]      Past Surgical History:   Procedure Laterality Date    IR INSERT TUNL CVC W/O PORT OVER 5 YR  9/16/2022       Personal factors and/or comorbidities impacting plan of care: [facility]/ [address] [place] Situation  Home Environment: Private residence  Wheelchair Ramp: Yes  One/Two Story Residence: One story  Living Alone: No  Support Systems: Spouse/Significant Other, Child(marli)  Patient Expects to be Discharged to[de-identified] Rehab Unit Subacute  Current DME Used/Available at Home: Wheelchair, Walker, [address] [facility] chair, Other (comment) (sliding board)  Tub or Shower Type: Shower    EXAMINATION/PRESENTATION/DECISION MAKING:   Critical Behavior:  Neurologic State: Alert, Confused  Orientation Level: Oriented to person, Disoriented to situation, Disoriented to place, Disoriented to time  Cognition: Decreased command following, Decreased attention/concentration, Impulsive  Safety/Judgement: Fall prevention, Home safety     Functional Mobility:  Bed Mobility:  Rolling: Minimum assistance; Moderate assistance (variable d/t pain)  Scooting: Total assistance;Assist x2  Transfers:  Unable to work towards transfers today d/t pain and colostomy leaking at seal    Pain Rating:  Moaning w/ pain (rectum) throughout session when mobilizing and at rest.    Activity Tolerance:   Poor and pain and colostomy leaking    After treatment patient left in no apparent distress:   Supine in bed and w/ RN at bedside working on colostomy seal    COMMUNICATION/EDUCATION:   The patients plan of care was discussed with: Occupational therapist and Registered nurse. Patient is unable to participate in goal setting and plan of care d/t pain.   Will discuss goals next session(s)    Thank you for this referral.  Aliza Whaley, PT   Time Calculation: 17 mins

## 2022-09-22 NOTE — PROGRESS NOTES
Bedside shift change report given to Shawanda Caceres RN (oncoming nurse) by [facility], RN (offgoing nurse). Report included the following information SBAR, Kardex, MAR, Recent Results, and Cardiac Rhythm NSR .

## 2022-09-22 NOTE — ADT AUTH CERT NOTES
General Surgery or Procedure GRG - Care Day 13 (9/22/2022) by Ryan Yanez       Review Status Review Entered   Completed 9/22/2022 1638       Created By   [id number], [address] [place] [address] Day: 13 Care Date: 9/22/2022 Level of Care: Telemetry    Guideline Day 3    Level Of Care    (X) * Activity level acceptable    9/22/2022 16:35:35 EDT by Brooks Osler      able to turn self    ( ) Floor to discharge    9/22/2022 16:35:35 EDT by Brooks Osler      inpatient telemety    Clinical Status    ( ) * Operative site and other wounds acceptable    9/22/2022 16:35:35 EDT by Brooks Osler      colostomy leaking; left BKA stump site: no active drainage, diffuse erythema, dehisced along the incision site    (X) * Pain and nausea absent or adequately managed    9/22/2022 16:35:35 EDT by Brooks Osler      no reports of pain, continues pain medication    (X) * Temperature status acceptable    9/22/2022 16:35:35 EDT by Brooks Osler      97.6 °F, 82, 155/85, 22, 100% RA    ( ) * No infection, or status acceptable    9/22/2022 16:35:35 EDT by Brooks Osler      WBC: 15.4 (H), continues antibiotics    (X) * No blood loss, or problem resolved    9/22/2022 16:35:35 EDT by Brooks Osler      none noted    (X) * Abdominal status acceptable    9/22/2022 16:35:35 EDT by Brooks Osler      GI:Soft, Non distended, Non tender.   +Bowel sounds,    (X) * Hepatic and biliary abnormalities absent or acceptable    9/22/2022 16:35:35 EDT by Brooks Osler      none noted    (X) * Inflammation absent or stable (eg, colitis)    9/22/2022 16:35:35 EDT by Brooks Osler      none noted    (X) * No transplant done, or status acceptable    9/22/2022 16:35:35 EDT by Brooks Osler      none noted    ( ) * General Discharge Criteria met    Interventions    (X) * Intake acceptable    9/22/2022 16:35:35 EDT by Burkett Osler      ADULT DIET Regular; 4 carb choices (60 gm/meal)     med: eliquis 5mg BID PO, glucagen 16g prn PO 1x, risaquad 1cap PO,remeron 7.5mg HS PO, protonix 40mg BID PO 1x, zoloft 25mg QD PO, klor con 40meq BID PO    ( ) * No inpatient interventions needed    9/22/2022 16:35:35 EDT by Shraddha Sommer      eraxis 100mg q24 IV, dextrose 10% prn IV 125ml 1x, zosyn 3.375g q12 IV,    * Milestone        General Surgery or Procedure GRG - Care Day 12 (9/21/2022) by Mimi Rodgers       Review Status Review Entered   Completed 9/22/2022 1618       Created By   [facility] [place] [facility], [address] [place] [address] Day: 12 Care Date: 9/21/2022 Level of Care: Telemetry    Guideline Day 3    Level Of Care    (X) * Activity level acceptable    9/22/2022 16:18:02 EDT by Shraddha Sommer      able to turn self to left side    ( ) Floor to discharge    9/22/2022 16:18:02 EDT by Shraddha Sommer      inpatient telemetry    Clinical Status    ( ) * Operative site and other wounds acceptable    9/22/2022 16:18:02 EDT by Shraddha Sommer      GI:Colostomy. Bandages clean and dry. Mild tenderness to palpation Pressure injury; sacrum, right buttock, left BKA site    (X) * Pain and nausea absent or adequately managed    9/22/2022 16:18:02 EDT by Shraddha Sommer      reports pain scale 3/10, continues pain medications    (X) * Temperature status acceptable    9/22/2022 16:18:02 EDT by Shraddha Sommer      97.9 °F, 90, 164/97, 17, 99% RA    ( ) * No infection, or status acceptable    9/22/2022 16:18:02 EDT by Shraddha Sommer      WBC: 16.0 (H), continues antibiotic    (X) * No blood loss, or problem resolved    9/22/2022 16:18:02 EDT by Shraddha Sommer      none noted    (X) * Abdominal status acceptable    9/22/2022 16:18:02 EDT by Shraddha Sommer      GI: Soft, Non distended, Non tender.  +Bowel sounds, colostomy in place    (X) * Hepatic and biliary abnormalities absent or acceptable    9/22/2022 16:18:02 EDT by Shraddha Sommer      none noted    (X) * Inflammation absent or stable (eg, colitis)    9/22/2022 16:18:02 EDT by Alondra Hughes      none noted    (X) * No transplant done, or status acceptable    9/22/2022 16:18:02 EDT by Alondra Hughes      none noted    ( ) * General Discharge Criteria met    Interventions    (X) * Intake acceptable    9/22/2022 16:18:02 EDT by Alondra Hughes      ADULT DIET Regular; 4 carb choices (60 gm/meal)     med: eliquis 5mg BID PO, dilaudid 4mg q4 PO 1x, risaquad 1cap PO,remeron 7.5mg HS PO, protonix 40mg BID PO 1x, zoloft 25mg QD PO, klor con 40meq BID PO,    ( ) * No inpatient interventions needed    9/22/2022 16:18:02 EDT by Alondra Hughes      eraxis 100mg q24 IV, benadryl 12.5mg q6 prn IV 1x, retacrit 10,000unit SQ 1x, apresoline 10mg q6 prn IV 1x, zosyn 3.375g q12 IV, vancocin 750mg IV 1x,    * Milestone   Additional Notes    DATE: 09/21/2022      Pertinent Updates:   -on HD now   -PT/OT deferred because of HD   -Weaning off hydromorphone, starting oxycodone for pain. Leaving on the IV hydromorphone for breakthrough pain   Has been evaluated by infectious disease, appreciate recommendations   Continuing PIP Yrn   Continuing vancomycin   Continuing anidulafungin   -Nursing reports he has been hallucinating overnight. Per medical record he had been hallucinating the night before due to IV hydromorphone   -Awaiting placement. Abnl/Pertinent Labs/Radiology/Diagnostic Studies:   WBC: 16.0 (H)   RBC: 2.58 (L)   HGB: 7.7 (L)   HCT: 24.0 (L)   RDW: 18.5 (H)   PLATELET: 605 (H)   NEUTROPHILS: 78 (H)   LYMPHOCYTES: 11 (L)   IMMATURE GRANULOCYTES: 1 (H)   ABS. NEUTROPHILS: 12.3 (H)   ABS. IMM. GRANS.: 0.2 (H)   ABS. EOSINOPHILS: 0.5 (H)   ABS. BASOPHILS: 0.2 (H)   Potassium: 2.6 (LL)   Creatinine: 2.33 (H)   BUN/Creatinine ratio: 5 (L)   Calcium: 7.5 (L)   GFR est non-AA: 30 (L)      Physical Exam:   Same as yesterday      MD Consults/Assessments & Plans:   **Infectious disease**   Objective:   Continue IV Eraxis, zosyn, and vancomycin.  Recommend to complete total 2 weeks, last dose 9/23, then complete 2 weeks with PO levofloxacin and flagyl (9/24 to 10/8). QTC 470ms on 9/15. Please recheck EKG prior to start on Levofloxacin. **Nephrology**   Assessment and Plan   JEMIMA on HD:   - 2/2 ATN   - HD dependent MWF for now while here   - no signs of recovery yet   - PC placed 9/16   - HD now       Hypokalemia:   - 3.5K bath w/ HD       Anemia:   - MICHAEL MWF       PAD s/p b/l BKA       Recent sepsis       Empyema       ABD wall abscess:   - J-tube removal, colostomy, I&D of abd wound       Sacral decub      **Hospitalist**   Assessment & Plan:   Intractable abdominal pain   Pneumoperitoneum   Abdominal wall abscess   Severe sepsis   Rectal fistula:    -s/p 9/10 LAPAROSCOPY GENERAL DIAGNOSTIC, LAPAROSCOPIC  TAKEDOWN AND REMOVAL J-TUBE, LAPAROSCOPIC COLOSTOMY, I & D ABDOMINAL WALL    -Cultures no growth    -repeat CT abd 9/14 improvement in rectal thickening   -Persistent leukocytosis, etiology possibly from procedure v below    -Went to OR with CRS and general surgery 9/15 for anorectal examination under anesthesia including rigid proctosignmoidoscopy, leukocytosis may be 2/2 recent procedure    Weaning off hydromorphone, starting oxycodone for pain.   Leaving on the IV hydromorphone for breakthrough pain   Has been evaluated by infectious disease, appreciate recommendations   Continuing PIP Yrn   Continuing vancomycin   Continuing anidulafungin       A fib   Atrial fibrillation   Has been evaluated by cardiology, appreciate your assistance   Heart rate currently controlled   Continuing apixaban       Possible empyema   -H/o recent thoracotomy and prolonged intubation at [facility]   9/19 with persistent right basilar airspace disease and right sided loculated effusion/empyema   Pulmonary following   Continuing the aforementioned antibiotics       B/L AKA    Wound specialist following   To follow-up with previous surgeon as outpatient   No need for surgical intervention at this time       JEMIMA now on HD Hemodialysis Monday Wednesday Friday   Nephrology following   Avoiding nephrotoxins       FOBT positive    Hemoglobin stable   Continuing pantoprazole       Hypokalemia:    Potassium low, 2.6 today   Has been repleted with oral in the early morning hours   Will check another potassium later and replete as appropriate           Acute blood loss Anemia:    s/p 1 unit PRBC 9/10. Hemoglobin currently stable, 7.7 we will transfuse for hemoglobin less than 7       HTN:    Blood pressure elevated   Adding as needed hydralazine       Type 2 diabetes   Blood sugars currently controlled   SSI                      General Surgery or Procedure GRG - Care Day 11 (9/20/2022) by Yadiel Galvez Status Review Entered   Completed 9/22/2022 1600       Created By   [id number] [facility], [address] [place] [address] Day: 11 Care Date: 9/20/2022 Level of Care: Telemetry    Guideline Day 3    Level Of Care    (X) * Activity level acceptable    9/22/2022 16:00:51 EDT by Jonathan Bundy      able to turn self on left side    ( ) Floor to discharge    9/22/2022 16:00:51 EDT by Jonathan Bundy      inpatient telemetry; droplet plus, airborne, enteric contact, contact, droplet precaution    Clinical Status    ( ) * Operative site and other wounds acceptable    9/22/2022 16:00:51 EDT by Jonathan Bundy      GI:Colostomy. Bandages clean and dry.  Mild tenderness to palpation  Pressure injury; sacrum, right buttock, left BKA site    ( ) * Pain and nausea absent or adequately managed    9/22/2022 16:00:51 EDT by Jonathan Bundy      reports pain scale 8/10  location: buttocks left    (X) * Temperature status acceptable    9/22/2022 16:00:51 EDT by Jonathan Bundy      98 °F, 87, 126/91, 20, 100% RA    ( ) * No infection, or status acceptable    9/22/2022 16:00:51 EDT by Jonathan Bundy      WBC: 17.3 (H), continues antibiotics    (X) * No blood loss, or problem resolved    9/22/2022 16:00:51 EDT by Jonathan Bundy none noted    (X) * Abdominal status acceptable    9/22/2022 16:00:51 EDT by Martin Lucio      GI:  Soft, Non distended, Non tender. +Bowel sounds, colostomy in place    (X) * Hepatic and biliary abnormalities absent or acceptable    9/22/2022 16:00:51 EDT by Martin Lucio      none noted    (X) * Inflammation absent or stable (eg, colitis)    9/22/2022 16:00:51 EDT by Martin Lucio      none noted    (X) * No transplant done, or status acceptable    9/22/2022 16:00:51 EDT by Martin Lucio      none noted    ( ) * General Discharge Criteria met    Interventions    (X) * Intake acceptable    9/22/2022 16:00:51 EDT by Martin Lucio      ADULT DIET Regular; 4 carb choices (60 gm/meal)     meds:tyelnol 650mg q6 prn PO 1x, eliquis 5mg BID PO, dilaudid 4mg q4 PO 3x, risaquad 1cap PO,remeron 7.5mg HS PO, protonix 40mg BID PO, zoloft 25mg QD PO,magox 400mg q4 PO 2x, phosphorus 2tab q4 PO 2x    ( ) * No inpatient interventions needed    9/22/2022 16:00:51 EDT by Martin Lucio      eraxis 100mg q24 IV, benadryl 12.5mg q6 prn IV 1x, dilaudid 0.75mg q2 prn IV 3x, zosyn 3.375g q12 IV, potassium chloride 10meq IV 1x,    * Milestone   Additional Notes    DATE: 09/20/2022      Pertinent Updates:   -hallucination with dilaudid noted   -Attempting to wean off IV dilaudid though pain with significant pain, requiring very slow taper    -Continue IV Eraxis, zosyn, and vancomycin. Recommend to complete total 2 weeks, last dose 9/23, then complete 2 weeks with PO levofloxacin and flagyl (9/24 to 10/8). -next HD tomorrow      Abnl/Pertinent Labs/Radiology/Diagnostic Studies:   WBC: 17.3 (H)   RBC: 2.53 (L)   HGB: 7.5 (L)   HCT: 24.2 (L)   RDW: 18.9 (H)   PLATELET: 317 (H)   NEUTROPHILS: 85 (H)   LYMPHOCYTES: 7 (L)   IMMATURE GRANULOCYTES: 1 (H)   ABS. NEUTROPHILS: 14.7 (H)   ABS. IMM.  GRANS.: 0.2 (H)   Potassium: 2.8 (L)   Creatinine: 1.71 (H)   BUN/Creatinine ratio: 5 (L)   Calcium: 7.6 (L)   Phosphorus: 1.8 (L)   GFR est non-AA: 42 (L)      Physical Exam:   Resp: CTA bilaterally, no wheezing or rales. No accessory muscle use   CV:  Regular  rhythm   GI: Soft, Non distended, Non tender. +Bowel sounds, colostomy in place   Neurologic: Alert and oriented X 3, normal speech,    Psych:   Fair insight. Not anxious nor agitated   Skin: No rashes. No jaundice, Pressure injury; sacrum, right buttock, left BKA site      MD Consults/Assessments & Plans:   **Infectious disease**   Objective:   Continue IV Eraxis, zosyn, and vancomycin. Recommend to complete total 2 weeks, last dose 9/23, then complete 2 weeks with PO levofloxacin and flagyl (9/24 to 10/8). QTC 470ms on 9/15. Please recheck EKG prior to start on Levofloxacin. **Nephrology**   Assessment and Plan   JEMIMA on HD:   - 2/2 ATN   - HD dependent MWF for now while here   - no signs of recovery yet   - PC placed 9/16   - next HD tomorrow       Hypokalemia:   - 3.5K bath w/ HD       Anemia:   - MICHAEL MWF       PAD s/p b/l BKA       Recent sepsis       Empyema       ABD wall abscess:   - J-tube removal, colostomy, I&D of abd wound       Sacral decub      **Nutrition**   Nutrition Recommendations/Plan:        Recommend Phos repletion       Recommend starting folic acid for deficiency       Recommend Nephrocap       To consider supplemental iron        Continue to monitor PO intake closely to determine if needs supplemental alternative means of nutrition (previous J-tube was placed for supplemental feeds per chart and pt) update 9/20 - I do not think patient is eating enough overall to help promote wound healing, but unclear other options since unable to place PEG or replace J-tube given active abdominal infection. Unsure he would be receptive to [facility] and this is not a long-term option. Perhaps in need trial appetite stimulant       Continue regular diet. D/c Nepro ONS - refusing. Will trial [employer]. If Elsie Matthew becomes available again while admitted, will trial this as well. **Hospitalist**   Assessment & Plan:   Intractable abdominal pain   Pneumoperitoneum   Abdominal wall abscess   Severe sepsis   Rectal fistula:    -s/p 9/10 LAPAROSCOPY GENERAL DIAGNOSTIC, LAPAROSCOPIC  TAKEDOWN AND REMOVAL J-TUBE, LAPAROSCOPIC COLOSTOMY, I & D ABDOMINAL WALL    -Cultures no growth    -repeat CT abd 9/14 improvement in rectal thickening   -Persistent leukocytosis, etiology possibly from procedure v below    -Went to OR with CRS and general surgery 9/15 for anorectal examination under anesthesia including rigid proctosignmoidoscopy, leukocytosis may be 2/2 recent procedure    Plan:   -Attempting to wean off IV dilaudid though pain with significant pain, requiring very slow taper    -Continue zosyn/Vanc, eraxis, duration per ID    - Follow-up repeat blood cultures and urine culture (NGTD)       A fib   - New a fib on telemetry and EKG   - No history of A fib    - CHADSVASC  of 2, started on eliquis    - Cardiology consulted, appreciate rec's        Possible empyema   -H/o recent thoracotomy and prolonged intubation at [facility]   -Continue antibiotics   - Repeat CXR 9/19 demonstrates persistent right basilar airspace disease and right-sided loculated effusion/empyema    -Appreciate pulmonology recommendations       B/L AKA    - Wound care on board   - Concern for wound dehiscence, discussed with vascular surgery, who feels no acute surgical intervention and patient can f/u with previous surgeon as an outpatient    - No infection requiring surgical debridement        JEMIMA now on HD   - MWF HD    - Perm cath placed   - Nephrology on board, appreciate recommendations        FOBT positive Protonix BID       Hypokalemia: replace as needed with dialysis    Acute blood loss Anemia: s/p 1 unit PRBC 9/10. Transfuse for hb<7   HTN: holding antihypertensives for now   DM2:SSI, monitor   Diarrhea: C.  Diff negative            PT/OT/SLP/CM Assessments or Notes:   **Physical therapy**   ASSESSMENT   Pt alert, oriented x 4, but confused at times. Pt reported significant pain at rectum; willing to participate, but unable to tolerate sitting up. Pt rolled R/L with use of bed rail and min A for facilitation of linen change. Positioned on R side for pressure relief at end of session. Will benefit from con't PT for mobility progression as tolerated. Cecil Jaramillo PLAN :   Patient continues to benefit from skilled intervention to address the above impairments. Continue treatment per established plan of care. to address goals. **Occupational therapy**   ASSESSMENT    Pt declined attempt to sit EOB 2* sacral wound pain and noted pt intermittently confused (visual hallucinations). He demonstrated improved rolling L/R with min A x 1 for adjustment of bed linens. Pt assisted to scooting to [facility] with A x 2 and pt use of bed rail. Pt positioned onto R side with pillow for pressure relief. He declined changing a gown despite OT's encouragement. PLAN :   Patient continues to benefit from skilled intervention to address the above impairments.   Continue treatment per established plan of care to address goals

## 2022-09-22 NOTE — PROGRESS NOTES
ID Progress Note  2022    Subjective:     Seward to self and year, appear very confused    Review of Systems:            Symptom Y/N Comments   Symptom Y/N Comments   Fever/Chills n      Chest Pain  n      Poor Appetite       Edema        Cough       Abdominal Pain  n      Sputum       Joint Pain        SOB/ANDREWS n      Pruritis/Rash        Nausea/vomit  n     Tolerating PT/OT        Diarrhea       Tolerating Diet        Constipation       Other           Could NOT obtain due to:       Objective:     Vitals: Visit Vitals  BP (!) 158/82 (BP 1 Location: Right upper arm, BP Patient Position: At rest)   Pulse 88   Temp 97.9 °F (36.6 °C)   Resp 18   Ht 5' 7\" (1.702 m)   Wt 68 kg (149 lb 14.6 oz)   SpO2 100%   BMI 23.48 kg/m²          Tmax:  Temp (24hrs), Av °F (36.7 °C), Min:97.5 °F (36.4 °C), Max:98.5 °F (36.9 °C)      PHYSICAL EXAM:  General: Chronically ill appearing. WD, WN. Alert, cooperative, no acute distress    EENT:  Anicteric sclerae. Dry mucous membrane  Resp:  CTA bilaterally, no wheezing or rales. No accessory muscle use  CV:  Regular  rhythm  GI:  Soft, Non distended, Non tender. +Bowel sounds, colostomy leaking  Neurologic:  Alert and oriented X 3, normal speech,   Psych:   Fair insight. Not anxious nor agitated  Skin:  No rashes.   No jaundice    Pressure injury; sacrum, right buttock,   left BKA stump site; no active drainage, diffuse erythema, dehisced along the incision site    Labs:   Lab Results   Component Value Date/Time    WBC 15.4 (H) 2022 04:24 AM    HGB 8.7 (L) 2022 04:24 AM    HCT 27.5 (L) 2022 04:24 AM    PLATELET 899 (H) 1548/7257 04:24 AM    MCV 94.5 2022 04:24 AM     Lab Results   Component Value Date/Time    Sodium 140 2022 04:24 AM    Potassium 3.8 2022 04:24 AM    Chloride 106 2022 04:24 AM    CO2 23 2022 04:24 AM    Anion gap 11 2022 04:24 AM    Glucose 75 2022 04:24 AM    BUN 8 2022 04:24 AM    Creatinine 1.88 (H) 09/22/2022 04:24 AM    BUN/Creatinine ratio 4 (L) 09/22/2022 04:24 AM    GFR est AA 46 (L) 09/22/2022 04:24 AM    GFR est non-AA 38 (L) 09/22/2022 04:24 AM    Calcium 7.7 (L) 09/22/2022 04:24 AM    Bilirubin, total 0.7 09/10/2022 12:00 PM    Alk. phosphatase 86 09/10/2022 12:00 PM    Protein, total 5.8 (L) 09/10/2022 12:00 PM    Albumin 1.7 (L) 09/10/2022 12:00 PM    Globulin 4.1 (H) 09/10/2022 12:00 PM    A-G Ratio 0.4 (L) 09/10/2022 12:00 PM    ALT (SGPT) <6 (L) 09/10/2022 12:00 PM     Assessment:      Dislodged feeding tube/peritonitis   Free air and perirectal fistula  S/p laparoscopy procedure; take down and removal of J tube, colostomy placement, and I & D of abdominal wall (9/10)  Recent right thoracotomy  Hx Bilateral BKAs   - afebrile, wbc 17.3->16->15     Blood cx (9/9, 9/16) no growth    Objective:      Continue IV Eraxis, zosyn, and vancomycin. Recommend to complete total 2 weeks, last dose 9/23, then complete 2 more weeks with PO Augmentin, starting 9/24. Fever work up if temp >= 100.4    Above plan of care discussed and agreed with Dr. Maycol Caldera    Pt will be seen as need. Please contact us with any questions or concerns.     Hyunsun Claude Beaver, NP

## 2022-09-23 ENCOUNTER — APPOINTMENT (OUTPATIENT)
Dept: CT IMAGING | Age: 52
DRG: 853 | End: 2022-09-23
Attending: PHYSICIAN ASSISTANT
Payer: COMMERCIAL

## 2022-09-23 LAB
ANION GAP SERPL CALC-SCNC: 7 MMOL/L (ref 5–15)
ATRIAL RATE: 66 BPM
BACTERIA SPEC CULT: NORMAL
BASOPHILS # BLD: 0.1 K/UL (ref 0–0.1)
BASOPHILS NFR BLD: 1 % (ref 0–1)
BUN SERPL-MCNC: 11 MG/DL (ref 6–20)
BUN/CREAT SERPL: 4 (ref 12–20)
CALCIUM SERPL-MCNC: 7.6 MG/DL (ref 8.5–10.1)
CALCULATED R AXIS, ECG10: -9 DEGREES
CALCULATED T AXIS, ECG11: 63 DEGREES
CHLORIDE SERPL-SCNC: 110 MMOL/L (ref 97–108)
CO2 SERPL-SCNC: 27 MMOL/L (ref 21–32)
CREAT SERPL-MCNC: 2.49 MG/DL (ref 0.7–1.3)
DIAGNOSIS, 93000: NORMAL
DIFFERENTIAL METHOD BLD: ABNORMAL
EOSINOPHIL # BLD: 0.3 K/UL (ref 0–0.4)
EOSINOPHIL NFR BLD: 2 % (ref 0–7)
ERYTHROCYTE [DISTWIDTH] IN BLOOD BY AUTOMATED COUNT: 19.9 % (ref 11.5–14.5)
GLUCOSE BLD STRIP.AUTO-MCNC: 120 MG/DL (ref 65–117)
GLUCOSE BLD STRIP.AUTO-MCNC: 75 MG/DL (ref 65–117)
GLUCOSE BLD STRIP.AUTO-MCNC: 81 MG/DL (ref 65–117)
GLUCOSE BLD STRIP.AUTO-MCNC: 82 MG/DL (ref 65–117)
GLUCOSE SERPL-MCNC: 104 MG/DL (ref 65–100)
HCT VFR BLD AUTO: 26.2 % (ref 36.6–50.3)
HGB BLD-MCNC: 8.2 G/DL (ref 12.1–17)
IMM GRANULOCYTES # BLD AUTO: 0.1 K/UL (ref 0–0.04)
IMM GRANULOCYTES NFR BLD AUTO: 1 % (ref 0–0.5)
LYMPHOCYTES # BLD: 1.6 K/UL (ref 0.8–3.5)
LYMPHOCYTES NFR BLD: 11 % (ref 12–49)
MAGNESIUM SERPL-MCNC: 1.5 MG/DL (ref 1.6–2.4)
MCH RBC QN AUTO: 29.6 PG (ref 26–34)
MCHC RBC AUTO-ENTMCNC: 31.3 G/DL (ref 30–36.5)
MCV RBC AUTO: 94.6 FL (ref 80–99)
MONOCYTES # BLD: 1 K/UL (ref 0–1)
MONOCYTES NFR BLD: 7 % (ref 5–13)
NEUTS SEG # BLD: 11.1 K/UL (ref 1.8–8)
NEUTS SEG NFR BLD: 78 % (ref 32–75)
NRBC # BLD: 0 K/UL (ref 0–0.01)
NRBC BLD-RTO: 0 PER 100 WBC
PHOSPHATE SERPL-MCNC: 2.8 MG/DL (ref 2.6–4.7)
PLATELET # BLD AUTO: 686 K/UL (ref 150–400)
PLATELET COMMENTS,PCOM: ABNORMAL
PMV BLD AUTO: 9 FL (ref 8.9–12.9)
POTASSIUM SERPL-SCNC: 3.2 MMOL/L (ref 3.5–5.1)
Q-T INTERVAL, ECG07: 454 MS
QRS DURATION, ECG06: 80 MS
QTC CALCULATION (BEZET), ECG08: 490 MS
RBC # BLD AUTO: 2.77 M/UL (ref 4.1–5.7)
RBC MORPH BLD: ABNORMAL
SERVICE CMNT-IMP: ABNORMAL
SERVICE CMNT-IMP: NORMAL
SODIUM SERPL-SCNC: 144 MMOL/L (ref 136–145)
TRIGL SERPL-MCNC: 179 MG/DL (ref ?–150)
VENTRICULAR RATE, ECG03: 70 BPM
WBC # BLD AUTO: 14.2 K/UL (ref 4.1–11.1)
WBC MORPH BLD: ABNORMAL

## 2022-09-23 PROCEDURE — 74011000250 HC RX REV CODE- 250: Performed by: PHYSICIAN ASSISTANT

## 2022-09-23 PROCEDURE — 83735 ASSAY OF MAGNESIUM: CPT

## 2022-09-23 PROCEDURE — 74011250637 HC RX REV CODE- 250/637: Performed by: STUDENT IN AN ORGANIZED HEALTH CARE EDUCATION/TRAINING PROGRAM

## 2022-09-23 PROCEDURE — 74011250636 HC RX REV CODE- 250/636: Performed by: NURSE PRACTITIONER

## 2022-09-23 PROCEDURE — 80048 BASIC METABOLIC PNL TOTAL CA: CPT

## 2022-09-23 PROCEDURE — 74011000258 HC RX REV CODE- 258: Performed by: COLON & RECTAL SURGERY

## 2022-09-23 PROCEDURE — 74011250637 HC RX REV CODE- 250/637: Performed by: PHYSICIAN ASSISTANT

## 2022-09-23 PROCEDURE — 74011000258 HC RX REV CODE- 258: Performed by: PHYSICIAN ASSISTANT

## 2022-09-23 PROCEDURE — 93005 ELECTROCARDIOGRAM TRACING: CPT

## 2022-09-23 PROCEDURE — 74011250637 HC RX REV CODE- 250/637: Performed by: NURSE PRACTITIONER

## 2022-09-23 PROCEDURE — 82962 GLUCOSE BLOOD TEST: CPT

## 2022-09-23 PROCEDURE — 85025 COMPLETE CBC W/AUTO DIFF WBC: CPT

## 2022-09-23 PROCEDURE — 74011250636 HC RX REV CODE- 250/636: Performed by: PHYSICIAN ASSISTANT

## 2022-09-23 PROCEDURE — 74011250636 HC RX REV CODE- 250/636: Performed by: COLON & RECTAL SURGERY

## 2022-09-23 PROCEDURE — 70450 CT HEAD/BRAIN W/O DYE: CPT

## 2022-09-23 PROCEDURE — 74011250637 HC RX REV CODE- 250/637: Performed by: COLON & RECTAL SURGERY

## 2022-09-23 PROCEDURE — 74011000250 HC RX REV CODE- 250: Performed by: COLON & RECTAL SURGERY

## 2022-09-23 PROCEDURE — 65270000046 HC RM TELEMETRY

## 2022-09-23 PROCEDURE — 90935 HEMODIALYSIS ONE EVALUATION: CPT

## 2022-09-23 PROCEDURE — 84478 ASSAY OF TRIGLYCERIDES: CPT

## 2022-09-23 PROCEDURE — 84100 ASSAY OF PHOSPHORUS: CPT

## 2022-09-23 PROCEDURE — 36415 COLL VENOUS BLD VENIPUNCTURE: CPT

## 2022-09-23 RX ORDER — HYDROMORPHONE HYDROCHLORIDE 1 MG/ML
0.2 INJECTION, SOLUTION INTRAMUSCULAR; INTRAVENOUS; SUBCUTANEOUS
Status: DISCONTINUED | OUTPATIENT
Start: 2022-09-23 | End: 2022-09-30

## 2022-09-23 RX ORDER — AMOXICILLIN AND CLAVULANATE POTASSIUM 500; 125 MG/1; MG/1
1 TABLET, FILM COATED ORAL EVERY 24 HOURS
Status: DISCONTINUED | OUTPATIENT
Start: 2022-09-24 | End: 2022-09-28

## 2022-09-23 RX ORDER — HYDROMORPHONE HYDROCHLORIDE 4 MG/1
4 TABLET ORAL
Status: DISCONTINUED | OUTPATIENT
Start: 2022-09-23 | End: 2022-10-10

## 2022-09-23 RX ORDER — HYDROMORPHONE HYDROCHLORIDE 2 MG/1
2 TABLET ORAL
Status: DISCONTINUED | OUTPATIENT
Start: 2022-09-23 | End: 2022-10-10

## 2022-09-23 RX ORDER — LANOLIN ALCOHOL/MO/W.PET/CERES
1 CREAM (GRAM) TOPICAL 2 TIMES DAILY WITH MEALS
Status: DISCONTINUED | OUTPATIENT
Start: 2022-09-23 | End: 2022-10-13 | Stop reason: HOSPADM

## 2022-09-23 RX ORDER — AMOXICILLIN AND CLAVULANATE POTASSIUM 875; 125 MG/1; MG/1
1 TABLET, FILM COATED ORAL 2 TIMES DAILY WITH MEALS
Status: DISCONTINUED | OUTPATIENT
Start: 2022-09-24 | End: 2022-09-23 | Stop reason: DRUGHIGH

## 2022-09-23 RX ADMIN — MIRTAZAPINE 7.5 MG: 15 TABLET, FILM COATED ORAL at 21:11

## 2022-09-23 RX ADMIN — I.V. FAT EMULSION 250 ML: 20 EMULSION INTRAVENOUS at 22:36

## 2022-09-23 RX ADMIN — SODIUM CHLORIDE, PRESERVATIVE FREE 10 ML: 5 INJECTION INTRAVENOUS at 05:54

## 2022-09-23 RX ADMIN — SODIUM CHLORIDE, PRESERVATIVE FREE 10 ML: 5 INJECTION INTRAVENOUS at 14:32

## 2022-09-23 RX ADMIN — PIPERACILLIN AND TAZOBACTAM 3.38 G: 3; .375 INJECTION, POWDER, FOR SOLUTION INTRAVENOUS at 18:35

## 2022-09-23 RX ADMIN — CALCIUM GLUCONATE: 94 INJECTION, SOLUTION INTRAVENOUS at 18:18

## 2022-09-23 RX ADMIN — PANTOPRAZOLE SODIUM 40 MG: 40 TABLET, DELAYED RELEASE ORAL at 16:44

## 2022-09-23 RX ADMIN — PANTOPRAZOLE SODIUM 40 MG: 40 TABLET, DELAYED RELEASE ORAL at 06:32

## 2022-09-23 RX ADMIN — SERTRALINE 25 MG: 50 TABLET, FILM COATED ORAL at 10:04

## 2022-09-23 RX ADMIN — SODIUM CHLORIDE, PRESERVATIVE FREE 10 ML: 5 INJECTION INTRAVENOUS at 00:31

## 2022-09-23 RX ADMIN — OXYCODONE 10 MG: 5 TABLET ORAL at 10:05

## 2022-09-23 RX ADMIN — FERROUS SULFATE TAB 325 MG (65 MG ELEMENTAL FE) 325 MG: 325 (65 FE) TAB at 16:44

## 2022-09-23 RX ADMIN — APIXABAN 5 MG: 5 TABLET, FILM COATED ORAL at 10:04

## 2022-09-23 RX ADMIN — Medication 1 CAPSULE: at 10:05

## 2022-09-23 RX ADMIN — HYDRALAZINE HYDROCHLORIDE 10 MG: 20 INJECTION INTRAMUSCULAR; INTRAVENOUS at 00:30

## 2022-09-23 RX ADMIN — PIPERACILLIN AND TAZOBACTAM 3.38 G: 3; .375 INJECTION, POWDER, FOR SOLUTION INTRAVENOUS at 06:08

## 2022-09-23 RX ADMIN — SODIUM CHLORIDE 100 MG: 9 INJECTION, SOLUTION INTRAVENOUS at 18:14

## 2022-09-23 RX ADMIN — ACETAMINOPHEN 650 MG: 325 TABLET, FILM COATED ORAL at 14:32

## 2022-09-23 RX ADMIN — APIXABAN 5 MG: 5 TABLET, FILM COATED ORAL at 21:11

## 2022-09-23 RX ADMIN — SODIUM CHLORIDE, PRESERVATIVE FREE 10 ML: 5 INJECTION INTRAVENOUS at 21:11

## 2022-09-23 NOTE — PROGRESS NOTES
Problem: Patient Education:  Go to Education Activity  Goal: Patient/Family Education  Outcome: Progressing Towards Goal     Problem: Falls - Risk of  Goal: *Absence of Falls  Description: Document Faviola Bolivar Fall Risk and appropriate interventions in the flowsheet. Outcome: Progressing Towards Goal  Note: Fall Risk Interventions:  Mobility Interventions: Assess mobility with egress test, Bed/chair exit alarm, Communicate number of staff needed for ambulation/transfer, Mechanical lift    Mentation Interventions: Bed/chair exit alarm, Door open when patient unattended    Medication Interventions: Bed/chair exit alarm, Evaluate medications/consider consulting pharmacy    Elimination Interventions: Bed/chair exit alarm, Call light in reach              Problem: Patient Education: Go to Patient Education Activity  Goal: Patient/Family Education  Outcome: Progressing Towards Goal     Problem: Nutrition Deficit  Goal: *Optimize nutritional status  Outcome: Progressing Towards Goal     Problem: Pressure Injury - Risk of  Goal: *Prevention of pressure injury  Description: Document Barber Scale and appropriate interventions in the flowsheet.   Outcome: Progressing Towards Goal  Note: Pressure Injury Interventions:  Sensory Interventions: Assess changes in LOC, Assess need for specialty bed, Maintain/enhance activity level, Minimize linen layers    Moisture Interventions: Absorbent underpads, Apply protective barrier, creams and emollients, Check for incontinence Q2 hours and as needed    Activity Interventions: Increase time out of bed, Assess need for specialty bed    Mobility Interventions: HOB 30 degrees or less, Pressure redistribution bed/mattress (bed type), PT/OT evaluation    Nutrition Interventions: Document food/fluid/supplement intake    Friction and Shear Interventions: Apply protective barrier, creams and emollients, Foam dressings/transparent film/skin sealants                Problem: Patient Education: Go to Patient Education Activity  Goal: Patient/Family Education  Outcome: Progressing Towards Goal

## 2022-09-23 NOTE — PROGRESS NOTES
New ostomy bag placed due to leakage.   Dressing to abdomen changed secondary to being soiled by the ostomy out put

## 2022-09-23 NOTE — PROGRESS NOTES
Physical Therapy    Reviewed chart and attempted to treat pt. Wound care and Infectious Disease NP present. Will defer at this time and continue to follow.

## 2022-09-23 NOTE — PROGRESS NOTES
Augmentin dose adjustment  Indication:  bloodstream infection  Current regimen:  875 mg bid  Abx regimen: monotherapy ,starting   Recent Labs     22  0210 22  0424 22  0240   WBC 14.2* 15.4* 16.0*   CREA 2.49* 1.88* 2.33*   BUN 11 8 11     Est CrCl: HD patient MWF  Temp (24hrs), Av.2 °F (36.8 °C), Min:97.9 °F (36.6 °C), Max:98.4 °F (36.9 °C)    Plan:  Change to 500 mg Q24H for dialysis pt

## 2022-09-23 NOTE — PROGRESS NOTES
1629 given report on patients. Vitals stable, patient resting calmly in bed with wife at bedside. Will give bedside report to nightshift.

## 2022-09-23 NOTE — PROGRESS NOTES
Bedside shift change report given to Rolf Santacruz RN (oncoming nurse) by Keven Rios RN (offgoing nurse). Report included the following information SBAR, Kardex, and Cardiac Rhythm Afib .

## 2022-09-23 NOTE — PROGRESS NOTES
[facility]  Hospitalist Group                                                                                          Hospitalist Progress Note  William Spangler PA-C  Answering service: [phone] from in house phone        Date of Service:  2022  NAME:  Krystyna Patel  :  1970  MRN:  [id number]      Admission Summary:   Krystyna Patel is a 45 y.o. male with PMHx of T2DM, PAD s/p bilateral BKA and recent prolonged hospital course at [facility] for LLE BKA c/b respiratory failure with prolonged intubation, pleural effusion requiring decortication and R thoracotomy, renal failure requiring HD, and J tube placement for nutrition. He was discharged to [address] [place] [address] but was ultimately transferred to [facility] on 22 due to abdominal pain and was diagnosed with subcutaneous abscess at J tube site with pneumoperitoneum and perirectal fistula. He underwent diagnostic laparoscopy with removal of J-tube and I&D of abdominal wall, and colostomy on 9/10/22 with General Surgeon, Dr. Gamaliel Avila, and then rigid proctosigmoidoscopy on 9/15/22 with colo-rectal surgeon, Dr. Leighann Hernandez, where no abscess was visualized and no devitalized tissue and no therapeutic procedure performed. During this hospitalization the nephrology team continues to follow and performing HD MWF, Vascular Surgery was consulted for skin breakdown at L BKA incision line (no surgical intervention required), ID consulted (2 week course of IV Eraxis, Zosyn, Vanc on , followed by PO Augmentin monotherapy on ), and pulmonology was consulted due to abnormal chest imaging with right pleural effusion with hx of empyema (do not recommend surgical intervention or drainage at this time). Interval history / Subjective:   Mr. Pedro Solis tells me he is feeling well at the time of our visit and not denying any pain, however, he appears altered and confused.  His eyes are constantly looking up at the ceiling and roaming around. He is able to answer orientation questions correctly. I spoke with the nurse who notes this was reported mental status, unclear duration. He also is not eating and not meeting caloric goals. This was discussed with nutrition as well and discussed starting on TPN. I confirmed with renal prior to starting. Addendum: Met with patient and wife at bedside. We reviewed his clinical course and current presentation. His mental status is significantly improved at this time and much more alert and conversant. The wife reports that for the past few days in the hospital he has been more confused and altered, but that at this moment he is better. We discuss that the opioids could be the cause of acute delirium for him as he took the Oxycodone this morning at 1005 prior to visit. He, however, is complaining of ongoing significant pain and he feels the pain medication that are available to him do not last as long. The wife notes that the team has been holding pain medication for multiple days now for him due to the delirium. We discussed trying as needed medications of dilaudid at lower doses and PO and IV will be available. Discussed starting TPN and other interventions to try and limit delirium. All questions and concerns addressed. Assessment & Plan:     Abdominal Pain  S/p J tube placement at OSH, Pneumoperitoneum and subcutaneous abscess  Sepsis without septic shock  -- s/p diagnostic laparoscopy with removal of J-tube and I&D of abdominal wall, and colostomy on 9/10/22 with General Surgeon, Dr. Gamaliel Avila  -- Appreciate wound care following for ostomy care  -- ID Previously following. Recommended 2 week course of IV Eraxis/Zosyn/Vancomycin to complete 9/23/22. Start monotherapy with PO Augmentin on 9/24/22 x 2 weeks. Appreciate pharmacy dose adjustment. -- Palliative Care previously following for pain control. Standing pain medication and PRN IV Dilaudid has been held with AMS.  His pain seemed improved but he notes is poorly controlled. Will start with PO Dilaudid 2-4mg q4h PRN and IV Dilaudid available PRN. Hold parameters added. We discussed need to start working on tapering off this medication. Anorectal Wound  -- s/p rigid proctosigmoidoscopy on 9/15/22 with colo-rectal surgeon, Dr. Christopher Galvin, where no abscess was visualized and no devitalized tissue and no therapeutic procedure performed.    + occult stool blood  Normocytic Anemia, low iron  -- On PPI. No melena or hematochezia  -- start iron supplements     ESRD due to ATN on HD  -- CVC placed 9/16/22- IR.  -- Appreciate pharmacy guidance for medication dosing  -- Nephrology following. On HD MWF. No signs of recovery. Has CVC  -- Discussed with nephrology about starting TPN for which they are comfortable with     Hx of T2DM  -- Hgb A1c on 9/10/22 was < 3.8%. Will recheck. Hgb A1c was 14.7% on 7/3/18  -- Has been on a meal time insulin sliding scale, however, has been frequently hypoglycemic and is not eating almost anything  -- Previously diabetes team following    Delirium  AMS  Hallucinations  -- Has been altered for multiple days it appears. Will start with CT Head and then pursue MRI if necessary  -- Possible that opioids are contributing as was altered on exam earlier around ~11:00 after PO Oxycodone and now significantly improved on re-rounds. However, do not want to undertreating his pain. -- Neurologic exam is nonfocal. Anticipate hospital acquired delirium as etiology but will evaluate for other organic causes    Malnutrition  Not meeting caloric needs  -- Will start TPN on 9/23/22. He has a double lumen PICC that has dedicated lumen that can be used  -- I discussed with renal prior to starting.   -- Appreciate Nutrition assistance with ordering the TPN and lipids. Will check TGs. Thiamine and Folic Acid added to TPN  -- High risk for re-feeding. Will monitor closely with BID labs if necessary.  On telemetry     Atrial Fibrillation  -- EKG from 9/19/22 with new Atrial fibrillation with rate of 70bpm. No further work-up completed at that time. -- Repeat EKG 9/23.  -- His CHADS2-Vasc score is 2, however, he is very high risk. Continue Apixaban 5mg BID for stroke prevention  -- Not on any rate control agents. Consider TTE. Hx of Right thoracotomy  Loculated Effusion  -- Pulmonology previously consulted on 9/11/22. Per note: \" I do not think any percutaneous drainage would be successful at removing fluid as I suppose it is very thick and organized at this time. Would only recommend following clinically and radiographically. If he was to worsen or his right-sided effusion worsened he would need to be reevaluated by his thoracic surgeon at [facility] for additional drainage. \"    Hx of Bilateral BKA's, most recent L BKA  -- Vascular Surgery consulted for evaluation of L stump. Note wound is evolving with gradual removal of eschar. Continue wound care. Needs arranged follow-up at [facility] or in [facility].  -- Appreciate wound care following. See note from 9/19     Depression  -- Continue Zoloft 25mg daily    Thrombocytosis  -- Elevation has been felt due to reactive thrombocytosis. Downtrending      DVT PPx: On Apixaban for stroke prevention. Code Status: Full Code  Diet: Regular, supplements BID. He is not meeting caloric needs. Start TPN  Activity: Activity as tolerated.   Discharge planning: Has been reported that he is accepted at [facility], needs OP HD chair      Hospital Problems  Date Reviewed: 9/10/2022            Codes Class Noted POA    Injury to rectum without open wound into cavity ICD-10-CM: S36.60XA  ICD-9-CM: 863.45  9/20/2022 Yes        Open wound of anus ICD-10-CM: C55.505N  ICD-9-CM: 863.89  9/20/2022 Yes        Severe protein-calorie malnutrition ([facility] [address] ICD-10-CM: E43  ICD-9-CM: 166  9/13/2022 Yes        * (Principal) Intra-abdominal infection ICD-10-CM: B99.9  ICD-9-CM: 136.9  9/10/2022 Yes Review of Systems:   A comprehensive review of systems was negative except for that written in the HPI. Vital Signs:    Last 24hrs VS reviewed since prior progress note. Most recent are:  Visit Vitals  BP (!) 160/92 (BP 1 Location: Left upper arm, BP Patient Position: At rest)   Pulse 81   Temp 98.4 °F (36.9 °C)   Resp 16   Ht 5' 7\" (1.702 m)   Wt 65 kg (143 lb 4.8 oz)   SpO2 98%   BMI 22.44 kg/m²         Intake/Output Summary (Last 24 hours) at 9/23/2022 1249  Last data filed at 9/22/2022 1612  Gross per 24 hour   Intake --   Output 50 ml   Net -50 ml        Physical Examination:     I had a face to face encounter with this patient and independently examined them on 9/23/2022 as outlined below:          Constitutional:  No acute distress, cooperative, pleasant. His eyes are staring at the ceiling, roaming, not consistently making eye contact   ENT:  Oral mucosa moist, oropharynx benign. Resp:  Clear on left, decreased on right. No wheezing/rhonchi/rales. CV:  Irregularly irregular rhythm. No murmurs    GI:  Soft, non distended, non tender. Surgical sites on abdomen are c/d/I and covered with clean dressing. Musculoskeletal:  S/p Bl BKA. The right side is c/d/I. The Left side with mild bleeding/discharge from the left    Neurologic:  Moves all extremities symmetrically. AAO to person, place, and time but is confused and saying nonsensical things at times.  CN II-XII reviewed and without deficits            Data Review:    Review and/or order of clinical lab test  Review and/or order of tests in the radiology section of CPT  Review and/or order of tests in the medicine section of CPT      Labs:     Recent Labs     09/23/22 0210 09/22/22 0424   WBC 14.2* 15.4*   HGB 8.2* 8.7*   HCT 26.2* 27.5*   * 815*     Recent Labs     09/23/22  0210 09/22/22  0424 09/21/22  0240    140 141   K 3.2* 3.8 2.6*   * 106 106   CO2 27 23 27   BUN 11 8 11   CREA 2.49* 1.88* 2.33*   * 75 82   CA 7.6* 7.7* 7.5*   MG 1.5* 1.7 1.6   PHOS 2.8 2.7 3.0     Lab Results   Component Value Date/Time    Folate 4.1 (L) 09/10/2022 12:00 PM      Lab Results   Component Value Date/Time    Glucose (POC) 75 09/23/2022 11:45 AM    Glucose (POC) 81 09/23/2022 08:18 AM    Glucose (POC) 82 09/23/2022 05:57 AM    Glucose (POC) 82 09/22/2022 09:14 PM    Glucose (POC) 98 09/22/2022 04:12 PM       Medications Reviewed:     Current Facility-Administered Medications   Medication Dose Route Frequency    TPN ADULT - CENTRAL   IntraVENous CONTINUOUS    fat emulsion 20% (LIPOSYN, INTRAlipid) infusion 250 mL  250 mL IntraVENous QPM    [START ON 9/24/2022] amoxicillin-clavulanate (AUGMENTIN) 500-125 mg per tablet 1 Tablet  1 Tablet Oral Q24H    hydrALAZINE (APRESOLINE) 20 mg/mL injection 10 mg  10 mg IntraVENous Q6H PRN    oxyCODONE IR (ROXICODONE) tablet 10 mg  10 mg Oral Q4H PRN    [Held by provider] HYDROmorphone (DILAUDID) injection 0.75 mg  0.75 mg IntraVENous Q2H PRN    mirtazapine (REMERON) tablet 7.5 mg  7.5 mg Oral QHS    pantoprazole (PROTONIX) tablet 40 mg  40 mg Oral ACB&D    apixaban (ELIQUIS) tablet 5 mg  5 mg Oral BID    [Held by provider] HYDROmorphone (DILAUDID) 1 mg/mL oral solution 4 mg  4 mg Oral Q4HWA    anidulafungin (ERAXIS) 100 mg in 0.9% sodium chloride 130 mL IVPB  100 mg IntraVENous Q24H    sertraline (ZOLOFT) tablet 25 mg  25 mg Oral DAILY    epoetin vera-epbx (RETACRIT) injection 10,000 Units  10,000 Units SubCUTAneous DIALYSIS MON, WED & FRI    piperacillin-tazobactam (ZOSYN) 3.375 g in 0.9% sodium chloride (MBP/ADV) 100 mL MBP  3.375 g IntraVENous Q12H    collagenase (SANTYL) 250 unit/gram ointment   Topical DAILY    diphenhydrAMINE (BENADRYL) injection 12.5 mg  12.5 mg IntraVENous Q6H PRN    sodium chloride (NS) flush 5-40 mL  5-40 mL IntraVENous Q8H    sodium chloride (NS) flush 5-40 mL  5-40 mL IntraVENous PRN    acetaminophen (TYLENOL) tablet 650 mg  650 mg Oral Q6H PRN    Or    acetaminophen (TYLENOL) suppository 650 mg  650 mg Rectal Q6H PRN    polyethylene glycol (MIRALAX) packet 17 g  17 g Oral DAILY PRN    ondansetron (ZOFRAN ODT) tablet 4 mg  4 mg Oral Q8H PRN    Or    ondansetron (ZOFRAN) injection 4 mg  4 mg IntraVENous Q6H PRN    L.acidophilus-paracasei-S.thermophil-bifidobacter (RISAQUAD) 8 billion cell capsule  1 Capsule Oral DAILY    insulin lispro (HUMALOG) injection   SubCUTAneous AC&HS    glucose chewable tablet 16 g  4 Tablet Oral PRN    glucagon (GLUCAGEN) injection 1 mg  1 mg IntraMUSCular PRN    dextrose 10 % infusion 0-250 mL  0-250 mL IntraVENous PRN    Vancomycin - pharmacy to dose   Other Rx Dosing/Monitoring     ______________________________________________________________________  EXPECTED LENGTH OF STAY: 9d 14h  ACTUAL LENGTH OF STAY:          13                 William Spangler PA-C

## 2022-09-23 NOTE — PROGRESS NOTES
NUTRITION brief    TPN Recommendations/Plan:      Day 1 - Initiate TPN of 5%AA,15% dex @ 42 mL/hr (151 gm dex)  Include thiamine, folic acid  Start 436 mL 20% lipids daily    Day 2 - increase TPN to 8%AA, 15% dex @ 63 mL/hr (227 gm dex)    Day 3 - increase TPN to 8%AA, 18% dex @ 63 mL/hr (272 gm dex)    In order to fit recommended Amino Acids in bag, will need to use the 2nd option for Amino Acids \"Clinisol\"    Monitor Phos, K+, Mg daily with PRN repletion prior to advancing TPN    May need insulin adjustments (if added to TPN, recommend starting with 0.1 unit per gram of dex)    RD initiated TPN order set to ensure proper lab monitoring    Recommend goals of care conversation if no improvements in PO intake as TPN not ideal and will not be a long-term option, ?would he be able to get a PEG once abdominal infection clears       Diet: consistent CHO 60 gm/meal  Nutrition Support: pending  Nutrition-related meds: eraxis, epo, SSI, risaquad, remeron, protonix, zosyn    New events impacting nutrition plan of care: pt continues to eat very little, only bites if at all. Will not consume ONS. AMS/ delirium, concern for some hallucinations. Doubtful he would tolerate/ allow an NG.  PEG or Jtube not an option at this time with active abdominal infection. Weight trending down rapidly. High EEN with wounds and HD. Electrolyte abnormalities, folate deficiency. Pt is severely malnourished. Discussed with PA today, pt has access, so plan to start TPN. This was discussed with nephrology as well. For tonight, will start TPN of 5%AA, 15% dex @ 42 mL/hr.  250 mL 20% lipids added daily. Thiamine and folic acid included. Recommend goal of 8%AA, 18% dex @ 63 mL/hr with 250 mL 20% lipids daily to provide 1762 mL, 1909 kcal, 120 gm pro, and 272 gm dex (GIR 2.9 mg/kg/min). If allowed more volume, could better meet EEN, but limited with HD.       See full RD assessment from 9/20 for additional details, goals, and monitoring/evaluation.    Estimated Nutrition Needs:   Energy: 0358-1951 (30-35 kcal/kg)  Wt used: Admission (68 kg)  Protein: 100-120 (1.5-1.8 gm/kg or at least 20%)  Wt used: Admission   Fluid: 500 mL + OP     Recent Labs     09/23/22  0210 09/22/22  0424 09/21/22  0240   * 75 82   BUN 11 8 11   CREA 2.49* 1.88* 2.33*    140 141   K 3.2* 3.8 2.6*   * 106 106   CO2 27 23 27   CA 7.6* 7.7* 7.5*   PHOS 2.8 2.7 3.0   MG 1.5* 1.7 1.6       Recent Labs     09/23/22  1145 09/23/22  0818 09/23/22  0557 09/22/22  2114 09/22/22  1612 09/22/22  1255 09/22/22  1235 09/22/22  1209 09/22/22  1153 09/22/22  0641 09/22/22  0640 09/21/22  2115 09/21/22  2110 09/21/22  1718 09/21/22  1716 09/21/22  1132   GLUCPOC 75 81 82 82 98 113 64* 61* 65 70 70 75 67 73 72 80         Gayla Lehman RD  Available via [employer]

## 2022-09-23 NOTE — WOUND CARE
WOCN Note:     Follow-up visit for multiple wounds and colostomy. Previously seen by L[facility] [address] . Seen today with Davidson Chen NP. Chart shows:  Admitted on 9/10/22. Admitted for abdominal pain with perforation and colostomy creation on 9/10/22  History of bilateral BKA's, DM, HD, aspiration pneumonia with intubation, peg. Assessment:   Communicative and medicated by RN. Able to turn with assist onto left side. Surface: Fackler gel mattress with blower added     1. POA unstageable pressure injury to sacrum  4 x 3 x 0.1 cm  100% fibrinous yellow     2. POA stage 3 pressure injury to left buttock  4.5 x 3 x 0.1 cm  100% moist pink with slight yellow glaze    Tx: cleaned sacrum and buttock with Vashe, applied Santyl and covered with foam dressing. 3. POA stage 3 pressure injury to right buttock  Fully resurfaced     4. LUQ drain site  90% soft yellow/tan necrosis 10% granular red  Flushed with saline  Santyl applied with Vashe-moist packing and dry cover dressing     5. Left BKA site  Necrosis across most of incision with large clot in cavity on lateral side  Did not disrupt clot. Santyl applied to rest of incision after cleaning  Packed open area with Vashe-moist gauze. Dry gauze over all of incision. Colostomy:  Stoma is red and abdomen rises around it slightly  Brown mucoid output  Some distant denudation protected with Marathon  1-piece pouch with protective ring applied  He is not able to comprehend care of ostomy or education at this time. Wound Recommendations:    Continue wound care as ordered to sacrum, buttocks, and abdomen  Left BKA incision: clean with Vashe, apply Santyl to brown tissue, pack open area with Vash-moist gauze and cover all with dry gauze. Change daily. PI Prevention:  Turn/reposition approximately every 2 hours  Low Air Loss mattress: Use only flat sheet and one incontinence pad. Discussed with RN. Transition of Care: Plan to follow weekly and as needed while admitted to hospital. Notes indicate he will discharge to a facility in [facility].      ARNOLDO FoxN, RN, [facility]  Certified Wound, Ostomy, Continence Nurse  office [phone]  Available via [employer]

## 2022-09-23 NOTE — PROGRESS NOTES
ID Progress Note  2022    Subjective:     Alert, confused    Review of Systems:            Symptom Y/N Comments   Symptom Y/N Comments   Fever/Chills       Chest Pain        Poor Appetite       Edema        Cough       Abdominal Pain        Sputum       Joint Pain        SOB/ANDREWS       Pruritis/Rash        Nausea/vomit       Tolerating PT/OT        Diarrhea       Tolerating Diet        Constipation       Other           Could NOT obtain due to:       Objective:     Vitals: Visit Vitals  BP (!) 169/101 (BP 1 Location: Left upper arm, BP Patient Position: At rest)   Pulse 79   Temp 98.4 °F (36.9 °C)   Resp 16   Ht 5' 7\" (1.702 m)   Wt 65 kg (143 lb 4.8 oz)   SpO2 100%   BMI 22.44 kg/m²          Tmax:  Temp (24hrs), Av.1 °F (36.7 °C), Min:97.6 °F (36.4 °C), Max:98.4 °F (36.9 °C)      PHYSICAL EXAM:  General: Chronically ill appearing. WD, WN. Alert, cooperative, no acute distress    EENT:  Anicteric sclerae. Dry mucous membrane  Resp:  CTA bilaterally, no wheezing or rales. No accessory muscle use  CV:  Regular  rhythm  GI:  Soft, Non distended, Non tender. +Bowel sounds, colostomy leaking  Neurologic:  Alert and oriented X 3, normal speech,   Psych:   Fair insight. Not anxious nor agitated  Skin:  No rashes.   No jaundice    Pressure injury; sacrum, right buttock,   left BKA stump site; no active drainage, diffuse erythema, dehisced along the incision site      Labs:   Lab Results   Component Value Date/Time    WBC 14.2 (H) 2022 02:10 AM    HGB 8.2 (L) 2022 02:10 AM    HCT 26.2 (L) 2022 02:10 AM    PLATELET 248 (H)  02:10 AM    MCV 94.6 2022 02:10 AM     Lab Results   Component Value Date/Time    Sodium 144 2022 02:10 AM    Potassium 3.2 (L) 2022 02:10 AM    Chloride 110 (H) 2022 02:10 AM    CO2 27 2022 02:10 AM    Anion gap 7 2022 02:10 AM    Glucose 104 (H) 2022 02:10 AM    BUN 11 2022 02:10 AM    Creatinine 2.49 (H) 2022 02:10 AM    BUN/Creatinine ratio 4 (L) 09/23/2022 02:10 AM    GFR est AA 33 (L) 09/23/2022 02:10 AM    GFR est non-AA 27 (L) 09/23/2022 02:10 AM    Calcium 7.6 (L) 09/23/2022 02:10 AM    Bilirubin, total 0.7 09/10/2022 12:00 PM    Alk. phosphatase 86 09/10/2022 12:00 PM    Protein, total 5.8 (L) 09/10/2022 12:00 PM    Albumin 1.7 (L) 09/10/2022 12:00 PM    Globulin 4.1 (H) 09/10/2022 12:00 PM    A-G Ratio 0.4 (L) 09/10/2022 12:00 PM    ALT (SGPT) <6 (L) 09/10/2022 12:00 PM     Assessment:      Dislodged feeding tube/peritonitis   Free air and perirectal fistula  S/p laparoscopy procedure; take down and removal of J tube, colostomy placement, and I & D of abdominal wall (9/10)  Recent right thoracotomy  Hx Bilateral BKAs   - afebrile, wbc 17.3->14     Blood cx (9/9, 9/16) no growth    Objective:      Completing 2 weeks of IV Eraxis, zosyn, and vancomycin as of 9/23. Recommend to 2  more weeks with PO Augmentin, starting 9/24. Fever work up if temp >= 100.4    Above plan of care discussed and agreed with Dr. Irene Lauren    Pt will be seen as need. Please contact us with any questions or concerns. Group will see prn this weekend, call if issues arise.      Paco Early NP

## 2022-09-23 NOTE — PROGRESS NOTES
Occupational Therapy  9/23/2022    Chart reviewed. Attempted to see pt for OT. Pt is currently with wound care at bedside. Will follow. Thank you.  Ritika Mack MS, OTR/L

## 2022-09-23 NOTE — PROGRESS NOTES
Nephrology Progress Note  Karli Matthew  Date of Admission : 9/9/2022    CC:  Follow up for JEMIMA       Assessment and Plan     JEMIMA on HD:  - 2/2 ATN  - HD dependent MWF for now while here  - no signs of recovery yet  - PC placed 9/16  - HD for today. Hypokalemia:  - 3.5K bath w/ HD    Anemia:  - MICHAEL MWF    PAD s/p b/l BKA    Recent sepsis    Empyema    ABD wall abscess:  - J-tube removal, colostomy, I&D of abd wound    Sacral decub       Interval History:  Seen and examined. Resting in bed. Confused. For HD later today. No cp or sob. Pt appears comfortable    Current Medications: all current  Medications have been eviewed in EPIC  Review of Systems: Review of systems not obtained due to patient factors. Objective:  Vitals:    Vitals:    09/23/22 0339 09/23/22 0400 09/23/22 0600 09/23/22 0816   BP: (!) 169/101   (!) 172/101   Pulse: 82 82 79 80   Resp: 16   18   Temp: 98.4 °F (36.9 °C)   97.9 °F (36.6 °C)   TempSrc:       SpO2:    100%   Weight: 65 kg (143 lb 4.8 oz)      Height:         Intake and Output:  No intake/output data recorded. 09/21 1901 - 09/23 0700  In: -   Out: 550 [Urine:50]    Physical Examination:  General: Confused   Neck:  Supple, no mass  Resp:  Lungs CTA B/L, no wheezing , normal respiratory effort  CV:  RRR,  no murmur or rub, no LE edema  GI:  Soft, NT, + Bowel sounds, + ostomy  Neurologic:  Confused   Access:           RIJ PC    []    High complexity decision making was performed  []    Patient is at high-risk of decompensation with multiple organ involvement    Lab Data Personally Reviewed: I have reviewed all the pertinent labs, microbiology data and radiology studies during assessment.     Recent Labs     09/23/22  0210 09/22/22  0424 09/21/22  0240    140 141   K 3.2* 3.8 2.6*   * 106 106   CO2 27 23 27   * 75 82   BUN 11 8 11   CREA 2.49* 1.88* 2.33*   CA 7.6* 7.7* 7.5*   MG 1.5* 1.7 1.6   PHOS 2.8 2.7 3.0       Recent Labs     09/23/22  0210 09/22/22  042 09/21/22  0240   WBC 14.2* 15.4* 16.0*   HGB 8.2* 8.7* 7.7*   HCT 26.2* 27.5* 24.0*   * 815* 670*       No results found for: SDES  Lab Results   Component Value Date/Time    Culture result: NO GROWTH 6 DAYS 09/16/2022 08:45 PM    Culture result: NO GROWTH 6 DAYS 09/09/2022 08:52 PM     Recent Results (from the past 24 hour(s))   GLUCOSE, POC    Collection Time: 09/22/22 11:53 AM   Result Value Ref Range    Glucose (POC) 65 65 - 117 mg/dL    Performed by [employer], POC    Collection Time: 09/22/22 12:09 PM   Result Value Ref Range    Glucose (POC) 61 (L) 65 - 117 mg/dL    Performed by [facility] [employer] [facility], POC    Collection Time: 09/22/22 12:35 PM   Result Value Ref Range    Glucose (POC) 64 (L) 65 - 117 mg/dL    Performed by [facility] [employer] [facility], POC    Collection Time: 09/22/22 12:55 PM   Result Value Ref Range    Glucose (POC) 113 65 - 117 mg/dL    Performed by [facility] [employer], POC    Collection Time: 09/22/22  4:12 PM   Result Value Ref Range    Glucose (POC) 98 65 - 117 mg/dL    Performed by [facility] [employer], POC    Collection Time: 09/22/22  9:14 PM   Result Value Ref Range    Glucose (POC) 82 65 - 117 mg/dL    Performed by CONOR Gunderson    CBC WITH AUTOMATED DIFF    Collection Time: 09/23/22  2:10 AM   Result Value Ref Range    WBC 14.2 (H) 4.1 - 11.1 K/uL    RBC 2.77 (L) 4.10 - 5.70 M/uL    HGB 8.2 (L) 12.1 - 17.0 g/dL    HCT 26.2 (L) 36.6 - 50.3 %    MCV 94.6 80.0 - 99.0 FL    MCH 29.6 26.0 - 34.0 PG    MCHC 31.3 30.0 - 36.5 g/dL    RDW 19.9 (H) 11.5 - 14.5 %    PLATELET 063 (H) 061 - 400 K/uL    MPV 9.0 8.9 - 12.9 FL    NRBC 0.0 0  WBC    ABSOLUTE NRBC 0.00 0.00 - 0.01 K/uL    NEUTROPHILS 78 (H) 32 - 75 %    LYMPHOCYTES 11 (L) 12 - 49 %    MONOCYTES 7 5 - 13 %    EOSINOPHILS 2 0 - 7 %    BASOPHILS 1 0 - 1 %    IMMATURE GRANULOCYTES 1 (H) 0.0 - 0.5 %    ABS. NEUTROPHILS 11.1 (H) 1.8 - 8.0 K/UL    ABS. LYMPHOCYTES 1.6 0.8 - 3.5 K/UL    ABS. MONOCYTES 1.0 0.0 - 1.0 K/UL    ABS. EOSINOPHILS 0.3 0.0 - 0.4 K/UL    ABS. BASOPHILS 0.1 0.0 - 0.1 K/UL    ABS. IMM. GRANS. 0.1 (H) 0.00 - 0.04 K/UL    DF SMEAR SCANNED      PLATELET COMMENTS CLUMPED PLATELETS      RBC COMMENTS ANISOCYTOSIS  2+        RBC COMMENTS MACROCYTOSIS  1+        RBC COMMENTS POLYCHROMASIA  1+        RBC COMMENTS OVALOCYTES  PRESENT        WBC COMMENTS REACTIVE LYMPHS     METABOLIC PANEL, BASIC    Collection Time: 09/23/22  2:10 AM   Result Value Ref Range    Sodium 144 136 - 145 mmol/L    Potassium 3.2 (L) 3.5 - 5.1 mmol/L    Chloride 110 (H) 97 - 108 mmol/L    CO2 27 21 - 32 mmol/L    Anion gap 7 5 - 15 mmol/L    Glucose 104 (H) 65 - 100 mg/dL    BUN 11 6 - 20 MG/DL    Creatinine 2.49 (H) 0.70 - 1.30 MG/DL    BUN/Creatinine ratio 4 (L) 12 - 20      GFR est AA 33 (L) >60 ml/min/1.73m2    GFR est non-AA 27 (L) >60 ml/min/1.73m2    Calcium 7.6 (L) 8.5 - 10.1 MG/DL   MAGNESIUM    Collection Time: 09/23/22  2:10 AM   Result Value Ref Range    Magnesium 1.5 (L) 1.6 - 2.4 mg/dL   PHOSPHORUS    Collection Time: 09/23/22  2:10 AM   Result Value Ref Range    Phosphorus 2.8 2.6 - 4.7 MG/DL   GLUCOSE, POC    Collection Time: 09/23/22  5:57 AM   Result Value Ref Range    Glucose (POC) 82 65 - 117 mg/dL    Performed by [address], POC    Collection Time: 09/23/22  8:18 AM   Result Value Ref Range    Glucose (POC) 81 65 - 117 mg/dL    Performed by Dinorah Tom MD  [address]  Phone - [phone]   Fax - [phone]  www. [facility]

## 2022-09-24 LAB
ANION GAP SERPL CALC-SCNC: 6 MMOL/L (ref 5–15)
ATRIAL RATE: 76 BPM
BASOPHILS # BLD: 0.1 K/UL (ref 0–0.1)
BASOPHILS NFR BLD: 1 % (ref 0–1)
BUN SERPL-MCNC: 11 MG/DL (ref 6–20)
BUN/CREAT SERPL: 4 (ref 12–20)
CALCIUM SERPL-MCNC: 7.3 MG/DL (ref 8.5–10.1)
CALCULATED R AXIS, ECG10: -13 DEGREES
CALCULATED T AXIS, ECG11: -158 DEGREES
CHLORIDE SERPL-SCNC: 110 MMOL/L (ref 97–108)
CO2 SERPL-SCNC: 27 MMOL/L (ref 21–32)
CREAT SERPL-MCNC: 2.57 MG/DL (ref 0.7–1.3)
DIAGNOSIS, 93000: NORMAL
DIFFERENTIAL METHOD BLD: ABNORMAL
EOSINOPHIL # BLD: 0.3 K/UL (ref 0–0.4)
EOSINOPHIL NFR BLD: 3 % (ref 0–7)
ERYTHROCYTE [DISTWIDTH] IN BLOOD BY AUTOMATED COUNT: 20.2 % (ref 11.5–14.5)
EST. AVERAGE GLUCOSE BLD GHB EST-MCNC: ABNORMAL MG/DL
GLUCOSE BLD STRIP.AUTO-MCNC: 108 MG/DL (ref 65–117)
GLUCOSE BLD STRIP.AUTO-MCNC: 129 MG/DL (ref 65–117)
GLUCOSE BLD STRIP.AUTO-MCNC: 145 MG/DL (ref 65–117)
GLUCOSE BLD STRIP.AUTO-MCNC: 173 MG/DL (ref 65–117)
GLUCOSE SERPL-MCNC: 118 MG/DL (ref 65–100)
HBA1C MFR BLD: <3.8 % (ref 4–5.6)
HCT VFR BLD AUTO: 23.3 % (ref 36.6–50.3)
HGB BLD-MCNC: 7.2 G/DL (ref 12.1–17)
IMM GRANULOCYTES # BLD AUTO: 0.1 K/UL (ref 0–0.04)
IMM GRANULOCYTES NFR BLD AUTO: 1 % (ref 0–0.5)
LYMPHOCYTES # BLD: 1.7 K/UL (ref 0.8–3.5)
LYMPHOCYTES NFR BLD: 17 % (ref 12–49)
MAGNESIUM SERPL-MCNC: 1.6 MG/DL (ref 1.6–2.4)
MCH RBC QN AUTO: 30 PG (ref 26–34)
MCHC RBC AUTO-ENTMCNC: 30.9 G/DL (ref 30–36.5)
MCV RBC AUTO: 97.1 FL (ref 80–99)
MONOCYTES # BLD: 0.4 K/UL (ref 0–1)
MONOCYTES NFR BLD: 4 % (ref 5–13)
NEUTS SEG # BLD: 7.4 K/UL (ref 1.8–8)
NEUTS SEG NFR BLD: 74 % (ref 32–75)
NRBC # BLD: 0 K/UL (ref 0–0.01)
NRBC BLD-RTO: 0 PER 100 WBC
PHOSPHATE SERPL-MCNC: 2.8 MG/DL (ref 2.6–4.7)
PLATELET # BLD AUTO: 568 K/UL (ref 150–400)
PMV BLD AUTO: 9.2 FL (ref 8.9–12.9)
POTASSIUM SERPL-SCNC: 3.4 MMOL/L (ref 3.5–5.1)
Q-T INTERVAL, ECG07: 502 MS
QRS DURATION, ECG06: 76 MS
QTC CALCULATION (BEZET), ECG08: 560 MS
RBC # BLD AUTO: 2.4 M/UL (ref 4.1–5.7)
RBC MORPH BLD: ABNORMAL
SERVICE CMNT-IMP: ABNORMAL
SERVICE CMNT-IMP: NORMAL
SODIUM SERPL-SCNC: 143 MMOL/L (ref 136–145)
TRIGL SERPL-MCNC: 199 MG/DL (ref ?–150)
VENTRICULAR RATE, ECG03: 75 BPM
WBC # BLD AUTO: 10 K/UL (ref 4.1–11.1)

## 2022-09-24 PROCEDURE — 82962 GLUCOSE BLOOD TEST: CPT

## 2022-09-24 PROCEDURE — 90935 HEMODIALYSIS ONE EVALUATION: CPT

## 2022-09-24 PROCEDURE — 74011250637 HC RX REV CODE- 250/637: Performed by: PHYSICIAN ASSISTANT

## 2022-09-24 PROCEDURE — 84100 ASSAY OF PHOSPHORUS: CPT

## 2022-09-24 PROCEDURE — 65270000046 HC RM TELEMETRY

## 2022-09-24 PROCEDURE — 74011000250 HC RX REV CODE- 250: Performed by: PHYSICIAN ASSISTANT

## 2022-09-24 PROCEDURE — 74011000250 HC RX REV CODE- 250: Performed by: COLON & RECTAL SURGERY

## 2022-09-24 PROCEDURE — 80048 BASIC METABOLIC PNL TOTAL CA: CPT

## 2022-09-24 PROCEDURE — 83036 HEMOGLOBIN GLYCOSYLATED A1C: CPT

## 2022-09-24 PROCEDURE — 84478 ASSAY OF TRIGLYCERIDES: CPT

## 2022-09-24 PROCEDURE — 74011250637 HC RX REV CODE- 250/637: Performed by: STUDENT IN AN ORGANIZED HEALTH CARE EDUCATION/TRAINING PROGRAM

## 2022-09-24 PROCEDURE — 83735 ASSAY OF MAGNESIUM: CPT

## 2022-09-24 PROCEDURE — 74011250637 HC RX REV CODE- 250/637: Performed by: COLON & RECTAL SURGERY

## 2022-09-24 PROCEDURE — 74011636637 HC RX REV CODE- 636/637: Performed by: COLON & RECTAL SURGERY

## 2022-09-24 PROCEDURE — 74011250636 HC RX REV CODE- 250/636: Performed by: PHYSICIAN ASSISTANT

## 2022-09-24 PROCEDURE — 85025 COMPLETE CBC W/AUTO DIFF WBC: CPT

## 2022-09-24 PROCEDURE — 74011000258 HC RX REV CODE- 258: Performed by: PHYSICIAN ASSISTANT

## 2022-09-24 PROCEDURE — 36415 COLL VENOUS BLD VENIPUNCTURE: CPT

## 2022-09-24 PROCEDURE — 74011250636 HC RX REV CODE- 250/636: Performed by: NURSE PRACTITIONER

## 2022-09-24 RX ADMIN — HYDROMORPHONE HYDROCHLORIDE 4 MG: 2 TABLET ORAL at 14:24

## 2022-09-24 RX ADMIN — COLLAGENASE SANTYL: 250 OINTMENT TOPICAL at 09:28

## 2022-09-24 RX ADMIN — CALCIUM GLUCONATE: 94 INJECTION, SOLUTION INTRAVENOUS at 18:04

## 2022-09-24 RX ADMIN — FERROUS SULFATE TAB 325 MG (65 MG ELEMENTAL FE) 325 MG: 325 (65 FE) TAB at 09:21

## 2022-09-24 RX ADMIN — I.V. FAT EMULSION 250 ML: 20 EMULSION INTRAVENOUS at 20:30

## 2022-09-24 RX ADMIN — APIXABAN 5 MG: 5 TABLET, FILM COATED ORAL at 09:21

## 2022-09-24 RX ADMIN — SERTRALINE 25 MG: 50 TABLET, FILM COATED ORAL at 09:21

## 2022-09-24 RX ADMIN — HYDROMORPHONE HYDROCHLORIDE 4 MG: 2 TABLET ORAL at 19:06

## 2022-09-24 RX ADMIN — PANTOPRAZOLE SODIUM 40 MG: 40 TABLET, DELAYED RELEASE ORAL at 16:32

## 2022-09-24 RX ADMIN — SODIUM CHLORIDE, PRESERVATIVE FREE 10 ML: 5 INJECTION INTRAVENOUS at 06:44

## 2022-09-24 RX ADMIN — HYDROMORPHONE HYDROCHLORIDE 2 MG: 2 TABLET ORAL at 09:37

## 2022-09-24 RX ADMIN — SODIUM CHLORIDE, PRESERVATIVE FREE 10 ML: 5 INJECTION INTRAVENOUS at 13:06

## 2022-09-24 RX ADMIN — Medication 1 CAPSULE: at 09:21

## 2022-09-24 RX ADMIN — FERROUS SULFATE TAB 325 MG (65 MG ELEMENTAL FE) 325 MG: 325 (65 FE) TAB at 16:32

## 2022-09-24 RX ADMIN — AMOXICILLIN AND CLAVULANATE POTASSIUM 1 TABLET: 500; 125 TABLET, FILM COATED ORAL at 12:16

## 2022-09-24 RX ADMIN — MIRTAZAPINE 7.5 MG: 15 TABLET, FILM COATED ORAL at 22:12

## 2022-09-24 RX ADMIN — APIXABAN 5 MG: 5 TABLET, FILM COATED ORAL at 20:29

## 2022-09-24 RX ADMIN — HYDROMORPHONE HYDROCHLORIDE 0.2 MG: 1 INJECTION, SOLUTION INTRAMUSCULAR; INTRAVENOUS; SUBCUTANEOUS at 00:51

## 2022-09-24 RX ADMIN — HYDRALAZINE HYDROCHLORIDE 10 MG: 20 INJECTION INTRAMUSCULAR; INTRAVENOUS at 09:41

## 2022-09-24 RX ADMIN — HYDROMORPHONE HYDROCHLORIDE 0.2 MG: 1 INJECTION, SOLUTION INTRAMUSCULAR; INTRAVENOUS; SUBCUTANEOUS at 22:13

## 2022-09-24 RX ADMIN — Medication 2 UNITS: at 12:23

## 2022-09-24 RX ADMIN — SODIUM CHLORIDE, PRESERVATIVE FREE 10 ML: 5 INJECTION INTRAVENOUS at 20:38

## 2022-09-24 RX ADMIN — PANTOPRAZOLE SODIUM 40 MG: 40 TABLET, DELAYED RELEASE ORAL at 09:21

## 2022-09-24 NOTE — ROUTINE PROCESS
Bedside and Verbal shift change report given to Mildred Kerr (oncoming nurse) by Radha Gordon (offgoing nurse). Report included the following information SBAR, Kardex, Intake/Output, MAR, and Cardiac Rhythm A Fib/SR .

## 2022-09-24 NOTE — PROGRESS NOTES
[facility] Adult  Hospitalist Group                                                                                          Hospitalist Progress Note  William Spangler PA-C  Answering service: [phone] from in house phone        Date of Service:  2022  NAME:  Lillian Neves  :  1970  MRN:  [id number]      Admission Summary:   Lillian Neves is a 45 y.o. male with PMHx of T2DM, PAD s/p bilateral BKA and recent prolonged hospital course at [facility] for LLE BKA c/b respiratory failure with prolonged intubation, pleural effusion requiring decortication and R thoracotomy, renal failure requiring HD, and J tube placement for nutrition. He was discharged to [facility] but was ultimately transferred to [facility] on 22 due to abdominal pain and was diagnosed with subcutaneous abscess at J tube site with pneumoperitoneum and perirectal fistula. He underwent diagnostic laparoscopy with removal of J-tube and I&D of abdominal wall, and colostomy on 9/10/22 with General Surgeon, Dr. Gabe Damon, and then rigid proctosigmoidoscopy on 9/15/22 with colo-rectal surgeon, Dr. Leonora Griffith, where no abscess was visualized and no devitalized tissue and no therapeutic procedure performed. During this hospitalization the nephrology team continues to follow and performing HD MWF, Vascular Surgery was consulted for skin breakdown at L BKA incision line (no surgical intervention required), ID consulted (2 week course of IV Eraxis, Zosyn, Vanc on , followed by PO Augmentin monotherapy on ), and pulmonology was consulted due to abnormal chest imaging with right pleural effusion with hx of empyema (do not recommend surgical intervention or drainage at this time). Interval history / Subjective:   Mr. Saman Perrin has not had any major changes overnight. He is having ongoing pain, but appears the Dilaudid makes him sleepy.  Appreciate nurses assessing him and appropriateness prior to receiving the medication. He started TPN last night which the wife is pleased. I discussed with the wife my guarded optimism for his long term recovery. All questions and concerns addressed. Assessment & Plan:     Abdominal Pain  S/p J tube placement at OSH, Pneumoperitoneum and subcutaneous abscess  Sepsis without septic shock  -- s/p diagnostic laparoscopy with removal of J-tube and I&D of abdominal wall, and colostomy on 9/10/22 with General Surgeon, Dr. Jed Gabriel. Spoke with Dr. Jed Gabriel on 9/24/22 via [employer] and will remove staples at abdominal incision sites. -- Appreciate wound care following for ostomy care  -- ID Previously following. Recommended 2 week course of IV Eraxis/Zosyn/Vancomycin to complete 9/23/22. Start monotherapy with PO Augmentin on 9/24/22 x 2 weeks. Appreciate pharmacy dose adjustment. -- Palliative Care previously following for pain control. Standing pain medication and PRN IV Dilaudid had been held with AMS. His pain seemed improved but he notes is poorly controlled. Will continue PO Dilaudid 2-4mg q4h PRN and IV Dilaudid available PRN. Hold parameters added. Anorectal Wound  -- s/p rigid proctosigmoidoscopy on 9/15/22 with colo-rectal surgeon, Dr. Abraham Walker, where no abscess was visualized and no devitalized tissue and no therapeutic procedure performed.     + occult stool blood  Normocytic Anemia, low iron  -- On PPI. No melena or hematochezia  -- start iron supplements     ESRD due to ATN on HD  -- CVC placed 9/16/22- IR.  -- Appreciate pharmacy guidance for medication dosing  -- Nephrology following. On HD MWF. No signs of recovery. -- Discussed with nephrology about starting TPN for which they are comfortable with     Hx of T2DM  -- Hgb A1c on 9/10/22 was < 3.8% and again less than assay on recheck.  Hgb A1c was 14.7% on 7/3/18  -- Has been on a meal time insulin sliding scale, however, has been frequently hypoglycemic and is not eating almost anything  -- Previously diabetes team following  -- Follow glucose now receiving TPN     Delirium  AMS  Hallucinations  -- Has been altered for multiple days up to 9/23. Head CT 9/23 revealed no acute intracranial pathology  -- Overall suspect this is complicated combined toxic metabolic encephalopathy, hospital delirium, and opioid induced. Will try and limit opioids but he I do not want to under-treat his pain. Making Tylenol standing is sub-optimal as can mask fevers and has a complicated infectious history. Also limited due to ESRD     Malnutrition  Not meeting caloric needs  -- Started TPN on 9/23/22 and dosing adjusted based on nutritionist recommendation. He has a double lumen PICC that has dedicated lumen that can be used  -- Appreciate Nutrition assistance with ordering the TPN and lipids. Will check TGs. Thiamine and Folic Acid added to TPN  -- High risk for re-feeding. Will monitor closely with BID labs if necessary. On telemetry     Atrial Fibrillation  -- EKG from 9/19/22 with new Atrial fibrillation with rate of 70bpm. No further work-up completed at that time. -- Repeat EKG 9/23 appeared consistent with sinus rhythm. -- His CHADS2-Vasc score is 2, however, he is very high risk. Continue Apixaban 5mg BID for stroke prevention  -- Not on any rate control agents. Consider TTE. Hx of Right thoracotomy  Loculated Effusion  -- Pulmonology previously consulted on 9/11/22. Per note: \" I do not think any percutaneous drainage would be successful at removing fluid as I suppose it is very thick and organized at this time. Would only recommend following clinically and radiographically. If he was to worsen or his right-sided effusion worsened he would need to be reevaluated by his thoracic surgeon at [facility] for additional drainage. \"     Hx of Bilateral BKA's, most recent L BKA  -- Vascular Surgery consulted for evaluation of L stump. Note wound is evolving with gradual removal of eschar.  Continue wound care. Needs arranged follow-up at [facility] or in [address] [place] [address].  -- Appreciate wound care following. See note from 9/19     Depression  -- Continue Zoloft 25mg daily     Thrombocytosis  -- Elevation has been felt due to reactive thrombocytosis. Downtrending      DVT PPx: On Apixaban for stroke prevention. Code Status: Full Code  Diet: Regular, supplements BID. He is not meeting caloric needs. Continue TPN  Activity: Activity as tolerated. Discharge planning: Has been reported that he is accepted at [facility], needs OP HD chair      Hospital Problems  Date Reviewed: 9/10/2022            Codes Class Noted POA    Injury to rectum without open wound into cavity ICD-10-CM: S36.60XA  ICD-9-CM: 863.45  9/20/2022 Yes        Open wound of anus ICD-10-CM: H21.486C  ICD-9-CM: 863.89  9/20/2022 Yes        Severe protein-calorie malnutrition ([facility] [address] ICD-10-CM: E43  ICD-9-CM: 677  9/13/2022 Yes        * (Principal) Intra-abdominal infection ICD-10-CM: B99.9  ICD-9-CM: 136.9  9/10/2022 Yes             Review of Systems:   A comprehensive review of systems was negative except for that written in the HPI. A comprehensive review of systems was negative except for that written in the HPI. Vital Signs:    Last 24hrs VS reviewed since prior progress note. Most recent are:  Visit Vitals  /80   Pulse 78   Temp 98.7 °F (37.1 °C)   Resp 18   Ht 5' 7\" (1.702 m)   Wt 64.9 kg (143 lb 1.3 oz)   SpO2 100%   BMI 22.41 kg/m²         Intake/Output Summary (Last 24 hours) at 9/24/2022 1528  Last data filed at 9/24/2022 0415  Gross per 24 hour   Intake --   Output 1325 ml   Net -1325 ml        Physical Examination:     I had a face to face encounter with this patient and independently examined them on 9/24/2022 as outlined below:          Constitutional:  No acute distress, sleeping on arrival. Awakens to verbal stimuli. Has significant pain when moving in the bed. Cooperative   ENT:  Oral mucosa moist, oropharynx benign. Dried blood on the lips   Resp:  CTA bilaterally. No wheezing/rhonchi/rales. No accessory muscle use. CV:  Regular rhythm, normal rate, no murmurs    GI:  Soft, non distended, non tender. His ostomy site has green stool in the bag. His prior J-tube site I placed a new wet-to-dry dressing in but with large defect with no active pus/drainage and possible evidence of granulation tissue. The laparoscopic surgical sites on the abdomen are covered with dressing with staples underneath which are c/d/i    Musculoskeletal:   S/p Bl BKA. The right side is c/d/I. The Left side with mild bleeding/discharge on the dressing    Neurologic:  Moves all extremities.   AAOx3, CN II-XII reviewed  Skin: There is a large, unstageable pressure ulcer on the sacrum with fibrinous yellow tissue and additional pressure injuries to right and left buttock            Data Review:    Review and/or order of clinical lab test  Review and/or order of tests in the radiology section of CPT  Review and/or order of tests in the medicine section of CPT      Labs:     Recent Labs     09/24/22  0144 09/23/22  0210   WBC 10.0 14.2*   HGB 7.2* 8.2*   HCT 23.3* 26.2*   * 686*     Recent Labs     09/24/22  0144 09/23/22  0210 09/22/22  0424    144 140   K 3.4* 3.2* 3.8   * 110* 106   CO2 27 27 23   BUN 11 11 8   CREA 2.57* 2.49* 1.88*   * 104* 75   CA 7.3* 7.6* 7.7*   MG 1.6 1.5* 1.7   PHOS 2.8 2.8 2.7     Lab Results   Component Value Date/Time    Triglyceride 199 (H) 09/24/2022 01:44 AM     Lab Results   Component Value Date/Time    Glucose (POC) 145 (H) 09/24/2022 12:15 PM    Glucose (POC) 108 09/24/2022 06:43 AM    Glucose (POC) 120 (H) 09/23/2022 09:09 PM    Glucose (POC) 75 09/23/2022 11:45 AM    Glucose (POC) 81 09/23/2022 08:18 AM         Medications Reviewed:     Current Facility-Administered Medications   Medication Dose Route Frequency    TPN ADULT - CENTRAL   IntraVENous CONTINUOUS    TPN ADULT - CENTRAL   IntraVENous CONTINUOUS    fat emulsion 20% (LIPOSYN, INTRAlipid) infusion 250 mL  250 mL IntraVENous QPM    amoxicillin-clavulanate (AUGMENTIN) 500-125 mg per tablet 1 Tablet  1 Tablet Oral Q24H    ferrous sulfate tablet 325 mg  1 Tablet Oral BID WITH MEALS    HYDROmorphone (DILAUDID) injection 0.2 mg  0.2 mg IntraVENous Q4H PRN    HYDROmorphone (DILAUDID) tablet 2 mg  2 mg Oral Q4H PRN    HYDROmorphone (DILAUDID) tablet 4 mg  4 mg Oral Q4H PRN    hydrALAZINE (APRESOLINE) 20 mg/mL injection 10 mg  10 mg IntraVENous Q6H PRN    mirtazapine (REMERON) tablet 7.5 mg  7.5 mg Oral QHS    pantoprazole (PROTONIX) tablet 40 mg  40 mg Oral ACB&D    apixaban (ELIQUIS) tablet 5 mg  5 mg Oral BID    sertraline (ZOLOFT) tablet 25 mg  25 mg Oral DAILY    epoetin vera-epbx (RETACRIT) injection 10,000 Units  10,000 Units SubCUTAneous DIALYSIS MON, WED & FRI    collagenase (SANTYL) 250 unit/gram ointment   Topical DAILY    diphenhydrAMINE (BENADRYL) injection 12.5 mg  12.5 mg IntraVENous Q6H PRN    sodium chloride (NS) flush 5-40 mL  5-40 mL IntraVENous Q8H    sodium chloride (NS) flush 5-40 mL  5-40 mL IntraVENous PRN    acetaminophen (TYLENOL) tablet 650 mg  650 mg Oral Q6H PRN    Or    acetaminophen (TYLENOL) suppository 650 mg  650 mg Rectal Q6H PRN    polyethylene glycol (MIRALAX) packet 17 g  17 g Oral DAILY PRN    ondansetron (ZOFRAN ODT) tablet 4 mg  4 mg Oral Q8H PRN    Or    ondansetron (ZOFRAN) injection 4 mg  4 mg IntraVENous Q6H PRN    L.acidophilus-paracasei-S.thermophil-bifidobacter (RISAQUAD) 8 billion cell capsule  1 Capsule Oral DAILY    insulin lispro (HUMALOG) injection   SubCUTAneous AC&HS    glucose chewable tablet 16 g  4 Tablet Oral PRN    glucagon (GLUCAGEN) injection 1 mg  1 mg IntraMUSCular PRN    dextrose 10 % infusion 0-250 mL  0-250 mL IntraVENous PRN     ______________________________________________________________________  EXPECTED LENGTH OF STAY: 9d 14h  ACTUAL LENGTH OF STAY:          14                 Cy A KATLYN Spangler

## 2022-09-24 NOTE — PROCEDURES
Hemodialysis / [phone]    Vitals Pre Post Assessment Pre Post   BP BP: (!) 167/95 (09/24/22 0118)   138/70 LOC Alert and confused  Alert and confused    HR Pulse (Heart Rate): 75 (09/24/22 0118) 80 Lungs Diminished  Diminished    Resp Resp Rate: 16 (09/24/22 0118) 18 Cardiac NSR  NSR    Temp Temp: 98.9 °F (37.2 °C) (09/24/22 0033) 98.8 Skin WDI WDI   Weight Pre-Dialysis Weight: 64.5 kg (142 lb 3.2 oz) (09/19/22 0925) N/a Edema TRACE  Trace    Tele status Monitored at bedside  Monitored at bedside  Pain Pain Intensity 1: 8 (09/23/22 1952) 0     Orders   Duration: Start: 8837 End: 1916 Total: 3hrs   Dialyzer: Dialyzer/Set Up Inspection: Revaclear (09/24/22 0118)   K Bath: Dialysate K (mEq/L): 3.5 (09/24/22 0118)   Ca Bath: Dialysate CA (mEq/L): 2.5 (09/24/22 0118)   Na: Dialysate NA (mEq/L): 138 (09/24/22 0118)   Bicarb: Dialysate HCO3 (mEq/L): 35 (09/24/22 0118)   Target Fluid Removal: Goal/Amount of Fluid to Remove (mL): 1000 mL (09/24/22 0118)     Access   Type & Location: Right IJ CVC    Comments:                             Dressing clean and intact            Labs   HBsAg (Antigen) / date: Negative     09/12/2022                           HBsAb (Antibody) / date: Susceptible  09/12/2022   Source: Epic    Obtained/Reviewed  Critical Results Called HGB   Date Value Ref Range Status   09/23/2022 8.2 (L) 12.1 - 17.0 g/dL Final     Potassium   Date Value Ref Range Status   09/23/2022 3.2 (L) 3.5 - 5.1 mmol/L Final     Calcium   Date Value Ref Range Status   09/23/2022 7.6 (L) 8.5 - 10.1 MG/DL Final     BUN   Date Value Ref Range Status   09/23/2022 11 6 - 20 MG/DL Final     Creatinine   Date Value Ref Range Status   09/23/2022 2.49 (H) 0.70 - 1.30 MG/DL Final        Meds Given   Name Dose Route   None                  Adequacy / Fluid    Total Liters Process: 67.5L   Net Fluid Removed: 1000ml      Comments   Time Out Done:   (Time) 0115   Admitting Diagnosis: JEMIMA    Consent obtained/signed: Informed Consent Verified: Yes (09/24/22 0118)   Machine / RO # Machine Number: [id number] (09/24/22 0118)   Primary Nurse Rpt Pre: Margot Ordoñez RN   Primary Nurse Rpt Post: Margot Ordoñez RN    Pt Education: Procedural Education    Care Plan: Continue with HD care plan    Pts outpatient clinic: N/a      Tx Summary   Comments:   0118:Each catheter limb disinfected per p&p, caps removed, hubs disinfected per p&p. Both lines aspirated and flushed with no difficulty. Treatment initiated with 200ml nss given. CVC running well at 400. No distress reported. Will continue to monitor patient. 0418:Treatment terminated 8mins early r/t clots in the venous chamber. Tx ended with all possible blood returned. Each dialysis catheter limb disinfected per p&p, blood returned per p&p, each dialysis hub disinfected per p&p, post dialysis catheter dwell instilled per order, and caps applied. SBAR given to primary nurse. Patient bed at lowest position and call bell and patient's belongings at her reach. At baseline upon departure.

## 2022-09-24 NOTE — PROGRESS NOTES
2900: Bedside and Verbal shift change report given to Jose (oncoming nurse) by Sukhjinder Vance RN (offgoing nurse). Report included the following information SBAR, Kardex, ED Summary, Intake/Output, MAR, Recent Results, and Cardiac Rhythm NSR/Sinus Arrhythmia, Afib . Problem: Risk for Spread of Infection  Goal: Prevent transmission of infectious organism to others  Description: Prevent the transmission of infectious organisms to other patients, staff members, and visitors. Outcome: Progressing Towards Goal     Problem: Patient Education:  Go to Education Activity  Goal: Patient/Family Education  Outcome: Progressing Towards Goal     Problem: Falls - Risk of  Goal: *Absence of Falls  Description: Document Ben De Anda Fall Risk and appropriate interventions in the flowsheet. Outcome: Progressing Towards Goal  Note: Fall Risk Interventions:  Mobility Interventions: Bed/chair exit alarm    Mentation Interventions: Bed/chair exit alarm    Medication Interventions: Bed/chair exit alarm, Evaluate medications/consider consulting pharmacy, Patient to call before getting OOB    Elimination Interventions: Bed/chair exit alarm, Call light in reach, Patient to call for help with toileting needs, Stay With Me (per policy)    Problem: Patient Education: Go to Patient Education Activity  Goal: Patient/Family Education  Outcome: Progressing Towards Goal     Problem: Nutrition Deficit  Goal: *Optimize nutritional status  Outcome: Progressing Towards Goal     Problem: Pressure Injury - Risk of  Goal: *Prevention of pressure injury  Description: Document Barber Scale and appropriate interventions in the flowsheet.   Outcome: Progressing Towards Goal  Note: Pressure Injury Interventions:  Sensory Interventions: Assess changes in LOC, Assess need for specialty bed, Discuss PT/OT consult with provider    Moisture Interventions: Absorbent underpads, Apply protective barrier, creams and emollients, Limit adult briefs    Activity Interventions: Increase time out of bed, Pressure redistribution bed/mattress(bed type), PT/OT evaluation    Mobility Interventions: Float heels, HOB 30 degrees or less, Pressure redistribution bed/mattress (bed type), PT/OT evaluation    Nutrition Interventions: Discuss nutritional consult with provider, Document food/fluid/supplement intake, Offer support with meals,snacks and hydration    Friction and Shear Interventions: Feet elevated on foot rest, Foam dressings/transparent film/skin sealants, HOB 30 degrees or less, Lift team/patient mobility team       Problem: Patient Education: Go to Patient Education Activity  Goal: Patient/Family Education  Outcome: Progressing Towards Goal     Problem: Patient Education: Go to Patient Education Activity  Goal: Patient/Family Education  Outcome: Progressing Towards Goal     Problem: Patient Education: Go to Patient Education Activity  Goal: Patient/Family Education  Outcome: Progressing Towards Goal

## 2022-09-24 NOTE — PROGRESS NOTES
Problem: Patient Education:  Go to Education Activity  Goal: Patient/Family Education  Outcome: Progressing Towards Goal     Problem: Falls - Risk of  Goal: *Absence of Falls  Description: Document Sandramary Roach Fall Risk and appropriate interventions in the flowsheet. Outcome: Progressing Towards Goal  Note: Fall Risk Interventions:  Mobility Interventions: Bed/chair exit alarm, Patient to call before getting OOB    Mentation Interventions: Bed/chair exit alarm, Adequate sleep, hydration, pain control, Evaluate medications/consider consulting pharmacy, Increase mobility    Medication Interventions: Assess postural VS orthostatic hypotension, Bed/chair exit alarm, Evaluate medications/consider consulting pharmacy, Patient to call before getting OOB, Teach patient to arise slowly    Elimination Interventions: Bed/chair exit alarm, Call light in reach, Patient to call for help with toileting needs              Problem: Patient Education: Go to Patient Education Activity  Goal: Patient/Family Education  Outcome: Progressing Towards Goal     Problem: Nutrition Deficit  Goal: *Optimize nutritional status  Outcome: Progressing Towards Goal     Problem: Pressure Injury - Risk of  Goal: *Prevention of pressure injury  Description: Document Barber Scale and appropriate interventions in the flowsheet. Outcome: Progressing Towards Goal  Note: Pressure Injury Interventions:  Sensory Interventions: Assess changes in LOC, Avoid rigorous massage over bony prominences, Monitor skin under medical devices, Pressure redistribution bed/mattress (bed type), Turn and reposition approx.  every two hours (pillows and wedges if needed)    Moisture Interventions: Absorbent underpads, Apply protective barrier, creams and emollients, Check for incontinence Q2 hours and as needed    Activity Interventions: Increase time out of bed, Pressure redistribution bed/mattress(bed type), PT/OT evaluation, Assess need for specialty bed    Mobility Interventions: Float heels, HOB 30 degrees or less, Pressure redistribution bed/mattress (bed type)    Nutrition Interventions: Discuss nutritional consult with provider, Document food/fluid/supplement intake, Offer support with meals,snacks and hydration    Friction and Shear Interventions: Apply protective barrier, creams and emollients, HOB 30 degrees or less, Foam dressings/transparent film/skin sealants, Lift sheet, Lift team/patient mobility team                Problem: Patient Education: Go to Patient Education Activity  Goal: Patient/Family Education  Outcome: Progressing Towards Goal     Problem: Patient Education: Go to Patient Education Activity  Goal: Patient/Family Education  Outcome: Progressing Towards Goal     Problem: Patient Education: Go to Patient Education Activity  Goal: Patient/Family Education  Outcome: Progressing Towards Goal

## 2022-09-25 LAB
ANION GAP SERPL CALC-SCNC: 5 MMOL/L (ref 5–15)
BASOPHILS # BLD: 0.1 K/UL (ref 0–0.1)
BASOPHILS NFR BLD: 1 % (ref 0–1)
BUN SERPL-MCNC: 18 MG/DL (ref 6–20)
BUN/CREAT SERPL: 8 (ref 12–20)
CALCIUM SERPL-MCNC: 7.1 MG/DL (ref 8.5–10.1)
CHLORIDE SERPL-SCNC: 107 MMOL/L (ref 97–108)
CO2 SERPL-SCNC: 26 MMOL/L (ref 21–32)
CREAT SERPL-MCNC: 2.31 MG/DL (ref 0.7–1.3)
DIFFERENTIAL METHOD BLD: ABNORMAL
EOSINOPHIL # BLD: 0.6 K/UL (ref 0–0.4)
EOSINOPHIL NFR BLD: 4 % (ref 0–7)
ERYTHROCYTE [DISTWIDTH] IN BLOOD BY AUTOMATED COUNT: 20.3 % (ref 11.5–14.5)
GLUCOSE BLD STRIP.AUTO-MCNC: 105 MG/DL (ref 65–117)
GLUCOSE BLD STRIP.AUTO-MCNC: 141 MG/DL (ref 65–117)
GLUCOSE BLD STRIP.AUTO-MCNC: 167 MG/DL (ref 65–117)
GLUCOSE BLD STRIP.AUTO-MCNC: 214 MG/DL (ref 65–117)
GLUCOSE SERPL-MCNC: 184 MG/DL (ref 65–100)
HCT VFR BLD AUTO: 23.1 % (ref 36.6–50.3)
HGB BLD-MCNC: 7.1 G/DL (ref 12.1–17)
IMM GRANULOCYTES # BLD AUTO: 0.1 K/UL (ref 0–0.04)
IMM GRANULOCYTES NFR BLD AUTO: 1 % (ref 0–0.5)
LYMPHOCYTES # BLD: 1.9 K/UL (ref 0.8–3.5)
LYMPHOCYTES NFR BLD: 13 % (ref 12–49)
MAGNESIUM SERPL-MCNC: 1.6 MG/DL (ref 1.6–2.4)
MCH RBC QN AUTO: 29.5 PG (ref 26–34)
MCHC RBC AUTO-ENTMCNC: 30.7 G/DL (ref 30–36.5)
MCV RBC AUTO: 95.9 FL (ref 80–99)
MONOCYTES # BLD: 0.9 K/UL (ref 0–1)
MONOCYTES NFR BLD: 6 % (ref 5–13)
NEUTS SEG # BLD: 11 K/UL (ref 1.8–8)
NEUTS SEG NFR BLD: 75 % (ref 32–75)
NRBC # BLD: 0 K/UL (ref 0–0.01)
NRBC BLD-RTO: 0 PER 100 WBC
PHOSPHATE SERPL-MCNC: 2.6 MG/DL (ref 2.6–4.7)
PLATELET # BLD AUTO: 476 K/UL (ref 150–400)
PLATELET COMMENTS,PCOM: ABNORMAL
PMV BLD AUTO: 9 FL (ref 8.9–12.9)
POTASSIUM SERPL-SCNC: 3.5 MMOL/L (ref 3.5–5.1)
RBC # BLD AUTO: 2.41 M/UL (ref 4.1–5.7)
RBC MORPH BLD: ABNORMAL
SERVICE CMNT-IMP: ABNORMAL
SERVICE CMNT-IMP: NORMAL
SODIUM SERPL-SCNC: 138 MMOL/L (ref 136–145)
TRIGL SERPL-MCNC: 186 MG/DL (ref ?–150)
WBC # BLD AUTO: 14.6 K/UL (ref 4.1–11.1)
WBC MORPH BLD: ABNORMAL

## 2022-09-25 PROCEDURE — 74011250637 HC RX REV CODE- 250/637: Performed by: PHYSICIAN ASSISTANT

## 2022-09-25 PROCEDURE — 74011636637 HC RX REV CODE- 636/637: Performed by: PHYSICIAN ASSISTANT

## 2022-09-25 PROCEDURE — 82962 GLUCOSE BLOOD TEST: CPT

## 2022-09-25 PROCEDURE — 74011250636 HC RX REV CODE- 250/636: Performed by: PHYSICIAN ASSISTANT

## 2022-09-25 PROCEDURE — 36415 COLL VENOUS BLD VENIPUNCTURE: CPT

## 2022-09-25 PROCEDURE — 80048 BASIC METABOLIC PNL TOTAL CA: CPT

## 2022-09-25 PROCEDURE — 74011250637 HC RX REV CODE- 250/637: Performed by: STUDENT IN AN ORGANIZED HEALTH CARE EDUCATION/TRAINING PROGRAM

## 2022-09-25 PROCEDURE — 85025 COMPLETE CBC W/AUTO DIFF WBC: CPT

## 2022-09-25 PROCEDURE — 74011000258 HC RX REV CODE- 258: Performed by: PHYSICIAN ASSISTANT

## 2022-09-25 PROCEDURE — 74011250637 HC RX REV CODE- 250/637: Performed by: COLON & RECTAL SURGERY

## 2022-09-25 PROCEDURE — 74011000250 HC RX REV CODE- 250: Performed by: COLON & RECTAL SURGERY

## 2022-09-25 PROCEDURE — 84100 ASSAY OF PHOSPHORUS: CPT

## 2022-09-25 PROCEDURE — 74011000250 HC RX REV CODE- 250: Performed by: PHYSICIAN ASSISTANT

## 2022-09-25 PROCEDURE — 84478 ASSAY OF TRIGLYCERIDES: CPT

## 2022-09-25 PROCEDURE — 65270000046 HC RM TELEMETRY

## 2022-09-25 PROCEDURE — 74011636637 HC RX REV CODE- 636/637: Performed by: COLON & RECTAL SURGERY

## 2022-09-25 PROCEDURE — 83735 ASSAY OF MAGNESIUM: CPT

## 2022-09-25 RX ORDER — GABAPENTIN 100 MG/1
100 CAPSULE ORAL 3 TIMES DAILY
Status: DISCONTINUED | OUTPATIENT
Start: 2022-09-25 | End: 2022-10-13 | Stop reason: HOSPADM

## 2022-09-25 RX ORDER — MAGNESIUM SULFATE 100 %
4 CRYSTALS MISCELLANEOUS AS NEEDED
Status: DISCONTINUED | OUTPATIENT
Start: 2022-09-25 | End: 2022-10-13 | Stop reason: HOSPADM

## 2022-09-25 RX ADMIN — GABAPENTIN 100 MG: 100 CAPSULE ORAL at 22:15

## 2022-09-25 RX ADMIN — FERROUS SULFATE TAB 325 MG (65 MG ELEMENTAL FE) 325 MG: 325 (65 FE) TAB at 15:20

## 2022-09-25 RX ADMIN — PANTOPRAZOLE SODIUM 40 MG: 40 TABLET, DELAYED RELEASE ORAL at 15:19

## 2022-09-25 RX ADMIN — PANTOPRAZOLE SODIUM 40 MG: 40 TABLET, DELAYED RELEASE ORAL at 06:46

## 2022-09-25 RX ADMIN — HYDROMORPHONE HYDROCHLORIDE 2 MG: 2 TABLET ORAL at 06:46

## 2022-09-25 RX ADMIN — GABAPENTIN 100 MG: 100 CAPSULE ORAL at 15:19

## 2022-09-25 RX ADMIN — I.V. FAT EMULSION 250 ML: 20 EMULSION INTRAVENOUS at 22:15

## 2022-09-25 RX ADMIN — HYDROMORPHONE HYDROCHLORIDE 4 MG: 2 TABLET ORAL at 18:45

## 2022-09-25 RX ADMIN — SODIUM CHLORIDE, PRESERVATIVE FREE 10 ML: 5 INJECTION INTRAVENOUS at 22:17

## 2022-09-25 RX ADMIN — COLLAGENASE SANTYL: 250 OINTMENT TOPICAL at 09:32

## 2022-09-25 RX ADMIN — MIRTAZAPINE 7.5 MG: 15 TABLET, FILM COATED ORAL at 22:15

## 2022-09-25 RX ADMIN — ACETAMINOPHEN 650 MG: 325 TABLET, FILM COATED ORAL at 09:30

## 2022-09-25 RX ADMIN — Medication 3 UNITS: at 06:47

## 2022-09-25 RX ADMIN — SERTRALINE 25 MG: 50 TABLET, FILM COATED ORAL at 09:30

## 2022-09-25 RX ADMIN — Medication 1 CAPSULE: at 09:30

## 2022-09-25 RX ADMIN — Medication 2 UNITS: at 17:31

## 2022-09-25 RX ADMIN — Medication: at 18:14

## 2022-09-25 RX ADMIN — FERROUS SULFATE TAB 325 MG (65 MG ELEMENTAL FE) 325 MG: 325 (65 FE) TAB at 09:30

## 2022-09-25 RX ADMIN — ACETAMINOPHEN 650 MG: 325 TABLET, FILM COATED ORAL at 19:56

## 2022-09-25 RX ADMIN — APIXABAN 5 MG: 5 TABLET, FILM COATED ORAL at 22:15

## 2022-09-25 RX ADMIN — HYDROMORPHONE HYDROCHLORIDE 0.2 MG: 1 INJECTION, SOLUTION INTRAMUSCULAR; INTRAVENOUS; SUBCUTANEOUS at 10:47

## 2022-09-25 RX ADMIN — HYDROMORPHONE HYDROCHLORIDE 4 MG: 2 TABLET ORAL at 22:28

## 2022-09-25 RX ADMIN — AMOXICILLIN AND CLAVULANATE POTASSIUM 1 TABLET: 500; 125 TABLET, FILM COATED ORAL at 09:30

## 2022-09-25 RX ADMIN — HYDROMORPHONE HYDROCHLORIDE 4 MG: 2 TABLET ORAL at 14:46

## 2022-09-25 RX ADMIN — SODIUM CHLORIDE, PRESERVATIVE FREE 10 ML: 5 INJECTION INTRAVENOUS at 06:51

## 2022-09-25 RX ADMIN — Medication 2 UNITS: at 12:35

## 2022-09-25 RX ADMIN — APIXABAN 5 MG: 5 TABLET, FILM COATED ORAL at 09:30

## 2022-09-25 NOTE — PROGRESS NOTES
Problem: Risk for Spread of Infection  Goal: Prevent transmission of infectious organism to others  Description: Prevent the transmission of infectious organisms to other patients, staff members, and visitors. Outcome: Progressing Towards Goal     Problem: Patient Education:  Go to Education Activity  Goal: Patient/Family Education  Outcome: Progressing Towards Goal     Problem: Falls - Risk of  Goal: *Absence of Falls  Description: Document Munguia Blades Fall Risk and appropriate interventions in the flowsheet. Outcome: Progressing Towards Goal  Note: Fall Risk Interventions:  Mobility Interventions: Communicate number of staff needed for ambulation/transfer    Mentation Interventions: Family/sitter at bedside    Medication Interventions: Evaluate medications/consider consulting pharmacy    Elimination Interventions: Call light in reach              Problem: Patient Education: Go to Patient Education Activity  Goal: Patient/Family Education  Outcome: Progressing Towards Goal     Problem: Nutrition Deficit  Goal: *Optimize nutritional status  Outcome: Progressing Towards Goal     Problem: Pressure Injury - Risk of  Goal: *Prevention of pressure injury  Description: Document Barber Scale and appropriate interventions in the flowsheet.   Outcome: Progressing Towards Goal  Note: Pressure Injury Interventions:  Sensory Interventions: Assess changes in LOC    Moisture Interventions: Apply protective barrier, creams and emollients    Activity Interventions: PT/OT evaluation    Mobility Interventions: Float heels, HOB 30 degrees or less    Nutrition Interventions: Document food/fluid/supplement intake    Friction and Shear Interventions: Apply protective barrier, creams and emollients                Problem: Patient Education: Go to Patient Education Activity  Goal: Patient/Family Education  Outcome: Progressing Towards Goal     Problem: Patient Education: Go to Patient Education Activity  Goal: Patient/Family Education  Outcome: Progressing Towards Goal     Problem: Patient Education: Go to Patient Education Activity  Goal: Patient/Family Education  Outcome: Progressing Towards Goal

## 2022-09-25 NOTE — PROGRESS NOTES
[facility]  Hospitalist Group                                                                                          Hospitalist Progress Note  William Spangler PA-C  Answering service: [phone] from in house phone        Date of Service:  2022  NAME:  Holly Milton  :  1970  MRN:  [id number]      Admission Summary:   Holly Milton is a 45 y.o. male with PMHx of T2DM, PAD s/p bilateral BKA and recent prolonged hospital course at [facility] for LLE BKA c/b respiratory failure with prolonged intubation, pleural effusion requiring decortication and R thoracotomy, renal failure requiring HD, and J tube placement for nutrition. He was discharged to [facility] but was ultimately transferred to [facility] on 22 due to abdominal pain and was diagnosed with subcutaneous abscess at J tube site with pneumoperitoneum and perirectal fistula. He underwent diagnostic laparoscopy with removal of J-tube and I&D of abdominal wall, and colostomy on 9/10/22 with General Surgeon, Dr. Linsey Colmenares, and then rigid proctosigmoidoscopy on 9/15/22 with colo-rectal surgeon, Dr. Chantale Hernandez, where no abscess was visualized and no devitalized tissue and no therapeutic procedure performed. During this hospitalization the nephrology team continues to follow and performing HD MWF, Vascular Surgery was consulted for skin breakdown at L BKA incision line (no surgical intervention required), ID consulted (2 week course of IV Eraxis, Zosyn, Vanc on , followed by PO Augmentin monotherapy on ), and pulmonology was consulted due to abnormal chest imaging with right pleural effusion with hx of empyema (do not recommend surgical intervention or drainage at this time). Interval history / Subjective:   Mr. Andrez Sequeira continues to be uncomfortable. His wife was at bedside this morning as well.  We discussed his labs with rising WBC, but that he overall remains HDS without evidence of infection at this time. She is worried that he still has the sutures in place from the L BKA at [facility]. We reviewed ongoing TPN. All questions and concerns addressed. Assessment & Plan:     Abdominal Pain  S/p J tube placement at OSH, Pneumoperitoneum and subcutaneous abscess  Sepsis without septic shock  -- s/p diagnostic laparoscopy with removal of J-tube and I&D of abdominal wall, and colostomy on 9/10/22 with General Surgeon, Dr. Reina Pearson. Spoke with Dr. Reina Pearson on 9/24/22 via [employer] and removed the staples at abdominal incision sites. -- Appreciate wound care following for ostomy care  -- ID Previously following. Recommended 2 week course of IV Eraxis/Zosyn/Vancomycin to complete 9/23/22. Start monotherapy with PO Augmentin on 9/24/22 x 2 weeks. Appreciate pharmacy dose adjustment. -- Palliative Care previously following for pain control. Standing pain medication and PRN IV Dilaudid had been held with AMS. His pain seemed improved but he notes is poorly controlled. Will continue PO Dilaudid 2-4mg q4h PRN and IV Dilaudid available PRN. Hold parameters added. Anorectal Wound  -- s/p rigid proctosigmoidoscopy on 9/15/22 with colo-rectal surgeon, Dr. Christopher Duff, where no abscess was visualized and no devitalized tissue and no therapeutic procedure performed.     + occult stool blood  Normocytic Anemia, low iron  -- On PPI. No melena or hematochezia  -- start iron supplements     ESRD due to ATN on HD  -- CVC placed 9/16/22- IR.  -- Appreciate pharmacy guidance for medication dosing  -- Nephrology following. On HD MWF. No signs of recovery. -- Discussed with nephrology about starting TPN for which they are comfortable with     Hx of T2DM  -- Hgb A1c on 9/10/22 was < 3.8% and again less than assay on recheck.  Hgb A1c was 14.7% on 7/3/18  -- Has been on a meal time insulin sliding scale, however, has been frequently hypoglycemic and is not eating almost anything  -- Previously diabetes team following  -- glucose slightly elevated with TPN. Will add insulin as recommended by nutritionist. Glucose control critical for him for healing     Delirium  AMS  Hallucinations  -- Has been altered for multiple days up to 9/23. Head CT 9/23 revealed no acute intracranial pathology  -- Overall suspect this is complicated combined toxic metabolic encephalopathy, hospital delirium, and opioid induced. Will try and limit opioids but he I do not want to under-treat his pain. Making Tylenol standing is sub-optimal as can mask fevers and has a complicated infectious history. Also limited due to ESRD     Malnutrition  Not meeting caloric needs  -- Started TPN on 9/23/22 and dosing adjusted based on nutritionist recommendation. He has a double lumen PICC that has dedicated lumen that can be used  -- Appreciate Nutrition assistance with ordering the TPN and lipids. Will check TGs. Thiamine and Folic Acid added to TPN  -- High risk for re-feeding. Will monitor closely with BID labs if necessary. On telemetry     Atrial Fibrillation  -- EKG from 9/19/22 with new Atrial fibrillation with rate of 70bpm. No further work-up completed at that time. -- Repeat EKG 9/23 appeared consistent with sinus rhythm. -- His CHADS2-Vasc score is 2, however, he is very high risk. Continue Apixaban 5mg BID for stroke prevention  -- Not on any rate control agents. Consider TTE. Hx of Right thoracotomy  Loculated Effusion  -- Pulmonology previously consulted on 9/11/22. Per note: \" I do not think any percutaneous drainage would be successful at removing fluid as I suppose it is very thick and organized at this time. Would only recommend following clinically and radiographically. If he was to worsen or his right-sided effusion worsened he would need to be reevaluated by his thoracic surgeon at [facility] for additional drainage. \"     Hx of Bilateral BKA's, most recent L BKA  -- Vascular Surgery consulted for evaluation of L stump. Note wound is evolving with gradual removal of eschar. Continue wound care. Needs arranged follow-up at [facility] or in [facility]. He had the surgery performed by Dr. Marielos Zimmerman at [facility] in August 2022. Still has sutures in place. -- Appreciate wound care following. See note from 9/19     Depression  -- Continue Zoloft 25mg daily     Thrombocytosis  -- Elevation has been felt due to reactive thrombocytosis. Downtrending      DVT PPx: On Apixaban for stroke prevention. Code Status: Full Code  Diet: Regular, supplements BID. He is not meeting caloric needs. Continue TPN  Activity: Activity as tolerated. Discharge planning: Has been reported that he is accepted at [facility], needs OP HD chair      Hospital Problems  Date Reviewed: 9/10/2022            Codes Class Noted POA    Injury to rectum without open wound into cavity ICD-10-CM: S36.60XA  ICD-9-CM: 863.45  9/20/2022 Yes        Open wound of anus ICD-10-CM: Z03.659X  ICD-9-CM: 863.89  9/20/2022 Yes        Severe protein-calorie malnutrition ([facility] [address] ICD-10-CM: E43  ICD-9-CM: 709  9/13/2022 Yes        * (Principal) Intra-abdominal infection ICD-10-CM: B99.9  ICD-9-CM: 136.9  9/10/2022 Yes             Review of Systems:   A comprehensive review of systems was negative except for that written in the HPI. Vital Signs:    Last 24hrs VS reviewed since prior progress note.  Most recent are:  Visit Vitals  /75   Pulse 76   Temp 98.1 °F (36.7 °C)   Resp 20   Ht 5' 7\" (1.702 m)   Wt 67.4 kg (148 lb 9.4 oz)   SpO2 100%   BMI 23.27 kg/m²         Intake/Output Summary (Last 24 hours) at 9/25/2022 1347  Last data filed at 9/25/2022 1139  Gross per 24 hour   Intake --   Output 400 ml   Net -400 ml        Physical Examination:     I had a face to face encounter with this patient and independently examined them on 9/25/2022 as outlined below:                                    Constitutional:  No acute distress, sleeping on arrival. Awakens to verbal stimuli. ENT:  Oral mucosa moist, oropharynx benign. T   Resp:  CTA bilaterally. No wheezing/rhonchi/rales. No accessory muscle use. CV:  Regular rhythm, normal rate, no murmurs    GI:  Soft, non distended, non tender. His ostomy site has green stool in the bag. His prior J-tube site with wet-to-dry dressing in but with large defect with no active pus/drainage and possible evidence of granulation tissue. The laparoscopic surgical sites on the abdomen are covered with dressing with staples now removed and the left lower site is with minimal serous fluid     Musculoskeletal:   S/p Bl BKA. The right side is c/d/I. The Left side with mild bleeding/discharge on the dressing    Neurologic:  Moves all extremities. AAOx3, CN II-XII reviewed  Skin: There is a large, unstageable pressure ulcer on the sacrum with fibrinous yellow tissue and additional pressure injuries to right and left buttock      Addendum at ~1445: Changed the dressing to the L BKA stump with the nurse. The incision site is open with granulation tissue along aspects. There is a bigger defect in the posterior aspect. There are what appear to be nylon sutures in place and many of them are overgrown. No active bleeding/discharge. Non erythematous            Data Review:    Review and/or order of clinical lab test  Review and/or order of tests in the radiology section of CPT  Review and/or order of tests in the medicine section of CPT      Labs:     Recent Labs     09/25/22  0404 09/24/22  0144   WBC 14.6* 10.0   HGB 7.1* 7.2*   HCT 23.1* 23.3*   * 568*     Recent Labs     09/25/22  0404 09/24/22  0144 09/23/22  0210    143 144   K 3.5 3.4* 3.2*    110* 110*   CO2 26 27 27   BUN 18 11 11   CREA 2.31* 2.57* 2.49*   * 118* 104*   CA 7.1* 7.3* 7.6*   MG 1.6 1.6 1.5*   PHOS 2.6 2.8 2.8        No results for input(s): FE, TIBC, PSAT, FERR in the last 72 hours.    Lab Results   Component Value Date/Time    Folate 4.1 (L) 09/10/2022 12:00 PM     Lab Results   Component Value Date/Time    Triglyceride 186 (H) 09/25/2022 04:04 AM         Medications Reviewed:     Current Facility-Administered Medications   Medication Dose Route Frequency    TPN ADULT - CENTRAL   IntraVENous CONTINUOUS    insulin regular (NOVOLIN R, HUMULIN R) injection   SubCUTAneous Q6H    glucose chewable tablet 16 g  4 Tablet Oral PRN    glucagon (GLUCAGEN) injection 1 mg  1 mg IntraMUSCular PRN    dextrose 10 % infusion 0-250 mL  0-250 mL IntraVENous PRN    gabapentin (NEURONTIN) capsule 100 mg  100 mg Oral TID    TPN ADULT - CENTRAL   IntraVENous CONTINUOUS    fat emulsion 20% (LIPOSYN, INTRAlipid) infusion 250 mL  250 mL IntraVENous QPM    amoxicillin-clavulanate (AUGMENTIN) 500-125 mg per tablet 1 Tablet  1 Tablet Oral Q24H    ferrous sulfate tablet 325 mg  1 Tablet Oral BID WITH MEALS    HYDROmorphone (DILAUDID) injection 0.2 mg  0.2 mg IntraVENous Q4H PRN    HYDROmorphone (DILAUDID) tablet 2 mg  2 mg Oral Q4H PRN    HYDROmorphone (DILAUDID) tablet 4 mg  4 mg Oral Q4H PRN    hydrALAZINE (APRESOLINE) 20 mg/mL injection 10 mg  10 mg IntraVENous Q6H PRN    mirtazapine (REMERON) tablet 7.5 mg  7.5 mg Oral QHS    pantoprazole (PROTONIX) tablet 40 mg  40 mg Oral ACB&D    apixaban (ELIQUIS) tablet 5 mg  5 mg Oral BID    sertraline (ZOLOFT) tablet 25 mg  25 mg Oral DAILY    epoetin vera-epbx (RETACRIT) injection 10,000 Units  10,000 Units SubCUTAneous DIALYSIS MON, WED & FRI    collagenase (SANTYL) 250 unit/gram ointment   Topical DAILY    diphenhydrAMINE (BENADRYL) injection 12.5 mg  12.5 mg IntraVENous Q6H PRN    sodium chloride (NS) flush 5-40 mL  5-40 mL IntraVENous Q8H    sodium chloride (NS) flush 5-40 mL  5-40 mL IntraVENous PRN    acetaminophen (TYLENOL) tablet 650 mg  650 mg Oral Q6H PRN    polyethylene glycol (MIRALAX) packet 17 g  17 g Oral DAILY PRN    ondansetron (ZOFRAN ODT) tablet 4 mg  4 mg Oral Q8H PRN    Or    ondansetron (ZOFRAN) injection 4 mg  4 mg IntraVENous Q6H PRN    L.acidophilus-paracasei-S.thermophil-bifidobacter (RISAQUAD) 8 billion cell capsule  1 Capsule Oral DAILY     ______________________________________________________________________  EXPECTED LENGTH OF STAY: 9d 14h  ACTUAL LENGTH OF STAY:          15                 William Spangler PA-C

## 2022-09-25 NOTE — PROGRESS NOTES
0745: Bedside shift change report given to Daniel Hudson (oncoming nurse) by Adelita Mix (offgoing nurse). Report included the following information SBAR, Kardex, ED Summary, Intake/Output, MAR, Recent Results, and Cardiac Rhythm NSR/Afib/Sinus Arrhythmia . Problem: Risk for Spread of Infection  Goal: Prevent transmission of infectious organism to others  Description: Prevent the transmission of infectious organisms to other patients, staff members, and visitors. Outcome: Progressing Towards Goal     Problem: Patient Education:  Go to Education Activity  Goal: Patient/Family Education  Outcome: Progressing Towards Goal     Problem: Falls - Risk of  Goal: *Absence of Falls  Description: Document Johnny Blight Fall Risk and appropriate interventions in the flowsheet. Outcome: Progressing Towards Goal  Note: Fall Risk Interventions:  Mobility Interventions: Communicate number of staff needed for ambulation/transfer    Mentation Interventions: Family/sitter at bedside    Medication Interventions: Evaluate medications/consider consulting pharmacy    Elimination Interventions: Call light in reach     Problem: Patient Education: Go to Patient Education Activity  Goal: Patient/Family Education  Outcome: Progressing Towards Goal     Problem: Nutrition Deficit  Goal: *Optimize nutritional status  Outcome: Progressing Towards Goal     Problem: Pressure Injury - Risk of  Goal: *Prevention of pressure injury  Description: Document Barber Scale and appropriate interventions in the flowsheet.   Outcome: Progressing Towards Goal  Note: Pressure Injury Interventions:  Sensory Interventions: Assess changes in LOC    Moisture Interventions: Apply protective barrier, creams and emollients    Activity Interventions: PT/OT evaluation    Mobility Interventions: Float heels, HOB 30 degrees or less    Nutrition Interventions: Document food/fluid/supplement intake    Friction and Shear Interventions: Apply protective barrier, creams and emollients                Problem: Patient Education: Go to Patient Education Activity  Goal: Patient/Family Education  Outcome: Progressing Towards Goal     Problem: Patient Education: Go to Patient Education Activity  Goal: Patient/Family Education  Outcome: Progressing Towards Goal     Problem: Patient Education: Go to Patient Education Activity  Goal: Patient/Family Education  Outcome: Progressing Towards Goal

## 2022-09-26 ENCOUNTER — APPOINTMENT (OUTPATIENT)
Dept: NON INVASIVE DIAGNOSTICS | Age: 52
DRG: 853 | End: 2022-09-26
Attending: NURSE PRACTITIONER
Payer: COMMERCIAL

## 2022-09-26 LAB
ANION GAP SERPL CALC-SCNC: 6 MMOL/L (ref 5–15)
BASOPHILS # BLD: 0.1 K/UL (ref 0–0.1)
BASOPHILS NFR BLD: 1 % (ref 0–1)
BUN SERPL-MCNC: 43 MG/DL (ref 6–20)
BUN/CREAT SERPL: 14 (ref 12–20)
CALCIUM SERPL-MCNC: 7.4 MG/DL (ref 8.5–10.1)
CHLORIDE SERPL-SCNC: 108 MMOL/L (ref 97–108)
CO2 SERPL-SCNC: 22 MMOL/L (ref 21–32)
CREAT SERPL-MCNC: 2.98 MG/DL (ref 0.7–1.3)
DIFFERENTIAL METHOD BLD: ABNORMAL
ECHO AO ROOT DIAM: 3.6 CM
ECHO AO ROOT INDEX: 2.01 CM/M2
ECHO AV AREA PEAK VELOCITY: 3.5 CM2
ECHO AV AREA/BSA PEAK VELOCITY: 2 CM2/M2
ECHO AV PEAK GRADIENT: 14 MMHG
ECHO AV PEAK VELOCITY: 1.9 M/S
ECHO AV VELOCITY RATIO: 0.74
ECHO EST RA PRESSURE: 8 MMHG
ECHO LV E' LATERAL VELOCITY: 10 CM/S
ECHO LV E' SEPTAL VELOCITY: 7 CM/S
ECHO LV EDV A2C: 98 ML
ECHO LV EDV A4C: 137 ML
ECHO LV EDV BP: 126 ML (ref 67–155)
ECHO LV EDV INDEX A4C: 77 ML/M2
ECHO LV EDV INDEX BP: 70 ML/M2
ECHO LV EDV NDEX A2C: 55 ML/M2
ECHO LV EJECTION FRACTION A2C: 36 %
ECHO LV EJECTION FRACTION A4C: 43 %
ECHO LV EJECTION FRACTION BIPLANE: 44 % (ref 55–100)
ECHO LV ESV A2C: 62 ML
ECHO LV ESV A4C: 79 ML
ECHO LV ESV BP: 70 ML (ref 22–58)
ECHO LV ESV INDEX A2C: 35 ML/M2
ECHO LV ESV INDEX A4C: 44 ML/M2
ECHO LV ESV INDEX BP: 39 ML/M2
ECHO LVOT AREA: 4.9 CM2
ECHO LVOT DIAM: 2.5 CM
ECHO LVOT PEAK GRADIENT: 7 MMHG
ECHO LVOT PEAK VELOCITY: 1.4 M/S
ECHO MV A VELOCITY: 0.99 M/S
ECHO MV AREA PHT: 3.1 CM2
ECHO MV E DECELERATION TIME (DT): 242.2 MS
ECHO MV E VELOCITY: 0.98 M/S
ECHO MV E/A RATIO: 0.99
ECHO MV E/E' LATERAL: 9.8
ECHO MV E/E' RATIO (AVERAGED): 11.9
ECHO MV E/E' SEPTAL: 14
ECHO MV PRESSURE HALF TIME (PHT): 70.3 MS
ECHO MV REGURGITANT PEAK GRADIENT: 41 MMHG
ECHO MV REGURGITANT PEAK VELOCITY: 3.2 M/S
ECHO PV MAX VELOCITY: 1.4 M/S
ECHO PV PEAK GRADIENT: 8 MMHG
ECHO RIGHT VENTRICULAR SYSTOLIC PRESSURE (RVSP): 42 MMHG
ECHO RV FREE WALL PEAK S': 20 CM/S
ECHO RV TAPSE: 2 CM (ref 1.7–?)
ECHO TV REGURGITANT MAX VELOCITY: 2.9 M/S
ECHO TV REGURGITANT PEAK GRADIENT: 34 MMHG
EOSINOPHIL # BLD: 0.7 K/UL (ref 0–0.4)
EOSINOPHIL NFR BLD: 5 % (ref 0–7)
ERYTHROCYTE [DISTWIDTH] IN BLOOD BY AUTOMATED COUNT: 20.4 % (ref 11.5–14.5)
GLUCOSE BLD STRIP.AUTO-MCNC: 76 MG/DL (ref 65–117)
GLUCOSE BLD STRIP.AUTO-MCNC: 79 MG/DL (ref 65–117)
GLUCOSE BLD STRIP.AUTO-MCNC: 87 MG/DL (ref 65–117)
GLUCOSE SERPL-MCNC: 83 MG/DL (ref 65–100)
HCT VFR BLD AUTO: 22.1 % (ref 36.6–50.3)
HCT VFR BLD AUTO: 26.3 % (ref 36.6–50.3)
HGB BLD-MCNC: 6.7 G/DL (ref 12.1–17)
HGB BLD-MCNC: 8.3 G/DL (ref 12.1–17)
HISTORY CHECKED?,CKHIST: NORMAL
IMM GRANULOCYTES # BLD AUTO: 0.3 K/UL (ref 0–0.04)
IMM GRANULOCYTES NFR BLD AUTO: 2 % (ref 0–0.5)
LYMPHOCYTES # BLD: 2.4 K/UL (ref 0.8–3.5)
LYMPHOCYTES NFR BLD: 16 % (ref 12–49)
MAGNESIUM SERPL-MCNC: 1.6 MG/DL (ref 1.6–2.4)
MCH RBC QN AUTO: 29.6 PG (ref 26–34)
MCHC RBC AUTO-ENTMCNC: 30.3 G/DL (ref 30–36.5)
MCV RBC AUTO: 97.8 FL (ref 80–99)
MONOCYTES # BLD: 0.9 K/UL (ref 0–1)
MONOCYTES NFR BLD: 6 % (ref 5–13)
NEUTS SEG # BLD: 10.5 K/UL (ref 1.8–8)
NEUTS SEG NFR BLD: 70 % (ref 32–75)
NRBC # BLD: 0 K/UL (ref 0–0.01)
NRBC BLD-RTO: 0 PER 100 WBC
PHOSPHATE SERPL-MCNC: 2.9 MG/DL (ref 2.6–4.7)
PLATELET # BLD AUTO: 410 K/UL (ref 150–400)
PMV BLD AUTO: 9.4 FL (ref 8.9–12.9)
POTASSIUM SERPL-SCNC: 3 MMOL/L (ref 3.5–5.1)
RBC # BLD AUTO: 2.26 M/UL (ref 4.1–5.7)
RBC MORPH BLD: ABNORMAL
SERVICE CMNT-IMP: NORMAL
SODIUM SERPL-SCNC: 136 MMOL/L (ref 136–145)
TRIGL SERPL-MCNC: 124 MG/DL (ref ?–150)
WBC # BLD AUTO: 14.9 K/UL (ref 4.1–11.1)

## 2022-09-26 PROCEDURE — 80048 BASIC METABOLIC PNL TOTAL CA: CPT

## 2022-09-26 PROCEDURE — 65270000046 HC RM TELEMETRY

## 2022-09-26 PROCEDURE — 74011250637 HC RX REV CODE- 250/637: Performed by: COLON & RECTAL SURGERY

## 2022-09-26 PROCEDURE — 36415 COLL VENOUS BLD VENIPUNCTURE: CPT

## 2022-09-26 PROCEDURE — 36430 TRANSFUSION BLD/BLD COMPNT: CPT

## 2022-09-26 PROCEDURE — 74011250637 HC RX REV CODE- 250/637: Performed by: NURSE PRACTITIONER

## 2022-09-26 PROCEDURE — 74011250636 HC RX REV CODE- 250/636: Performed by: PHYSICIAN ASSISTANT

## 2022-09-26 PROCEDURE — 84100 ASSAY OF PHOSPHORUS: CPT

## 2022-09-26 PROCEDURE — P9016 RBC LEUKOCYTES REDUCED: HCPCS

## 2022-09-26 PROCEDURE — 90935 HEMODIALYSIS ONE EVALUATION: CPT

## 2022-09-26 PROCEDURE — 86923 COMPATIBILITY TEST ELECTRIC: CPT

## 2022-09-26 PROCEDURE — 86900 BLOOD TYPING SEROLOGIC ABO: CPT

## 2022-09-26 PROCEDURE — 74011000258 HC RX REV CODE- 258: Performed by: NURSE PRACTITIONER

## 2022-09-26 PROCEDURE — 74011250637 HC RX REV CODE- 250/637: Performed by: STUDENT IN AN ORGANIZED HEALTH CARE EDUCATION/TRAINING PROGRAM

## 2022-09-26 PROCEDURE — 84478 ASSAY OF TRIGLYCERIDES: CPT

## 2022-09-26 PROCEDURE — 74011000250 HC RX REV CODE- 250: Performed by: PHYSICIAN ASSISTANT

## 2022-09-26 PROCEDURE — 74011250636 HC RX REV CODE- 250/636: Performed by: COLON & RECTAL SURGERY

## 2022-09-26 PROCEDURE — 74011250637 HC RX REV CODE- 250/637: Performed by: PHYSICIAN ASSISTANT

## 2022-09-26 PROCEDURE — 74011636637 HC RX REV CODE- 636/637: Performed by: NURSE PRACTITIONER

## 2022-09-26 PROCEDURE — 85025 COMPLETE CBC W/AUTO DIFF WBC: CPT

## 2022-09-26 PROCEDURE — 93306 TTE W/DOPPLER COMPLETE: CPT

## 2022-09-26 PROCEDURE — 85018 HEMOGLOBIN: CPT

## 2022-09-26 PROCEDURE — 74011250636 HC RX REV CODE- 250/636: Performed by: NURSE PRACTITIONER

## 2022-09-26 PROCEDURE — 83735 ASSAY OF MAGNESIUM: CPT

## 2022-09-26 PROCEDURE — 74011000250 HC RX REV CODE- 250: Performed by: COLON & RECTAL SURGERY

## 2022-09-26 PROCEDURE — 82962 GLUCOSE BLOOD TEST: CPT

## 2022-09-26 PROCEDURE — 74011000250 HC RX REV CODE- 250: Performed by: NURSE PRACTITIONER

## 2022-09-26 RX ORDER — POTASSIUM CHLORIDE 750 MG/1
20 TABLET, FILM COATED, EXTENDED RELEASE ORAL DAILY
Status: COMPLETED | OUTPATIENT
Start: 2022-09-26 | End: 2022-09-28

## 2022-09-26 RX ORDER — SODIUM CHLORIDE 9 MG/ML
250 INJECTION, SOLUTION INTRAVENOUS AS NEEDED
Status: DISCONTINUED | OUTPATIENT
Start: 2022-09-26 | End: 2022-10-13 | Stop reason: HOSPADM

## 2022-09-26 RX ADMIN — SODIUM CHLORIDE, PRESERVATIVE FREE 10 ML: 5 INJECTION INTRAVENOUS at 22:03

## 2022-09-26 RX ADMIN — HYDROMORPHONE HYDROCHLORIDE 4 MG: 2 TABLET ORAL at 13:19

## 2022-09-26 RX ADMIN — PANTOPRAZOLE SODIUM 40 MG: 40 TABLET, DELAYED RELEASE ORAL at 08:52

## 2022-09-26 RX ADMIN — APIXABAN 5 MG: 5 TABLET, FILM COATED ORAL at 08:52

## 2022-09-26 RX ADMIN — HYDROMORPHONE HYDROCHLORIDE 2 MG: 2 TABLET ORAL at 17:29

## 2022-09-26 RX ADMIN — HYDROMORPHONE HYDROCHLORIDE 0.2 MG: 1 INJECTION, SOLUTION INTRAMUSCULAR; INTRAVENOUS; SUBCUTANEOUS at 09:04

## 2022-09-26 RX ADMIN — SODIUM CHLORIDE, PRESERVATIVE FREE 10 ML: 5 INJECTION INTRAVENOUS at 13:19

## 2022-09-26 RX ADMIN — HYDROMORPHONE HYDROCHLORIDE 2 MG: 2 TABLET ORAL at 22:03

## 2022-09-26 RX ADMIN — PANTOPRAZOLE SODIUM 40 MG: 40 TABLET, DELAYED RELEASE ORAL at 15:57

## 2022-09-26 RX ADMIN — SERTRALINE 25 MG: 50 TABLET, FILM COATED ORAL at 08:52

## 2022-09-26 RX ADMIN — COLLAGENASE SANTYL: 250 OINTMENT TOPICAL at 09:03

## 2022-09-26 RX ADMIN — AMOXICILLIN AND CLAVULANATE POTASSIUM 1 TABLET: 500; 125 TABLET, FILM COATED ORAL at 08:52

## 2022-09-26 RX ADMIN — GABAPENTIN 100 MG: 100 CAPSULE ORAL at 22:03

## 2022-09-26 RX ADMIN — I.V. FAT EMULSION 250 ML: 20 EMULSION INTRAVENOUS at 22:03

## 2022-09-26 RX ADMIN — POTASSIUM CHLORIDE 20 MEQ: 750 TABLET, FILM COATED, EXTENDED RELEASE ORAL at 10:03

## 2022-09-26 RX ADMIN — Medication: at 18:51

## 2022-09-26 RX ADMIN — MIRTAZAPINE 7.5 MG: 15 TABLET, FILM COATED ORAL at 22:03

## 2022-09-26 RX ADMIN — GABAPENTIN 100 MG: 100 CAPSULE ORAL at 08:52

## 2022-09-26 RX ADMIN — GABAPENTIN 100 MG: 100 CAPSULE ORAL at 15:57

## 2022-09-26 RX ADMIN — FERROUS SULFATE TAB 325 MG (65 MG ELEMENTAL FE) 325 MG: 325 (65 FE) TAB at 08:52

## 2022-09-26 RX ADMIN — SODIUM CHLORIDE, PRESERVATIVE FREE 10 ML: 5 INJECTION INTRAVENOUS at 05:00

## 2022-09-26 RX ADMIN — EPOETIN ALFA-EPBX 10000 UNITS: 10000 INJECTION, SOLUTION INTRAVENOUS; SUBCUTANEOUS at 22:15

## 2022-09-26 RX ADMIN — FERROUS SULFATE TAB 325 MG (65 MG ELEMENTAL FE) 325 MG: 325 (65 FE) TAB at 17:29

## 2022-09-26 RX ADMIN — Medication 1 CAPSULE: at 08:52

## 2022-09-26 RX ADMIN — APIXABAN 5 MG: 5 TABLET, FILM COATED ORAL at 22:03

## 2022-09-26 NOTE — PROGRESS NOTES
Occupational Therapy  9/26/2022    Chart reviewed. Attempted to see pt for OT. Pt supine, sleeping and noted to have R residual limb pressed against and through bed rail. Assisted with repositioning pt onto L side for comfort and skin protection. Issued pt red theraband and bed level UE exercises. However, pt too drowsy and reporting sacral pain. Will follow up tomorrow. Melody Segura MS, OTR/L

## 2022-09-26 NOTE — PROGRESS NOTES
Chart reviewed in preparation for PT tx. Per chart, pt with overnight hgb of 6.7 g/dL with no subsequent MD note regarding new lab results. Will hold at this time and follow up later as able and appropriate once rounded by treatment team.     Addendum: Spoke with RN, plan to transfuse 1 unit due to low hgb. Will continue to hold at this time pending transfusion.     Brittany Adler PT, DPT

## 2022-09-26 NOTE — WOUND CARE
Discussed with Dr. Estephania Mitchell by phone this morning after he debrided at bedside. At large amount of the previous eschar is now gone but some remnants remain across the incision. No bleeding noted. Deepest area is on lateral side of incision = 4 x 6 x 4 cm  Cleaned all of the area with saline, applied hydrogel to wound beds and then covered with wet [employer]el [employer]. Dry 4x4's and then secured with STRETCH roll gauze. Will continue to follow while admitted. Orders were updated.   DONNY BurrN

## 2022-09-26 NOTE — PROGRESS NOTES
Care plan reviewed. Problem: Risk for Spread of Infection  Goal: Prevent transmission of infectious organism to others  Description: Prevent the transmission of infectious organisms to other patients, staff members, and visitors. Outcome: Progressing Towards Goal     Problem: Patient Education:  Go to Education Activity  Goal: Patient/Family Education  Outcome: Progressing Towards Goal     Problem: Falls - Risk of  Goal: *Absence of Falls  Description: Document Phong Kerr Fall Risk and appropriate interventions in the flowsheet.   Outcome: Progressing Towards Goal  Note: Fall Risk Interventions:  Mobility Interventions: Patient to call before getting OOB, OT consult for ADLs, PT Consult for mobility concerns    Mentation Interventions: Reorient patient, More frequent rounding    Medication Interventions: Bed/chair exit alarm    Elimination Interventions: Call light in reach, Bed/chair exit alarm

## 2022-09-26 NOTE — PROGRESS NOTES
ID Progress Note  2022    Subjective:     Alert, confused    Review of Systems:            Symptom Y/N Comments   Symptom Y/N Comments   Fever/Chills       Chest Pain        Poor Appetite       Edema        Cough       Abdominal Pain        Sputum       Joint Pain        SOB/ANDREWS       Pruritis/Rash        Nausea/vomit       Tolerating PT/OT        Diarrhea       Tolerating Diet        Constipation       Other           Could NOT obtain due to:       Objective:     Vitals: Visit Vitals  /65 (BP 1 Location: Left upper arm, BP Patient Position: At rest)   Pulse 71   Temp 98.3 °F (36.8 °C)   Resp 20   Ht 5' 7\" (1.702 m)   Wt 67.4 kg (148 lb 9.4 oz)   SpO2 100%   BMI 23.27 kg/m²          Tmax:  Temp (24hrs), Av.9 °F (36.6 °C), Min:97.1 °F (36.2 °C), Max:98.3 °F (36.8 °C)      PHYSICAL EXAM:  General: Chronically ill appearing. WD, WN. Alert, cooperative, no acute distress    EENT:  Anicteric sclerae. Dry mucous membrane  Resp:  CTA bilaterally, no wheezing or rales. No accessory muscle use  CV:  Regular  rhythm  GI:  Soft, Non distended, Non tender. +Bowel sounds, colostomy leaking  Neurologic:  Alert and oriented X self, normal speech,   Psych:   Fair insight. Not anxious nor agitated  Skin:  No rashes.   No jaundice    Pressure injury; sacrum, right buttock,   left BKA stump site; dressing dry and intact    Labs:   Lab Results   Component Value Date/Time    WBC 14.9 (H) 2022 04:57 AM    HGB 6.7 (L) 2022 04:57 AM    HCT 22.1 (L) 2022 04:57 AM    PLATELET 754 (H)  04:57 AM    MCV 97.8 2022 04:57 AM     Lab Results   Component Value Date/Time    Sodium 136 2022 04:57 AM    Potassium 3.0 (L) 2022 04:57 AM    Chloride 108 2022 04:57 AM    CO2 22 2022 04:57 AM    Anion gap 6 2022 04:57 AM    Glucose 83 2022 04:57 AM    BUN 43 (H) 2022 04:57 AM    Creatinine 2.98 (H) 2022 04:57 AM    BUN/Creatinine ratio 14 2022 04:57 AM GFR est AA 27 (L) 09/26/2022 04:57 AM    GFR est non-AA 22 (L) 09/26/2022 04:57 AM    Calcium 7.4 (L) 09/26/2022 04:57 AM    Bilirubin, total 0.7 09/10/2022 12:00 PM    Alk. phosphatase 86 09/10/2022 12:00 PM    Protein, total 5.8 (L) 09/10/2022 12:00 PM    Albumin 1.7 (L) 09/10/2022 12:00 PM    Globulin 4.1 (H) 09/10/2022 12:00 PM    A-G Ratio 0.4 (L) 09/10/2022 12:00 PM    ALT (SGPT) <6 (L) 09/10/2022 12:00 PM     Assessment:      Dislodged feeding tube/peritonitis   Free air and perirectal fistula  S/p laparoscopy procedure; take down and removal of J tube, colostomy placement, and I & D of abdominal wall (9/10)  Recent right thoracotomy  Hx Bilateral BKAs   - afebrile, wbc 17.3->14     Blood cx (9/9, 9/16) no growth    Objective:      Completing 2 weeks of IV Eraxis, zosyn, and vancomycin as of 9/23. Recommend to 2  more weeks with PO Augmentin, starting 9/24. Fever work up if temp >= 100.4    Above plan of care discussed and agreed with Dr. Irene Lauren    Pt will be seen as need. Please contact us with any questions or concerns.       Paco Early, NAN

## 2022-09-26 NOTE — PROGRESS NOTES
Vascular:    Left BKA incision cleaned up at bedside - sutures removed and some eschar along incision line excised. Open cavity laterally has old hematoma at base that was evacuated  - discussed with wound nurses - they will see and adjust wound care.

## 2022-09-26 NOTE — PROGRESS NOTES
[address] [facility] Adult  Hospitalist Group                                                                                          Hospitalist Progress Note  Chata Jiménez NP  Answering service: [phone] from in house phone        Date of Service:  2022  NAME:  Claude Negro  :  1970  MRN:  [id number]      Admission Summary: This is a 50-year-old man with a PMH of T2DM, BKA bilaterally, JEMIMA on CKD requiring HD, Respiratory Failure requiring intubation who presented at the ED from [address] [place] [address] with c/o abdominal pain. The patient was recently admitted to another hospital and underwent left below-knee amputation, hospitalization complicated with respiratory failure which required prolonged intubation- attributed to aspiration pneumonia, also developed lobulated effusion, for which the patient underwent decortication and right thoracotomy and acute renal failure for which he has been started on hemodialysis. He had a J-tube was placed for supplemental nutrition and was transferred to [address] [place] [address] for continuation of care. He was doing relatively well in [address] [place] [address] until the day of presentation at the ED when the patient complained of abdominal pain at the facility. CT scan of the abdomen and pelvis was obtained: shows evidence of bowel perforation, sent to [facility] fro further eval/tx. When the patient arrived at the emergency room, based on his clinical presentation and lab work, Code Sepsis was triggered. The patient received fluid therapy as per sepsis protocol. The emergency room physician consulted general surgeon on-call. The patient was seen by the surgeon and the patient is planned for immediate surgical intervention. No record of prior admission to this hospital.        Interval history / Subjective:    Follow-up for issues listed below.   -Patient seen and examined, no distress.    -Hgb 6.7- transfuse 1 unit PRBC's   -TPN continues  -T max 98.3, WBC 14.9, Augmentin continues  -ECHO ordered 9/26  -jaundice skin: check LFT's     Assessment & Plan:     Intractable Abdominal Pain/Acute GIB, guaiac positive 9/9/Anorectal wound/Diarrhea/Free Air and Perirectal Fistula:    -PPI    -started on vancomycin and Zosyn  -Gen Sx: 9/10 Lap take down and removal J-tube, Lap colostomy, I&D abdominal wall  -Colorectal Sx: 9/15 anorectal wound exam including rigid proctosigmoidoscopy  -monitor WBC  -ID consult: IV Eraxis/Zosyn/Vancomycin completed 9/23/22, continue Augmentin x2 weeks, started 9/24  -C Diff negative 9/10  -BCNGTD  -PPI  -CT chest 9/10: Small right basilar gas and fluid containing pleural collection with a  peripheral soft tissue rind. Finding may represent an empyema and clinical correlation is recommended. Recommend direct comparison to any additional prior imaging. Free intraperitoneal gas, seen on prior CT of the abdomen and pelvis. -CT abd/pel wo 9/14:No focal intra-abdominal fluid collection to suggest abscess. Free intraperitoneal gas consistent with recent intra-abdominal surgery. Interval improvement in rectal thickening. Prior CT dated September 9, 2022 demonstrated a probable right posterior rectal wall defect which is no longer visualized on today's examination. Persistent, small right basilar gas and fluid containing pleural collection, unchanged.  -Palliative: pain management  -TPN continues, started 9/25  -wound care ostomy care    Severe sepsis without septic shock: intra-abdominal infection likely source. -IV ABT's  -ID following  -BCNGTD     ESRD on dialysis: CVC 9/16/22- IR.  -renally dose meds  -HD MWF  -Nephrology following  -creatinine improved  -Renal US 9/11: No hydronephrosis or stones.  -Electrolyte Disturbances: monitor replete/treat as needed     T2DM: hypoglycemia  -ISS  -BGL ac&hs  -Hga1c: <3.8  -DM care team consult     AFIB: rate stable. -EKG 9/19: AFIB.   -eliquis 5mg BID   -ECHO pending    Empyema: noted: recent thoracotomy and prolonged intubation at [facility]. -CXR9/19: Persistent right basilar airspace disease and right-sided loculated effusion/empyema. -CT chest 9/10: Small right basilar gas and fluid containing pleural collection with a  peripheral soft tissue rind. Finding may represent an empyema and clinical correlation is recommended. Recommend direct comparison to any additional prior imaging. Free intraperitoneal gas, seen on prior CT of the abdomen and pelvis. -Pulmonary consulted: noted \"percutaneous drainage would not be successful at removing fluid as I suppose it is very thick and organized at this time. Would only recommend following clinically and radiographically. If worsens or worsened right-sided effusion he would need to be reevaluated by his thoracic surgeon at [facility] for additional drainage. \"  -IV ABT's     Acute blood loss anemia on chronic:  -monitor Hgb  -transfuse to keep Hgb > 7.0  -Hgb 6.7- transfuse 1 unit PRBC's 9/26    +Occult Blood: no melena, no n/v.  -PPI  -iron supplements  -monitor Hgb, transfuse to keep Hgb > 7.0    AMS: delirium, hallucinations ? 2/2 to toxic metabolic encephalopathy, hospital delirium, and opioid induced. -CT head 9/23: no acute intracranial abnormality. Depression: continue zoloft. Thrombocytosis: noted reactive thrombocytosis, monitor platelets, trending downward       Wounds: Ostomy, Abd surgical site: J tube, laparoscope sites. Sacrum. Bilateral BKA, open wound to LLE stump, noted recurrent infections. -wound care following, daily wound care and documentation. -IV ABT's  -Vascular Sx following: Noted wound  evolving with gradual removal of eschar, continue wound care. -Follow-up needs to scheduled at [facility] or in [facility] for surgery performed by Dr. Jarvis Miles at [facility] in August 2022. Still has sutures in place to LLE stump, wound open.      DVTppx: eliquis  GIppx: Pepcid  Code Status: Full Code  Diet: Regular, supplements BID  Activity: bedrest  Discharge: TBD  Ambulates:  nonambulatory  Discharge planning: Accepted at [facility], needs OP HD chair      Hospital Problems  Date Reviewed: 9/10/2022            Codes Class Noted POA    Injury to rectum without open wound into cavity ICD-10-CM: S36.60XA  ICD-9-CM: 863.45  9/20/2022 Yes        Open wound of anus ICD-10-CM: W95.617H  ICD-9-CM: 863.89  9/20/2022 Yes        Severe protein-calorie malnutrition ([address] ICD-10-CM: E43  ICD-9-CM: 191  9/13/2022 Yes        * (Principal) Intra-abdominal infection ICD-10-CM: B99.9  ICD-9-CM: 136.9  9/10/2022 Yes             Review of Systems:   A comprehensive review of systems was negative except for that written in the HPI. Vital Signs:    Last 24hrs VS reviewed since prior progress note. Most recent are:  Visit Vitals  /61 (BP 1 Location: Left upper arm, BP Patient Position: At rest;Lying)   Pulse 74   Temp 98 °F (36.7 °C)   Resp 16   Ht 5' 7\" (1.702 m)   Wt 68.1 kg (150 lb 2.1 oz)   SpO2 100%   BMI 23.51 kg/m²         Intake/Output Summary (Last 24 hours) at 9/26/2022 1029  Last data filed at 9/26/2022 0400  Gross per 24 hour   Intake 615.3 ml   Output 370 ml   Net 245.3 ml        Physical Examination:     I had a face to face encounter with this patient and independently examined them on 9/26/2022 as outlined below:          Constitutional:  No acute distress. ENT:  Oral mucosa moist.    Resp:  CTA bilaterally. No wheezing/rhonchi/rales. No accessory muscle use. CV:  Regular rate, S1,S2.    GI/:  Soft, non distended, non tender, no guarding, BS present, colostomy patent. Voids Freely. Musculoskeletal:  No edema, warm, bilateral BKA: dressing to LLE stunp. Neurologic:  Moves all extremities. Awake and alert, disoriented. Skin:  multiple wounds, skin w/d, jaundiced. Psych:  Poor insight, Not anxious nor agitated.                Data Review:    Review and/or order of clinical lab test      Labs:     Recent Labs     09/26/22  5844 09/25/22  0404   WBC 14.9* 14.6*   HGB 6.7* 7.1*   HCT 22.1* 23.1*   * 476*     Recent Labs     09/26/22  0457 09/25/22  0404 09/24/22  0144    138 143   K 3.0* 3.5 3.4*    107 110*   CO2 22 26 27   BUN 43* 18 11   CREA 2.98* 2.31* 2.57*   GLU 83 184* 118*   CA 7.4* 7.1* 7.3*   MG 1.6 1.6 1.6   PHOS 2.9 2.6 2.8     No results for input(s): ALT, AP, TBIL, TBILI, TP, ALB, GLOB, GGT, AML, LPSE in the last 72 hours. No lab exists for component: SGOT, GPT, AMYP, HLPSE  No results for input(s): INR, PTP, APTT, INREXT in the last 72 hours. No results for input(s): FE, TIBC, PSAT, FERR in the last 72 hours. Lab Results   Component Value Date/Time    Folate 4.1 (L) 09/10/2022 12:00 PM      No results for input(s): PH, PCO2, PO2 in the last 72 hours. No results for input(s): CPK, CKNDX, TROIQ in the last 72 hours.     No lab exists for component: CPKMB  Lab Results   Component Value Date/Time    Triglyceride 124 09/26/2022 04:57 AM     Lab Results   Component Value Date/Time    Glucose (POC) 76 09/26/2022 04:55 AM    Glucose (POC) 105 09/25/2022 10:36 PM    Glucose (POC) 141 (H) 09/25/2022 05:17 PM    Glucose (POC) 167 (H) 09/25/2022 12:05 PM    Glucose (POC) 214 (H) 09/25/2022 06:39 AM     No results found for: COLOR, APPRN, SPGRU, REFSG, WILL, PROTU, GLUCU, KETU, BILU, UROU, MISHA, LEUKU, GLUKE, EPSU, BACTU, WBCU, RBCU, CASTS, UCRY      Medications Reviewed:     Current Facility-Administered Medications   Medication Dose Route Frequency    0.9% sodium chloride infusion 250 mL  250 mL IntraVENous PRN    potassium chloride SR (KLOR-CON 10) tablet 20 mEq  20 mEq Oral DAILY    TPN ADULT - CENTRAL   IntraVENous CONTINUOUS    insulin regular (NOVOLIN R, HUMULIN R) injection   SubCUTAneous Q6H    glucose chewable tablet 16 g  4 Tablet Oral PRN    glucagon (GLUCAGEN) injection 1 mg  1 mg IntraMUSCular PRN    dextrose 10 % infusion 0-250 mL  0-250 mL IntraVENous PRN    gabapentin (NEURONTIN) capsule 100 mg 100 mg Oral TID    fat emulsion 20% (LIPOSYN, INTRAlipid) infusion 250 mL  250 mL IntraVENous QPM    amoxicillin-clavulanate (AUGMENTIN) 500-125 mg per tablet 1 Tablet  1 Tablet Oral Q24H    ferrous sulfate tablet 325 mg  1 Tablet Oral BID WITH MEALS    HYDROmorphone (DILAUDID) injection 0.2 mg  0.2 mg IntraVENous Q4H PRN    HYDROmorphone (DILAUDID) tablet 2 mg  2 mg Oral Q4H PRN    HYDROmorphone (DILAUDID) tablet 4 mg  4 mg Oral Q4H PRN    hydrALAZINE (APRESOLINE) 20 mg/mL injection 10 mg  10 mg IntraVENous Q6H PRN    mirtazapine (REMERON) tablet 7.5 mg  7.5 mg Oral QHS    pantoprazole (PROTONIX) tablet 40 mg  40 mg Oral ACB&D    apixaban (ELIQUIS) tablet 5 mg  5 mg Oral BID    sertraline (ZOLOFT) tablet 25 mg  25 mg Oral DAILY    epoetin vera-epbx (RETACRIT) injection 10,000 Units  10,000 Units SubCUTAneous DIALYSIS MON, WED & FRI    collagenase (SANTYL) 250 unit/gram ointment   Topical DAILY    diphenhydrAMINE (BENADRYL) injection 12.5 mg  12.5 mg IntraVENous Q6H PRN    sodium chloride (NS) flush 5-40 mL  5-40 mL IntraVENous Q8H    sodium chloride (NS) flush 5-40 mL  5-40 mL IntraVENous PRN    acetaminophen (TYLENOL) tablet 650 mg  650 mg Oral Q6H PRN    polyethylene glycol (MIRALAX) packet 17 g  17 g Oral DAILY PRN    ondansetron (ZOFRAN ODT) tablet 4 mg  4 mg Oral Q8H PRN    Or    ondansetron (ZOFRAN) injection 4 mg  4 mg IntraVENous Q6H PRN    L.acidophilus-paracasei-S.thermophil-bifidobacter (RISAQUAD) 8 billion cell capsule  1 Capsule Oral DAILY     ______________________________________________________________________  EXPECTED LENGTH OF STAY: 9d 14h  ACTUAL LENGTH OF STAY:          Gena [address], NP

## 2022-09-26 NOTE — PROCEDURES
hospitals / [phone]    Vitals Pre Post Assessment Pre Post   /72 114/54 LOC Alert & [place] Alert & orient   HR 71 80 Lungs No sob No sob   Resp 18 18 Cardiac irregular irregular   Temp 98 98.2 Skin warm warm   Weight N/A N/A Edema None noted None noted   Tele status monitor monitor Pain 0/10 0/10     Orders   Duration: Start: 5073 End: 2136 Total: 3 hours   Dialyzer: Dialyzer/Set Up Inspection: Revaclear (09/26/22 1834)   K Bath: Dialysate K (mEq/L): 3.5 (09/26/22 1834)   Ca Bath: Dialysate CA (mEq/L): 2.5 (09/26/22 1834)   Na: Dialysate NA (mEq/L): 138 (09/26/22 1834)   Bicarb: Dialysate HCO3 (mEq/L): 35 (09/26/22 1834)   Target Fluid Removal: Goal/Amount of Fluid to Remove (mL): 1000 mL (09/26/22 1834)     Access   Type & Location: Right cvc   Comments:   high AP lines reversed for optimal BFR; dressing dry and intact; no s/s of infection noted;  post treatment both ports clamped and new caps applied securely                                    Labs   HBsAg (Antigen) / date:    Negative  as of 9/12/22                                           HBsAb (Antibody) / date: Susceptible as of 9/12/22   Source: [facility]   Obtained/Reviewed  Critical Results Called HGB   Date Value Ref Range Status   09/26/2022 8.3 (L) 12.1 - 17.0 g/dL Final     Potassium   Date Value Ref Range Status   09/26/2022 3.0 (L) 3.5 - 5.1 mmol/L Final     Calcium   Date Value Ref Range Status   09/26/2022 7.4 (L) 8.5 - 10.1 MG/DL Final     BUN   Date Value Ref Range Status   09/26/2022 43 (H) 6 - 20 MG/DL Final     Creatinine   Date Value Ref Range Status   09/26/2022 2.98 (H) 0.70 - 1.30 MG/DL Final        Meds Given   Name Dose Route   None ordered                 Adequacy / Fluid    Total Liters Process: 64.3   Net Fluid Removed: 1000 ml      Comments   Time Out Done:   (Time) [id number],[id number]   Admitting Diagnosis: Intra-abdominal infection   Consent obtained/signed: Informed Consent Verified: Yes (09/26/22 1920)   Eunice Guthrie / Elvia Carpenter # Machine Number: [id number] (09/26/22 7394)   Primary Nurse Rpt Pre: Joi Hess RN   Primary Nurse Rpt Post: Danisha Urbina RN   Pt Education: Hemodialysis procedural; s/s of hypotension   Care Plan: Continue dialysis per nephrologist   Pts outpatient clinic:      Tx Summary   Comments:     Treatment completed uneventful

## 2022-09-26 NOTE — PROGRESS NOTES
Nephrology Progress Note  Vaibhav Porter  Date of Admission : 9/9/2022    CC:  Follow up for JEMIMA       Assessment and Plan     JEMIMA on HD:  - 2/2 ATN  - HD dependent MWF for now while here  - no signs of recovery yet  - PC placed 9/16  - HD for today. Anemia:  - MICHAEL MWF    PAD s/p b/l BKA    Recent sepsis    Empyema    ABD wall abscess:  - J-tube removal, colostomy, I&D of abd wound    Sacral decub       Interval History:  Seen and examined. Resting in bed. Confused. For HD later today. No cp or sob. Pt appears comfortable    Current Medications: all current  Medications have been eviewed in EPIC  Review of Systems: Review of systems not obtained due to patient factors. Objective:  Vitals:    Vitals:    09/26/22 0358 09/26/22 0400 09/26/22 0600 09/26/22 0849   BP:  122/65  119/61   Pulse: 71 71 71 74   Resp:  20  16   Temp:  98.3 °F (36.8 °C)  98 °F (36.7 °C)   TempSrc:       SpO2:  100%  100%   Weight:    68.1 kg (150 lb 2.1 oz)   Height:         Intake and Output:  No intake/output data recorded. 09/24 1901 - 09/26 0700  In: 615.3 [I.V.:615.3]  Out: 620     Physical Examination:  General: Confused   Neck:  Supple, no mass  Resp:  Lungs CTA B/L, no wheezing , normal respiratory effort  CV:  RRR,  no murmur or rub, no LE edema  GI:  Soft, NT, + Bowel sounds, + ostomy  Neurologic:  Confused   Access:           RIJ PC    []    High complexity decision making was performed  []    Patient is at high-risk of decompensation with multiple organ involvement    Lab Data Personally Reviewed: I have reviewed all the pertinent labs, microbiology data and radiology studies during assessment.     Recent Labs     09/26/22  0457 09/25/22  0404 09/24/22  0144    138 143   K 3.0* 3.5 3.4*    107 110*   CO2 22 26 27   GLU 83 184* 118*   BUN 43* 18 11   CREA 2.98* 2.31* 2.57*   CA 7.4* 7.1* 7.3*   MG 1.6 1.6 1.6   PHOS 2.9 2.6 2.8       Recent Labs     09/26/22  0457 09/25/22  0404 09/24/22  0144   WBC 14.9* 14.6* 10. 0   HGB 6.7* 7.1* 7.2*   HCT 22.1* 23.1* 23.3*   * 476* 568*       No results found for: SDES  Lab Results   Component Value Date/Time    Culture result: NO GROWTH 6 DAYS 09/16/2022 08:45 PM    Culture result: NO GROWTH 6 DAYS 09/09/2022 08:52 PM     Recent Results (from the past 24 hour(s))   GLUCOSE, POC    Collection Time: 09/25/22 12:05 PM   Result Value Ref Range    Glucose (POC) 167 (H) 65 - 117 mg/dL    Performed by Caron DICKERSON    GLUCOSE, POC    Collection Time: 09/25/22  5:17 PM   Result Value Ref Range    Glucose (POC) 141 (H) 65 - 117 mg/dL    Performed by Jered 72, POC    Collection Time: 09/25/22 10:36 PM   Result Value Ref Range    Glucose (POC) 105 65 - 117 mg/dL    Performed by Katie Cortes RN    GLUCOSE, POC    Collection Time: 09/26/22  4:55 AM   Result Value Ref Range    Glucose (POC) 76 65 - 117 mg/dL    Performed by [facility]  RN    TRIGLYCERIDE    Collection Time: 09/26/22  4:57 AM   Result Value Ref Range    Triglyceride 124 <150 MG/DL   CBC WITH AUTOMATED DIFF    Collection Time: 09/26/22  4:57 AM   Result Value Ref Range    WBC 14.9 (H) 4.1 - 11.1 K/uL    RBC 2.26 (L) 4.10 - 5.70 M/uL    HGB 6.7 (L) 12.1 - 17.0 g/dL    HCT 22.1 (L) 36.6 - 50.3 %    MCV 97.8 80.0 - 99.0 FL    MCH 29.6 26.0 - 34.0 PG    MCHC 30.3 30.0 - 36.5 g/dL    RDW 20.4 (H) 11.5 - 14.5 %    PLATELET 775 (H) 429 - 400 K/uL    MPV 9.4 8.9 - 12.9 FL    NRBC 0.0 0  WBC    ABSOLUTE NRBC 0.00 0.00 - 0.01 K/uL    NEUTROPHILS 70 32 - 75 %    LYMPHOCYTES 16 12 - 49 %    MONOCYTES 6 5 - 13 %    EOSINOPHILS 5 0 - 7 %    BASOPHILS 1 0 - 1 %    IMMATURE GRANULOCYTES 2 (H) 0.0 - 0.5 %    ABS. NEUTROPHILS 10.5 (H) 1.8 - 8.0 K/UL    ABS. LYMPHOCYTES 2.4 0.8 - 3.5 K/UL    ABS. MONOCYTES 0.9 0.0 - 1.0 K/UL    ABS. EOSINOPHILS 0.7 (H) 0.0 - 0.4 K/UL    ABS. BASOPHILS 0.1 0.0 - 0.1 K/UL    ABS. IMM.  GRANS. 0.3 (H) 0.00 - 0.04 K/UL    DF SMEAR SCANNED      RBC COMMENTS ANISOCYTOSIS  1+ RBC COMMENTS MACROCYTOSIS  1+        RBC COMMENTS OVALOCYTES  PRESENT       MAGNESIUM    Collection Time: 09/26/22  4:57 AM   Result Value Ref Range    Magnesium 1.6 1.6 - 2.4 mg/dL   METABOLIC PANEL, BASIC    Collection Time: 09/26/22  4:57 AM   Result Value Ref Range    Sodium 136 136 - 145 mmol/L    Potassium 3.0 (L) 3.5 - 5.1 mmol/L    Chloride 108 97 - 108 mmol/L    CO2 22 21 - 32 mmol/L    Anion gap 6 5 - 15 mmol/L    Glucose 83 65 - 100 mg/dL    BUN 43 (H) 6 - 20 MG/DL    Creatinine 2.98 (H) 0.70 - 1.30 MG/DL    BUN/Creatinine ratio 14 12 - 20      GFR est AA 27 (L) >60 ml/min/1.73m2    GFR est non-AA 22 (L) >60 ml/min/1.73m2    Calcium 7.4 (L) 8.5 - 10.1 MG/DL   PHOSPHORUS    Collection Time: 09/26/22  4:57 AM   Result Value Ref Range    Phosphorus 2.9 2.6 - 4.7 MG/DL                 Jensen Brush MD  [address]  Phone - [phone]   Fax - [phone]  [email or web address]. [email or web address]

## 2022-09-27 ENCOUNTER — APPOINTMENT (OUTPATIENT)
Dept: CT IMAGING | Age: 52
DRG: 853 | End: 2022-09-27
Attending: NURSE PRACTITIONER
Payer: COMMERCIAL

## 2022-09-27 ENCOUNTER — APPOINTMENT (OUTPATIENT)
Dept: GENERAL RADIOLOGY | Age: 52
DRG: 853 | End: 2022-09-27
Attending: NURSE PRACTITIONER
Payer: COMMERCIAL

## 2022-09-27 LAB
ABO + RH BLD: NORMAL
ALBUMIN SERPL-MCNC: 1.1 G/DL (ref 3.5–5)
ALBUMIN/GLOB SERPL: 0.3 {RATIO} (ref 1.1–2.2)
ALP SERPL-CCNC: 100 U/L (ref 45–117)
ALT SERPL-CCNC: <6 U/L (ref 12–78)
ANION GAP SERPL CALC-SCNC: 6 MMOL/L (ref 5–15)
AST SERPL-CCNC: 16 U/L (ref 15–37)
BASOPHILS # BLD: 0.2 K/UL (ref 0–0.1)
BASOPHILS NFR BLD: 1 % (ref 0–1)
BILIRUB SERPL-MCNC: 0.3 MG/DL (ref 0.2–1)
BLD PROD TYP BPU: NORMAL
BLOOD GROUP ANTIBODIES SERPL: NORMAL
BPU ID: NORMAL
BUN SERPL-MCNC: 31 MG/DL (ref 6–20)
BUN/CREAT SERPL: 19 (ref 12–20)
CALCIUM SERPL-MCNC: 7.5 MG/DL (ref 8.5–10.1)
CHLORIDE SERPL-SCNC: 104 MMOL/L (ref 97–108)
CO2 SERPL-SCNC: 26 MMOL/L (ref 21–32)
CREAT SERPL-MCNC: 1.67 MG/DL (ref 0.7–1.3)
CROSSMATCH RESULT,%XM: NORMAL
DIFFERENTIAL METHOD BLD: ABNORMAL
EOSINOPHIL # BLD: 0.8 K/UL (ref 0–0.4)
EOSINOPHIL NFR BLD: 5 % (ref 0–7)
ERYTHROCYTE [DISTWIDTH] IN BLOOD BY AUTOMATED COUNT: 21.1 % (ref 11.5–14.5)
GLOBULIN SER CALC-MCNC: 3.8 G/DL (ref 2–4)
GLUCOSE BLD STRIP.AUTO-MCNC: 76 MG/DL (ref 65–117)
GLUCOSE BLD STRIP.AUTO-MCNC: 76 MG/DL (ref 65–117)
GLUCOSE BLD STRIP.AUTO-MCNC: 80 MG/DL (ref 65–117)
GLUCOSE BLD STRIP.AUTO-MCNC: 93 MG/DL (ref 65–117)
GLUCOSE BLD STRIP.AUTO-MCNC: 95 MG/DL (ref 65–117)
GLUCOSE SERPL-MCNC: 66 MG/DL (ref 65–100)
HCT VFR BLD AUTO: 25.1 % (ref 36.6–50.3)
HGB BLD-MCNC: 8.1 G/DL (ref 12.1–17)
IMM GRANULOCYTES # BLD AUTO: 0.3 K/UL (ref 0–0.04)
IMM GRANULOCYTES NFR BLD AUTO: 2 % (ref 0–0.5)
LYMPHOCYTES # BLD: 1.9 K/UL (ref 0.8–3.5)
LYMPHOCYTES NFR BLD: 12 % (ref 12–49)
MAGNESIUM SERPL-MCNC: 1.7 MG/DL (ref 1.6–2.4)
MCH RBC QN AUTO: 29.7 PG (ref 26–34)
MCHC RBC AUTO-ENTMCNC: 32.3 G/DL (ref 30–36.5)
MCV RBC AUTO: 91.9 FL (ref 80–99)
MONOCYTES # BLD: 0.9 K/UL (ref 0–1)
MONOCYTES NFR BLD: 6 % (ref 5–13)
NEUTS SEG # BLD: 11.4 K/UL (ref 1.8–8)
NEUTS SEG NFR BLD: 74 % (ref 32–75)
NRBC # BLD: 0 K/UL (ref 0–0.01)
NRBC BLD-RTO: 0 PER 100 WBC
PHOSPHATE SERPL-MCNC: 2.4 MG/DL (ref 2.6–4.7)
PLATELET # BLD AUTO: 345 K/UL (ref 150–400)
PMV BLD AUTO: 9.6 FL (ref 8.9–12.9)
POTASSIUM SERPL-SCNC: 4.1 MMOL/L (ref 3.5–5.1)
PROT SERPL-MCNC: 4.9 G/DL (ref 6.4–8.2)
RBC # BLD AUTO: 2.73 M/UL (ref 4.1–5.7)
RBC MORPH BLD: ABNORMAL
RBC MORPH BLD: ABNORMAL
SERVICE CMNT-IMP: NORMAL
SODIUM SERPL-SCNC: 136 MMOL/L (ref 136–145)
SPECIMEN EXP DATE BLD: NORMAL
STATUS OF UNIT,%ST: NORMAL
TRIGL SERPL-MCNC: 117 MG/DL (ref ?–150)
UNIT DIVISION, %UDIV: 0
WBC # BLD AUTO: 15.5 K/UL (ref 4.1–11.1)

## 2022-09-27 PROCEDURE — 83735 ASSAY OF MAGNESIUM: CPT

## 2022-09-27 PROCEDURE — 87205 SMEAR GRAM STAIN: CPT

## 2022-09-27 PROCEDURE — 74011000250 HC RX REV CODE- 250: Performed by: NURSE PRACTITIONER

## 2022-09-27 PROCEDURE — 80053 COMPREHEN METABOLIC PANEL: CPT

## 2022-09-27 PROCEDURE — 85025 COMPLETE CBC W/AUTO DIFF WBC: CPT

## 2022-09-27 PROCEDURE — 87186 SC STD MICRODIL/AGAR DIL: CPT

## 2022-09-27 PROCEDURE — 74011250637 HC RX REV CODE- 250/637: Performed by: NURSE PRACTITIONER

## 2022-09-27 PROCEDURE — 82962 GLUCOSE BLOOD TEST: CPT

## 2022-09-27 PROCEDURE — 74011250636 HC RX REV CODE- 250/636: Performed by: NURSE PRACTITIONER

## 2022-09-27 PROCEDURE — 74011250637 HC RX REV CODE- 250/637: Performed by: COLON & RECTAL SURGERY

## 2022-09-27 PROCEDURE — 74011250636 HC RX REV CODE- 250/636: Performed by: PHYSICIAN ASSISTANT

## 2022-09-27 PROCEDURE — 87040 BLOOD CULTURE FOR BACTERIA: CPT

## 2022-09-27 PROCEDURE — 84478 ASSAY OF TRIGLYCERIDES: CPT

## 2022-09-27 PROCEDURE — 74011000250 HC RX REV CODE- 250: Performed by: COLON & RECTAL SURGERY

## 2022-09-27 PROCEDURE — 74011000250 HC RX REV CODE- 250: Performed by: PHYSICIAN ASSISTANT

## 2022-09-27 PROCEDURE — 87077 CULTURE AEROBIC IDENTIFY: CPT

## 2022-09-27 PROCEDURE — 74011636637 HC RX REV CODE- 636/637: Performed by: NURSE PRACTITIONER

## 2022-09-27 PROCEDURE — 74011250637 HC RX REV CODE- 250/637: Performed by: STUDENT IN AN ORGANIZED HEALTH CARE EDUCATION/TRAINING PROGRAM

## 2022-09-27 PROCEDURE — 65270000046 HC RM TELEMETRY

## 2022-09-27 PROCEDURE — 74011250637 HC RX REV CODE- 250/637: Performed by: PHYSICIAN ASSISTANT

## 2022-09-27 PROCEDURE — 71045 X-RAY EXAM CHEST 1 VIEW: CPT

## 2022-09-27 PROCEDURE — 36415 COLL VENOUS BLD VENIPUNCTURE: CPT

## 2022-09-27 PROCEDURE — 74011000258 HC RX REV CODE- 258: Performed by: NURSE PRACTITIONER

## 2022-09-27 PROCEDURE — 97530 THERAPEUTIC ACTIVITIES: CPT

## 2022-09-27 PROCEDURE — 74018 RADEX ABDOMEN 1 VIEW: CPT

## 2022-09-27 PROCEDURE — 73700 CT LOWER EXTREMITY W/O DYE: CPT

## 2022-09-27 PROCEDURE — 74011250636 HC RX REV CODE- 250/636: Performed by: COLON & RECTAL SURGERY

## 2022-09-27 PROCEDURE — 84100 ASSAY OF PHOSPHORUS: CPT

## 2022-09-27 RX ADMIN — HYDROMORPHONE HYDROCHLORIDE 4 MG: 2 TABLET ORAL at 08:59

## 2022-09-27 RX ADMIN — ONDANSETRON 4 MG: 2 INJECTION INTRAMUSCULAR; INTRAVENOUS at 08:59

## 2022-09-27 RX ADMIN — GABAPENTIN 100 MG: 100 CAPSULE ORAL at 10:43

## 2022-09-27 RX ADMIN — COLLAGENASE SANTYL: 250 OINTMENT TOPICAL at 10:45

## 2022-09-27 RX ADMIN — APIXABAN 5 MG: 5 TABLET, FILM COATED ORAL at 10:43

## 2022-09-27 RX ADMIN — GABAPENTIN 100 MG: 100 CAPSULE ORAL at 21:00

## 2022-09-27 RX ADMIN — PANTOPRAZOLE SODIUM 40 MG: 40 TABLET, DELAYED RELEASE ORAL at 17:40

## 2022-09-27 RX ADMIN — FERROUS SULFATE TAB 325 MG (65 MG ELEMENTAL FE) 325 MG: 325 (65 FE) TAB at 17:40

## 2022-09-27 RX ADMIN — Medication: at 19:21

## 2022-09-27 RX ADMIN — HYDROMORPHONE HYDROCHLORIDE 0.2 MG: 1 INJECTION, SOLUTION INTRAMUSCULAR; INTRAVENOUS; SUBCUTANEOUS at 10:44

## 2022-09-27 RX ADMIN — SERTRALINE 25 MG: 50 TABLET, FILM COATED ORAL at 10:43

## 2022-09-27 RX ADMIN — AMOXICILLIN AND CLAVULANATE POTASSIUM 1 TABLET: 500; 125 TABLET, FILM COATED ORAL at 10:43

## 2022-09-27 RX ADMIN — GABAPENTIN 100 MG: 100 CAPSULE ORAL at 17:40

## 2022-09-27 RX ADMIN — HYDROMORPHONE HYDROCHLORIDE 0.2 MG: 1 INJECTION, SOLUTION INTRAMUSCULAR; INTRAVENOUS; SUBCUTANEOUS at 16:33

## 2022-09-27 RX ADMIN — FERROUS SULFATE TAB 325 MG (65 MG ELEMENTAL FE) 325 MG: 325 (65 FE) TAB at 10:43

## 2022-09-27 RX ADMIN — PANTOPRAZOLE SODIUM 40 MG: 40 TABLET, DELAYED RELEASE ORAL at 06:31

## 2022-09-27 RX ADMIN — SODIUM CHLORIDE, PRESERVATIVE FREE 10 ML: 5 INJECTION INTRAVENOUS at 06:32

## 2022-09-27 RX ADMIN — HYDROMORPHONE HYDROCHLORIDE 4 MG: 2 TABLET ORAL at 19:30

## 2022-09-27 RX ADMIN — I.V. FAT EMULSION 250 ML: 20 EMULSION INTRAVENOUS at 20:55

## 2022-09-27 RX ADMIN — APIXABAN 5 MG: 5 TABLET, FILM COATED ORAL at 21:00

## 2022-09-27 RX ADMIN — Medication 1 CAPSULE: at 10:43

## 2022-09-27 RX ADMIN — SODIUM CHLORIDE, PRESERVATIVE FREE 10 ML: 5 INJECTION INTRAVENOUS at 21:00

## 2022-09-27 RX ADMIN — MIRTAZAPINE 7.5 MG: 15 TABLET, FILM COATED ORAL at 21:00

## 2022-09-27 RX ADMIN — HYDROMORPHONE HYDROCHLORIDE 4 MG: 2 TABLET ORAL at 01:04

## 2022-09-27 RX ADMIN — POTASSIUM CHLORIDE 20 MEQ: 750 TABLET, FILM COATED, EXTENDED RELEASE ORAL at 10:43

## 2022-09-27 RX ADMIN — SODIUM CHLORIDE, PRESERVATIVE FREE 10 ML: 5 INJECTION INTRAVENOUS at 16:36

## 2022-09-27 NOTE — PROGRESS NOTES
Comprehensive Nutrition Assessment    Type and Reason for Visit: Reassess    Nutrition Recommendations/Plan:     Recommend increasing dex to 20% in TPN, keep insulin same for now    Recommend Phos repletion if continues to trend down    Continue to monitor PO intake closely for improvements to determine need for nutrition support going forth/ at discharge         Malnutrition Assessment:  Malnutrition Status:  Severe malnutrition (09/13/22 1116)    Context:  Chronic illness     Findings of the 6 clinical characteristics of malnutrition:   Energy Intake:  Unable to assess  Weight Loss:  Unable to assess     Body Fat Loss:  Severe body fat loss, Triceps, Orbital   Muscle Mass Loss:  Severe muscle mass loss, Temples (temporalis), Clavicles (pectoralis &deltoids)  Fluid Accumulation:  No significant fluid accumulation,     Strength:  Not performed        Nutrition Assessment:    PMHx includes DM, bilateral BKAs, JEMIMA on HD  HPI: pt presented at the emergency room from [facility] with abdominal pain. . was recently admitted to another hospital and underwent L BKA, hospitalization c/b respiratory failure which required prolonged intubation. The respiratory failure was attributed to aspiration pneumonia as well as lobulated effusion, for which the patient underwent decortication and R thoracotomy. The patient also went into acute renal failure for which he has been started on HD. J-tube was placed for supplemental nutrition. The patient was subsequently transferred to [facility] for continuation of care. He was doing relatively well at [facility] until the day of presentation to our ER with c/o abdominal pain. CTAP showed evidence of bowel perforation. Admitted with pneumoperitoneum, abdominal wall abscess, severe sepsis, possible empyema, FOBT+, JEMIMA on HD, rectal fistula. Surgery consulted on admission. Underwent takedown and removal of J-tube, lap colostomy placement, and I&D of abdominal wall on 9/10/22.   Recommends colorectal surgery eval.  CRS took pt to OR 9/15 for anorectal examination of wound, including rigid proctosigmoidoscopy. PC placed 9/16 - no sign of renal recovery yet. HD MWF. Concern for BKA wound dehiscence - vascular eval, no surgical intervention needed at this time. 9/27: follow-up. Continues on TPN at goal (initiated 9/23, at goal by 9/25). Phos low and BG running low normal.  Does have insulin and folic acid and thiamine in TPN. Nephrology following closely - remains on HD. Worsening leukocytosis - KUB negative, ID recommends CT of L stump. Given BG is well controlled, do recommend increasing dex to 20% to slightly increase calories - keep insulin same for mow.  8%AA, 20% dex @ 63 mL/hr with 250 mL 20% lipids daily provides 1762 mL, 2012 kcal, 120 gm pro, and 302 gm dex. 9/23: pt continues to eat very little, only bites if at all. Will not consume ONS. AMS/ delirium, concern for some hallucinations. Doubtful he would tolerate/ allow an NG.  PEG or Jtube not an option at this time with active abdominal infection. Weight trending down rapidly. High EEN with wounds and HD. Electrolyte abnormalities, folate deficiency. Pt is severely malnourished. Discussed with PA today, pt has access, so plan to start TPN. This was discussed with nephrology as well. For tonight, will start TPN of 5%AA, 15% dex @ 42 mL/hr.  250 mL 20% lipids added daily. Thiamine and folic acid included. Recommend goal of 8%AA, 18% dex @ 63 mL/hr with 250 mL 20% lipids daily to provide 1762 mL, 1909 kcal, 120 gm pro, and 272 gm dex (GIR 2.9 mg/kg/min). If allowed more volume, could better meet EEN, but limited with HD.       9/20: follow-up. Discussed in rounds. Hallucinations overnight thought to be d/t dilaudid. Still with significant pain. Events of past week added above. New A fib, pending cardiology consult. Met with pt in room. Weight down.   Pt reports a good appetite/ intake, but this is not supported by documentation. Breakfast meal at bedside untouched. He states he was off for procedure (?unclear). Now too cold - awaiting lunch. Pt states he likes the taste of Nepro, but he cannot drink any of these ONS as they make him sick. When I asked him to elaborate, he states they cause bloating/ pain, and diarrhea. He tells me he usually eats more than 50% of his meal, sometimes all. This is unclear. He seems to have poor insight regarding nutrition status and ?current medical issues - stating once the pain in under control, he hopes to be up walking around which will probably make him hungrier. BG well controlled. K/Phos BNL. Has not been started on folic acid. Will address with hospitalist.        9/13: Pt was initially screen d/t diet order thickened clear liquids x 3 days. Upon review of [facility] records, pt was on pureed diet with mildly thick liquids. However, despite our diet order, pt had dried cereal (brought in from outside) he was eating yesterday and drinking thin liquids. RN stated he was tolerating fine. I addressed this with Dr. John Joyce yesterday and asked about SLP eval and he also did not seem that concerned as he also saw that pt was tolerating these items. I did reach out to speech and chart was reviewed, but given MD, surgery, and pulmonary all without concerns for aspiration or dysphagia, they did not see an indication for evaluation and to advance diet as tolerated as we usually would. I spoke with pt in room today. He states he eats chicken, burgers, steaks, etc - basically everything. He stated he coughed once and they placed him on a modified diet weeks ago, but has been doing fine eating whatever he wants. He said J-tube was placed (not sure why J-tube over PEG) for supplemental nutrition, but they only used it 3 times since he was still eating. Pt does have significant muscle wasting and fat loss.   He states he used to weigh >350 lb and started to try and lose weight many years ago, however it is unclear how long ago. He stated more recently it has not been intentional weight loss, but he is unable to state when all this occurred. Wife not present at the time. He refused any ONS and just wants to eat. He did state he cannot eat a lot at once. I suspect maybe tube feeds started to help promote wound healing of BKA and other Pis with poor intake, severe PCM, but this is still unclear. I reached to Dr. Bishop Stacy as surgery had said advance diet for past 2 days and it had not been done. Again, asked if he wanted SLP eval first and he declined, just to advance to full liquids and then regular as tolerated. Multiple PI wounds as below. Confirmed height of 5'7\" before amputations. Adjust IBW with bilat BKA ~ 130 lb. Suspect pt's admission weight of 149 lb is closer to his ABW. Therefore, he is ~114% of his adj IBW and will use for assessment purposes. NFPE reveals he is clearly malnourished and would not put him in \"overweight\" category despite what the flowsheets are classifying him as (even using admission weight of 149 lb). Nutritionally Significant Medications:  Eraxis, Epo, risaquad, mag-ox, protonix, K Phos, Zosyn  PRN: dilaudid, zofran    Estimated Daily Nutrient Needs:  Energy Requirements Based On: Kcal/kg  Weight Used for Energy Requirements: Admission (68 kg)  Energy (kcal/day): 1349-2757 (30-35 kcal/kg)  Weight Used for Protein Requirements: Admission  Protein (g/day): 100-120 (1.5-1.8 gm/kg or at least 20%)  Method Used for Fluid Requirements: Standard renal  Fluid (ml/day): 500 mL + OP    Nutrition Related Findings:   Edema: No data recorded    Last BM: 09/27/22, Loose, Watery - via colostomy    Wounds: Multiple, Pressure injury, Stage III, Partial thickness  Per WOCN on 9/12:  1. Left abdomen, open surgical wound/ former J tube site:  2. POA Sacrum, Pressure Injury Stage 3  3. POA Left buttocks, Pressure Injury Stage 3  4.  POA Right buttocks, Pressure Injury Stage 3  5. POA Right upper back, partial thickness wound  6. POA Right upper back, partial thickness wound  7. POA Left BKA site with dried crusted incision with sutures      Current Nutrition Therapies:  Diet: regular, consistent CHO 60 gm/meal  Supplements: Magic Cup BID  Meal intake: No data found. Supplement intake: No data found. Anthropometric Measures:  Height: 5' 7\" (170.2 cm)  Ideal Body Weight (IBW): 148 lbs (67 kg)  Admission Body Weight: 149 lb 14.6 oz (68 kg)  Current Body Wt:  68.1 kg (150 lb 2.1 oz), 101.4 % IBW.  Bed scale  Current BMI (kg/m2): 23.5        Weight Adjustment: Amputation  Total Amputation Percentage: 11.8  Adjusted Ideal Body Weight (lbs) (Calculated): 130.5  Adjusted Ideal Body Weight (kg) (Calculated): 59.32 kg  Adjusted % IBW (Calculated): 115  Adjusted BMI (Calculated): 26.3  BMI Category: Normal weight (BMI 18.5-24.9) (or underweight - clinical judgement)    Wt Readings from Last 15 Encounters:   09/27/22 68.1 kg   09/19/22 69 kg (152 lb 1.9 oz) - bed   09/12/22 75.2 kg (165 lb 12.6 oz) - bed   09/09/22 68 kg (149 lb 14.6 oz) - admission, no source   07/02/18 104 kg (229 lb 4.5 oz)     Weight hx from other outside records:  04/2021 = 189 lb  10/2020 = 221 lb      Nutrition Diagnosis:   Inadequate oral intake related to increased demand for energy/nutrients, renal dysfunction, acute injury/trauma as evidenced by poor intake prior to admission, dialysis, Criteria as identified in malnutrition assessment  Increased nutrient needs related to increased demand for energy/nutrients as evidenced by wounds, dialysis    Nutrition Interventions:   Food and/or Nutrient Delivery: Continue current diet, Continue parenteral nutrition  Nutrition Education/Counseling: Counseling initiated (encouraged high protein)  Coordination of Nutrition Care: Continue to monitor while inpatient, Feeding assistance/environmental change, Interdisciplinary rounds       Goals:  Previous Goal Met: No progress toward goal(s)  Goals: other (specify)  Specify Other Goals: TPN to meet >80% of kcal needs, 100% of pro needs until PO intake >/=75% of meals since won't take ONS    Nutrition Monitoring and Evaluation:   Behavioral-Environmental Outcomes: None identified  Food/Nutrient Intake Outcomes: Food and nutrient intake, Parenteral nutrition intake/tolerance  Physical Signs/Symptoms Outcomes: Biochemical data, GI status, Meal time behavior, Nutrition focused physical findings, Weight    Discharge Planning: Too soon to determine    Recent Labs     09/27/22  0527 09/26/22  0457 09/25/22  0404   GLU 66 83 184*   BUN 31* 43* 18   CREA 1.67* 2.98* 2.31*    136 138   K 4.1 3.0* 3.5    108 107   CO2 26 22 26   CA 7.5* 7.4* 7.1*   PHOS 2.4* 2.9 2.6   MG 1.7 1.6 1.6       Recent Labs     09/27/22 0527   ALT <6*   AST 16      TBILI 0.3   TP 4.9*   ALB 1.1*   GLOB 3.8         No results for input(s): LAC in the last 72 hours.       Recent Labs     09/27/22  0527 09/26/22 1848 09/26/22 0457   WBC 15.5*  --  14.9*   HGB 8.1* 8.3* 6.7*   HCT 25.1* 26.3* 22.1*     --  410*       Recent Labs     09/27/22  1240 09/27/22  0631 09/27/22  0011 09/26/22  1705 09/26/22  1138 09/26/22  0455 09/25/22  2236 09/25/22  1717 09/25/22  1205 09/25/22  0639 09/24/22  2134   GLUCPOC 80 93 76 79 87 76 105 141* 167* 214* 173*       Lab Results   Component Value Date/Time    Hemoglobin A1c <3.8 (L) 09/24/2022 01:44 AM    Hemoglobin A1c <3.8 (L) 09/10/2022 05:50 AM    Hemoglobin A1c 14.7 (H) 07/03/2018 07:13 AM       Iron   Date Value Ref Range Status   09/10/2022 12 (L) 35 - 150 ug/dL Final   09/10/2022 12 (L) 35 - 150 ug/dL Final     TIBC   Date Value Ref Range Status   09/10/2022 141 (L) 250 - 450 ug/dL Final     Iron % saturation   Date Value Ref Range Status   09/10/2022 9 (L) 20 - 50 % Final       Ferritin   Date Value Ref Range Status   09/10/2022 715 (H) 26 - 388 NG/ML Final       B Vitamins  Folate Date Value Ref Range Status   09/10/2022 4.1 (L) 5.0 - 21.0 ng/mL Final     Vitamin B12   Date Value Ref Range Status   09/10/2022 729 193 - 986 pg/mL Final         K[address] RD  Available via Jini

## 2022-09-27 NOTE — PROGRESS NOTES
CAROLYN:  RUR: 13%    Disposition:  [facility] ([place])  Outpatient HD: at [address] has been established M/W/F 3:15pm    CM received call from [facility]  admissions  [phone]). Updates provided from 9/26/22 IDR with DRE anticipated for Friday 9/30/22. CM will follow-up with  Camille ([facility] [phone]). CM continuing to follow. Barbi Menchaca, [address], CRM  [phone]    5:56pm  CM spoke with patient and his daughter and provided updates. Patient states  [facility] is closer to his residence (30 min) vs  [facility] 40-45 min). He is asking if there is a site in [place] (which would be closest) to his home. He is also asking about possibility of chair time change as family works during the day and a morning appt may be a better fit . CM will follow-up with [facility] intake/Admission in the morning.      Barbi Menchaca, [address], [address]

## 2022-09-27 NOTE — PROGRESS NOTES
Nephrology Progress Note  Neto Duran  Date of Admission : 9/9/2022    CC:  Follow up for JEMIMA       Assessment and Plan     JEMIMA on HD:  - 2/2 ATN  - HD dependent MWF for now while here  - no signs of recovery yet  - PC placed 9/16  - HD for today. Anemia:  - MICHAEL MWF    PAD s/p b/l BKA    Recent sepsis    Empyema    ABD wall abscess:  - J-tube removal, colostomy, I&D of abd wound    Sacral decub       Interval History:  Seen and examined. Complaints of abdominal pain   Tolerated HD yesterday       Current Medications: all current  Medications have been eviewed in EPIC  Review of Systems: A comprehensive review of systems was negative except for that written in the HPI. Objective:  Vitals:    Vitals:    09/27/22 0400 09/27/22 0600 09/27/22 0901 09/27/22 1000   BP: 109/69  124/67    Pulse: 69 72 76 78   Resp: 18  16    Temp: 98 °F (36.7 °C)  98.2 °F (36.8 °C)    TempSrc:       SpO2: 100%  100%    Weight: 68.1 kg (150 lb 2.1 oz)      Height:         Intake and Output:  09/27 0701 - 09/27 1900  In: -   Out: 200   09/25 1901 - 09/27 0700  In: 1989 [P. O.:600; I.V.:1650.3]  Out: 1420     Physical Examination:  General: Confused   Neck:  Supple, no mass  Resp:  Lungs CTA B/L, no wheezing , normal respiratory effort  CV:  RRR,  no murmur or rub, no LE edema  GI:  Soft, NT, + Bowel sounds, + ostomy  Neurologic:  Confused   Access:           RIJ PC    []    High complexity decision making was performed  []    Patient is at high-risk of decompensation with multiple organ involvement    Lab Data Personally Reviewed: I have reviewed all the pertinent labs, microbiology data and radiology studies during assessment.     Recent Labs     09/27/22  0527 09/26/22  0457 09/25/22  0404    136 138   K 4.1 3.0* 3.5    108 107   CO2 26 22 26   GLU 66 83 184*   BUN 31* 43* 18   CREA 1.67* 2.98* 2.31*   CA 7.5* 7.4* 7.1*   MG 1.7 1.6 1.6   PHOS 2.4* 2.9 2.6   ALB 1.1*  --   --    ALT <6*  --   --        Recent Labs 09/27/22  0527 09/26/22  1848 09/26/22  0457 09/25/22  0404   WBC 15.5*  --  14.9* 14.6*   HGB 8.1* 8.3* 6.7* 7.1*   HCT 25.1* 26.3* 22.1* 23.1*     --  410* 476*       No results found for: SDES  Lab Results   Component Value Date/Time    Culture result: NO GROWTH 6 DAYS 09/16/2022 08:45 PM    Culture result: NO GROWTH 6 DAYS 09/09/2022 08:52 PM     Recent Results (from the past 24 hour(s))   ECHO ADULT COMPLETE    Collection Time: 09/26/22  2:07 PM   Result Value Ref Range    LVOT Diameter 2.5 cm    EF BP 44 (A) 55 - 100 %    LV Ejection Fraction A2C 36 %    LV Ejection Fraction A4C 43 %    LV EDV A2C 98 mL    LV EDV A4C 137 mL    LV EDV  67 - 155 mL    LV ESV A2C 62 mL    LV ESV A4C 79 mL    LV ESV BP 70 (A) 22 - 58 mL    LVOT Peak Gradient 7 mmHg    LVOT Peak Velocity 1.4 m/s    RVSP 42 mmHg    RV Free Wall Peak S' 20 cm/s    Est. RA Pressure 8 mmHg    AV Area by Peak Velocity 3.5 cm2    AV Peak Gradient 14 mmHg    AV Peak Velocity 1.9 m/s    MV A Velocity 0.99 m/s    MV E Wave Deceleration Time 242.2 ms    MV E Velocity 0.98 m/s    LV E' Lateral Velocity 10 cm/s    LV E' Septal Velocity 7 cm/s    MV PHT 70.3 ms    MV Area by PHT 3.1 cm2    MR Peak Gradient 41 mmHg    MR Peak Velocity 3.2 m/s    PV Peak Gradient 8 mmHg    PV Max Velocity 1.4 m/s    TAPSE 2.0 1.7 cm    TR Peak Gradient 34 mmHg    TR Max Velocity 2.90 m/s    Aortic Root 3.6 cm    LV ESV Index BP 39 mL/m2    LV EDV Index BP 70 mL/m2    LV ESV Index A4C 44 mL/m2    LV EDV Index A4C 77 mL/m2    LV ESV Index A2C 35 mL/m2    LV EDV Index A2C 55 mL/m2    MV E/A 0.99     E/E' Ratio (Averaged) 11.90     E/E' Lateral 9.80     E/E' Septal 14.00     LVOT Area 4.9 cm2    Ao Root Index 2.01 cm/m2    AV Velocity Ratio 0.74     SERGIO/BSA Peak Velocity 2.0 cm2/m2   GLUCOSE, POC    Collection Time: 09/26/22  5:05 PM   Result Value Ref Range    Glucose (POC) 79 65 - 117 mg/dL    Performed by Grace Martin    HGB & HCT    Collection Time: 09/26/22 6:48 PM   Result Value Ref Range    HGB 8.3 (L) 12.1 - 17.0 g/dL    HCT 26.3 (L) 36.6 - 50.3 %   GLUCOSE, POC    Collection Time: 09/27/22 12:11 AM   Result Value Ref Range    Glucose (POC) 76 65 - 117 mg/dL    Performed by Brendia Dance    TRIGLYCERIDE    Collection Time: 09/27/22  5:27 AM   Result Value Ref Range    Triglyceride 117 <150 MG/DL   CBC WITH AUTOMATED DIFF    Collection Time: 09/27/22  5:27 AM   Result Value Ref Range    WBC 15.5 (H) 4.1 - 11.1 K/uL    RBC 2.73 (L) 4.10 - 5.70 M/uL    HGB 8.1 (L) 12.1 - 17.0 g/dL    HCT 25.1 (L) 36.6 - 50.3 %    MCV 91.9 80.0 - 99.0 FL    MCH 29.7 26.0 - 34.0 PG    MCHC 32.3 30.0 - 36.5 g/dL    RDW 21.1 (H) 11.5 - 14.5 %    PLATELET 690 605 - 837 K/uL    MPV 9.6 8.9 - 12.9 FL    NRBC 0.0 0  WBC    ABSOLUTE NRBC 0.00 0.00 - 0.01 K/uL    NEUTROPHILS 74 32 - 75 %    LYMPHOCYTES 12 12 - 49 %    MONOCYTES 6 5 - 13 %    EOSINOPHILS 5 0 - 7 %    BASOPHILS 1 0 - 1 %    IMMATURE GRANULOCYTES 2 (H) 0.0 - 0.5 %    ABS. NEUTROPHILS 11.4 (H) 1.8 - 8.0 K/UL    ABS. LYMPHOCYTES 1.9 0.8 - 3.5 K/UL    ABS. MONOCYTES 0.9 0.0 - 1.0 K/UL    ABS. EOSINOPHILS 0.8 (H) 0.0 - 0.4 K/UL    ABS. BASOPHILS 0.2 (H) 0.0 - 0.1 K/UL    ABS. IMM.  GRANS. 0.3 (H) 0.00 - 0.04 K/UL    DF SMEAR SCANNED      RBC COMMENTS ANISOCYTOSIS  2+        RBC COMMENTS JOSE CELLS  PRESENT       MAGNESIUM    Collection Time: 09/27/22  5:27 AM   Result Value Ref Range    Magnesium 1.7 1.6 - 2.4 mg/dL   PHOSPHORUS    Collection Time: 09/27/22  5:27 AM   Result Value Ref Range    Phosphorus 2.4 (L) 2.6 - 4.7 MG/DL   METABOLIC PANEL, COMPREHENSIVE    Collection Time: 09/27/22  5:27 AM   Result Value Ref Range    Sodium 136 136 - 145 mmol/L    Potassium 4.1 3.5 - 5.1 mmol/L    Chloride 104 97 - 108 mmol/L    CO2 26 21 - 32 mmol/L    Anion gap 6 5 - 15 mmol/L    Glucose 66 65 - 100 mg/dL    BUN 31 (H) 6 - 20 MG/DL    Creatinine 1.67 (H) 0.70 - 1.30 MG/DL    BUN/Creatinine ratio 19 12 - 20      GFR est AA 53 (L) >60 ml/min/1.73m2    GFR est non-AA 43 (L) >60 ml/min/1.73m2    Calcium 7.5 (L) 8.5 - 10.1 MG/DL    Bilirubin, total 0.3 0.2 - 1.0 MG/DL    ALT (SGPT) <6 (L) 12 - 78 U/L    AST (SGOT) 16 15 - 37 U/L    Alk. phosphatase 100 45 - 117 U/L    Protein, total 4.9 (L) 6.4 - 8.2 g/dL    Albumin 1.1 (L) 3.5 - 5.0 g/dL    Globulin 3.8 2.0 - 4.0 g/dL    A-G Ratio 0.3 (L) 1.1 - 2.2     GLUCOSE, POC    Collection Time: 09/27/22  6:31 AM   Result Value Ref Range    Glucose (POC) 93 65 - 117 mg/dL    Performed by Bozena Elkins MD  [address]  Phone - [phone]   Fax - [phone]  [email or web address]

## 2022-09-27 NOTE — PROGRESS NOTES
Bedside handoff report given to Rayo Garcia (oncoming nurse) by Alexandra Najera RN (offgoing nurse). Report included the following information: SBAR, Kardex, Intake/Output, MAR, and Cardiac Rhythm.

## 2022-09-27 NOTE — PROGRESS NOTES
[address] [facility] Adult  Hospitalist Group                                                                                          Hospitalist Progress Note  Yosef Saravia NP  Answering service: [phone] from in house phone        Date of Service:  2022  NAME:  Micheal Dennis  :  1970  MRN:  [id number]      Admission Summary: This is a 50-year-old man with a PMH of T2DM, BKA bilaterally, JEMIMA on CKD requiring HD, Respiratory Failure requiring intubation who presented at the ED from [facility] with c/o abdominal pain. The patient was recently admitted to another hospital and underwent left below-knee amputation, hospitalization complicated with respiratory failure which required prolonged intubation- attributed to aspiration pneumonia, also developed lobulated effusion, for which the patient underwent decortication and right thoracotomy and acute renal failure for which he has been started on hemodialysis. He had a J-tube was placed for supplemental nutrition and was transferred to [facility] for continuation of care. He was doing relatively well in [facility] until the day of presentation at the ED when the patient complained of abdominal pain at the facility. CT scan of the abdomen and pelvis was obtained: shows evidence of bowel perforation, sent to [facility] fro further eval/tx. When the patient arrived at the emergency room, based on his clinical presentation and lab work, Code Sepsis was triggered. The patient received fluid therapy as per sepsis protocol. The emergency room physician consulted general surgeon on-call. The patient was seen by the surgeon and the patient is planned for immediate surgical intervention. No record of prior admission to this hospital.     Interval history / Subjective: Follow-up for issues listed below.   -Patient seen and examined, no distress.     -worsening leukocytosis 15.5/afebrile- repeat wound, ur and bld cultures, CXR, KUB- no acute changes.    -ID: CT scan left stump 9/27     Assessment & Plan:     Intractable Abdominal Pain/Acute GIB, guaiac positive 9/9/Anorectal wound/Diarrhea/Free Air and Perirectal Fistula:    -PPI    -started on vancomycin and Zosyn  -Gen Sx: 9/10 Lap take down and removal J-tube, Lap colostomy, I&D abdominal wall  -Colorectal Sx: 9/15 anorectal wound exam including rigid proctosigmoidoscopy  -monitor WBC  -ID consult: IV Eraxis/Zosyn/Vancomycin completed 9/23/22, continue Augmentin x2 weeks, started 9/24  -C Diff negative 9/10  -BCNGTD  -PPI  -CT chest 9/10: Small right basilar gas and fluid containing pleural collection with a  peripheral soft tissue rind. Finding may represent an empyema and clinical correlation is recommended. Recommend direct comparison to any additional prior imaging. Free intraperitoneal gas, seen on prior CT of the abdomen and pelvis. -CT abd/pel wo 9/14:No focal intra-abdominal fluid collection to suggest abscess. Free intraperitoneal gas consistent with recent intra-abdominal surgery. Interval improvement in rectal thickening. Prior CT dated September 9, 2022 demonstrated a probable right posterior rectal wall defect which is no longer visualized on today's examination. Persistent, small right basilar gas and fluid containing pleural collection, unchanged.  -Palliative: pain management  -TPN continues, started 9/25  -wound care ostomy care     Severe sepsis without septic shock: intra-abdominal infection likely source. -IV ABT's as above  -ID following  -BCNGTD, repeat wound, blood and urine cultures 9/27: worsening leukocytosis 15.5.  -CXR 9/27: improved aeration in right lung base, small right pleural effusion unchanged. -KUB 9/27: none obstructive gas pattern.      JEMIMA vs CKD requiring dialysis: CVC 9/16/22- IR.  -renally dose meds  -HD MWF  -Nephrology following  -creatinine improved  -Renal US 9/11: No hydronephrosis or stones.  -Electrolyte Disturbances: monitor replete/treat as needed     T2DM: hypoglycemia  -ISS  -BGL ac&hs  -DM care team consult     AFIB: rate stable. -EKG 9/19: AFIB. -eliquis 5mg BID  -ECHO 9/26: Left Ventricle: The EF by visual approximation is 55 - 60%. Left ventricle size is normal. Normal wall thickness. Normal wall motion. Normal diastolic function. Tricuspid Valve: Mildly elevated RVSP. The estimated RVSP is 42 mmHg. Empyema: noted: recent thoracotomy and prolonged intubation at [facility]. -CXR9/19: Persistent right basilar airspace disease and right-sided loculated effusion/empyema. -CT chest 9/10: Small right basilar gas and fluid containing pleural collection with a  peripheral soft tissue rind. Finding may represent an empyema and clinical correlation is recommended. Recommend direct comparison to any additional prior imaging. Free intraperitoneal gas, seen on prior CT of the abdomen and pelvis. -Pulmonary consulted: noted \"percutaneous drainage would not be successful at removing fluid as I suppose it is very thick and organized at this time. Would only recommend following clinically and radiographically. If worsens or worsened right-sided effusion he would need to be reevaluated by his thoracic surgeon at [facility] for additional drainage. \"  -IV ABT's course as above. Acute blood loss anemia on chronic:  -monitor Hgb  -transfuse to keep Hgb > 7.0  -Hgb 6.7- transfuse 1 unit PRBC's 9/26     +Occult Blood: no melena, no n/v.  -PPI  -iron supplements  -monitor Hgb, transfuse to keep Hgb > 7.0     AMS: delirium, hallucinations ? 2/2 to toxic metabolic encephalopathy, hospital delirium, and opioid induced. -CT head 9/23: no acute intracranial abnormality. Depression: continue zoloft. Thrombocytosis: noted reactive thrombocytosis, monitor platelets, trending downward     Wounds: Ostomy, Abd surgical site: J tube, laparoscope sites. Sacrum. Bilateral BKA, open wound to LLE stump, noted recurrent infections.   -wound care following, daily wound care and documentation. -IV ABT's as above  -Vascular Sx followin/26-sutures removed and some eschar along incision line excised, open cavity laterally has old hematoma at base that was evacuated , continue wound care. -ID: CT scan left stump        DVTppx: eliquis  GIppx: Pepcid  Code Status: Full Code  Diet: Regular, supplements BID  Activity: bedrest  Discharge: TBD  Ambulates:  nonambulatory  Discharge planning: Accepted at [facility], needs OP HD chair      Hospital Problems  Date Reviewed: 9/10/2022            Codes Class Noted POA    Injury to rectum without open wound into cavity ICD-10-CM: S36.60XA  ICD-9-CM: 863.45  2022 Yes        Open wound of anus ICD-10-CM: M45.877W  ICD-9-CM: 863.89  2022 Yes        Severe protein-calorie malnutrition ([address] ICD-10-CM: E43  ICD-9-CM: 790  2022 Yes        * (Principal) Intra-abdominal infection ICD-10-CM: B99.9  ICD-9-CM: 136.9  9/10/2022 Yes             Review of Systems:   A comprehensive review of systems was negative except for that written in the HPI. Vital Signs:    Last 24hrs VS reviewed since prior progress note. Most recent are:  Visit Vitals  /67 (BP 1 Location: Right upper arm, BP Patient Position: At rest)   Pulse 78   Temp 98.2 °F (36.8 °C)   Resp 16   Ht 5' 7\" (1.702 m)   Wt 68.1 kg (150 lb 2.1 oz)   SpO2 100%   BMI 23.51 kg/m²         Intake/Output Summary (Last 24 hours) at 2022 1130  Last data filed at 2022 1107  Gross per 24 hour   Intake 1976.67 ml   Output 1400 ml   Net 576.67 ml        Physical Examination:     I had a face to face encounter with this patient and independently examined them on 2022 as outlined below:          Constitutional:  No acute distress. ENT:  Oral mucosa moist.    Resp:  CTA bilaterally. No wheezing/rhonchi/rales. No accessory muscle use. CV:  Regular rate, S1,S2.    GI/:  Soft, non distended, non tender, no guarding, BS present, colostomy patent.  Voids Freely. Musculoskeletal:  No edema, warm, bilateral BKA: dressing to LLE stunp. Neurologic:  Moves all extremities. Awake and alert, disoriented. Skin:  multiple wounds, skin w/d, jaundiced. Psych:  Poor insight, Not anxious nor agitated. Data Review:    Review and/or order of clinical lab test      Labs:     Recent Labs     09/27/22 0527 09/26/22 1848 09/26/22 0457   WBC 15.5*  --  14.9*   HGB 8.1* 8.3* 6.7*   HCT 25.1* 26.3* 22.1*     --  410*     Recent Labs     09/27/22 0527 09/26/22 0457 09/25/22  0404    136 138   K 4.1 3.0* 3.5    108 107   CO2 26 22 26   BUN 31* 43* 18   CREA 1.67* 2.98* 2.31*   GLU 66 83 184*   CA 7.5* 7.4* 7.1*   MG 1.7 1.6 1.6   PHOS 2.4* 2.9 2.6     Recent Labs     09/27/22 0527   ALT <6*      TBILI 0.3   TP 4.9*   ALB 1.1*   GLOB 3.8     No results for input(s): INR, PTP, APTT, INREXT in the last 72 hours. No results for input(s): FE, TIBC, PSAT, FERR in the last 72 hours. Lab Results   Component Value Date/Time    Folate 4.1 (L) 09/10/2022 12:00 PM      No results for input(s): PH, PCO2, PO2 in the last 72 hours. No results for input(s): CPK, CKNDX, TROIQ in the last 72 hours.     No lab exists for component: CPKMB  Lab Results   Component Value Date/Time    Triglyceride 117 09/27/2022 05:27 AM     Lab Results   Component Value Date/Time    Glucose (POC) 93 09/27/2022 06:31 AM    Glucose (POC) 76 09/27/2022 12:11 AM    Glucose (POC) 79 09/26/2022 05:05 PM    Glucose (POC) 87 09/26/2022 11:38 AM    Glucose (POC) 76 09/26/2022 04:55 AM     No results found for: COLOR, APPRN, SPGRU, REFSG, WILL, PROTU, GLUCU, KETU, BILU, UROU, MISHA, LEUKU, GLUKE, EPSU, BACTU, WBCU, RBCU, CASTS, UCRY      Medications Reviewed:     Current Facility-Administered Medications   Medication Dose Route Frequency    0.9% sodium chloride infusion 250 mL  250 mL IntraVENous PRN    potassium chloride SR (KLOR-CON 10) tablet 20 mEq  20 mEq Oral DAILY    TPN ADULT - CENTRAL   IntraVENous CONTINUOUS    insulin regular (NOVOLIN R, HUMULIN R) injection   SubCUTAneous Q6H    glucose chewable tablet 16 g  4 Tablet Oral PRN    glucagon (GLUCAGEN) injection 1 mg  1 mg IntraMUSCular PRN    dextrose 10 % infusion 0-250 mL  0-250 mL IntraVENous PRN    gabapentin (NEURONTIN) capsule 100 mg  100 mg Oral TID    fat emulsion 20% (LIPOSYN, INTRAlipid) infusion 250 mL  250 mL IntraVENous QPM    amoxicillin-clavulanate (AUGMENTIN) 500-125 mg per tablet 1 Tablet  1 Tablet Oral Q24H    ferrous sulfate tablet 325 mg  1 Tablet Oral BID WITH MEALS    HYDROmorphone (DILAUDID) injection 0.2 mg  0.2 mg IntraVENous Q4H PRN    HYDROmorphone (DILAUDID) tablet 2 mg  2 mg Oral Q4H PRN    HYDROmorphone (DILAUDID) tablet 4 mg  4 mg Oral Q4H PRN    hydrALAZINE (APRESOLINE) 20 mg/mL injection 10 mg  10 mg IntraVENous Q6H PRN    mirtazapine (REMERON) tablet 7.5 mg  7.5 mg Oral QHS    pantoprazole (PROTONIX) tablet 40 mg  40 mg Oral ACB&D    apixaban (ELIQUIS) tablet 5 mg  5 mg Oral BID    sertraline (ZOLOFT) tablet 25 mg  25 mg Oral DAILY    epoetin vera-epbx (RETACRIT) injection 10,000 Units  10,000 Units SubCUTAneous DIALYSIS MON, WED & FRI    collagenase (SANTYL) 250 unit/gram ointment   Topical DAILY    diphenhydrAMINE (BENADRYL) injection 12.5 mg  12.5 mg IntraVENous Q6H PRN    sodium chloride (NS) flush 5-40 mL  5-40 mL IntraVENous Q8H    sodium chloride (NS) flush 5-40 mL  5-40 mL IntraVENous PRN    acetaminophen (TYLENOL) tablet 650 mg  650 mg Oral Q6H PRN    polyethylene glycol (MIRALAX) packet 17 g  17 g Oral DAILY PRN    ondansetron (ZOFRAN ODT) tablet 4 mg  4 mg Oral Q8H PRN    Or    ondansetron (ZOFRAN) injection 4 mg  4 mg IntraVENous Q6H PRN    L.acidophilus-paracasei-S.thermophil-bifidobacter (RISAQUAD) 8 billion cell capsule  1 Capsule Oral DAILY     ______________________________________________________________________  EXPECTED LENGTH OF STAY: 9d 14h  ACTUAL LENGTH OF STAY: [address] 43, NP

## 2022-09-27 NOTE — PROGRESS NOTES
Problem: Mobility Impaired (Adult and Pediatric)  Goal: *Acute Goals and Plan of Care (Insert Text)  Description: FUNCTIONAL STATUS PRIOR TO ADMISSION: The patient was functional at the wheelchair level and required minimal assistance for transfers to the chair. HOME SUPPORT PRIOR TO ADMISSION: The patient lived with wife, daughter and required up to moderate assistance  for tranfers bed <> w/c and ADLs. Patient has spent extensive period of time hospitalized recently and has been admitted from [facility]. Physical Therapy Goals  Weekly Reassessment 9/22/22 (goals continued)  Initiated 9/15/2022  1. Patient will roll left/right in bed for pressure relief mod I with use of bed rails within 7 day(s). 2.  Patient will move from supine to sit and sit to supine  in bed with moderate assistance within 7 day(s). 3.  Patient will transfer from bed to chair and chair to bed with moderate assistance  using the least restrictive device within 7 day(s). 4  Patient will sit EOB x 10 minutes without UE support with CGA-min A within 7 day(s). Outcome: Progressing Towards Goal       PHYSICAL THERAPY TREATMENT  Patient: León Brunner (62 y.o. male)  Date: 9/27/2022  Diagnosis: Intra-abdominal infection [B99.9] Intra-abdominal infection  Procedure(s) (LRB):  EUA/ PROCTOSIGMOIDOSCOPY (N/A) 12 Days Post-Op  Precautions: Fall, Other (comment) (bilateral BKA)  Chart, physical therapy assessment, plan of care and goals were reviewed. ASSESSMENT  Patient continues with skilled PT services and is progressing towards goals. Pt was premedicated with PO pain meds and IV pain med. Pt agreeable to attempt EOB. Pt in sideline on arrival. Utilized elevated HOB to 60 degrees requiring breaks to obtain due to pain. Pt utilized UE to attempt full sitting but unable to tolerate due to pain. Pt had good participation. Will continue to progress sitting tolerance and mobility  .      Current Level of Function Impacting Discharge (mobility/balance): Ax2    Other factors to consider for discharge: pain, Bilateral BKA, decrease mobility and independence          PLAN :  Patient continues to benefit from skilled intervention to address the above impairments. Continue treatment per established plan of care. to address goals. Recommendation for discharge: (in order for the patient to meet his/her long term goals)  Therapy 3 hours per day 5-7 days per week if unable then SNF    This discharge recommendation:  Has not yet been discussed the attending provider and/or case management    IF patient discharges home will need the following DME: to be determined (TBD)       SUBJECTIVE:   Patient stated just give me a minute.     OBJECTIVE DATA SUMMARY:   Critical Behavior:  Neurologic State: Alert  Orientation Level: Oriented X4  Cognition: Appropriate decision making  Safety/Judgement: Fall prevention, Home safety  Functional Mobility Training:  Bed Mobility:     Supine to Sit: Adaptive equipment;Maximum assistance;Assist x2 (pt in sideline. elevated HOB. pt utilizing UE to move trunk off the bed with assistance of 2. pt able to move trunk off bed but unable to tolerate full sitting due to pain. pt able to hold approximatly 10 min before rest. pt regained position for ostomy)              Transfers:       Balance:    Sitting balance static- poor       Ambulation/Gait Training:                   Stairs: Therapeutic Exercises:     Pain Rating:  Abdomin and stomach    Activity Tolerance:   Limited by pain     After treatment patient left in no apparent distress:   Supine in bed and Call bell within reach    COMMUNICATION/COLLABORATION:   The patients plan of care was discussed with: Occupational therapist and Registered nurse.      Mike Swift PTA   Time Calculation: 34 mins

## 2022-09-27 NOTE — PROGRESS NOTES
ID Progress Note  2022    Subjective:     Alert, able to answer self, place, and year but unable to elaborate his medical issues  C/o not just comfortable in bed    Review of Systems:            Symptom Y/N Comments   Symptom Y/N Comments   Fever/Chills n      Chest Pain  n      Poor Appetite       Edema        Cough       Abdominal Pain  n      Sputum       Joint Pain        SOB/ANDREWS n      Pruritis/Rash        Nausea/vomit n      Tolerating PT/OT        Diarrhea       Tolerating Diet        Constipation       Other           Could NOT obtain due to:       Objective:     Vitals: Visit Vitals  /69 (BP 1 Location: Right upper arm, BP Patient Position: At rest)   Pulse 72   Temp 98 °F (36.7 °C)   Resp 18   Ht 5' 7\" (1.702 m)   Wt 68.1 kg (150 lb 2.1 oz)   SpO2 100%   BMI 23.51 kg/m²          Tmax:  Temp (24hrs), Av.1 °F (36.7 °C), Min:97.6 °F (36.4 °C), Max:98.6 °F (37 °C)      PHYSICAL EXAM:  General: Chronically ill appearing. WD, WN. Alert, cooperative, no acute distress    EENT:  Anicteric sclerae. Dry mucous membrane  Resp:  CTA bilaterally, no wheezing or rales. No accessory muscle use  CV:  Regular  rhythm  GI:  Soft, Non distended, Non tender. +Bowel sounds, colostomy leaking  Neurologic:  Alert and oriented X self, normal speech,   Psych:   Fair insight. Not anxious nor agitated  Skin:  No rashes.   No jaundice    Pressure injury; sacrum, right buttock,   left BKA stump site; dressing dry and intact  Right BKA stump; intact    Labs:   Lab Results   Component Value Date/Time    WBC 15.5 (H) 2022 05:27 AM    HGB 8.1 (L) 2022 05:27 AM    HCT 25.1 (L) 2022 05:27 AM    PLATELET 557  05:27 AM    MCV 91.9 2022 05:27 AM     Lab Results   Component Value Date/Time    Sodium 136 2022 05:27 AM    Potassium 4.1 2022 05:27 AM    Chloride 104 2022 05:27 AM    CO2 26 2022 05:27 AM    Anion gap 6 2022 05:27 AM    Glucose 66 2022 05:27 AM BUN 31 (H) 09/27/2022 05:27 AM    Creatinine 1.67 (H) 09/27/2022 05:27 AM    BUN/Creatinine ratio 19 09/27/2022 05:27 AM    GFR est AA 53 (L) 09/27/2022 05:27 AM    GFR est non-AA 43 (L) 09/27/2022 05:27 AM    Calcium 7.5 (L) 09/27/2022 05:27 AM    Bilirubin, total 0.3 09/27/2022 05:27 AM    Alk. phosphatase 100 09/27/2022 05:27 AM    Protein, total 4.9 (L) 09/27/2022 05:27 AM    Albumin 1.1 (L) 09/27/2022 05:27 AM    Globulin 3.8 09/27/2022 05:27 AM    A-G Ratio 0.3 (L) 09/27/2022 05:27 AM    ALT (SGPT) <6 (L) 09/27/2022 05:27 AM     Assessment:      Dislodged feeding tube/peritonitis   Free air and perirectal fistula  S/p laparoscopy procedure; take down and removal of J tube, colostomy placement, and I & D of abdominal wall (9/10)  Recent right thoracotomy  Hx Bilateral BKAs   - afebrile, wbc 15     Blood cx (9/9, 9/16) no growth    Objective:      Completed 2 weeks of IV Eraxis, zosyn, and vancomycin as of 9/23. Recommend to 2  more weeks with PO Augmentin, starting 9/24, last dose 10/7. Persistent leukocytosis; check CT of LLE; stump site for any fluid collection  -> Pair of BC and stump wound cx were sent by primary team    Fever work up if temp >= 100.4    Above plan of care discussed and agreed with Dr. Meghna Enrique    Pt will be seen as need. Please contact us with any questions or concerns.       Paco Hickman NP

## 2022-09-28 LAB
ALBUMIN SERPL-MCNC: 1.2 G/DL (ref 3.5–5)
ALBUMIN/GLOB SERPL: 0.3 {RATIO} (ref 1.1–2.2)
ALP SERPL-CCNC: 110 U/L (ref 45–117)
ALT SERPL-CCNC: <6 U/L (ref 12–78)
ANION GAP SERPL CALC-SCNC: 6 MMOL/L (ref 5–15)
AST SERPL-CCNC: 14 U/L (ref 15–37)
BASOPHILS # BLD: 0.1 K/UL (ref 0–0.1)
BASOPHILS NFR BLD: 1 % (ref 0–1)
BILIRUB SERPL-MCNC: 0.3 MG/DL (ref 0.2–1)
BUN SERPL-MCNC: 59 MG/DL (ref 6–20)
BUN/CREAT SERPL: 25 (ref 12–20)
CALCIUM SERPL-MCNC: 7.7 MG/DL (ref 8.5–10.1)
CHLORIDE SERPL-SCNC: 104 MMOL/L (ref 97–108)
CO2 SERPL-SCNC: 25 MMOL/L (ref 21–32)
CREAT SERPL-MCNC: 2.38 MG/DL (ref 0.7–1.3)
DIFFERENTIAL METHOD BLD: ABNORMAL
EOSINOPHIL # BLD: 0.6 K/UL (ref 0–0.4)
EOSINOPHIL NFR BLD: 4 % (ref 0–7)
ERYTHROCYTE [DISTWIDTH] IN BLOOD BY AUTOMATED COUNT: 20.7 % (ref 11.5–14.5)
GLOBULIN SER CALC-MCNC: 3.8 G/DL (ref 2–4)
GLUCOSE BLD STRIP.AUTO-MCNC: 102 MG/DL (ref 65–117)
GLUCOSE BLD STRIP.AUTO-MCNC: 122 MG/DL (ref 65–117)
GLUCOSE BLD STRIP.AUTO-MCNC: 83 MG/DL (ref 65–117)
GLUCOSE BLD STRIP.AUTO-MCNC: 94 MG/DL (ref 65–117)
GLUCOSE SERPL-MCNC: 89 MG/DL (ref 65–100)
HCT VFR BLD AUTO: 24.1 % (ref 36.6–50.3)
HGB BLD-MCNC: 7.6 G/DL (ref 12.1–17)
IMM GRANULOCYTES # BLD AUTO: 0.3 K/UL (ref 0–0.04)
IMM GRANULOCYTES NFR BLD AUTO: 2 % (ref 0–0.5)
LYMPHOCYTES # BLD: 2 K/UL (ref 0.8–3.5)
LYMPHOCYTES NFR BLD: 14 % (ref 12–49)
MAGNESIUM SERPL-MCNC: 1.8 MG/DL (ref 1.6–2.4)
MCH RBC QN AUTO: 29.1 PG (ref 26–34)
MCHC RBC AUTO-ENTMCNC: 31.5 G/DL (ref 30–36.5)
MCV RBC AUTO: 92.3 FL (ref 80–99)
MONOCYTES # BLD: 1 K/UL (ref 0–1)
MONOCYTES NFR BLD: 7 % (ref 5–13)
NEUTS SEG # BLD: 10.2 K/UL (ref 1.8–8)
NEUTS SEG NFR BLD: 72 % (ref 32–75)
NRBC # BLD: 0 K/UL (ref 0–0.01)
NRBC BLD-RTO: 0 PER 100 WBC
PHOSPHATE SERPL-MCNC: 3.7 MG/DL (ref 2.6–4.7)
PLATELET # BLD AUTO: 356 K/UL (ref 150–400)
PMV BLD AUTO: 9.7 FL (ref 8.9–12.9)
POTASSIUM SERPL-SCNC: 4.7 MMOL/L (ref 3.5–5.1)
PROT SERPL-MCNC: 5 G/DL (ref 6.4–8.2)
RBC # BLD AUTO: 2.61 M/UL (ref 4.1–5.7)
RBC MORPH BLD: ABNORMAL
SERVICE CMNT-IMP: ABNORMAL
SERVICE CMNT-IMP: NORMAL
SODIUM SERPL-SCNC: 135 MMOL/L (ref 136–145)
TRIGL SERPL-MCNC: 98 MG/DL (ref ?–150)
WBC # BLD AUTO: 14.2 K/UL (ref 4.1–11.1)

## 2022-09-28 PROCEDURE — 36415 COLL VENOUS BLD VENIPUNCTURE: CPT

## 2022-09-28 PROCEDURE — 74011000250 HC RX REV CODE- 250: Performed by: PHYSICIAN ASSISTANT

## 2022-09-28 PROCEDURE — 65270000046 HC RM TELEMETRY

## 2022-09-28 PROCEDURE — 74011250637 HC RX REV CODE- 250/637: Performed by: PHYSICIAN ASSISTANT

## 2022-09-28 PROCEDURE — 74011250636 HC RX REV CODE- 250/636: Performed by: COLON & RECTAL SURGERY

## 2022-09-28 PROCEDURE — 84478 ASSAY OF TRIGLYCERIDES: CPT

## 2022-09-28 PROCEDURE — 74011250637 HC RX REV CODE- 250/637: Performed by: STUDENT IN AN ORGANIZED HEALTH CARE EDUCATION/TRAINING PROGRAM

## 2022-09-28 PROCEDURE — 74011000258 HC RX REV CODE- 258: Performed by: HOSPITALIST

## 2022-09-28 PROCEDURE — 74011000250 HC RX REV CODE- 250: Performed by: COLON & RECTAL SURGERY

## 2022-09-28 PROCEDURE — 83735 ASSAY OF MAGNESIUM: CPT

## 2022-09-28 PROCEDURE — 85025 COMPLETE CBC W/AUTO DIFF WBC: CPT

## 2022-09-28 PROCEDURE — 82962 GLUCOSE BLOOD TEST: CPT

## 2022-09-28 PROCEDURE — 74011250636 HC RX REV CODE- 250/636: Performed by: HOSPITALIST

## 2022-09-28 PROCEDURE — 74011250637 HC RX REV CODE- 250/637: Performed by: COLON & RECTAL SURGERY

## 2022-09-28 PROCEDURE — 74011250636 HC RX REV CODE- 250/636: Performed by: NURSE PRACTITIONER

## 2022-09-28 PROCEDURE — 74011250637 HC RX REV CODE- 250/637: Performed by: NURSE PRACTITIONER

## 2022-09-28 PROCEDURE — 99024 POSTOP FOLLOW-UP VISIT: CPT | Performed by: SURGERY

## 2022-09-28 PROCEDURE — 84100 ASSAY OF PHOSPHORUS: CPT

## 2022-09-28 PROCEDURE — 74011000250 HC RX REV CODE- 250: Performed by: HOSPITALIST

## 2022-09-28 PROCEDURE — 74011000258 HC RX REV CODE- 258: Performed by: NURSE PRACTITIONER

## 2022-09-28 PROCEDURE — 74011250636 HC RX REV CODE- 250/636: Performed by: PHYSICIAN ASSISTANT

## 2022-09-28 PROCEDURE — 74011636637 HC RX REV CODE- 636/637: Performed by: HOSPITALIST

## 2022-09-28 PROCEDURE — 80053 COMPREHEN METABOLIC PANEL: CPT

## 2022-09-28 RX ADMIN — PIPERACILLIN AND TAZOBACTAM 3.38 G: 3; .375 INJECTION, POWDER, FOR SOLUTION INTRAVENOUS at 16:50

## 2022-09-28 RX ADMIN — MIRTAZAPINE 7.5 MG: 15 TABLET, FILM COATED ORAL at 21:04

## 2022-09-28 RX ADMIN — HYDROMORPHONE HYDROCHLORIDE 4 MG: 2 TABLET ORAL at 15:05

## 2022-09-28 RX ADMIN — SODIUM CHLORIDE, PRESERVATIVE FREE 10 ML: 5 INJECTION INTRAVENOUS at 06:40

## 2022-09-28 RX ADMIN — FERROUS SULFATE TAB 325 MG (65 MG ELEMENTAL FE) 325 MG: 325 (65 FE) TAB at 09:07

## 2022-09-28 RX ADMIN — GABAPENTIN 100 MG: 100 CAPSULE ORAL at 09:07

## 2022-09-28 RX ADMIN — HYDROMORPHONE HYDROCHLORIDE 0.2 MG: 1 INJECTION, SOLUTION INTRAMUSCULAR; INTRAVENOUS; SUBCUTANEOUS at 16:49

## 2022-09-28 RX ADMIN — POTASSIUM CHLORIDE 20 MEQ: 750 TABLET, FILM COATED, EXTENDED RELEASE ORAL at 09:07

## 2022-09-28 RX ADMIN — Medication 1 CAPSULE: at 09:07

## 2022-09-28 RX ADMIN — APIXABAN 5 MG: 5 TABLET, FILM COATED ORAL at 09:07

## 2022-09-28 RX ADMIN — ONDANSETRON 4 MG: 2 INJECTION INTRAMUSCULAR; INTRAVENOUS at 17:46

## 2022-09-28 RX ADMIN — Medication: at 18:01

## 2022-09-28 RX ADMIN — I.V. FAT EMULSION 250 ML: 20 EMULSION INTRAVENOUS at 21:04

## 2022-09-28 RX ADMIN — SERTRALINE 25 MG: 50 TABLET, FILM COATED ORAL at 09:07

## 2022-09-28 RX ADMIN — HYDROMORPHONE HYDROCHLORIDE 0.2 MG: 1 INJECTION, SOLUTION INTRAMUSCULAR; INTRAVENOUS; SUBCUTANEOUS at 10:57

## 2022-09-28 RX ADMIN — PANTOPRAZOLE SODIUM 40 MG: 40 TABLET, DELAYED RELEASE ORAL at 06:40

## 2022-09-28 RX ADMIN — SODIUM CHLORIDE, PRESERVATIVE FREE 10 ML: 5 INJECTION INTRAVENOUS at 21:08

## 2022-09-28 RX ADMIN — FERROUS SULFATE TAB 325 MG (65 MG ELEMENTAL FE) 325 MG: 325 (65 FE) TAB at 16:49

## 2022-09-28 RX ADMIN — GABAPENTIN 100 MG: 100 CAPSULE ORAL at 21:04

## 2022-09-28 RX ADMIN — SODIUM CHLORIDE, PRESERVATIVE FREE 10 ML: 5 INJECTION INTRAVENOUS at 15:06

## 2022-09-28 RX ADMIN — COLLAGENASE SANTYL: 250 OINTMENT TOPICAL at 09:47

## 2022-09-28 RX ADMIN — HYDROMORPHONE HYDROCHLORIDE 4 MG: 2 TABLET ORAL at 06:40

## 2022-09-28 RX ADMIN — PANTOPRAZOLE SODIUM 40 MG: 40 TABLET, DELAYED RELEASE ORAL at 16:49

## 2022-09-28 RX ADMIN — GABAPENTIN 100 MG: 100 CAPSULE ORAL at 16:49

## 2022-09-28 RX ADMIN — HYDROMORPHONE HYDROCHLORIDE 4 MG: 2 TABLET ORAL at 20:49

## 2022-09-28 RX ADMIN — EPOETIN ALFA-EPBX 10000 UNITS: 10000 INJECTION, SOLUTION INTRAVENOUS; SUBCUTANEOUS at 21:05

## 2022-09-28 RX ADMIN — APIXABAN 5 MG: 5 TABLET, FILM COATED ORAL at 20:49

## 2022-09-28 RX ADMIN — PIPERACILLIN SODIUM AND TAZOBACTAM SODIUM 4.5 G: 4; .5 INJECTION, POWDER, LYOPHILIZED, FOR SOLUTION INTRAVENOUS at 09:07

## 2022-09-28 NOTE — PROGRESS NOTES
Progress Note    Patient: Kevin Chris MRN: [id number]  SSN: [id number]    YOB: 1970  Age: 45 y.o. Sex: male      Admit Date: 2022    13 Days Post-Op    Procedure:  Procedure(s):  EUA/ PROCTOSIGMOIDOSCOPY    Subjective:   Asked to come back to see patient for increasing drainage from J-tube site. He has been getting daily dressing changes. Otherwise he has no new concerns. Objective:     Visit Vitals  /66 (BP 1 Location: Left upper arm, BP Patient Position: At rest;Lying)   Pulse 62   Temp 97.5 °F (36.4 °C)   Resp 16   Ht 5' 7\" (1.702 m)   Wt 68.2 kg (150 lb 5.7 oz)   SpO2 100%   BMI 23.55 kg/m²       Temp (24hrs), Av °F (36.7 °C), Min:97.5 °F (36.4 °C), Max:98.3 °F (36.8 °C)      Physical Exam:    Abdomen soft nontender-distended  Left upper quadrant-mainly laterally there is some dead loosening tissue. With some drainage. This is debrided with scissors. The wound continue to track laterally. Data Review: images and reports reviewed    Lab Review: All lab results for the last 24 hours reviewed. Assessment:     Hospital Problems  Date Reviewed: 9/10/2022            Codes Class Noted POA    Injury to rectum without open wound into cavity ICD-10-CM: S36.60XA  ICD-9-CM: 863.45  2022 Yes        Open wound of anus ICD-10-CM: F03.783M  ICD-9-CM: 863.89  2022 Yes        Severe protein-calorie malnutrition ([facility] [address] ICD-10-CM: E43  ICD-9-CM: 717  2022 Yes        * (Principal) Intra-abdominal infection ICD-10-CM: B99.9  ICD-9-CM: 136.9  9/10/2022 Yes           Plan/Recommendations/Medical Decision Making:   Status post local debridement of left upper quadrant wound. We will continue to pack the wound. May need a more formal debridement with local or general anesthesia. We will continue to monitor. Wound care to reassess tomorrow.

## 2022-09-28 NOTE — PROGRESS NOTES
Renal Dosing/Monitoring  Medication: Zosyn   Current regimen:  3.375 gm IV every 12 hr over 30 min  Recent Labs     09/28/22  0434 09/27/22  0527 09/26/22  0457   CREA 2.38* 1.67* 2.98*   BUN 59* 31* 43*     Estimated CrCl:  IHD  Plan:   Dose adjusted based on extended infusion beta lactam protocol to:  Zosyn 4.5 gm LD over 30 minutes, followed by  3.375 gm IV q12h over 4 hours; first dose to be given 8 hours after LD.

## 2022-09-28 NOTE — PROGRESS NOTES
ID Progress Note  2022    Subjective:     Alert, no new complaints    Review of Systems:            Symptom Y/N Comments   Symptom Y/N Comments   Fever/Chills n      Chest Pain  n      Poor Appetite       Edema        Cough       Abdominal Pain  n      Sputum       Joint Pain        SOB/ANDREWS n      Pruritis/Rash        Nausea/vomit n      Tolerating PT/OT        Diarrhea       Tolerating Diet        Constipation       Other           Could NOT obtain due to:       Objective:     Vitals: Visit Vitals  BP (!) 117/58 (BP 1 Location: Right upper arm, BP Patient Position: At rest)   Pulse 67   Temp 97.6 °F (36.4 °C)   Resp 17   Ht 5' 7\" (1.702 m)   Wt 68.2 kg (150 lb 5.7 oz)   SpO2 100%   BMI 23.55 kg/m²          Tmax:  Temp (24hrs), Av.2 °F (36.8 °C), Min:97.6 °F (36.4 °C), Max:98.5 °F (36.9 °C)      PHYSICAL EXAM:  General: Chronically ill appearing. WD, WN. Alert, cooperative, no acute distress    EENT:  Anicteric sclerae. Dry mucous membrane  Resp:  CTA bilaterally, no wheezing or rales. No accessory muscle use  CV:  Regular  rhythm  GI:  Soft, Non distended, Non tender. +Bowel sounds, colostomy leaking  Neurologic:  Alert and oriented X self, normal speech,   Psych:   Fair insight. Not anxious nor agitated  Skin:  No rashes.   No jaundice    Pressure injury; sacrum, right buttock,   left BKA stump site; dressing dry and intact  Right BKA stump; intact    Labs:   Lab Results   Component Value Date/Time    WBC 14.2 (H) 2022 04:34 AM    HGB 7.6 (L) 2022 04:34 AM    HCT 24.1 (L) 2022 04:34 AM    PLATELET 875  04:34 AM    MCV 92.3 2022 04:34 AM     Lab Results   Component Value Date/Time    Sodium 135 (L) 2022 04:34 AM    Potassium 4.7 2022 04:34 AM    Chloride 104 2022 04:34 AM    CO2 25 2022 04:34 AM    Anion gap 6 2022 04:34 AM    Glucose 89 2022 04:34 AM    BUN 59 (H) 2022 04:34 AM    Creatinine 2.38 (H) 2022 04:34 AM BUN/Creatinine ratio 25 (H) 09/28/2022 04:34 AM    GFR est AA 35 (L) 09/28/2022 04:34 AM    GFR est non-AA 29 (L) 09/28/2022 04:34 AM    Calcium 7.7 (L) 09/28/2022 04:34 AM    Bilirubin, total 0.3 09/28/2022 04:34 AM    Alk. phosphatase 110 09/28/2022 04:34 AM    Protein, total 5.0 (L) 09/28/2022 04:34 AM    Albumin 1.2 (L) 09/28/2022 04:34 AM    Globulin 3.8 09/28/2022 04:34 AM    A-G Ratio 0.3 (L) 09/28/2022 04:34 AM    ALT (SGPT) <6 (L) 09/28/2022 04:34 AM     Assessment:      Dislodged feeding tube/peritonitis   Free air and perirectal fistula  S/p laparoscopy procedure; take down and removal of J tube, colostomy placement, and I & D of abdominal wall (9/10)  Left BKA stump infection  Recent right thoracotomy  Hx Bilateral BKAs   - afebrile, wbc 14     Blood cx (9/9, 9/16) no growth, (9/27) no growth so far     Wound cx (9/27) GNR from gram stain       CT of LLE (9/28)  Partially encapsulated fluid collection with a few associated gas locules overlying the dictation margin. Infection/abscess cannot be excluded based on imaging alone. No CT evidence of acute osteomyelitis. Several gas locules along the left spermatic cord, correlate clinically for signs of infection in this location. Objective:      Completed 2 weeks of IV Eraxis, zosyn, and vancomycin as of 9/23 then switched to   PO Augmentin between 9/24 to 9/28.  ABX changed back to IV zosyn until further evaluation from vascular surgery team    Fever work up if temp >= 100.4    Above plan of care discussed and agreed with Dr. Cher Avery, NP

## 2022-09-28 NOTE — PROGRESS NOTES
[facility]                                                                                          Hospitalist Progress Note  Derek Robles MD  Answering service: [phone] from in house phone        Date of Service:  2022  NAME:  Violeta Weathers  :  1970  MRN:  [id number]      Admission Summary: This is a 44-year-old man with a PMH of T2DM, BKA bilaterally, JEMIMA on CKD requiring HD, Respiratory Failure requiring intubation who presented at the ED from [facility] with c/o abdominal pain. The patient was recently admitted to another hospital and underwent left below-knee amputation, hospitalization complicated with respiratory failure which required prolonged intubation- attributed to aspiration pneumonia, also developed lobulated effusion, for which the patient underwent decortication and right thoracotomy and acute renal failure for which he has been started on hemodialysis. He had a J-tube was placed for supplemental nutrition and was transferred to [facility] for continuation of care. He was doing relatively well in [facility] until the day of presentation at the ED when the patient complained of abdominal pain at the facility. CT scan of the abdomen and pelvis was obtained: shows evidence of bowel perforation, sent to [facility] fro further eval/tx. When the patient arrived at the emergency room, based on his clinical presentation and lab work, Code Sepsis was triggered. The patient received fluid therapy as per sepsis protocol. The emergency room physician consulted general surgeon on-call. The patient was seen by the surgeon and the patient is planned for immediate surgical intervention. No record of prior admission to this hospital.     Interval history / Subjective: Follow-up for issues listed below.   -Patient seen and examined, no distress. Patient's primary concern today is uncontrolled pain. He denied fever or chills.   This is my first encounter with patient. He is alert oriented x4. Assessment & Plan:     Dislodged JG tube with surrounding peritonitis. Free air and perirectal fistula. Patient was sent from [address] [place] [address] for intractable abdominal pain. Severe sepsis without septic shock due to intra-abdominal source of infection. .  -He was started on broad-spectrum empiric antibiotics on admission and underwent the following procedures:  -Gen Sx: 9/10 Lap take down and removal J-tube, Lap colostomy, I&D abdominal wall  -Colorectal Sx: 9/15 anorectal wound exam including rigid proctosigmoidoscopy  -ID were consulted and assisted with antibiotics management. Patient completed IV Eraxis/Zosyn/Vancomycin completed 9/23/22 and recommendation was to continue Augmentin x2 weeks from 9/24. Due to worsening leukocytosis however the Augmentin was discontinued and patient is restarted on Zosyn since 9/28.  -C. difficile, blood culture where negative. -CT chest 9/10: Small right basilar gas and fluid containing pleural collection with a  peripheral soft tissue rind. Finding may represent an empyema and clinical correlation is recommended. Recommend direct comparison to any additional prior imaging. Free intraperitoneal gas, seen on prior CT of the abdomen and pelvis. -CT abd/pel wo 9/14:No focal intra-abdominal fluid collection to suggest abscess. Free intraperitoneal gas consistent with recent intra-abdominal surgery. Interval improvement in rectal thickening. Prior CT dated September 9, 2022 demonstrated a probable right posterior rectal wall defect which is no longer visualized on today's examination. Persistent, small right basilar gas and fluid containing pleural collection, unchanged.  -wound care ostomy care      JEMIMA vs CKD requiring dialysis: CVC 9/16/22- IR.  -Continued on hemodialysis on MWF schedule, continue. No sign of recovery. -Access, permacath placed on 9/16     Type 2 diabetes mellitus with hyperglycemia.   Patient is currently tolerating diet and being supplemented with TPN. -Monitor blood closely 4 times daily and as needed. Humalog sliding scale. AFIB: rate stable. -EKG 9/19: AFIB. -eliquis 5mg BID  -ECHO 9/26: Left Ventricle: The EF by visual approximation is 55 - 60%. Left ventricle size is normal. Normal wall thickness. Normal wall motion. Normal diastolic function. Tricuspid Valve: Mildly elevated RVSP. The estimated RVSP is 42 mmHg. Empyema: noted: recent thoracotomy and prolonged intubation at [facility]. -CXR9/19: Persistent right basilar airspace disease and right-sided loculated effusion/empyema. -CT chest 9/10: Small right basilar gas and fluid containing pleural collection with a  peripheral soft tissue rind. Finding may represent an empyema and clinical correlation is recommended. Recommend direct comparison to any additional prior imaging. Free intraperitoneal gas, seen on prior CT of the abdomen and pelvis. -Pulmonary consulted: noted \"percutaneous drainage would not be successful at removing fluid as I suppose it is very thick and organized at this time. Would only recommend following clinically and radiographically. If worsens or worsened right-sided effusion he would need to be reevaluated by his thoracic surgeon at [facility] for additional drainage. \"  -IV ABT's course as above. Acute blood loss anemia on chronic:  -monitor Hgb  -transfuse to keep Hgb > 7.0  -Hgb 6.7- transfuse 1 unit PRBC's 9/26     +Occult Blood: no melena, no n/v.  -PPI  -iron supplements  -monitor Hgb, transfuse to keep Hgb > 7.0     AMS: delirium, hallucinations ? 2/2 to toxic metabolic encephalopathy, hospital delirium, and opioid induced. -CT head 9/23: no acute intracranial abnormality.   -Alert and oriented on rounds 9/28. Depression: continue zoloft.   Thrombocytosis: noted reactive thrombocytosis, monitor platelets, trending downward    Epigastric J site wound with surrounding abscess, POA.  --Patient underwent incision and drainage on 9/10  --There is surrounding erythema, tenderness with some purulence. Given rebound leukocytosis, will ask general surgery to Look to see if this will need further I&D    Bilateral BKA, left BKA dehiscence and possible infection.  -Vascular Sx followin/26-sutures removed and some eschar along incision line excised, open cavity laterally has old hematoma at base that was evacuated   -CT of the left BKA stump on  showed several irregular soft tissue defects overlying the amputation site with a 4.4 x 4.1 x 1.9 cm amorphous fluid collection with partial thick walled/rim-enhancing and internal gas locules. --Patient restarted back on Zosyn due to worsening leukocytosis. --Spoke with Dr Marivel Reis will check on pt tomorrow     Anorectal wound, POA  --Followed by colorectal surgery. He is status post proctosigmoidoscopy on 9/15, there was communication of the distal rectal lumen with extraperitoneal pelvis. Acute pain syndrome. This is due to the multiple surgeries he had. Anticipate some degree of tolerance due to his continued exposure to periods  --Current pain regimen includes scheduled gabapentin 100 mg 3 times daily  -- As needed 0.2 mg IV Dilaudid every 4 hourly, as needed; Dilaudid 2 mg p.o. every 4 hourly as needed and Dilaudid 4 mg p.o. every 4 hourly as needed. Palliative care help appreciated. Patient will probably benefit from scheduled long-acting opiate such as MS Contin. GIppx: Pepcid  Code Status: Full Code  Diet: Regular, supplements BID  --Time to start weaning off TPN.   Activity: bedrest  Discharge: TBD  Ambulates:  nonambulatory  Discharge planning: Accepted at [facility], needs OP HD chair      Hospital Problems  Date Reviewed: 9/10/2022            Codes Class Noted POA    Injury to rectum without open wound into cavity ICD-10-CM: S36.60XA  ICD-9-CM: 863.45  2022 Yes        Open wound of anus ICD-10-CM: Q95.359B  ICD-9-CM: 863.89  2022 Yes Severe protein-calorie malnutrition ([facility] [address] ICD-10-CM: E43  ICD-9-CM: 919  9/13/2022 Yes        * (Principal) Intra-abdominal infection ICD-10-CM: B99.9  ICD-9-CM: 136.9  9/10/2022 Yes           Review of Systems:   A comprehensive review of systems was negative except for that written in the HPI. Vital Signs:    Last 24hrs VS reviewed since prior progress note. Most recent are:  Visit Vitals  BP (!) 103/33 (BP 1 Location: Left upper arm, BP Patient Position: At rest;Lying)   Pulse 73   Temp 98.3 °F (36.8 °C)   Resp 16   Ht 5' 7\" (1.702 m)   Wt 68.2 kg (150 lb 5.7 oz)   SpO2 100%   BMI 23.55 kg/m²       No intake or output data in the 24 hours ending 09/28/22 1619       Physical Examination:     I had a face to face encounter with this patient and independently examined them on 9/28/2022 as outlined below:          Constitutional: Chronically sick looking gentleman, he is alert and oriented x3. ENT:  Oral mucosa moist.    Resp/chest wall:  CTA bilaterally. No wheezing/rhonchi/rales. No accessory muscle use. Permacath and double-lumen PICC line the right chest wall. CV:  Regular rate, S1,S2.    GI/: Epigastric chest site 1 2 with surrounding erythema, induration and tenderness and purulent discharge. Colostomy in the lower abdomen. No erythema, swelling, discharge in the inguinal.  Bowel sounds are normoactive. Musculoskeletal:  No edema, warm, bilateral BKA: dressing to LLE stunp. Neurologic:  Moves all extremities. Awake and alert, disoriented. Skin:  multiple wounds, skin w/d, jaundiced. Psych:  Poor insight, Not anxious nor agitated.             Data Review:    Review and/or order of clinical lab test      Labs:     Recent Labs     09/28/22 0434 09/27/22 0527   WBC 14.2* 15.5*   HGB 7.6* 8.1*   HCT 24.1* 25.1*    345       Recent Labs     09/28/22 0434 09/27/22 0527 09/26/22  0457   * 136 136   K 4.7 4.1 3.0*    104 108   CO2 25 26 22   BUN 59* 31* 43*   CREA 2.38* 1.67* 2.98*   GLU 89 66 83   CA 7.7* 7.5* 7.4*   MG 1.8 1.7 1.6   PHOS 3.7 2.4* 2.9       Recent Labs     09/28/22  0434 09/27/22  0527   ALT <6* <6*    100   TBILI 0.3 0.3   TP 5.0* 4.9*   ALB 1.2* 1.1*   GLOB 3.8 3.8       No results for input(s): INR, PTP, APTT, INREXT, INREXT in the last 72 hours. No results for input(s): FE, TIBC, PSAT, FERR in the last 72 hours. Lab Results   Component Value Date/Time    Folate 4.1 (L) 09/10/2022 12:00 PM        No results for input(s): PH, PCO2, PO2 in the last 72 hours. No results for input(s): CPK, CKNDX, TROIQ in the last 72 hours.     No lab exists for component: CPKMB  Lab Results   Component Value Date/Time    Triglyceride 98 09/28/2022 04:34 AM     Lab Results   Component Value Date/Time    Glucose (POC) 94 09/28/2022 11:03 AM    Glucose (POC) 122 (H) 09/28/2022 06:36 AM    Glucose (POC) 95 09/27/2022 11:17 PM    Glucose (POC) 76 09/27/2022 05:25 PM    Glucose (POC) 80 09/27/2022 12:40 PM     No results found for: COLOR, APPRN, SPGRU, REFSG, WILL, PROTU, GLUCU, KETU, BILU, UROU, MISHA, LEUKU, GLUKE, EPSU, BACTU, WBCU, RBCU, CASTS, UCRY      Medications Reviewed:     Current Facility-Administered Medications   Medication Dose Route Frequency    piperacillin-tazobactam (ZOSYN) 3.375 g in 0.9% sodium chloride (MBP/ADV) 100 mL MBP  3.375 g IntraVENous Q12H    TPN ADULT - CENTRAL   IntraVENous CONTINUOUS    TPN ADULT - CENTRAL   IntraVENous CONTINUOUS    0.9% sodium chloride infusion 250 mL  250 mL IntraVENous PRN    insulin regular (NOVOLIN R, HUMULIN R) injection   SubCUTAneous Q6H    glucose chewable tablet 16 g  4 Tablet Oral PRN    glucagon (GLUCAGEN) injection 1 mg  1 mg IntraMUSCular PRN    dextrose 10 % infusion 0-250 mL  0-250 mL IntraVENous PRN    gabapentin (NEURONTIN) capsule 100 mg  100 mg Oral TID    fat emulsion 20% (LIPOSYN, INTRAlipid) infusion 250 mL  250 mL IntraVENous QPM    ferrous sulfate tablet 325 mg  1 Tablet Oral BID WITH MEALS HYDROmorphone (DILAUDID) injection 0.2 mg  0.2 mg IntraVENous Q4H PRN    HYDROmorphone (DILAUDID) tablet 2 mg  2 mg Oral Q4H PRN    HYDROmorphone (DILAUDID) tablet 4 mg  4 mg Oral Q4H PRN    hydrALAZINE (APRESOLINE) 20 mg/mL injection 10 mg  10 mg IntraVENous Q6H PRN    mirtazapine (REMERON) tablet 7.5 mg  7.5 mg Oral QHS    pantoprazole (PROTONIX) tablet 40 mg  40 mg Oral ACB&D    apixaban (ELIQUIS) tablet 5 mg  5 mg Oral BID    sertraline (ZOLOFT) tablet 25 mg  25 mg Oral DAILY    epoetin vera-epbx (RETACRIT) injection 10,000 Units  10,000 Units SubCUTAneous DIALYSIS MON, WED & FRI    collagenase (SANTYL) 250 unit/gram ointment   Topical DAILY    diphenhydrAMINE (BENADRYL) injection 12.5 mg  12.5 mg IntraVENous Q6H PRN    sodium chloride (NS) flush 5-40 mL  5-40 mL IntraVENous Q8H    sodium chloride (NS) flush 5-40 mL  5-40 mL IntraVENous PRN    acetaminophen (TYLENOL) tablet 650 mg  650 mg Oral Q6H PRN    polyethylene glycol (MIRALAX) packet 17 g  17 g Oral DAILY PRN    ondansetron (ZOFRAN ODT) tablet 4 mg  4 mg Oral Q8H PRN    Or    ondansetron (ZOFRAN) injection 4 mg  4 mg IntraVENous Q6H PRN    L.acidophilus-paracasei-S.thermophil-bifidobacter (RISAQUAD) 8 billion cell capsule  1 Capsule Oral DAILY     ______________________________________________________________________  EXPECTED LENGTH OF STAY: 9d 14h  ACTUAL LENGTH OF STAY:          18                 Terence Simental MD

## 2022-09-28 NOTE — PROGRESS NOTES
Occupational Therapy  9/28/2022    Attempted to see pt for OT treatment. OT coordinated pain medication administration with nursing staff and pt premedicated with IV Dilaudid. Upon arrival to room, pt had just finished conversation with MD. Pt declined attempting EOB, stating his MD will be readjusting his pain med schedule. Pt requested stronger theraband, green band placed at bedside. Will follow for OT. Melody Segura MS, OTR/L

## 2022-09-29 ENCOUNTER — ANESTHESIA (OUTPATIENT)
Dept: SURGERY | Age: 52
DRG: 853 | End: 2022-09-29
Payer: COMMERCIAL

## 2022-09-29 ENCOUNTER — ANESTHESIA EVENT (OUTPATIENT)
Dept: SURGERY | Age: 52
DRG: 853 | End: 2022-09-29
Payer: COMMERCIAL

## 2022-09-29 LAB
ALBUMIN SERPL-MCNC: 1.2 G/DL (ref 3.5–5)
ALBUMIN/GLOB SERPL: 0.3 {RATIO} (ref 1.1–2.2)
ALP SERPL-CCNC: 114 U/L (ref 45–117)
ALT SERPL-CCNC: 6 U/L (ref 12–78)
ANION GAP SERPL CALC-SCNC: 7 MMOL/L (ref 5–15)
AST SERPL-CCNC: 18 U/L (ref 15–37)
BASOPHILS # BLD: 0.1 K/UL (ref 0–0.1)
BASOPHILS NFR BLD: 1 % (ref 0–1)
BILIRUB SERPL-MCNC: 0.2 MG/DL (ref 0.2–1)
BUN SERPL-MCNC: 86 MG/DL (ref 6–20)
BUN/CREAT SERPL: 27 (ref 12–20)
CALCIUM SERPL-MCNC: 7.9 MG/DL (ref 8.5–10.1)
CHLORIDE SERPL-SCNC: 106 MMOL/L (ref 97–108)
CO2 SERPL-SCNC: 21 MMOL/L (ref 21–32)
CREAT SERPL-MCNC: 3.14 MG/DL (ref 0.7–1.3)
DIFFERENTIAL METHOD BLD: ABNORMAL
EOSINOPHIL # BLD: 0.7 K/UL (ref 0–0.4)
EOSINOPHIL NFR BLD: 5 % (ref 0–7)
ERYTHROCYTE [DISTWIDTH] IN BLOOD BY AUTOMATED COUNT: 20.2 % (ref 11.5–14.5)
GLOBULIN SER CALC-MCNC: 4.1 G/DL (ref 2–4)
GLUCOSE BLD STRIP.AUTO-MCNC: 130 MG/DL (ref 65–117)
GLUCOSE BLD STRIP.AUTO-MCNC: 78 MG/DL (ref 65–117)
GLUCOSE BLD STRIP.AUTO-MCNC: 85 MG/DL (ref 65–117)
GLUCOSE BLD STRIP.AUTO-MCNC: 91 MG/DL (ref 65–117)
GLUCOSE SERPL-MCNC: 72 MG/DL (ref 65–100)
HCT VFR BLD AUTO: 23.9 % (ref 36.6–50.3)
HGB BLD-MCNC: 7.6 G/DL (ref 12.1–17)
IMM GRANULOCYTES # BLD AUTO: 0.3 K/UL (ref 0–0.04)
IMM GRANULOCYTES NFR BLD AUTO: 2 % (ref 0–0.5)
LYMPHOCYTES # BLD: 2.3 K/UL (ref 0.8–3.5)
LYMPHOCYTES NFR BLD: 17 % (ref 12–49)
MAGNESIUM SERPL-MCNC: 1.9 MG/DL (ref 1.6–2.4)
MCH RBC QN AUTO: 29.7 PG (ref 26–34)
MCHC RBC AUTO-ENTMCNC: 31.8 G/DL (ref 30–36.5)
MCV RBC AUTO: 93.4 FL (ref 80–99)
MONOCYTES # BLD: 1 K/UL (ref 0–1)
MONOCYTES NFR BLD: 7 % (ref 5–13)
NEUTS SEG # BLD: 9.3 K/UL (ref 1.8–8)
NEUTS SEG NFR BLD: 68 % (ref 32–75)
NRBC # BLD: 0 K/UL (ref 0–0.01)
NRBC BLD-RTO: 0 PER 100 WBC
PHOSPHATE SERPL-MCNC: 5.2 MG/DL (ref 2.6–4.7)
PLATELET # BLD AUTO: 350 K/UL (ref 150–400)
PLATELET COMMENTS,PCOM: ABNORMAL
PMV BLD AUTO: 10 FL (ref 8.9–12.9)
POTASSIUM SERPL-SCNC: 5.6 MMOL/L (ref 3.5–5.1)
PROT SERPL-MCNC: 5.3 G/DL (ref 6.4–8.2)
RBC # BLD AUTO: 2.56 M/UL (ref 4.1–5.7)
RBC MORPH BLD: ABNORMAL
SERVICE CMNT-IMP: ABNORMAL
SERVICE CMNT-IMP: NORMAL
SODIUM SERPL-SCNC: 134 MMOL/L (ref 136–145)
TRIGL SERPL-MCNC: 92 MG/DL (ref ?–150)
WBC # BLD AUTO: 13.7 K/UL (ref 4.1–11.1)

## 2022-09-29 PROCEDURE — 74011250637 HC RX REV CODE- 250/637: Performed by: COLON & RECTAL SURGERY

## 2022-09-29 PROCEDURE — 74011250637 HC RX REV CODE- 250/637: Performed by: PHYSICIAN ASSISTANT

## 2022-09-29 PROCEDURE — 80053 COMPREHEN METABOLIC PANEL: CPT

## 2022-09-29 PROCEDURE — 77030002933 HC SUT MCRYL J&J -A: Performed by: SURGERY

## 2022-09-29 PROCEDURE — 77030037877 HC DRSG MEPILEX >48IN BORD MOLN -A

## 2022-09-29 PROCEDURE — 84100 ASSAY OF PHOSPHORUS: CPT

## 2022-09-29 PROCEDURE — 74011000250 HC RX REV CODE- 250: Performed by: STUDENT IN AN ORGANIZED HEALTH CARE EDUCATION/TRAINING PROGRAM

## 2022-09-29 PROCEDURE — 77030031139 HC SUT VCRL2 J&J -A: Performed by: SURGERY

## 2022-09-29 PROCEDURE — 84478 ASSAY OF TRIGLYCERIDES: CPT

## 2022-09-29 PROCEDURE — 74011000258 HC RX REV CODE- 258: Performed by: STUDENT IN AN ORGANIZED HEALTH CARE EDUCATION/TRAINING PROGRAM

## 2022-09-29 PROCEDURE — 74011250636 HC RX REV CODE- 250/636: Performed by: NURSE ANESTHETIST, CERTIFIED REGISTERED

## 2022-09-29 PROCEDURE — 74011250637 HC RX REV CODE- 250/637: Performed by: STUDENT IN AN ORGANIZED HEALTH CARE EDUCATION/TRAINING PROGRAM

## 2022-09-29 PROCEDURE — 74011000258 HC RX REV CODE- 258: Performed by: NURSE PRACTITIONER

## 2022-09-29 PROCEDURE — 77030008684 HC TU ET CUF COVD -B: Performed by: NURSE ANESTHETIST, CERTIFIED REGISTERED

## 2022-09-29 PROCEDURE — 76210000016 HC OR PH I REC 1 TO 1.5 HR: Performed by: SURGERY

## 2022-09-29 PROCEDURE — 90935 HEMODIALYSIS ONE EVALUATION: CPT

## 2022-09-29 PROCEDURE — 83735 ASSAY OF MAGNESIUM: CPT

## 2022-09-29 PROCEDURE — 76010000149 HC OR TIME 1 TO 1.5 HR: Performed by: SURGERY

## 2022-09-29 PROCEDURE — 0JB80ZZ EXCISION OF ABDOMEN SUBCUTANEOUS TISSUE AND FASCIA, OPEN APPROACH: ICD-10-PCS | Performed by: SURGERY

## 2022-09-29 PROCEDURE — 11045 DBRDMT SUBQ TISS EACH ADDL: CPT | Performed by: SURGERY

## 2022-09-29 PROCEDURE — 82962 GLUCOSE BLOOD TEST: CPT

## 2022-09-29 PROCEDURE — 74011250636 HC RX REV CODE- 250/636: Performed by: COLON & RECTAL SURGERY

## 2022-09-29 PROCEDURE — 77030026438 HC STYL ET INTUB CARD -A: Performed by: NURSE ANESTHETIST, CERTIFIED REGISTERED

## 2022-09-29 PROCEDURE — 77030037878 HC DRSG MEPILEX >48IN BORD MOLN -B

## 2022-09-29 PROCEDURE — 74011000250 HC RX REV CODE- 250: Performed by: NURSE ANESTHETIST, CERTIFIED REGISTERED

## 2022-09-29 PROCEDURE — 77030013079 HC BLNKT BAIR HGGR 3M -A: Performed by: NURSE ANESTHETIST, CERTIFIED REGISTERED

## 2022-09-29 PROCEDURE — P9045 ALBUMIN (HUMAN), 5%, 250 ML: HCPCS | Performed by: NURSE ANESTHETIST, CERTIFIED REGISTERED

## 2022-09-29 PROCEDURE — 85025 COMPLETE CBC W/AUTO DIFF WBC: CPT

## 2022-09-29 PROCEDURE — 76060000034 HC ANESTHESIA 1.5 TO 2 HR: Performed by: SURGERY

## 2022-09-29 PROCEDURE — 87077 CULTURE AEROBIC IDENTIFY: CPT

## 2022-09-29 PROCEDURE — 2709999900 HC NON-CHARGEABLE SUPPLY: Performed by: SURGERY

## 2022-09-29 PROCEDURE — 74011636637 HC RX REV CODE- 636/637: Performed by: STUDENT IN AN ORGANIZED HEALTH CARE EDUCATION/TRAINING PROGRAM

## 2022-09-29 PROCEDURE — 74011250636 HC RX REV CODE- 250/636: Performed by: NURSE PRACTITIONER

## 2022-09-29 PROCEDURE — 77030008771 HC TU NG SALEM SUMP -A: Performed by: NURSE ANESTHETIST, CERTIFIED REGISTERED

## 2022-09-29 PROCEDURE — 87186 SC STD MICRODIL/AGAR DIL: CPT

## 2022-09-29 PROCEDURE — 87205 SMEAR GRAM STAIN: CPT

## 2022-09-29 PROCEDURE — 99024 POSTOP FOLLOW-UP VISIT: CPT | Performed by: SURGERY

## 2022-09-29 PROCEDURE — 87075 CULTR BACTERIA EXCEPT BLOOD: CPT

## 2022-09-29 PROCEDURE — 77030040162

## 2022-09-29 PROCEDURE — 11042 DBRDMT SUBQ TIS 1ST 20SQCM/<: CPT | Performed by: SURGERY

## 2022-09-29 PROCEDURE — 36415 COLL VENOUS BLD VENIPUNCTURE: CPT

## 2022-09-29 PROCEDURE — 74011250636 HC RX REV CODE- 250/636: Performed by: ANESTHESIOLOGY

## 2022-09-29 PROCEDURE — 74011250636 HC RX REV CODE- 250/636: Performed by: PHYSICIAN ASSISTANT

## 2022-09-29 PROCEDURE — 74011250636 HC RX REV CODE- 250/636: Performed by: STUDENT IN AN ORGANIZED HEALTH CARE EDUCATION/TRAINING PROGRAM

## 2022-09-29 PROCEDURE — 65270000046 HC RM TELEMETRY

## 2022-09-29 PROCEDURE — 74011000250 HC RX REV CODE- 250: Performed by: PHYSICIAN ASSISTANT

## 2022-09-29 RX ORDER — ONDANSETRON 2 MG/ML
INJECTION INTRAMUSCULAR; INTRAVENOUS AS NEEDED
Status: DISCONTINUED | OUTPATIENT
Start: 2022-09-29 | End: 2022-09-29 | Stop reason: HOSPADM

## 2022-09-29 RX ORDER — SODIUM CHLORIDE, SODIUM LACTATE, POTASSIUM CHLORIDE, CALCIUM CHLORIDE 600; 310; 30; 20 MG/100ML; MG/100ML; MG/100ML; MG/100ML
125 INJECTION, SOLUTION INTRAVENOUS CONTINUOUS
Status: CANCELLED | OUTPATIENT
Start: 2022-09-29 | End: 2022-09-30

## 2022-09-29 RX ORDER — FENTANYL CITRATE 50 UG/ML
50 INJECTION, SOLUTION INTRAMUSCULAR; INTRAVENOUS AS NEEDED
Status: CANCELLED | OUTPATIENT
Start: 2022-09-29

## 2022-09-29 RX ORDER — SODIUM CHLORIDE 0.9 % (FLUSH) 0.9 %
5-40 SYRINGE (ML) INJECTION EVERY 8 HOURS
Status: DISCONTINUED | OUTPATIENT
Start: 2022-09-29 | End: 2022-09-29 | Stop reason: HOSPADM

## 2022-09-29 RX ORDER — MORPHINE SULFATE 2 MG/ML
2 INJECTION, SOLUTION INTRAMUSCULAR; INTRAVENOUS
Status: DISCONTINUED | OUTPATIENT
Start: 2022-09-29 | End: 2022-09-29 | Stop reason: HOSPADM

## 2022-09-29 RX ORDER — PHENYLEPHRINE HCL IN 0.9% NACL 0.4MG/10ML
SYRINGE (ML) INTRAVENOUS AS NEEDED
Status: DISCONTINUED | OUTPATIENT
Start: 2022-09-29 | End: 2022-09-29 | Stop reason: HOSPADM

## 2022-09-29 RX ORDER — MIDAZOLAM HYDROCHLORIDE 1 MG/ML
INJECTION, SOLUTION INTRAMUSCULAR; INTRAVENOUS AS NEEDED
Status: DISCONTINUED | OUTPATIENT
Start: 2022-09-29 | End: 2022-09-29 | Stop reason: HOSPADM

## 2022-09-29 RX ORDER — SODIUM CHLORIDE 9 MG/ML
INJECTION, SOLUTION INTRAVENOUS
Status: DISCONTINUED | OUTPATIENT
Start: 2022-09-29 | End: 2022-09-29 | Stop reason: HOSPADM

## 2022-09-29 RX ORDER — MIDAZOLAM HYDROCHLORIDE 1 MG/ML
1 INJECTION, SOLUTION INTRAMUSCULAR; INTRAVENOUS AS NEEDED
Status: CANCELLED | OUTPATIENT
Start: 2022-09-29

## 2022-09-29 RX ORDER — SODIUM CHLORIDE 9 MG/ML
25 INJECTION, SOLUTION INTRAVENOUS CONTINUOUS
Status: CANCELLED | OUTPATIENT
Start: 2022-09-29 | End: 2022-09-30

## 2022-09-29 RX ORDER — ONDANSETRON 2 MG/ML
4 INJECTION INTRAMUSCULAR; INTRAVENOUS AS NEEDED
Status: DISCONTINUED | OUTPATIENT
Start: 2022-09-29 | End: 2022-09-29 | Stop reason: HOSPADM

## 2022-09-29 RX ORDER — MIDAZOLAM HYDROCHLORIDE 1 MG/ML
0.5 INJECTION, SOLUTION INTRAMUSCULAR; INTRAVENOUS
Status: DISCONTINUED | OUTPATIENT
Start: 2022-09-29 | End: 2022-09-29 | Stop reason: HOSPADM

## 2022-09-29 RX ORDER — ROCURONIUM BROMIDE 10 MG/ML
INJECTION, SOLUTION INTRAVENOUS AS NEEDED
Status: DISCONTINUED | OUTPATIENT
Start: 2022-09-29 | End: 2022-09-29 | Stop reason: HOSPADM

## 2022-09-29 RX ORDER — SODIUM CHLORIDE, SODIUM LACTATE, POTASSIUM CHLORIDE, CALCIUM CHLORIDE 600; 310; 30; 20 MG/100ML; MG/100ML; MG/100ML; MG/100ML
INJECTION, SOLUTION INTRAVENOUS
Status: DISCONTINUED | OUTPATIENT
Start: 2022-09-29 | End: 2022-09-29

## 2022-09-29 RX ORDER — DIPHENHYDRAMINE HYDROCHLORIDE 50 MG/ML
12.5 INJECTION, SOLUTION INTRAMUSCULAR; INTRAVENOUS AS NEEDED
Status: DISCONTINUED | OUTPATIENT
Start: 2022-09-29 | End: 2022-09-29 | Stop reason: HOSPADM

## 2022-09-29 RX ORDER — LIDOCAINE HYDROCHLORIDE 20 MG/ML
INJECTION, SOLUTION EPIDURAL; INFILTRATION; INTRACAUDAL; PERINEURAL AS NEEDED
Status: DISCONTINUED | OUTPATIENT
Start: 2022-09-29 | End: 2022-09-29 | Stop reason: HOSPADM

## 2022-09-29 RX ORDER — KETAMINE HYDROCHLORIDE 10 MG/ML
INJECTION, SOLUTION INTRAMUSCULAR; INTRAVENOUS AS NEEDED
Status: DISCONTINUED | OUTPATIENT
Start: 2022-09-29 | End: 2022-09-29 | Stop reason: HOSPADM

## 2022-09-29 RX ORDER — LIDOCAINE HYDROCHLORIDE 10 MG/ML
0.1 INJECTION, SOLUTION EPIDURAL; INFILTRATION; INTRACAUDAL; PERINEURAL AS NEEDED
Status: CANCELLED | OUTPATIENT
Start: 2022-09-29

## 2022-09-29 RX ORDER — DEXAMETHASONE SODIUM PHOSPHATE 4 MG/ML
INJECTION, SOLUTION INTRA-ARTICULAR; INTRALESIONAL; INTRAMUSCULAR; INTRAVENOUS; SOFT TISSUE AS NEEDED
Status: DISCONTINUED | OUTPATIENT
Start: 2022-09-29 | End: 2022-09-29 | Stop reason: HOSPADM

## 2022-09-29 RX ORDER — SODIUM CHLORIDE 0.9 % (FLUSH) 0.9 %
5-40 SYRINGE (ML) INJECTION AS NEEDED
Status: CANCELLED | OUTPATIENT
Start: 2022-09-29

## 2022-09-29 RX ORDER — SODIUM CHLORIDE, SODIUM LACTATE, POTASSIUM CHLORIDE, CALCIUM CHLORIDE 600; 310; 30; 20 MG/100ML; MG/100ML; MG/100ML; MG/100ML
125 INJECTION, SOLUTION INTRAVENOUS CONTINUOUS
Status: DISCONTINUED | OUTPATIENT
Start: 2022-09-29 | End: 2022-09-29 | Stop reason: HOSPADM

## 2022-09-29 RX ORDER — ACETAMINOPHEN 325 MG/1
650 TABLET ORAL ONCE
Status: CANCELLED | OUTPATIENT
Start: 2022-09-29 | End: 2022-09-29

## 2022-09-29 RX ORDER — HYDROMORPHONE HYDROCHLORIDE 1 MG/ML
0.2 INJECTION, SOLUTION INTRAMUSCULAR; INTRAVENOUS; SUBCUTANEOUS
Status: DISCONTINUED | OUTPATIENT
Start: 2022-09-29 | End: 2022-09-29 | Stop reason: HOSPADM

## 2022-09-29 RX ORDER — FENTANYL CITRATE 50 UG/ML
25 INJECTION, SOLUTION INTRAMUSCULAR; INTRAVENOUS
Status: DISCONTINUED | OUTPATIENT
Start: 2022-09-29 | End: 2022-09-29 | Stop reason: HOSPADM

## 2022-09-29 RX ORDER — FENTANYL CITRATE 50 UG/ML
INJECTION, SOLUTION INTRAMUSCULAR; INTRAVENOUS AS NEEDED
Status: DISCONTINUED | OUTPATIENT
Start: 2022-09-29 | End: 2022-09-29 | Stop reason: HOSPADM

## 2022-09-29 RX ORDER — EPHEDRINE SULFATE/0.9% NACL/PF 50 MG/5 ML
SYRINGE (ML) INTRAVENOUS AS NEEDED
Status: DISCONTINUED | OUTPATIENT
Start: 2022-09-29 | End: 2022-09-29 | Stop reason: HOSPADM

## 2022-09-29 RX ORDER — ALBUMIN HUMAN 50 G/1000ML
SOLUTION INTRAVENOUS AS NEEDED
Status: DISCONTINUED | OUTPATIENT
Start: 2022-09-29 | End: 2022-09-29 | Stop reason: HOSPADM

## 2022-09-29 RX ORDER — OXYCODONE AND ACETAMINOPHEN 5; 325 MG/1; MG/1
1 TABLET ORAL AS NEEDED
Status: DISCONTINUED | OUTPATIENT
Start: 2022-09-29 | End: 2022-09-29 | Stop reason: HOSPADM

## 2022-09-29 RX ORDER — SODIUM CHLORIDE 0.9 % (FLUSH) 0.9 %
5-40 SYRINGE (ML) INJECTION EVERY 8 HOURS
Status: CANCELLED | OUTPATIENT
Start: 2022-09-29

## 2022-09-29 RX ORDER — SODIUM CHLORIDE 0.9 % (FLUSH) 0.9 %
5-40 SYRINGE (ML) INJECTION AS NEEDED
Status: DISCONTINUED | OUTPATIENT
Start: 2022-09-29 | End: 2022-09-29 | Stop reason: HOSPADM

## 2022-09-29 RX ORDER — PROPOFOL 10 MG/ML
INJECTION, EMULSION INTRAVENOUS AS NEEDED
Status: DISCONTINUED | OUTPATIENT
Start: 2022-09-29 | End: 2022-09-29 | Stop reason: HOSPADM

## 2022-09-29 RX ADMIN — MIDAZOLAM 2 MG: 1 INJECTION INTRAMUSCULAR; INTRAVENOUS at 20:40

## 2022-09-29 RX ADMIN — LIDOCAINE HYDROCHLORIDE 100 MG: 20 INJECTION, SOLUTION EPIDURAL; INFILTRATION; INTRACAUDAL; PERINEURAL at 20:45

## 2022-09-29 RX ADMIN — HYDROMORPHONE HYDROCHLORIDE 0.2 MG: 1 INJECTION, SOLUTION INTRAMUSCULAR; INTRAVENOUS; SUBCUTANEOUS at 10:36

## 2022-09-29 RX ADMIN — DIPHENHYDRAMINE HYDROCHLORIDE 12.5 MG: 50 INJECTION, SOLUTION INTRAMUSCULAR; INTRAVENOUS at 12:48

## 2022-09-29 RX ADMIN — HYDROMORPHONE HYDROCHLORIDE 4 MG: 2 TABLET ORAL at 02:07

## 2022-09-29 RX ADMIN — PANTOPRAZOLE SODIUM 40 MG: 40 TABLET, DELAYED RELEASE ORAL at 18:24

## 2022-09-29 RX ADMIN — HYDROMORPHONE HYDROCHLORIDE 4 MG: 2 TABLET ORAL at 12:48

## 2022-09-29 RX ADMIN — Medication 20 MG: at 20:45

## 2022-09-29 RX ADMIN — HYDROMORPHONE HYDROCHLORIDE 4 MG: 2 TABLET ORAL at 18:25

## 2022-09-29 RX ADMIN — PROPOFOL 60 MG: 10 INJECTION, EMULSION INTRAVENOUS at 20:45

## 2022-09-29 RX ADMIN — Medication 20 MG: at 20:55

## 2022-09-29 RX ADMIN — FENTANYL CITRATE 25 MCG: 50 INJECTION, SOLUTION INTRAMUSCULAR; INTRAVENOUS at 22:20

## 2022-09-29 RX ADMIN — APIXABAN 5 MG: 5 TABLET, FILM COATED ORAL at 09:00

## 2022-09-29 RX ADMIN — FENTANYL CITRATE 25 MCG: 50 INJECTION, SOLUTION INTRAMUSCULAR; INTRAVENOUS at 22:30

## 2022-09-29 RX ADMIN — I.V. FAT EMULSION 250 ML: 20 EMULSION INTRAVENOUS at 22:37

## 2022-09-29 RX ADMIN — GABAPENTIN 100 MG: 100 CAPSULE ORAL at 18:29

## 2022-09-29 RX ADMIN — GABAPENTIN 100 MG: 100 CAPSULE ORAL at 09:00

## 2022-09-29 RX ADMIN — ALBUMIN (HUMAN) 250 ML: 12.5 INJECTION, SOLUTION INTRAVENOUS at 21:00

## 2022-09-29 RX ADMIN — PIPERACILLIN AND TAZOBACTAM 3.38 G: 3; .375 INJECTION, POWDER, FOR SOLUTION INTRAVENOUS at 04:52

## 2022-09-29 RX ADMIN — HYDROMORPHONE HYDROCHLORIDE 4 MG: 2 TABLET ORAL at 06:30

## 2022-09-29 RX ADMIN — FENTANYL CITRATE 50 MCG: 50 INJECTION, SOLUTION INTRAMUSCULAR; INTRAVENOUS at 20:45

## 2022-09-29 RX ADMIN — FENTANYL CITRATE 25 MCG: 50 INJECTION, SOLUTION INTRAMUSCULAR; INTRAVENOUS at 22:38

## 2022-09-29 RX ADMIN — Medication 80 MCG: at 21:30

## 2022-09-29 RX ADMIN — SUGAMMADEX 200 MG: 100 INJECTION, SOLUTION INTRAVENOUS at 21:42

## 2022-09-29 RX ADMIN — Medication: at 18:36

## 2022-09-29 RX ADMIN — PANTOPRAZOLE SODIUM 40 MG: 40 TABLET, DELAYED RELEASE ORAL at 06:31

## 2022-09-29 RX ADMIN — FERROUS SULFATE TAB 325 MG (65 MG ELEMENTAL FE) 325 MG: 325 (65 FE) TAB at 09:00

## 2022-09-29 RX ADMIN — FENTANYL CITRATE 50 MCG: 50 INJECTION, SOLUTION INTRAMUSCULAR; INTRAVENOUS at 22:01

## 2022-09-29 RX ADMIN — Medication 10 MG: at 21:09

## 2022-09-29 RX ADMIN — PIPERACILLIN AND TAZOBACTAM 3.38 G: 3; .375 INJECTION, POWDER, FOR SOLUTION INTRAVENOUS at 18:25

## 2022-09-29 RX ADMIN — Medication 1 CAPSULE: at 08:59

## 2022-09-29 RX ADMIN — COLLAGENASE SANTYL: 250 OINTMENT TOPICAL at 09:00

## 2022-09-29 RX ADMIN — SERTRALINE 25 MG: 50 TABLET, FILM COATED ORAL at 08:59

## 2022-09-29 RX ADMIN — HYDROMORPHONE HYDROCHLORIDE 0.2 MG: 1 INJECTION, SOLUTION INTRAMUSCULAR; INTRAVENOUS; SUBCUTANEOUS at 22:52

## 2022-09-29 RX ADMIN — PHENYLEPHRINE HYDROCHLORIDE 80 MCG/MIN: 10 INJECTION INTRAVENOUS at 20:54

## 2022-09-29 RX ADMIN — Medication 10 MG: at 21:21

## 2022-09-29 RX ADMIN — SODIUM CHLORIDE: 900 INJECTION, SOLUTION INTRAVENOUS at 20:20

## 2022-09-29 RX ADMIN — DEXAMETHASONE SODIUM PHOSPHATE 4 MG: 4 INJECTION, SOLUTION INTRAMUSCULAR; INTRAVENOUS at 21:00

## 2022-09-29 RX ADMIN — ONDANSETRON HYDROCHLORIDE 4 MG: 2 INJECTION, SOLUTION INTRAMUSCULAR; INTRAVENOUS at 21:00

## 2022-09-29 RX ADMIN — FERROUS SULFATE TAB 325 MG (65 MG ELEMENTAL FE) 325 MG: 325 (65 FE) TAB at 18:24

## 2022-09-29 RX ADMIN — ROCURONIUM BROMIDE 50 MG: 10 SOLUTION INTRAVENOUS at 20:46

## 2022-09-29 NOTE — WOUND CARE
Wound/Ostomy Visit     Follow up visit with Dr Jose Ny and Fan Guthrie NP. Plan:  Dr Jose Ny taking patient to OR today to debride Left abd/former J tube site. Surgery: Colostomy  Date of Surgery: 9.10.22      Surgeon: Dr. Ihsan Bellamy Location: left quadrant     Ostomy Assessment:  Stoma appearance: Red, moist and recessed  Jennifer stoma:  erythema  Output: brown  Current appliance:  1 piece flat with rings       Left abdomen, open surgical wound/ former J tube site:  3.6 x 2 x 4.4 cm; 80% yellow 20% red; moderate serosang exudate; no odor; jennifer wound erythema. Cleansed with VASHE; packed with VASHE moistened roll gauze. Covered with dry dressing. Right abdominal, adjacent to former trocar site with ulceration:  1 x 1 x 0.1 cm; 100% moist yellow; no odor or erythema. Right upper posterior/lateral back, partial thickness wound:  0.5 x 0.8 x 0.3 cm; 100% pink with suture material in wound bed removed by Dr Jose Ny; no exudate, odor or erythema. Sacrum, Pressure Injury Unstageable:  5 x 4 x 3.6 cm; 100% yellow. Small serous exudate; no odor; tender to touch; jennifer wound blanching red erythema. Dr Jose Ny debrided wound at the bedside. Left buttock, Pressure Injury Stage 3:  4 x 3 x 0.1 cm; 90% pink 10% yellow; scant serous exudate; no odor; tender to touch; jennifer wound blanching pink erythema. POA Left BKA site:  3.4 x 19 x 7 cm; 90% yellow/brown 10% red; The depth is on the lateral side of the incision. Cleansed with VASHE; applied Santyl filled depth with VASHE moistened gauze; covered incision with XEROFORM and ABD pad. Recommendations:   Left abdominal wound:  Daily irrigate with saline; apply Santyl; pack with VASHE moist gauze; cover with dry dressing. Sacrum and buttocks:  Daily cleanse with saline; apply Santyl and saline moist gauze; cover with dry dressing. Right upper back wounds:  Daily cleanse with saline; apply Santyl; cover with dry dressing.   Left BKA site:  Daily cleanse with saline; apply Santyl; pack dehisced area with VASHE moistened roll gauze; cover with Vashe moist gauze and dry dressing. 5.   Empty appliance when 1/3 full and PRN. Encourage patient/family to notify nurse and  assist w/ pouch emptying to promote self-care. Change appliance twice weekly and PRN for leaking ASAP. DO NOT REINFORCE LEAKS to avoid skin breakdown. Transition of Care: Will follow tomorrow. Discharge disposition plan is to go to [facility].      KATHY Obando RN [facility]  Available on [employer]  Office [phone]

## 2022-09-29 NOTE — ROUTINE PROCESS
Today this patient had large amounts of what looked and smelled like vomit coming from his ostomy. There was so much drainage, his bag would fill within 10 min, we would empty it and it would fill again. It caused the bag to leak several times from pressure. I also did the patient's wound care on his abdomen and sacrum. The sacrum looks so much better. The abd wound, when I pulled the packing out, it poured large amounts of purulent super smelly drainage. The wound is also red around the site, warm to the touch and firm. Patient continues to complain of increased pain.  MD aware

## 2022-09-29 NOTE — PROGRESS NOTES
Problem: Risk for Spread of Infection  Goal: Prevent transmission of infectious organism to others  Description: Prevent the transmission of infectious organisms to other patients, staff members, and visitors. Outcome: Progressing Towards Goal     Problem: Patient Education:  Go to Education Activity  Goal: Patient/Family Education  Outcome: Progressing Towards Goal     Problem: Falls - Risk of  Goal: *Absence of Falls  Description: Document Marcelo Pfeiffer Fall Risk and appropriate interventions in the flowsheet. Outcome: Progressing Towards Goal  Note: Fall Risk Interventions:  Mobility Interventions: Communicate number of staff needed for ambulation/transfer, OT consult for ADLs, PT Consult for mobility concerns    Mentation Interventions: Adequate sleep, hydration, pain control, Increase mobility, Update white board    Medication Interventions: Bed/chair exit alarm    Elimination Interventions: Call light in reach              Problem: Patient Education: Go to Patient Education Activity  Goal: Patient/Family Education  Outcome: Progressing Towards Goal     Problem: Nutrition Deficit  Goal: *Optimize nutritional status  Outcome: Progressing Towards Goal     Problem: Pressure Injury - Risk of  Goal: *Prevention of pressure injury  Description: Document Barber Scale and appropriate interventions in the flowsheet. Outcome: Progressing Towards Goal  Note: Pressure Injury Interventions:  Sensory Interventions: Assess changes in LOC, Avoid rigorous massage over bony prominences, Keep linens dry and wrinkle-free, Minimize linen layers, Turn and reposition approx.  every two hours (pillows and wedges if needed), Pressure redistribution bed/mattress (bed type)    Moisture Interventions: Absorbent underpads, Apply protective barrier, creams and emollients, Check for incontinence Q2 hours and as needed, Contain wound drainage, Minimize layers    Activity Interventions: PT/OT evaluation    Mobility Interventions: PT/OT evaluation    Nutrition Interventions: Offer support with meals,snacks and hydration    Friction and Shear Interventions: Apply protective barrier, creams and emollients, Foam dressings/transparent film/skin sealants, HOB 30 degrees or less, Minimize layers                Problem: Patient Education: Go to Patient Education Activity  Goal: Patient/Family Education  Outcome: Progressing Towards Goal

## 2022-09-29 NOTE — PROGRESS NOTES
ID Progress Note  2022    Subjective:     Alert, no new complaints  Pt was seen with wound care team    Review of Systems:            Symptom Y/N Comments   Symptom Y/N Comments   Fever/Chills       Chest Pain        Poor Appetite       Edema        Cough       Abdominal Pain        Sputum       Joint Pain        SOB/ANDREWS       Pruritis/Rash        Nausea/vomit       Tolerating PT/OT        Diarrhea       Tolerating Diet        Constipation       Other           Could NOT obtain due to:       Objective:     Vitals: Visit Vitals  BP (!) 104/33 (BP 1 Location: Left upper arm, BP Patient Position: At rest;Lying)   Pulse 65   Temp 97.6 °F (36.4 °C)   Resp 18   Ht 5' 7\" (1.702 m)   Wt 69.9 kg (154 lb 1.6 oz)   SpO2 100%   BMI 24.14 kg/m²          Tmax:  Temp (24hrs), Av.8 °F (36.6 °C), Min:97.4 °F (36.3 °C), Max:98.3 °F (36.8 °C)      PHYSICAL EXAM:  General: Chronically ill appearing. WD, WN. Alert, cooperative, no acute distress    EENT:  Anicteric sclerae. Dry mucous membrane  Resp:  CTA bilaterally, no wheezing or rales. No accessory muscle use  CV:  Regular  rhythm  GI:  Soft, Non distended, Non tender. +Bowel sounds, colostomy stoma pink  Neurologic:  Alert and oriented X self, normal speech,   Psych:   Fair insight. Not anxious nor agitated  Skin:  No rashes.   No jaundice    Pressure injury; sacrum, right buttock,   left BKA stump site; dressing dry and intact  Right BKA stump; intact                Labs:   Lab Results   Component Value Date/Time    WBC 13.7 (H) 2022 04:30 AM    HGB 7.6 (L) 2022 04:30 AM    HCT 23.9 (L) 2022 04:30 AM    PLATELET 013  04:30 AM    MCV 93.4 2022 04:30 AM     Lab Results   Component Value Date/Time    Sodium 134 (L) 2022 04:30 AM    Potassium 5.6 (H) 2022 04:30 AM    Chloride 106 2022 04:30 AM    CO2 21 2022 04:30 AM    Anion gap 7 2022 04:30 AM    Glucose 72 2022 04:30 AM    BUN 86 (H) 2022 04:30 AM Creatinine 3.14 (H) 09/29/2022 04:30 AM    BUN/Creatinine ratio 27 (H) 09/29/2022 04:30 AM    GFR est AA 25 (L) 09/29/2022 04:30 AM    GFR est non-AA 21 (L) 09/29/2022 04:30 AM    Calcium 7.9 (L) 09/29/2022 04:30 AM    Bilirubin, total 0.2 09/29/2022 04:30 AM    Alk. phosphatase 114 09/29/2022 04:30 AM    Protein, total 5.3 (L) 09/29/2022 04:30 AM    Albumin 1.2 (L) 09/29/2022 04:30 AM    Globulin 4.1 (H) 09/29/2022 04:30 AM    A-G Ratio 0.3 (L) 09/29/2022 04:30 AM    ALT (SGPT) 6 (L) 09/29/2022 04:30 AM     Assessment:      Dislodged feeding tube/peritonitis   Free air and perirectal fistula  S/p laparoscopy procedure; take down and removal of J tube, colostomy placement, and I & D of abdominal wall (9/10)  Left BKA stump infection  Recent right thoracotomy  Hx Bilateral BKAs   - afebrile, wbc 14     Blood cx (9/9, 9/16) no growth, (9/27) no growth so far     Wound cx from left bka stump (9/27) pseudomonas species     Wound cx from previous J-tube (9/29) pending       CT of LLE (9/28)  Partially encapsulated fluid collection with a few associated gas locules overlying the dictation margin. Infection/abscess cannot be excluded based on imaging alone. No CT evidence of acute osteomyelitis. Several gas locules along the left spermatic cord, correlate clinically for signs of infection in this location. Objective:      Completed 2 weeks of IV Eraxis, zosyn, and vancomycin as of 9/23 then switched to   PO Augmentin between 9/24 to 9/28.    Continue with IV zosyn for now  Multiple potential sources for infection: previous J tube site, left BKA stump, sacral wound  Appreciate wound care team's input  Vascular/general surgery following    Fever work up if temp >= 100.4    Above plan of care discussed and agreed with Dr. Maryann Hood, NP

## 2022-09-29 NOTE — PROGRESS NOTES
Vascular:    CT of amputation stump reviewed and patient examined - the cavity/collection referred to in CT report is packed with gauze and well drained communicating with the open lateral wound. This should not be a source of sepsis at this point and will slowly resolve with dressing changes.

## 2022-09-29 NOTE — PROGRESS NOTES
Occupational Therapy  9/29/2022    Chart reviewed. Attempted to see pt for OT. Pt in L side lying, having just finished wound care. Pt c/o increased pain despite already receiving some pain meds. RN notified. Melody Segura MS, OTR/L

## 2022-09-29 NOTE — PROGRESS NOTES
Nephrology Progress Note  Jignesh Cade  Date of Admission : 9/9/2022    CC:  Follow up for JEMIMA       Assessment and Plan     JEMIMA on HD:  - 2/2 ATN  - HD dependent TTS  - HD later today    Hyperkalemia:  - will correct with HD    Anemia:  - MICHAEL MWF    PAD s/p b/l BKA    Recent sepsis    Empyema    ABD wall abscess:  - J-tube removal, colostomy, I&D of abd wound  - TPN per primary team     Sacral decub       Interval History:  Seen and examined. Resting in bed. For HD later today. Denies any N/V/abdo poain/ CP/SOB today      Current Medications: all current  Medications have been eviewed in EPIC  Review of Systems: A comprehensive review of systems was negative except for that written in the HPI. Objective:  Vitals:    Vitals:    09/29/22 0259 09/29/22 0400 09/29/22 0600 09/29/22 0857   BP:  (!) 104/33  (!) 158/90   Pulse:  98 65 62   Resp:  18  16   Temp:  97.6 °F (36.4 °C)  97.9 °F (36.6 °C)   TempSrc:       SpO2:    100%   Weight: 69.9 kg (154 lb 1.6 oz)      Height:         Intake and Output:  No intake/output data recorded. 09/27 1901 - 09/29 0700  In: -   Out: 950 [Urine:450]    Physical Examination:  General: Confused   Neck:  Supple, no mass  Resp:  Lungs CTA B/L, no wheezing , normal respiratory effort  CV:  RRR,  no murmur or rub, no LE edema  GI:  Soft, NT, + Bowel sounds, + ostomy  Neurologic:  Confused   Access:           [facility]    []    High complexity decision making was performed  []    Patient is at high-risk of decompensation with multiple organ involvement    Lab Data Personally Reviewed: I have reviewed all the pertinent labs, microbiology data and radiology studies during assessment.     Recent Labs     09/29/22  0430 09/28/22  0434 09/27/22  0527   * 135* 136   K 5.6* 4.7 4.1    104 104   CO2 21 25 26   GLU 72 89 66   BUN 86* 59* 31*   CREA 3.14* 2.38* 1.67*   CA 7.9* 7.7* 7.5*   MG 1.9 1.8 1.7   PHOS 5.2* 3.7 2.4*   ALB 1.2* 1.2* 1.1*   ALT 6* <6* <6*       Recent Labs 09/29/22  0430 09/28/22  0434 09/27/22  0527   WBC 13.7* 14.2* 15.5*   HGB 7.6* 7.6* 8.1*   HCT 23.9* 24.1* 25.1*    356 345       No results found for: SDES  Lab Results   Component Value Date/Time    Culture result: MODERATE POSSIBLE PSEUDOMONAS SPECIES (A) 09/27/2022 12:18 PM    Culture result: NO GROWTH AFTER 21 HOURS 09/27/2022 12:01 PM    Culture result: NO GROWTH 6 DAYS 09/16/2022 08:45 PM     Recent Results (from the past 24 hour(s))   GLUCOSE, POC    Collection Time: 09/28/22 11:03 AM   Result Value Ref Range    Glucose (POC) 94 65 - 117 mg/dL    Performed by Wendi Lares (RN)    GLUCOSE, POC    Collection Time: 09/28/22  4:41 PM   Result Value Ref Range    Glucose (POC) 83 65 - 117 mg/dL    Performed by Frank Fields    GLUCOSE, POC    Collection Time: 09/28/22 11:16 PM   Result Value Ref Range    Glucose (POC) 102 65 - 117 mg/dL    Performed by Apolinar Gallego    TRIGLYCERIDE    Collection Time: 09/29/22  4:30 AM   Result Value Ref Range    Triglyceride 92 <150 MG/DL   CBC WITH AUTOMATED DIFF    Collection Time: 09/29/22  4:30 AM   Result Value Ref Range    WBC 13.7 (H) 4.1 - 11.1 K/uL    RBC 2.56 (L) 4.10 - 5.70 M/uL    HGB 7.6 (L) 12.1 - 17.0 g/dL    HCT 23.9 (L) 36.6 - 50.3 %    MCV 93.4 80.0 - 99.0 FL    MCH 29.7 26.0 - 34.0 PG    MCHC 31.8 30.0 - 36.5 g/dL    RDW 20.2 (H) 11.5 - 14.5 %    PLATELET 698 104 - 631 K/uL    MPV 10.0 8.9 - 12.9 FL    NRBC 0.0 0  WBC    ABSOLUTE NRBC 0.00 0.00 - 0.01 K/uL    NEUTROPHILS 68 32 - 75 %    LYMPHOCYTES 17 12 - 49 %    MONOCYTES 7 5 - 13 %    EOSINOPHILS 5 0 - 7 %    BASOPHILS 1 0 - 1 %    IMMATURE GRANULOCYTES 2 (H) 0.0 - 0.5 %    ABS. NEUTROPHILS 9.3 (H) 1.8 - 8.0 K/UL    ABS. LYMPHOCYTES 2.3 0.8 - 3.5 K/UL    ABS. MONOCYTES 1.0 0.0 - 1.0 K/UL    ABS. EOSINOPHILS 0.7 (H) 0.0 - 0.4 K/UL    ABS. BASOPHILS 0.1 0.0 - 0.1 K/UL    ABS. IMM.  GRANS. 0.3 (H) 0.00 - 0.04 K/UL    DF SMEAR SCANNED      PLATELET COMMENTS Large Platelets      RBC COMMENTS ANISOCYTOSIS  1+        RBC COMMENTS        ATYPICAL LYMPHOCYTES PRESENT  JOSE CELLS  PRESENT      RBC COMMENTS POLYCHROMASIA  PRESENT       MAGNESIUM    Collection Time: 09/29/22  4:30 AM   Result Value Ref Range    Magnesium 1.9 1.6 - 2.4 mg/dL   PHOSPHORUS    Collection Time: 09/29/22  4:30 AM   Result Value Ref Range    Phosphorus 5.2 (H) 2.6 - 4.7 MG/DL   METABOLIC PANEL, COMPREHENSIVE    Collection Time: 09/29/22  4:30 AM   Result Value Ref Range    Sodium 134 (L) 136 - 145 mmol/L    Potassium 5.6 (H) 3.5 - 5.1 mmol/L    Chloride 106 97 - 108 mmol/L    CO2 21 21 - 32 mmol/L    Anion gap 7 5 - 15 mmol/L    Glucose 72 65 - 100 mg/dL    BUN 86 (H) 6 - 20 MG/DL    Creatinine 3.14 (H) 0.70 - 1.30 MG/DL    BUN/Creatinine ratio 27 (H) 12 - 20      GFR est AA 25 (L) >60 ml/min/1.73m2    GFR est non-AA 21 (L) >60 ml/min/1.73m2    Calcium 7.9 (L) 8.5 - 10.1 MG/DL    Bilirubin, total 0.2 0.2 - 1.0 MG/DL    ALT (SGPT) 6 (L) 12 - 78 U/L    AST (SGOT) 18 15 - 37 U/L    Alk. phosphatase 114 45 - 117 U/L    Protein, total 5.3 (L) 6.4 - 8.2 g/dL    Albumin 1.2 (L) 3.5 - 5.0 g/dL    Globulin 4.1 (H) 2.0 - 4.0 g/dL    A-G Ratio 0.3 (L) 1.1 - 2.2     GLUCOSE, POC    Collection Time: 09/29/22  4:51 AM   Result Value Ref Range    Glucose (POC) 85 65 - 117 mg/dL    Performed by Cherri Lee MD  [address]  Phone - [phone]   Fax - [phone]  [email or web address]

## 2022-09-29 NOTE — PROCEDURES
[facility] / [phone]    Vitals Pre Post Assessment Pre Post   /49   142/76 LOC Alert to pain  (Recent Dilaudid and Benadryl Administration) Alert and oriented x 4   HR 61   77 Lungs Breath sounds are clear No change   Resp Resp Rate: 14 (09/29/22 1445) 14 Cardiac S1/S2 Regular No change   Temp Temp: 97.6 °F (36.4 °C) (09/29/22 1445) 97.9 Skin CDI - minus anal lesion No change   Weight Pre-Dialysis Weight: 64.5 kg (142 lb 3.2 oz) (09/19/22 0925) N/a   Edema None Noted No change   Tele status Wired - Sinus Rhythm Wired - Sinus Rhythm Pain Pain Intensity 1: 0 (09/29/22 0400) 0/10     Orders   Duration: Start: 0675 End: 5973 Total: 3 Hours   Dialyzer: Dialyzer/Set Up Inspection: Normajean Quinton (09/29/22 1445)   K Bath: Dialysate K (mEq/L): 2 (09/29/22 1445)   Ca Bath: Dialysate CA (mEq/L): 2.5 (09/29/22 1445)   Na: Dialysate NA (mEq/L): 138 (09/29/22 1445)   Bicarb: Dialysate HCO3 (mEq/L): 35 (09/29/22 1445)   Target Fluid Removal: Goal/Amount of Fluid to Remove (mL): 1000 mL (09/29/22 1445)     Access   Type & Location: Right Kurt     Comments:  Dressing CDI. No s/s of infection. Both lumens aspirate & flush well. Running well at  after switching the lines.                                  Labs   HBsAg (Antigen) / date: Ag Neg - 9/12/22                                              HBsAb (Antibody) / date: Billie - 9/12/22   Source: Epic Chart Review   Obtained/Reviewed  Critical Results Called HGB   Date Value Ref Range Status   09/29/2022 7.6 (L) 12.1 - 17.0 g/dL Final     Potassium   Date Value Ref Range Status   09/29/2022 5.6 (H) 3.5 - 5.1 mmol/L Final     Calcium   Date Value Ref Range Status   09/29/2022 7.9 (L) 8.5 - 10.1 MG/DL Final     BUN   Date Value Ref Range Status   09/29/2022 86 (H) 6 - 20 MG/DL Final     Creatinine   Date Value Ref Range Status   09/29/2022 3.14 (H) 0.70 - 1.30 MG/DL Final        Meds Given   Name Dose Route   None Ordered                 Adequacy / Fluid    Total Liters Process: 64.4 L   Net Fluid Removed: 1700 mL      Comments   Time Out Done:   (Time) 1447   Admitting Diagnosis: Intra-abdominal infection   Consent obtained/signed: Informed Consent Verified: Yes (09/29/22 8845)   Machine / RO # Machine Number: [id number] (09/29/22 2024)   Primary Nurse Rpt Pre: Denisse Dahl RN   Primary Nurse Rpt Post: Denisse Dahl RN   Pt Education: None, pt alert to painful stimuli, ongoing   Care Plan: Ongoing   Pts outpatient clinic: [facility]     Tx Summary   Comments:      SBAR received from Primary RN. Pt alert to painful stimuli and occasionally loud verbal.     Consent signed & on file. 1449: Each catheter limb disinfected per p&p, caps removed, hubs disinfected per p&p. Each lumen aspirated for blood return and flushed with Normal Saline per policy. VSS. Dialysis Tx initiated. 1710: Pt. Woke up a couple of times and looked around and then went back to sleep. 1749: Tx ended. VSS. Each dialysis catheter limb disinfected per p&p, all possible blood returned per p&p, and each dialysis hub disinfected per p&p. Each lumen flushed, and caps applied. Bed locked and in the lowest position, call bell and belongings in reach. SBAR given to Primary, RN. Patient is stable at time of my departure. All Dialysis related medications have been reviewed.

## 2022-09-29 NOTE — PROGRESS NOTES
CAROLYN:  RUR: 13%     Disposition:    [facility] ([place])  Outpatient HD: at [address] has been established M/W/F 3:15pm     CONNIE called  [facility]ra[facility] ([place]--inbound specialist [phone]). CM spoke with patient and his daughter on 9/27/22 and they requested researching HD sites closer to his home in [place], [place]. CONNIE obtained the following information:    [facility]--37 min from patient's home  has a chair M/W/F 5:30am  [facility]-- 42 min  no M/W/F chair . Has T/TH/Sat chair at 10am.    If patient changes his chair time, he will risk losing his established chair and delay discharge in locating a new change. CONNIE is recommending to patient/family to keep chair established with [facility]. Once he starts community HD, he can seek other choices either at [facility] or other facilities through assistance with his community dialysis SW.    Plan remains the same as above.      Josephine Mcguire, [address], [address]

## 2022-09-29 NOTE — PROGRESS NOTES
Physical Therapy - defer  Wound care RN in with pt. Will check back. 12:30 - Checked back for therapy session. Noted pt declined OT d/t pain, RN aware. Will check back later as able.

## 2022-09-29 NOTE — ANESTHESIA PREPROCEDURE EVALUATION
Relevant Problems   ENDOCRINE   (+) Uncontrolled diabetes mellitus       Anesthetic History               Review of Systems / Medical History  Patient summary reviewed, nursing notes reviewed and pertinent labs reviewed    Pulmonary          Pneumonia         Neuro/Psych              Cardiovascular                  Exercise tolerance: <4 METS  Comments: Echo 9/26/22    Technical qualifiers: Echo study was technically difficult    Left Ventricle: The EF by visual approximation is 55 - 60%. Left ventricle size is normal. Normal wall thickness. Normal wall motion. Normal diastolic function.   Tricuspid Valve: Mildly elevated RVSP. The estimated RVSP is 42 mmHg. CXR 9/27/22  FINDINGS: A portable AP radiograph of the chest was obtained at 810 hours. Tubes  and lines are stable. . Improved aeration the right lung base. Small right  pleural effusion. The cardiac and mediastinal contours and pulmonary vascularity  are normal.  The bones and soft tissues are grossly within normal limits.      IMPRESSION  Improved aeration in the right lung base.  Small right pleural  effusion unchanged   GI/Hepatic/Renal         Renal disease: dialysis and ESRD       Endo/Other    Diabetes: poorly controlled, using insulin    Anemia     Other Findings   Comments:            Physical Exam    Airway  Mallampati: II  TM Distance: 4 - 6 cm         Cardiovascular    Rhythm: regular  Rate: normal         Dental         Pulmonary  Breath sounds clear to auscultation               Abdominal  GI exam deferred       Other Findings            Anesthetic Plan    ASA: 3, emergent  Anesthesia type: general          Induction: Intravenous  Anesthetic plan and risks discussed with: Patient

## 2022-09-29 NOTE — PROGRESS NOTES
Problem: Risk for Spread of Infection  Goal: Prevent transmission of infectious organism to others  Description: Prevent the transmission of infectious organisms to other patients, staff members, and visitors. 9/28/2022 2245 by Kymberly Woody RN  Outcome: Progressing Towards Goal  9/28/2022 2225 by Kymberly Woody RN  Outcome: Progressing Towards Goal     Problem: Patient Education:  Go to Education Activity  Goal: Patient/Family Education  9/28/2022 2245 by Kymberly Woody RN  Outcome: Progressing Towards Goal  9/28/2022 2225 by Kymberly Woody RN  Outcome: Progressing Towards Goal     Problem: Falls - Risk of  Goal: *Absence of Falls  Description: Document Lincoln Counts Fall Risk and appropriate interventions in the flowsheet.   9/28/2022 2245 by Kymberly Woody RN  Outcome: Progressing Towards Goal  Note: Fall Risk Interventions:  Mobility Interventions: Communicate number of staff needed for ambulation/transfer, PT Consult for mobility concerns    Mentation Interventions: Adequate sleep, hydration, pain control, Increase mobility    Medication Interventions: Bed/chair exit alarm    Elimination Interventions: Bed/chair exit alarm, Call light in reach           9/28/2022 2225 by Kymberly Woody RN  Outcome: Progressing Towards Goal  Note: Fall Risk Interventions:  Mobility Interventions: Communicate number of staff needed for ambulation/transfer, OT consult for ADLs, PT Consult for mobility concerns    Mentation Interventions: Adequate sleep, hydration, pain control, Increase mobility, Update white board    Medication Interventions: Bed/chair exit alarm    Elimination Interventions: Call light in reach              Problem: Patient Education: Go to Patient Education Activity  Goal: Patient/Family Education  9/28/2022 2245 by Kymberly Woody RN  Outcome: Progressing Towards Goal  9/28/2022 2225 by Kymberly Woody RN  Outcome: Progressing Towards Goal     Problem: Nutrition Deficit  Goal: *Optimize nutritional status  9/28/2022 2245 by Robert Meyers RN  Outcome: Progressing Towards Goal  9/28/2022 2225 by Robert Meyers RN  Outcome: Progressing Towards Goal     Problem: Pressure Injury - Risk of  Goal: *Prevention of pressure injury  Description: Document Barber Scale and appropriate interventions in the flowsheet. 9/28/2022 2245 by Robert Meyers RN  Outcome: Progressing Towards Goal  Note: Pressure Injury Interventions:  Sensory Interventions: Assess changes in LOC    Moisture Interventions: Absorbent underpads, Apply protective barrier, creams and emollients, Minimize layers    Activity Interventions: Pressure redistribution bed/mattress(bed type)    Mobility Interventions: HOB 30 degrees or less, Pressure redistribution bed/mattress (bed type)    Nutrition Interventions: Offer support with meals,snacks and hydration    Friction and Shear Interventions: Apply protective barrier, creams and emollients, Foam dressings/transparent film/skin sealants, HOB 30 degrees or less, Minimize layers             9/28/2022 2225 by Robert Meyers RN  Outcome: Progressing Towards Goal  Note: Pressure Injury Interventions:  Sensory Interventions: Assess changes in LOC, Avoid rigorous massage over bony prominences, Keep linens dry and wrinkle-free, Minimize linen layers, Turn and reposition approx.  every two hours (pillows and wedges if needed), Pressure redistribution bed/mattress (bed type)    Moisture Interventions: Absorbent underpads, Apply protective barrier, creams and emollients, Check for incontinence Q2 hours and as needed, Contain wound drainage, Minimize layers    Activity Interventions: PT/OT evaluation    Mobility Interventions: PT/OT evaluation    Nutrition Interventions: Offer support with meals,snacks and hydration    Friction and Shear Interventions: Apply protective barrier, creams and emollients, Foam dressings/transparent film/skin sealants, HOB 30 degrees or less, Minimize layers                Problem: Patient Education: Go to Patient Education Activity  Goal: Patient/Family Education  9/28/2022 2245 by Antoine Sin RN  Outcome: Progressing Towards Goal  9/28/2022 2225 by Antoine Sin, RN  Outcome: Progressing Towards Goal

## 2022-09-29 NOTE — PROGRESS NOTES
Progress Note    Patient: Lillian Neves MRN: [id number]  SSN: [id number]    YOB: 1970  Age: 45 y.o. Sex: male      Admit Date: 2022    14 Days Post-Op    Procedure:  Procedure(s):  EUA/ PROCTOSIGMOIDOSCOPY    Subjective:     Patient has been eating half of his meals. Objective:     Visit Vitals  BP (!) 158/90 (BP 1 Location: Left upper arm, BP Patient Position: At rest;Lying)   Pulse 69   Temp 97.9 °F (36.6 °C)   Resp 16   Ht 5' 7\" (1.702 m)   Wt 69.9 kg (154 lb 1.6 oz)   SpO2 100%   BMI 24.14 kg/m²       Temp (24hrs), Av.8 °F (36.6 °C), Min:97.4 °F (36.3 °C), Max:98.3 °F (36.8 °C)      Physical Exam:    General alert in no acute distress  Left upper quadrant wound-significant drainage continues since yesterday. Seems to be tracking laterally. Has necrotic tissue in the wound. Sacral wound some loose fibrinous tissue debrided sharply at bedside  Ostomy pink and viable  Right chest residual silk suture and chest tube site this was removed    Data Review: images and reports reviewed    Lab Review: All lab results for the last 24 hours reviewed. Assessment:     Hospital Problems  Date Reviewed: 9/10/2022            Codes Class Noted POA    Injury to rectum without open wound into cavity ICD-10-CM: S36.60XA  ICD-9-CM: 863.45  2022 Yes        Open wound of anus ICD-10-CM: N13.657B  ICD-9-CM: 863.89  2022 Yes        Severe protein-calorie malnutrition ([address] ICD-10-CM: E43  ICD-9-CM: 822  2022 Yes        * (Principal) Intra-abdominal infection ICD-10-CM: B99.9  ICD-9-CM: 136.9  9/10/2022 Yes           Plan/Recommendations/Medical Decision Making:   Wound evaluated with wound care. Will need formal debridement in OR. We will post for later today. Hopefully will be able to do dressing changes over the weekend and perhaps VAC dressing next week.   Continue Santyl on sacral wound

## 2022-09-30 LAB
ALBUMIN SERPL-MCNC: 1.5 G/DL (ref 3.5–5)
ALBUMIN/GLOB SERPL: 0.3 {RATIO} (ref 1.1–2.2)
ALP SERPL-CCNC: 121 U/L (ref 45–117)
ALT SERPL-CCNC: 9 U/L (ref 12–78)
ANION GAP SERPL CALC-SCNC: 8 MMOL/L (ref 5–15)
AST SERPL-CCNC: 17 U/L (ref 15–37)
BACTERIA SPEC CULT: ABNORMAL
BASOPHILS # BLD: 0 K/UL (ref 0–0.1)
BASOPHILS NFR BLD: 0 % (ref 0–1)
BILIRUB SERPL-MCNC: 0.2 MG/DL (ref 0.2–1)
BUN SERPL-MCNC: 48 MG/DL (ref 6–20)
BUN/CREAT SERPL: 24 (ref 12–20)
CALCIUM SERPL-MCNC: 7.7 MG/DL (ref 8.5–10.1)
CHLORIDE SERPL-SCNC: 100 MMOL/L (ref 97–108)
CO2 SERPL-SCNC: 25 MMOL/L (ref 21–32)
CREAT SERPL-MCNC: 1.99 MG/DL (ref 0.7–1.3)
DIFFERENTIAL METHOD BLD: ABNORMAL
EOSINOPHIL # BLD: 0 K/UL (ref 0–0.4)
EOSINOPHIL NFR BLD: 0 % (ref 0–7)
ERYTHROCYTE [DISTWIDTH] IN BLOOD BY AUTOMATED COUNT: 20.3 % (ref 11.5–14.5)
GLOBULIN SER CALC-MCNC: 4.9 G/DL (ref 2–4)
GLUCOSE BLD STRIP.AUTO-MCNC: 115 MG/DL (ref 65–117)
GLUCOSE BLD STRIP.AUTO-MCNC: 151 MG/DL (ref 65–117)
GLUCOSE BLD STRIP.AUTO-MCNC: 207 MG/DL (ref 65–117)
GLUCOSE SERPL-MCNC: 194 MG/DL (ref 65–100)
GRAM STN SPEC: ABNORMAL
GRAM STN SPEC: ABNORMAL
HCT VFR BLD AUTO: 22.6 % (ref 36.6–50.3)
HGB BLD-MCNC: 7.1 G/DL (ref 12.1–17)
IMM GRANULOCYTES # BLD AUTO: 0.3 K/UL (ref 0–0.04)
IMM GRANULOCYTES NFR BLD AUTO: 2 % (ref 0–0.5)
LYMPHOCYTES # BLD: 0.4 K/UL (ref 0.8–3.5)
LYMPHOCYTES NFR BLD: 3 % (ref 12–49)
MAGNESIUM SERPL-MCNC: 1.9 MG/DL (ref 1.6–2.4)
MCH RBC QN AUTO: 29.7 PG (ref 26–34)
MCHC RBC AUTO-ENTMCNC: 31.4 G/DL (ref 30–36.5)
MCV RBC AUTO: 94.6 FL (ref 80–99)
MONOCYTES # BLD: 0.3 K/UL (ref 0–1)
MONOCYTES NFR BLD: 2 % (ref 5–13)
NEUTS SEG # BLD: 13.5 K/UL (ref 1.8–8)
NEUTS SEG NFR BLD: 93 % (ref 32–75)
NRBC # BLD: 0 K/UL (ref 0–0.01)
NRBC BLD-RTO: 0 PER 100 WBC
PHOSPHATE SERPL-MCNC: 3 MG/DL (ref 2.6–4.7)
PLATELET # BLD AUTO: 329 K/UL (ref 150–400)
PLATELET COMMENTS,PCOM: ABNORMAL
PMV BLD AUTO: 9.7 FL (ref 8.9–12.9)
POTASSIUM SERPL-SCNC: 4.3 MMOL/L (ref 3.5–5.1)
PROT SERPL-MCNC: 6.4 G/DL (ref 6.4–8.2)
RBC # BLD AUTO: 2.39 M/UL (ref 4.1–5.7)
RBC MORPH BLD: ABNORMAL
SERVICE CMNT-IMP: ABNORMAL
SERVICE CMNT-IMP: NORMAL
SODIUM SERPL-SCNC: 133 MMOL/L (ref 136–145)
TRIGL SERPL-MCNC: 99 MG/DL (ref ?–150)
WBC # BLD AUTO: 14.5 K/UL (ref 4.1–11.1)

## 2022-09-30 PROCEDURE — 74011000258 HC RX REV CODE- 258: Performed by: STUDENT IN AN ORGANIZED HEALTH CARE EDUCATION/TRAINING PROGRAM

## 2022-09-30 PROCEDURE — 74011250637 HC RX REV CODE- 250/637: Performed by: COLON & RECTAL SURGERY

## 2022-09-30 PROCEDURE — 74011636637 HC RX REV CODE- 636/637: Performed by: STUDENT IN AN ORGANIZED HEALTH CARE EDUCATION/TRAINING PROGRAM

## 2022-09-30 PROCEDURE — 74011000250 HC RX REV CODE- 250: Performed by: COLON & RECTAL SURGERY

## 2022-09-30 PROCEDURE — 80053 COMPREHEN METABOLIC PANEL: CPT

## 2022-09-30 PROCEDURE — 74011250636 HC RX REV CODE- 250/636: Performed by: NURSE PRACTITIONER

## 2022-09-30 PROCEDURE — 74011636637 HC RX REV CODE- 636/637: Performed by: PHYSICIAN ASSISTANT

## 2022-09-30 PROCEDURE — 84100 ASSAY OF PHOSPHORUS: CPT

## 2022-09-30 PROCEDURE — 85025 COMPLETE CBC W/AUTO DIFF WBC: CPT

## 2022-09-30 PROCEDURE — 74011250637 HC RX REV CODE- 250/637: Performed by: STUDENT IN AN ORGANIZED HEALTH CARE EDUCATION/TRAINING PROGRAM

## 2022-09-30 PROCEDURE — 99232 SBSQ HOSP IP/OBS MODERATE 35: CPT | Performed by: SURGERY

## 2022-09-30 PROCEDURE — 65270000046 HC RM TELEMETRY

## 2022-09-30 PROCEDURE — 82962 GLUCOSE BLOOD TEST: CPT

## 2022-09-30 PROCEDURE — 83735 ASSAY OF MAGNESIUM: CPT

## 2022-09-30 PROCEDURE — 84478 ASSAY OF TRIGLYCERIDES: CPT

## 2022-09-30 PROCEDURE — 74011250636 HC RX REV CODE- 250/636: Performed by: STUDENT IN AN ORGANIZED HEALTH CARE EDUCATION/TRAINING PROGRAM

## 2022-09-30 PROCEDURE — 74011000250 HC RX REV CODE- 250: Performed by: STUDENT IN AN ORGANIZED HEALTH CARE EDUCATION/TRAINING PROGRAM

## 2022-09-30 PROCEDURE — 74011000250 HC RX REV CODE- 250: Performed by: PHYSICIAN ASSISTANT

## 2022-09-30 PROCEDURE — 74011250637 HC RX REV CODE- 250/637: Performed by: PHYSICIAN ASSISTANT

## 2022-09-30 PROCEDURE — 74011250636 HC RX REV CODE- 250/636: Performed by: COLON & RECTAL SURGERY

## 2022-09-30 PROCEDURE — 74011000258 HC RX REV CODE- 258: Performed by: NURSE PRACTITIONER

## 2022-09-30 PROCEDURE — 36415 COLL VENOUS BLD VENIPUNCTURE: CPT

## 2022-09-30 PROCEDURE — 74011000250 HC RX REV CODE- 250: Performed by: NURSE PRACTITIONER

## 2022-09-30 RX ORDER — HYDROMORPHONE HYDROCHLORIDE 1 MG/ML
1 INJECTION, SOLUTION INTRAMUSCULAR; INTRAVENOUS; SUBCUTANEOUS
Status: DISCONTINUED | OUTPATIENT
Start: 2022-09-30 | End: 2022-10-10

## 2022-09-30 RX ADMIN — PANTOPRAZOLE SODIUM 40 MG: 40 TABLET, DELAYED RELEASE ORAL at 06:56

## 2022-09-30 RX ADMIN — SERTRALINE 25 MG: 50 TABLET, FILM COATED ORAL at 09:45

## 2022-09-30 RX ADMIN — ONDANSETRON 4 MG: 4 TABLET, ORALLY DISINTEGRATING ORAL at 01:20

## 2022-09-30 RX ADMIN — Medication 1 CAPSULE: at 09:46

## 2022-09-30 RX ADMIN — HYDROMORPHONE HYDROCHLORIDE 4 MG: 2 TABLET ORAL at 06:56

## 2022-09-30 RX ADMIN — HYDROMORPHONE HYDROCHLORIDE 4 MG: 2 TABLET ORAL at 01:19

## 2022-09-30 RX ADMIN — I.V. FAT EMULSION 250 ML: 20 EMULSION INTRAVENOUS at 21:49

## 2022-09-30 RX ADMIN — ONDANSETRON 4 MG: 4 TABLET, ORALLY DISINTEGRATING ORAL at 06:56

## 2022-09-30 RX ADMIN — GABAPENTIN 100 MG: 100 CAPSULE ORAL at 21:17

## 2022-09-30 RX ADMIN — HYDROMORPHONE HYDROCHLORIDE 1 MG: 1 INJECTION, SOLUTION INTRAMUSCULAR; INTRAVENOUS; SUBCUTANEOUS at 14:57

## 2022-09-30 RX ADMIN — APIXABAN 5 MG: 5 TABLET, FILM COATED ORAL at 21:17

## 2022-09-30 RX ADMIN — SODIUM CHLORIDE, PRESERVATIVE FREE 10 ML: 5 INJECTION INTRAVENOUS at 06:00

## 2022-09-30 RX ADMIN — Medication 2 UNITS: at 12:00

## 2022-09-30 RX ADMIN — PANTOPRAZOLE SODIUM 40 MG: 40 TABLET, DELAYED RELEASE ORAL at 18:39

## 2022-09-30 RX ADMIN — SODIUM CHLORIDE, PRESERVATIVE FREE 10 ML: 5 INJECTION INTRAVENOUS at 15:11

## 2022-09-30 RX ADMIN — FERROUS SULFATE TAB 325 MG (65 MG ELEMENTAL FE) 325 MG: 325 (65 FE) TAB at 18:39

## 2022-09-30 RX ADMIN — COLLAGENASE SANTYL: 250 OINTMENT TOPICAL at 09:00

## 2022-09-30 RX ADMIN — GABAPENTIN 100 MG: 100 CAPSULE ORAL at 15:02

## 2022-09-30 RX ADMIN — DIPHENHYDRAMINE HYDROCHLORIDE 12.5 MG: 50 INJECTION, SOLUTION INTRAMUSCULAR; INTRAVENOUS at 03:42

## 2022-09-30 RX ADMIN — SODIUM CHLORIDE 1 G: 9 INJECTION INTRAMUSCULAR; INTRAVENOUS; SUBCUTANEOUS at 14:57

## 2022-09-30 RX ADMIN — SODIUM CHLORIDE, PRESERVATIVE FREE 10 ML: 5 INJECTION INTRAVENOUS at 21:49

## 2022-09-30 RX ADMIN — Medication 3 UNITS: at 06:56

## 2022-09-30 RX ADMIN — HYDROMORPHONE HYDROCHLORIDE 1 MG: 1 INJECTION, SOLUTION INTRAMUSCULAR; INTRAVENOUS; SUBCUTANEOUS at 21:18

## 2022-09-30 RX ADMIN — GABAPENTIN 100 MG: 100 CAPSULE ORAL at 09:46

## 2022-09-30 RX ADMIN — FERROUS SULFATE TAB 325 MG (65 MG ELEMENTAL FE) 325 MG: 325 (65 FE) TAB at 09:47

## 2022-09-30 RX ADMIN — MIRTAZAPINE 7.5 MG: 15 TABLET, FILM COATED ORAL at 22:00

## 2022-09-30 RX ADMIN — Medication: at 18:40

## 2022-09-30 RX ADMIN — HYDROMORPHONE HYDROCHLORIDE 1 MG: 1 INJECTION, SOLUTION INTRAMUSCULAR; INTRAVENOUS; SUBCUTANEOUS at 10:51

## 2022-09-30 RX ADMIN — APIXABAN 5 MG: 5 TABLET, FILM COATED ORAL at 09:47

## 2022-09-30 RX ADMIN — PIPERACILLIN AND TAZOBACTAM 3.38 G: 3; .375 INJECTION, POWDER, FOR SOLUTION INTRAVENOUS at 06:56

## 2022-09-30 NOTE — OP NOTES
Operative Note    Patient: Almas Lilly MRN: [id number]  SSN: [id number]    YOB: 1970  Age: 45 y.o. Sex: male      Date of Surgery: 9/29/2022     Preoperative Diagnosis: Abdominal Wall Wound     Postoperative Diagnosis: Abdominal Wall Wound     Surgeon(s) and Role:     Claudia Segovia MD - Primary    Surgical Assistant: None     Anesthesia: General     Procedure: Procedure(s):  ABDOMINAL Wall DEBRIDEMENT-10 x 11 x 2-1/2    Indications: The patient was admitted to the hospital with an infected J-tube. This was removed and the patient has been receiving dressing changes. However he seems to be having more drainage from the wound. Bedside debridement was done but that was not felt to be adequate so he comes to the operating room today for abdominal wall debridement. Discussed the risk of surgery including infection, hematoma, bleeding,  The patient understands the risks, any and all questions were answered to the patient's satisfaction, and they freely signed the consent for operation. Procedure in Detail: Patient was brought to the operating placed operative in supine position. General endotracheal anesthesia was then established. He was then prepped and draped in usual sterile fashion. There was draining pus within the wound. Cultures were then taken. We first began working laterally by excising the skin and subcutaneous tissues with a knife and Bovie electrocautery. The underlying subcutaneous tissues were very soupy and loose. We continued to debride both skin and subcutaneous tissues until the size of the wound was approximately 10 x 11. Internal loculations were opened using cautery and using blunt dissection. We continued to debride until we were were back to relatively healthy appearing tissue. Pulsavac was then introduced and the wound was irrigated. Bleeders were identified and cauterized.   There was still some additional bleeding that was controlled using quick clot pressure then recauterization. Additional soupy tissue was opened and debrided. The wound was then packed with quick clot and Curlex covered with 4 x 4's and tape. Estimated Blood Loss:  50    Findings  Large subcutaneous wound secondary to residual infection from J-tube infection. Tourniquet Time: * No tourniquets in log *      Implants: * No implants in log *            Specimens:   ID Type Source Tests Collected by Time Destination   1 : ABDOMINAL WALL WOUND Wound Abdomen ANAEROBIC/AEROBIC/GRAM Jeremy Joseph MD 9/29/2022 2121 Microbiology           Drains: None                Complications: None    Counts: Sponge and needle counts were correct times two.

## 2022-09-30 NOTE — PROGRESS NOTES
[facility]  Hospitalist Group                                                                                          Hospitalist Progress Note  Meena Gatica MD  Answering service: [phone] from in house phone        Date of Service:  2022  NAME:  Darrin Mitchell  :  1970  MRN:  [id number]      Admission Summary: This is a 65-year-old man with a PMH of T2DM, BKA bilaterally, JEMIMA on CKD requiring HD, Respiratory Failure requiring intubation who presented at the ED from [facility] with c/o abdominal pain. The patient was recently admitted to another hospital and underwent left below-knee amputation, hospitalization complicated with respiratory failure which required prolonged intubation- attributed to aspiration pneumonia, also developed lobulated effusion, for which the patient underwent decortication and right thoracotomy and acute renal failure for which he has been started on hemodialysis. He had a J-tube was placed for supplemental nutrition and was transferred to [facility] for continuation of care. He was doing relatively well in [facility] until the day of presentation at the ED when the patient complained of abdominal pain at the facility. CT scan of the abdomen and pelvis was obtained: shows evidence of bowel perforation, sent to [facility] fro further eval/tx. When the patient arrived at the emergency room, based on his clinical presentation and lab work, Code Sepsis was triggered. The patient received fluid therapy as per sepsis protocol. The emergency room physician consulted general surgeon on-call. The patient was seen by the surgeon and the patient is planned for immediate surgical intervention. No record of prior admission to this hospital.     Interval history / Subjective:   Patient seen and examined earlier this morning by me for follow up of peritonitis, JEMIMA, and other issues. Patient is c/o some pain. No other issues. He was debrided yesterday. Assessment & Plan:     Dislodged JG tube with surrounding peritonitis. Free air and perirectal fistula. Patient was sent from [address] [place] [address] for intractable abdominal pain. Severe sepsis without septic shock due to intra-abdominal source of infection. .  -He was started on broad-spectrum empiric antibiotics on admission and underwent the following procedures:  -Gen Sx: 9/10 Lap take down and removal J-tube, Lap colostomy, I&D abdominal wall  -Colorectal Sx: 9/15 anorectal wound exam including rigid proctosigmoidoscopy  -ID were consulted and assisted with antibiotics management. Patient completed IV Eraxis/Zosyn/Vancomycin completed 9/23/22 and recommendation was to continue Augmentin x2 weeks from 9/24. Due to worsening leukocytosis however the Augmentin was discontinued and patient is restarted on Zosyn since 9/28.  -C. difficile, blood culture where negative. -CT chest 9/10: Small right basilar gas and fluid containing pleural collection with a  peripheral soft tissue rind. Finding may represent an empyema and clinical correlation is recommended. Recommend direct comparison to any additional prior imaging. Free intraperitoneal gas, seen on prior CT of the abdomen and pelvis. -CT abd/pel wo 9/14:No focal intra-abdominal fluid collection to suggest abscess. Free intraperitoneal gas consistent with recent intra-abdominal surgery. Interval improvement in rectal thickening. Prior CT dated September 9, 2022 demonstrated a probable right posterior rectal wall defect which is no longer visualized on today's examination. Persistent, small right basilar gas and fluid containing pleural collection, unchanged.  -wound care ostomy care      JEMIMA vs CKD requiring dialysis: CVC 9/16/22- IR.  -Continued on hemodialysis on MWF schedule, continue. No sign of recovery. -Access, permacath placed on 9/16     Type 2 diabetes mellitus with hyperglycemia.   Patient is currently tolerating diet and being supplemented with TPN.  -Monitor blood closely 4 times daily and as needed. Humalog sliding scale. AFIB: rate stable. -EKG 9/19: AFIB. -eliquis 5mg BID  -ECHO 9/26: Left Ventricle: The EF by visual approximation is 55 - 60%. Left ventricle size is normal. Normal wall thickness. Normal wall motion. Normal diastolic function. Tricuspid Valve: Mildly elevated RVSP. The estimated RVSP is 42 mmHg. Empyema: noted: recent thoracotomy and prolonged intubation at [facility]. -CXR9/19: Persistent right basilar airspace disease and right-sided loculated effusion/empyema. -CT chest 9/10: Small right basilar gas and fluid containing pleural collection with a  peripheral soft tissue rind. Finding may represent an empyema and clinical correlation is recommended. Recommend direct comparison to any additional prior imaging. Free intraperitoneal gas, seen on prior CT of the abdomen and pelvis. -Pulmonary consulted: noted \"percutaneous drainage would not be successful at removing fluid as I suppose it is very thick and organized at this time. Would only recommend following clinically and radiographically. If worsens or worsened right-sided effusion he would need to be reevaluated by his thoracic surgeon at [facility] for additional drainage. \"  -IV ABT's course as above. Acute blood loss anemia on chronic:  -monitor Hgb  -transfuse to keep Hgb > 7.0  -Hgb 6.7- transfuse 1 unit PRBC's 9/26 9/30- Hb 7. 1. will continue to monitor. +Occult Blood: no melena, no n/v.  -PPI  -iron supplements  -monitor Hgb, transfuse to keep Hgb > 7.0     AMS: delirium, hallucinations ? 2/2 to toxic metabolic encephalopathy, hospital delirium, and opioid induced. -CT head 9/23: no acute intracranial abnormality.   -Alert and oriented on rounds 9/28. Depression: continue zoloft.   Thrombocytosis: noted reactive thrombocytosis, monitor platelets, trending downward    Epigastric J site wound with surrounding abscess, POA.  --Patient underwent incision and drainage on 9/10  --There is surrounding erythema, tenderness with some purulence. Given rebound leukocytosis, will ask general surgery to Look to see if this will need further I&D    Bilateral BKA, left BKA dehiscence and possible infection.  -Vascular Sx followin/26-sutures removed and some eschar along incision line excised, open cavity laterally has old hematoma at base that was evacuated   -CT of the left BKA stump on  showed several irregular soft tissue defects overlying the amputation site with a 4.4 x 4.1 x 1.9 cm amorphous fluid collection with partial thick walled/rim-enhancing and internal gas locules. --Patient restarted back on Zosyn due to worsening leukocytosis. --Spoke with Dr Mallory Mccann will check on pt tomorrow   - Leg wound growing Pseudomonas  Vascular has seen    Anorectal wound, POA  --Followed by colorectal surgery. He is status post proctosigmoidoscopy on 9/15, there was communication of the distal rectal lumen with extraperitoneal pelvis. Acute pain syndrome. This is due to the multiple surgeries he had. Anticipate some degree of tolerance due to his continued exposure to periods  --Current pain regimen includes scheduled gabapentin 100 mg 3 times daily  -- As needed 0.2 mg IV Dilaudid every 4 hourly, as needed; Dilaudid 2 mg p.o. every 4 hourly as needed and Dilaudid 4 mg p.o. every 4 hourly as needed. Palliative care help appreciated. Patient will probably benefit from scheduled long-acting opiate such as MS Contin. GIppx: Pepcid  Code Status: Full Code  Diet: Regular, supplements BID  --Time to start weaning off TPN.   Activity: bedrest  Discharge: TBD  Ambulates:  nonambulatory  Discharge planning: Accepted at [facility] OP HD chair      Hospital Problems  Date Reviewed: 9/10/2022            Codes Class Noted POA    Injury to rectum without open wound into cavity ICD-10-CM: S36.60XA  ICD-9-CM: 863.45  2022 Yes        Open wound of anus ICD-10-CM: N44.313H  ICD-9-CM: 863.89  9/20/2022 Yes        Severe protein-calorie malnutrition ([address] ICD-10-CM: E43  ICD-9-CM: 378  9/13/2022 Yes        * (Principal) Intra-abdominal infection ICD-10-CM: B99.9  ICD-9-CM: 136.9  9/10/2022 Yes         Review of Systems:   A comprehensive review of systems was negative except for that written in the HPI. Vital Signs:    Last 24hrs VS reviewed since prior progress note. Most recent are:  Visit Vitals  BP (!) 120/46   Pulse 77   Temp 98.2 °F (36.8 °C)   Resp 18   Ht 5' 7\" (1.702 m)   Wt 68.3 kg (150 lb 9.2 oz)   SpO2 100%   BMI 23.58 kg/m²         Intake/Output Summary (Last 24 hours) at 9/30/2022 1624  Last data filed at 9/30/2022 1501  Gross per 24 hour   Intake 2814.75 ml   Output 1210 ml   Net 1604.75 ml          Physical Examination:     I had a face to face encounter with this patient and independently examined them on 9/30/2022 as outlined below:          Constitutional: Chronically sick looking gentleman, he is alert and oriented x3. ENT:  Oral mucosa moist.    Resp/chest wall:  CTA bilaterally. No wheezing/rhonchi/rales. No accessory muscle use. Permacath and double-lumen PICC line the right chest wall. CV:  Regular rate, S1,S2.    GI/: Epigastric chest site 1 2 with surrounding erythema, induration and tenderness and purulent discharge. Colostomy in the lower abdomen. No erythema, swelling, discharge in the inguinal.  Bowel sounds are normoactive. Musculoskeletal:  No edema, warm, bilateral BKA: dressing to LLE stunp. Neurologic:  Moves all extremities. Awake and alert, disoriented. Skin:  multiple wounds, skin w/d, jaundiced. Psych:  Poor insight, Not anxious nor agitated.             Data Review:    Review and/or order of clinical lab test      Labs:     Recent Labs     09/30/22  0342 09/29/22  0430   WBC 14.5* 13.7*   HGB 7.1* 7.6*   HCT 22.6* 23.9*    350       Recent Labs     09/30/22  0342 09/29/22  0430 09/28/22 0434   * 134* 135*   K 4.3 5.6* 4.7    106 104   CO2 25 21 25   BUN 48* 86* 59*   CREA 1.99* 3.14* 2.38*   * 72 89   CA 7.7* 7.9* 7.7*   MG 1.9 1.9 1.8   PHOS 3.0 5.2* 3.7       Recent Labs     09/30/22  0342 09/29/22  0430 09/28/22 0434   ALT 9* 6* <6*   * 114 110   TBILI 0.2 0.2 0.3   TP 6.4 5.3* 5.0*   ALB 1.5* 1.2* 1.2*   GLOB 4.9* 4.1* 3.8       No results for input(s): INR, PTP, APTT, INREXT, INREXT in the last 72 hours. No results for input(s): FE, TIBC, PSAT, FERR in the last 72 hours. Lab Results   Component Value Date/Time    Folate 4.1 (L) 09/10/2022 12:00 PM        No results for input(s): PH, PCO2, PO2 in the last 72 hours. No results for input(s): CPK, CKNDX, TROIQ in the last 72 hours.     No lab exists for component: CPKMB  Lab Results   Component Value Date/Time    Triglyceride 99 09/30/2022 03:42 AM     Lab Results   Component Value Date/Time    Glucose (POC) 151 (H) 09/30/2022 11:22 AM    Glucose (POC) 207 (H) 09/30/2022 06:52 AM    Glucose (POC) 130 (H) 09/29/2022 11:48 PM    Glucose (POC) 78 09/29/2022 06:32 PM    Glucose (POC) 91 09/29/2022 12:01 PM     No results found for: COLOR, APPRN, SPGRU, REFSG, WILL, PROTU, GLUCU, KETU, BILU, UROU, MISHA, LEUKU, GLUKE, EPSU, BACTU, WBCU, RBCU, CASTS, UCRY      Medications Reviewed:     Current Facility-Administered Medications   Medication Dose Route Frequency    HYDROmorphone (DILAUDID) injection 1 mg  1 mg IntraVENous Q3H PRN    TPN ADULT - CENTRAL   IntraVENous CONTINUOUS    [START ON 10/1/2022] meropenem (MERREM) 500 mg in 0.9% sodium chloride (MBP/ADV) 50 mL MBP  500 mg IntraVENous Q24H    TPN ADULT - CENTRAL   IntraVENous CONTINUOUS    0.9% sodium chloride infusion 250 mL  250 mL IntraVENous PRN    insulin regular (NOVOLIN R, HUMULIN R) injection   SubCUTAneous Q6H    glucose chewable tablet 16 g  4 Tablet Oral PRN    glucagon (GLUCAGEN) injection 1 mg  1 mg IntraMUSCular PRN    dextrose 10 % infusion 0-250 mL 0-250 mL IntraVENous PRN    gabapentin (NEURONTIN) capsule 100 mg  100 mg Oral TID    fat emulsion 20% (LIPOSYN, INTRAlipid) infusion 250 mL  250 mL IntraVENous QPM    ferrous sulfate tablet 325 mg  1 Tablet Oral BID WITH MEALS    HYDROmorphone (DILAUDID) tablet 2 mg  2 mg Oral Q4H PRN    HYDROmorphone (DILAUDID) tablet 4 mg  4 mg Oral Q4H PRN    hydrALAZINE (APRESOLINE) 20 mg/mL injection 10 mg  10 mg IntraVENous Q6H PRN    mirtazapine (REMERON) tablet 7.5 mg  7.5 mg Oral QHS    pantoprazole (PROTONIX) tablet 40 mg  40 mg Oral ACB&D    apixaban (ELIQUIS) tablet 5 mg  5 mg Oral BID    sertraline (ZOLOFT) tablet 25 mg  25 mg Oral DAILY    epoetin vera-epbx (RETACRIT) injection 10,000 Units  10,000 Units SubCUTAneous DIALYSIS MON, WED & FRI    collagenase (SANTYL) 250 unit/gram ointment   Topical DAILY    diphenhydrAMINE (BENADRYL) injection 12.5 mg  12.5 mg IntraVENous Q6H PRN    sodium chloride (NS) flush 5-40 mL  5-40 mL IntraVENous Q8H    sodium chloride (NS) flush 5-40 mL  5-40 mL IntraVENous PRN    acetaminophen (TYLENOL) tablet 650 mg  650 mg Oral Q6H PRN    polyethylene glycol (MIRALAX) packet 17 g  17 g Oral DAILY PRN    ondansetron (ZOFRAN ODT) tablet 4 mg  4 mg Oral Q8H PRN    Or    ondansetron (ZOFRAN) injection 4 mg  4 mg IntraVENous Q6H PRN    L.acidophilus-paracasei-S.thermophil-bifidobacter (RISAQUAD) 8 billion cell capsule  1 Capsule Oral DAILY     ______________________________________________________________________  EXPECTED LENGTH OF STAY: 9d 14h  ACTUAL LENGTH OF STAY:          [id number] [facility] Jordan Veloz MD

## 2022-09-30 NOTE — PROGRESS NOTES
Problem: Risk for Spread of Infection  Goal: Prevent transmission of infectious organism to others  Description: Prevent the transmission of infectious organisms to other patients, staff members, and visitors. Outcome: Progressing Towards Goal     Problem: Patient Education:  Go to Education Activity  Goal: Patient/Family Education  Outcome: Progressing Towards Goal     Problem: Falls - Risk of  Goal: *Absence of Falls  Description: Document Margret Bansal Fall Risk and appropriate interventions in the flowsheet. Outcome: Progressing Towards Goal  Note: Fall Risk Interventions:  Mobility Interventions: Communicate number of staff needed for ambulation/transfer, OT consult for ADLs, PT Consult for mobility concerns    Mentation Interventions: Adequate sleep, hydration, pain control, Room close to nurse's station, Update white board    Medication Interventions: Evaluate medications/consider consulting pharmacy    Elimination Interventions: Call light in reach, Patient to call for help with toileting needs              Problem: Patient Education: Go to Patient Education Activity  Goal: Patient/Family Education  Outcome: Progressing Towards Goal     Problem: Nutrition Deficit  Goal: *Optimize nutritional status  Outcome: Progressing Towards Goal     Problem: Pressure Injury - Risk of  Goal: *Prevention of pressure injury  Description: Document Barber Scale and appropriate interventions in the flowsheet. Outcome: Progressing Towards Goal  Note: Pressure Injury Interventions:  Sensory Interventions: Assess changes in LOC, Avoid rigorous massage over bony prominences, Discuss PT/OT consult with provider, Keep linens dry and wrinkle-free, Minimize linen layers, Pad between skin to skin, Pressure redistribution bed/mattress (bed type), Turn and reposition approx.  every two hours (pillows and wedges if needed)    Moisture Interventions: Absorbent underpads, Apply protective barrier, creams and emollients, Check for incontinence Q2 hours and as needed, Contain wound drainage, Minimize layers    Activity Interventions: PT/OT evaluation, Pressure redistribution bed/mattress(bed type)    Mobility Interventions: Pressure redistribution bed/mattress (bed type), HOB 30 degrees or less, PT/OT evaluation, Turn and reposition approx.  every two hours(pillow and wedges)    Nutrition Interventions: Document food/fluid/supplement intake    Friction and Shear Interventions: Apply protective barrier, creams and emollients, Foam dressings/transparent film/skin sealants, HOB 30 degrees or less, Minimize layers                Problem: Patient Education: Go to Patient Education Activity  Goal: Patient/Family Education  Outcome: Progressing Towards Goal     Problem: Patient Education: Go to Patient Education Activity  Goal: Patient/Family Education  Outcome: Progressing Towards Goal     Problem: Patient Education: Go to Patient Education Activity  Goal: Patient/Family Education  Outcome: Progressing Towards Goal

## 2022-09-30 NOTE — PERIOP NOTES
TRANSFER - OUT REPORT:    Verbal report given to DOMINGA Billingsley(name) on Cirilo Frias  being transferred to Room 442(unit) for routine post - op       Report consisted of patients Situation, Background, Assessment and   Recommendations(SBAR). Time Pre op antibiotic given:Hanging on way to OR  Anesthesia Stop time:     Information from the following report(s) OR Summary, Intake/Output, Med Rec Status, and Cardiac Rhythm Sinu sRhythm with PAC's  was reviewed with the receiving nurse. Opportunity for questions and clarification was provided. Is the patient on 02? NO       L/Min        Other     Is the patient on a monitor? NO    Is the nurse transporting with the patient? NO    Surgical Waiting Area notified of patient's transfer from PACU?  NO      The following personal items collected during your admission accompanied patient upon transfer:   Dental Appliance: Dental Appliances: None (Edentulous)  Vision:    Hearing Aid:    Jewelry:    Clothing:    Other Valuables:    Valuables sent to safe:

## 2022-09-30 NOTE — ANESTHESIA POSTPROCEDURE EVALUATION
Procedure(s):  ABDOMINAL Wall DEBRIDEMENT. general    Anesthesia Post Evaluation      Multimodal analgesia: multimodal analgesia not used between 6 hours prior to anesthesia start to PACU discharge  Patient location during evaluation: bedside  Patient participation: complete - patient participated  Level of consciousness: awake  Pain score: 5  Pain management: satisfactory to patient  Airway patency: patent  Anesthetic complications: no  Cardiovascular status: acceptable and blood pressure returned to baseline  Respiratory status: acceptable  Hydration status: acceptable  Comments: Will give patient more pain medications. I have evaluated the patient and meets criteria for discharge from PACU. Crys Live DO. Post anesthesia nausea and vomiting:  none  Final Post Anesthesia Temperature Assessment:  Normothermia (36.0-37.5 degrees C)      INITIAL Post-op Vital signs:   Vitals Value Taken Time   /55 09/29/22 2231   Temp 36.7 °C (98 °F) 09/29/22 2206   Pulse 80 09/29/22 2244   Resp 16 09/29/22 2244   SpO2 100 % 09/29/22 2244   Vitals shown include unvalidated device data.

## 2022-09-30 NOTE — PROGRESS NOTES
[facility] at [facility] Surgery    POD #1    Subjective     No acute issues and went over the wound getting packed by wound care today    Objective     Patient Vitals for the past 24 hrs:   Temp Pulse Resp BP SpO2   09/30/22 1501 98.2 °F (36.8 °C) 77 18 (!) 120/46 100 %   09/30/22 1205 -- 73 -- -- --   09/30/22 1124 98.2 °F (36.8 °C) 76 22 (!) 121/53 100 %   09/30/22 1011 -- 78 -- -- --   09/30/22 0805 98.6 °F (37 °C) 79 14 (!) 153/91 99 %   09/30/22 0600 -- 77 -- -- --   09/30/22 0400 98.2 °F (36.8 °C) 84 16 (!) 150/78 --   09/30/22 0200 -- 87 -- -- --   09/29/22 2338 98.4 °F (36.9 °C) 79 15 (!) 110/53 96 %   09/29/22 2315 -- -- -- 122/69 --   09/29/22 2255 98.1 °F (36.7 °C) 82 15 (!) 96/55 --   09/29/22 2250 -- 82 10 110/71 100 %   09/29/22 2241 -- 82 9 -- 99 %   09/29/22 2231 -- 80 17 (!) 116/55 98 %   09/29/22 2222 -- 81 23 (!) 77/67 100 %   09/29/22 2221 -- 81 15 -- 100 %   09/29/22 2212 -- 81 13 113/87 100 %   09/29/22 2206 98 °F (36.7 °C) 77 10 102/73 100 %   09/29/22 2202 -- -- -- 102/73 --   09/29/22 2027 -- 68 14 121/69 100 %   09/29/22 2017 -- 74 10 99/79 100 %   09/29/22 2008 98.3 °F (36.8 °C) 72 18 133/78 97 %   09/29/22 1805 -- 73 -- -- --   09/29/22 1800 -- 77 14 (!) 142/76 --   09/29/22 1745 -- 79 14 122/74 --   09/29/22 1730 -- 73 14 127/75 --   09/29/22 1715 -- 75 14 127/69 --   09/29/22 1700 -- 72 14 122/88 --   09/29/22 1645 -- 72 14 135/89 --   09/29/22 1630 -- 74 14 117/72 --       Date 09/29/22 0700 - 09/30/22 0659 09/30/22 0700 - 10/01/22 0659   Shift 5752-0103 6806-2426 24 Hour Total 7881-1987 9852-8559 24 Hour Total   INTAKE   I.V.(mL/kg/hr)  200(0.2) 200(0.1) 2614.8  2614.8     Volume (0.9% sodium chloride infusion)  100 100        Volume (piperacillin-tazobactam (ZOSYN) 3.375 g in 0.9% sodium chloride (MBP/ADV) 100 mL MBP)  100 100        Volume (fat emulsion 20% (LIPOSYN, INTRAlipid) infusion 250 mL)    1770.6 1770.6     Volume (TPN ADULT - CENTRAL)    844.2  844. 2   Shift Total(mL/kg)  200(2.9) 200(2.9) 2614. 8(38.3)  [id number]. 8(38.3)   OUTPUT   Urine(mL/kg/hr)    50  50     Urine Voided    50  50   Stool    160  160     Stool    160  160   Dialysis 1000  1000        NET Fluid Removed (mL) 1000  1000      Shift Total(mL/kg) 1000(14.3)  1000(14.6) 210(3.1)  210(3.1)   NET -1000 200 -800 2404.8  2404.8   Weight (kg) 69.9 68.3 68.3 68.3 68.3 68.3       PE  Pulm - CTAB  CV - RRR  Abd -abdominal wound much  without any purulence packed    Labs  Recent Results (from the past 12 hour(s))   GLUCOSE, POC    Collection Time: 09/30/22  6:52 AM   Result Value Ref Range    Glucose (POC) 207 (H) 65 - 117 mg/dL    Performed by Jose G Nielson    GLUCOSE, POC    Collection Time: 09/30/22 11:22 AM   Result Value Ref Range    Glucose (POC) 151 (H) 65 - 117 mg/dL    Performed by Doyle Buerger is a 45 y. o.yr old male with abdominal wound from infected J-tube site    Plan     Continue J-tube site wound care and packing per wound ostomy care nursing    Leonidas Mcfadden MD

## 2022-09-30 NOTE — PROGRESS NOTES
Nephrology Progress Note  Neto Amble  Date of Admission : 9/9/2022    CC:  Follow up for JEMIMA       Assessment and Plan     JEMIMA on HD:  - 2/2 ATN  - HD TTS  - recovering   - UOP today and labs tomorrow and decide on dialysis     Hyperkalemia:  - will correct with HD    Anemia:  - MICHAEL MWF    PAD s/p b/l BKA    Recent sepsis    Empyema    ABD wall abscess:  - J-tube removal, colostomy, I&D of abd wound  - TPN per primary team   - s/p I&D of abdominal avbscess 9/29    Sacral decub       Interval History:  Seen and examined. Underwent drainage of abdominal abscess surrounding G tbe. Voided twice. Denies any N/V/abdo poain/ CP/SOB today      Current Medications: all current  Medications have been eviewed in EPIC  Review of Systems: A comprehensive review of systems was negative except for that written in the HPI. Objective:  Vitals:    Vitals:    09/30/22 0805 09/30/22 1011 09/30/22 1124 09/30/22 1205   BP: (!) 153/91  (!) 121/53    Pulse: 79 78 76 73   Resp: 14  22    Temp: 98.6 °F (37 °C)  98.2 °F (36.8 °C)    TempSrc:       SpO2: 99%  100%    Weight:       Height:         Intake and Output:  09/30 0701 - 09/30 1900  In: 2614.8 [I.V.:2614.8]  Out: -   09/28 1901 - 09/30 0700  In: 200 [I.V.:200]  Out: 1450 [Urine:450]    Physical Examination:  General: Confused   Neck:  Supple, no mass  Resp:  Lungs CTA B/L, no wheezing , normal respiratory effort  CV:  RRR,  no murmur or rub, no LE edema  GI:  Soft, NT, + Bowel sounds, + ostomy  Neurologic:  Confused   Access:           [facility]    []    High complexity decision making was performed  []    Patient is at high-risk of decompensation with multiple organ involvement    Lab Data Personally Reviewed: I have reviewed all the pertinent labs, microbiology data and radiology studies during assessment.     Recent Labs     09/30/22  0342 09/29/22  0430 09/28/22  0434   * 134* 135*   K 4.3 5.6* 4.7    106 104   CO2 25 21 25   * 72 89   BUN 48* 86* 59* CREA 1.99* 3.14* 2.38*   CA 7.7* 7.9* 7.7*   MG 1.9 1.9 1.8   PHOS 3.0 5.2* 3.7   ALB 1.5* 1.2* 1.2*   ALT 9* 6* <6*       Recent Labs     09/30/22  0342 09/29/22  0430 09/28/22  0434   WBC 14.5* 13.7* 14.2*   HGB 7.1* 7.6* 7.6*   HCT 22.6* 23.9* 24.1*    350 356       No results found for: SDES  Lab Results   Component Value Date/Time    Culture result: PENDING 09/29/2022 09:21 PM    Culture result: FEW GRAM NEGATIVE RODS (A) 09/29/2022 11:28 AM    Culture result: MODERATE PSEUDOMONAS AERUGINOSA (A) 09/27/2022 12:18 PM     Recent Results (from the past 24 hour(s))   GLUCOSE, POC    Collection Time: 09/29/22  6:32 PM   Result Value Ref Range    Glucose (POC) 78 65 - 117 mg/dL    Performed by Dolly  81., WOUND Jerene Callahan STAIN    Collection Time: 09/29/22  9:21 PM    Specimen: Abdomen   Result Value Ref Range    Special Requests: ABDOMINAL WALL     GRAM STAIN NO WBC'S SEEN      GRAM STAIN NO ORGANISMS SEEN      Culture result: PENDING    GLUCOSE, POC    Collection Time: 09/29/22 11:48 PM   Result Value Ref Range    Glucose (POC) 130 (H) 65 - 117 mg/dL    Performed by Josh Hawthorne    TRIGLYCERIDE    Collection Time: 09/30/22  3:42 AM   Result Value Ref Range    Triglyceride 99 <150 MG/DL   CBC WITH AUTOMATED DIFF    Collection Time: 09/30/22  3:42 AM   Result Value Ref Range    WBC 14.5 (H) 4.1 - 11.1 K/uL    RBC 2.39 (L) 4.10 - 5.70 M/uL    HGB 7.1 (L) 12.1 - 17.0 g/dL    HCT 22.6 (L) 36.6 - 50.3 %    MCV 94.6 80.0 - 99.0 FL    MCH 29.7 26.0 - 34.0 PG    MCHC 31.4 30.0 - 36.5 g/dL    RDW 20.3 (H) 11.5 - 14.5 %    PLATELET 117 808 - 901 K/uL    MPV 9.7 8.9 - 12.9 FL    NRBC 0.0 0  WBC    ABSOLUTE NRBC 0.00 0.00 - 0.01 K/uL    NEUTROPHILS 93 (H) 32 - 75 %    LYMPHOCYTES 3 (L) 12 - 49 %    MONOCYTES 2 (L) 5 - 13 %    EOSINOPHILS 0 0 - 7 %    BASOPHILS 0 0 - 1 %    IMMATURE GRANULOCYTES 2 (H) 0.0 - 0.5 %    ABS. NEUTROPHILS 13.5 (H) 1.8 - 8.0 K/UL    ABS.  LYMPHOCYTES 0.4 (L) 0.8 - 3.5 K/UL ABS. MONOCYTES 0.3 0.0 - 1.0 K/UL    ABS. EOSINOPHILS 0.0 0.0 - 0.4 K/UL    ABS. BASOPHILS 0.0 0.0 - 0.1 K/UL    ABS. IMM. GRANS. 0.3 (H) 0.00 - 0.04 K/UL    DF SMEAR SCANNED      PLATELET COMMENTS CLUMPED PLATELETS      RBC COMMENTS ANISOCYTOSIS  2+        RBC COMMENTS MACROCYTOSIS  1+        RBC COMMENTS POLYCHROMASIA  1+       MAGNESIUM    Collection Time: 09/30/22  3:42 AM   Result Value Ref Range    Magnesium 1.9 1.6 - 2.4 mg/dL   PHOSPHORUS    Collection Time: 09/30/22  3:42 AM   Result Value Ref Range    Phosphorus 3.0 2.6 - 4.7 MG/DL   METABOLIC PANEL, COMPREHENSIVE    Collection Time: 09/30/22  3:42 AM   Result Value Ref Range    Sodium 133 (L) 136 - 145 mmol/L    Potassium 4.3 3.5 - 5.1 mmol/L    Chloride 100 97 - 108 mmol/L    CO2 25 21 - 32 mmol/L    Anion gap 8 5 - 15 mmol/L    Glucose 194 (H) 65 - 100 mg/dL    BUN 48 (H) 6 - 20 MG/DL    Creatinine 1.99 (H) 0.70 - 1.30 MG/DL    BUN/Creatinine ratio 24 (H) 12 - 20      GFR est AA 43 (L) >60 ml/min/1.73m2    GFR est non-AA 35 (L) >60 ml/min/1.73m2    Calcium 7.7 (L) 8.5 - 10.1 MG/DL    Bilirubin, total 0.2 0.2 - 1.0 MG/DL    ALT (SGPT) 9 (L) 12 - 78 U/L    AST (SGOT) 17 15 - 37 U/L    Alk. phosphatase 121 (H) 45 - 117 U/L    Protein, total 6.4 6.4 - 8.2 g/dL    Albumin 1.5 (L) 3.5 - 5.0 g/dL    Globulin 4.9 (H) 2.0 - 4.0 g/dL    A-G Ratio 0.3 (L) 1.1 - 2.2     GLUCOSE, POC    Collection Time: 09/30/22  6:52 AM   Result Value Ref Range    Glucose (POC) 207 (H) 65 - 117 mg/dL    Performed by Constance Acuña    GLUCOSE, POC    Collection Time: 09/30/22 11:22 AM   Result Value Ref Range    Glucose (POC) 151 (H) 65 - 117 mg/dL    Performed by Tyler Joiner MD  [address]  Phone - [phone]   Fax - [phone]  [email or web address]

## 2022-09-30 NOTE — PERIOP NOTES
Left BKA dressing changed post op in OR. Telfa, Kerlix and tape. Wound has break through bleeding. Dr. Georgi Cameron aware.

## 2022-09-30 NOTE — PROGRESS NOTES
Occupational Therapy   [phone]    Chart reviewed in prep for OT, RN reporting patient still in significant pain and not appropriate at this time. Will defer and follow-up as able and medically appropriate. Thank you.

## 2022-09-30 NOTE — WOUND CARE
WOCN Visit    Follow up visit with [facility] NP to assess Left abdominal/former J tube site. 9.29.22 S/P Left Abdominal Wall Debridement by Dr Stella Moser    Left abdominal wall wound/former J tube site:  5 x 7.8 x 3.6 cm; Red viable tissue. Removed surgical packing; irrigated with saline; packed with VASHE moistened packing; covered with dry gauze and abd pad. Other wounds and ostomy assessed on 9.29.22. See note for that day. Will follow up abdominal wall wound on Monday for assessment for [facility] therapy.     Ran Coelho, ARNOLDON RN [facility] [place] [facility]  Available on [employer]  Office [phone]

## 2022-09-30 NOTE — PROGRESS NOTES
ID Progress Note  2022    Subjective:     Alert, orient x 3, asking appropriate questions    Review of Systems:            Symptom Y/N Comments   Symptom Y/N Comments   Fever/Chills n      Chest Pain n       Poor Appetite       Edema        Cough       Abdominal Pain  y      Sputum       Joint Pain        SOB/ANDREWS  n     Pruritis/Rash        Nausea/vomit  n     Tolerating PT/OT        Diarrhea       Tolerating Diet        Constipation       Other           Could NOT obtain due to:       Objective:     Vitals: Visit Vitals  BP (!) 150/78   Pulse 77   Temp 98.2 °F (36.8 °C)   Resp 16   Ht 5' 7\" (1.702 m)   Wt 68.3 kg (150 lb 9.2 oz)   SpO2 96%   BMI 23.58 kg/m²          Tmax:  Temp (24hrs), Av.1 °F (36.7 °C), Min:97.6 °F (36.4 °C), Max:98.4 °F (36.9 °C)      PHYSICAL EXAM:  General: Chronically ill appearing. WD, WN. Alert, cooperative, no acute distress    EENT:  Anicteric sclerae. Dry mucous membrane  Resp:  CTA bilaterally, no wheezing or rales. No accessory muscle use  CV:  Regular  rhythm  GI:  Soft, Non distended, Non tender. +Bowel sounds, colostomy in place  Neurologic:  Alert and oriented X self, normal speech,   Psych:   Fair insight. Not anxious nor agitated  Skin:  No rashes.   No jaundice    Pressure injury; sacrum, right buttock,   left BKA stump site; dressing dry and intact  Right BKA stump; intact      Labs:   Lab Results   Component Value Date/Time    WBC 14.5 (H) 2022 03:42 AM    HGB 7.1 (L) 2022 03:42 AM    HCT 22.6 (L) 2022 03:42 AM    PLATELET 091  03:42 AM    MCV 94.6 2022 03:42 AM     Lab Results   Component Value Date/Time    Sodium 133 (L) 2022 03:42 AM    Potassium 4.3 2022 03:42 AM    Chloride 100 2022 03:42 AM    CO2 25 2022 03:42 AM    Anion gap 8 2022 03:42 AM    Glucose 194 (H) 2022 03:42 AM    BUN 48 (H) 2022 03:42 AM    Creatinine 1.99 (H) 2022 03:42 AM    BUN/Creatinine ratio 24 (H) 2022 03:42 AM    GFR est AA 43 (L) 09/30/2022 03:42 AM    GFR est non-AA 35 (L) 09/30/2022 03:42 AM    Calcium 7.7 (L) 09/30/2022 03:42 AM    Bilirubin, total 0.2 09/30/2022 03:42 AM    Alk. phosphatase 121 (H) 09/30/2022 03:42 AM    Protein, total 6.4 09/30/2022 03:42 AM    Albumin 1.5 (L) 09/30/2022 03:42 AM    Globulin 4.9 (H) 09/30/2022 03:42 AM    A-G Ratio 0.3 (L) 09/30/2022 03:42 AM    ALT (SGPT) 9 (L) 09/30/2022 03:42 AM     Assessment:      Dislodged feeding tube/peritonitis   Abdominal wound, s/p abdominal wall debridement (9/29)  Free air and perirectal fistula  S/p laparoscopy procedure; take down and removal of J tube, colostomy placement, and I & D of abdominal wall (9/10)  Left BKA stump infection  Recent right thoracotomy  Hx Bilateral BKAs   - afebrile, wbc 14     Blood cx (9/9, 9/16) no growth, (9/27) no growth so far     Wound cx from left bka stump (9/27) pseudomonas aeruginosa     Wound cx from previous J-tube (9/29) GNR     Intra-op cx (9/29) no growth so far       CT of LLE (9/28)  Partially encapsulated fluid collection with a few associated gas locules overlying the dictation margin. Infection/abscess cannot be excluded based on imaging alone. No CT evidence of acute osteomyelitis. Several gas locules along the left spermatic cord, correlate clinically for signs of infection in this location. Objective:      Completed 2 weeks of IV Eraxis, zosyn, and vancomycin as of 9/23 then switched to   PO Augmentin between 9/24 to 9/28. ABX therapy changed back to IV due to persistent leukocytosis. Continue with IV Merrem, recommend to complete total 2 weeks from debridement date. Last dose 10/13  - Pre-op abd wound growing GNR, which appears to be pseudomonas like the one from Left BKA stump.   Appreciate wound care team's input  Vascular/general surgery following    Fever work up if temp >= 100.4    Above plan of care discussed and agreed with Dr. Mateusz Rose will see prn this weekend, call if issues arise.           Paco Mena Figures, NP

## 2022-10-01 LAB
ALBUMIN SERPL-MCNC: 1.4 G/DL (ref 3.5–5)
ALBUMIN/GLOB SERPL: 0.3 {RATIO} (ref 1.1–2.2)
ALP SERPL-CCNC: 118 U/L (ref 45–117)
ALT SERPL-CCNC: 11 U/L (ref 12–78)
ANION GAP SERPL CALC-SCNC: 8 MMOL/L (ref 5–15)
AST SERPL-CCNC: 22 U/L (ref 15–37)
BACTERIA SPEC CULT: NORMAL
BASOPHILS # BLD: 0.1 K/UL (ref 0–0.1)
BASOPHILS NFR BLD: 1 % (ref 0–1)
BILIRUB SERPL-MCNC: 0.2 MG/DL (ref 0.2–1)
BUN SERPL-MCNC: 74 MG/DL (ref 6–20)
BUN/CREAT SERPL: 30 (ref 12–20)
CALCIUM SERPL-MCNC: 7.7 MG/DL (ref 8.5–10.1)
CHLORIDE SERPL-SCNC: 101 MMOL/L (ref 97–108)
CO2 SERPL-SCNC: 23 MMOL/L (ref 21–32)
CREAT SERPL-MCNC: 2.47 MG/DL (ref 0.7–1.3)
DIFFERENTIAL METHOD BLD: ABNORMAL
EOSINOPHIL # BLD: 0.6 K/UL (ref 0–0.4)
EOSINOPHIL NFR BLD: 5 % (ref 0–7)
ERYTHROCYTE [DISTWIDTH] IN BLOOD BY AUTOMATED COUNT: 20.1 % (ref 11.5–14.5)
GLOBULIN SER CALC-MCNC: 4.1 G/DL (ref 2–4)
GLUCOSE BLD STRIP.AUTO-MCNC: 81 MG/DL (ref 65–117)
GLUCOSE BLD STRIP.AUTO-MCNC: 85 MG/DL (ref 65–117)
GLUCOSE BLD STRIP.AUTO-MCNC: 90 MG/DL (ref 65–117)
GLUCOSE BLD STRIP.AUTO-MCNC: 90 MG/DL (ref 65–117)
GLUCOSE SERPL-MCNC: 78 MG/DL (ref 65–100)
HCT VFR BLD AUTO: 21.5 % (ref 36.6–50.3)
HGB BLD-MCNC: 6.6 G/DL (ref 12.1–17)
HISTORY CHECKED?,CKHIST: NORMAL
IMM GRANULOCYTES # BLD AUTO: 0.2 K/UL (ref 0–0.04)
IMM GRANULOCYTES NFR BLD AUTO: 2 % (ref 0–0.5)
LYMPHOCYTES # BLD: 2.4 K/UL (ref 0.8–3.5)
LYMPHOCYTES NFR BLD: 21 % (ref 12–49)
MAGNESIUM SERPL-MCNC: 2.1 MG/DL (ref 1.6–2.4)
MCH RBC QN AUTO: 29.7 PG (ref 26–34)
MCHC RBC AUTO-ENTMCNC: 30.7 G/DL (ref 30–36.5)
MCV RBC AUTO: 96.8 FL (ref 80–99)
MONOCYTES # BLD: 1 K/UL (ref 0–1)
MONOCYTES NFR BLD: 9 % (ref 5–13)
NEUTS SEG # BLD: 7 K/UL (ref 1.8–8)
NEUTS SEG NFR BLD: 62 % (ref 32–75)
NRBC # BLD: 0 K/UL (ref 0–0.01)
NRBC BLD-RTO: 0 PER 100 WBC
PHOSPHATE SERPL-MCNC: 3.4 MG/DL (ref 2.6–4.7)
PLATELET # BLD AUTO: 321 K/UL (ref 150–400)
PMV BLD AUTO: 9.9 FL (ref 8.9–12.9)
POTASSIUM SERPL-SCNC: 4.3 MMOL/L (ref 3.5–5.1)
PROT SERPL-MCNC: 5.5 G/DL (ref 6.4–8.2)
RBC # BLD AUTO: 2.22 M/UL (ref 4.1–5.7)
RBC MORPH BLD: ABNORMAL
SERVICE CMNT-IMP: NORMAL
SODIUM SERPL-SCNC: 132 MMOL/L (ref 136–145)
TRIGL SERPL-MCNC: 101 MG/DL (ref ?–150)
WBC # BLD AUTO: 11.3 K/UL (ref 4.1–11.1)

## 2022-10-01 PROCEDURE — 65270000046 HC RM TELEMETRY

## 2022-10-01 PROCEDURE — 86900 BLOOD TYPING SEROLOGIC ABO: CPT

## 2022-10-01 PROCEDURE — 82962 GLUCOSE BLOOD TEST: CPT

## 2022-10-01 PROCEDURE — 84478 ASSAY OF TRIGLYCERIDES: CPT

## 2022-10-01 PROCEDURE — 36415 COLL VENOUS BLD VENIPUNCTURE: CPT

## 2022-10-01 PROCEDURE — 80053 COMPREHEN METABOLIC PANEL: CPT

## 2022-10-01 PROCEDURE — P9016 RBC LEUKOCYTES REDUCED: HCPCS

## 2022-10-01 PROCEDURE — 36430 TRANSFUSION BLD/BLD COMPNT: CPT

## 2022-10-01 PROCEDURE — 74011250637 HC RX REV CODE- 250/637: Performed by: COLON & RECTAL SURGERY

## 2022-10-01 PROCEDURE — 84100 ASSAY OF PHOSPHORUS: CPT

## 2022-10-01 PROCEDURE — 74011000250 HC RX REV CODE- 250: Performed by: COLON & RECTAL SURGERY

## 2022-10-01 PROCEDURE — 83735 ASSAY OF MAGNESIUM: CPT

## 2022-10-01 PROCEDURE — 74011250636 HC RX REV CODE- 250/636: Performed by: NURSE PRACTITIONER

## 2022-10-01 PROCEDURE — 74011250636 HC RX REV CODE- 250/636

## 2022-10-01 PROCEDURE — 90935 HEMODIALYSIS ONE EVALUATION: CPT

## 2022-10-01 PROCEDURE — P9047 ALBUMIN (HUMAN), 25%, 50ML: HCPCS

## 2022-10-01 PROCEDURE — 85025 COMPLETE CBC W/AUTO DIFF WBC: CPT

## 2022-10-01 PROCEDURE — 74011250637 HC RX REV CODE- 250/637: Performed by: STUDENT IN AN ORGANIZED HEALTH CARE EDUCATION/TRAINING PROGRAM

## 2022-10-01 PROCEDURE — 74011000250 HC RX REV CODE- 250: Performed by: PHYSICIAN ASSISTANT

## 2022-10-01 PROCEDURE — 86923 COMPATIBILITY TEST ELECTRIC: CPT

## 2022-10-01 PROCEDURE — 74011250637 HC RX REV CODE- 250/637: Performed by: PHYSICIAN ASSISTANT

## 2022-10-01 RX ORDER — ALBUMIN HUMAN 250 G/1000ML
50 SOLUTION INTRAVENOUS
Status: DISCONTINUED | OUTPATIENT
Start: 2022-10-01 | End: 2022-10-13 | Stop reason: HOSPADM

## 2022-10-01 RX ORDER — ALBUMIN HUMAN 250 G/1000ML
SOLUTION INTRAVENOUS
Status: COMPLETED
Start: 2022-10-01 | End: 2022-10-01

## 2022-10-01 RX ORDER — SODIUM CHLORIDE 9 MG/ML
250 INJECTION, SOLUTION INTRAVENOUS AS NEEDED
Status: DISCONTINUED | OUTPATIENT
Start: 2022-10-01 | End: 2022-10-13 | Stop reason: HOSPADM

## 2022-10-01 RX ADMIN — HYDROMORPHONE HYDROCHLORIDE 1 MG: 1 INJECTION, SOLUTION INTRAMUSCULAR; INTRAVENOUS; SUBCUTANEOUS at 10:01

## 2022-10-01 RX ADMIN — HYDROMORPHONE HYDROCHLORIDE 4 MG: 2 TABLET ORAL at 15:21

## 2022-10-01 RX ADMIN — HYDROMORPHONE HYDROCHLORIDE 4 MG: 2 TABLET ORAL at 11:27

## 2022-10-01 RX ADMIN — ALBUMIN (HUMAN) 12.5 G: 0.25 INJECTION, SOLUTION INTRAVENOUS at 18:00

## 2022-10-01 RX ADMIN — COLLAGENASE SANTYL: 250 OINTMENT TOPICAL at 10:02

## 2022-10-01 RX ADMIN — HYDROMORPHONE HYDROCHLORIDE 1 MG: 1 INJECTION, SOLUTION INTRAMUSCULAR; INTRAVENOUS; SUBCUTANEOUS at 18:56

## 2022-10-01 RX ADMIN — SODIUM CHLORIDE, PRESERVATIVE FREE 10 ML: 5 INJECTION INTRAVENOUS at 22:00

## 2022-10-01 RX ADMIN — GABAPENTIN 100 MG: 100 CAPSULE ORAL at 22:30

## 2022-10-01 RX ADMIN — APIXABAN 5 MG: 5 TABLET, FILM COATED ORAL at 22:00

## 2022-10-01 RX ADMIN — HYDROMORPHONE HYDROCHLORIDE 1 MG: 1 INJECTION, SOLUTION INTRAMUSCULAR; INTRAVENOUS; SUBCUTANEOUS at 22:33

## 2022-10-01 RX ADMIN — HYDROMORPHONE HYDROCHLORIDE 1 MG: 1 INJECTION, SOLUTION INTRAMUSCULAR; INTRAVENOUS; SUBCUTANEOUS at 16:13

## 2022-10-01 RX ADMIN — GABAPENTIN 100 MG: 100 CAPSULE ORAL at 16:13

## 2022-10-01 RX ADMIN — HYDROMORPHONE HYDROCHLORIDE 1 MG: 1 INJECTION, SOLUTION INTRAMUSCULAR; INTRAVENOUS; SUBCUTANEOUS at 05:36

## 2022-10-01 RX ADMIN — GABAPENTIN 100 MG: 100 CAPSULE ORAL at 10:00

## 2022-10-01 RX ADMIN — SODIUM CHLORIDE, PRESERVATIVE FREE 10 ML: 5 INJECTION INTRAVENOUS at 15:21

## 2022-10-01 RX ADMIN — FERROUS SULFATE TAB 325 MG (65 MG ELEMENTAL FE) 325 MG: 325 (65 FE) TAB at 16:13

## 2022-10-01 RX ADMIN — APIXABAN 5 MG: 5 TABLET, FILM COATED ORAL at 10:02

## 2022-10-01 RX ADMIN — Medication 1 CAPSULE: at 10:01

## 2022-10-01 RX ADMIN — HYDROMORPHONE HYDROCHLORIDE 1 MG: 1 INJECTION, SOLUTION INTRAMUSCULAR; INTRAVENOUS; SUBCUTANEOUS at 13:25

## 2022-10-01 RX ADMIN — FERROUS SULFATE TAB 325 MG (65 MG ELEMENTAL FE) 325 MG: 325 (65 FE) TAB at 10:00

## 2022-10-01 RX ADMIN — SERTRALINE 25 MG: 50 TABLET, FILM COATED ORAL at 10:01

## 2022-10-01 RX ADMIN — PANTOPRAZOLE SODIUM 40 MG: 40 TABLET, DELAYED RELEASE ORAL at 16:13

## 2022-10-01 RX ADMIN — I.V. FAT EMULSION 250 ML: 20 EMULSION INTRAVENOUS at 23:50

## 2022-10-01 RX ADMIN — MIRTAZAPINE 7.5 MG: 15 TABLET, FILM COATED ORAL at 22:30

## 2022-10-01 RX ADMIN — SODIUM CHLORIDE, PRESERVATIVE FREE 10 ML: 5 INJECTION INTRAVENOUS at 05:36

## 2022-10-01 NOTE — PROGRESS NOTES
Problem: Risk for Spread of Infection  Goal: Prevent transmission of infectious organism to others  Description: Prevent the transmission of infectious organisms to other patients, staff members, and visitors. Outcome: Progressing Towards Goal     Problem: Patient Education:  Go to Education Activity  Goal: Patient/Family Education  Outcome: Progressing Towards Goal     Problem: Falls - Risk of  Goal: *Absence of Falls  Description: Document Diamond Ennis Fall Risk and appropriate interventions in the flowsheet. Outcome: Progressing Towards Goal  Note: Fall Risk Interventions:  Mobility Interventions: Assess mobility with egress test, Bed/chair exit alarm, Communicate number of staff needed for ambulation/transfer, Mechanical lift    Mentation Interventions: Adequate sleep, hydration, pain control, Bed/chair exit alarm    Medication Interventions: Evaluate medications/consider consulting pharmacy, Patient to call before getting OOB    Elimination Interventions: Bed/chair exit alarm, Call light in reach              Problem: Patient Education: Go to Patient Education Activity  Goal: Patient/Family Education  Outcome: Progressing Towards Goal     Problem: Nutrition Deficit  Goal: *Optimize nutritional status  Outcome: Progressing Towards Goal     Problem: Pressure Injury - Risk of  Goal: *Prevention of pressure injury  Description: Document Barber Scale and appropriate interventions in the flowsheet.   Outcome: Progressing Towards Goal  Note: Pressure Injury Interventions:  Sensory Interventions: Assess changes in LOC, Keep linens dry and wrinkle-free, Minimize linen layers    Moisture Interventions: Absorbent underpads, Internal/External fecal devices    Activity Interventions: Pressure redistribution bed/mattress(bed type)    Mobility Interventions: Assess need for specialty bed, HOB 30 degrees or less, Pressure redistribution bed/mattress (bed type)    Nutrition Interventions: Document food/fluid/supplement intake    Friction and Shear Interventions: Apply protective barrier, creams and emollients, Foam dressings/transparent film/skin sealants, Lift sheet                Problem: Patient Education: Go to Patient Education Activity  Goal: Patient/Family Education  Outcome: Progressing Towards Goal     Problem: Patient Education: Go to Patient Education Activity  Goal: Patient/Family Education  Outcome: Progressing Towards Goal     Problem: Patient Education: Go to Patient Education Activity  Goal: Patient/Family Education  Outcome: Progressing Towards Goal

## 2022-10-01 NOTE — PROGRESS NOTES
CAROLYN:  RUR: 13%     Disposition:    [facility]  Outpatient HD: at [address] has been established M/W/F 3:15pm    Patient wife and other visitors here. CM met with all and provided updates. along with reason recommendation made to have patient stay on previously established schedule for community HD at [facility]. CM provided opportunity for questions.       Mark Mack, [address], [address] [place] [address]

## 2022-10-01 NOTE — PROCEDURES
Hemodialysis / [phone]    Vitals Pre Post Assessment Pre Post   BP BP: (!) 150/48 (10/01/22 1700)   141/28 LOC axox4 No change   HR Pulse (Heart Rate): 70 (10/01/22 1700) 76 Lungs clear No change    Resp Rate: 16 (10/01/22 1523) 15 Cardiac nsr No change   Temp Temp: 97.7 °F (36.5 °C) (10/01/22 1523) 97.6 Skin wdi No change   Weight Pre-Dialysis Weight: 64.5 kg (142 lb 3.2 oz) (09/19/22 0925)  Edema generalized No change   Tele status remote remote Pain Pain Intensity 1: 9 (9 after wound care) (10/01/22 1523) 6     Orders   Duration: Start: 0149 End: 2015 Total: 3.0   Dialyzer: Dialyzer/Set Up Inspection: Revaclear (10/01/22 1700)   K Bath: Dialysate K (mEq/L): 3 (10/01/22 1700)   Ca Bath: Dialysate CA (mEq/L): 2.5 (10/01/22 1700)   Na: Dialysate NA (mEq/L): 140 (10/01/22 1700)   Bicarb: Dialysate HCO3 (mEq/L): 35 (10/01/22 1700)   Target Fluid Removal: Goal/Amount of Fluid to Remove (mL): 1500 mL (10/01/22 1700)     Access   Type & Location: Mic Rice / PC : Dressing CDI. No s/s of infection. Both lumens aspirate & flush well. Running well at .     Comments:                                        Labs   HBsAg (Antigen) / date:           09/12/22 neg                                    HBsAb (Antibody) / date: 09[place]12/[place] [facility]   Source:    Obtained/Reviewed  Critical Results Called HGB   Date Value Ref Range Status   10/01/2022 6.6 (L) 12.1 - 17.0 g/dL Final     Comment:     RESULTS REPEATED     Potassium   Date Value Ref Range Status   10/01/2022 4.3 3.5 - 5.1 mmol/L Final     Calcium   Date Value Ref Range Status   10/01/2022 7.7 (L) 8.5 - 10.1 MG/DL Final     BUN   Date Value Ref Range Status   10/01/2022 74 (H) 6 - 20 MG/DL Final     Comment:     INVESTIGATED PER DELTA CHECK PROTOCOL     Creatinine   Date Value Ref Range Status   10/01/2022 2.47 (H) 0.70 - 1.30 MG/DL Final        Meds Given   Name Dose Route   albumin 12.5 g/50 ml cvc               Adequacy / Fluid    Total Liters Process: 52 l   Net Fluid Removed: 1000 mla      Comments   Time Out Done:   (Time) 1700 yes   Admitting Diagnosis: BKA, Sepsis   Consent obtained/signed: Informed Consent Verified: Yes (10/01/22 1700)   Machine / RO # Machine Number: [id number] (10/01/22 1700)   Primary Nurse Rpt Pre: Rafael Akbar RN   Primary Nurse Rpt Post:    Pt Education: Tx time, options, plan   Care Plan: Follow MD  [address]   Pts outpatient clinic: [facility]     Tx Summary   SBAR received from Primary RN. Pt  A&Ox4. Consent signed & on file. 1700: Each catheter limb disinfected per p&p, caps removed, hubs disinfected per p&p. Each lumen aspirated for blood return and flushed with Normal Saline per policy. 1715  VSS. Dialysis Tx initiated. 2015 Tx ended. VSS. Each dialysis catheter limb disinfected per p&p, all possible blood returned per p&p, and each dialysis hub disinfected per p&p. Each lumen flushed, post dialysis catheter saline dwell instilled per order, and caps applied. Bed locked and in the lowest position, call bell and belongings in reach. SBAR given to Primary, RN. Patient is stable at time of my departure. All Dialysis related medications have been reviewed.        Comments:

## 2022-10-01 NOTE — PROGRESS NOTES
Nephrology Progress Note  Pily Dubois  Date of Admission : 9/9/2022    CC:  Follow up for JEMIMA       Assessment and Plan     JEMIMA on HD:  - 2/2 ATN  - HD TTS  -HD today per schedule      Anemia:  - [facility] MWF    PAD s/p b/l BKA    Recent sepsis    Empyema    ABD wall abscess:  - J-tube removal, colostomy, I&D of abd wound  - TPN per primary team   - s/p I&D of abdominal avbscess 9/29    Sacral decub       Interval History:  Seen and examined. Discussed labs and urine output with the patient. He has family visiting from out of town and is requesting dialysis to be done later tonight      Current Medications: all current  Medications have been eviewed in EPIC  Review of Systems: A comprehensive review of systems was negative except for that written in the HPI. Objective:  Vitals:    Vitals:    10/01/22 0400 10/01/22 0422 10/01/22 0600 10/01/22 0959   BP:  (!) 110/53     Pulse: 65 62 66 70   Resp:  14     Temp:  97.4 °F (36.3 °C)     TempSrc:       SpO2:  100%     Weight:  74.8 kg (164 lb 14.5 oz)     Height:         Intake and Output:  No intake/output data recorded. 09/29 1901 - 10/01 0700  In: 2814.8 [I.V.:2814.8]  Out: 360 [Urine:50]    Physical Examination:  General: Confused   Neck:  Supple, no mass  Resp:  Lungs CTA B/L, no wheezing , normal respiratory effort  CV:  RRR,  no murmur or rub, no LE edema  GI:  Soft, NT, + Bowel sounds, + ostomy  Neurologic:  Confused   Access:           [facility]    []    High complexity decision making was performed  []    Patient is at high-risk of decompensation with multiple organ involvement    Lab Data Personally Reviewed: I have reviewed all the pertinent labs, microbiology data and radiology studies during assessment.     Recent Labs     10/01/22  0309 09/30/22  0342 09/29/22  0430   * 133* 134*   K 4.3 4.3 5.6*    100 106   CO2 23 25 21   GLU 78 194* 72   BUN 74* 48* 86*   CREA 2.47* 1.99* 3.14*   CA 7.7* 7.7* 7.9*   MG 2.1 1.9 1.9   PHOS 3.4 3.0 5.2*   ALB 1.4* 1.5* 1.2*   ALT 11* 9* 6*       Recent Labs     10/01/22  0309 09/30/22  0342 09/29/22  0430   WBC 11.3* 14.5* 13.7*   HGB 6.6* 7.1* 7.6*   HCT 21.5* 22.6* 23.9*    329 350       No results found for: SDES  Lab Results   Component Value Date/Time    Culture result: MODERATE GRAM NEGATIVE RODS (A) 09/29/2022 09:21 PM    Culture result: FEW GRAM NEGATIVE RODS (A) 09/29/2022 11:28 AM    Culture result: MODERATE PSEUDOMONAS AERUGINOSA (A) 09/27/2022 12:18 PM     Recent Results (from the past 24 hour(s))   GLUCOSE, POC    Collection Time: 09/30/22 11:22 AM   Result Value Ref Range    Glucose (POC) 151 (H) 65 - 117 mg/dL    Performed by Dinorah Gonzalez    GLUCOSE, POC    Collection Time: 09/30/22  5:18 PM   Result Value Ref Range    Glucose (POC) 115 65 - 117 mg/dL    Performed by Dinorah Gonzalez    TRIGLYCERIDE    Collection Time: 10/01/22  3:09 AM   Result Value Ref Range    Triglyceride 101 <150 MG/DL   CBC WITH AUTOMATED DIFF    Collection Time: 10/01/22  3:09 AM   Result Value Ref Range    WBC 11.3 (H) 4.1 - 11.1 K/uL    RBC 2.22 (L) 4.10 - 5.70 M/uL    HGB 6.6 (L) 12.1 - 17.0 g/dL    HCT 21.5 (L) 36.6 - 50.3 %    MCV 96.8 80.0 - 99.0 FL    MCH 29.7 26.0 - 34.0 PG    MCHC 30.7 30.0 - 36.5 g/dL    RDW 20.1 (H) 11.5 - 14.5 %    PLATELET 349 817 - 228 K/uL    MPV 9.9 8.9 - 12.9 FL    NRBC 0.0 0  WBC    ABSOLUTE NRBC 0.00 0.00 - 0.01 K/uL    NEUTROPHILS 62 32 - 75 %    LYMPHOCYTES 21 12 - 49 %    MONOCYTES 9 5 - 13 %    EOSINOPHILS 5 0 - 7 %    BASOPHILS 1 0 - 1 %    IMMATURE GRANULOCYTES 2 (H) 0.0 - 0.5 %    ABS. NEUTROPHILS 7.0 1.8 - 8.0 K/UL    ABS. LYMPHOCYTES 2.4 0.8 - 3.5 K/UL    ABS. MONOCYTES 1.0 0.0 - 1.0 K/UL    ABS. EOSINOPHILS 0.6 (H) 0.0 - 0.4 K/UL    ABS. BASOPHILS 0.1 0.0 - 0.1 K/UL    ABS. IMM.  GRANS. 0.2 (H) 0.00 - 0.04 K/UL    DF SMEAR SCANNED      RBC COMMENTS ANISOCYTOSIS  2+       MAGNESIUM    Collection Time: 10/01/22  3:09 AM   Result Value Ref Range    Magnesium 2.1 1.6 - 2.4 mg/dL PHOSPHORUS    Collection Time: 10/01/22  3:09 AM   Result Value Ref Range    Phosphorus 3.4 2.6 - 4.7 MG/DL   METABOLIC PANEL, COMPREHENSIVE    Collection Time: 10/01/22  3:09 AM   Result Value Ref Range    Sodium 132 (L) 136 - 145 mmol/L    Potassium 4.3 3.5 - 5.1 mmol/L    Chloride 101 97 - 108 mmol/L    CO2 23 21 - 32 mmol/L    Anion gap 8 5 - 15 mmol/L    Glucose 78 65 - 100 mg/dL    BUN 74 (H) 6 - 20 MG/DL    Creatinine 2.47 (H) 0.70 - 1.30 MG/DL    BUN/Creatinine ratio 30 (H) 12 - 20      GFR est AA 34 (L) >60 ml/min/1.73m2    GFR est non-AA 28 (L) >60 ml/min/1.73m2    Calcium 7.7 (L) 8.5 - 10.1 MG/DL    Bilirubin, total 0.2 0.2 - 1.0 MG/DL    ALT (SGPT) 11 (L) 12 - 78 U/L    AST (SGOT) 22 15 - 37 U/L    Alk. phosphatase 118 (H) 45 - 117 U/L    Protein, total 5.5 (L) 6.4 - 8.2 g/dL    Albumin 1.4 (L) 3.5 - 5.0 g/dL    Globulin 4.1 (H) 2.0 - 4.0 g/dL    A-G Ratio 0.3 (L) 1.1 - 2.2     GLUCOSE, POC    Collection Time: 10/01/22  5:33 AM   Result Value Ref Range    Glucose (POC) 90 65 - 117 mg/dL    Performed by Manuel Mcmahon, [facility]OCATE    Collection Time: 10/01/22  7:30 AM   Result Value Ref Range    HISTORY CHECKED? Historical check performed                  Ten Stallworth MD  [address]  Phone - [phone]   Fax - [phone]  [email or web address]

## 2022-10-01 NOTE — PROGRESS NOTES
[facility]  Hospitalist Group                                                                                          Hospitalist Progress Note  Job MD Ashlee  Answering service: [phone] from in house phone        Date of Service:  10/1/2022  NAME:  Rozina Godwin  :  1970  MRN:  [id number]      Admission Summary: This is a 78-year-old man with a PMH of T2DM, BKA bilaterally, JEMIMA on CKD requiring HD, Respiratory Failure requiring intubation who presented at the ED from [address] [place] [address] with c/o abdominal pain. The patient was recently admitted to another hospital and underwent left below-knee amputation, hospitalization complicated with respiratory failure which required prolonged intubation- attributed to aspiration pneumonia, also developed lobulated effusion, for which the patient underwent decortication and right thoracotomy and acute renal failure for which he has been started on hemodialysis. He had a J-tube was placed for supplemental nutrition and was transferred to [address] [place] [address] for continuation of care. He was doing relatively well in [address] [place] [address] until the day of presentation at the ED when the patient complained of abdominal pain at the facility. CT scan of the abdomen and pelvis was obtained: shows evidence of bowel perforation, sent to [facility] fro further eval/tx. When the patient arrived at the emergency room, based on his clinical presentation and lab work, Code Sepsis was triggered. The patient received fluid therapy as per sepsis protocol. The emergency room physician consulted general surgeon on-call. The patient was seen by the surgeon and the patient is planned for immediate surgical intervention. No record of prior admission to this hospital.     Interval history / Subjective:   Patient seen and examined earlier this morning by me for follow up of peritonitis, JEMIMA, and other issues.   No acute complaints at the time of my exam.     Assessment & Plan: Dislodged JG tube with surrounding peritonitis. Free air and perirectal fistula. Patient was sent from [address] [place] [address] for intractable abdominal pain. Severe sepsis without septic shock due to intra-abdominal source of infection. .  -He was started on broad-spectrum empiric antibiotics on admission and underwent the following procedures:  -Gen Sx: 9/10 Lap take down and removal J-tube, Lap colostomy, I&D abdominal wall  -Colorectal Sx: 9/15 anorectal wound exam including rigid proctosigmoidoscopy  -ID were consulted and assisted with antibiotics management. Patient completed IV Eraxis/Zosyn/Vancomycin completed 9/23/22 and recommendation was to continue Augmentin x2 weeks from 9/24. Due to worsening leukocytosis however the Augmentin was discontinued and patient is restarted on Zosyn since 9/28.  -C. difficile, blood culture where negative. -CT chest 9/10: Small right basilar gas and fluid containing pleural collection with a  peripheral soft tissue rind. Finding may represent an empyema and clinical correlation is recommended. Recommend direct comparison to any additional prior imaging. Free intraperitoneal gas, seen on prior CT of the abdomen and pelvis. -CT abd/pel wo 9/14:No focal intra-abdominal fluid collection to suggest abscess. Free intraperitoneal gas consistent with recent intra-abdominal surgery. Interval improvement in rectal thickening. Prior CT dated September 9, 2022 demonstrated a probable right posterior rectal wall defect which is no longer visualized on today's examination. Persistent, small right basilar gas and fluid containing pleural collection, unchanged.  -wound care ostomy care  Abdominal wounds are growing Pseudomonas  Recommendations are to continue with IV meropenem until 10/13    JEMIMA vs CKD requiring dialysis: CVC 9/16/22- IR.  -Continued on hemodialysis on MWF schedule, continue. No sign of recovery. -Access, permacath placed on 9/16     Type 2 diabetes mellitus with hyperglycemia. Patient is currently tolerating diet and being supplemented with TPN. -Monitor blood closely 4 times daily and as needed. Humalog sliding scale. AFIB: rate stable. -EKG 9/19: AFIB. -eliquis 5mg BID  -ECHO 9/26: Left Ventricle: The EF by visual approximation is 55 - 60%. Left ventricle size is normal. Normal wall thickness. Normal wall motion. Normal diastolic function. Tricuspid Valve: Mildly elevated RVSP. The estimated RVSP is 42 mmHg. Empyema: noted: recent thoracotomy and prolonged intubation at [facility]. -CXR9/19: Persistent right basilar airspace disease and right-sided loculated effusion/empyema. -CT chest 9/10: Small right basilar gas and fluid containing pleural collection with a  peripheral soft tissue rind. Finding may represent an empyema and clinical correlation is recommended. Recommend direct comparison to any additional prior imaging. Free intraperitoneal gas, seen on prior CT of the abdomen and pelvis. -Pulmonary consulted: noted \"percutaneous drainage would not be successful at removing fluid as I suppose it is very thick and organized at this time. Would only recommend following clinically and radiographically. If worsens or worsened right-sided effusion he would need to be reevaluated by his thoracic surgeon at [facility] for additional drainage. \"  -IV ABT's course as above. Acute blood loss anemia on chronic:  -monitor Hgb  -transfuse to keep Hgb > 7.0  -Hgb 6.7- transfuse 1 unit PRBC's 9/26 9/30- Hb 7. 1. will continue to monitor. +Occult Blood: no melena, no n/v.  -PPI  -iron supplements  -monitor Hgb, transfuse to keep Hgb > 7.0     AMS: delirium, hallucinations ? 2/2 to toxic metabolic encephalopathy, hospital delirium, and opioid induced. -CT head 9/23: no acute intracranial abnormality.   -Alert and oriented on rounds 9/28. Depression: continue zoloft.   Thrombocytosis: noted reactive thrombocytosis, monitor platelets, trending downward    Epigastric J site wound with surrounding abscess, POA.  --Patient underwent incision and drainage on 9/10  --There is surrounding erythema, tenderness with some purulence. Given rebound leukocytosis, will ask general surgery to [place] to see if this will need further I&D    Bilateral BKA, left BKA dehiscence and possible infection.  -Vascular Sx followin/26-sutures removed and some eschar along incision line excised, open cavity laterally has old hematoma at base that was evacuated   -CT of the left BKA stump on  showed several irregular soft tissue defects overlying the amputation site with a 4.4 x 4.1 x 1.9 cm amorphous fluid collection with partial thick walled/rim-enhancing and internal gas locules. --Patient restarted back on Zosyn due to worsening leukocytosis. --Spoke with Dr Conrad Kumari will check on pt tomorrow   - Leg wound growing Pseudomonas  Vascular has seen  10/1-continue antibiotics    Anorectal wound, POA  --Followed by colorectal surgery. He is status post proctosigmoidoscopy on 9/15, there was communication of the distal rectal lumen with extraperitoneal pelvis. Acute pain syndrome. This is due to the multiple surgeries he had. Anticipate some degree of tolerance due to his continued exposure to periods  --Current pain regimen includes scheduled gabapentin 100 mg 3 times daily  -- As needed 0.2 mg IV Dilaudid every 4 hourly, as needed; Dilaudid 2 mg p.o. every 4 hourly as needed and Dilaudid 4 mg p.o. every 4 hourly as needed. Palliative care help appreciated. Patient will probably benefit from scheduled long-acting opiate such as MS Contin. GIppx: Pepcid  Code Status: Full Code  Diet: Regular, supplements BID  --Time to start weaning off TPN.   Activity: bedrest  Discharge: TBD  Ambulates:  nonambulatory  Discharge planning: Accepted at [facility], [facility] OP HD chair      Hospital Problems  Date Reviewed: 9/10/2022            Codes Class Noted POA    Injury to rectum without open wound into cavity ICD-10-CM: S36.60XA  ICD-9-CM: 863.45  9/20/2022 Yes        Open wound of anus ICD-10-CM: B30.841C  ICD-9-CM: 863.89  9/20/2022 Yes        Severe protein-calorie malnutrition ([facility] [address] ICD-10-CM: S56  ICD-9-CM: 944  9/13/2022 Yes        * (Principal) Intra-abdominal infection ICD-10-CM: B99.9  ICD-9-CM: 136.9  9/10/2022 Yes       Review of Systems:   A comprehensive review of systems was negative except for that written in the HPI. Vital Signs:    Last 24hrs VS reviewed since prior progress note. Most recent are:  Visit Vitals  BP (!) 147/58 (BP 1 Location: Left upper arm, BP Patient Position: At rest)   Pulse 70   Temp 97.7 °F (36.5 °C)   Resp 16   Ht 5' 7\" (1.702 m)   Wt 74.8 kg (164 lb 14.5 oz)   SpO2 100%   BMI 25.83 kg/m²         Intake/Output Summary (Last 24 hours) at 10/1/2022 1357  Last data filed at 10/1/2022 0800  Gross per 24 hour   Intake 1080 ml   Output 50 ml   Net 1030 ml          Physical Examination:     I had a face to face encounter with this patient and independently examined them on 10/1/2022 as outlined below:          Constitutional: Chronically sick looking gentleman, he is alert and oriented x3. ENT:  Oral mucosa moist.    Resp/chest wall:  CTA bilaterally. No wheezing/rhonchi/rales. No accessory muscle use. Permacath and double-lumen PICC line the right chest wall. CV:  Regular rate, S1,S2.    GI/: Epigastric chest site 1 2 with surrounding erythema, induration and tenderness and purulent discharge. Colostomy in the lower abdomen. No erythema, swelling, discharge in the inguinal.  Bowel sounds are normoactive. Musculoskeletal:  No edema, warm, bilateral BKA: dressing to LLE stunp. Neurologic:  Moves all extremities. Awake and alert, disoriented. Skin:  multiple wounds, skin w/d, jaundiced. Psych:  Poor insight, Not anxious nor agitated.             Data Review:    Review and/or order of clinical lab test      Labs:     Recent Labs 10/01/22  0309 09/30/22  0342   WBC 11.3* 14.5*   HGB 6.6* 7.1*   HCT 21.5* 22.6*    329       Recent Labs     10/01/22  0309 09/30/22  0342 09/29/22  0430   * 133* 134*   K 4.3 4.3 5.6*    100 106   CO2 23 25 21   BUN 74* 48* 86*   CREA 2.47* 1.99* 3.14*   GLU 78 194* 72   CA 7.7* 7.7* 7.9*   MG 2.1 1.9 1.9   PHOS 3.4 3.0 5.2*       Recent Labs     10/01/22  0309 09/30/22  0342 09/29/22  0430   ALT 11* 9* 6*   * 121* 114   TBILI 0.2 0.2 0.2   TP 5.5* 6.4 5.3*   ALB 1.4* 1.5* 1.2*   GLOB 4.1* 4.9* 4.1*       No results for input(s): INR, PTP, APTT, INREXT, INREXT in the last 72 hours. No results for input(s): FE, TIBC, PSAT, FERR in the last 72 hours. Lab Results   Component Value Date/Time    Folate 4.1 (L) 09/10/2022 12:00 PM        No results for input(s): PH, PCO2, PO2 in the last 72 hours. No results for input(s): CPK, CKNDX, TROIQ in the last 72 hours.     No lab exists for component: CPKMB  Lab Results   Component Value Date/Time    Triglyceride 101 10/01/2022 03:09 AM     Lab Results   Component Value Date/Time    Glucose (POC) 90 10/01/2022 11:22 AM    Glucose (POC) 90 10/01/2022 05:33 AM    Glucose (POC) 115 09/30/2022 05:18 PM    Glucose (POC) 151 (H) 09/30/2022 11:22 AM    Glucose (POC) 207 (H) 09/30/2022 06:52 AM     No results found for: COLOR, APPRN, SPGRU, REFSG, WILL, PROTU, GLUCU, KETU, BILU, UROU, MISHA, LEUKU, GLUKE, EPSU, BACTU, WBCU, RBCU, CASTS, UCRY      Medications Reviewed:     Current Facility-Administered Medications   Medication Dose Route Frequency    0.9% sodium chloride infusion 250 mL  250 mL IntraVENous PRN    TPN ADULT - CENTRAL   IntraVENous CONTINUOUS    HYDROmorphone (DILAUDID) injection 1 mg  1 mg IntraVENous Q3H PRN    TPN ADULT - CENTRAL   IntraVENous CONTINUOUS    meropenem (MERREM) 500 mg in 0.9% sodium chloride (MBP/ADV) 50 mL MBP  500 mg IntraVENous Q24H    [START ON 10/4/2022] epoetin vera-epbx (RETACRIT) injection 10,000 Units  10,000 Units SubCUTAneous DIALYSIS TUE, THU & SAT    0.9% sodium chloride infusion 250 mL  250 mL IntraVENous PRN    insulin regular (NOVOLIN R, HUMULIN R) injection   SubCUTAneous Q6H    glucose chewable tablet 16 g  4 Tablet Oral PRN    glucagon (GLUCAGEN) injection 1 mg  1 mg IntraMUSCular PRN    dextrose 10 % infusion 0-250 mL  0-250 mL IntraVENous PRN    gabapentin (NEURONTIN) capsule 100 mg  100 mg Oral TID    fat emulsion 20% (LIPOSYN, INTRAlipid) infusion 250 mL  250 mL IntraVENous QPM    ferrous sulfate tablet 325 mg  1 Tablet Oral BID WITH MEALS    HYDROmorphone (DILAUDID) tablet 2 mg  2 mg Oral Q4H PRN    HYDROmorphone (DILAUDID) tablet 4 mg  4 mg Oral Q4H PRN    hydrALAZINE (APRESOLINE) 20 mg/mL injection 10 mg  10 mg IntraVENous Q6H PRN    mirtazapine (REMERON) tablet 7.5 mg  7.5 mg Oral QHS    pantoprazole (PROTONIX) tablet 40 mg  40 mg Oral ACB&D    apixaban (ELIQUIS) tablet 5 mg  5 mg Oral BID    sertraline (ZOLOFT) tablet 25 mg  25 mg Oral DAILY    collagenase (SANTYL) 250 unit/gram ointment   Topical DAILY    diphenhydrAMINE (BENADRYL) injection 12.5 mg  12.5 mg IntraVENous Q6H PRN    sodium chloride (NS) flush 5-40 mL  5-40 mL IntraVENous Q8H    sodium chloride (NS) flush 5-40 mL  5-40 mL IntraVENous PRN    acetaminophen (TYLENOL) tablet 650 mg  650 mg Oral Q6H PRN    polyethylene glycol (MIRALAX) packet 17 g  17 g Oral DAILY PRN    ondansetron (ZOFRAN ODT) tablet 4 mg  4 mg Oral Q8H PRN    Or    ondansetron (ZOFRAN) injection 4 mg  4 mg IntraVENous Q6H PRN    L.acidophilus-paracasei-S.thermophil-bifidobacter (RISAQUAD) 8 billion cell capsule  1 Capsule Oral DAILY     ______________________________________________________________________  EXPECTED LENGTH OF STAY: 9d 14h  ACTUAL LENGTH OF STAY:          [facility] [address] Akash Valdez MD

## 2022-10-01 NOTE — PROGRESS NOTES
Progress Note    Patient: Milena Soni MRN: [id number]  SSN: [id number]    YOB: 1970  Age: 45 y.o. Sex: male      Admit Date: 2022    2 Days Post-Op    Procedure:  Procedure(s):  ABDOMINAL Wall DEBRIDEMENT    Subjective:     No acute surgical issues. Pt reported intermittent pain at J-tube wound site. Current on regular diet with TPN supplementation    Objective:     Visit Vitals  BP (!) 122/49   Pulse 66   Temp 97.7 °F (36.5 °C)   Resp 16   Ht 5' 7\" (1.702 m)   Wt 164 lb 14.5 oz (74.8 kg)   SpO2 99%   BMI 25.83 kg/m²       Temp (24hrs), Av.7 °F (36.5 °C), Min:97.4 °F (36.3 °C), Max:98 °F (36.7 °C)      Physical Exam:    Gen:  NAD  Pulm:  Unlabored  Abd:  S/ND/appropriate TTP  Wound:  abdominal wound with mild erythema at J-tube site which has been removed    Recent Results (from the past 24 hour(s))   GLUCOSE, POC    Collection Time: 22  5:18 PM   Result Value Ref Range    Glucose (POC) 115 65 - 117 mg/dL    Performed by [address] [place] [address]    Collection Time: 10/01/22  3:09 AM   Result Value Ref Range    Triglyceride 101 <150 MG/DL   CBC WITH AUTOMATED DIFF    Collection Time: 10/01/22  3:09 AM   Result Value Ref Range    WBC 11.3 (H) 4.1 - 11.1 K/uL    RBC 2.22 (L) 4.10 - 5.70 M/uL    HGB 6.6 (L) 12.1 - 17.0 g/dL    HCT 21.5 (L) 36.6 - 50.3 %    MCV 96.8 80.0 - 99.0 FL    MCH 29.7 26.0 - 34.0 PG    MCHC 30.7 30.0 - 36.5 g/dL    RDW 20.1 (H) 11.5 - 14.5 %    PLATELET 674 901 - 712 K/uL    MPV 9.9 8.9 - 12.9 FL    NRBC 0.0 0  WBC    ABSOLUTE NRBC 0.00 0.00 - 0.01 K/uL    NEUTROPHILS 62 32 - 75 %    LYMPHOCYTES 21 12 - 49 %    MONOCYTES 9 5 - 13 %    EOSINOPHILS 5 0 - 7 %    BASOPHILS 1 0 - 1 %    IMMATURE GRANULOCYTES 2 (H) 0.0 - 0.5 %    ABS. NEUTROPHILS 7.0 1.8 - 8.0 K/UL    ABS. LYMPHOCYTES 2.4 0.8 - 3.5 K/UL    ABS. MONOCYTES 1.0 0.0 - 1.0 K/UL    ABS. EOSINOPHILS 0.6 (H) 0.0 - 0.4 K/UL    ABS. BASOPHILS 0.1 0.0 - 0.1 K/UL    ABS. IMM.  GRANS. 0.2 (H) 0.00 - 0.04 K/UL    DF SMEAR SCANNED      RBC COMMENTS ANISOCYTOSIS  2+       MAGNESIUM    Collection Time: 10/01/22  3:09 AM   Result Value Ref Range    Magnesium 2.1 1.6 - 2.4 mg/dL   PHOSPHORUS    Collection Time: 10/01/22  3:09 AM   Result Value Ref Range    Phosphorus 3.4 2.6 - 4.7 MG/DL   METABOLIC PANEL, COMPREHENSIVE    Collection Time: 10/01/22  3:09 AM   Result Value Ref Range    Sodium 132 (L) 136 - 145 mmol/L    Potassium 4.3 3.5 - 5.1 mmol/L    Chloride 101 97 - 108 mmol/L    CO2 23 21 - 32 mmol/L    Anion gap 8 5 - 15 mmol/L    Glucose 78 65 - 100 mg/dL    BUN 74 (H) 6 - 20 MG/DL    Creatinine 2.47 (H) 0.70 - 1.30 MG/DL    BUN/Creatinine ratio 30 (H) 12 - 20      GFR est AA 34 (L) >60 ml/min/1.73m2    GFR est non-AA 28 (L) >60 ml/min/1.73m2    Calcium 7.7 (L) 8.5 - 10.1 MG/DL    Bilirubin, total 0.2 0.2 - 1.0 MG/DL    ALT (SGPT) 11 (L) 12 - 78 U/L    AST (SGOT) 22 15 - 37 U/L    Alk. phosphatase 118 (H) 45 - 117 U/L    Protein, total 5.5 (L) 6.4 - 8.2 g/dL    Albumin 1.4 (L) 3.5 - 5.0 g/dL    Globulin 4.1 (H) 2.0 - 4.0 g/dL    A-G Ratio 0.3 (L) 1.1 - 2.2     GLUCOSE, POC    Collection Time: 10/01/22  5:33 AM   Result Value Ref Range    Glucose (POC) 90 65 - 117 mg/dL    Performed by Lucía Anderson, ALLOCRODRÍGUEZ    Collection Time: 10/01/22  7:30 AM   Result Value Ref Range    HISTORY CHECKED?  Historical check performed    TYPE & SCREEN    Collection Time: 10/01/22 10:29 AM   Result Value Ref Range    Crossmatch Expiration 10/04/2022,2359     ABO/Rh(D) Rene Ulloa POSITIVE     Antibody screen NEG     Unit number [id number]     Blood component type RC LR     Unit division 00     Status of unit ISSUED     Crossmatch result Compatible    GLUCOSE, POC    Collection Time: 10/01/22 11:22 AM   Result Value Ref Range    Glucose (POC) 90 65 - 117 mg/dL    Performed by Adriane Parson RN        Assessment:     Hospital Problems  Date Reviewed: 9/10/2022            Codes Class Noted POA    Injury to rectum without open wound into cavity ICD-10-CM: S36.60XA  ICD-9-CM: 863.45  9/20/2022 Yes        Open wound of anus ICD-10-CM: Y59.345C  ICD-9-CM: 863.89  9/20/2022 Yes        Severe protein-calorie malnutrition ([facility] [address] ICD-10-CM: W41  ICD-9-CM: 639  9/13/2022 Yes        * (Principal) Intra-abdominal infection ICD-10-CM: B99.9  ICD-9-CM: 136.9  9/10/2022 Yes           Plan/Recommendations/Medical Decision Making:     - Continue TPN  - Diet as tolerated  - Continue antibiotic therapy  - Local wound care to abdomen  - Will follow on periphery.   Please call with questions

## 2022-10-02 LAB
ABO + RH BLD: NORMAL
ALBUMIN SERPL-MCNC: 1.6 G/DL (ref 3.5–5)
ALBUMIN/GLOB SERPL: 0.4 {RATIO} (ref 1.1–2.2)
ALP SERPL-CCNC: 154 U/L (ref 45–117)
ALT SERPL-CCNC: 14 U/L (ref 12–78)
ANION GAP SERPL CALC-SCNC: 5 MMOL/L (ref 5–15)
AST SERPL-CCNC: 28 U/L (ref 15–37)
BACTERIA SPEC CULT: ABNORMAL
BACTERIA SPEC CULT: NORMAL
BASOPHILS # BLD: 0.1 K/UL (ref 0–0.1)
BASOPHILS NFR BLD: 1 % (ref 0–1)
BILIRUB SERPL-MCNC: 0.2 MG/DL (ref 0.2–1)
BLD PROD TYP BPU: NORMAL
BLOOD GROUP ANTIBODIES SERPL: NORMAL
BPU ID: NORMAL
BUN SERPL-MCNC: 46 MG/DL (ref 6–20)
BUN/CREAT SERPL: 28 (ref 12–20)
CALCIUM SERPL-MCNC: 7.7 MG/DL (ref 8.5–10.1)
CHLORIDE SERPL-SCNC: 103 MMOL/L (ref 97–108)
CO2 SERPL-SCNC: 28 MMOL/L (ref 21–32)
CREAT SERPL-MCNC: 1.64 MG/DL (ref 0.7–1.3)
CROSSMATCH RESULT,%XM: NORMAL
DIFFERENTIAL METHOD BLD: ABNORMAL
EOSINOPHIL # BLD: 0.6 K/UL (ref 0–0.4)
EOSINOPHIL NFR BLD: 6 % (ref 0–7)
ERYTHROCYTE [DISTWIDTH] IN BLOOD BY AUTOMATED COUNT: 20.7 % (ref 11.5–14.5)
GLOBULIN SER CALC-MCNC: 3.8 G/DL (ref 2–4)
GLUCOSE BLD STRIP.AUTO-MCNC: 101 MG/DL (ref 65–117)
GLUCOSE BLD STRIP.AUTO-MCNC: 105 MG/DL (ref 65–117)
GLUCOSE BLD STRIP.AUTO-MCNC: 108 MG/DL (ref 65–117)
GLUCOSE BLD STRIP.AUTO-MCNC: 83 MG/DL (ref 65–117)
GLUCOSE BLD STRIP.AUTO-MCNC: 91 MG/DL (ref 65–117)
GLUCOSE SERPL-MCNC: 87 MG/DL (ref 65–100)
GRAM STN SPEC: ABNORMAL
HCT VFR BLD AUTO: 25.3 % (ref 36.6–50.3)
HGB BLD-MCNC: 7.9 G/DL (ref 12.1–17)
IMM GRANULOCYTES # BLD AUTO: 0.1 K/UL (ref 0–0.04)
IMM GRANULOCYTES NFR BLD AUTO: 1 % (ref 0–0.5)
LYMPHOCYTES # BLD: 1.4 K/UL (ref 0.8–3.5)
LYMPHOCYTES NFR BLD: 14 % (ref 12–49)
MAGNESIUM SERPL-MCNC: 1.9 MG/DL (ref 1.6–2.4)
MCH RBC QN AUTO: 28.6 PG (ref 26–34)
MCHC RBC AUTO-ENTMCNC: 31.2 G/DL (ref 30–36.5)
MCV RBC AUTO: 91.7 FL (ref 80–99)
MONOCYTES # BLD: 0.9 K/UL (ref 0–1)
MONOCYTES NFR BLD: 9 % (ref 5–13)
NEUTS SEG # BLD: 6.8 K/UL (ref 1.8–8)
NEUTS SEG NFR BLD: 69 % (ref 32–75)
NRBC # BLD: 0 K/UL (ref 0–0.01)
NRBC BLD-RTO: 0 PER 100 WBC
PHOSPHATE SERPL-MCNC: 1.9 MG/DL (ref 2.6–4.7)
PLATELET # BLD AUTO: 361 K/UL (ref 150–400)
PMV BLD AUTO: 9.5 FL (ref 8.9–12.9)
POTASSIUM SERPL-SCNC: 3.7 MMOL/L (ref 3.5–5.1)
PROT SERPL-MCNC: 5.4 G/DL (ref 6.4–8.2)
RBC # BLD AUTO: 2.76 M/UL (ref 4.1–5.7)
RBC MORPH BLD: ABNORMAL
SERVICE CMNT-IMP: ABNORMAL
SERVICE CMNT-IMP: ABNORMAL
SERVICE CMNT-IMP: NORMAL
SODIUM SERPL-SCNC: 136 MMOL/L (ref 136–145)
SPECIMEN EXP DATE BLD: NORMAL
STATUS OF UNIT,%ST: NORMAL
TRIGL SERPL-MCNC: 119 MG/DL (ref ?–150)
UNIT DIVISION, %UDIV: 0
WBC # BLD AUTO: 9.9 K/UL (ref 4.1–11.1)

## 2022-10-02 PROCEDURE — 85025 COMPLETE CBC W/AUTO DIFF WBC: CPT

## 2022-10-02 PROCEDURE — 84478 ASSAY OF TRIGLYCERIDES: CPT

## 2022-10-02 PROCEDURE — 74011250637 HC RX REV CODE- 250/637: Performed by: STUDENT IN AN ORGANIZED HEALTH CARE EDUCATION/TRAINING PROGRAM

## 2022-10-02 PROCEDURE — 74011250636 HC RX REV CODE- 250/636: Performed by: NURSE PRACTITIONER

## 2022-10-02 PROCEDURE — 65270000046 HC RM TELEMETRY

## 2022-10-02 PROCEDURE — 74011636637 HC RX REV CODE- 636/637: Performed by: STUDENT IN AN ORGANIZED HEALTH CARE EDUCATION/TRAINING PROGRAM

## 2022-10-02 PROCEDURE — 80053 COMPREHEN METABOLIC PANEL: CPT

## 2022-10-02 PROCEDURE — 74011250636 HC RX REV CODE- 250/636: Performed by: STUDENT IN AN ORGANIZED HEALTH CARE EDUCATION/TRAINING PROGRAM

## 2022-10-02 PROCEDURE — 74011250637 HC RX REV CODE- 250/637: Performed by: PHYSICIAN ASSISTANT

## 2022-10-02 PROCEDURE — 84100 ASSAY OF PHOSPHORUS: CPT

## 2022-10-02 PROCEDURE — 74011000250 HC RX REV CODE- 250: Performed by: STUDENT IN AN ORGANIZED HEALTH CARE EDUCATION/TRAINING PROGRAM

## 2022-10-02 PROCEDURE — 74011000258 HC RX REV CODE- 258: Performed by: STUDENT IN AN ORGANIZED HEALTH CARE EDUCATION/TRAINING PROGRAM

## 2022-10-02 PROCEDURE — 74011250637 HC RX REV CODE- 250/637: Performed by: COLON & RECTAL SURGERY

## 2022-10-02 PROCEDURE — 74011000250 HC RX REV CODE- 250: Performed by: COLON & RECTAL SURGERY

## 2022-10-02 PROCEDURE — 36415 COLL VENOUS BLD VENIPUNCTURE: CPT

## 2022-10-02 PROCEDURE — 82962 GLUCOSE BLOOD TEST: CPT

## 2022-10-02 PROCEDURE — 74011250636 HC RX REV CODE- 250/636: Performed by: COLON & RECTAL SURGERY

## 2022-10-02 PROCEDURE — 74011000258 HC RX REV CODE- 258: Performed by: NURSE PRACTITIONER

## 2022-10-02 PROCEDURE — 74011000250 HC RX REV CODE- 250: Performed by: PHYSICIAN ASSISTANT

## 2022-10-02 PROCEDURE — 83735 ASSAY OF MAGNESIUM: CPT

## 2022-10-02 RX ADMIN — COLLAGENASE SANTYL: 250 OINTMENT TOPICAL at 11:36

## 2022-10-02 RX ADMIN — HYDROMORPHONE HYDROCHLORIDE 4 MG: 2 TABLET ORAL at 21:05

## 2022-10-02 RX ADMIN — FERROUS SULFATE TAB 325 MG (65 MG ELEMENTAL FE) 325 MG: 325 (65 FE) TAB at 17:07

## 2022-10-02 RX ADMIN — SODIUM CHLORIDE, PRESERVATIVE FREE 10 ML: 5 INJECTION INTRAVENOUS at 21:10

## 2022-10-02 RX ADMIN — GABAPENTIN 100 MG: 100 CAPSULE ORAL at 10:36

## 2022-10-02 RX ADMIN — GABAPENTIN 100 MG: 100 CAPSULE ORAL at 21:05

## 2022-10-02 RX ADMIN — MIRTAZAPINE 7.5 MG: 15 TABLET, FILM COATED ORAL at 21:05

## 2022-10-02 RX ADMIN — APIXABAN 5 MG: 5 TABLET, FILM COATED ORAL at 10:36

## 2022-10-02 RX ADMIN — Medication: at 00:14

## 2022-10-02 RX ADMIN — I.V. FAT EMULSION 250 ML: 20 EMULSION INTRAVENOUS at 21:05

## 2022-10-02 RX ADMIN — HYDROMORPHONE HYDROCHLORIDE 4 MG: 2 TABLET ORAL at 04:37

## 2022-10-02 RX ADMIN — MEROPENEM 500 MG: 500 INJECTION, POWDER, FOR SOLUTION INTRAVENOUS at 10:36

## 2022-10-02 RX ADMIN — Medication: at 18:47

## 2022-10-02 RX ADMIN — FERROUS SULFATE TAB 325 MG (65 MG ELEMENTAL FE) 325 MG: 325 (65 FE) TAB at 10:40

## 2022-10-02 RX ADMIN — PANTOPRAZOLE SODIUM 40 MG: 40 TABLET, DELAYED RELEASE ORAL at 17:07

## 2022-10-02 RX ADMIN — ONDANSETRON 4 MG: 2 INJECTION INTRAMUSCULAR; INTRAVENOUS at 21:08

## 2022-10-02 RX ADMIN — APIXABAN 5 MG: 5 TABLET, FILM COATED ORAL at 21:05

## 2022-10-02 RX ADMIN — PANTOPRAZOLE SODIUM 40 MG: 40 TABLET, DELAYED RELEASE ORAL at 06:45

## 2022-10-02 RX ADMIN — GABAPENTIN 100 MG: 100 CAPSULE ORAL at 17:07

## 2022-10-02 RX ADMIN — Medication 1 CAPSULE: at 10:36

## 2022-10-02 RX ADMIN — ACETAMINOPHEN 650 MG: 325 TABLET, FILM COATED ORAL at 04:38

## 2022-10-02 RX ADMIN — SODIUM CHLORIDE, PRESERVATIVE FREE 10 ML: 5 INJECTION INTRAVENOUS at 06:00

## 2022-10-02 RX ADMIN — HYDROMORPHONE HYDROCHLORIDE 1 MG: 1 INJECTION, SOLUTION INTRAMUSCULAR; INTRAVENOUS; SUBCUTANEOUS at 10:36

## 2022-10-02 RX ADMIN — SODIUM CHLORIDE, PRESERVATIVE FREE 10 ML: 5 INJECTION INTRAVENOUS at 14:17

## 2022-10-02 RX ADMIN — HYDROMORPHONE HYDROCHLORIDE 4 MG: 2 TABLET ORAL at 08:20

## 2022-10-02 RX ADMIN — SERTRALINE 25 MG: 50 TABLET, FILM COATED ORAL at 10:36

## 2022-10-02 NOTE — PROGRESS NOTES
Problem: Risk for Spread of Infection  Goal: Prevent transmission of infectious organism to others  Description: Prevent the transmission of infectious organisms to other patients, staff members, and visitors. Outcome: Progressing Towards Goal     Problem: Falls - Risk of  Goal: *Absence of Falls  Description: Document Huang Boudreaux Fall Risk and appropriate interventions in the flowsheet.   Outcome: Progressing Towards Goal  Note: Fall Risk Interventions:  Mobility Interventions: Bed/chair exit alarm    Mentation Interventions: Adequate sleep, hydration, pain control    Medication Interventions: Bed/chair exit alarm    Elimination Interventions: Bed/chair exit alarm

## 2022-10-02 NOTE — PROGRESS NOTES
[address] [facility] Adult  Hospitalist Group                                                                                          Hospitalist Progress Note  Leslie Lora MD  Answering service: [phone] from in house phone        Date of Service:  10/2/2022  NAME:  Krystyna Patel  :  1970  MRN:  [id number]      Admission Summary: This is a 58-year-old man with a PMH of T2DM, BKA bilaterally, JEMIMA on CKD requiring HD, Respiratory Failure requiring intubation who presented at the ED from [address] [place] [address] with c/o abdominal pain. The patient was recently admitted to another hospital and underwent left below-knee amputation, hospitalization complicated with respiratory failure which required prolonged intubation- attributed to aspiration pneumonia, also developed lobulated effusion, for which the patient underwent decortication and right thoracotomy and acute renal failure for which he has been started on hemodialysis. He had a J-tube was placed for supplemental nutrition and was transferred to [address] [place] [address] for continuation of care. He was doing relatively well in [address] [place] [address] until the day of presentation at the ED when the patient complained of abdominal pain at the facility. CT scan of the abdomen and pelvis was obtained: shows evidence of bowel perforation, sent to [facility] fro further eval/tx. When the patient arrived at the emergency room, based on his clinical presentation and lab work, Code Sepsis was triggered. The patient received fluid therapy as per sepsis protocol. The emergency room physician consulted general surgeon on-call. The patient was seen by the surgeon and the patient is planned for immediate surgical intervention. No record of prior admission to this hospital.     Interval history / Subjective:   Patient seen and examined earlier this morning by me for follow up of peritonitis, JEMIMA, and other issues. Patient is in no acute distress today. No acute complaints.  He reports that he was in pain yesterday night but it is controlled now. We discussed using PO pain meds more consistently and relying on IV for when itis not controlled. Assessment & Plan:     Dislodged JG tube with surrounding peritonitis. Free air and perirectal fistula. Patient was sent from [facility] for intractable abdominal pain. Severe sepsis without septic shock due to intra-abdominal source of infection. .  -He was started on broad-spectrum empiric antibiotics on admission and underwent the following procedures:  -Gen Sx: 9/10 Lap take down and removal J-tube, Lap colostomy, I&D abdominal wall  -Colorectal Sx: 9/15 anorectal wound exam including rigid proctosigmoidoscopy  -ID were consulted and assisted with antibiotics management. Patient completed IV Eraxis/Zosyn/Vancomycin completed 9/23/22 and recommendation was to continue Augmentin x2 weeks from 9/24. Due to worsening leukocytosis however the Augmentin was discontinued and patient is restarted on Zosyn since 9/28.  -C. difficile, blood culture where negative. -CT chest 9/10: Small right basilar gas and fluid containing pleural collection with a  peripheral soft tissue rind. Finding may represent an empyema and clinical correlation is recommended. Recommend direct comparison to any additional prior imaging. Free intraperitoneal gas, seen on prior CT of the abdomen and pelvis. -CT abd/pel wo 9/14:No focal intra-abdominal fluid collection to suggest abscess. Free intraperitoneal gas consistent with recent intra-abdominal surgery. Interval improvement in rectal thickening. Prior CT dated September 9, 2022 demonstrated a probable right posterior rectal wall defect which is no longer visualized on today's examination.  Persistent, small right basilar gas and fluid containing pleural collection, unchanged.  -wound care ostomy care  Abdominal wounds are growing Pseudomonas  Recommendations are to continue with IV meropenem until 10/13    JEMIMA vs CKD requiring dialysis: CVC 9/16/22- IR.  -Continued on hemodialysis on MWF schedule, continue. No sign of recovery. -Access, permacath placed on 9/16     Type 2 diabetes mellitus with hyperglycemia. Patient is currently tolerating diet and being supplemented with TPN. -Monitor blood closely 4 times daily and as needed. Humalog sliding scale. AFIB: rate stable. -EKG 9/19: AFIB. -eliquis 5mg BID  -ECHO 9/26: Left Ventricle: The EF by visual approximation is 55 - 60%. Left ventricle size is normal. Normal wall thickness. Normal wall motion. Normal diastolic function. Tricuspid Valve: Mildly elevated RVSP. The estimated RVSP is 42 mmHg. Empyema: noted: recent thoracotomy and prolonged intubation at [facility]. -CXR9/19: Persistent right basilar airspace disease and right-sided loculated effusion/empyema. -CT chest 9/10: Small right basilar gas and fluid containing pleural collection with a  peripheral soft tissue rind. Finding may represent an empyema and clinical correlation is recommended. Recommend direct comparison to any additional prior imaging. Free intraperitoneal gas, seen on prior CT of the abdomen and pelvis. -Pulmonary consulted: noted \"percutaneous drainage would not be successful at removing fluid as I suppose it is very thick and organized at this time. Would only recommend following clinically and radiographically. If worsens or worsened right-sided effusion he would need to be reevaluated by his thoracic surgeon at [facility] for additional drainage. \"  -IV ABT's course as above. Acute blood loss anemia on chronic:  -monitor Hgb  -transfuse to keep Hgb > 7.0  -Hgb 6.7- transfuse 1 unit PRBC's 9/26 9/30- Hb 7. 1. will continue to monitor. +Occult Blood: no melena, no n/v.  -PPI  -iron supplements  -monitor Hgb, transfuse to keep Hgb > 7.0     AMS: delirium, hallucinations ? 2/2 to toxic metabolic encephalopathy, hospital delirium, and opioid induced.   -CT head 9/23: no acute intracranial abnormality.   -Alert and oriented on rounds . Depression: continue zoloft. Thrombocytosis: noted reactive thrombocytosis, monitor platelets, trending downward    Epigastric J site wound with surrounding abscess, POA.  --Patient underwent incision and drainage on 9/10  --There is surrounding erythema, tenderness with some purulence. Given rebound leukocytosis, will ask general surgery to Look to see if this will need further I&D    Bilateral BKA, left BKA dehiscence and possible infection.  -Vascular Sx followin/26-sutures removed and some eschar along incision line excised, open cavity laterally has old hematoma at base that was evacuated   -CT of the left BKA stump on  showed several irregular soft tissue defects overlying the amputation site with a 4.4 x 4.1 x 1.9 cm amorphous fluid collection with partial thick walled/rim-enhancing and internal gas locules. --Patient restarted back on Zosyn due to worsening leukocytosis. --Spoke with Dr Radha Ellison will check on pt tomorrow   - Leg wound growing Pseudomonas  Vascular has seen  10/1-continue antibiotics    Anorectal wound, POA  --Followed by colorectal surgery. He is status post proctosigmoidoscopy on 9/15, there was communication of the distal rectal lumen with extraperitoneal pelvis. Acute pain syndrome. This is due to the multiple surgeries he had. Anticipate some degree of tolerance due to his continued exposure to periods  --Current pain regimen includes scheduled gabapentin 100 mg 3 times daily  -- As needed 0.2 mg IV Dilaudid every 4 hourly, as needed; Dilaudid 2 mg p.o. every 4 hourly as needed and Dilaudid 4 mg p.o. every 4 hourly as needed. Palliative care help appreciated. Patient will probably benefit from scheduled long-acting opiate such as MS Contin. GIppx: Pepcid  Code Status: Full Code  Diet: Regular, supplements BID  --Time to start weaning off TPN.   Activity: bedrest  Discharge: TBD  Ambulates: nonambulatory  Discharge planning: Accepted at [facility], needs OP HD chair      Hospital Problems  Date Reviewed: 9/10/2022            Codes Class Noted POA    Injury to rectum without open wound into cavity ICD-10-CM: S36.60XA  ICD-9-CM: 863.45  9/20/2022 Yes        Open wound of anus ICD-10-CM: N04.283U  ICD-9-CM: 863.89  9/20/2022 Yes        Severe protein-calorie malnutrition ([address] ICD-10-CM: E43  ICD-9-CM: 984  9/13/2022 Yes        * (Principal) Intra-abdominal infection ICD-10-CM: B99.9  ICD-9-CM: 136.9  9/10/2022 Yes       Review of Systems:   A comprehensive review of systems was negative except for that written in the HPI. Vital Signs:    Last 24hrs VS reviewed since prior progress note. Most recent are:  Visit Vitals  BP (!) 111/49   Pulse 71   Temp 98.2 °F (36.8 °C)   Resp 18   Ht 5' 7\" (1.702 m)   Wt 75.2 kg (165 lb 12.6 oz)   SpO2 98%   BMI 25.97 kg/m²         Intake/Output Summary (Last 24 hours) at 10/2/2022 1353  Last data filed at 10/2/2022 1320  Gross per 24 hour   Intake 1176.02 ml   Output 1525 ml   Net -348.98 ml          Physical Examination:     I had a face to face encounter with this patient and independently examined them on 10/2/2022 as outlined below:          Constitutional: Chronically sick looking gentleman, he is alert and oriented x3. ENT:  Oral mucosa moist.    Resp/chest wall:  CTA bilaterally. No wheezing/rhonchi/rales. No accessory muscle use. Permacath and double-lumen PICC line the right chest wall. CV:  Regular rate, S1,S2.    GI/: Epigastric chest site 1 2 with surrounding erythema, induration and tenderness and purulent discharge. Colostomy in the lower abdomen. No erythema, swelling, discharge in the inguinal.  Bowel sounds are normoactive. Musculoskeletal:  No edema, warm, bilateral BKA: dressing to LLE stunp. Neurologic:  Moves all extremities. Awake and alert, disoriented. Skin:  multiple wounds, skin w/d, jaundiced.    Psych: Poor insight, Not anxious nor agitated. Data Review:    Review and/or order of clinical lab test      Labs:     Recent Labs     10/02/22  0541 10/01/22  0309   WBC 9.9 11.3*   HGB 7.9* 6.6*   HCT 25.3* 21.5*    321       Recent Labs     10/02/22  0541 10/01/22  0309 09/30/22  0342    132* 133*   K 3.7 4.3 4.3    101 100   CO2 28 23 25   BUN 46* 74* 48*   CREA 1.64* 2.47* 1.99*   GLU 87 78 194*   CA 7.7* 7.7* 7.7*   MG 1.9 2.1 1.9   PHOS 1.9* 3.4 3.0       Recent Labs     10/02/22  0541 10/01/22  0309 09/30/22  0342   ALT 14 11* 9*   * 118* 121*   TBILI 0.2 0.2 0.2   TP 5.4* 5.5* 6.4   ALB 1.6* 1.4* 1.5*   GLOB 3.8 4.1* 4.9*       No results for input(s): INR, PTP, APTT, INREXT, INREXT in the last 72 hours. No results for input(s): FE, TIBC, PSAT, FERR in the last 72 hours. Lab Results   Component Value Date/Time    Folate 4.1 (L) 09/10/2022 12:00 PM        No results for input(s): PH, PCO2, PO2 in the last 72 hours. No results for input(s): CPK, CKNDX, TROIQ in the last 72 hours.     No lab exists for component: CPKMB  Lab Results   Component Value Date/Time    Triglyceride 119 10/02/2022 05:41 AM     Lab Results   Component Value Date/Time    Glucose (POC) 91 10/02/2022 11:35 AM    Glucose (POC) 83 10/02/2022 08:41 AM    Glucose (POC) 101 10/02/2022 06:44 AM    Glucose (POC) 85 10/01/2022 11:40 PM    Glucose (POC) 81 10/01/2022 07:31 PM     No results found for: COLOR, APPRN, SPGRU, REFSG, WILL, PROTU, GLUCU, KETU, BILU, UROU, MISHA, LEUKU, GLUKE, EPSU, BACTU, WBCU, RBCU, CASTS, UCRY      Medications Reviewed:     Current Facility-Administered Medications   Medication Dose Route Frequency    TPN ADULT - CENTRAL   IntraVENous CONTINUOUS    0.9% sodium chloride infusion 250 mL  250 mL IntraVENous PRN    TPN ADULT - CENTRAL   IntraVENous CONTINUOUS    albumin human 25% (BUMINATE) solution 50 g  50 g IntraVENous DIALYSIS PRN    HYDROmorphone (DILAUDID) injection 1 mg  1 mg IntraVENous Q3H PRN    meropenem (MERREM) 500 mg in 0.9% sodium chloride (MBP/ADV) 50 mL MBP  500 mg IntraVENous Q24H    [START ON 10/4/2022] epoetin vera-epbx (RETACRIT) injection 10,000 Units  10,000 Units SubCUTAneous DIALYSIS TUE, THU & SAT    0.9% sodium chloride infusion 250 mL  250 mL IntraVENous PRN    insulin regular (NOVOLIN R, HUMULIN R) injection   SubCUTAneous Q6H    glucose chewable tablet 16 g  4 Tablet Oral PRN    glucagon (GLUCAGEN) injection 1 mg  1 mg IntraMUSCular PRN    dextrose 10 % infusion 0-250 mL  0-250 mL IntraVENous PRN    gabapentin (NEURONTIN) capsule 100 mg  100 mg Oral TID    fat emulsion 20% (LIPOSYN, INTRAlipid) infusion 250 mL  250 mL IntraVENous QPM    ferrous sulfate tablet 325 mg  1 Tablet Oral BID WITH MEALS    HYDROmorphone (DILAUDID) tablet 2 mg  2 mg Oral Q4H PRN    HYDROmorphone (DILAUDID) tablet 4 mg  4 mg Oral Q4H PRN    hydrALAZINE (APRESOLINE) 20 mg/mL injection 10 mg  10 mg IntraVENous Q6H PRN    mirtazapine (REMERON) tablet 7.5 mg  7.5 mg Oral QHS    pantoprazole (PROTONIX) tablet 40 mg  40 mg Oral ACB&D    apixaban (ELIQUIS) tablet 5 mg  5 mg Oral BID    sertraline (ZOLOFT) tablet 25 mg  25 mg Oral DAILY    collagenase (SANTYL) 250 unit/gram ointment   Topical DAILY    diphenhydrAMINE (BENADRYL) injection 12.5 mg  12.5 mg IntraVENous Q6H PRN    sodium chloride (NS) flush 5-40 mL  5-40 mL IntraVENous Q8H    sodium chloride (NS) flush 5-40 mL  5-40 mL IntraVENous PRN    acetaminophen (TYLENOL) tablet 650 mg  650 mg Oral Q6H PRN    polyethylene glycol (MIRALAX) packet 17 g  17 g Oral DAILY PRN    ondansetron (ZOFRAN ODT) tablet 4 mg  4 mg Oral Q8H PRN    Or    ondansetron (ZOFRAN) injection 4 mg  4 mg IntraVENous Q6H PRN    L.acidophilus-paracasei-S.thermophil-bifidobacter (RISAQUAD) 8 billion cell capsule  1 Capsule Oral DAILY     ______________________________________________________________________  EXPECTED LENGTH OF STAY: 9d 14h  [facility]

## 2022-10-03 LAB
ALBUMIN SERPL-MCNC: 1.5 G/DL (ref 3.5–5)
ALBUMIN/GLOB SERPL: 0.4 {RATIO} (ref 1.1–2.2)
ALP SERPL-CCNC: 152 U/L (ref 45–117)
ALT SERPL-CCNC: 17 U/L (ref 12–78)
ANION GAP SERPL CALC-SCNC: 8 MMOL/L (ref 5–15)
AST SERPL-CCNC: 26 U/L (ref 15–37)
BASOPHILS # BLD: 0.1 K/UL (ref 0–0.1)
BASOPHILS NFR BLD: 1 % (ref 0–1)
BILIRUB SERPL-MCNC: 0.2 MG/DL (ref 0.2–1)
BUN SERPL-MCNC: 75 MG/DL (ref 6–20)
BUN/CREAT SERPL: 33 (ref 12–20)
CALCIUM SERPL-MCNC: 8.1 MG/DL (ref 8.5–10.1)
CHLORIDE SERPL-SCNC: 103 MMOL/L (ref 97–108)
CO2 SERPL-SCNC: 24 MMOL/L (ref 21–32)
CREAT SERPL-MCNC: 2.3 MG/DL (ref 0.7–1.3)
DIFFERENTIAL METHOD BLD: ABNORMAL
EOSINOPHIL # BLD: 0.8 K/UL (ref 0–0.4)
EOSINOPHIL NFR BLD: 7 % (ref 0–7)
ERYTHROCYTE [DISTWIDTH] IN BLOOD BY AUTOMATED COUNT: 20.5 % (ref 11.5–14.5)
GLOBULIN SER CALC-MCNC: 4 G/DL (ref 2–4)
GLUCOSE BLD STRIP.AUTO-MCNC: 143 MG/DL (ref 65–117)
GLUCOSE BLD STRIP.AUTO-MCNC: 87 MG/DL (ref 65–117)
GLUCOSE BLD STRIP.AUTO-MCNC: 94 MG/DL (ref 65–117)
GLUCOSE BLD STRIP.AUTO-MCNC: 98 MG/DL (ref 65–117)
GLUCOSE SERPL-MCNC: 84 MG/DL (ref 65–100)
HCT VFR BLD AUTO: 26 % (ref 36.6–50.3)
HGB BLD-MCNC: 8 G/DL (ref 12.1–17)
IMM GRANULOCYTES # BLD AUTO: 0.1 K/UL (ref 0–0.04)
IMM GRANULOCYTES NFR BLD AUTO: 1 % (ref 0–0.5)
LYMPHOCYTES # BLD: 2 K/UL (ref 0.8–3.5)
LYMPHOCYTES NFR BLD: 18 % (ref 12–49)
MAGNESIUM SERPL-MCNC: 2 MG/DL (ref 1.6–2.4)
MCH RBC QN AUTO: 28.7 PG (ref 26–34)
MCHC RBC AUTO-ENTMCNC: 30.8 G/DL (ref 30–36.5)
MCV RBC AUTO: 93.2 FL (ref 80–99)
MONOCYTES # BLD: 0.9 K/UL (ref 0–1)
MONOCYTES NFR BLD: 8 % (ref 5–13)
NEUTS SEG # BLD: 7.1 K/UL (ref 1.8–8)
NEUTS SEG NFR BLD: 65 % (ref 32–75)
NRBC # BLD: 0 K/UL (ref 0–0.01)
NRBC BLD-RTO: 0 PER 100 WBC
PHOSPHATE SERPL-MCNC: 2.5 MG/DL (ref 2.6–4.7)
PLATELET # BLD AUTO: 396 K/UL (ref 150–400)
PMV BLD AUTO: 9.3 FL (ref 8.9–12.9)
POTASSIUM SERPL-SCNC: 4 MMOL/L (ref 3.5–5.1)
PROT SERPL-MCNC: 5.5 G/DL (ref 6.4–8.2)
RBC # BLD AUTO: 2.79 M/UL (ref 4.1–5.7)
RBC MORPH BLD: ABNORMAL
SERVICE CMNT-IMP: ABNORMAL
SERVICE CMNT-IMP: NORMAL
SODIUM SERPL-SCNC: 135 MMOL/L (ref 136–145)
TRIGL SERPL-MCNC: 106 MG/DL (ref ?–150)
WBC # BLD AUTO: 11 K/UL (ref 4.1–11.1)

## 2022-10-03 PROCEDURE — 74011636637 HC RX REV CODE- 636/637: Performed by: PHYSICIAN ASSISTANT

## 2022-10-03 PROCEDURE — 97530 THERAPEUTIC ACTIVITIES: CPT

## 2022-10-03 PROCEDURE — 82962 GLUCOSE BLOOD TEST: CPT

## 2022-10-03 PROCEDURE — 74011250637 HC RX REV CODE- 250/637: Performed by: STUDENT IN AN ORGANIZED HEALTH CARE EDUCATION/TRAINING PROGRAM

## 2022-10-03 PROCEDURE — 74011000258 HC RX REV CODE- 258: Performed by: NURSE PRACTITIONER

## 2022-10-03 PROCEDURE — 83735 ASSAY OF MAGNESIUM: CPT

## 2022-10-03 PROCEDURE — 36415 COLL VENOUS BLD VENIPUNCTURE: CPT

## 2022-10-03 PROCEDURE — 74011000250 HC RX REV CODE- 250: Performed by: PHYSICIAN ASSISTANT

## 2022-10-03 PROCEDURE — 74011000250 HC RX REV CODE- 250: Performed by: COLON & RECTAL SURGERY

## 2022-10-03 PROCEDURE — 84478 ASSAY OF TRIGLYCERIDES: CPT

## 2022-10-03 PROCEDURE — 74011250637 HC RX REV CODE- 250/637: Performed by: COLON & RECTAL SURGERY

## 2022-10-03 PROCEDURE — 74011000258 HC RX REV CODE- 258: Performed by: STUDENT IN AN ORGANIZED HEALTH CARE EDUCATION/TRAINING PROGRAM

## 2022-10-03 PROCEDURE — 74011250637 HC RX REV CODE- 250/637: Performed by: PHYSICIAN ASSISTANT

## 2022-10-03 PROCEDURE — 74011000250 HC RX REV CODE- 250: Performed by: STUDENT IN AN ORGANIZED HEALTH CARE EDUCATION/TRAINING PROGRAM

## 2022-10-03 PROCEDURE — 65270000046 HC RM TELEMETRY

## 2022-10-03 PROCEDURE — 80053 COMPREHEN METABOLIC PANEL: CPT

## 2022-10-03 PROCEDURE — 84100 ASSAY OF PHOSPHORUS: CPT

## 2022-10-03 PROCEDURE — 74011250636 HC RX REV CODE- 250/636: Performed by: NURSE PRACTITIONER

## 2022-10-03 PROCEDURE — 99231 SBSQ HOSP IP/OBS SF/LOW 25: CPT | Performed by: NURSE PRACTITIONER

## 2022-10-03 PROCEDURE — 74011636637 HC RX REV CODE- 636/637: Performed by: STUDENT IN AN ORGANIZED HEALTH CARE EDUCATION/TRAINING PROGRAM

## 2022-10-03 PROCEDURE — 74011250636 HC RX REV CODE- 250/636: Performed by: STUDENT IN AN ORGANIZED HEALTH CARE EDUCATION/TRAINING PROGRAM

## 2022-10-03 PROCEDURE — 85025 COMPLETE CBC W/AUTO DIFF WBC: CPT

## 2022-10-03 RX ADMIN — MEROPENEM 500 MG: 500 INJECTION, POWDER, FOR SOLUTION INTRAVENOUS at 09:34

## 2022-10-03 RX ADMIN — GABAPENTIN 100 MG: 100 CAPSULE ORAL at 21:34

## 2022-10-03 RX ADMIN — Medication 1 CAPSULE: at 09:33

## 2022-10-03 RX ADMIN — COLLAGENASE SANTYL: 250 OINTMENT TOPICAL at 09:34

## 2022-10-03 RX ADMIN — Medication: at 20:12

## 2022-10-03 RX ADMIN — HYDROMORPHONE HYDROCHLORIDE 1 MG: 1 INJECTION, SOLUTION INTRAMUSCULAR; INTRAVENOUS; SUBCUTANEOUS at 17:16

## 2022-10-03 RX ADMIN — PANTOPRAZOLE SODIUM 40 MG: 40 TABLET, DELAYED RELEASE ORAL at 06:40

## 2022-10-03 RX ADMIN — SODIUM CHLORIDE, PRESERVATIVE FREE 10 ML: 5 INJECTION INTRAVENOUS at 21:33

## 2022-10-03 RX ADMIN — HYDROMORPHONE HYDROCHLORIDE 1 MG: 1 INJECTION, SOLUTION INTRAMUSCULAR; INTRAVENOUS; SUBCUTANEOUS at 11:07

## 2022-10-03 RX ADMIN — SERTRALINE 25 MG: 50 TABLET, FILM COATED ORAL at 09:33

## 2022-10-03 RX ADMIN — HYDROMORPHONE HYDROCHLORIDE 4 MG: 2 TABLET ORAL at 09:33

## 2022-10-03 RX ADMIN — GABAPENTIN 100 MG: 100 CAPSULE ORAL at 16:25

## 2022-10-03 RX ADMIN — Medication 2 UNITS: at 19:02

## 2022-10-03 RX ADMIN — FERROUS SULFATE TAB 325 MG (65 MG ELEMENTAL FE) 325 MG: 325 (65 FE) TAB at 09:33

## 2022-10-03 RX ADMIN — I.V. FAT EMULSION 250 ML: 20 EMULSION INTRAVENOUS at 20:13

## 2022-10-03 RX ADMIN — GABAPENTIN 100 MG: 100 CAPSULE ORAL at 09:33

## 2022-10-03 RX ADMIN — FERROUS SULFATE TAB 325 MG (65 MG ELEMENTAL FE) 325 MG: 325 (65 FE) TAB at 16:26

## 2022-10-03 RX ADMIN — MIRTAZAPINE 7.5 MG: 15 TABLET, FILM COATED ORAL at 21:34

## 2022-10-03 RX ADMIN — APIXABAN 5 MG: 5 TABLET, FILM COATED ORAL at 09:33

## 2022-10-03 RX ADMIN — HYDROMORPHONE HYDROCHLORIDE 4 MG: 2 TABLET ORAL at 14:21

## 2022-10-03 RX ADMIN — APIXABAN 5 MG: 5 TABLET, FILM COATED ORAL at 21:34

## 2022-10-03 RX ADMIN — SODIUM CHLORIDE, PRESERVATIVE FREE 10 ML: 5 INJECTION INTRAVENOUS at 06:40

## 2022-10-03 RX ADMIN — PANTOPRAZOLE SODIUM 40 MG: 40 TABLET, DELAYED RELEASE ORAL at 16:25

## 2022-10-03 NOTE — PROGRESS NOTES
CAROLYN:  RUR[de-identified] 13%    Please note that patient has been assigned a community dialysis chair since CM progress note 9/21/22. Disposition:     Admit to [facility] DRE Fri 10/7/22  Camille ([facility] [phone]). 2.  Outpatient HD chair at [facility] chair time 3:15pm M/W/F schedule  Dea Berrios [facility]  admissions  [phone]). CM requesting facility ([facility]) to start auth on Wed 10/5    Barriers:  Patient is still on iv Dilaudid q 4 hrs    CM continuing to follow.     Mahad Quigley, [address], [address]

## 2022-10-03 NOTE — PROGRESS NOTES
Observed wound care of abdomen; there are a few areas of devitalized tissue  Some areas of clotted blood  Will cont LWC and hold off on wound vac for now  Observed w/ wound care nurseAraceli

## 2022-10-03 NOTE — PROGRESS NOTES
[facility]  Hospitalist Group                                                                                          Hospitalist Progress Note  Jack Herrmann MD  Answering service: [phone] from in house phone        Date of Service:  10/3/2022  NAME:  Pily Dubois  :  1970  MRN:  [id number]      Admission Summary: This is a 62-year-old man with a PMH of T2DM, BKA bilaterally, JEMIMA on CKD requiring HD, Respiratory Failure requiring intubation who presented at the ED from [facility] with c/o abdominal pain. The patient was recently admitted to another hospital and underwent left below-knee amputation, hospitalization complicated with respiratory failure which required prolonged intubation- attributed to aspiration pneumonia, also developed lobulated effusion, for which the patient underwent decortication and right thoracotomy and acute renal failure for which he has been started on hemodialysis. He had a J-tube was placed for supplemental nutrition and was transferred to [facility] for continuation of care. He was doing relatively well in [facility] until the day of presentation at the ED when the patient complained of abdominal pain at the facility. CT scan of the abdomen and pelvis was obtained: shows evidence of bowel perforation, sent to [facility] fro further eval/tx. When the patient arrived at the emergency room, based on his clinical presentation and lab work, Code Sepsis was triggered. The patient received fluid therapy as per sepsis protocol. The emergency room physician consulted general surgeon on-call. The patient was seen by the surgeon and the patient is planned for immediate surgical intervention. No record of prior admission to this hospital.     Interval history / Subjective:   Patient seen and examined earlier this morning by me for follow up of peritonitis, JEMIMA, and other issues. Patient comfortable today without any acute complaints.       Assessment & Plan:     Dislodged JG tube with surrounding peritonitis. Free air and perirectal fistula. Patient was sent from [facility] for intractable abdominal pain. Severe sepsis without septic shock due to intra-abdominal source of infection. .  -He was started on broad-spectrum empiric antibiotics on admission and underwent the following procedures:  -Gen Sx: 9/10 Lap take down and removal J-tube, Lap colostomy, I&D abdominal wall  -Colorectal Sx: 9/15 anorectal wound exam including rigid proctosigmoidoscopy  -ID were consulted and assisted with antibiotics management. Patient completed IV Eraxis/Zosyn/Vancomycin completed 9/23/22 and recommendation was to continue Augmentin x2 weeks from 9/24. Due to worsening leukocytosis however the Augmentin was discontinued and patient is restarted on Zosyn since 9/28.  -C. difficile, blood culture where negative. -CT chest 9/10: Small right basilar gas and fluid containing pleural collection with a  peripheral soft tissue rind. Finding may represent an empyema and clinical correlation is recommended. Recommend direct comparison to any additional prior imaging. Free intraperitoneal gas, seen on prior CT of the abdomen and pelvis. -CT abd/pel wo 9/14:No focal intra-abdominal fluid collection to suggest abscess. Free intraperitoneal gas consistent with recent intra-abdominal surgery. Interval improvement in rectal thickening. Prior CT dated September 9, 2022 demonstrated a probable right posterior rectal wall defect which is no longer visualized on today's examination. Persistent, small right basilar gas and fluid containing pleural collection, unchanged.  -wound care ostomy care  Abdominal wounds are growing Pseudomonas  Recommendations are to continue with IV meropenem until 10/13  Last debridement 9/29    JEMIMA vs CKD requiring dialysis: CVC 9/16/22- IR.  -Continued on hemodialysis on MWF schedule, continue. No sign of recovery.   -Access, permacath placed on 9/16     Type 2 diabetes mellitus with hyperglycemia. Patient is currently tolerating diet and being supplemented with TPN. -Monitor blood closely 4 times daily and as needed. Humalog sliding scale. AFIB: rate stable. -EKG 9/19: AFIB. -eliquis 5mg BID  -ECHO 9/26: Left Ventricle: The EF by visual approximation is 55 - 60%. Left ventricle size is normal. Normal wall thickness. Normal wall motion. Normal diastolic function. Tricuspid Valve: Mildly elevated RVSP. The estimated RVSP is 42 mmHg. Empyema: noted: recent thoracotomy and prolonged intubation at [facility]. -CXR9/19: Persistent right basilar airspace disease and right-sided loculated effusion/empyema. -CT chest 9/10: Small right basilar gas and fluid containing pleural collection with a  peripheral soft tissue rind. Finding may represent an empyema and clinical correlation is recommended. Recommend direct comparison to any additional prior imaging. Free intraperitoneal gas, seen on prior CT of the abdomen and pelvis. -Pulmonary consulted: noted \"percutaneous drainage would not be successful at removing fluid as I suppose it is very thick and organized at this time. Would only recommend following clinically and radiographically. If worsens or worsened right-sided effusion he would need to be reevaluated by his thoracic surgeon at [facility] for additional drainage. \"  -IV ABT's course as above. Acute blood loss anemia on chronic:  -monitor Hgb  -transfuse to keep Hgb > 7.0  -Hgb 6.7- transfuse 1 unit PRBC's 9/26 9/30- Hb 7. 1. will continue to monitor. 10/3- Stable     +Occult Blood: no melena, no n/v.  -PPI  -iron supplements  -monitor Hgb, transfuse to keep Hgb > 7.0     AMS: delirium, hallucinations ? 2/2 to toxic metabolic encephalopathy, hospital delirium, and opioid induced. -CT head 9/23: no acute intracranial abnormality.   -Alert and oriented on rounds 9/28.  10/3- AAO     Depression: continue zoloft.   Thrombocytosis: noted reactive thrombocytosis, monitor platelets, trending downward    Epigastric J site wound with surrounding abscess, POA.  --Patient underwent incision and drainage on 9/10  --There is surrounding erythema, tenderness with some purulence. Given rebound leukocytosis, will ask general surgery to Look to see if this will need further I&D    Bilateral BKA, left BKA dehiscence and possible infection.  -Vascular Sx followin/26-sutures removed and some eschar along incision line excised, open cavity laterally has old hematoma at base that was evacuated   -CT of the left BKA stump on  showed several irregular soft tissue defects overlying the amputation site with a 4.4 x 4.1 x 1.9 cm amorphous fluid collection with partial thick walled/rim-enhancing and internal gas locules. --Patient restarted back on Zosyn due to worsening leukocytosis. --Spoke with Dr Leatha Candelario will check on pt tomorrow   - Leg wound growing Pseudomonas  Vascular has seen  10/1-continue antibiotics    Anorectal wound, POA  --Followed by colorectal surgery. He is status post proctosigmoidoscopy on 9/15, there was communication of the distal rectal lumen with extraperitoneal pelvis. Acute pain syndrome. This is due to the multiple surgeries he had. Anticipate some degree of tolerance due to his continued exposure to periods  --Current pain regimen includes scheduled gabapentin 100 mg 3 times daily  -- As needed 0.2 mg IV Dilaudid every 4 hourly, as needed; Dilaudid 2 mg p.o. every 4 hourly as needed and Dilaudid 4 mg p.o. every 4 hourly as needed. Palliative care help appreciated. Patient will probably benefit from scheduled long-acting opiate such as MS Contin. Continue wound care    GIppx: Pepcid  Code Status: Full Code  Diet: Regular, supplements BID  --Time to start weaning off TPN.   Activity: bedrest  Discharge: TBD  Ambulates:  nonambulatory  Discharge planning: Accepted at [facility], needs OP HD chair      JYOTI MEI Problems  Date Reviewed: 9/10/2022            Codes Class Noted POA    Injury to rectum without open wound into cavity ICD-10-CM: S36.60XA  ICD-9-CM: 863.45  9/20/2022 Yes        Open wound of anus ICD-10-CM: G19.689W  ICD-9-CM: 863.89  9/20/2022 Yes        Severe protein-calorie malnutrition ([facility] [address] ICD-10-CM: E43  ICD-9-CM: 330  9/13/2022 Yes        * (Principal) Intra-abdominal infection ICD-10-CM: B99.9  ICD-9-CM: 136.9  9/10/2022 Yes       Review of Systems:   A comprehensive review of systems was negative except for that written in the HPI. Vital Signs:    Last 24hrs VS reviewed since prior progress note. Most recent are:  Visit Vitals  BP (!) 161/84 (BP 1 Location: Right upper arm, BP Patient Position: At rest;Lying right side)   Pulse 77   Temp 98.2 °F (36.8 °C)   Resp 20   Ht 5' 7\" (1.702 m)   Wt 75.3 kg (166 lb 0.1 oz)   SpO2 100%   BMI 26.00 kg/m²         Intake/Output Summary (Last 24 hours) at 10/3/2022 1525  Last data filed at 10/3/2022 0055  Gross per 24 hour   Intake --   Output 300 ml   Net -300 ml          Physical Examination:     I had a face to face encounter with this patient and independently examined them on 10/3/2022 as outlined below:    Refer to wound care pictures for abdominal wound. Constitutional: Chronically sick looking gentleman, he is alert and oriented x3. ENT:  Oral mucosa moist.    Resp/chest wall:  CTA bilaterally. No wheezing/rhonchi/rales. No accessory muscle use. Permacath and double-lumen PICC line the right chest wall. CV:  Regular rate, S1,S2.    GI/: Epigastric chest site 1 2 with surrounding erythema, induration and tenderness and purulent discharge. Colostomy in the lower abdomen. No erythema, swelling, discharge in the inguinal.  Bowel sounds are normoactive. Musculoskeletal:  No edema, warm, bilateral BKA: dressing to LLE stunp. Neurologic:  Moves all extremities. Awake and alert, disoriented.    Skin:  multiple wounds, skin w/d, jaundiced. Psych:  Poor insight, Not anxious nor agitated. Data Review:    Review and/or order of clinical lab test      Labs:     Recent Labs     10/03/22  0450 10/02/22  0541   WBC 11.0 9.9   HGB 8.0* 7.9*   HCT 26.0* 25.3*    361       Recent Labs     10/03/22  0450 10/02/22  0541 10/01/22  0309   * 136 132*   K 4.0 3.7 4.3    103 101   CO2 24 28 23   BUN 75* 46* 74*   CREA 2.30* 1.64* 2.47*   GLU 84 87 78   CA 8.1* 7.7* 7.7*   MG 2.0 1.9 2.1   PHOS 2.5* 1.9* 3.4       Recent Labs     10/03/22  0450 10/02/22  0541 10/01/22  0309   ALT 17 14 11*   * 154* 118*   TBILI 0.2 0.2 0.2   TP 5.5* 5.4* 5.5*   ALB 1.5* 1.6* 1.4*   GLOB 4.0 3.8 4.1*       No results for input(s): INR, PTP, APTT, INREXT, INREXT in the last 72 hours. No results for input(s): FE, TIBC, PSAT, FERR in the last 72 hours. Lab Results   Component Value Date/Time    Folate 4.1 (L) 09/10/2022 12:00 PM        No results for input(s): PH, PCO2, PO2 in the last 72 hours. No results for input(s): CPK, CKNDX, TROIQ in the last 72 hours.     No lab exists for component: CPKMB  Lab Results   Component Value Date/Time    Triglyceride 106 10/03/2022 04:50 AM     Lab Results   Component Value Date/Time    Glucose (POC) 98 10/03/2022 12:38 PM    Glucose (POC) 94 10/03/2022 06:40 AM    Glucose (POC) 108 10/02/2022 11:23 PM    Glucose (POC) 105 10/02/2022 04:32 PM    Glucose (POC) 91 10/02/2022 11:35 AM     No results found for: COLOR, APPRN, SPGRU, REFSG, WILL, PROTU, GLUCU, KETU, BILU, UROU, MISHA, LEUKU, GLUKE, EPSU, BACTU, WBCU, RBCU, CASTS, UCRY      Medications Reviewed:     Current Facility-Administered Medications   Medication Dose Route Frequency    TPN ADULT - CENTRAL   IntraVENous CONTINUOUS    TPN ADULT - CENTRAL   IntraVENous CONTINUOUS    0.9% sodium chloride infusion 250 mL  250 mL IntraVENous PRN    albumin human 25% (BUMINATE) solution 50 g  50 g IntraVENous DIALYSIS PRN    HYDROmorphone (DILAUDID) injection 1 mg  1 mg IntraVENous Q3H PRN    meropenem (MERREM) 500 mg in 0.9% sodium chloride (MBP/ADV) 50 mL MBP  500 mg IntraVENous Q24H    [START ON 10/4/2022] epoetin vera-epbx (RETACRIT) injection 10,000 Units  10,000 Units SubCUTAneous DIALYSIS TUE, THU & SAT    0.9% sodium chloride infusion 250 mL  250 mL IntraVENous PRN    insulin regular (NOVOLIN R, HUMULIN R) injection   SubCUTAneous Q6H    glucose chewable tablet 16 g  4 Tablet Oral PRN    glucagon (GLUCAGEN) injection 1 mg  1 mg IntraMUSCular PRN    dextrose 10 % infusion 0-250 mL  0-250 mL IntraVENous PRN    gabapentin (NEURONTIN) capsule 100 mg  100 mg Oral TID    fat emulsion 20% (LIPOSYN, INTRAlipid) infusion 250 mL  250 mL IntraVENous QPM    ferrous sulfate tablet 325 mg  1 Tablet Oral BID WITH MEALS    HYDROmorphone (DILAUDID) tablet 2 mg  2 mg Oral Q4H PRN    HYDROmorphone (DILAUDID) tablet 4 mg  4 mg Oral Q4H PRN    hydrALAZINE (APRESOLINE) 20 mg/mL injection 10 mg  10 mg IntraVENous Q6H PRN    mirtazapine (REMERON) tablet 7.5 mg  7.5 mg Oral QHS    pantoprazole (PROTONIX) tablet 40 mg  40 mg Oral ACB&D    apixaban (ELIQUIS) tablet 5 mg  5 mg Oral BID    sertraline (ZOLOFT) tablet 25 mg  25 mg Oral DAILY    collagenase (SANTYL) 250 unit/gram ointment   Topical DAILY    diphenhydrAMINE (BENADRYL) injection 12.5 mg  12.5 mg IntraVENous Q6H PRN    sodium chloride (NS) flush 5-40 mL  5-40 mL IntraVENous Q8H    sodium chloride (NS) flush 5-40 mL  5-40 mL IntraVENous PRN    acetaminophen (TYLENOL) tablet 650 mg  650 mg Oral Q6H PRN    polyethylene glycol (MIRALAX) packet 17 g  17 g Oral DAILY PRN    ondansetron (ZOFRAN ODT) tablet 4 mg  4 mg Oral Q8H PRN    Or    ondansetron (ZOFRAN) injection 4 mg  4 mg IntraVENous Q6H PRN    L.acidophilus-paracasei-S.thermophil-bifidobacter (RISAQUAD) 8 billion cell capsule  1 Capsule Oral DAILY     ______________________________________________________________________  EXPECTED LENGTH OF STAY: 9d 14h  ACTUAL LENGTH OF STAY: [address] [facility] Kimmie Velez MD

## 2022-10-03 NOTE — PROGRESS NOTES
ID Progress Note  10/3/2022    Subjective:     Alert, orient x 3, asking appropriate questions, wife at bedside    Review of Systems:            Symptom Y/N Comments   Symptom Y/N Comments   Fever/Chills n      Chest Pain n       Poor Appetite       Edema        Cough       Abdominal Pain  n      Sputum       Joint Pain        SOB/ANDREWS  n     Pruritis/Rash        Nausea/vomit  n     Tolerating PT/OT        Diarrhea       Tolerating Diet        Constipation       Other           Could NOT obtain due to:       Objective:     Vitals: Visit Vitals  BP (!) 171/64 (BP 1 Location: Right upper arm, BP Patient Position: At rest)   Pulse 68   Temp 98.3 °F (36.8 °C)   Resp 16   Ht 5' 7\" (1.702 m)   Wt 75.3 kg (166 lb 0.1 oz)   SpO2 97%   BMI 26.00 kg/m²          Tmax:  Temp (24hrs), Av.2 °F (36.8 °C), Min:98 °F (36.7 °C), Max:98.3 °F (36.8 °C)      PHYSICAL EXAM:  General: Chronically ill appearing. WD, WN. Alert, cooperative, no acute distress    EENT:  Anicteric sclerae. Dry mucous membrane  Resp:  CTA bilaterally, no wheezing or rales. No accessory muscle use  CV:  Regular  rhythm  GI:  Soft, Non distended, Non tender. +Bowel sounds, colostomy in place, previous J tube site dressing dry and intact  Neurologic:  Alert and oriented X self, normal speech,   Psych:   Fair insight. Not anxious nor agitated  Skin:  No rashes.   No jaundice    Pressure injury; sacrum, right buttock,   left BKA stump site; old blood drainage noted it and intact  Right BKA stump; intact      Labs:   Lab Results   Component Value Date/Time    WBC 11.0 10/03/2022 04:50 AM    HGB 8.0 (L) 10/03/2022 04:50 AM    HCT 26.0 (L) 10/03/2022 04:50 AM    PLATELET 740  04:50 AM    MCV 93.2 10/03/2022 04:50 AM     Lab Results   Component Value Date/Time    Sodium 135 (L) 10/03/2022 04:50 AM    Potassium 4.0 10/03/2022 04:50 AM    Chloride 103 10/03/2022 04:50 AM    CO2 24 10/03/2022 04:50 AM    Anion gap 8 10/03/2022 04:50 AM    Glucose 84 10/03/2022 04:50 AM    BUN 75 (H) 10/03/2022 04:50 AM    Creatinine 2.30 (H) 10/03/2022 04:50 AM    BUN/Creatinine ratio 33 (H) 10/03/2022 04:50 AM    GFR est AA 36 (L) 10/03/2022 04:50 AM    GFR est non-AA 30 (L) 10/03/2022 04:50 AM    Calcium 8.1 (L) 10/03/2022 04:50 AM    Bilirubin, total 0.2 10/03/2022 04:50 AM    Alk. phosphatase 152 (H) 10/03/2022 04:50 AM    Protein, total 5.5 (L) 10/03/2022 04:50 AM    Albumin 1.5 (L) 10/03/2022 04:50 AM    Globulin 4.0 10/03/2022 04:50 AM    A-G Ratio 0.4 (L) 10/03/2022 04:50 AM    ALT (SGPT) 17 10/03/2022 04:50 AM     Assessment:      Dislodged feeding tube/peritonitis   Abdominal wound, s/p abdominal wall debridement (9/29)  Free air and perirectal fistula  S/p laparoscopy procedure; take down and removal of J tube, colostomy placement, and I & D of abdominal wall (9/10)  Left BKA stump infection  Recent right thoracotomy  Hx Bilateral BKAs   - afebrile, wbc 14     Blood cx (9/9, 9/16) no growth, (9/27) no growth so far     Wound cx from left bka stump (9/27) pseudomonas aeruginosa     Wound cx from previous J-tube & intra-op cx (9/29) pseudomonas aeruginosa, proteus mirabilis       CT of LLE (9/28)  Partially encapsulated fluid collection with a few associated gas locules overlying the dictation margin. Infection/abscess cannot be excluded based on imaging alone. No CT evidence of acute osteomyelitis. Several gas locules along the left spermatic cord, correlate clinically for signs of infection in this location. Objective:      Completed 2 weeks of IV Eraxis, zosyn, and vancomycin as of 9/23 then switched to   PO Augmentin between 9/24 to 9/28. ABX therapy changed back to IV due to persistent leukocytosis. Continue with IV Merrem, recommend to complete total 2 weeks.  Last dose 10/13  Appreciate wound care team's input  Vascular/general surgery following    Fever work up if temp >= 100.4    Above plan of care discussed and agreed with Dr. Jasvir ZAMORA Cody Kerr, NP

## 2022-10-03 NOTE — PROGRESS NOTES
Problem: Self Care Deficits Care Plan (Adult)  Goal: *Acute Goals and Plan of Care (Insert Text)  Description: FUNCTIONAL STATUS PRIOR TO ADMISSION: Prior to previous hospitalization, d/c to [facility], and current admission to [facility] pt was utilizing w/c for functional mobility and required minimal assistance for transfers. HOME SUPPORT: The patient lived with his wife and daughter and required assistance for all ADL/IADL tasks. Occupational Therapy Goals  Initiated 9/15/2022, OT weekly re-assessment 9/22/22  1. Patient will perform basic grooming in unsupported sitting with minimal assistance/contact guard assist within 7 day(s). (Continue 9/22, 10/3)  2. Patient will perform anterior neck to thigh bathing while in unsupported sitting with minimal assistance/contact guard assist within 7 day(s). (Downgrade to mod A 9/22, continue 10/3)  3. Patient will perform drop arm BSC via lateral transfers with moderate assistance within 7 day(s). (Continue 9/22, 10/3)  4. Patient will perform all aspects of toileting with moderate assistance  within 7 day(s). (At bed level 9/22, 10/3)  5. Patient will participate in upper extremity therapeutic exercise/activities with supervision/set-up for 10 minutes within 7 day(s). (Continue 9/22, 10/3)      Outcome: Progressing Towards Goal     OCCUPATIONAL THERAPY TREATMENT/WEEKLY RE-EVALUATION  Patient: Mary Alice De Paz (64 y.o. male)  Date: 10/3/2022  Diagnosis: Intra-abdominal infection [B99.9] Intra-abdominal infection  Procedure(s) (LRB):  ABDOMINAL Wall DEBRIDEMENT (Left) 4 Days Post-Op  Precautions: Fall, Other (comment) (bilateral BKA)  Chart, occupational therapy assessment, plan of care, and goals were reviewed. ASSESSMENT  Based on the objective data described below, pt remains motivated to work with therapy and pre medicated with oral Dilaudid (45 minutes before session). Pt rated pain as 8/10 but willing to try to attempt.  From elevated HOB at 46* and mod A x 1, pt able to come to fully sit upright EOB. Pt tolerated x 11 minutes both with and without UE support. Completed gentle UE stretches and ROM- will issue green theraband next session. Continue to work towards tolerating IPR at discharge as pt remains very motivated, limited by pain. Wife at bedside and to bring in R prothesis. Current Level of Function Impacting Discharge (ADLs): up to mod Ax 1 for supine to sit, intact static sitting balance    Other factors to consider for discharge: from home         PLAN :  Goals have been updated based on progression since last assessment. Patient continues to benefit from skilled intervention to address the above impairments. Continue to follow patient 4 times a week to address goals. Recommend with staff: encourage assist with bed mobility, ADLs, increase HOB    Recommend next OT session: EOB theraband exercises     Recommendation for discharge: (in order for the patient to meet his/her long term goals)  Therapy 3 hours per day 5-7 days per week    This discharge recommendation:  Has been made in collaboration with the attending provider and/or case management    Equipment recommendations for successful discharge (if) home: tbd       SUBJECTIVE:   Patient stated I was only in rehab for 3 days because I did everything I needed to.    OBJECTIVE DATA SUMMARY:   Cognitive/Behavioral Status:                      Functional Mobility and Transfers for ADLs:  Bed Mobility:  Rolling: Contact guard assistance;Bed Modified; Additional time  Supine to Sit: Moderate assistance; Additional time;Bed Modified  Sit to Supine: Minimum assistance; Additional time;Bed Modified  Scooting: Contact guard assistance    Transfers:             Balance:  Sitting: Intact; Without support    ADL Intervention:             Pt tolerated sitting EOB x 11 minutes, encouraged to perform gentle scapular retraction, shoulder flexion, IR/ER                             Pain:  8/10 prior to session, about 9/10 with EOB sitting     Activity Tolerance:   Good    After treatment patient left in no apparent distress:   Supine in bed, Heels elevated for pressure relief, Patient positioned in L sidelying for pressure relief, Call bell within reach, Caregiver / family present, and Side rails x 3    COMMUNICATION/COLLABORATION:   The patients plan of care was discussed with: Physical Therapist and Registered Nurse    Brayedn Coburn OT  Time Calculation: 34 mins

## 2022-10-03 NOTE — PROGRESS NOTES
Nephrology Progress Note  Rozina Journey  Date of Admission : 9/9/2022    CC:  Follow up for JEMIMA       Assessment and Plan     JEMIMA on HD:  - 2/2 ATN  - HD Schedule: TTS  - Outpatient unit: [facility] chair per CM notes  - Access: permacath placed on 9/16    Anemia:  - improving with MICHAEL 10k TTS    PAD s/p b/l BKA    Recent sepsis    Empyema    ABD wall abscess:  - J-tube removal, colostomy, I&D of abd wound  - TPN /lipids per primary team   - s/p I&D of abdominal abscess 9/29    Sacral decub       Interval History:  Seen and examined. Urine output 300 cc in the last 24 hours. Remains on TPN with lipids  Nursing staff reports poor p.o. intake over the weekend. Blood pressure is elevated denies any nausea vomiting or abdominal pain r. Current Medications: all current  Medications have been eviewed in EPIC  Review of Systems: A comprehensive review of systems was negative except for that written in the HPI. Objective:  Vitals:    Vitals:    10/03/22 0600 10/03/22 0748 10/03/22 0930 10/03/22 1000   BP:  (!) 171/64 (!) 167/73    Pulse: 68 68 72 65   Resp:  16 15    Temp:  98.3 °F (36.8 °C) 98 °F (36.7 °C)    TempSrc:       SpO2:  97% 100%    Weight:       Height:         Intake and Output:  No intake/output data recorded. 10/01 1901 - 10/03 0700  In: 26 [I.V.:866]  Out: 1825 [Urine:575]    Physical Examination:  General: Confused   Neck:  Supple, no mass  Resp:  Lungs CTA B/L, no wheezing , normal respiratory effort  CV:  RRR,  no murmur or rub, no LE edema  GI:  Soft, NT, + Bowel sounds, + ostomy  Neurologic:  Confused   Access:           RIRAMOS ESPINOSA    Lab Data Personally Reviewed: I have reviewed all the pertinent labs, microbiology data and radiology studies during assessment.     Recent Labs     10/03/22  0450 10/02/22  0541 10/01/22  0309   * 136 132*   K 4.0 3.7 4.3    103 101   CO2 24 28 23   GLU 84 87 78   BUN 75* 46* 74*   CREA 2.30* 1.64* 2.47*   CA 8.1* 7.7* 7.7*   MG 2.0 1.9 2.1 PHOS 2.5* 1.9* 3.4   ALB 1.5* 1.6* 1.4*   ALT 17 14 11*       Recent Labs     10/03/22  0450 10/02/22  0541 10/01/22  0309   WBC 11.0 9.9 11.3*   HGB 8.0* 7.9* 6.6*   HCT 26.0* 25.3* 21.5*    361 321       No results found for: SDES  Lab Results   Component Value Date/Time    Culture result: (A) 09/29/2022 09:21 PM     MODERATE PROTEUS MIRABILIS REFER TO [id number] FOR SENSITIVITIES    Culture result: MODERATE PSEUDOMONAS AERUGINOSA (A) 09/29/2022 09:21 PM    Culture result: NO ANAEROBES ISOLATED 09/29/2022 09:21 PM     Recent Results (from the past 24 hour(s))   GLUCOSE, POC    Collection Time: 10/02/22  4:32 PM   Result Value Ref Range    Glucose (POC) 105 65 - 117 mg/dL    Performed by Miguel Kee, POC    Collection Time: 10/02/22 11:23 PM   Result Value Ref Range    Glucose (POC) 108 65 - 117 mg/dL    Performed by Rc Cassidy    TRIGLYCERIDE    Collection Time: 10/03/22  4:50 AM   Result Value Ref Range    Triglyceride 106 <150 MG/DL   CBC WITH AUTOMATED DIFF    Collection Time: 10/03/22  4:50 AM   Result Value Ref Range    WBC 11.0 4.1 - 11.1 K/uL    RBC 2.79 (L) 4.10 - 5.70 M/uL    HGB 8.0 (L) 12.1 - 17.0 g/dL    HCT 26.0 (L) 36.6 - 50.3 %    MCV 93.2 80.0 - 99.0 FL    MCH 28.7 26.0 - 34.0 PG    MCHC 30.8 30.0 - 36.5 g/dL    RDW 20.5 (H) 11.5 - 14.5 %    PLATELET 113 298 - 299 K/uL    MPV 9.3 8.9 - 12.9 FL    NRBC 0.0 0  WBC    ABSOLUTE NRBC 0.00 0.00 - 0.01 K/uL    NEUTROPHILS 65 32 - 75 %    LYMPHOCYTES 18 12 - 49 %    MONOCYTES 8 5 - 13 %    EOSINOPHILS 7 0 - 7 %    BASOPHILS 1 0 - 1 %    IMMATURE GRANULOCYTES 1 (H) 0.0 - 0.5 %    ABS. NEUTROPHILS 7.1 1.8 - 8.0 K/UL    ABS. LYMPHOCYTES 2.0 0.8 - 3.5 K/UL    ABS. MONOCYTES 0.9 0.0 - 1.0 K/UL    ABS. EOSINOPHILS 0.8 (H) 0.0 - 0.4 K/UL    ABS. BASOPHILS 0.1 0.0 - 0.1 K/UL    ABS. IMM.  GRANS. 0.1 (H) 0.00 - 0.04 K/UL    DF SMEAR SCANNED      RBC COMMENTS ANISOCYTOSIS  2+       MAGNESIUM    Collection Time: 10/03/22  4:50 AM Result Value Ref Range    Magnesium 2.0 1.6 - 2.4 mg/dL   PHOSPHORUS    Collection Time: 10/03/22  4:50 AM   Result Value Ref Range    Phosphorus 2.5 (L) 2.6 - 4.7 MG/DL   METABOLIC PANEL, COMPREHENSIVE    Collection Time: 10/03/22  4:50 AM   Result Value Ref Range    Sodium 135 (L) 136 - 145 mmol/L    Potassium 4.0 3.5 - 5.1 mmol/L    Chloride 103 97 - 108 mmol/L    CO2 24 21 - 32 mmol/L    Anion gap 8 5 - 15 mmol/L    Glucose 84 65 - 100 mg/dL    BUN 75 (H) 6 - 20 MG/DL    Creatinine 2.30 (H) 0.70 - 1.30 MG/DL    BUN/Creatinine ratio 33 (H) 12 - 20      GFR est AA 36 (L) >60 ml/min/1.73m2    GFR est non-AA 30 (L) >60 ml/min/1.73m2    Calcium 8.1 (L) 8.5 - 10.1 MG/DL    Bilirubin, total 0.2 0.2 - 1.0 MG/DL    ALT (SGPT) 17 12 - 78 U/L    AST (SGOT) 26 15 - 37 U/L    Alk. phosphatase 152 (H) 45 - 117 U/L    Protein, total 5.5 (L) 6.4 - 8.2 g/dL    Albumin 1.5 (L) 3.5 - 5.0 g/dL    Globulin 4.0 2.0 - 4.0 g/dL    A-G Ratio 0.4 (L) 1.1 - 2.2     GLUCOSE, POC    Collection Time: 10/03/22  6:40 AM   Result Value Ref Range    Glucose (POC) 94 65 - 117 mg/dL    Performed by Bozena Elkins MD  [facility]   [address] [address]  Phone - [phone]   Fax - [phone]  www. [email or web address]

## 2022-10-03 NOTE — PROGRESS NOTES
Nephrology Progress Note  Gracie Carmona  Date of Admission : 9/9/2022    CC:  Follow up for JEMIMA       Assessment and Plan     JEMIMA on HD:  - 2/2 ATN  - HD Schedule: TTS  - Outpatient unit: [facility] chair per CM notes  - Access: permacath placed on 9/16    Anemia:  - improving with MICHAEL 10k TTS    PAD s/p b/l BKA    Recent sepsis    Empyema    ABD wall abscess:  - J-tube removal, colostomy, I&D of abd wound  - TPN /lipids per primary team   - s/p I&D of abdominal avbscess 9/29    Sacral decub       Interval History:  Seen and examined. Resting without issues. HR and BP stable. UO minimal.    Current Medications: all current  Medications have been eviewed in EPIC  Review of Systems: A comprehensive review of systems was negative except for that written in the HPI. Objective:  Vitals:    Vitals:    10/03/22 0415 10/03/22 0522 10/03/22 0600 10/03/22 0748   BP: (!) 144/61   (!) 171/64   Pulse: 68  68 68   Resp: 17   16   Temp: 98 °F (36.7 °C)   98.3 °F (36.8 °C)   TempSrc:       SpO2: 99%   97%   Weight:  75.3 kg (166 lb 0.1 oz)     Height:         Intake and Output:  No intake/output data recorded. 10/01 1901 - 10/03 0700  In: 26 [I.V.:866]  Out: 1825 [Urine:575]    Physical Examination:  General: Confused   Neck:  Supple, no mass  Resp:  Lungs CTA B/L, no wheezing , normal respiratory effort  CV:  RRR,  no murmur or rub, no LE edema  GI:  Soft, NT, + Bowel sounds, + ostomy  Neurologic:  Confused   Access:           [facility]    Lab Data Personally Reviewed: I have reviewed all the pertinent labs, microbiology data and radiology studies during assessment.     Recent Labs     10/03/22  0450 10/02/22  0541 10/01/22  0309   * 136 132*   K 4.0 3.7 4.3    103 101   CO2 24 28 23   GLU 84 87 78   BUN 75* 46* 74*   CREA 2.30* 1.64* 2.47*   CA 8.1* 7.7* 7.7*   MG 2.0 1.9 2.1   PHOS 2.5* 1.9* 3.4   ALB 1.5* 1.6* 1.4*   ALT 17 14 11*       Recent Labs     10/03/22  0450 10/02/22  0541 10/01/22  0309   WBC 11.0 9.9 11.3*   HGB 8.0* 7.9* 6.6*   HCT 26.0* 25.3* 21.5*    361 321       No results found for: SDES  Lab Results   Component Value Date/Time    Culture result: (A) 09/29/2022 09:21 PM     MODERATE PROTEUS MIRABILIS REFER TO [id number] FOR SENSITIVITIES    Culture result: MODERATE PSEUDOMONAS AERUGINOSA (A) 09/29/2022 09:21 PM    Culture result: NO ANAEROBES ISOLATED 09/29/2022 09:21 PM     Recent Results (from the past 24 hour(s))   GLUCOSE, POC    Collection Time: 10/02/22 11:35 AM   Result Value Ref Range    Glucose (POC) 91 65 - 117 mg/dL    Performed by Pro Howell RN    GLUCOSE, POC    Collection Time: 10/02/22  4:32 PM   Result Value Ref Range    Glucose (POC) 105 65 - 117 mg/dL    Performed by Miguel [address], POC    Collection Time: 10/02/22 11:23 PM   Result Value Ref Range    Glucose (POC) 108 65 - 117 mg/dL    Performed by Shannon Tang    TRIGLYCERIDE    Collection Time: 10/03/22  4:50 AM   Result Value Ref Range    Triglyceride 106 <150 MG/DL   CBC WITH AUTOMATED DIFF    Collection Time: 10/03/22  4:50 AM   Result Value Ref Range    WBC 11.0 4.1 - 11.1 K/uL    RBC 2.79 (L) 4.10 - 5.70 M/uL    HGB 8.0 (L) 12.1 - 17.0 g/dL    HCT 26.0 (L) 36.6 - 50.3 %    MCV 93.2 80.0 - 99.0 FL    MCH 28.7 26.0 - 34.0 PG    MCHC 30.8 30.0 - 36.5 g/dL    RDW 20.5 (H) 11.5 - 14.5 %    PLATELET 324 086 - 404 K/uL    MPV 9.3 8.9 - 12.9 FL    NRBC 0.0 0  WBC    ABSOLUTE NRBC 0.00 0.00 - 0.01 K/uL    NEUTROPHILS 65 32 - 75 %    LYMPHOCYTES 18 12 - 49 %    MONOCYTES 8 5 - 13 %    EOSINOPHILS 7 0 - 7 %    BASOPHILS 1 0 - 1 %    IMMATURE GRANULOCYTES 1 (H) 0.0 - 0.5 %    ABS. NEUTROPHILS 7.1 1.8 - 8.0 K/UL    ABS. LYMPHOCYTES 2.0 0.8 - 3.5 K/UL    ABS. MONOCYTES 0.9 0.0 - 1.0 K/UL    ABS. EOSINOPHILS 0.8 (H) 0.0 - 0.4 K/UL    ABS. BASOPHILS 0.1 0.0 - 0.1 K/UL    ABS. IMM.  GRANS. 0.1 (H) 0.00 - 0.04 K/UL    DF SMEAR SCANNED      RBC COMMENTS ANISOCYTOSIS  2+       MAGNESIUM    Collection Time: 10/03/22  4:50 AM   Result Value Ref Range    Magnesium 2.0 1.6 - 2.4 mg/dL   PHOSPHORUS    Collection Time: 10/03/22  4:50 AM   Result Value Ref Range    Phosphorus 2.5 (L) 2.6 - 4.7 MG/DL   METABOLIC PANEL, COMPREHENSIVE    Collection Time: 10/03/22  4:50 AM   Result Value Ref Range    Sodium 135 (L) 136 - 145 mmol/L    Potassium 4.0 3.5 - 5.1 mmol/L    Chloride 103 97 - 108 mmol/L    CO2 24 21 - 32 mmol/L    Anion gap 8 5 - 15 mmol/L    Glucose 84 65 - 100 mg/dL    BUN 75 (H) 6 - 20 MG/DL    Creatinine 2.30 (H) 0.70 - 1.30 MG/DL    BUN/Creatinine ratio 33 (H) 12 - 20      GFR est AA 36 (L) >60 ml/min/1.73m2    GFR est non-AA 30 (L) >60 ml/min/1.73m2    Calcium 8.1 (L) 8.5 - 10.1 MG/DL    Bilirubin, total 0.2 0.2 - 1.0 MG/DL    ALT (SGPT) 17 12 - 78 U/L    AST (SGOT) 26 15 - 37 U/L    Alk. phosphatase 152 (H) 45 - 117 U/L    Protein, total 5.5 (L) 6.4 - 8.2 g/dL    Albumin 1.5 (L) 3.5 - 5.0 g/dL    Globulin 4.0 2.0 - 4.0 g/dL    A-G Ratio 0.4 (L) 1.1 - 2.2     GLUCOSE, POC    Collection Time: 10/03/22  6:40 AM   Result Value Ref Range    Glucose (POC) 94 65 - 117 mg/dL    Performed by Tony Croft NP  [facility]   [id number] [place] [address]  Phone - [phone]   Fax - [phone]  [email or web address]

## 2022-10-03 NOTE — PROGRESS NOTES
Problem: Mobility Impaired (Adult and Pediatric)  Goal: *Acute Goals and Plan of Care (Insert Text)  Description: FUNCTIONAL STATUS PRIOR TO ADMISSION: The patient was functional at the wheelchair level and required minimal assistance for transfers to the chair. HOME SUPPORT PRIOR TO ADMISSION: The patient lived with wife, daughter and required up to moderate assistance  for tranfers bed <> w/c and ADLs. Patient has spent extensive period of time hospitalized recently and has been admitted from [facility]. Physical Therapy Goals    Upgraded 10/3/2022  1. Patient will roll left/right in bed for pressure relief mod I with use of bed rails within 7 day(s). 2.  Patient will move from supine to sit and sit to supine  in bed with SBA within 7 day(s). 3.  Patient will transfer from bed to chair and chair to bed with moderate assistance  using the least restrictive device within 7 day(s). 4  Patient will sit EOB x 15 minutes without UE support with CGA-min A within 7 day(s). Weekly Reassessment 9/22/22 (goals continued)  Initiated 9/15/2022  1. Patient will roll left/right in bed for pressure relief mod I with use of bed rails within 7 day(s). 2.  Patient will move from supine to sit and sit to supine  in bed with moderate assistance within 7 day(s). MET    3. Patient will transfer from bed to chair and chair to bed with moderate assistance  using the least restrictive device within 7 day(s). 4  Patient will sit EOB x 10 minutes without UE support with CGA-min A within 7 day(s). MET  Outcome: Progressing Towards Goal       PHYSICAL THERAPY TREATMENT: WEEKLY REASSESSMENT  Patient: Milena Soni (75 y.o. male)  Date: 10/3/2022  Primary Diagnosis: Intra-abdominal infection [B99.9]  Procedure(s) (LRB):  ABDOMINAL Wall DEBRIDEMENT (Left) 4 Days Post-Op   Precautions:   Fall, Other (comment) (bilateral BKA)      ASSESSMENT  Patient continues with skilled PT services and is progressing towards goals.  PT generally mobilized at a Mod A to CGA level during today's session. Pt received semi supine in bed, on RA, on tele, family in room, agreeable to work with therapy. Pt endorsed 8/10 pain prior to mobilizing but was agreeable to continue with therapy. Bed mechanics utilized to raise HOB to 46 degrees to assist with supine>sit transfer. Pt then rolled to R and came to a full upright sitting position EOB with Mod A for trunk advancement. Pt initially utilized UE support for upright balance and pressure relief however over course of 11 minutes sitting EOB pt able to progress to sitting independently without UE support. Pt performed limited UE therex (should AROM, scap retraction). Pt eventually endorsed increased abdominal discomfort and requested return to supine. Pt then rolled to L for positioning of new lisa and was scooted up in bed with total ax2 and elected to remain L sidelying and was positioned for comfort and session concluded. Anticipate with good pain control given recent progress with sitting balance pt a good candidate to attempt sliding board transfer in coming sessions. Patient's progression toward goals since last assessment: Met 2 of 4 goals, improved barthel score    Current Level of Function Impacting Discharge (mobility/balance): up to Mod A for bed mobility, yet to transfer bed<>chair    Functional Outcome Measure: The patient scored 40/100 on the barthel outcome measure which is indicative of partially dependent for self care. Other factors to consider for discharge: PLOF, bilateral BKA         PLAN :  Goals have been updated based on progression since last assessment. Patient continues to benefit from skilled intervention to address the above impairments.     Recommendations and Planned Interventions: bed mobility training, transfer training, therapeutic exercises, neuromuscular re-education, patient and family training/education, and therapeutic activities      Frequency/Duration: Patient will be followed by physical therapy:  4 times a week to address goals. Recommendation for discharge: (in order for the patient to meet his/her long term goals)  Working towards tolerating 3 hours of rehab therapy    This discharge recommendation:  Has been made in collaboration with the attending provider and/or case management    IF patient discharges home will need the following DME: to be determined (TBD)         SUBJECTIVE:   Patient stated Monika  keep stealing my theraband.     OBJECTIVE DATA SUMMARY:   HISTORY:    Past Medical History:   Diagnosis Date    Diabetes ([address]      Past Surgical History:   Procedure Laterality Date    IR INSERT TUNL CVC W/O PORT OVER 5 YR  9/16/2022       Personal factors and/or comorbidities impacting plan of care: pmh    Home Situation  Home Environment: Private residence  Wheelchair Ramp: Yes  One/Two Story Residence: One story  Living Alone: No  Support Systems: Spouse/Significant Other  Patient Expects to be Discharged to[de-identified] Rehab Unit Subacute  Current DME Used/Available at Home: Wheelchair, Geovanna Phlegm or Shower Type: Shower    EXAMINATION/PRESENTATION/DECISION MAKING:   Critical Behavior:  Neurologic State: Alert  Orientation Level: Oriented X4  Cognition: Follows commands  Safety/Judgement: Fall prevention, Home safety  Hearing: Auditory  Auditory Impairment: None    Range Of Motion:  AROM: Generally decreased, functional           PROM: Generally decreased, functional           Strength:    Strength: Generally decreased, functional       Functional Mobility:  Bed Mobility:  Rolling: Contact guard assistance;Bed Modified; Additional time  Supine to Sit: Moderate assistance; Additional time;Bed Modified  Sit to Supine: Minimum assistance; Additional time;Bed Modified  Scooting: Contact guard assistance    Balance:   Sitting: Intact; Without support      Therapeutic Exercises:   Shoulder AROM, scap retractions    Functional Measure:  Barthel Index:    Bathing: 0  Bladder: 10  Bowels: 5  Groomin  Dressin  Feeding: 10  Mobility: 0  Stairs: 0  Toilet Use: 0  Transfer (Bed to Chair and Back): 5  Total: 40/100       The Barthel ADL Index: Guidelines  1. The index should be used as a record of what a patient does, not as a record of what a patient could do. 2. The main aim is to establish degree of independence from any help, physical or verbal, however minor and for whatever reason. 3. The need for supervision renders the patient not independent. 4. A patient's performance should be established using the best available evidence. Asking the patient, friends/relatives and nurses are the usual sources, but direct observation and common sense are also important. However direct testing is not needed. 5. Usually the patient's performance over the preceding 24-48 hours is important, but occasionally longer periods will be relevant. 6. Middle categories imply that the patient supplies over 50 per cent of the effort. 7. Use of aids to be independent is allowed. Score Interpretation (from [address] [facility])    Independent   60-79 Minimally independent   40-59 Partially dependent   20-39 Very dependent   <20 Totally dependent     -Luke Sands., Barthel, DKeriW. (1965). Functional evaluation: the Barthel Index. [facility] ([address] (). The Barthel activities of daily living index: self-reporting versus actual performance in the old (> or = 75 years). Journal of [address] [place] [address] 45(7), [facility], JCHAZ, Coty Elias., Michelle Johnson. (1999). Measuring the change in disability after inpatient rehabilitation; comparison of the responsiveness of the Barthel Index and Functional [place] Measure. Journal of Neurology, Neurosurgery, and Psychiatry, 66(4), 845-954.   Jas Montoya, NTYLER.A, ORION Chen, & Diogo Ogden M.A. (2004) Assessment of post-stroke quality of life in cost-effectiveness studies: The usefulness of the Barthel Index and the EuroQoL-5D. Quality of Life Research, 13, 757-65           Pain Ratin/10 prior to mobility, RN aware    Activity Tolerance:   Good, tolerates ADLs without rest breaks, and SpO2 stable on RA    After treatment patient left in no apparent distress:   Patient positioned in left sidelying for pressure relief, Call bell within reach, Caregiver / family present, and Side rails x 3    COMMUNICATION/EDUCATION:   The patients plan of care was discussed with: Occupational therapist and Registered nurse. Fall prevention education was provided and the patient/caregiver indicated understanding., Patient/family have participated as able in goal setting and plan of care. , and Patient/family agree to work toward stated goals and plan of care.     Thank you for this referral.  Viviane White, PT   Time Calculation: 22 mins

## 2022-10-03 NOTE — WOUND CARE
WOCN Visit     Follow up visit with [facility] NP to assess Left abdominal/former J tube site. 9.29.22 S/P Left Abdominal Wall Debridement by Dr Gabe Damon     Left abdominal wall wound/former J tube site:  4.6 x 5.8 x 3.6 cm; 4.6 cm tunnel at 3 o'clock; undermining 3.5 cm 12-7 o'clock;  70% red 30% tan and clots; improved erythema and induration to wilbur wound; irrigated with saline; applied Santyl; packed with VASHE moistened packing; covered with dry gauze and abd pad. Will use Santyl therapy for the next day or two then reeval for [facility] therapy. Changed ostomy pouch. Other wounds assessed on 9.29.22. See note for that day. Will follow Tuesday.      ARNOLDO CrockerN RN [facility] Inpatient Wound Care  Available on Perfect Serve  Office [phone]

## 2022-10-04 LAB
ALBUMIN SERPL-MCNC: 1.5 G/DL (ref 3.5–5)
ALBUMIN/GLOB SERPL: 0.4 {RATIO} (ref 1.1–2.2)
ALP SERPL-CCNC: 160 U/L (ref 45–117)
ALT SERPL-CCNC: 21 U/L (ref 12–78)
ANION GAP SERPL CALC-SCNC: 11 MMOL/L (ref 5–15)
AST SERPL-CCNC: 35 U/L (ref 15–37)
BASOPHILS # BLD: 0.1 K/UL (ref 0–0.1)
BASOPHILS NFR BLD: 1 % (ref 0–1)
BILIRUB SERPL-MCNC: 0.2 MG/DL (ref 0.2–1)
BUN SERPL-MCNC: 99 MG/DL (ref 6–20)
BUN/CREAT SERPL: 37 (ref 12–20)
CALCIUM SERPL-MCNC: 8.3 MG/DL (ref 8.5–10.1)
CHLORIDE SERPL-SCNC: 105 MMOL/L (ref 97–108)
CO2 SERPL-SCNC: 21 MMOL/L (ref 21–32)
CREAT SERPL-MCNC: 2.71 MG/DL (ref 0.7–1.3)
DIFFERENTIAL METHOD BLD: ABNORMAL
EOSINOPHIL # BLD: 0.8 K/UL (ref 0–0.4)
EOSINOPHIL NFR BLD: 7 % (ref 0–7)
ERYTHROCYTE [DISTWIDTH] IN BLOOD BY AUTOMATED COUNT: 20.7 % (ref 11.5–14.5)
GLOBULIN SER CALC-MCNC: 3.8 G/DL (ref 2–4)
GLUCOSE BLD STRIP.AUTO-MCNC: 82 MG/DL (ref 65–117)
GLUCOSE BLD STRIP.AUTO-MCNC: 86 MG/DL (ref 65–117)
GLUCOSE BLD STRIP.AUTO-MCNC: 86 MG/DL (ref 65–117)
GLUCOSE BLD STRIP.AUTO-MCNC: 91 MG/DL (ref 65–117)
GLUCOSE SERPL-MCNC: 78 MG/DL (ref 65–100)
HCT VFR BLD AUTO: 24.9 % (ref 36.6–50.3)
HGB BLD-MCNC: 7.7 G/DL (ref 12.1–17)
IMM GRANULOCYTES # BLD AUTO: 0.2 K/UL (ref 0–0.04)
IMM GRANULOCYTES NFR BLD AUTO: 2 % (ref 0–0.5)
LYMPHOCYTES # BLD: 2.2 K/UL (ref 0.8–3.5)
LYMPHOCYTES NFR BLD: 20 % (ref 12–49)
MAGNESIUM SERPL-MCNC: 2.1 MG/DL (ref 1.6–2.4)
MCH RBC QN AUTO: 29.2 PG (ref 26–34)
MCHC RBC AUTO-ENTMCNC: 30.9 G/DL (ref 30–36.5)
MCV RBC AUTO: 94.3 FL (ref 80–99)
MONOCYTES # BLD: 0.8 K/UL (ref 0–1)
MONOCYTES NFR BLD: 7 % (ref 5–13)
NEUTS SEG # BLD: 6.7 K/UL (ref 1.8–8)
NEUTS SEG NFR BLD: 63 % (ref 32–75)
NRBC # BLD: 0 K/UL (ref 0–0.01)
NRBC BLD-RTO: 0 PER 100 WBC
PHOSPHATE SERPL-MCNC: 2.6 MG/DL (ref 2.6–4.7)
PLATELET # BLD AUTO: 409 K/UL (ref 150–400)
PMV BLD AUTO: 9.4 FL (ref 8.9–12.9)
POTASSIUM SERPL-SCNC: 3.6 MMOL/L (ref 3.5–5.1)
PROT SERPL-MCNC: 5.3 G/DL (ref 6.4–8.2)
RBC # BLD AUTO: 2.64 M/UL (ref 4.1–5.7)
RBC MORPH BLD: ABNORMAL
SERVICE CMNT-IMP: NORMAL
SODIUM SERPL-SCNC: 137 MMOL/L (ref 136–145)
TRIGL SERPL-MCNC: 103 MG/DL (ref ?–150)
WBC # BLD AUTO: 10.8 K/UL (ref 4.1–11.1)

## 2022-10-04 PROCEDURE — 74011250636 HC RX REV CODE- 250/636: Performed by: NURSE PRACTITIONER

## 2022-10-04 PROCEDURE — 65270000046 HC RM TELEMETRY

## 2022-10-04 PROCEDURE — 74011000250 HC RX REV CODE- 250: Performed by: COLON & RECTAL SURGERY

## 2022-10-04 PROCEDURE — 74011250637 HC RX REV CODE- 250/637: Performed by: COLON & RECTAL SURGERY

## 2022-10-04 PROCEDURE — P9047 ALBUMIN (HUMAN), 25%, 50ML: HCPCS | Performed by: INTERNAL MEDICINE

## 2022-10-04 PROCEDURE — 74011250636 HC RX REV CODE- 250/636: Performed by: HOSPITALIST

## 2022-10-04 PROCEDURE — 36415 COLL VENOUS BLD VENIPUNCTURE: CPT

## 2022-10-04 PROCEDURE — 84100 ASSAY OF PHOSPHORUS: CPT

## 2022-10-04 PROCEDURE — 84478 ASSAY OF TRIGLYCERIDES: CPT

## 2022-10-04 PROCEDURE — 97530 THERAPEUTIC ACTIVITIES: CPT

## 2022-10-04 PROCEDURE — 97605 NEG PRS WND THER DME<=50SQCM: CPT

## 2022-10-04 PROCEDURE — 82962 GLUCOSE BLOOD TEST: CPT

## 2022-10-04 PROCEDURE — 74011000258 HC RX REV CODE- 258: Performed by: NURSE PRACTITIONER

## 2022-10-04 PROCEDURE — 74011250636 HC RX REV CODE- 250/636: Performed by: COLON & RECTAL SURGERY

## 2022-10-04 PROCEDURE — 74011250636 HC RX REV CODE- 250/636: Performed by: INTERNAL MEDICINE

## 2022-10-04 PROCEDURE — 97535 SELF CARE MNGMENT TRAINING: CPT

## 2022-10-04 PROCEDURE — 90935 HEMODIALYSIS ONE EVALUATION: CPT

## 2022-10-04 PROCEDURE — 74011250637 HC RX REV CODE- 250/637: Performed by: PHYSICIAN ASSISTANT

## 2022-10-04 PROCEDURE — 74011000258 HC RX REV CODE- 258: Performed by: HOSPITALIST

## 2022-10-04 PROCEDURE — 77030018717 HC DRSG GRNUFM KCON -B

## 2022-10-04 PROCEDURE — 85025 COMPLETE CBC W/AUTO DIFF WBC: CPT

## 2022-10-04 PROCEDURE — 77030037877 HC DRSG MEPILEX >48IN BORD MOLN -A

## 2022-10-04 PROCEDURE — 2709999900 HC NON-CHARGEABLE SUPPLY

## 2022-10-04 PROCEDURE — 80053 COMPREHEN METABOLIC PANEL: CPT

## 2022-10-04 PROCEDURE — 74011000250 HC RX REV CODE- 250: Performed by: HOSPITALIST

## 2022-10-04 PROCEDURE — 74011250637 HC RX REV CODE- 250/637: Performed by: STUDENT IN AN ORGANIZED HEALTH CARE EDUCATION/TRAINING PROGRAM

## 2022-10-04 PROCEDURE — 83735 ASSAY OF MAGNESIUM: CPT

## 2022-10-04 PROCEDURE — 74011000250 HC RX REV CODE- 250: Performed by: PHYSICIAN ASSISTANT

## 2022-10-04 PROCEDURE — 74011636637 HC RX REV CODE- 636/637: Performed by: HOSPITALIST

## 2022-10-04 PROCEDURE — 77030037878 HC DRSG MEPILEX >48IN BORD MOLN -B

## 2022-10-04 RX ADMIN — Medication: at 19:17

## 2022-10-04 RX ADMIN — FERROUS SULFATE TAB 325 MG (65 MG ELEMENTAL FE) 325 MG: 325 (65 FE) TAB at 17:37

## 2022-10-04 RX ADMIN — ONDANSETRON 4 MG: 4 TABLET, ORALLY DISINTEGRATING ORAL at 13:49

## 2022-10-04 RX ADMIN — ACETAMINOPHEN 650 MG: 325 TABLET, FILM COATED ORAL at 03:07

## 2022-10-04 RX ADMIN — ALBUMIN (HUMAN) 50 G: 0.25 INJECTION, SOLUTION INTRAVENOUS at 01:28

## 2022-10-04 RX ADMIN — HYDROMORPHONE HYDROCHLORIDE 4 MG: 2 TABLET ORAL at 14:50

## 2022-10-04 RX ADMIN — HYDROMORPHONE HYDROCHLORIDE 1 MG: 1 INJECTION, SOLUTION INTRAMUSCULAR; INTRAVENOUS; SUBCUTANEOUS at 17:38

## 2022-10-04 RX ADMIN — EPOETIN ALFA-EPBX 15000 UNITS: 10000 INJECTION, SOLUTION INTRAVENOUS; SUBCUTANEOUS at 21:42

## 2022-10-04 RX ADMIN — HYDROMORPHONE HYDROCHLORIDE 1 MG: 1 INJECTION, SOLUTION INTRAMUSCULAR; INTRAVENOUS; SUBCUTANEOUS at 23:15

## 2022-10-04 RX ADMIN — I.V. FAT EMULSION 250 ML: 20 EMULSION INTRAVENOUS at 23:07

## 2022-10-04 RX ADMIN — MIRTAZAPINE 7.5 MG: 15 TABLET, FILM COATED ORAL at 20:43

## 2022-10-04 RX ADMIN — COLLAGENASE SANTYL: 250 OINTMENT TOPICAL at 08:45

## 2022-10-04 RX ADMIN — ONDANSETRON 4 MG: 2 INJECTION INTRAMUSCULAR; INTRAVENOUS at 20:39

## 2022-10-04 RX ADMIN — HYDROMORPHONE HYDROCHLORIDE 2 MG: 2 TABLET ORAL at 08:35

## 2022-10-04 RX ADMIN — APIXABAN 5 MG: 5 TABLET, FILM COATED ORAL at 20:42

## 2022-10-04 RX ADMIN — GABAPENTIN 100 MG: 100 CAPSULE ORAL at 20:42

## 2022-10-04 RX ADMIN — HYDROMORPHONE HYDROCHLORIDE 1 MG: 1 INJECTION, SOLUTION INTRAMUSCULAR; INTRAVENOUS; SUBCUTANEOUS at 10:35

## 2022-10-04 RX ADMIN — SODIUM CHLORIDE, PRESERVATIVE FREE 10 ML: 5 INJECTION INTRAVENOUS at 17:44

## 2022-10-04 RX ADMIN — SODIUM CHLORIDE, PRESERVATIVE FREE 10 ML: 5 INJECTION INTRAVENOUS at 21:00

## 2022-10-04 RX ADMIN — Medication 1 CAPSULE: at 08:34

## 2022-10-04 RX ADMIN — APIXABAN 5 MG: 5 TABLET, FILM COATED ORAL at 08:35

## 2022-10-04 RX ADMIN — HYDROMORPHONE HYDROCHLORIDE 1 MG: 1 INJECTION, SOLUTION INTRAMUSCULAR; INTRAVENOUS; SUBCUTANEOUS at 06:14

## 2022-10-04 RX ADMIN — PANTOPRAZOLE SODIUM 40 MG: 40 TABLET, DELAYED RELEASE ORAL at 06:46

## 2022-10-04 RX ADMIN — MEROPENEM 500 MG: 500 INJECTION, POWDER, FOR SOLUTION INTRAVENOUS at 08:35

## 2022-10-04 RX ADMIN — FERROUS SULFATE TAB 325 MG (65 MG ELEMENTAL FE) 325 MG: 325 (65 FE) TAB at 08:34

## 2022-10-04 RX ADMIN — SERTRALINE 25 MG: 50 TABLET, FILM COATED ORAL at 08:35

## 2022-10-04 RX ADMIN — HYDROMORPHONE HYDROCHLORIDE 4 MG: 2 TABLET ORAL at 00:29

## 2022-10-04 RX ADMIN — GABAPENTIN 100 MG: 100 CAPSULE ORAL at 17:37

## 2022-10-04 RX ADMIN — GABAPENTIN 100 MG: 100 CAPSULE ORAL at 08:35

## 2022-10-04 RX ADMIN — SODIUM CHLORIDE, PRESERVATIVE FREE 10 ML: 5 INJECTION INTRAVENOUS at 06:14

## 2022-10-04 RX ADMIN — PANTOPRAZOLE SODIUM 40 MG: 40 TABLET, DELAYED RELEASE ORAL at 17:37

## 2022-10-04 NOTE — PROGRESS NOTES
Problem: Mobility Impaired (Adult and Pediatric)  Goal: *Acute Goals and Plan of Care (Insert Text)  Description: FUNCTIONAL STATUS PRIOR TO ADMISSION: The patient was functional at the wheelchair level and required minimal assistance for transfers to the chair. HOME SUPPORT PRIOR TO ADMISSION: The patient lived with wife, daughter and required up to moderate assistance  for tranfers bed <> w/c and ADLs. Patient has spent extensive period of time hospitalized recently and has been admitted from [place] [address]. Physical Therapy Goals    Upgraded 10/3/2022  1. Patient will roll left/right in bed for pressure relief mod I with use of bed rails within 7 day(s). 2.  Patient will move from supine to sit and sit to supine  in bed with SBA within 7 day(s). 3.  Patient will transfer from bed to chair and chair to bed with moderate assistance  using the least restrictive device within 7 day(s). 4  Patient will sit EOB x 15 minutes without UE support with CGA-min A within 7 day(s). Weekly Reassessment 9/22/22 (goals continued)  Initiated 9/15/2022  1. Patient will roll left/right in bed for pressure relief mod I with use of bed rails within 7 day(s). 2.  Patient will move from supine to sit and sit to supine  in bed with moderate assistance within 7 day(s). MET    3. Patient will transfer from bed to chair and chair to bed with moderate assistance  using the least restrictive device within 7 day(s). 4  Patient will sit EOB x 10 minutes without UE support with CGA-min A within 7 day(s). MET  Outcome: Progressing Towards Goal       PHYSICAL THERAPY TREATMENT  Patient: Jignesh Cade (89 y.o. male)  Date: 10/4/2022  Diagnosis: Intra-abdominal infection [B99.9] Intra-abdominal infection  Procedure(s) (LRB):  ABDOMINAL Wall DEBRIDEMENT (Left) 5 Days Post-Op  Precautions: Fall, Other (comment) (bilateral BKA)  Chart, physical therapy assessment, plan of care and goals were reviewed.     ASSESSMENT  Patient continues with skilled PT services and is progressing towards goals. Pt generally mobilized at a CGA to Mod A x2 level during today's session. Pt received sidelying in bed on RA, on tele, agreeable and motivated to work with therapy. Pt displayed improved supine>sit transfer this session over last (requred decreased assist to compete transfer). Pt then sat EOB ~ 10 minutes performing self care tasks (see OT note) and demonstrating improved sitting balance as evidenced by no need for UE support during process. Per report from RN, wound care scheduled to treat pt post therapy session and thus deferred transfer bed>chair as wound care required pt supine in bed. Pt then initiated lateral weight shift for pressure relief training and in preparation for lateral transfers. Pt demonstrated good ability to shift left and right but was unable to advance body 2/2 soft surface of bed and anticipate pt would be able to better propel self with sliding board in future session. During lateral weight shifting pt extended hips and began to slide forward off bed however with PT assist, pt able to go sit>supine and then roll for repositioning. This session pt displayed improved activity tolerance and decrease reports of pain. Pt remains motivated and engaged in therapy session and provided with theraband for therex in between therapy sessions. Additionally pt endorsed and demonstrated ability to scoot up in bed with use of bed mechanics and rails during session. Continue to rec d/c to IPR once medically stable. Current Level of Function Impacting Discharge (mobility/balance): up to Mod A x2 for bed mobility    Other factors to consider for discharge: PLOF, hospital course, supportive family,         PLAN :  Patient continues to benefit from skilled intervention to address the above impairments. Continue treatment per established plan of care. to address goals.     Recommendation for discharge: (in order for the patient to meet his/her long term goals)  Therapy 3 hours per day 5-7 days per week    This discharge recommendation:  Has been made in collaboration with the attending provider and/or case management    IF patient discharges home will need the following DME: to be determined (TBD)       SUBJECTIVE:   Patient stated my grandson is 4.    OBJECTIVE DATA SUMMARY:   Critical Behavior:  Neurologic State: Alert  Orientation Level: Oriented X4  Cognition: Follows commands  Safety/Judgement: Fall prevention, Home safety  Functional Mobility Training:  Bed Mobility:     Supine to Sit: Contact guard assistance;Bed Modified (HOB raised to 60 deg)  Sit to Supine: Moderate assistance;Assist x2 (due to B hips slipping to EOB)             Balance:  Sitting: Intact; Without support  Sitting - Dynamic: Fair (occasional) (laterally weight shifting)      Pain Rating:  Pt did not quantify pain during session      Activity Tolerance:   Good, tolerates ADLs without rest breaks, and SpO2 stable on RA    After treatment patient left in no apparent distress:   Supine in bed, Call bell within reach, and Side rails x 3    COMMUNICATION/COLLABORATION:   The patients plan of care was discussed with: Occupational therapist, Registered nurse, and Case management.      Natalie Kaba, PT   Time Calculation: 24 mins

## 2022-10-04 NOTE — PROGRESS NOTES
Problem: Self Care Deficits Care Plan (Adult)  Goal: *Acute Goals and Plan of Care (Insert Text)  Description: FUNCTIONAL STATUS PRIOR TO ADMISSION: Prior to previous hospitalization, d/c to [facility], and current admission to [facility] pt was utilizing w/c for functional mobility and required minimal assistance for transfers. HOME SUPPORT: The patient lived with his wife and daughter and required assistance for all ADL/IADL tasks. Occupational Therapy Goals  Initiated 9/15/2022, OT weekly re-assessment 9/22/22  1. Patient will perform basic grooming in unsupported sitting with minimal assistance/contact guard assist within 7 day(s). (Continue 9/22, 10/3)  2. Patient will perform anterior neck to thigh bathing while in unsupported sitting with minimal assistance/contact guard assist within 7 day(s). (Downgrade to mod A 9/22, continue 10/3)  3. Patient will perform drop arm BSC via lateral transfers with moderate assistance within 7 day(s). (Continue 9/22, 10/3)  4. Patient will perform all aspects of toileting with moderate assistance  within 7 day(s). (At bed level 9/22, 10/3)  5. Patient will participate in upper extremity therapeutic exercise/activities with supervision/set-up for 10 minutes within 7 day(s). (Continue 9/22, 10/3)      Outcome: Progressing Towards Goal    OCCUPATIONAL THERAPY TREATMENT  Patient: Melanie Pavon (35 y.o. male)  Date: 10/4/2022  Diagnosis: Intra-abdominal infection [B99.9] Intra-abdominal infection  Procedure(s) (LRB):  ABDOMINAL Wall DEBRIDEMENT (Left) 5 Days Post-Op  Precautions: Fall, Other (comment) (bilateral BKA)  Chart, occupational therapy assessment, plan of care, and goals were reviewed. ASSESSMENT  Patient continues with skilled OT services and is progressing towards goals. Pt remains highly motivated to work with therapy and therapy session coordinated with RN for pain medicine administration.  From elevated HOB (60 degrees) and pt in R side lying, pt able to achieve full upright sitting with CGA (improvement from mod A yesterday) and use of bed rails. Pt tolerated sitting EOB x 10 minutes with removal of UE support to brush teeth/grooming ADL with setup. Max A to don R residual limb sleeve and pt reported prothesis company to come see pt this PM. Vitals stable with activity. At this time, pt demonstrates daily improvement and progress with activity tolerance, pain management and ADL participation. He requires setup for feeding/grooming EOB, min A UB and max to total A for LB dressing and bathing. Pt unable to tolerate transfers at this time due to pain, wound care follow up and sacral wound bandage coming off. OT continues to highly recommend IPR at discharge as pt is motivated to regain a functional level of independence for his basic self care tasks. Current Level of Function Impacting Discharge (ADLs): setup for feeding/grooming EOB, min A UB and max to total A for LB dressing and bathing. Currently needing toileting at bed level. Pt able to roll L/R with supervision for placement of bed pan, pt able to independently use urinal in the bed. Other factors to consider for discharge: from home, using R prothesis          PLAN :  Patient continues to benefit from skilled intervention to address the above impairments. Continue treatment per established plan of care to address goals. Recommend with staff: encourage assist with ADLs, rolling/repositioning in the bed    Recommend next OT session: ADLs EOB, LB dressing    Recommendation for discharge: (in order for the patient to meet his/her long term goals)  Therapy 3 hours per day 5-7 days per week    This discharge recommendation:  Has been made in collaboration with the attending provider and/or case management    IF patient discharges home will need the following DME: TBD       SUBJECTIVE:   Patient stated I will do what I can.     OBJECTIVE DATA SUMMARY:   Cognitive/Behavioral Status: Functional Mobility and Transfers for ADLs:  Bed Mobility:  Supine to Sit: Contact guard assistance;Bed Modified (HOB raised to 60 deg)  Sit to Supine: Moderate assistance;Assist x2 (due to B hips slipping to EOB)    Transfers:             Balance:  Sitting: Intact; Without support  Sitting - Dynamic: Fair (occasional) (laterally weight shifting)    ADL Intervention:       Grooming  Brushing Teeth: Set-up (sitting EOB)                                       Therapeutic Exercises:   Issued green theraband, pt is independently completing exercises UE at bed level    Pain:  Increased pain at sacral region, especially with attempts to laterally scoot/weight shift while sitting EOB    Activity Tolerance:   Good    After treatment patient left in no apparent distress:   Patient positioned in R sidelying for pressure relief, Call bell within reach, and Side rails x 3    COMMUNICATION/COLLABORATION:   The patients plan of care was discussed with: Physical therapist, Registered nurse, and Case management.      Ritika Mack OT  Time Calculation: 27 mins

## 2022-10-04 NOTE — PROGRESS NOTES
[facility] Adult  Hospitalist Group                                                                                          Hospitalist Progress Note  Hailey Ornelas MD  Answering service: [phone] OR [phone] from in house phone        Date of Service:  10/4/2022  NAME:  Cirilo Frias  :  1970  MRN:  [id number]      Admission Summary: This is a 24-year-old man with a PMH of T2DM, BKA bilaterally, JEMIMA on CKD requiring HD, Respiratory Failure requiring intubation who presented at the ED from [facility] with c/o abdominal pain. The patient was recently admitted to another hospital and underwent left below-knee amputation, hospitalization complicated with respiratory failure which required prolonged intubation- attributed to aspiration pneumonia, also developed lobulated effusion, for which the patient underwent decortication and right thoracotomy and acute renal failure for which he has been started on hemodialysis. He had a J-tube was placed for supplemental nutrition and was transferred to [facility] for continuation of care. He was doing relatively well in [facility] until the day of presentation at the ED when the patient complained of abdominal pain at the facility. CT scan of the abdomen and pelvis was obtained: shows evidence of bowel perforation, sent to [facility] fro further eval/tx. When the patient arrived at the emergency room, based on his clinical presentation and lab work, Code Sepsis was triggered. The patient received fluid therapy as per sepsis protocol. The emergency room physician consulted general surgeon on-call. The patient was seen by the surgeon and the patient is planned for immediate surgical intervention. No record of prior admission to this hospital.     Interval history / Subjective:   He states he is eating more with improved appetite, he has had a wound vac placed      Assessment & Plan:     Dislodged JG tube with surrounding peritonitis. Free air and perirectal fistula. Patient was sent from [facility] for intractable abdominal pain. Severe sepsis without septic shock due to intra-abdominal source of infection. .  -He was started on broad-spectrum empiric antibiotics on admission and underwent the following procedures:  -Gen Sx: 9/10 Lap take down and removal J-tube, Lap colostomy, I&D abdominal wall  -Colorectal Sx: 9/15 anorectal wound exam including rigid proctosigmoidoscopy  -ID were consulted and assisted with antibiotics management. Patient completed IV Eraxis/Zosyn/Vancomycin completed 9/23/22 and recommendation was to continue Augmentin x2 weeks from 9/24. Due to worsening leukocytosis however the Augmentin was discontinued and patient is restarted on Zosyn since 9/28.  -C. difficile, blood culture where negative. -CT chest 9/10: Small right basilar gas and fluid containing pleural collection with a  peripheral soft tissue rind. Finding may represent an empyema and clinical correlation is recommended. Recommend direct comparison to any additional prior imaging. Free intraperitoneal gas, seen on prior CT of the abdomen and pelvis. -CT abd/pel wo 9/14:No focal intra-abdominal fluid collection to suggest abscess. Free intraperitoneal gas consistent with recent intra-abdominal surgery. Interval improvement in rectal thickening. Prior CT dated September 9, 2022 demonstrated a probable right posterior rectal wall defect which is no longer visualized on today's examination. Persistent, small right basilar gas and fluid containing pleural collection, unchanged.  -wound care ostomy care  Abdominal wounds are growing Pseudomonas  Recommendations are to continue with IV meropenem until 10/13  Last debridement 9/29    JEMIMA vs CKD requiring dialysis: CVC 9/16/22- IR.  -Continued on hemodialysis on MWF schedule, continue. No sign of recovery. -Access, permacath placed on 9/16     Type 2 diabetes mellitus with hyperglycemia.   Patient is currently tolerating diet and being supplemented with TPN. -Monitor blood closely 4 times daily and as needed. Humalog sliding scale. AFIB: rate stable. -EKG 9/19: AFIB. -eliquis 5mg BID  -ECHO 9/26: Left Ventricle: The EF by visual approximation is 55 - 60%. Left ventricle size is normal. Normal wall thickness. Normal wall motion. Normal diastolic function. Tricuspid Valve: Mildly elevated RVSP. The estimated RVSP is 42 mmHg. Empyema: noted: recent thoracotomy and prolonged intubation at [facility]. -CXR9/19: Persistent right basilar airspace disease and right-sided loculated effusion/empyema. -CT chest 9/10: Small right basilar gas and fluid containing pleural collection with a  peripheral soft tissue rind. Finding may represent an empyema and clinical correlation is recommended. Recommend direct comparison to any additional prior imaging. Free intraperitoneal gas, seen on prior CT of the abdomen and pelvis. -Pulmonary consulted: noted \"percutaneous drainage would not be successful at removing fluid as I suppose it is very thick and organized at this time. Would only recommend following clinically and radiographically. If worsens or worsened right-sided effusion he would need to be reevaluated by his thoracic surgeon at [facility] for additional drainage. \"  -IV ABT's course as above. Acute blood loss anemia on chronic:  -monitor Hgb  -transfuse to keep Hgb > 7.0  -Hgb 6.7- transfuse 1 unit PRBC's 9/26 9/30- Hb 7. 1. will continue to monitor. 10/3- Stable     +Occult Blood: no melena, no n/v.  -PPI  -iron supplements  -monitor Hgb, transfuse to keep Hgb > 7.0     AMS: delirium, hallucinations ? 2/2 to toxic metabolic encephalopathy, hospital delirium, and opioid induced. -CT head 9/23: no acute intracranial abnormality.   -Alert and oriented on rounds 9/28.  10/3- AAO     Depression: continue zoloft.   Thrombocytosis: noted reactive thrombocytosis, monitor platelets, trending downward    Epigastric J site wound with surrounding abscess, POA.  --Patient underwent incision and drainage on 9/10  --There is surrounding erythema, tenderness with some purulence. Given rebound leukocytosis, will ask general surgery to [place] to see if this will need further I&D    Bilateral BKA, left BKA dehiscence and possible infection.  -Vascular Sx followin/26-sutures removed and some eschar along incision line excised, open cavity laterally has old hematoma at base that was evacuated   -CT of the left BKA stump on  showed several irregular soft tissue defects overlying the amputation site with a 4.4 x 4.1 x 1.9 cm amorphous fluid collection with partial thick walled/rim-enhancing and internal gas locules. --Patient restarted back on Zosyn due to worsening leukocytosis. --Spoke with Dr Linden Ricks will check on pt tomorrow   - Leg wound growing Pseudomonas  Vascular has seen  10/1-continue antibiotics    Anorectal wound, POA  --Followed by colorectal surgery. He is status post proctosigmoidoscopy on 9/15, there was communication of the distal rectal lumen with extraperitoneal pelvis. Acute pain syndrome. This is due to the multiple surgeries he had. Anticipate some degree of tolerance due to his continued exposure to periods  --Current pain regimen includes scheduled gabapentin 100 mg 3 times daily  -- As needed 0.2 mg IV Dilaudid every 4 hourly, as needed; Dilaudid 2 mg p.o. every 4 hourly as needed and Dilaudid 4 mg p.o. every 4 hourly as needed. Palliative care help appreciated. Patient will probably benefit from scheduled long-acting opiate such as MS Contin. Continue wound care    GIppx: Pepcid  Code Status: Full Code  Diet: Regular, supplements BID  --Time to start weaning off TPN.   Activity: bedrest  Discharge: TBD  Ambulates:  nonambulatory  Discharge planning: Accepted at [facility] 24-48 hours  needs OP HD chair      Hospital Problems  Date Reviewed: 9/10/2022            Codes Class Noted POA    Injury to rectum without open wound into cavity ICD-10-CM: S36.60XA  ICD-9-CM: 863.45  9/20/2022 Yes        Open wound of anus ICD-10-CM: O51.582G  ICD-9-CM: 863.89  9/20/2022 Yes        Severe protein-calorie malnutrition ([facility] [address] ICD-10-CM: V82  ICD-9-CM: 496  9/13/2022 Yes        * (Principal) Intra-abdominal infection ICD-10-CM: B99.9  ICD-9-CM: 136.9  9/10/2022 Yes       Review of Systems:   A comprehensive review of systems was negative except for that written in the HPI. Vital Signs:    Last 24hrs VS reviewed since prior progress note. Most recent are:  Visit Vitals  /86 (BP 1 Location: Right upper arm, BP Patient Position: At rest)   Pulse 77   Temp 98.3 °F (36.8 °C)   Resp 16   Ht 5' 7\" (1.702 m)   Wt 68.2 kg (150 lb 5.7 oz)   SpO2 100%   BMI 23.55 kg/m²         Intake/Output Summary (Last 24 hours) at 10/4/2022 1744  Last data filed at 10/4/2022 1671  Gross per 24 hour   Intake 1367.73 ml   Output 2150 ml   Net -782.27 ml          Physical Examination:     I had a face to face encounter with this patient and independently examined them on 10/4/2022 as outlined below:    Refer to wound care pictures for abdominal wound. Constitutional: Chronically sick looking gentleman, he is alert and oriented x3. ENT:  Oral mucosa moist.    Resp/chest wall:  CTA bilaterally. No wheezing/rhonchi/rales. No accessory muscle use. Permacath and double-lumen PICC line the right chest wall. CV:  Regular rate, S1,S2.    GI/: Epigastric chest site 1 2 with surrounding erythema, induration and tenderness and purulent discharge. Colostomy in the lower abdomen. No erythema, swelling, discharge in the inguinal.  Bowel sounds are normoactive. Musculoskeletal:  No edema, warm, bilateral BKA: dressing to LLE stunp. Neurologic:  Moves all extremities. Awake and alert, disoriented. Skin:  multiple wounds, skin w/d, jaundiced. Psych:  Poor insight, Not anxious nor agitated.             Data Review:    Review and/or order of clinical lab test      Labs:     Recent Labs     10/04/22  0111 10/03/22  0450   WBC 10.8 11.0   HGB 7.7* 8.0*   HCT 24.9* 26.0*   * 396       Recent Labs     10/04/22  0111 10/03/22  0450 10/02/22  0541    135* 136   K 3.6 4.0 3.7    103 103   CO2 21 24 28   BUN 99* 75* 46*   CREA 2.71* 2.30* 1.64*   GLU 78 84 87   CA 8.3* 8.1* 7.7*   MG 2.1 2.0 1.9   PHOS 2.6 2.5* 1.9*       Recent Labs     10/04/22  0111 10/03/22  0450 10/02/22  0541   ALT 21 17 14   * 152* 154*   TBILI 0.2 0.2 0.2   TP 5.3* 5.5* 5.4*   ALB 1.5* 1.5* 1.6*   GLOB 3.8 4.0 3.8       No results for input(s): INR, PTP, APTT, INREXT, INREXT in the last 72 hours. No results for input(s): FE, TIBC, PSAT, FERR in the last 72 hours. Lab Results   Component Value Date/Time    Folate 4.1 (L) 09/10/2022 12:00 PM        No results for input(s): PH, PCO2, PO2 in the last 72 hours. No results for input(s): CPK, CKNDX, TROIQ in the last 72 hours.     No lab exists for component: CPKMB  Lab Results   Component Value Date/Time    Triglyceride 103 10/04/2022 01:11 AM     Lab Results   Component Value Date/Time    Glucose (POC) 86 10/04/2022 01:47 PM    Glucose (POC) 82 10/04/2022 06:13 AM    Glucose (POC) 87 10/03/2022 11:27 PM    Glucose (POC) 143 (H) 10/03/2022 06:49 PM    Glucose (POC) 98 10/03/2022 12:38 PM     No results found for: COLOR, APPRN, SPGRU, REFSG, WILL, PROTU, GLUCU, KETU, BILU, UROU, MISHA, LEUKU, GLUKE, EPSU, BACTU, WBCU, RBCU, CASTS, UCRY      Medications Reviewed:     Current Facility-Administered Medications   Medication Dose Route Frequency    epoetin vera-epbx (RETACRIT) injection 15,000 Units  15,000 Units SubCUTAneous DIALYSIS TUE, THU & SAT    TPN ADULT - CENTRAL   IntraVENous CONTINUOUS    TPN ADULT - CENTRAL   IntraVENous CONTINUOUS    0.9% sodium chloride infusion 250 mL  250 mL IntraVENous PRN    albumin human 25% (BUMINATE) solution 50 g  50 g IntraVENous DIALYSIS PRN    HYDROmorphone (DILAUDID) injection 1 mg  1 mg IntraVENous Q3H PRN    meropenem (MERREM) 500 mg in 0.9% sodium chloride (MBP/ADV) 50 mL MBP  500 mg IntraVENous Q24H    0.9% sodium chloride infusion 250 mL  250 mL IntraVENous PRN    insulin regular (NOVOLIN R, HUMULIN R) injection   SubCUTAneous Q6H    glucose chewable tablet 16 g  4 Tablet Oral PRN    glucagon (GLUCAGEN) injection 1 mg  1 mg IntraMUSCular PRN    dextrose 10 % infusion 0-250 mL  0-250 mL IntraVENous PRN    gabapentin (NEURONTIN) capsule 100 mg  100 mg Oral TID    fat emulsion 20% (LIPOSYN, INTRAlipid) infusion 250 mL  250 mL IntraVENous QPM    ferrous sulfate tablet 325 mg  1 Tablet Oral BID WITH MEALS    HYDROmorphone (DILAUDID) tablet 2 mg  2 mg Oral Q4H PRN    HYDROmorphone (DILAUDID) tablet 4 mg  4 mg Oral Q4H PRN    hydrALAZINE (APRESOLINE) 20 mg/mL injection 10 mg  10 mg IntraVENous Q6H PRN    mirtazapine (REMERON) tablet 7.5 mg  7.5 mg Oral QHS    pantoprazole (PROTONIX) tablet 40 mg  40 mg Oral ACB&D    apixaban (ELIQUIS) tablet 5 mg  5 mg Oral BID    sertraline (ZOLOFT) tablet 25 mg  25 mg Oral DAILY    collagenase (SANTYL) 250 unit/gram ointment   Topical DAILY    diphenhydrAMINE (BENADRYL) injection 12.5 mg  12.5 mg IntraVENous Q6H PRN    sodium chloride (NS) flush 5-40 mL  5-40 mL IntraVENous Q8H    sodium chloride (NS) flush 5-40 mL  5-40 mL IntraVENous PRN    acetaminophen (TYLENOL) tablet 650 mg  650 mg Oral Q6H PRN    polyethylene glycol (MIRALAX) packet 17 g  17 g Oral DAILY PRN    ondansetron (ZOFRAN ODT) tablet 4 mg  4 mg Oral Q8H PRN    Or    ondansetron (ZOFRAN) injection 4 mg  4 mg IntraVENous Q6H PRN    L.acidophilus-paracasei-S.thermophil-bifidobacter (RISAQUAD) 8 billion cell capsule  1 Capsule Oral DAILY     ______________________________________________________________________  EXPECTED LENGTH OF STAY: 9d 14h  ACTUAL LENGTH OF STAY:          24                 Bong Shin MD

## 2022-10-04 NOTE — PROGRESS NOTES
Occupational Therapy  10/4/2022    Spoke with CM and RN. Pt agreeable to scheduled therapy around 1330. Will coordinate with RN for pt to receive pain medication prior to therapy session.      Katya Guerin, OTS

## 2022-10-04 NOTE — PROGRESS NOTES
ID Progress Note  10/4/2022    Subjective:     C/o left bka stump pain    Review of Systems:            Symptom Y/N Comments   Symptom Y/N Comments   Fever/Chills n      Chest Pain n       Poor Appetite       Edema        Cough       Abdominal Pain  n      Sputum       Joint Pain        SOB/ANDREWS  n     Pruritis/Rash        Nausea/vomit  n     Tolerating PT/OT        Diarrhea       Tolerating Diet        Constipation       Other           Could NOT obtain due to:       Objective:     Vitals: Visit Vitals  /68 (BP 1 Location: Right upper arm, BP Patient Position: At rest)   Pulse 69   Temp 98.6 °F (37 °C)   Resp 18   Ht 5' 7\" (1.702 m)   Wt 68.2 kg (150 lb 5.7 oz)   SpO2 100%   BMI 23.55 kg/m²          Tmax:  Temp (24hrs), Av.3 °F (36.8 °C), Min:98 °F (36.7 °C), Max:98.6 °F (37 °C)      PHYSICAL EXAM:  General: Chronically ill appearing. WD, WN. Alert, cooperative, no acute distress    EENT:  Anicteric sclerae. Dry mucous membrane  Resp:  CTA bilaterally, no wheezing or rales. No accessory muscle use  CV:  Regular  rhythm  GI:  Soft, Non distended, Non tender. +Bowel sounds, colostomy in place, previous J tube site dressing dry and intact  Neurologic:  Alert and oriented X self, normal speech,   Psych:   Fair insight. Not anxious nor agitated  Skin:  No rashes.   No jaundice    Pressure injury; sacrum, right buttock,   left BKA stump site; old blood drainage noted it and intact  Right BKA stump; intact      Labs:   Lab Results   Component Value Date/Time    WBC 10.8 10/04/2022 01:11 AM    HGB 7.7 (L) 10/04/2022 01:11 AM    HCT 24.9 (L) 10/04/2022 01:11 AM    PLATELET 083 (H)  01:11 AM    MCV 94.3 10/04/2022 01:11 AM     Lab Results   Component Value Date/Time    Sodium 137 10/04/2022 01:11 AM    Potassium 3.6 10/04/2022 01:11 AM    Chloride 105 10/04/2022 01:11 AM    CO2 21 10/04/2022 01:11 AM    Anion gap 11 10/04/2022 01:11 AM    Glucose 78 10/04/2022 01:11 AM    BUN 99 (H) 10/04/2022 01:11 AM Creatinine 2.71 (H) 10/04/2022 01:11 AM    BUN/Creatinine ratio 37 (H) 10/04/2022 01:11 AM    GFR est AA 36 (L) 10/03/2022 04:50 AM    GFR est non-AA 30 (L) 10/03/2022 04:50 AM    Calcium 8.3 (L) 10/04/2022 01:11 AM    Bilirubin, total 0.2 10/04/2022 01:11 AM    Alk. phosphatase 160 (H) 10/04/2022 01:11 AM    Protein, total 5.3 (L) 10/04/2022 01:11 AM    Albumin 1.5 (L) 10/04/2022 01:11 AM    Globulin 3.8 10/04/2022 01:11 AM    A-G Ratio 0.4 (L) 10/04/2022 01:11 AM    ALT (SGPT) 21 10/04/2022 01:11 AM     Assessment:      Dislodged feeding tube/peritonitis   Abdominal wound, s/p abdominal wall debridement (9/29)  Free air and perirectal fistula  S/p laparoscopy procedure; take down and removal of J tube, colostomy placement, and I & D of abdominal wall (9/10)  Left BKA stump infection  Recent right thoracotomy  Hx Bilateral BKAs   - afebrile, wbc normal     Blood cx (9/9, 9/16) no growth, (9/27) no growth so far     Wound cx from left bka stump (9/27) pseudomonas aeruginosa     Wound cx from previous J-tube & intra-op cx (9/29) pseudomonas aeruginosa, proteus mirabilis       CT of LLE (9/28)  Partially encapsulated fluid collection with a few associated gas locules overlying the dictation margin. Infection/abscess cannot be excluded based on imaging alone. No CT evidence of acute osteomyelitis. Several gas locules along the left spermatic cord, correlate clinically for signs of infection in this location. Objective:      Completed 2 weeks of IV Eraxis, zosyn, and vancomycin as of 9/23 then switched to   PO Augmentin between 9/24 to 9/28. ABX therapy changed back to IV due to persistent leukocytosis. Continue with IV Merrem, recommend to complete total 2 weeks.  Last dose 10/13  Pt will need Haley catheter to complete the IV abx therapy  Discharge IV abx order in place 10/14  Appreciate wound care team's input  Vascular/general surgery following    Fever work up if temp >= 100.4    Above plan of care discussed and agreed with Dr. Tony Oliveros, NP

## 2022-10-04 NOTE — PROCEDURES
Hemodialysis / [phone]    Vitals Pre Post Assessment Pre Post   BP 90/48 118/68 LOC A&O x4 A&O x4   HR 71 71 Lungs clear clear   Resp 20 18 Cardiac regular regular   Temp 98.3 98.3 Skin Dry, warm Dry, warm   Weight   Edema none none   Tele status Bedside monitor Bedside monitor Pain 5/10 medicated by primary Rn 9/10 medicated by primary RN     Orders   Duration: Start: 0110 End: 0410 Total: 3 hrs   Dialyzer: Dialyzer/Set Up Inspection: Revaclear (10/04/22 0110)   K Bath: Dialysate K (mEq/L): 3 (10/04/22 0110)   Ca Bath: Dialysate CA (mEq/L): 2.5 (10/04/22 0110)   Na: Dialysate NA (mEq/L): 138 (10/04/22 0110)   Bicarb: Dialysate HCO3 (mEq/L): 35 (10/04/22 0110)   Target Fluid Removal: Goal/Amount of Fluid to Remove (mL): 1500 mL (10/04/22 0110)     Access   Type & Location: Right PC: dressing DCI, no S&S of infection, ports cleaned per P&P, flushing well   Comments:                                        Labs   HBsAg (Antigen) / date:    09/12/19 negative                                           HBsAb (Antibody) / date: 09/12/22 susceptible   Source:    Obtained/Reviewed  Critical Results Called HGB   Date Value Ref Range Status   10/04/2022 7.7 (L) 12.1 - 17.0 g/dL Final     Potassium   Date Value Ref Range Status   10/04/2022 3.6 3.5 - 5.1 mmol/L Final     Calcium   Date Value Ref Range Status   10/04/2022 8.3 (L) 8.5 - 10.1 MG/DL Final     BUN   Date Value Ref Range Status   10/04/2022 99 (H) 6 - 20 MG/DL Final     Creatinine   Date Value Ref Range Status   10/04/2022 2.71 (H) 0.70 - 1.30 MG/DL Final        Meds Given   Name Dose Route        Albumin 25% 50mg HD          Adequacy / Fluid    Total Liters Process: 68 L   Net Fluid Removed: 1500 cc      Comments   Time Out Done:   (Time) 0105   Admitting Diagnosis:    Consent obtained/signed: Informed Consent Verified: Yes (10/01/22 1700)   Machine / RO # Machine Number: [id number] (10/04/22 0110)   Primary Nurse Rpt Pre: Jose Omalley, RN   Primary Nurse Rpt Post: Tammi Robles, RN   Pt Education: Access care, procedure   Care Plan: Continue HD tx as per MD order   Pts outpatient clinic:      Tx Summary   Comments:              Tolerated tx well, at the end remaining blood in circuit returned with 300 cc NS, ports flushed with NS, cleaned per P&P, caps applied

## 2022-10-04 NOTE — PROGRESS NOTES
Nephrology Progress Note  [facility] Pickup  Date of Admission : 9/9/2022    CC:  Follow up for JEMIMA       Assessment and Plan     JEMIMA on HD:  - 2/2 ATN  - HD Schedule: TTS   - Outpatient unit: [facility] chair per CM notes  - Access: permacath placed on 9/16    Anemia:  - MICHAEL increased 15 k TTS    PAD s/p b/l BKA    Recent sepsis  - on Merrem 500 mg q 24 hrs per ID    Empyema    ABD wall abscess:  - J-tube removal, colostomy, I&D of abd wound  - TPN  per primary team   - s/p I&D of abdominal avbscess 9/29    Sacral decub  Afib       Interval History:  Seen and examined. He completed his dialysis at 0500 this morning. Alert this am oriented to person/ place. Tolerated HD well with 1.5 ml removed. Current Medications: all current  Medications have been eviewed in EPIC  Review of Systems: A comprehensive review of systems was negative except for that written in the HPI. Objective:  Vitals:    Vitals:    10/04/22 0410 10/04/22 0415 10/04/22 0600 10/04/22 0827   BP: 128/61 118/68  120/86   Pulse: 69 71 69 69   Resp: 18 18  16   Temp:  98.6 °F (37 °C)  98.3 °F (36.8 °C)   TempSrc:       SpO2:  100%  100%   Weight:       Height:         Intake and Output:  10/04 0701 - 10/04 1900  In: 1367.7 [I.V.:1367.7]  Out: -   10/02 1901 - 10/04 0700  In: -   Out: 2450 [Urine:950]    Physical Examination:  General: NAD oriented to person/place today  Neck:  Supple, no mass  Resp:  Lungs CTA B/L, no wheezing , normal respiratory effort  CV:  RRR,  no murmur or rub, no LE edema  GI:  Soft, NT, + Bowel sounds, + ostomy  Access:           RIJ PC    Lab Data Personally Reviewed: I have reviewed all the pertinent labs, microbiology data and radiology studies during assessment.     Recent Labs     10/04/22  0111 10/03/22  0450 10/02/22  0541    135* 136   K 3.6 4.0 3.7    103 103   CO2 21 24 28   GLU 78 84 87   BUN 99* 75* 46*   CREA 2.71* 2.30* 1.64*   CA 8.3* 8.1* 7.7*   MG 2.1 2.0 1.9   PHOS 2.6 2.5* 1.9*   ALB 1. 5* 1.5* 1.6*   ALT 21 17 14       Recent Labs     10/04/22  0111 10/03/22  0450 10/02/22  0541   WBC 10.8 11.0 9.9   HGB 7.7* 8.0* 7.9*   HCT 24.9* 26.0* 25.3*   * 396 361       No results found for: SDES  Lab Results   Component Value Date/Time    Culture result: (A) 09/29/2022 09:21 PM     MODERATE PROTEUS MIRABILIS REFER TO [id number] FOR SENSITIVITIES    Culture result: MODERATE PSEUDOMONAS AERUGINOSA (A) 09/29/2022 09:21 PM    Culture result: NO ANAEROBES ISOLATED 09/29/2022 09:21 PM     Recent Results (from the past 24 hour(s))   GLUCOSE, POC    Collection Time: 10/03/22 12:38 PM   Result Value Ref Range    Glucose (POC) 98 65 - 117 mg/dL    Performed by [address], POC    Collection Time: 10/03/22  6:49 PM   Result Value Ref Range    Glucose (POC) 143 (H) 65 - 117 mg/dL    Performed by [address], POC    Collection Time: 10/03/22 11:27 PM   Result Value Ref Range    Glucose (POC) 87 65 - 117 mg/dL    Performed by Awilda Govea (PCT)    TRIGLYCERIDE    Collection Time: 10/04/22  1:11 AM   Result Value Ref Range    Triglyceride 103 <150 MG/DL   CBC WITH AUTOMATED DIFF    Collection Time: 10/04/22  1:11 AM   Result Value Ref Range    WBC 10.8 4.1 - 11.1 K/uL    RBC 2.64 (L) 4.10 - 5.70 M/uL    HGB 7.7 (L) 12.1 - 17.0 g/dL    HCT 24.9 (L) 36.6 - 50.3 %    MCV 94.3 80.0 - 99.0 FL    MCH 29.2 26.0 - 34.0 PG    MCHC 30.9 30.0 - 36.5 g/dL    RDW 20.7 (H) 11.5 - 14.5 %    PLATELET 582 (H) 716 - 400 K/uL    MPV 9.4 8.9 - 12.9 FL    NRBC 0.0 0  WBC    ABSOLUTE NRBC 0.00 0.00 - 0.01 K/uL    NEUTROPHILS 63 32 - 75 %    LYMPHOCYTES 20 12 - 49 %    MONOCYTES 7 5 - 13 %    EOSINOPHILS 7 0 - 7 %    BASOPHILS 1 0 - 1 %    IMMATURE GRANULOCYTES 2 (H) 0.0 - 0.5 %    ABS. NEUTROPHILS 6.7 1.8 - 8.0 K/UL    ABS. LYMPHOCYTES 2.2 0.8 - 3.5 K/UL    ABS. MONOCYTES 0.8 0.0 - 1.0 K/UL    ABS. EOSINOPHILS 0.8 (H) 0.0 - 0.4 K/UL    ABS. BASOPHILS 0.1 0.0 - 0.1 K/UL    ABS. IMM.  GRANS. 0.2 (H) 0.00 - 0.04 K/UL    DF SMEAR SCANNED      RBC COMMENTS ANISOCYTOSIS  1+       MAGNESIUM    Collection Time: 10/04/22  1:11 AM   Result Value Ref Range    Magnesium 2.1 1.6 - 2.4 mg/dL   PHOSPHORUS    Collection Time: 10/04/22  1:11 AM   Result Value Ref Range    Phosphorus 2.6 2.6 - 4.7 MG/DL   METABOLIC PANEL, COMPREHENSIVE    Collection Time: 10/04/22  1:11 AM   Result Value Ref Range    Sodium 137 136 - 145 mmol/L    Potassium 3.6 3.5 - 5.1 mmol/L    Chloride 105 97 - 108 mmol/L    CO2 21 21 - 32 mmol/L    Anion gap 11 5 - 15 mmol/L    Glucose 78 65 - 100 mg/dL    BUN 99 (H) 6 - 20 MG/DL    Creatinine 2.71 (H) 0.70 - 1.30 MG/DL    BUN/Creatinine ratio 37 (H) 12 - 20      eGFR 27 (L) >60 ml/min/1.73m2    Calcium 8.3 (L) 8.5 - 10.1 MG/DL    Bilirubin, total 0.2 0.2 - 1.0 MG/DL    ALT (SGPT) 21 12 - 78 U/L    AST (SGOT) 35 15 - 37 U/L    Alk. phosphatase 160 (H) 45 - 117 U/L    Protein, total 5.3 (L) 6.4 - 8.2 g/dL    Albumin 1.5 (L) 3.5 - 5.0 g/dL    Globulin 3.8 2.0 - 4.0 g/dL    A-G Ratio 0.4 (L) 1.1 - 2.2     GLUCOSE, POC    Collection Time: 10/04/22  6:13 AM   Result Value Ref Range    Glucose (POC) 82 65 - 117 mg/dL    Performed by Jerrell Matute NP  [facility]   [address] [address]  Phone - [phone]   Fax - [phone]  [email or web address]

## 2022-10-04 NOTE — PROGRESS NOTES
Home IV Antibiotic Orders     1. Diagnosis:  previous J tube site and Left BKA stump site infection (pseudomonas aeruginosa & proteus mirabilis)    2. Routine Haley care per protocol     3. Antibiotic:   IV Merrem 500 mg every 24 hours    4. Lab each Monday & Thursdays             CBC/diff/platelets             BMP             CRP (every Mondays)             Vancomycin trough     5. Fax lab to Dr. Israel Mcbride @ [phone]. 6.  Call Dr. Israel Mcbride @ [phone] for WBC <4, PLT <100, and/or Creat > 3.0    7.  Duration of therapy: 10/13/2022       Please call Dr. Israel Mcbride @ [phone] before stopping therapy. 8.  Antibiotic Allergies: none    9.   Case management has been instructed to Call [phone] with name of [address], NP

## 2022-10-04 NOTE — WOUND CARE
Wound/Ostomy Visit     Follow up visit with Marco Lopez NP. Application of NPWT to left abdominal wound. Surgery: Colostomy  Date of Surgery: 9.10.22      Surgeon: Dr. Stacey Chow Location: left quadrant  Pouch in place     Left abdomen, open surgical wound/ former J tube site:  4.6 x 5.8 x 3.6 cm; tunnel at 3 o'clock; undermining 3.5 cm 12-7 o'clock;  85% red 15% tan; improved erythema and induration to wilbur wound; irrigated with saline; applied Santyl; packed with VASHE moistened packing; initiated NPWT at 125 mmHg using one continuous gauze and 1 black foam.        Right abdominal, adjacent to former trocar site with ulceration:  0.8 x 0.8 x 0.1 cm; 50% red 50% yellow; no odor or erythema. Sacrum, Pressure Injury Unstageable:  4 x 3.5 x 3 cm; 100% yellow. Small serous exudate; no odor; tender to touch; wilbur wound blanching red erythema. Left buttock, Pressure Injury Stage 3:  3x 3 x 0.1 cm; 70% pink 30% yellow; scant serous exudate; no odor; tender to touch; wilbur wound blanching pink erythema. Resurfaced pink to left and right buttocks. POA Left BKA site:  3.6 x 19 x 6 cm; 25% yellow/brown 75% red; The depth is on the lateral side of the incision. Periwound has a 3.2 x 1.6 x 0 cm 100% dry black/brown area of eschar and a 1 x 1.5 x 0.1 cm 100% tan/brown devitatized area. Cleansed with VASHE; applied Santyl filled depth with VASHE moistened gauze and covered rest of incision with VASHE moistened gauze, dry gauze and ABD pad; covered with roll gauze and ace wrap. Right upper posterior/lateral back, partial thickness wound:  0.5 x 0.5 x 0.3 cm; 50% pink 50% yellow; no exudate, odor or erythema. Recommendations:   Left abdominal wound:    VAC (NPWT) Dressing Orders:  VAC Settings: 125 mmHg continuous/low suction   Dressing change twice weekly by WOCN. Change canisters when full and measure output q shift. (located  or [facility] supply room).   For sudden development of blood or an increased amount of huy bleedin. Immediately turn off VAC system. 2.  Leave dressing in place. 3.  Hold pressure over wound. 4.  Call physician. If vacuum fails to maintain seal or unable to manage alarm for clogged tubing for >2hrs:   1. Contact Physician    2. Cleanse wound with normal saline. 3.  Saline moistened fluff gauze to base. 4.  Cover with dry dressing. 5.  Secure with transparent film. 6.  Change q 8 hours and as needed. 7.  Notify Physician and WOCN that wound VAC dressing needs to be reapplied. If patient is discharged from our facility:   1. Remove all of equipment and sponge. 2.  Place moist dressing. 3.  Put VAC system and power cord in clear bag in utility room. (no red bags)   4. Please call [employer] to  system and stop billing @ [phone]  For procedures or when pt is off the floor:    Battery backup on our current inpatient systems lasts for 4 hours and may be   used to prevent interruption of treatment- VAC should NOT be turned off. If disconnecting for more than 2 hours, with discharge, or a pt is admitted with a VAC:  Discontinue the therapy/ begin wound care orders above, return any outpatient equipment to family or transferring facility, and notify the [id number] [facility] Nurse and ordering practitioner. Sacrum and buttocks:  Daily cleanse with saline; apply Santyl and saline moist gauze; cover with dry dressing. Right upper back wounds:  Daily cleanse with saline; apply Santyl; cover with dry dressing. Left BKA site:  Daily cleanse with saline; apply Santyl; pack dehisced area with VASHE moistened roll gauze; cover with Vashe moist gauze and dry dressing. 5.   Empty appliance when 1/3 full and PRN. Encourage patient/family to notify nurse and  assist w/ pouch emptying to promote self-care. Change appliance twice weekly and PRN for leaking ASAP. DO NOT REINFORCE LEAKS to avoid skin breakdown. Transition of Care:   Will follow routinely for VAC changes Tues and Friday. Discharge disposition plan is to go to [facility].      Clive Kehr, BSN RN [facility] Inpatient Wound Care  Available on [employer]  Office [phone]

## 2022-10-04 NOTE — ADT AUTH CERT NOTES
W[facility] - Care Day 25 (10/4/2022) by Donna England       Review Status Review Entered   Completed 10/4/2022 1609       Created By   Donna England      Criteria Review      Care Day: 25 Care Date: 10/4/2022 Level of Care: Telemetry    Guideline Day 3    Level Of Care    ( ) * Activity level acceptable    (X) Floor to discharge    Clinical Status    (X) * Temperature status acceptable    10/4/2022 16:09:04 EDT by Donna England      afebrile    ( ) * No infection, or status acceptable    (X) * Pain and nausea absent or adequately managed    10/4/2022 16:09:04 EDT by Donna England      managed    ( ) * Wound and ulcer problems absent or status acceptable    (X) * Burn injury absent or acceptable for next level of care    10/4/2022 16:09:04 EDT by Donna England      absent    (X) * Compartment syndrome absent    ( ) * Skin disease absent or acceptable for next level of care    ( ) * General Discharge Criteria met    Interventions    (X) * Intake acceptable    10/4/2022 16:09:04 EDT by Donna England      regular low K diet    ( ) * No inpatient interventions needed    * Milestone   Additional Notes   Inpatient  10/4/22 - Telemetry unit   Wt: 68.2kg   98.3-69-/86, 100% on RA   H&H 7.7/24.9, Plt 409, BUN 99, Cr 2.71, GFR 27, Ca 8.3, Alk Phos 160, T prot 5.3, Alb 1.5   Tylenol 650mg PO x 1, Buminate 25% 50g IV x 1, Eliquis 5mg BID, Intralipids 20% 250cc IV QPM, Neurontin 100mg TID, Dilaudid 1mg IV x 2, 2mg PO x 1, 4mg PO x 2, Merrem 500mg IV QD, Remeron 7.5mg QHS, Zofran 4mg IV x 1, Protonix 40mg PO BID, Zoloft 25mg QD, Retacrit 15,000u T/Th/Sa, TPN at 63cc/hr   Orders: droplet & enteric isolation, fall precautions, oximetry spot check prn, regular low K diet, wound vac per orders, HD per orders, PT/OT, I&O, daily weight, elevate HOB, wound care per orders, ostomy care, activity as tolerated with assistance   Internal Medicine progress note 10/4/22:   Subjective:       C/o left bka stump pain General:          Chronically ill appearing. WD, WN. Alert, cooperative, no acute distress     EENT:              Anicteric sclerae. Dry mucous membrane   Resp:               CTA bilaterally, no wheezing or rales. No accessory muscle use   CV:                  Regular  rhythm   GI:                   Soft, Non distended, Non tender. +Bowel sounds, colostomy in place, previous J tube site dressing dry and intact   Neurologic:       Alert and oriented X self, normal speech,    Psych:   Fair insight. Not anxious nor agitated   Skin:                No rashes. No jaundice                           Pressure injury; sacrum, right buttock,    left BKA stump site; old blood drainage noted it and intact   Right BKA stump; intact       Assessment:       Dislodged feeding tube/peritonitis    Abdominal wound, s/p abdominal wall debridement (9/29)   Free air and perirectal fistula   S/p laparoscopy procedure; take down and removal of J tube, colostomy placement, and I & D of abdominal wall (9/10)   Left BKA stump infection   Recent right thoracotomy   Hx Bilateral BKAs    - afebrile, wbc normal      Blood cx (9/9, 9/16) no growth, (9/27) no growth so far      Wound cx from left bka stump (9/27) pseudomonas aeruginosa      Wound cx from previous J-tube & intra-op cx (9/29) pseudomonas aeruginosa, proteus mirabilis          CT of LLE (9/28)  Partially encapsulated fluid collection with a few associated gas locules overlying the dictation margin. Infection/abscess cannot be excluded based on imaging alone. No CT evidence of acute osteomyelitis. Several gas locules along the left spermatic cord, correlate clinically for signs of infection in this location. Objective:       Completed 2 weeks of IV Eraxis, zosyn, and vancomycin as of 9/23 then switched to    PO Augmentin between 9/24 to 9/28. ABX therapy changed back to IV due to persistent leukocytosis. Continue with IV Merrem, recommend to complete total 2 weeks.  Last dose 10/13   Pt will need Haley catheter to complete the IV abx therapy           Wound [facility] Skin Management [facility] - Care Day 24 (10/3/2022) by Lucy Wood       Review Status Review Entered   Completed 10/4/2022 1518       Created By   GABRIELA Perales 310 Day: 24 Care Date: 10/3/2022 Level of Care: Telemetry    Guideline Day 3    Level Of Care    ( ) * Activity level acceptable    10/4/2022 15:18:05 EDT by Lucy Wood      bed rest    (X) Floor to discharge    10/4/2022 15:18:05 EDT by Lucy Wood      10/3 tele    Clinical Status    (X) * Temperature status acceptable    10/4/2022 15:18:05 EDT by Lucy Wood      98.3    ( ) * No infection, or status acceptable    10/4/2022 15:18:05 EDT by Lucy Wood      wbc 11  IV meropenem until 10/13    ( ) * Pain and nausea absent or adequately managed    10/4/2022 15:18:05 EDT by Lucy Wood      lower left abd 10,5/10.  anterior abd 7 8 8/10.    ( ) * Wound and ulcer problems absent or status acceptable    10/4/2022 15:18:05 EDT by Lucy Wood      previous J tube site dressing dry and intact    (X) * Burn injury absent or acceptable for next level of care    10/4/2022 15:18:05 EDT by Lucy Wood      n/a    (X) * Compartment syndrome absent    10/4/2022 15:18:05 EDT by Lucy Wood      n/a    (X) * Skin disease absent or acceptable for next level of care    10/4/2022 15:18:05 EDT by Lucy Wood      n/a    ( ) * General Discharge Criteria met    Interventions    (X) * Intake acceptable    10/4/2022 15:18:05 EDT by Lucy Wood      ADULT DIET Regular; Low Potassium (Less than 3000 mg/day)    ( ) * No inpatient interventions needed    10/4/2022 15:18:05 EDT by Lucy Wood      iv liposyn 250ml evening  iv dilaudid 1mg q3 prn x2  iv merrem 500mg q24  tpn adult-central 63 mL/hr  daily labs, poc glucose q6, strict i&o, daily weight, v/s q4, wound care, elevate hob, cath care, ostomy care    * Milestone   Additional Notes   10/3 tele      Pertinent Updates:    Urine output 300 cc in the last 24 hours. Remains on TPN with lipids   Nursing staff reports poor p.o. intake over the weekend. Vitals:   98.2 °F (36.8 °C) 74 161/84 20 100 % RA   161/84 135/49 132/56      Abnl/Pertinent Labs/Radiology/Diagnostic Studies:   RBC: 2.79 (L)   HGB: 8.0 (L)   HCT: 26.0 (L)   RDW: 20.5 (H)   IMMATURE GRANULOCYTES: 1 (H)   ABS. IMM. GRANS.: 0.1 (H)   ABS. EOSINOPHILS: 0.8 (H)   Sodium: 135 (L)   BUN: 75 (H)   Creatinine: 2.30 (H)   BUN/Creatinine ratio: 33 (H)   Calcium: 8.1 (L)   Phosphorus: 2.5 (L)   GFR est non-AA: 30 (L)   Protein, total: 5.5 (L)   Albumin: 1.5 (L)   A-G Ratio: 0.4 (L)   Alk. phosphatase: 152 (H)      Physical Exam:   GI/: Epigastric chest site 1 2 with surrounding erythema, induration and tenderness and purulent discharge. Colostomy in the lower abdomen. No erythema, swelling, discharge in the inguinal.  Bowel sounds are normoactive. MD Consults/Assessments & Plans:   Continue wound care   GIppx: Pepcid   Code Status: Full Code   Diet: Regular, supplements BID   --Time to start weaning off TPN. Activity: bedrest   Discharge: TBD   Ambulates:  nonambulatory      Nephro: JEMIMA on HD:   - 2/2 ATN   - HD Schedule: TTS   - Outpatient unit: [facility] chair per CM notes   Anemia:   - improving with MICHAEL 10k TTS    PAD s/p b/l BKA    Recent sepsis   Empyema   ABD wall abscess:   - J-tube removal, colostomy, I&D of abd wound   - TPN /lipids per primary team    - s/p I&D of abdominal abscess 9/29      Medications:   PO Eliquis 5mg bid   PO ferrous sulfate 325mg 1 tab bid   PO neurontin 100mg tid   PO Dilaudid 4mg q4h prn x2   sq humulin 2 units q6   po risaquad 8 billion cell 1capsule qd   PO Remeron 7.5mg hs   po protonix 40mg bid   PO Zoloft 25mg qd      PT:   PT generally mobilized at a Mod A to CGA level during today's session.  endorsed 8/10 pain prior to mobilizing but was agreeable to continue with therapy. Bed mechanics utilized to raise HOB to 46 degrees to assist with supine>sit transfer. Pt then rolled to R and came to a full upright sitting position EOB with Mod A for trunk advancement. Pt initially utilized UE support for upright balance and pressure relief however over course of 11 minutes sitting EOB pt able to progress to sitting independently without UE support. Pt performed limited UE therex (should AROM, scap retraction). Pt eventually endorsed increased abdominal discomfort and requested return to supine. Pt then rolled to L for positioning of new lisa and was scooted up in bed with total ax2 and elected to remain L sidelying and was positioned for comfort and session concluded. Anticipate with good pain control given recent progress with sitting balance pt a good candidate to attempt sliding board transfer in coming sessions. Patient's progression toward goals since last assessment: Met 2 of 4 goals, improved barthel score    Current Level of Function Impacting Discharge (mobility/balance): up to Mod A for bed mobility, yet to transfer bed<>chair    Functional Outcome Measure: The patient scored 40/100 on the barthel outcome measure which is indicative of partially dependent for self care. OT:   Based on the objective data described below, pt remains motivated to work with therapy and pre medicated with oral Dilaudid (45 minutes before session). Pt rated pain as 8/10 but willing to try to attempt. From elevated HOB at 46* and mod A x 1, pt able to come to fully sit upright EOB. Pt tolerated x 11 minutes both with and without UE support. Completed gentle UE stretches and ROM- will issue green theraband next session. Continue to work towards tolerating IPR at discharge as pt remains very motivated, limited by pain. Wife at bedside and to bring in R prothesis.      Current Level of Function Impacting Discharge (ADLs): up to mod Ax 1 for supine to sit, intact static sitting balance      CM:   CM requesting facility ([facility]) to start auth on Wed 10/5    Barriers:  Patient is still on iv Dilaudid q 4 hrs        Wound and Skin Management [facility] - Care Day 23 (10/2/2022) by Maureen Ibrahim       Review Status Review Entered   Completed 10/3/2022 1153       Created By   Maureen Ibrahim      Criteria Review      Care Day: 23 Care Date: 10/2/2022 Level of Care: Telemetry    Guideline Day 3    Level Of Care    ( ) * Activity level acceptable    10/3/2022 11:53:25 EDT by Prashanth Arce      bedrest, nonambulatory bilateral BKA    ( ) Floor to discharge    10/3/2022 11:53:25 EDT by Maureen Ibrahim      10/2  Telemetry    Clinical Status    (X) * Temperature status acceptable    10/3/2022 11:53:25 EDT by Prashanth Arce      98.2 °F (36.8 °C)    ( ) * No infection, or status acceptable    10/3/2022 11:53:25 EDT by Prashanth Arce      WBC: 9.9,  continue with IV meropenem until 10/13    ( ) * Pain and nausea absent or adequately managed    10/3/2022 11:53:25 EDT by Prashanth Arce      abdomen 9,9/10 pain scale    ( ) * Wound and ulcer problems absent or status acceptable    10/3/2022 11:53:25 EDT by Maureen Ibrahim      Local wound care to abdomen    (X) * Burn injury absent or acceptable for next level of care    10/3/2022 11:53:25 EDT by Prashanth Arce      no burn injury    (X) * Compartment syndrome absent    10/3/2022 11:53:25 EDT by Prashanth Arce      absent    (X) * Skin disease absent or acceptable for next level of care    10/3/2022 11:53:25 EDT by Prashanth Arce      none noted    ( ) * General Discharge Criteria met    Interventions    ( ) * Intake acceptable    10/3/2022 11:53:25 EDT by Maureen Ibrahim      ADULT DIET Regular; Low Potassium (Less than 3000 mg/day) and TPN supplementation    ( ) * No inpatient interventions needed    10/3/2022 11:53:25 EDT by Maureen Ibrahim TPN ADULT - CENTRAL 63ml/hr; IV fat emulsion 20%  infusion 250 mL evening, IV Dilaudid 1mg q3h prn given 1x, IV Merrem 500mg q24h, IV Zofran 4mg q6h prn given 1x    * Milestone   Additional Notes   10/2  Telemetry      Pertinent Updates:   3 Days Post-Op    He reports that he was in pain yesterday night but it is controlled now , discussed using PO pain meds more consistently and relying on IV for when itis not controlled      Vitals:    98 °F (36.7 °C) 70 131/57  17 100 % ra      Abnl/Pertinent Labs/Radiology/Diagnostic Studies:   WBC: 9.9   RBC: 2.76 (L)   HGB: 7.9 (L)   HCT: 25.3 (L)   BUN: 46 (H)   Creatinine: 1.64 (H)   BUN/Creatinine ratio: 28 (H)   Calcium: 7.7 (L)   Phosphorus: 1.9 (L)   Protein, total: 5.4 (L)   Albumin: 1.6 (L)   A-G Ratio: 0.4 (L)   Alk. phosphatase: 154 (H)   Triglyceride: 119   GLUCOSE,FAST - POC: 101, 83,91, 105, 108      Physical Exam:   Constitutional: Chronically sick looking gentleman, he is alert and oriented x3. ENT: Oral mucosa moist.    Resp/chest wall: CTA bilaterally. No wheezing/rhonchi/rales. No accessory muscle use. Permacath and double-lumen PICC line the right chest wall. CV: Regular rate, S1,S2.    GI/: Epigastric chest site 1 2 with surrounding erythema, induration and tenderness and purulent discharge. Colostomy in the lower abdomen. No erythema, swelling, discharge in the inguinal.  Bowel sounds are normoactive. Musculoskeletal: No edema, warm, bilateral BKA: dressing to LLE stunp. Neurologic:  Moves all extremities. Awake and alert, disoriented. Skin:  multiple wounds, skin w/d, jaundiced. Psych:  Poor insight, Not anxious nor agitated      Per Attending   continue antibiotics    Patient will probably benefit from scheduled long-acting opiate such as MS Contin.       Medications:   PO Tylenol 650mg q6h prn given 1x   PO Eliquis 5mg 2x daily   TP collagenase (SANTYL) 250 unit/gram ointment daily   PO ferrous sulfater 325mg 1 tab 2x daily   PO neurontin 100mg 3x daily   PO Dilaudid 4mg q4h prn given 3x   PO Risaquad 1 cap daily   PO Remeron 7.5mg bedtime   po protonix 40mg 2x daily   PO Zoloft 25mg daily      Orders:   droplet puls isolation, enteric isolation, fall precautions,     daily labs, glucose monitoring q6h    strict i&o, daily wound care, dressing change

## 2022-10-04 NOTE — PROGRESS NOTES
Problem: Risk for Spread of Infection  Goal: Prevent transmission of infectious organism to others  Description: Prevent the transmission of infectious organisms to other patients, staff members, and visitors. Outcome: Progressing Towards Goal     Problem: Patient Education:  Go to Education Activity  Goal: Patient/Family Education  Outcome: Progressing Towards Goal     Problem: Falls - Risk of  Goal: *Absence of Falls  Description: Document Laurence Ferrer Fall Risk and appropriate interventions in the flowsheet. Outcome: Progressing Towards Goal  Note: Fall Risk Interventions:  Mobility Interventions: Communicate number of staff needed for ambulation/transfer, OT consult for ADLs, PT Consult for mobility concerns    Mentation Interventions: Adequate sleep, hydration, pain control    Medication Interventions: Patient to call before getting OOB    Elimination Interventions: Call light in reach              Problem: Patient Education: Go to Patient Education Activity  Goal: Patient/Family Education  Outcome: Progressing Towards Goal     Problem: Nutrition Deficit  Goal: *Optimize nutritional status  Outcome: Progressing Towards Goal     Problem: Pressure Injury - Risk of  Goal: *Prevention of pressure injury  Description: Document Barber Scale and appropriate interventions in the flowsheet.   Outcome: Progressing Towards Goal  Note: Pressure Injury Interventions:  Sensory Interventions: Assess changes in LOC    Moisture Interventions: Check for incontinence Q2 hours and as needed, Minimize layers    Activity Interventions: PT/OT evaluation, Pressure redistribution bed/mattress(bed type)    Mobility Interventions: HOB 30 degrees or less    Nutrition Interventions: Document food/fluid/supplement intake    Friction and Shear Interventions: Apply protective barrier, creams and emollients                Problem: Patient Education: Go to Patient Education Activity  Goal: Patient/Family Education  Outcome: Progressing Towards Goal     Problem: Patient Education: Go to Patient Education Activity  Goal: Patient/Family Education  Outcome: Progressing Towards Goal     Problem: Patient Education: Go to Patient Education Activity  Goal: Patient/Family Education  Outcome: Progressing Towards Goal

## 2022-10-04 NOTE — PROGRESS NOTES
Comprehensive Nutrition Assessment    Type and Reason for Visit: Reassess    Nutrition Recommendations/Plan:     Recommend weaning TPN in anticipation of d/c. Continue regular diet. Recommend adding 1 mg folic acid PO daily d/t deficiency to continue on discharge (currently in TPN)    PO intake is still largely inadequate d/t variability, especially with increased needs for his wounds. Pt eats better with food brought in from home. Will not take supplements. Limited options for long-term nutrition given abdominal infection (previous J-tube for nutrition support with poor PO intake). TPN is not an appropriate long-term solution given functioning gut and risks associated with long-term use. Hopefully PO intake will improve once out of facilities. Malnutrition Assessment:  Malnutrition Status:  Severe malnutrition (09/13/22 1116)    Context:  Chronic illness     Findings of the 6 clinical characteristics of malnutrition:   Energy Intake:  Unable to assess  Weight Loss:  Unable to assess     Body Fat Loss:  Severe body fat loss, Triceps, Orbital   Muscle Mass Loss:  Severe muscle mass loss, Temples (temporalis), Clavicles (pectoralis &deltoids)  Fluid Accumulation:  No significant fluid accumulation,     Strength:  Not performed        Nutrition Assessment:    PMHx includes DM, bilateral BKAs, JEMIMA on HD  HPI: pt presented at the emergency room from [facility] with abdominal pain. . was recently admitted to another hospital and underwent L BKA, hospitalization c/b respiratory failure which required prolonged intubation. The respiratory failure was attributed to aspiration pneumonia as well as lobulated effusion, for which the patient underwent decortication and R thoracotomy. The patient also went into acute renal failure for which he has been started on HD. J-tube was placed for supplemental nutrition. The patient was subsequently transferred to [facility] for continuation of care.   He was doing relatively well at [facility] until the day of presentation to our ER with c/o abdominal pain. CTAP showed evidence of bowel perforation. Admitted with pneumoperitoneum, abdominal wall abscess, severe sepsis, possible empyema, FOBT+, JEMIMA on HD, rectal fistula. Surgery consulted on admission. Underwent takedown and removal of J-tube, lap colostomy placement, and I&D of abdominal wall on 9/10/22. Recommends colorectal surgery eval.  CRS took pt to OR 9/15 for anorectal examination of wound, including rigid proctosigmoidoscopy. PC placed 9/16 - no sign of renal recovery yet. HD MWF. Concern for BKA wound dehiscence - vascular eval, no surgical intervention needed at this time. Underwent abdominal wall debridement on 9/29 with general surgery. Abdominal wounds growing Pseudomonas. Recommendations are to continue with IV meropenem until 10/13    10/4:  follow-up. Discussed with RN and MD.  Pt has some good meals - but those are mostly ones brought in from home/ fast- food. Ultimately, they are looking at d/c in next 1-2 days and TPN is not an appropriate long-term solution to his overall poor PO intake. Plan to wean TPN today and d/c before his is discharged. Encourage PO intake. 9/27: follow-up. Continues on TPN at goal (initiated 9/23, at goal by 9/25). Phos low and BG running low normal.  Does have insulin and folic acid and thiamine in TPN. Nephrology following closely - remains on HD. Worsening leukocytosis - KUB negative, ID recommends CT of L stump. Given BG is well controlled, do recommend increasing dex to 20% to slightly increase calories - keep insulin same for mow.  8%AA, 20% dex @ 63 mL/hr with 250 mL 20% lipids daily provides 1762 mL, 2012 kcal, 120 gm pro, and 302 gm dex. 9/23: pt continues to eat very little, only bites if at all. Will not consume ONS. AMS/ delirium, concern for some hallucinations.   Doubtful he would tolerate/ allow an NG.  PEG or Jtube not an option at this time with active abdominal infection. Weight trending down rapidly. High EEN with wounds and HD. Electrolyte abnormalities, folate deficiency. Pt is severely malnourished. Discussed with PA today, pt has access, so plan to start TPN. This was discussed with nephrology as well. For tonight, will start TPN of 5%AA, 15% dex @ 42 mL/hr.  250 mL 20% lipids added daily. Thiamine and folic acid included. Recommend goal of 8%AA, 18% dex @ 63 mL/hr with 250 mL 20% lipids daily to provide 1762 mL, 1909 kcal, 120 gm pro, and 272 gm dex (GIR 2.9 mg/kg/min). If allowed more volume, could better meet EEN, but limited with HD.       9/20: follow-up. Discussed in rounds. Hallucinations overnight thought to be d/t dilaudid. Still with significant pain. Events of past week added above. New A fib, pending cardiology consult. Met with pt in room. Weight down. Pt reports a good appetite/ intake, but this is not supported by documentation. Breakfast meal at bedside untouched. He states he was off for procedure (?unclear). Now too cold - awaiting lunch. Pt states he likes the taste of Nepro, but he cannot drink any of these ONS as they make him sick. When I asked him to elaborate, he states they cause bloating/ pain, and diarrhea. He tells me he usually eats more than 50% of his meal, sometimes all. This is unclear. He seems to have poor insight regarding nutrition status and ?current medical issues - stating once the pain in under control, he hopes to be up walking around which will probably make him hungrier. BG well controlled. K/Phos BNL. Has not been started on folic acid. Will address with hospitalist.        9/13: Pt was initially screen d/t diet order thickened clear liquids x 3 days. Upon review of [facility] records, pt was on pureed diet with mildly thick liquids. However, despite our diet order, pt had dried cereal (brought in from outside) he was eating yesterday and drinking thin liquids.   RN stated he was tolerating fine. I addressed this with Dr. Lul Morales yesterday and asked about SLP eval and he also did not seem that concerned as he also saw that pt was tolerating these items. I did reach out to speech and chart was reviewed, but given MD, surgery, and pulmonary all without concerns for aspiration or dysphagia, they did not see an indication for evaluation and to advance diet as tolerated as we usually would. I spoke with pt in room today. He states he eats chicken, burgers, steaks, etc - basically everything. He stated he coughed once and they placed him on a modified diet weeks ago, but has been doing fine eating whatever he wants. He said J-tube was placed (not sure why J-tube over PEG) for supplemental nutrition, but they only used it 3 times since he was still eating. Pt does have significant muscle wasting and fat loss. He states he used to weigh >350 lb and started to try and lose weight many years ago, however it is unclear how long ago. He stated more recently it has not been intentional weight loss, but he is unable to state when all this occurred. Wife not present at the time. He refused any ONS and just wants to eat. He did state he cannot eat a lot at once. I suspect maybe tube feeds started to help promote wound healing of BKA and other Pis with poor intake, severe PCM, but this is still unclear. I reached to Dr. Lul Morales as surgery had said advance diet for past 2 days and it had not been done. Again, asked if he wanted SLP eval first and he declined, just to advance to full liquids and then regular as tolerated. Multiple PI wounds as below. Confirmed height of 5'7\" before amputations. Adjust IBW with bilat BKA ~ 130 lb. Suspect pt's admission weight of 149 lb is closer to his ABW. Therefore, he is ~114% of his adj IBW and will use for assessment purposes.   NFPE reveals he is clearly malnourished and would not put him in \"overweight\" category despite what the flowsheets are classifying him as (even using admission weight of 149 lb). Nutritionally Significant Medications:  Ferrous sulfate, SSI, risaquad, merrem, remeron 7.5 mg q HS, protonix, Epo  PRN: dilaudid, zofran    Estimated Daily Nutrient Needs:  Energy Requirements Based On: Kcal/kg  Weight Used for Energy Requirements: Admission (68 kg)  Energy (kcal/day): 7300-5685 (30-35 kcal/kg)  Weight Used for Protein Requirements: Admission  Protein (g/day): 100-120 (1.5-1.8 gm/kg or at least 20%)  Method Used for Fluid Requirements: Standard renal  Fluid (ml/day): 500 mL + OP    Nutrition Related Findings:   Edema: No data recorded    Last BM: 10/03/22, Loose, Watery - via colostomy    Wounds: Multiple, Pressure injury, Stage III, Partial thickness  Per WOCN on 9/12:  1. Left abdomen, open surgical wound/ former J tube site:  2. POA Sacrum, Pressure Injury Stage 3  3. POA Left buttocks, Pressure Injury Stage 3  4. POA Right buttocks, Pressure Injury Stage 3  5. POA Right upper back, partial thickness wound  6. POA Right upper back, partial thickness wound  7. POA Left BKA site with dried crusted incision with sutures      Current Nutrition Therapies:  Diet: regular, low K+ diet  Supplements: Magic Cup BID  Meal intake: Patient Vitals for the past 168 hrs:   % Diet Eaten   10/01/22 0800 26 - 50%       Supplement intake: No data found. Anthropometric Measures:  Height: 5' 7\" (170.2 cm)  Ideal Body Weight (IBW): 148 lbs (67 kg)  Admission Body Weight: 149 lb 14.6 oz (68 kg)  Current Body Wt:  68.1 kg (150 lb 2.1 oz), 101.4 % IBW.  Bed scale  Current BMI (kg/m2): 23.5        Weight Adjustment: Amputation  Total Amputation Percentage: 11.8  Adjusted Ideal Body Weight (lbs) (Calculated): 130.5  Adjusted Ideal Body Weight (kg) (Calculated): 59.32 kg  Adjusted % IBW (Calculated): 115  Adjusted BMI (Calculated): 26.3  BMI Category: Normal weight (BMI 18.5-24.9) (or underweight - clinical judgement)    Wt Readings from Last 15 Encounters:   10/04/22 68.2 kg   09/27/22 68.1 kg   09/19/22 69 kg (152 lb 1.9 oz) - bed   09/12/22 75.2 kg (165 lb 12.6 oz) - bed   09/09/22 68 kg (149 lb 14.6 oz) - admission, no source   07/02/18 104 kg (229 lb 4.5 oz)     Weight hx from other outside records:  04/2021 = 189 lb  10/2020 = 221 lb      Nutrition Diagnosis:   Inadequate oral intake related to increased demand for energy/nutrients, renal dysfunction, acute injury/trauma as evidenced by intake 0-25%, intake 26-50%  Increased nutrient needs related to increased demand for energy/nutrients as evidenced by wounds, dialysis    Nutrition Interventions:   Food and/or Nutrient Delivery: Modify oral nutrition supplement, Discontinue oral nutrition supplement  Nutrition Education/Counseling: No recommendations at this time  Coordination of Nutrition Care: Continue to monitor while inpatient       Goals:  Previous Goal Met: No progress toward goal(s)  Goals: other (specify)  Specify Other Goals: PO intake >/=75% of meals over next 7 days    Nutrition Monitoring and Evaluation:   Behavioral-Environmental Outcomes: None identified  Food/Nutrient Intake Outcomes: Food and nutrient intake  Physical Signs/Symptoms Outcomes: Biochemical data, GI status, Meal time behavior, Hemodynamic status, Nutrition focused physical findings, Weight, Skin    Discharge Planning:    Continue current diet    Recent Labs     10/04/22  0111 10/03/22  0450 10/02/22  0541   GLU 78 84 87   BUN 99* 75* 46*   CREA 2.71* 2.30* 1.64*    135* 136   K 3.6 4.0 3.7    103 103   CO2 21 24 28   CA 8.3* 8.1* 7.7*   PHOS 2.6 2.5* 1.9*   MG 2.1 2.0 1.9       Recent Labs     10/04/22  0111 10/03/22  0450 10/02/22  0541   ALT 21 17 14   AST 35 26 28   * 152* 154*   TBILI 0.2 0.2 0.2   TP 5.3* 5.5* 5.4*   ALB 1.5* 1.5* 1.6*   GLOB 3.8 4.0 3.8       Recent Labs     10/04/22  1347 10/04/22  2492 10/03/22  5027 10/03/22  1849 10/03/22  1238 10/03/22  2785 10/02/22  2323 10/02/22  1632 10/02/22  1135 10/02/22  0841 10/02/22  0644 10/01/22  2340 10/01/22  1931   GLUCPOC 86 82 87 143* 98 94 108 105 91 83 101 85 81       Lab Results   Component Value Date/Time    Hemoglobin A1c <3.8 (L) 09/24/2022 01:44 AM    Hemoglobin A1c <3.8 (L) 09/10/2022 05:50 AM    Hemoglobin A1c 14.7 (H) 07/03/2018 07:13 AM       Iron   Date Value Ref Range Status   09/10/2022 12 (L) 35 - 150 ug/dL Final   09/10/2022 12 (L) 35 - 150 ug/dL Final     TIBC   Date Value Ref Range Status   09/10/2022 141 (L) 250 - 450 ug/dL Final     Iron % saturation   Date Value Ref Range Status   09/10/2022 9 (L) 20 - 50 % Final       Ferritin   Date Value Ref Range Status   09/10/2022 715 (H) 26 - 388 NG/ML Final       B Vitamins  Folate   Date Value Ref Range Status   09/10/2022 4.1 (L) 5.0 - 21.0 ng/mL Final     Vitamin B12   Date Value Ref Range Status   09/10/2022 729 193 - 986 pg/mL Final         Reshma Carter RD  Available via [employer]

## 2022-10-05 LAB
GLUCOSE BLD STRIP.AUTO-MCNC: 99 MG/DL (ref 65–117)
GLUCOSE BLD STRIP.AUTO-MCNC: 99 MG/DL (ref 65–117)
SERVICE CMNT-IMP: NORMAL
SERVICE CMNT-IMP: NORMAL

## 2022-10-05 PROCEDURE — 74011250636 HC RX REV CODE- 250/636: Performed by: NURSE PRACTITIONER

## 2022-10-05 PROCEDURE — 82962 GLUCOSE BLOOD TEST: CPT

## 2022-10-05 PROCEDURE — 97530 THERAPEUTIC ACTIVITIES: CPT

## 2022-10-05 PROCEDURE — 74011000258 HC RX REV CODE- 258: Performed by: NURSE PRACTITIONER

## 2022-10-05 PROCEDURE — 77030037255 HC PCH OST FLX SENSURA COLO -A

## 2022-10-05 PROCEDURE — 74011250636 HC RX REV CODE- 250/636: Performed by: COLON & RECTAL SURGERY

## 2022-10-05 PROCEDURE — 77030040162

## 2022-10-05 PROCEDURE — 74011250637 HC RX REV CODE- 250/637: Performed by: COLON & RECTAL SURGERY

## 2022-10-05 PROCEDURE — 97535 SELF CARE MNGMENT TRAINING: CPT

## 2022-10-05 PROCEDURE — 74011000250 HC RX REV CODE- 250: Performed by: COLON & RECTAL SURGERY

## 2022-10-05 PROCEDURE — 74011250637 HC RX REV CODE- 250/637: Performed by: STUDENT IN AN ORGANIZED HEALTH CARE EDUCATION/TRAINING PROGRAM

## 2022-10-05 PROCEDURE — 74011250637 HC RX REV CODE- 250/637: Performed by: PHYSICIAN ASSISTANT

## 2022-10-05 PROCEDURE — 65270000046 HC RM TELEMETRY

## 2022-10-05 RX ADMIN — HYDROMORPHONE HYDROCHLORIDE 2 MG: 2 TABLET ORAL at 10:39

## 2022-10-05 RX ADMIN — DIPHENHYDRAMINE HYDROCHLORIDE 12.5 MG: 50 INJECTION, SOLUTION INTRAMUSCULAR; INTRAVENOUS at 21:42

## 2022-10-05 RX ADMIN — Medication 1 CAPSULE: at 08:06

## 2022-10-05 RX ADMIN — HYDROMORPHONE HYDROCHLORIDE 2 MG: 2 TABLET ORAL at 14:21

## 2022-10-05 RX ADMIN — HYDROMORPHONE HYDROCHLORIDE 1 MG: 1 INJECTION, SOLUTION INTRAMUSCULAR; INTRAVENOUS; SUBCUTANEOUS at 21:42

## 2022-10-05 RX ADMIN — PANTOPRAZOLE SODIUM 40 MG: 40 TABLET, DELAYED RELEASE ORAL at 08:06

## 2022-10-05 RX ADMIN — SERTRALINE 25 MG: 50 TABLET, FILM COATED ORAL at 08:07

## 2022-10-05 RX ADMIN — HYDROMORPHONE HYDROCHLORIDE 1 MG: 1 INJECTION, SOLUTION INTRAMUSCULAR; INTRAVENOUS; SUBCUTANEOUS at 06:18

## 2022-10-05 RX ADMIN — FERROUS SULFATE TAB 325 MG (65 MG ELEMENTAL FE) 325 MG: 325 (65 FE) TAB at 16:06

## 2022-10-05 RX ADMIN — APIXABAN 5 MG: 5 TABLET, FILM COATED ORAL at 08:06

## 2022-10-05 RX ADMIN — SODIUM CHLORIDE, PRESERVATIVE FREE 10 ML: 5 INJECTION INTRAVENOUS at 07:04

## 2022-10-05 RX ADMIN — GABAPENTIN 100 MG: 100 CAPSULE ORAL at 21:42

## 2022-10-05 RX ADMIN — SODIUM CHLORIDE, PRESERVATIVE FREE 10 ML: 5 INJECTION INTRAVENOUS at 21:43

## 2022-10-05 RX ADMIN — GABAPENTIN 100 MG: 100 CAPSULE ORAL at 16:06

## 2022-10-05 RX ADMIN — COLLAGENASE SANTYL: 250 OINTMENT TOPICAL at 08:11

## 2022-10-05 RX ADMIN — APIXABAN 5 MG: 5 TABLET, FILM COATED ORAL at 21:42

## 2022-10-05 RX ADMIN — PANTOPRAZOLE SODIUM 40 MG: 40 TABLET, DELAYED RELEASE ORAL at 16:06

## 2022-10-05 RX ADMIN — FERROUS SULFATE TAB 325 MG (65 MG ELEMENTAL FE) 325 MG: 325 (65 FE) TAB at 08:06

## 2022-10-05 RX ADMIN — GABAPENTIN 100 MG: 100 CAPSULE ORAL at 08:06

## 2022-10-05 RX ADMIN — MIRTAZAPINE 7.5 MG: 15 TABLET, FILM COATED ORAL at 21:42

## 2022-10-05 RX ADMIN — MEROPENEM 500 MG: 500 INJECTION, POWDER, FOR SOLUTION INTRAVENOUS at 08:14

## 2022-10-05 NOTE — PROGRESS NOTES
Problem: Risk for Spread of Infection  Goal: Prevent transmission of infectious organism to others  Description: Prevent the transmission of infectious organisms to other patients, staff members, and visitors. Outcome: Progressing Towards Goal     Problem: Patient Education:  Go to Education Activity  Goal: Patient/Family Education  Outcome: Progressing Towards Goal     Problem: Falls - Risk of  Goal: *Absence of Falls  Description: Document Susana Taft Fall Risk and appropriate interventions in the flowsheet. Outcome: Progressing Towards Goal  Note: Fall Risk Interventions:  Mobility Interventions: Communicate number of staff needed for ambulation/transfer, OT consult for ADLs, PT Consult for mobility concerns    Mentation Interventions: Adequate sleep, hydration, pain control    Medication Interventions: Patient to call before getting OOB    Elimination Interventions: Call light in reach              Problem: Patient Education: Go to Patient Education Activity  Goal: Patient/Family Education  Outcome: Progressing Towards Goal     Problem: Nutrition Deficit  Goal: *Optimize nutritional status  Outcome: Progressing Towards Goal     Problem: Pressure Injury - Risk of  Goal: *Prevention of pressure injury  Description: Document Barber Scale and appropriate interventions in the flowsheet.   Outcome: Progressing Towards Goal  Note: Pressure Injury Interventions:  Sensory Interventions: Assess changes in LOC    Moisture Interventions: Check for incontinence Q2 hours and as needed, Minimize layers    Activity Interventions: PT/OT evaluation, Pressure redistribution bed/mattress(bed type)    Mobility Interventions: HOB 30 degrees or less    Nutrition Interventions: Document food/fluid/supplement intake    Friction and Shear Interventions: Apply protective barrier, creams and emollients                Problem: Patient Education: Go to Patient Education Activity  Goal: Patient/Family Education  Outcome: Progressing Towards Goal     Problem: Patient Education: Go to Patient Education Activity  Goal: Patient/Family Education  Outcome: Progressing Towards Goal     Problem: Patient Education: Go to Patient Education Activity  Goal: Patient/Family Education  Outcome: Progressing Towards Goal

## 2022-10-05 NOTE — PROGRESS NOTES
16: 52 Attempted to give report, put on hold, unit to call back to get report. Olu Mares    17:08 TRANSFER - OUT REPORT:    Verbal report given to [facility](name) on Vaibhav Porter  being transferred to [facility](unit) for routine progression of care       Report consisted of patients Situation, Background, Assessment and   Recommendations(SBAR). Information from the following report(s) SBAR, Kardex, Procedure Summary, Intake/Output, MAR, and Cardiac Rhythm A fib  was reviewed with the receiving nurse. Lines:   PICC Double Lumen 09/09/22 Right (Active)   Central Line Being Utilized Yes 10/05/22 0800   Criteria for Appropriate Use Total parenteral nutrition 10/05/22 0800   Site Assessment Clean, dry, & intact 10/05/22 0800   Phlebitis Assessment 0 10/05/22 0800   Infiltration Assessment 0 10/05/22 0800   Date of Last Dressing Change 09/29/22 10/05/22 0800   Dressing Status Clean, dry, & intact 10/05/22 0800   Action Taken Open ports on tubing capped 10/05/22 0800   Dressing Type Transparent;Disk with Chlorhexadine gluconate (CHG) 10/05/22 0800   Hub Color/Line Status Red; Infusing 10/05/22 0800   Positive Blood Return (Site #1) Yes 10/05/22 0800   Hub Color/Line Status Purple 10/05/22 0800   Positive Blood Return (Site #2) Yes 10/05/22 0800   Alcohol Cap Used Yes 10/05/22 0800        Opportunity for questions and clarification was provided.       Patient transported with:   Domob

## 2022-10-05 NOTE — PROGRESS NOTES
Problem: Mobility Impaired (Adult and Pediatric)  Goal: *Acute Goals and Plan of Care (Insert Text)  Description: FUNCTIONAL STATUS PRIOR TO ADMISSION: The patient was functional at the wheelchair level and required minimal assistance for transfers to the chair. HOME SUPPORT PRIOR TO ADMISSION: The patient lived with wife, daughter and required up to moderate assistance  for tranfers bed <> w/c and ADLs. Patient has spent extensive period of time hospitalized recently and has been admitted from [facility]. Physical Therapy Goals    Upgraded 10/3/2022  1. Patient will roll left/right in bed for pressure relief mod I with use of bed rails within 7 day(s). 2.  Patient will move from supine to sit and sit to supine  in bed with SBA within 7 day(s). 3.  Patient will transfer from bed to chair and chair to bed with moderate assistance  using the least restrictive device within 7 day(s). 4  Patient will sit EOB x 15 minutes without UE support with CGA-min A within 7 day(s). Weekly Reassessment 9/22/22 (goals continued)  Initiated 9/15/2022  1. Patient will roll left/right in bed for pressure relief mod I with use of bed rails within 7 day(s). 2.  Patient will move from supine to sit and sit to supine  in bed with moderate assistance within 7 day(s). MET    3. Patient will transfer from bed to chair and chair to bed with moderate assistance  using the least restrictive device within 7 day(s). 4  Patient will sit EOB x 10 minutes without UE support with CGA-min A within 7 day(s). MET  Outcome: Progressing Towards Goal      PHYSICAL THERAPY TREATMENT  Patient: Owen Pérez (67 y.o. male)  Date: 10/5/2022  Diagnosis: Intra-abdominal infection [B99.9] Intra-abdominal infection  Procedure(s) (LRB):  ABDOMINAL Wall DEBRIDEMENT (Left) 6 Days Post-Op  Precautions: Fall, Other (comment) (bilateral BKA)  Chart, physical therapy assessment, plan of care and goals were reviewed.     ASSESSMENT  Patient continues with skilled PT services and is progressing towards goals. Pt medicated by RN prior to session. Pt required assistance with trunk to achieve sitting. Pt was able to sit unsupported with c/o of abdominal pain, and tolerable buttocks pain. Pt was able to ascencion right LE prothesis without plastic liner. Pt reports Prothesis needs refitting due to residual limb bottoming out. Pt had decrease lateral scooting but able to utilize UE to position self to [facility] once sideline. Pt's goal is to improve mobility and independence, . Current Level of Function Impacting Discharge (mobility/balance): min A for EOB    Other factors to consider for discharge: abdominal,sacral, left residual limb-wounds. Decrease mobility and independence          PLAN :  Patient continues to benefit from skilled intervention to address the above impairments. Continue treatment per established plan of care. to address goals. Recommendation for discharge: (in order for the patient to meet his/her long term goals)  Therapy 3 hours per day 5-7 days per week    This discharge recommendation:  Has not yet been discussed the attending provider and/or case management    IF patient discharges home will need the following DME: to be determined (TBD)       SUBJECTIVE:   Patient stated I can try.     OBJECTIVE DATA SUMMARY:   Critical Behavior:  Neurologic State: Alert  Orientation Level: Appropriate for age, Oriented X4  Cognition: Appropriate decision making, Appropriate for age attention/concentration  Safety/Judgement: Fall prevention, Home safety  Functional Mobility Training:  Bed Mobility:  Rolling: Minimum assistance  Supine to Sit: Minimum assistance initiated bed control to assist.    Sit to Supine: Minimum assistance  Scooting: Minimum assistance;Assist x2     Donned prothesis in seated. Pt decrease ability to lean forward to assist with donning      Transfers:                                   Balance:  Sitting: Intact; With support static sitting      Ambulation/Gait Training:       Stairs: Therapeutic Exercises:     Pain Rating:  Abdomin     Activity Tolerance:   Limited     After treatment patient left in no apparent distress:   Supine in bed, Call bell within reach, and right sideline    COMMUNICATION/COLLABORATION:   The patients plan of care was discussed with: Occupational therapist and Registered nurse.      Baldo Lyn PTA   Time Calculation: 27 mins

## 2022-10-05 NOTE — PROGRESS NOTES
Problem: Self Care Deficits Care Plan (Adult)  Goal: *Acute Goals and Plan of Care (Insert Text)  Description: FUNCTIONAL STATUS PRIOR TO ADMISSION: Prior to previous hospitalization, d/c to [facility], and current admission to [facility] pt was utilizing w/c for functional mobility and required minimal assistance for transfers. HOME SUPPORT: The patient lived with his wife and daughter and required assistance for all ADL/IADL tasks. Occupational Therapy Goals  Initiated 9/15/2022, OT weekly re-assessment 9/22/22  1. Patient will perform basic grooming in unsupported sitting with minimal assistance/contact guard assist within 7 day(s). (Continue 9/22, 10/3)  2. Patient will perform anterior neck to thigh bathing while in unsupported sitting with minimal assistance/contact guard assist within 7 day(s). (Downgrade to mod A 9/22, continue 10/3)  3. Patient will perform drop arm BSC via lateral transfers with moderate assistance within 7 day(s). (Continue 9/22, 10/3)  4. Patient will perform all aspects of toileting with moderate assistance  within 7 day(s). (At bed level 9/22, 10/3)  5. Patient will participate in upper extremity therapeutic exercise/activities with supervision/set-up for 10 minutes within 7 day(s). (Continue 9/22, 10/3)      Outcome: Progressing Towards Goal    OCCUPATIONAL THERAPY TREATMENT  Patient: Rozina Godwin (68 y.o. male)  Date: 10/5/2022  Diagnosis: Intra-abdominal infection [B99.9] Intra-abdominal infection  Procedure(s) (LRB):  ABDOMINAL Wall DEBRIDEMENT (Left) 6 Days Post-Op  Precautions: Fall, Other (comment) (bilateral BKA)  Chart, occupational therapy assessment, plan of care, and goals were reviewed. ASSESSMENT  Patient continues with skilled OT services and is progressing towards goals. AAOx4, limited today by abdominal pain, pre medicated by RN but participatory and motivated during therapy.  Min A to reach EOB with HOB elevated and SBA to CGA while seated EOB for LB dressing and tolerance. Patient sat for about 10 minutes with similar assistance but was unable to functionally assist with any LB dressing (don R prosthetic) due to abdominal pain and poor balance. Continue to feel that the patient would benefit from IPR to address deficits, he states he is motivated to return to walking and playing with his grandson. Acute care OT will continue to follow. Current Level of Function Impacting Discharge (ADLs): max A LB to set/up in bed    Other factors to consider for discharge: supportive family, high PLOF and high motivation         PLAN :  Patient continues to benefit from skilled intervention to address the above impairments. Continue treatment per established plan of care to address goals. Recommend with staff: Chair position as tolerated in bed, engage in self-care    Recommend next OT session: sitting grooming, AROM stretching to access distal LEs without abdominal pain     Recommendation for discharge: (in order for the patient to meet his/her long term goals)  Therapy 3 hours per day 5-7 days per week    This discharge recommendation:  Has been made in collaboration with the attending provider and/or case management    IF patient discharges home will need the following DME: TBD       SUBJECTIVE:   Patient stated I need to get back to running after my grandson.     OBJECTIVE DATA SUMMARY:   Cognitive/Behavioral Status:  Neurologic State: Alert  Orientation Level: Appropriate for age;Oriented X4  Cognition: Appropriate decision making; Appropriate for age attention/concentration             Functional Mobility and Transfers for ADLs:  Bed Mobility:  Rolling: Minimum assistance  Supine to Sit: Minimum assistance  Sit to Supine: Minimum assistance  Scooting: Minimum assistance;Assist x2    Transfers:   Unable to tolerate today, RLE prosthetics not fitting right           Balance:  Sitting: Intact; With support    ADL Intervention:     Progressed to EOB after attempts to don components of LE prosthetic in bed were limited by pain. Sitting EOB attempted to have pt help manage RLE prosthetic with below results. Lower Body Dressing Assistance  Dressing Assistance: Maximum assistance  Patient unable to complete in bed or sitting EOB, can't achieve tailor sitting at this point     Cognitive Retraining  Problem Solving: Deductive reason    Therapeutic Exercises:   Static Sitting Balance: limited by pain, patient was only briefly able to maintain static sitting balance unsupported. Need 1 or 2 UEs to help compensate   Dynamic Sitting balance: attempts to don RLE prosthetic, pt needed CGA to help maintain balance today     Pain:  Not rated, pre medicated, expressed extreme discomfort, RN aware     Activity Tolerance:   Fair and requires rest breaks    After treatment patient left in no apparent distress:   Supine in bed, Patient positioned in right sidelying for pressure relief, Call bell within reach, and Side rails x 3    COMMUNICATION/COLLABORATION:   The patients plan of care was discussed with: Physical therapist and Registered nurse.      Haley Peraza  Time Calculation: 27 mins

## 2022-10-05 NOTE — PROGRESS NOTES
Bedside and Verbal shift change report given to Miguel Hill RN (oncoming nurse) by Jessica Molina (offgoing nurse). Report included the following information SBAR and Kardex. 0600 patient noted to have stool coming  from the side and the top of his ostomy. Cleaned ostomy area with soap and water. Changed his ostomy bag and bedding. Patient complains of abdominal pain despite dilaudid being given.

## 2022-10-05 NOTE — PROGRESS NOTES
[facility] Adult  Hospitalist Group                                                                                          Hospitalist Progress Note  Kirk Harvey MD  Answering service: [phone] from in house phone        Date of Service:  10/5/2022  NAME:  Colletta Clamp  :  1970  MRN:  [id number]      Admission Summary: This is a 14-year-old man with a PMH of T2DM, BKA bilaterally, JEMIMA on CKD requiring HD, Respiratory Failure requiring intubation who presented at the ED from [address] [place] [address] with c/o abdominal pain. The patient was recently admitted to another hospital and underwent left below-knee amputation, hospitalization complicated with respiratory failure which required prolonged intubation- attributed to aspiration pneumonia, also developed lobulated effusion, for which the patient underwent decortication and right thoracotomy and acute renal failure for which he has been started on hemodialysis. He had a J-tube was placed for supplemental nutrition and was transferred to [address] [place] [address] for continuation of care. He was doing relatively well in [address] [place] [address] until the day of presentation at the ED when the patient complained of abdominal pain at the facility. CT scan of the abdomen and pelvis was obtained: shows evidence of bowel perforation, sent to [facility] fro further eval/tx. When the patient arrived at the emergency room, based on his clinical presentation and lab work, Code Sepsis was triggered. The patient received fluid therapy as per sepsis protocol. The emergency room physician consulted general surgeon on-call. The patient was seen by the surgeon and the patient is planned for immediate surgical intervention. No record of prior admission to this hospital.     Interval history / Subjective:   No acute events, pending placement      Assessment & Plan:     Dislodged JG tube with surrounding peritonitis. Free air and perirectal fistula.   Patient was sent from [address] [place] [address] for intractable abdominal pain. Severe sepsis without septic shock due to intra-abdominal source of infection. .  -He was started on broad-spectrum empiric antibiotics on admission and underwent the following procedures:  -Gen Sx: 9/10 Lap take down and removal J-tube, Lap colostomy, I&D abdominal wall  -Colorectal Sx: 9/15 anorectal wound exam including rigid proctosigmoidoscopy  -ID were consulted and assisted with antibiotics management. Patient completed IV Eraxis/Zosyn/Vancomycin completed 9/23/22 and recommendation was to continue Augmentin x2 weeks from 9/24. Due to worsening leukocytosis however the Augmentin was discontinued and patient is restarted on Zosyn since 9/28.  -C. difficile, blood culture where negative. -CT chest 9/10: Small right basilar gas and fluid containing pleural collection with a  peripheral soft tissue rind. Finding may represent an empyema and clinical correlation is recommended. Recommend direct comparison to any additional prior imaging. Free intraperitoneal gas, seen on prior CT of the abdomen and pelvis. -CT abd/pel wo 9/14:No focal intra-abdominal fluid collection to suggest abscess. Free intraperitoneal gas consistent with recent intra-abdominal surgery. Interval improvement in rectal thickening. Prior CT dated September 9, 2022 demonstrated a probable right posterior rectal wall defect which is no longer visualized on today's examination. Persistent, small right basilar gas and fluid containing pleural collection, unchanged.  -wound care ostomy care  Abdominal wounds are growing Pseudomonas  Recommendations are to continue with IV meropenem until 10/14  Last debridement 9/29  --10/05 Wound vac has been placed , IV ABX EOT 10/14 Per ID     JEMIMA vs CKD requiring dialysis: CVC 9/16/22- IR.  -Continued on hemodialysis on MWF schedule, continue. No sign of recovery.   -Access, permacath placed on 9/16  --10/05 Outpatient unit: [facility] , TTS HD Currently, on MICHAEL     Type 2 diabetes mellitus with hyperglycemia. Patient is currently tolerating diet and being supplemented with TPN. -Monitor blood closely 4 times daily and as needed. Humalog sliding scale. AFIB: rate stable. -EKG 9/19: AFIB. -eliquis 5mg BID  -ECHO 9/26: Left Ventricle: The EF by visual approximation is 55 - 60%. Left ventricle size is normal. Normal wall thickness. Normal wall motion. Normal diastolic function. Tricuspid Valve: Mildly elevated RVSP. The estimated RVSP is 42 mmHg.  --10/05 No acute events on tele overnight      Empyema: noted: recent thoracotomy and prolonged intubation at [facility]. -CXR9/19: Persistent right basilar airspace disease and right-sided loculated effusion/empyema. -CT chest 9/10: Small right basilar gas and fluid containing pleural collection with a  peripheral soft tissue rind. Finding may represent an empyema and clinical correlation is recommended. Recommend direct comparison to any additional prior imaging. Free intraperitoneal gas, seen on prior CT of the abdomen and pelvis. -Pulmonary consulted: noted \"percutaneous drainage would not be successful at removing fluid as I suppose it is very thick and organized at this time. Would only recommend following clinically and radiographically. If worsens or worsened right-sided effusion he would need to be reevaluated by his thoracic surgeon at [facility] for additional drainage. \"  -IV ABT's course as above. Acute blood loss anemia on chronic:  --10/05 on MICHAEL per Nephr, HGB check 10/06      +Occult Blood: no melena, no n/v.  -PPI  -iron supplements  -monitor Hgb, transfuse to keep Hgb > 7.0     AMS: delirium, hallucinations ? 2/2 to toxic metabolic encephalopathy, hospital delirium, and opioid induced. -CT head 9/23: no acute intracranial abnormality.   -Alert and oriented on rounds 9/28.  10/3- AAO     Depression: continue zoloft.   Thrombocytosis: noted reactive thrombocytosis, monitor platelets, trending downward    Epigastric J site wound with surrounding abscess, POA.  --Patient underwent incision and drainage on 9/10  --There is surrounding erythema, tenderness with some purulence. Given rebound leukocytosis, will ask general surgery to [facility] to see if this will need further I&D    Bilateral BKA, left BKA dehiscence and possible infection.  -Vascular Sx followin/26-sutures removed and some eschar along incision line excised, open cavity laterally has old hematoma at base that was evacuated   -CT of the left BKA stump on  showed several irregular soft tissue defects overlying the amputation site with a 4.4 x 4.1 x 1.9 cm amorphous fluid collection with partial thick walled/rim-enhancing and internal gas locules. --Patient restarted back on Zosyn due to worsening leukocytosis. --Spoke with Dr Conrad Kumair will check on pt tomorrow   - Leg wound growing Pseudomonas  Vascular has seen  10/1-continue antibiotics  --10/05 He remains on IV     Anorectal wound, POA  --Followed by colorectal surgery. He is status post proctosigmoidoscopy on 9/15, there was communication of the distal rectal lumen with extraperitoneal pelvis. Acute pain syndrome. This is due to the multiple surgeries he had. Anticipate some degree of tolerance due to his continued exposure to periods  --Current pain regimen includes scheduled gabapentin 100 mg 3 times daily  -- As needed 0.2 mg IV Dilaudid every 4 hourly, as needed; Dilaudid 2 mg p.o. every 4 hourly as needed and Dilaudid 4 mg p.o. every 4 hourly as needed. Palliative care help appreciated. Patient will probably benefit from scheduled long-acting opiate such as MS Contin. Continue wound care    GIppx: Pepcid  Code Status: Full Code  Diet: Regular, supplements BID  --Time to start weaning off TPN.   Activity: bedrest  Discharge: TBD  Ambulates:  nonambulatory  Discharge planning: Accepted at [facility]  48 hours      Hospital Problems  Date Reviewed: 9/10/2022            Codes Class Noted POA    Injury to rectum without open wound into cavity ICD-10-CM: S36.60XA  ICD-9-CM: 863.45  9/20/2022 Yes        Open wound of anus ICD-10-CM: J81.495I  ICD-9-CM: 863.89  9/20/2022 Yes        Severe protein-calorie malnutrition ([address] ICD-10-CM: E43  ICD-9-CM: 845  9/13/2022 Yes        * (Principal) Intra-abdominal infection ICD-10-CM: B99.9  ICD-9-CM: 136.9  9/10/2022 Yes       Review of Systems:   A comprehensive review of systems was negative except for that written in the HPI. Vital Signs:    Last 24hrs VS reviewed since prior progress note. Most recent are:  Visit Vitals  BP (!) 125/48 (BP 1 Location: Left upper arm, BP Patient Position: Lying right side)   Pulse 68   Temp 98.6 °F (37 °C)   Resp 11   Ht 5' 7\" (1.702 m)   Wt 70 kg (154 lb 5.2 oz)   SpO2 100%   BMI 24.17 kg/m²         Intake/Output Summary (Last 24 hours) at 10/5/2022 1504  Last data filed at 10/5/2022 7793  Gross per 24 hour   Intake 1105.75 ml   Output --   Net 1105.75 ml          Physical Examination:     I had a face to face encounter with this patient and independently examined them on 10/5/2022 as outlined below:    Refer to wound care pictures for abdominal wound. Constitutional: Chronically sick looking gentleman, he is alert and oriented x3. ENT:  Oral mucosa moist.    Resp/chest wall:  CTA bilaterally. No wheezing/rhonchi/rales. No accessory muscle use. Permacath and double-lumen PICC line the right chest wall. CV:  Regular rate, S1,S2.    GI/: Epigastric chest site 1 2 with surrounding erythema, induration and tenderness and purulent discharge. Colostomy in the lower abdomen. No erythema, swelling, discharge in the inguinal.  Bowel sounds are normoactive. Musculoskeletal:  No edema, warm, bilateral BKA: dressing to LLE stunp. Neurologic:  Moves all extremities. Awake and alert, disoriented. Skin:  multiple wounds, skin w/d, jaundiced.    Psych:  Poor insight, Not anxious nor agitated. Data Review:    Review and/or order of clinical lab test      Labs:     Recent Labs     10/04/22  0111 10/03/22  0450   WBC 10.8 11.0   HGB 7.7* 8.0*   HCT 24.9* 26.0*   * 396       Recent Labs     10/04/22  0111 10/03/22  0450    135*   K 3.6 4.0    103   CO2 21 24   BUN 99* 75*   CREA 2.71* 2.30*   GLU 78 84   CA 8.3* 8.1*   MG 2.1 2.0   PHOS 2.6 2.5*       Recent Labs     10/04/22  0111 10/03/22  0450   ALT 21 17   * 152*   TBILI 0.2 0.2   TP 5.3* 5.5*   ALB 1.5* 1.5*   GLOB 3.8 4.0       No results for input(s): INR, PTP, APTT, INREXT, INREXT in the last 72 hours. No results for input(s): FE, TIBC, PSAT, FERR in the last 72 hours. Lab Results   Component Value Date/Time    Folate 4.1 (L) 09/10/2022 12:00 PM        No results for input(s): PH, PCO2, PO2 in the last 72 hours. No results for input(s): CPK, CKNDX, TROIQ in the last 72 hours.     No lab exists for component: CPKMB  Lab Results   Component Value Date/Time    Triglyceride 103 10/04/2022 01:11 AM     Lab Results   Component Value Date/Time    Glucose (POC) 99 10/05/2022 11:27 AM    Glucose (POC) 99 10/05/2022 07:02 AM    Glucose (POC) 86 10/04/2022 11:43 PM    Glucose (POC) 91 10/04/2022 05:47 PM    Glucose (POC) 86 10/04/2022 01:47 PM     No results found for: COLOR, APPRN, SPGRU, REFSG, WILL, PROTU, GLUCU, KETU, BILU, UROU, MISHA, LEUKU, GLUKE, EPSU, BACTU, WBCU, RBCU, CASTS, UCRY      Medications Reviewed:     Current Facility-Administered Medications   Medication Dose Route Frequency    epoetin vera-epbx (RETACRIT) injection 15,000 Units  15,000 Units SubCUTAneous DIALYSIS TUE, THU & SAT    0.9% sodium chloride infusion 250 mL  250 mL IntraVENous PRN    albumin human 25% (BUMINATE) solution 50 g  50 g IntraVENous DIALYSIS PRN    HYDROmorphone (DILAUDID) injection 1 mg  1 mg IntraVENous Q3H PRN    meropenem (MERREM) 500 mg in 0.9% sodium chloride (MBP/ADV) 50 mL MBP  500 mg IntraVENous Q24H 0.9% sodium chloride infusion 250 mL  250 mL IntraVENous PRN    insulin regular (NOVOLIN R, HUMULIN R) injection   SubCUTAneous Q6H    glucose chewable tablet 16 g  4 Tablet Oral PRN    glucagon (GLUCAGEN) injection 1 mg  1 mg IntraMUSCular PRN    dextrose 10 % infusion 0-250 mL  0-250 mL IntraVENous PRN    gabapentin (NEURONTIN) capsule 100 mg  100 mg Oral TID    ferrous sulfate tablet 325 mg  1 Tablet Oral BID WITH MEALS    HYDROmorphone (DILAUDID) tablet 2 mg  2 mg Oral Q4H PRN    HYDROmorphone (DILAUDID) tablet 4 mg  4 mg Oral Q4H PRN    hydrALAZINE (APRESOLINE) 20 mg/mL injection 10 mg  10 mg IntraVENous Q6H PRN    mirtazapine (REMERON) tablet 7.5 mg  7.5 mg Oral QHS    pantoprazole (PROTONIX) tablet 40 mg  40 mg Oral ACB&D    apixaban (ELIQUIS) tablet 5 mg  5 mg Oral BID    sertraline (ZOLOFT) tablet 25 mg  25 mg Oral DAILY    collagenase (SANTYL) 250 unit/gram ointment   Topical DAILY    diphenhydrAMINE (BENADRYL) injection 12.5 mg  12.5 mg IntraVENous Q6H PRN    sodium chloride (NS) flush 5-40 mL  5-40 mL IntraVENous Q8H    sodium chloride (NS) flush 5-40 mL  5-40 mL IntraVENous PRN    acetaminophen (TYLENOL) tablet 650 mg  650 mg Oral Q6H PRN    polyethylene glycol (MIRALAX) packet 17 g  17 g Oral DAILY PRN    ondansetron (ZOFRAN ODT) tablet 4 mg  4 mg Oral Q8H PRN    Or    ondansetron (ZOFRAN) injection 4 mg  4 mg IntraVENous Q6H PRN    L.acidophilus-paracasei-S.thermophil-bifidobacter (RISAQUAD) 8 billion cell capsule  1 Capsule Oral DAILY     ______________________________________________________________________  EXPECTED LENGTH OF STAY: 9d 14h  ACTUAL LENGTH OF STAY:          25                 Wiliam Cuadra MD

## 2022-10-05 NOTE — PROGRESS NOTES
Nephrology Progress Note  Kayla Pelletier  Date of Admission : 9/9/2022    CC:  Follow up for JEMIMA       Assessment and Plan     JEMIMA on HD:  - 2/2 ATN  - HD Schedule: TTS   - Outpatient unit: [facility] MWF chair per CM notes  - Access: permacath placed on 9/16  - serial labs    Anemia:  - MICHAEL increased (10/4) 15 k TTS    PAD s/p b/l BKA    Recent sepsis  - on Merrem 500 mg q 24 hrs per ID    Empyema    ABD wall abscess:  - J-tube removal, colostomy, I&D of abd wound  - TPN/ lipids per primary team, encouraging increase oral intake  - s/p I&D of abdominal avbscess 9/29    Sacral decub  Type 2DM  Afib       Interval History:  Seen and examined this AM. Oriented to person/ place. Continues with sacral pain no new issues. No labs this AM. No urine output removed 1.5 ml yesterday. Current Medications: all current  Medications have been eviewed in EPIC  Review of Systems: A comprehensive review of systems was negative except for that written in the HPI. Objective:  Vitals:    Vitals:    10/05/22 0337 10/05/22 0400 10/05/22 0600 10/05/22 0800   BP: (!) 116/39   (!) 145/61   Pulse: 66 69 76 71   Resp: 10   10   Temp: 98.6 °F (37 °C)   98.6 °F (37 °C)   TempSrc:       SpO2: 100%   99%   Weight:       Height:         Intake and Output:  10/05 0701 - 10/05 1900  In: 1105.8 [I.V.:1105.8]  Out: -   10/03 1901 - 10/05 0700  In: 1367.7 [I.V.:1367.7]  Out: 2150 [Urine:650]    Physical Examination:  General: NAD oriented to person/place today  Neck:  Supple, no mass  Resp:  Lungs CTA B/L, no wheezing , normal respiratory effort  CV:  RRR,  no murmur or rub, no stump edema  GI:  Soft, NT, + Bowel sounds, + ostomy  Access:           [facility]    Lab Data Personally Reviewed: I have reviewed all the pertinent labs, microbiology data and radiology studies during assessment.     Recent Labs     10/04/22  0111 10/03/22  0450    135*   K 3.6 4.0    103   CO2 21 24   GLU 78 84   BUN 99* 75*   CREA 2.71* 2.30*   CA 8.3* 8.1*   MG 2.1 2.0   PHOS 2.6 2.5*   ALB 1.5* 1.5*   ALT 21 17       Recent Labs     10/04/22  0111 10/03/22  0450   WBC 10.8 11.0   HGB 7.7* 8.0*   HCT 24.9* 26.0*   * 396       No results found for: SDES  Lab Results   Component Value Date/Time    Culture result: (A) 09/29/2022 09:21 PM     MODERATE PROTEUS MIRABILIS REFER TO [id number] FOR SENSITIVITIES    Culture result: MODERATE PSEUDOMONAS AERUGINOSA (A) 09/29/2022 09:21 PM    Culture result: NO ANAEROBES ISOLATED 09/29/2022 09:21 PM     Recent Results (from the past 24 hour(s))   GLUCOSE, POC    Collection Time: 10/04/22  1:47 PM   Result Value Ref Range    Glucose (POC) 86 65 - 117 mg/dL    Performed by Joey Zuluaga    GLUCOSE, POC    Collection Time: 10/04/22  5:47 PM   Result Value Ref Range    Glucose (POC) 91 65 - 117 mg/dL    Performed by [place], POC    Collection Time: 10/04/22 11:43 PM   Result Value Ref Range    Glucose (POC) 86 65 - 117 mg/dL    Performed by Rut Beltran (PCT)    GLUCOSE, POC    Collection Time: 10/05/22  7:02 AM   Result Value Ref Range    Glucose (POC) 99 65 - 117 mg/dL    Performed by Ascencion Lass RN Hannah Lennox, NP  [address]  Phone - [phone]   Fax - [phone]  [email or web address]

## 2022-10-05 NOTE — WOUND CARE
Wound/Ostomy Visit    Follow up visit at request of nursing for ostomy pouch management. Surgery: Colostomy  Date of Surgery: 9.10.22      Surgeon: Dr. Chante Gale Location: left quadrant  Stoma:  red, moist and in a deep crevice  Jennifer stoma:  mild erythema  Pouch was not adherent so it was changed. Applied convex 2 piece pouch with ring. Teaching/Subjects covered today:  Encouraged pt to review handout given to patient/family and ask questions regarding material on next ostomy nurse visit. - When/how to clean stoma & peristomal skin  - When/how to empty pouch  - When/how to change appliance    Recommendations:  1. Empty appliance when 1/3 full and PRN. Encourage patient/family to notify nurse and  assist w/ pouch emptying to promote self-care. Change appliance twice weekly and PRN for leaking ASAP. DO NOT REINFORCE LEAKS to avoid skin breakdown. Transition of Care:  Supplies given to patient with ordering information. Patient going to inpatient rehab.     KATHY Moreland RN [facility] Inpatient Wound Care  Available on [employer]  Office [phone]

## 2022-10-06 LAB
ALBUMIN SERPL-MCNC: 1.8 G/DL (ref 3.5–5)
ALBUMIN/GLOB SERPL: 0.4 {RATIO} (ref 1.1–2.2)
ALP SERPL-CCNC: 186 U/L (ref 45–117)
ALT SERPL-CCNC: 28 U/L (ref 12–78)
ANION GAP SERPL CALC-SCNC: 9 MMOL/L (ref 5–15)
AST SERPL-CCNC: 40 U/L (ref 15–37)
BASOPHILS # BLD: 0.1 K/UL (ref 0–0.1)
BASOPHILS NFR BLD: 1 % (ref 0–1)
BILIRUB SERPL-MCNC: 0.2 MG/DL (ref 0.2–1)
BUN SERPL-MCNC: 91 MG/DL (ref 6–20)
BUN/CREAT SERPL: 35 (ref 12–20)
CALCIUM SERPL-MCNC: 8.9 MG/DL (ref 8.5–10.1)
CHLORIDE SERPL-SCNC: 110 MMOL/L (ref 97–108)
CO2 SERPL-SCNC: 24 MMOL/L (ref 21–32)
CREAT SERPL-MCNC: 2.58 MG/DL (ref 0.7–1.3)
DIFFERENTIAL METHOD BLD: ABNORMAL
EOSINOPHIL # BLD: 0.6 K/UL (ref 0–0.4)
EOSINOPHIL NFR BLD: 7 % (ref 0–7)
ERYTHROCYTE [DISTWIDTH] IN BLOOD BY AUTOMATED COUNT: 21.3 % (ref 11.5–14.5)
GLOBULIN SER CALC-MCNC: 4.5 G/DL (ref 2–4)
GLUCOSE BLD STRIP.AUTO-MCNC: 107 MG/DL (ref 65–117)
GLUCOSE BLD STRIP.AUTO-MCNC: 95 MG/DL (ref 65–117)
GLUCOSE SERPL-MCNC: 98 MG/DL (ref 65–100)
HCT VFR BLD AUTO: 24.8 % (ref 36.6–50.3)
HGB BLD-MCNC: 7.8 G/DL (ref 12.1–17)
IMM GRANULOCYTES # BLD AUTO: 0.1 K/UL (ref 0–0.04)
IMM GRANULOCYTES NFR BLD AUTO: 1 % (ref 0–0.5)
LYMPHOCYTES # BLD: 2.1 K/UL (ref 0.8–3.5)
LYMPHOCYTES NFR BLD: 23 % (ref 12–49)
MAGNESIUM SERPL-MCNC: 2.2 MG/DL (ref 1.6–2.4)
MCH RBC QN AUTO: 29.9 PG (ref 26–34)
MCHC RBC AUTO-ENTMCNC: 31.5 G/DL (ref 30–36.5)
MCV RBC AUTO: 95 FL (ref 80–99)
MONOCYTES # BLD: 0.5 K/UL (ref 0–1)
MONOCYTES NFR BLD: 6 % (ref 5–13)
NEUTS SEG # BLD: 5.6 K/UL (ref 1.8–8)
NEUTS SEG NFR BLD: 62 % (ref 32–75)
NRBC # BLD: 0 K/UL (ref 0–0.01)
NRBC BLD-RTO: 0 PER 100 WBC
PHOSPHATE SERPL-MCNC: 3.6 MG/DL (ref 2.6–4.7)
PLATELET # BLD AUTO: 465 K/UL (ref 150–400)
PLATELET COMMENTS,PCOM: ABNORMAL
PMV BLD AUTO: 9.5 FL (ref 8.9–12.9)
POTASSIUM SERPL-SCNC: 4 MMOL/L (ref 3.5–5.1)
PROT SERPL-MCNC: 6.3 G/DL (ref 6.4–8.2)
RBC # BLD AUTO: 2.61 M/UL (ref 4.1–5.7)
RBC MORPH BLD: ABNORMAL
RBC MORPH BLD: ABNORMAL
SERVICE CMNT-IMP: NORMAL
SERVICE CMNT-IMP: NORMAL
SODIUM SERPL-SCNC: 143 MMOL/L (ref 136–145)
TRIGL SERPL-MCNC: 96 MG/DL (ref ?–150)
WBC # BLD AUTO: 9 K/UL (ref 4.1–11.1)
WBC MORPH BLD: ABNORMAL

## 2022-10-06 PROCEDURE — 84100 ASSAY OF PHOSPHORUS: CPT

## 2022-10-06 PROCEDURE — 74011250636 HC RX REV CODE- 250/636: Performed by: NURSE PRACTITIONER

## 2022-10-06 PROCEDURE — 74011250637 HC RX REV CODE- 250/637: Performed by: PHYSICIAN ASSISTANT

## 2022-10-06 PROCEDURE — 74011250637 HC RX REV CODE- 250/637: Performed by: COLON & RECTAL SURGERY

## 2022-10-06 PROCEDURE — 65270000046 HC RM TELEMETRY

## 2022-10-06 PROCEDURE — 36415 COLL VENOUS BLD VENIPUNCTURE: CPT

## 2022-10-06 PROCEDURE — 74011250636 HC RX REV CODE- 250/636: Performed by: INTERNAL MEDICINE

## 2022-10-06 PROCEDURE — 82962 GLUCOSE BLOOD TEST: CPT

## 2022-10-06 PROCEDURE — 90935 HEMODIALYSIS ONE EVALUATION: CPT

## 2022-10-06 PROCEDURE — 74011250636 HC RX REV CODE- 250/636: Performed by: COLON & RECTAL SURGERY

## 2022-10-06 PROCEDURE — 85025 COMPLETE CBC W/AUTO DIFF WBC: CPT

## 2022-10-06 PROCEDURE — 84478 ASSAY OF TRIGLYCERIDES: CPT

## 2022-10-06 PROCEDURE — 74011000250 HC RX REV CODE- 250: Performed by: COLON & RECTAL SURGERY

## 2022-10-06 PROCEDURE — 80053 COMPREHEN METABOLIC PANEL: CPT

## 2022-10-06 PROCEDURE — 83735 ASSAY OF MAGNESIUM: CPT

## 2022-10-06 PROCEDURE — 74011250637 HC RX REV CODE- 250/637: Performed by: STUDENT IN AN ORGANIZED HEALTH CARE EDUCATION/TRAINING PROGRAM

## 2022-10-06 PROCEDURE — 74011000258 HC RX REV CODE- 258: Performed by: NURSE PRACTITIONER

## 2022-10-06 RX ADMIN — HYDROMORPHONE HYDROCHLORIDE 1 MG: 1 INJECTION, SOLUTION INTRAMUSCULAR; INTRAVENOUS; SUBCUTANEOUS at 15:15

## 2022-10-06 RX ADMIN — FERROUS SULFATE TAB 325 MG (65 MG ELEMENTAL FE) 325 MG: 325 (65 FE) TAB at 16:58

## 2022-10-06 RX ADMIN — GABAPENTIN 100 MG: 100 CAPSULE ORAL at 16:58

## 2022-10-06 RX ADMIN — HEPARIN SODIUM 1700 UNITS: 1000 INJECTION INTRAVENOUS; SUBCUTANEOUS at 08:27

## 2022-10-06 RX ADMIN — MEROPENEM 500 MG: 500 INJECTION, POWDER, FOR SOLUTION INTRAVENOUS at 11:37

## 2022-10-06 RX ADMIN — SODIUM CHLORIDE, PRESERVATIVE FREE 10 ML: 5 INJECTION INTRAVENOUS at 19:05

## 2022-10-06 RX ADMIN — HYDROMORPHONE HYDROCHLORIDE 1 MG: 1 INJECTION, SOLUTION INTRAMUSCULAR; INTRAVENOUS; SUBCUTANEOUS at 11:32

## 2022-10-06 RX ADMIN — Medication 1 CAPSULE: at 11:37

## 2022-10-06 RX ADMIN — EPOETIN ALFA-EPBX 20000 UNITS: 10000 INJECTION, SOLUTION INTRAVENOUS; SUBCUTANEOUS at 22:00

## 2022-10-06 RX ADMIN — APIXABAN 5 MG: 5 TABLET, FILM COATED ORAL at 22:00

## 2022-10-06 RX ADMIN — HYDROMORPHONE HYDROCHLORIDE 1 MG: 1 INJECTION, SOLUTION INTRAMUSCULAR; INTRAVENOUS; SUBCUTANEOUS at 04:15

## 2022-10-06 RX ADMIN — MIRTAZAPINE 7.5 MG: 15 TABLET, FILM COATED ORAL at 22:00

## 2022-10-06 RX ADMIN — SODIUM CHLORIDE, PRESERVATIVE FREE 10 ML: 5 INJECTION INTRAVENOUS at 16:58

## 2022-10-06 RX ADMIN — HYDROMORPHONE HYDROCHLORIDE 1 MG: 1 INJECTION, SOLUTION INTRAMUSCULAR; INTRAVENOUS; SUBCUTANEOUS at 00:36

## 2022-10-06 RX ADMIN — HEPARIN SODIUM 1800 UNITS: 1000 INJECTION INTRAVENOUS; SUBCUTANEOUS at 08:28

## 2022-10-06 RX ADMIN — APIXABAN 5 MG: 5 TABLET, FILM COATED ORAL at 11:35

## 2022-10-06 RX ADMIN — SERTRALINE 25 MG: 50 TABLET, FILM COATED ORAL at 11:37

## 2022-10-06 RX ADMIN — HYDROMORPHONE HYDROCHLORIDE 1 MG: 1 INJECTION, SOLUTION INTRAMUSCULAR; INTRAVENOUS; SUBCUTANEOUS at 19:04

## 2022-10-06 RX ADMIN — SODIUM CHLORIDE, PRESERVATIVE FREE 10 ML: 5 INJECTION INTRAVENOUS at 22:00

## 2022-10-06 RX ADMIN — DIPHENHYDRAMINE HYDROCHLORIDE 12.5 MG: 50 INJECTION, SOLUTION INTRAMUSCULAR; INTRAVENOUS at 22:07

## 2022-10-06 RX ADMIN — DIPHENHYDRAMINE HYDROCHLORIDE 12.5 MG: 50 INJECTION, SOLUTION INTRAMUSCULAR; INTRAVENOUS at 04:15

## 2022-10-06 RX ADMIN — HYDROMORPHONE HYDROCHLORIDE 1 MG: 1 INJECTION, SOLUTION INTRAMUSCULAR; INTRAVENOUS; SUBCUTANEOUS at 22:07

## 2022-10-06 RX ADMIN — GABAPENTIN 100 MG: 100 CAPSULE ORAL at 22:00

## 2022-10-06 RX ADMIN — PANTOPRAZOLE SODIUM 40 MG: 40 TABLET, DELAYED RELEASE ORAL at 16:58

## 2022-10-06 NOTE — PROGRESS NOTES
ID Progress Note  10/6/2022    Subjective:     Unable to get into comfortable position    Review of Systems:            Symptom Y/N Comments   Symptom Y/N Comments   Fever/Chills n      Chest Pain n       Poor Appetite       Edema        Cough       Abdominal Pain  n      Sputum       Joint Pain        SOB/ANDREWS  n     Pruritis/Rash        Nausea/vomit  n     Tolerating PT/OT        Diarrhea       Tolerating Diet        Constipation       Other           Could NOT obtain due to:       Objective:     Vitals: Visit Vitals  BP (!) 114/58 (BP 1 Location: Left upper arm, BP Patient Position: Lying right side)   Pulse 63   Temp 98.7 °F (37.1 °C)   Resp 14   Ht 5' 7\" (1.702 m)   Wt 70 kg (154 lb 5.2 oz)   SpO2 95%   BMI 24.17 kg/m²          Tmax:  Temp (24hrs), Av.5 °F (36.9 °C), Min:98.2 °F (36.8 °C), Max:98.7 °F (37.1 °C)      PHYSICAL EXAM:  General: Chronically ill appearing. WD, WN. Alert, cooperative, no acute distress    EENT:  Anicteric sclerae. Dry mucous membrane  Resp:  CTA bilaterally, no wheezing or rales. No accessory muscle use  CV:  Regular  rhythm  GI:  Soft, Non distended, Non tender. +Bowel sounds, colostomy in place, previous J tube site dressing dry and intact  Neurologic:  Alert and oriented X self, normal speech,   Psych:   Fair insight. Not anxious nor agitated  Skin:  No rashes.   No jaundice    Pressure injury; sacrum, right buttock,   left BKA stump site; old blood drainage noted it and intact  Right BKA stump; intact      Labs:   Lab Results   Component Value Date/Time    WBC 9.0 10/06/2022 04:14 AM    HGB 7.8 (L) 10/06/2022 04:14 AM    HCT 24.8 (L) 10/06/2022 04:14 AM    PLATELET 021 (H)  04:14 AM    MCV 95.0 10/06/2022 04:14 AM     Lab Results   Component Value Date/Time    Sodium 143 10/06/2022 04:14 AM    Potassium 4.0 10/06/2022 04:14 AM    Chloride 110 (H) 10/06/2022 04:14 AM    CO2 24 10/06/2022 04:14 AM    Anion gap 9 10/06/2022 04:14 AM    Glucose 98 10/06/2022 04:14 AM BUN 91 (H) 10/06/2022 04:14 AM    Creatinine 2.58 (H) 10/06/2022 04:14 AM    BUN/Creatinine ratio 35 (H) 10/06/2022 04:14 AM    GFR est AA 36 (L) 10/03/2022 04:50 AM    GFR est non-AA 30 (L) 10/03/2022 04:50 AM    Calcium 8.9 10/06/2022 04:14 AM    Bilirubin, total 0.2 10/06/2022 04:14 AM    Alk. phosphatase 186 (H) 10/06/2022 04:14 AM    Protein, total 6.3 (L) 10/06/2022 04:14 AM    Albumin 1.8 (L) 10/06/2022 04:14 AM    Globulin 4.5 (H) 10/06/2022 04:14 AM    A-G Ratio 0.4 (L) 10/06/2022 04:14 AM    ALT (SGPT) 28 10/06/2022 04:14 AM     Assessment:      Dislodged feeding tube/peritonitis   Abdominal wound, s/p abdominal wall debridement (9/29)  Free air and perirectal fistula  S/p laparoscopy procedure; take down and removal of J tube, colostomy placement, and I & D of abdominal wall (9/10)  Left BKA stump infection  Recent right thoracotomy  Hx Bilateral BKAs   - afebrile, wbc normal     Blood cx (9/9, 9/16) no growth, (9/27) no growth so far     Wound cx from left bka stump (9/27) pseudomonas aeruginosa     Wound cx from previous J-tube & intra-op cx (9/29) pseudomonas aeruginosa, proteus mirabilis       CT of LLE (9/28)  Partially encapsulated fluid collection with a few associated gas locules overlying the dictation margin. Infection/abscess cannot be excluded based on imaging alone. No CT evidence of acute osteomyelitis. Several gas locules along the left spermatic cord, correlate clinically for signs of infection in this location. Objective:      Completed 2 weeks of IV Eraxis, zosyn, and vancomycin as of 9/23 then switched to   PO Augmentin between 9/24 to 9/28. ABX therapy changed back to IV due to persistent leukocytosis. Continue with IV Merrem, recommend to complete total 2 weeks.  Last dose 10/13  Haley catheter in place  Discharge IV abx order in place 10/4  Appreciate wound care team's input  Vascular/general surgery following    Fever work up if temp >= 100.4    Above plan of care discussed and agreed with Dr. Lindsay Cantor    Pt is clear to discharge in ID stand point.             Paco De La Cruz NP

## 2022-10-06 NOTE — PROGRESS NOTES
Bedside shift change report given to Stefania RN (oncoming nurse) by Duong Quinteros RN (offgoing nurse). Report included the following information SBAR, Kardex, Intake/Output, MAR, and Recent Results.

## 2022-10-06 NOTE — PROGRESS NOTES
Nephrology Progress Note  Rozina Godwin  Date of Admission : 9/9/2022    CC:  Follow up for JEMIMA       Assessment and Plan     JEMIMA on HD: oliguria  - 2/2 ATN  - HD Schedule: TTS in hospital   - First dialysis 9/12/22  - Outpatient unit: [facility] per CM notes  - Access: permacath placed on 9/16  - Dialysis this morning no issues per HD team  - labs on HD    Anemia:  - MICHAEL increased (10/6) 20 k TTS  - goal is for Hgb 10-11  - on oral iron  - check iron studies last completed 9/10    PAD s/p b/l BKA    Recent sepsis  - on Merrem 500 mg q 24 hrs per ID recommends 2 more weeks until 10/14    ABD wall abscess:  - J-tube removal, colostomy, I&D of abd wound  - TPN/ lipids per primary team, encouraging increase oral intake  - s/p I&D of abdominal abscess 9/29  - 10/5 Wound vac placed    Empyema  Sacral decub  Type 2DM  Afib       Interval History:  Seen and examined. Oriented talking on the phone to family. Tolerating oral intake. Dialysis this am with 1.5 removed without issues. Per CM notes plans for transfer to [facility] 10/7 pending insurance auth. Current Medications: all current  Medications have been eviewed in EPIC  Review of Systems: A comprehensive review of systems was negative except for that written in the HPI.     Objective:  Vitals:    Vitals:    10/06/22 1022 10/06/22 1038 10/06/22 1052 10/06/22 1110   BP: 139/64 (!) 152/82 (!) 141/66 139/88   Pulse: (!) 57 (!) 59 (!) 59 60   Resp: 20 17 20 18   Temp:    98.3 °F (36.8 °C)   TempSrc:       SpO2:       Weight:       Height:         Intake and Output:  10/06 0701 - 10/06 1900  In: -   Out: 1500   10/04 1901 - 10/06 0700  In: 1105.8 [I.V.:1105.8]  Out: 575     Physical Examination:  General: NAD oriented to person/place today  Neck:  Supple, no mass  Resp:  Lungs CTA B/L, no wheezing , normal respiratory effort  CV:  RRR,  no murmur or rub, no stump edema  GI:  Soft, NT, + Bowel sounds, + ostomy  Access:           [facility]    Lab Data Personally Reviewed: I have reviewed all the pertinent labs, microbiology data and radiology studies during assessment. Recent Labs     10/06/22  0414 10/04/22  0111    137   K 4.0 3.6   * 105   CO2 24 21   GLU 98 78   BUN 91* 99*   CREA 2.58* 2.71*   CA 8.9 8.3*   MG 2.2 2.1   PHOS 3.6 2.6   ALB 1.8* 1.5*   ALT 28 21       Recent Labs     10/06/22  0414 10/04/22  0111   WBC 9.0 10.8   HGB 7.8* 7.7*   HCT 24.8* 24.9*   * 409*       No results found for: SDES  Lab Results   Component Value Date/Time    Culture result: (A) 09/29/2022 09:21 PM     MODERATE PROTEUS MIRABILIS REFER TO [id number] FOR SENSITIVITIES    Culture result: MODERATE PSEUDOMONAS AERUGINOSA (A) 09/29/2022 09:21 PM    Culture result: NO ANAEROBES ISOLATED 09/29/2022 09:21 PM     Recent Results (from the past 24 hour(s))   GLUCOSE, POC    Collection Time: 10/06/22 12:35 AM   Result Value Ref Range    Glucose (POC) 107 65 - 117 mg/dL    Performed by Kayli Rollins RN    TRIGLYCERIDE    Collection Time: 10/06/22  4:14 AM   Result Value Ref Range    Triglyceride 96 <150 MG/DL   CBC WITH AUTOMATED DIFF    Collection Time: 10/06/22  4:14 AM   Result Value Ref Range    WBC 9.0 4.1 - 11.1 K/uL    RBC 2.61 (L) 4.10 - 5.70 M/uL    HGB 7.8 (L) 12.1 - 17.0 g/dL    HCT 24.8 (L) 36.6 - 50.3 %    MCV 95.0 80.0 - 99.0 FL    MCH 29.9 26.0 - 34.0 PG    MCHC 31.5 30.0 - 36.5 g/dL    RDW 21.3 (H) 11.5 - 14.5 %    PLATELET 560 (H) 454 - 400 K/uL    MPV 9.5 8.9 - 12.9 FL    NRBC 0.0 0  WBC    ABSOLUTE NRBC 0.00 0.00 - 0.01 K/uL    NEUTROPHILS 62 32 - 75 %    LYMPHOCYTES 23 12 - 49 %    MONOCYTES 6 5 - 13 %    EOSINOPHILS 7 0 - 7 %    BASOPHILS 1 0 - 1 %    IMMATURE GRANULOCYTES 1 (H) 0.0 - 0.5 %    ABS. NEUTROPHILS 5.6 1.8 - 8.0 K/UL    ABS. LYMPHOCYTES 2.1 0.8 - 3.5 K/UL    ABS. MONOCYTES 0.5 0.0 - 1.0 K/UL    ABS. EOSINOPHILS 0.6 (H) 0.0 - 0.4 K/UL    ABS. BASOPHILS 0.1 0.0 - 0.1 K/UL    ABS. IMM.  GRANS. 0.1 (H) 0.00 - 0.04 K/UL    DF SMEAR SCANNED      PLATELET COMMENTS Large Platelets      RBC COMMENTS MACROCYTOSIS  1+        RBC COMMENTS ANISOCYTOSIS  2+        WBC COMMENTS REACTIVE LYMPHS     MAGNESIUM    Collection Time: 10/06/22  4:14 AM   Result Value Ref Range    Magnesium 2.2 1.6 - 2.4 mg/dL   PHOSPHORUS    Collection Time: 10/06/22  4:14 AM   Result Value Ref Range    Phosphorus 3.6 2.6 - 4.7 MG/DL   METABOLIC PANEL, COMPREHENSIVE    Collection Time: 10/06/22  4:14 AM   Result Value Ref Range    Sodium 143 136 - 145 mmol/L    Potassium 4.0 3.5 - 5.1 mmol/L    Chloride 110 (H) 97 - 108 mmol/L    CO2 24 21 - 32 mmol/L    Anion gap 9 5 - 15 mmol/L    Glucose 98 65 - 100 mg/dL    BUN 91 (H) 6 - 20 MG/DL    Creatinine 2.58 (H) 0.70 - 1.30 MG/DL    BUN/Creatinine ratio 35 (H) 12 - 20      eGFR 29 (L) >60 ml/min/1.73m2    Calcium 8.9 8.5 - 10.1 MG/DL    Bilirubin, total 0.2 0.2 - 1.0 MG/DL    ALT (SGPT) 28 12 - 78 U/L    AST (SGOT) 40 (H) 15 - 37 U/L    Alk. phosphatase 186 (H) 45 - 117 U/L    Protein, total 6.3 (L) 6.4 - 8.2 g/dL    Albumin 1.8 (L) 3.5 - 5.0 g/dL    Globulin 4.5 (H) 2.0 - 4.0 g/dL    A-G Ratio 0.4 (L) 1.1 - 2.2     GLUCOSE, POC    Collection Time: 10/06/22 11:14 AM   Result Value Ref Range    Glucose (POC) 95 65 - 117 mg/dL    Performed by Kerwin Salinas, NAN  [address]  Phone - [phone]   Fax - [phone]  www. [email or web address]

## 2022-10-06 NOTE — PROGRESS NOTES
Transition of Care: to [facility] Fri 10/7/22  Camille ([facility] [phone]); pending insurance auth started on 10/5    2. Outpatient HD chair at [facility] chair time 3:15pm M/W/F schedule  [facility]  admissions  [phone]). Transport- BLS (not set up yet); patient is bilateral BKA and non ambulatory    RUR: 13%    Main contact is wife- Citlaly Montaño- [phone]    Discharge pending:  -patient continues on IV antibiotics  -patient continues on dialysis  -patient continues with wound care  -patient continues on TPN  -pending medical progress and care recommendations    CM noted from chart and in IDR rounds    [phone]: this CM called Catarina at [facility] to followup on referral; no answer; left VM for callback    CM following  Natalie Herrera RN           NOTE:     The patient has been hospitalized in the [place] area since the last week of June 2022. .  patient has bilateral BKA; This started at [facility]. The patient was transferred from [facility] to [facility] last week. Per the wife, \"He was only there 2-3 days and d/c'd to [facility] [place]. The patient is no longer employed and receives disability. He lives with his wife (Lindsay)-(m)[phone] in a one story home. He was independent in ADL's (prior to June of this year). Following his first orthopedic surgery (r) AKA he received a (r) prosthetic device and ambulated with a cane. Pharmacy:  [facility]    primary: [employer]THE[employer] policy # [id number]   secondary: Medicare [id number] effec.  2/1/22

## 2022-10-06 NOTE — PROGRESS NOTES
Progress Note    Patient: Virginie Bell MRN: [id number]  SSN: [id number]    YOB: 1970  Age: 45 y.o. Sex: male      Admit Date: 2022    7 Days Post-Op    Procedure:  Procedure(s):  ABDOMINAL Wall DEBRIDEMENT    Subjective:     No acute surgical issues. Pt is doing well. Tolerating diet and wound vac to J-tube debridement site is intake. No nausea or vomiting. Ostomy is productive    Objective:     Visit Vitals  /88   Pulse 67   Temp 98.3 °F (36.8 °C)   Resp 18   Ht 5' 7\" (1.702 m)   Wt 149 lb 7.6 oz (67.8 kg)   SpO2 95%   BMI 23.41 kg/m²       Temp (24hrs), Av.4 °F (36.9 °C), Min:98.2 °F (36.8 °C), Max:98.7 °F (37.1 °C)      Physical Exam:    Gen:  NAD. ALert and oriented  Pulm:  Unlabored. Diminished  Abd:  S/ND/appropriate TTP  Wound vac intact    Recent Results (from the past 24 hour(s))   GLUCOSE, POC    Collection Time: 10/06/22 12:35 AM   Result Value Ref Range    Glucose (POC) 107 65 - 117 mg/dL    Performed by Jacinto Luna  RN    TRIGLYCERIDE    Collection Time: 10/06/22  4:14 AM   Result Value Ref Range    Triglyceride 96 <150 MG/DL   CBC WITH AUTOMATED DIFF    Collection Time: 10/06/22  4:14 AM   Result Value Ref Range    WBC 9.0 4.1 - 11.1 K/uL    RBC 2.61 (L) 4.10 - 5.70 M/uL    HGB 7.8 (L) 12.1 - 17.0 g/dL    HCT 24.8 (L) 36.6 - 50.3 %    MCV 95.0 80.0 - 99.0 FL    MCH 29.9 26.0 - 34.0 PG    MCHC 31.5 30.0 - 36.5 g/dL    RDW 21.3 (H) 11.5 - 14.5 %    PLATELET 293 (H) 096 - 400 K/uL    MPV 9.5 8.9 - 12.9 FL    NRBC 0.0 0  WBC    ABSOLUTE NRBC 0.00 0.00 - 0.01 K/uL    NEUTROPHILS 62 32 - 75 %    LYMPHOCYTES 23 12 - 49 %    MONOCYTES 6 5 - 13 %    EOSINOPHILS 7 0 - 7 %    BASOPHILS 1 0 - 1 %    IMMATURE GRANULOCYTES 1 (H) 0.0 - 0.5 %    ABS. NEUTROPHILS 5.6 1.8 - 8.0 K/UL    ABS. LYMPHOCYTES 2.1 0.8 - 3.5 K/UL    ABS. MONOCYTES 0.5 0.0 - 1.0 K/UL    ABS. EOSINOPHILS 0.6 (H) 0.0 - 0.4 K/UL    ABS. BASOPHILS 0.1 0.0 - 0.1 K/UL    ABS. IMM.  GRANS. 0.1 (H) 0.00 - 0.04 K/UL    DF SMEAR SCANNED      PLATELET COMMENTS Large Platelets      RBC COMMENTS MACROCYTOSIS  1+        RBC COMMENTS ANISOCYTOSIS  2+        WBC COMMENTS REACTIVE LYMPHS     MAGNESIUM    Collection Time: 10/06/22  4:14 AM   Result Value Ref Range    Magnesium 2.2 1.6 - 2.4 mg/dL   PHOSPHORUS    Collection Time: 10/06/22  4:14 AM   Result Value Ref Range    Phosphorus 3.6 2.6 - 4.7 MG/DL   METABOLIC PANEL, COMPREHENSIVE    Collection Time: 10/06/22  4:14 AM   Result Value Ref Range    Sodium 143 136 - 145 mmol/L    Potassium 4.0 3.5 - 5.1 mmol/L    Chloride 110 (H) 97 - 108 mmol/L    CO2 24 21 - 32 mmol/L    Anion gap 9 5 - 15 mmol/L    Glucose 98 65 - 100 mg/dL    BUN 91 (H) 6 - 20 MG/DL    Creatinine 2.58 (H) 0.70 - 1.30 MG/DL    BUN/Creatinine ratio 35 (H) 12 - 20      eGFR 29 (L) >60 ml/min/1.73m2    Calcium 8.9 8.5 - 10.1 MG/DL    Bilirubin, total 0.2 0.2 - 1.0 MG/DL    ALT (SGPT) 28 12 - 78 U/L    AST (SGOT) 40 (H) 15 - 37 U/L    Alk. phosphatase 186 (H) 45 - 117 U/L    Protein, total 6.3 (L) 6.4 - 8.2 g/dL    Albumin 1.8 (L) 3.5 - 5.0 g/dL    Globulin 4.5 (H) 2.0 - 4.0 g/dL    A-G Ratio 0.4 (L) 1.1 - 2.2     GLUCOSE, POC    Collection Time: 10/06/22 11:14 AM   Result Value Ref Range    Glucose (POC) 95 65 - 117 mg/dL    Performed by Frandy Goss  PCT          Assessment:     Hospital Problems  Date Reviewed: 9/10/2022            Codes Class Noted POA    Injury to rectum without open wound into cavity ICD-10-CM: S36.60XA  ICD-9-CM: 863.45  9/20/2022 Yes        Open wound of anus ICD-10-CM: A69.480R  ICD-9-CM: 863.89  9/20/2022 Yes        Severe protein-calorie malnutrition ([address] ICD-10-CM: O34  ICD-9-CM: 838  9/13/2022 Yes        * (Principal) Intra-abdominal infection ICD-10-CM: B99.9  ICD-9-CM: 136.9  9/10/2022 Yes           Plan/Recommendations/Medical Decision Making:     - Diet as tolerated  - Continue wound vac therapy  - Will follow and periphery.   Please call with questions

## 2022-10-06 NOTE — PROGRESS NOTES
CAROLYN:  RUR: 13%    Disposition:  [facility]  Contact (Camille [phone]). Community HD:  [facility]  M/W/F schedule 3:15pm chair time  [facility] ([phone]). CM noted transfer to [address] [place] [address]. CM sent email hand-off to [place] team for [address] [place] [address]. Trinidad from [facility] requesting call toy fo updates.     Henry Calderon, [address] [facility], [address] [place] [address]

## 2022-10-06 NOTE — PROGRESS NOTES
Occupational Therapy Note:  Chart reviewed and attempted to see pt for OT tx this afternoon. Pt politely declined stating he was in 9/10 abdominal incision pain and unable to receive pain med until 1600. Offered bed level activity and pt still declined stating he wants to participate, but he cannot today. Will follow up on later date for further OT.   Pat Valles, OTR/L, CBIS

## 2022-10-06 NOTE — PROCEDURES
Hemodialysis / [phone]    Vitals Pre Post Assessment Pre Post   BP BP: (!) 123/59 (10/06/22 0807)   139/88 LOC A&Ox4 A&Ox4   HR Pulse (Heart Rate): (!) 59 (10/06/22 0807)   60 Lungs Diminished  Diminished   Resp Resp Rate: 20 (10/06/22 0807) 18 Cardiac Bradycardic Bradycardic   Temp Temp: 98.3 °F (36.8 °C) (10/06/22 0750) 98.3 Skin Warm and dry Warm and dry   Weight N/a N/a Edema None  None    Tele status Remote  Remote  Pain Pain Intensity 1: 0 (10/06/22 0821) 0     Orders   Duration: Start: 9319 End: 1110 Total: 3.5 hrs   Dialyzer: Dialyzer/Set Up Inspection: Kanika Quinton (10/06/22 0807)   K Bath: Dialysate K (mEq/L): 3 (10/06/22 0807)   Ca Bath: Dialysate CA (mEq/L): 2.5 (10/06/22 0807)   Na: Dialysate NA (mEq/L): 138 (10/06/22 0807)   Bicarb: Dialysate HCO3 (mEq/L): 35 (10/06/22 0807)   Target Fluid Removal: Goal/Amount of Fluid to Remove (mL): 1500 mL (10/06/22 0807)     Access   Type & Location:    Comments:                                 Henry Ma / PC : Dressing CDI. No s/s of infection. Both lumens aspirate & flush well. Running well at .         Labs   HBsAg (Antigen) / date:        Neg 10/4/22                                       HBsAb (Antibody) / date: Billie 10/4/22   Source: Epic    Obtained/Reviewed  Critical Results Called HGB   Date Value Ref Range Status   10/06/2022 7.8 (L) 12.1 - 17.0 g/dL Final     Potassium   Date Value Ref Range Status   10/06/2022 4.0 3.5 - 5.1 mmol/L Final     Calcium   Date Value Ref Range Status   10/06/2022 8.9 8.5 - 10.1 MG/DL Final     BUN   Date Value Ref Range Status   10/06/2022 91 (H) 6 - 20 MG/DL Final     Creatinine   Date Value Ref Range Status   10/06/2022 2.58 (H) 0.70 - 1.30 MG/DL Final        Meds Given   Name Dose Route   Heparin  1:1000  1.7 ml Red port  1.8 ml Blue port cvc               Adequacy / Fluid    Total Liters Process: 58L   Net Fluid Removed: 1500 ml      Comments   Time Out Done:   (Time) 0805   Admitting Diagnosis: BKA, Sepsis Consent obtained/signed: Informed Consent Verified: Yes (10/06/22 4114)   Machine / RO # Machine Number: [id number] (10/06/22 6725)   Primary Nurse Rpt Pre: Moy Quijano RN   Primary Nurse Rpt Post: Moy Quijano RN   Pt Education: Access care   Care Plan: To continue hd   Pts outpatient clinic: TBD     Tx Summary   Comments:                           SBAR received from Primary RN. Pt at bedside A&Ox4. Consent signed & on file. 7290: Each catheter limb disinfected per p&p, caps removed, hubs disinfected per p&p. Each lumen aspirated for blood return and flushed with Normal Saline per policy. Labs drawn per request/ order. VSS. Dialysis Tx initiated. 1110: Tx ended. VSS. Each dialysis catheter limb disinfected per p&p, all possible blood returned per p&p, and each dialysis hub disinfected per p&p. Each lumen flushed, post dialysis catheter Heparin dwell instilled per order, and caps applied. Bed locked and in the lowest position, call bell and belongings in reach. SBAR given to Primary, RN. Patient is stable at time of their/ my departure. All Dialysis related medications have been reviewed.

## 2022-10-07 PROCEDURE — 97530 THERAPEUTIC ACTIVITIES: CPT | Performed by: OCCUPATIONAL THERAPIST

## 2022-10-07 PROCEDURE — 97535 SELF CARE MNGMENT TRAINING: CPT | Performed by: OCCUPATIONAL THERAPIST

## 2022-10-07 PROCEDURE — 77030018717 HC DRSG GRNUFM KCON -B

## 2022-10-07 PROCEDURE — 74011250637 HC RX REV CODE- 250/637: Performed by: PHYSICIAN ASSISTANT

## 2022-10-07 PROCEDURE — 77030040162

## 2022-10-07 PROCEDURE — 74011000250 HC RX REV CODE- 250: Performed by: COLON & RECTAL SURGERY

## 2022-10-07 PROCEDURE — 65270000046 HC RM TELEMETRY

## 2022-10-07 PROCEDURE — 74011250636 HC RX REV CODE- 250/636: Performed by: NURSE PRACTITIONER

## 2022-10-07 PROCEDURE — 74011000258 HC RX REV CODE- 258: Performed by: NURSE PRACTITIONER

## 2022-10-07 PROCEDURE — 97605 NEG PRS WND THER DME<=50SQCM: CPT

## 2022-10-07 PROCEDURE — 74011250637 HC RX REV CODE- 250/637: Performed by: COLON & RECTAL SURGERY

## 2022-10-07 PROCEDURE — 97530 THERAPEUTIC ACTIVITIES: CPT

## 2022-10-07 PROCEDURE — 74011250637 HC RX REV CODE- 250/637: Performed by: STUDENT IN AN ORGANIZED HEALTH CARE EDUCATION/TRAINING PROGRAM

## 2022-10-07 RX ADMIN — HYDROMORPHONE HYDROCHLORIDE 1 MG: 1 INJECTION, SOLUTION INTRAMUSCULAR; INTRAVENOUS; SUBCUTANEOUS at 12:24

## 2022-10-07 RX ADMIN — GABAPENTIN 100 MG: 100 CAPSULE ORAL at 16:28

## 2022-10-07 RX ADMIN — FERROUS SULFATE TAB 325 MG (65 MG ELEMENTAL FE) 325 MG: 325 (65 FE) TAB at 16:28

## 2022-10-07 RX ADMIN — COLLAGENASE SANTYL: 250 OINTMENT TOPICAL at 16:28

## 2022-10-07 RX ADMIN — MIRTAZAPINE 7.5 MG: 15 TABLET, FILM COATED ORAL at 22:12

## 2022-10-07 RX ADMIN — SODIUM CHLORIDE, PRESERVATIVE FREE 20 ML: 5 INJECTION INTRAVENOUS at 16:28

## 2022-10-07 RX ADMIN — PANTOPRAZOLE SODIUM 40 MG: 40 TABLET, DELAYED RELEASE ORAL at 16:28

## 2022-10-07 RX ADMIN — HYDROMORPHONE HYDROCHLORIDE 1 MG: 1 INJECTION, SOLUTION INTRAMUSCULAR; INTRAVENOUS; SUBCUTANEOUS at 19:51

## 2022-10-07 RX ADMIN — APIXABAN 5 MG: 5 TABLET, FILM COATED ORAL at 09:25

## 2022-10-07 RX ADMIN — GABAPENTIN 100 MG: 100 CAPSULE ORAL at 22:12

## 2022-10-07 RX ADMIN — HYDROMORPHONE HYDROCHLORIDE 1 MG: 1 INJECTION, SOLUTION INTRAMUSCULAR; INTRAVENOUS; SUBCUTANEOUS at 23:19

## 2022-10-07 RX ADMIN — HYDROMORPHONE HYDROCHLORIDE 1 MG: 1 INJECTION, SOLUTION INTRAMUSCULAR; INTRAVENOUS; SUBCUTANEOUS at 01:14

## 2022-10-07 RX ADMIN — HYDROMORPHONE HYDROCHLORIDE 1 MG: 1 INJECTION, SOLUTION INTRAMUSCULAR; INTRAVENOUS; SUBCUTANEOUS at 07:24

## 2022-10-07 RX ADMIN — SODIUM CHLORIDE, PRESERVATIVE FREE 10 ML: 5 INJECTION INTRAVENOUS at 22:12

## 2022-10-07 RX ADMIN — SODIUM CHLORIDE, PRESERVATIVE FREE 10 ML: 5 INJECTION INTRAVENOUS at 07:28

## 2022-10-07 RX ADMIN — Medication 1 CAPSULE: at 09:25

## 2022-10-07 RX ADMIN — GABAPENTIN 100 MG: 100 CAPSULE ORAL at 09:25

## 2022-10-07 RX ADMIN — MEROPENEM 500 MG: 500 INJECTION, POWDER, FOR SOLUTION INTRAVENOUS at 09:25

## 2022-10-07 RX ADMIN — PANTOPRAZOLE SODIUM 40 MG: 40 TABLET, DELAYED RELEASE ORAL at 07:27

## 2022-10-07 RX ADMIN — FERROUS SULFATE TAB 325 MG (65 MG ELEMENTAL FE) 325 MG: 325 (65 FE) TAB at 07:28

## 2022-10-07 RX ADMIN — APIXABAN 5 MG: 5 TABLET, FILM COATED ORAL at 22:12

## 2022-10-07 RX ADMIN — HYDROMORPHONE HYDROCHLORIDE 1 MG: 1 INJECTION, SOLUTION INTRAMUSCULAR; INTRAVENOUS; SUBCUTANEOUS at 16:28

## 2022-10-07 RX ADMIN — SERTRALINE 25 MG: 50 TABLET, FILM COATED ORAL at 09:26

## 2022-10-07 RX ADMIN — HYDROMORPHONE HYDROCHLORIDE 1 MG: 1 INJECTION, SOLUTION INTRAMUSCULAR; INTRAVENOUS; SUBCUTANEOUS at 04:20

## 2022-10-07 NOTE — PROGRESS NOTES
Nephrology Progress Note  Lillian Neves  Date of Admission : 9/9/2022    CC:  Follow up for JEMIMA       Assessment and Plan     JEMIMA on HD: oliguria  - 2/2 ATN  - HD Schedule: TTS in hospital   - First dialysis 9/12/22  - Outpatient unit: [facility] per CM notes  - Access: permacath placed on 9/16  - labs with HD    Anemia:  - MICHAEL increased (10/6) 20 k TTS  - goal is for Hgb 10-11  - on oral iron  - check iron studies with 10/8 labs last completed 9/10    PAD s/p b/l BKA    Recent sepsis  - on Merrem 500 mg q 24 hrs per ID recommends 2 more weeks until 10/14    ABD wall abscess:  - J-tube removal, colostomy, I&D of abd wound  - TPN/ lipids per primary team, encouraging increase oral intake  - s/p I&D of abdominal abscess 9/29  - 10/5 Wound vac placed    Empyema  Sacral decub  Type 2DM  Afib       Interval History:  Seen and examined. States he had a rough night with no sleep due to emptying colostomy repeatedly along with pain. No new symptoms. Tolerated HD yesterday 1.5 kg removed. UO remains minimal (~100/ 24hr)    Current Medications: all current  Medications have been eviewed in EPIC  Review of Systems: A comprehensive review of systems was negative except for that written in the HPI. Objective:  Vitals:    Vitals:    10/07/22 0200 10/07/22 0348 10/07/22 0400 10/07/22 0600   BP:  (!) 111/53     Pulse: 62 64 64 64   Resp:  18     Temp:  98.8 °F (37.1 °C)     TempSrc:       SpO2:  98%     Weight:       Height:         Intake and Output:  No intake/output data recorded.   10/05 1901 - 10/07 0700  In: -   Out: 2250 [Urine:100]    Physical Examination:  General: NAD oriented to person/place today  Neck:  Supple, no mass  Resp:  Lungs CTA B/L, no wheezing , normal respiratory effort  CV:  RRR,  no murmur or rub, no stump edema  GI:  Soft, NT, + Bowel sounds, + ostomy  Access:           [facility]    Lab Data Personally Reviewed: I have reviewed all the pertinent labs, microbiology data and radiology studies during assessment. Recent Labs     10/06/22  0414      K 4.0   *   CO2 24   GLU 98   BUN 91*   CREA 2.58*   CA 8.9   MG 2.2   PHOS 3.6   ALB 1.8*   ALT 28       Recent Labs     10/06/22  0414   WBC 9.0   HGB 7.8*   HCT 24.8*   *       No results found for: SDES  Lab Results   Component Value Date/Time    Culture result: (A) 09/29/2022 09:21 PM     MODERATE PROTEUS MIRABILIS REFER TO [id number] FOR SENSITIVITIES    Culture result: MODERATE PSEUDOMONAS AERUGINOSA (A) 09/29/2022 09:21 PM    Culture result: NO ANAEROBES ISOLATED 09/29/2022 09:21 PM     Recent Results (from the past 24 hour(s))   GLUCOSE, POC    Collection Time: 10/06/22 11:14 AM   Result Value Ref Range    Glucose (POC) 95 65 - 117 mg/dL    Performed by Jeana Rick, NAN  [address]  Phone - [phone]   Fax - [phone]  [email or web address]

## 2022-10-07 NOTE — PROGRESS NOTES
ID Progress Note  10/7/2022    Subjective:     Left BKA stump discomfort    Review of Systems:            Symptom Y/N Comments   Symptom Y/N Comments   Fever/Chills n      Chest Pain n       Poor Appetite       Edema        Cough       Abdominal Pain  n      Sputum       Joint Pain        SOB/ANDREWS  n     Pruritis/Rash        Nausea/vomit  n     Tolerating PT/OT        Diarrhea       Tolerating Diet        Constipation       Other           Could NOT obtain due to:       Objective:     Vitals: Visit Vitals  BP (!) 131/54   Pulse 63   Temp 98.4 °F (36.9 °C)   Resp 18   Ht 5' 7\" (1.702 m)   Wt 67.8 kg (149 lb 7.6 oz)   SpO2 94%   BMI 23.41 kg/m²          Tmax:  Temp (24hrs), Av.4 °F (36.9 °C), Min:98.3 °F (36.8 °C), Max:98.8 °F (37.1 °C)      PHYSICAL EXAM:  General: Chronically ill appearing. WD, WN. Alert, cooperative, no acute distress    EENT:  Anicteric sclerae. Dry mucous membrane  Resp:  CTA bilaterally, no wheezing or rales. No accessory muscle use  CV:  Regular  rhythm  GI:  Soft, Non distended, Non tender. +Bowel sounds, colostomy in place, previous J tube site dressing dry and intact  Neurologic:  Alert and oriented X self, normal speech,   Psych:   Fair insight. Not anxious nor agitated  Skin:  No rashes.   No jaundice    Pressure injury; sacrum, right buttock,   left BKA stump site; old blood drainage noted it and intact  Right BKA stump; intact      Labs:   Lab Results   Component Value Date/Time    WBC 9.0 10/06/2022 04:14 AM    HGB 7.8 (L) 10/06/2022 04:14 AM    HCT 24.8 (L) 10/06/2022 04:14 AM    PLATELET 773 (H)  04:14 AM    MCV 95.0 10/06/2022 04:14 AM     Lab Results   Component Value Date/Time    Sodium 143 10/06/2022 04:14 AM    Potassium 4.0 10/06/2022 04:14 AM    Chloride 110 (H) 10/06/2022 04:14 AM    CO2 24 10/06/2022 04:14 AM    Anion gap 9 10/06/2022 04:14 AM    Glucose 98 10/06/2022 04:14 AM    BUN 91 (H) 10/06/2022 04:14 AM    Creatinine 2.58 (H) 10/06/2022 04:14 AM BUN/Creatinine ratio 35 (H) 10/06/2022 04:14 AM    GFR est AA 36 (L) 10/03/2022 04:50 AM    GFR est non-AA 30 (L) 10/03/2022 04:50 AM    Calcium 8.9 10/06/2022 04:14 AM    Bilirubin, total 0.2 10/06/2022 04:14 AM    Alk. phosphatase 186 (H) 10/06/2022 04:14 AM    Protein, total 6.3 (L) 10/06/2022 04:14 AM    Albumin 1.8 (L) 10/06/2022 04:14 AM    Globulin 4.5 (H) 10/06/2022 04:14 AM    A-G Ratio 0.4 (L) 10/06/2022 04:14 AM    ALT (SGPT) 28 10/06/2022 04:14 AM     Assessment:      Dislodged feeding tube/peritonitis   Abdominal wound, s/p abdominal wall debridement (9/29)  Free air and perirectal fistula  S/p laparoscopy procedure; take down and removal of J tube, colostomy placement, and I & D of abdominal wall (9/10)  Left BKA stump infection  Recent right thoracotomy  Hx Bilateral BKAs   - afebrile, wbc normal     Blood cx (9/9, 9/16) no growth, (9/27) no growth so far     Wound cx from left bka stump (9/27) pseudomonas aeruginosa     Wound cx from previous J-tube & intra-op cx (9/29) pseudomonas aeruginosa, proteus mirabilis       CT of LLE (9/28)  Partially encapsulated fluid collection with a few associated gas locules overlying the dictation margin. Infection/abscess cannot be excluded based on imaging alone. No CT evidence of acute osteomyelitis. Several gas locules along the left spermatic cord, correlate clinically for signs of infection in this location. Objective:      Completed 2 weeks of IV Eraxis, zosyn, and vancomycin as of 9/23 then switched to   PO Augmentin between 9/24 to 9/28. ABX therapy changed back to IV due to persistent leukocytosis. Continue with IV Merrem, recommend to complete total 2 weeks. Last dose 10/13  Haley catheter in place  Discharge IV abx order in place 10/4  Appreciate wound care team's input  Vascular/general surgery following    Fever work up if temp >= 100.4    Above plan of care discussed and agreed with Dr. Butler Spine    Pt is clear to discharge in ID stand point.   ID team signing off. Please contact us with any questions.            Paco Goldsmith NP

## 2022-10-07 NOTE — PROGRESS NOTES
[facility] Adult  Hospitalist Group                                                                                          Hospitalist Progress Note  Nilson Neves MD  Answering service: [phone] from in house phone        Date of Service:  10/7/2022  NAME:  Claude Negro  :  1970  MRN:  [id number]      Admission Summary: This is a 54-year-old man with a PMH of T2DM, BKA bilaterally, JEMIMA on CKD requiring HD, Respiratory Failure requiring intubation who presented at the ED from [address] [place] [address] with c/o abdominal pain. The patient was recently admitted to another hospital and underwent left below-knee amputation, hospitalization complicated with respiratory failure which required prolonged intubation- attributed to aspiration pneumonia, also developed lobulated effusion, for which the patient underwent decortication and right thoracotomy and acute renal failure for which he has been started on hemodialysis. He had a J-tube was placed for supplemental nutrition and was transferred to [address] [place] [address] for continuation of care. He was doing relatively well in [address] [place] [address] until the day of presentation at the ED when the patient complained of abdominal pain at the facility. CT scan of the abdomen and pelvis was obtained: shows evidence of bowel perforation, sent to [facility] fro further eval/tx. When the patient arrived at the emergency room, based on his clinical presentation and lab work, Code Sepsis was triggered. The patient received fluid therapy as per sepsis protocol. The emergency room physician consulted general surgeon on-call. The patient was seen by the surgeon and the patient is planned for immediate surgical intervention. No record of prior admission to this hospital.     Interval history / Subjective:   No acute events  NAD  pending placement     Assessment & Plan:     Dislodged JG tube with surrounding peritonitis. Free air and perirectal fistula.   Patient was sent from [address] [place] [address] for intractable abdominal pain. Severe sepsis without septic shock due to intra-abdominal source of infection. .  -He was started on broad-spectrum empiric antibiotics on admission and underwent the following procedures:  -Gen Sx: 9/10 Lap take down and removal J-tube, Lap colostomy, I&D abdominal wall  -Colorectal Sx: 9/15 anorectal wound exam including rigid proctosigmoidoscopy  -ID were consulted and assisted with antibiotics management  Eraxis/Zosyn/Vancomycin completed 9/23/22, then PO Augmentin x4d and switched back to IV d/t persistent leukocytosis  Cont IV Merrem x2 weeks, last dose 10/13  Haley catheter in place  -C. difficile, blood culture negative  -CT chest 9/10: Small right basilar gas and fluid containing pleural collection with a  peripheral soft tissue rind. Finding may represent an empyema and clinical correlation is recommended. Recommend direct comparison to any additional prior imaging. Free intraperitoneal gas, seen on prior CT of the abdomen and pelvis. -CT abd/pel wo 9/14:No focal intra-abdominal fluid collection to suggest abscess. Free intraperitoneal gas consistent with recent intra-abdominal surgery. Interval improvement in rectal thickening. Prior CT dated September 9, 2022 demonstrated a probable right posterior rectal wall defect which is no longer visualized on today's examination. Persistent, small right basilar gas and fluid containing pleural collection, unchanged.  -wound vac, ostomy care    JEMIMA vs CKD requiring dialysis: CVC 9/16/22- IR  -Continued on hemodialysis on MWF schedule  -Access, permacath placed on 9/16  --10/05 Outpatient unit: [facility] MWF , TTS HD Currently, on [facility]EL     H/o Type 2 diabetes mellitus  S/p TPN  SSI not requiring, dc'd     AFIB: rate stable  -eliquis 5mg BID  -ECHO 9/26: Left Ventricle: The EF by visual approximation is 55 - 60%. Left ventricle size is normal. Normal wall thickness. Normal wall motion. Normal diastolic function.  Tricuspid Valve: Mildly elevated RVSP. The estimated RVSP is 42 mmHg     Empyema: noted: recent thoracotomy and prolonged intubation at [facility]. -CXR: Persistent right basilar airspace disease and right-sided loculated effusion/empyema. -CT chest 9/10: Small right basilar gas and fluid containing pleural collection with a  peripheral soft tissue rind. Finding may represent an empyema and clinical correlation is recommended. Recommend direct comparison to any additional prior imaging. Free intraperitoneal gas, seen on prior CT of the abdomen and pelvis. -Pulmonary consulted: noted \"percutaneous drainage would not be successful at removing fluid as I suppose it is very thick and organized at this time. Would only recommend following clinically and radiographically. If worsens or worsened right-sided effusion he would need to be reevaluated by his thoracic surgeon at [facility] for additional drainage. \"  -IV ABT's course as above     Acute blood loss anemia on chronic: stable  --on MICHAEL per Nephr     +Occult Blood: no melena, no n/v  -PPI  -iron supplements  -monitor Hgb, stable, transfuse to keep Hgb > 7.0     AMS: delirium, hallucinations ?  2/2 to toxic metabolic encephalopathy, hospital delirium, and opioid induced  -CT head : no acute intracranial abnormality.   -Alert and oriented since      Depression: continue zoloft    Thrombocytosis: noted reactive thrombocytosis, monitor platelets    Epigastric J site wound with surrounding abscess, POA  --Patient underwent incision and drainage on 9/10    Bilateral BKA, left BKA dehiscence and possible infection  -Vascular Sx followin/26-sutures removed and some eschar along incision line excised, open cavity laterally has old hematoma at base that was evacuated   -CT of the left BKA stump on  showed several irregular soft tissue defects overlying the amputation site with a 4.4 x 4.1 x 1.9 cm amorphous fluid collection with partial thick walled/rim-enhancing and internal gas locules. 9/29- Leg wound growing Pseudomonas  Vascular has seen  continue antibiotics as above    Anorectal wound, POA  --Followed by colorectal surgery. He is status post proctosigmoidoscopy on 9/15, there was communication of the distal rectal lumen with extraperitoneal pelvis. Acute pain syndrome. This is due to the multiple surgeries he had. Anticipate some degree of tolerance due to his continued exposure to opioids  --Current pain regimen includes scheduled gabapentin 100 mg 3 times daily  -- As needed Dilaudid      DVT ppx: Eliquis  Code Status: Full Code  Ambulates:  nonambulatory  Discharge planning: [facility] pending [facility]  Date Reviewed: 9/10/2022            Codes Class Noted POA    Injury to rectum without open wound into cavity ICD-10-CM: S36.60XA  ICD-9-CM: 863.45  9/20/2022 Yes        Open wound of anus ICD-10-CM: N47.185Y  ICD-9-CM: 863.89  9/20/2022 Yes        Severe protein-calorie malnutrition ([facility] [address] ICD-10-CM: E43  ICD-9-CM: 087  9/13/2022 Yes        * (Principal) Intra-abdominal infection ICD-10-CM: B99.9  ICD-9-CM: 136.9  9/10/2022 Yes       Review of Systems:   A comprehensive review of systems was negative except for that written in the HPI. Vital Signs:    Last 24hrs VS reviewed since prior progress note.  Most recent are:  Visit Vitals  BP (!) 155/86 (BP 1 Location: Left upper arm, BP Patient Position: At rest)   Pulse 71   Temp 98.3 °F (36.8 °C)   Resp 16   Ht 5' 7\" (1.702 m)   Wt 67.8 kg (149 lb 7.6 oz)   SpO2 98%   BMI 23.41 kg/m²         Intake/Output Summary (Last 24 hours) at 10/7/2022 1532  Last data filed at 10/7/2022 1052  Gross per 24 hour   Intake --   Output 550 ml   Net -550 ml        Physical Examination:     I had a face to face encounter with this patient and independently examined them on 10/7/2022 as outlined below:          Constitutional: Chronically ill-appearing, alert, NAD   ENT:  Oral mucosa moist   Resp:  CTA bilaterally. No wheezing/rhonchi/rales. No accessory muscle use   CV:  RRR, no audible m/r/g    GI/: Abd wound vac in place, ostomy, nondistended    Musculoskeletal:  No edema, warm, bilateral BKA    Neurologic:  Skin:  Psych:  Moves all extremities. Awake and alert   multiple wounds, skin w/d, no jaundice   Calm, Not anxious or agitated            Data Review:    Review and/or order of clinical lab test      Labs:     Recent Labs     10/06/22  0414   WBC 9.0   HGB 7.8*   HCT 24.8*   *       Recent Labs     10/06/22  0414      K 4.0   *   CO2 24   BUN 91*   CREA 2.58*   GLU 98   CA 8.9   MG 2.2   PHOS 3.6       Recent Labs     10/06/22  0414   ALT 28   *   TBILI 0.2   TP 6.3*   ALB 1.8*   GLOB 4.5*       No results for input(s): INR, PTP, APTT, INREXT, INREXT in the last 72 hours. No results for input(s): FE, TIBC, PSAT, FERR in the last 72 hours. Lab Results   Component Value Date/Time    Folate 4.1 (L) 09/10/2022 12:00 PM        No results for input(s): PH, PCO2, PO2 in the last 72 hours. No results for input(s): CPK, CKNDX, TROIQ in the last 72 hours.     No lab exists for component: CPKMB  Lab Results   Component Value Date/Time    Triglyceride 96 10/06/2022 04:14 AM     Lab Results   Component Value Date/Time    Glucose (POC) 95 10/06/2022 11:14 AM    Glucose (POC) 107 10/06/2022 12:35 AM    Glucose (POC) 99 10/05/2022 11:27 AM    Glucose (POC) 99 10/05/2022 07:02 AM    Glucose (POC) 86 10/04/2022 11:43 PM     No results found for: COLOR, APPRN, SPGRU, REFSG, WILL, PROTU, GLUCU, KETU, BILU, UROU, MISHA, LEUKU, GLUKE, EPSU, BACTU, WBCU, RBCU, CASTS, UCRY      Medications Reviewed:     Current Facility-Administered Medications   Medication Dose Route Frequency    epoetin vera-epbx (RETACRIT) injection 20,000 Units  20,000 Units SubCUTAneous DIALYSIS TUE, THU & SAT    heparin (porcine) 1,000 unit/mL injection 1,800 Units  1,800 Units InterCATHeter DIALYSIS PRN    And    heparin (porcine) 1,000 unit/mL injection 1,700 Units  1,700 Units InterCATHeter DIALYSIS PRN    0.9% sodium chloride infusion 250 mL  250 mL IntraVENous PRN    albumin human 25% (BUMINATE) solution 50 g  50 g IntraVENous DIALYSIS PRN    HYDROmorphone (DILAUDID) injection 1 mg  1 mg IntraVENous Q3H PRN    meropenem (MERREM) 500 mg in 0.9% sodium chloride (MBP/ADV) 50 mL MBP  500 mg IntraVENous Q24H    0.9% sodium chloride infusion 250 mL  250 mL IntraVENous PRN    glucose chewable tablet 16 g  4 Tablet Oral PRN    glucagon (GLUCAGEN) injection 1 mg  1 mg IntraMUSCular PRN    dextrose 10 % infusion 0-250 mL  0-250 mL IntraVENous PRN    gabapentin (NEURONTIN) capsule 100 mg  100 mg Oral TID    ferrous sulfate tablet 325 mg  1 Tablet Oral BID WITH MEALS    HYDROmorphone (DILAUDID) tablet 2 mg  2 mg Oral Q4H PRN    HYDROmorphone (DILAUDID) tablet 4 mg  4 mg Oral Q4H PRN    hydrALAZINE (APRESOLINE) 20 mg/mL injection 10 mg  10 mg IntraVENous Q6H PRN    mirtazapine (REMERON) tablet 7.5 mg  7.5 mg Oral QHS    pantoprazole (PROTONIX) tablet 40 mg  40 mg Oral ACB&D    apixaban (ELIQUIS) tablet 5 mg  5 mg Oral BID    sertraline (ZOLOFT) tablet 25 mg  25 mg Oral DAILY    collagenase (SANTYL) 250 unit/gram ointment   Topical DAILY    diphenhydrAMINE (BENADRYL) injection 12.5 mg  12.5 mg IntraVENous Q6H PRN    sodium chloride (NS) flush 5-40 mL  5-40 mL IntraVENous Q8H    sodium chloride (NS) flush 5-40 mL  5-40 mL IntraVENous PRN    acetaminophen (TYLENOL) tablet 650 mg  650 mg Oral Q6H PRN    polyethylene glycol (MIRALAX) packet 17 g  17 g Oral DAILY PRN    ondansetron (ZOFRAN ODT) tablet 4 mg  4 mg Oral Q8H PRN    Or    ondansetron (ZOFRAN) injection 4 mg  4 mg IntraVENous Q6H PRN    L.acidophilus-paracasei-S.thermophil-bifidobacter (RISAQUAD) 8 billion cell capsule  1 Capsule Oral DAILY     ______________________________________________________________________  EXPECTED LENGTH OF STAY: 9d 14h  ACTUAL LENGTH OF STAY: MD Rosaura

## 2022-10-07 NOTE — PROGRESS NOTES
Problem: Mobility Impaired (Adult and Pediatric)  Goal: *Acute Goals and Plan of Care (Insert Text)  Description: FUNCTIONAL STATUS PRIOR TO ADMISSION: The patient was functional at the wheelchair level and required minimal assistance for transfers to the chair. HOME SUPPORT PRIOR TO ADMISSION: The patient lived with wife, daughter and required up to moderate assistance  for tranfers bed <> w/c and ADLs. Patient has spent extensive period of time hospitalized recently and has been admitted from [facility]. Physical Therapy Goals    Upgraded 10/3/2022  1. Patient will roll left/right in bed for pressure relief mod I with use of bed rails within 7 day(s). 2.  Patient will move from supine to sit and sit to supine  in bed with SBA within 7 day(s). 3.  Patient will transfer from bed to chair and chair to bed with moderate assistance  using the least restrictive device within 7 day(s). 4  Patient will sit EOB x 15 minutes without UE support with CGA-min A within 7 day(s). Weekly Reassessment 9/22/22 (goals continued)  Initiated 9/15/2022  1. Patient will roll left/right in bed for pressure relief mod I with use of bed rails within 7 day(s). 2.  Patient will move from supine to sit and sit to supine  in bed with moderate assistance within 7 day(s). MET    3. Patient will transfer from bed to chair and chair to bed with moderate assistance  using the least restrictive device within 7 day(s). 4  Patient will sit EOB x 10 minutes without UE support with CGA-min A within 7 day(s). MET  Outcome: Progressing Towards Goal   PHYSICAL THERAPY TREATMENT  Patient: Virginie Bell (17 y.o. male)  Date: 10/7/2022  Diagnosis: Intra-abdominal infection [B99.9] Intra-abdominal infection  Procedure(s) (LRB):  ABDOMINAL Wall DEBRIDEMENT (Left) 8 Days Post-Op  Precautions: Fall, Other (comment) (bilateral BKA)  Chart, physical therapy assessment, plan of care and goals were reviewed.     ASSESSMENT  Patient continues with skilled PT services and is progressing slowly towards goals. Noted minor drainage of R residual limb as well as on abdomen nearby colostomy bag. Also noted colostomy bag leaking after activity. He did require Mod Ax2 to complete supine>sit to EOB. Pt's sitting balance poor w/ first attempt to sit EOB. Pt unable to \"find\" proper balance at EOB d/t position, pain in buttocks from wound(s), and mattress setting too firm for pt. Pt returned to sidelying w/more centered position in bed and  mepilex sacral dressing placed to area of wound for added protection against shearing, and firmness of mattress decreased to the 4th level/setting. Pt attempted transfer to sitting up at EOB and was able to demonstrate improved sitting balance. Pt able to complete ADL activity seated EOB w/o additional physical support. Pt able to shift to (B) elbows for removal of soiled linen under hips/buttocks while seated EOB w/ Mod A for shift to midline from Right and Min A shift to midline from Left. Pt returned to sidelying position and was able to pull himself to [facility] using single bed rail and bed positioned to trendelenburg. Waffle cushion placed underneath buttocks and hips for additional pressure relief and pt returned to supine. Pt remained in care of Wound Care Nurse. Will continue to follow for progression of mobility. Therabands provided for pt use for BUE strengthening. Current Level of Function Impacting Discharge (mobility/balance): Mod Ax2 overall    Other factors to consider for discharge: (B) BKA. Mobility below baseline. Needs re-fitting of Left Prosthetic (see previous PT note). Colostomy bag leakage. Sacrum/buttock wounds, Wound vac in place to abdomen. PLAN :  Patient continues to benefit from skilled intervention to address the above impairments. Continue treatment per established plan of care. to address goals.     Recommendation for discharge: (in order for the patient to meet his/her long term goals)  Therapy 3 hours per day 5-7 days per week  If pt unable to attend IPR, recommend Rehab at SNF. This discharge recommendation:  Has been made in collaboration with the attending provider and/or case management    IF patient discharges home will need the following DME: to be determined (TBD)       SUBJECTIVE:   Patient stated This colostomy bag was changed 9 times last night.  d/t leakage    OBJECTIVE DATA SUMMARY:   Critical Behavior:  Neurologic State: (P) Alert, Appropriate for age  Orientation Level: (P) Oriented X4  Cognition: (P) Appropriate decision making, Appropriate for age attention/concentration, Appropriate safety awareness, Follows commands  Safety/Judgement: Fall prevention, Good awareness of safety precautions  Functional Mobility Training:  Bed Mobility:  Rolling: Supervision  Supine to Sit: Moderate assistance;Assist x2; Additional time (instructed on log roll)  Sit to Supine: Moderate assistance;Assist x1;Additional time  Scooting: Minimum assistance;Bed Modified (in supine in reverse trend, edge of bed total assist to scoot)        Transfers:  Sit to Stand: Other (comment) (n/a)                                Balance:  Sitting: Impaired  Sitting - Static: Fair (occasional) (dependent on positioning at edge of bed)  Sitting - Dynamic: Poor (constant support)  Standing:  (unable to don left prosthesis)    Pain Ratin/10- abdomen, buttocks, RLE    Activity Tolerance:   Fair    After treatment patient left in no apparent distress:   Supine in bed, Call bell within reach, and Side rails x 3    COMMUNICATION/COLLABORATION:   The patients plan of care was discussed with: Occupational therapist and Registered nurse. Jonna MartinezPTA   Time Calculation: 43 mins      .

## 2022-10-07 NOTE — WOUND CARE
Wound/Ostomy Visit     Follow up visit for VAC change. Other wounds assessed on 10.4. 22. See that days note for information. Surgery: Colostomy  Date of Surgery: 9.10.22      Surgeon: Dr. Kaylene Pagan Location: left quadrant  Stoma red, moist and in a deep crevice. Pouch changed using 2 piece convex pouch, ring and paste. Supplies left at the bedside  Supplies and ordering information provided to wife for home use when he leaves rehab. Left abdomen, open surgical wound/ former J tube site:  4.6 x 5.8 x 3.6 cm;4.6 cm tunnel at 3 o'clock; undermining 3.5 cm 12-7 o'clock;  85% red 15% tan; improved erythema and induration to wilbur wound; irrigated with saline; resumed NPWT at 125 mmHg using one continuous gauze and 1 black foam.         Recommendations:   Left abdominal wound:    VAC (NPWT) Dressing Orders:  VAC Settings: 125 mmHg continuous/low suction   Dressing change twice weekly by WOCN. Change canisters when full and measure output q shift. (located  or [facility] supply room). For sudden development of blood or an increased amount of huy bleedin.  Immediately turn off VAC system. 2.  Leave dressing in place. 3.  Hold pressure over wound. 4.  Call physician. If vacuum fails to maintain seal or unable to manage alarm for clogged tubing for >2hrs:              1.  Contact Physician               2.  Cleanse wound with normal saline. 3.  Saline moistened fluff gauze to base. 4.  Cover with dry dressing. 5.  Secure with transparent film. 6.  Change q 8 hours and as needed. 7.  Notify Physician and WOCN that wound VAC dressing needs to be reapplied. If patient is discharged from our facility:              1.  Remove all of equipment and sponge. 2.  Place moist dressing. 3.  Put VAC system and power cord in clear bag in utility room.  (no red bags) 4. Please call I to  system and stop billing @ [phone]  For procedures or when pt is off the floor:               Battery backup on our current inpatient systems lasts for 4 hours and may be                   used to prevent interruption of treatment- VAC should NOT be turned off. If disconnecting for more than 2 hours, with discharge, or a pt is admitted with a VAC:  Discontinue the therapy/ begin wound care orders above, return any outpatient equipment to family or transferring facility, and notify the [address] [place] [address] Nurse and ordering practitioner. Sacrum and buttocks:  Daily cleanse with saline; apply Santyl and saline moist gauze; cover with dry dressing. Right upper back wounds:  Daily cleanse with saline; apply Santyl; cover with dry dressing. Left BKA site:  Daily cleanse with saline; apply Santyl; pack dehisced area with VASHE moistened roll gauze; cover with Vashe moist gauze and dry dressing. 5.   Empty appliance when 1/3 full and PRN. Encourage patient/family to notify nurse and  assist w/ pouch emptying to promote self-care. Change appliance twice weekly and PRN for leaking ASAP. DO NOT REINFORCE LEAKS to avoid skin breakdown. Transition of Care: Will follow routinely for VAC changes Tues and Friday. Discharge disposition plan is to go to [facility].      ARNOLDO GonzalezN RN [facility] Inpatient Wound Care  Available on Perfect [employer]  Office [phone]

## 2022-10-07 NOTE — PROGRESS NOTES
Transition of Care: pending to [facility] Diamond Lopez ([facility] [phone]); pending insurance auth started on 10/7    Weekend CM- please call one of the weekend contacts to check on insurance AUTH    Saturday 10/8:   Usman Bealy [phone]  Adiel Addison [phone]    Fredrick 10/9:  Regina [phone] - [phone]            NOTE: patient also has OP HD set up for after rehab: Outpatient HD chair at [facility] chair time 3:15pm M/W/F schedule  [facility]  admissions  [phone]). [facility]- [phone]- contact- Sis Varela           Transport- [facility] is on willcall; patient is bilateral BKA      RUR: 13%     Main contact is wife- Eugenia Thomas- [phone]     Discharge pending:  -patient continues on IV antibiotics until 10/13/22  -patient continues on dialysis  -patient continues with wound care  -patient continues with progress in PT/OT  -pending medical progress and care recommendations          0945:this CM called Linsey Moran at [facility] to followup on referral; no answer; left  for callback    1100: this CM called Catarina again at [facility] to f/ on referral; apparently  the auth has not been started yet;  [facility] requesting the most updated clinicals; this CM faxed the following: Quentin Martinez, last consult notes from nephrology, dialysis, ID, hospitalist, general surgery, PT/OT, wound care, labs, kardex with vitals, nutrition, diet order, wound vac order, to fax number [phone] for [facility] to start insurance auth    1230: this CM updated patient at bedside on discharge plan and insurance auth pending; patient verbalized understanding    21 : faxed todays PT and OT notes to [facility]    1450: this CM called contact Hima Kumari at [facility] to update on patient progress; the phone was unable to take messages     1500: other unit CM called patients wife to update on the phone about discharge planning; patients wife verbalized understanding     CM following  Raz Ponce, RN            NOTE:     The patient has been hospitalized in the [place] area since the last week of June 2022. .  patient has bilateral BKA; This started at [facility]. The patient was transferred from [facility] to [facility] last week. Per the wife, \"He was only there 2-3 days and d/c'd to [facility]. The patient is no longer employed and receives disability. He lives with his wife (Lindsay)-([phone] in a one story home. He was independent in ADL's (prior to June of this year). Following his first orthopedic surgery (r) AKA he received a (r) prosthetic device and ambulated with a cane.    Pharmacy:  [facility]    primary: MALLORY policy # [id number]

## 2022-10-07 NOTE — PROGRESS NOTES
Bedside shift change report given to [facility] (oncoming nurse) by [facility], RN (offgoing nurse). Report included the following information SBAR, Kardex, ED Summary, OR Summary, Procedure Summary, Intake/Output, MAR, Accordion, Recent Results, and Med Rec Status.

## 2022-10-07 NOTE — PROGRESS NOTES
Problem: Self Care Deficits Care Plan (Adult)  Goal: *Acute Goals and Plan of Care (Insert Text)  Description: FUNCTIONAL STATUS PRIOR TO ADMISSION: Prior to previous hospitalization, d/c to [facility], and current admission to [address] [place] [address] pt was utilizing w/c for functional mobility and required minimal assistance for transfers. HOME SUPPORT: The patient lived with his wife and daughter and required assistance for all ADL/IADL tasks. Occupational Therapy Goals  Initiated 9/15/2022, OT weekly re-assessment 9/22/22  1. Patient will perform basic grooming in unsupported sitting with minimal assistance/contact guard assist within 7 day(s). (Continue 9/22, 10/3)  2. Patient will perform anterior neck to thigh bathing while in unsupported sitting with minimal assistance/contact guard assist within 7 day(s). (Downgrade to mod A 9/22, continue 10/3)  3. Patient will perform drop arm BSC via lateral transfers with moderate assistance within 7 day(s). (Continue 9/22, 10/3)  4. Patient will perform all aspects of toileting with moderate assistance  within 7 day(s). (At bed level 9/22, 10/3)  5. Patient will participate in upper extremity therapeutic exercise/activities with supervision/set-up for 10 minutes within 7 day(s). (Continue 9/22, 10/3)    Outcome: Progressing Towards Goal    OCCUPATIONAL THERAPY TREATMENT  Patient: Jah Henning (68 y.o. male)  Date: 10/7/2022  Diagnosis: Intra-abdominal infection [B99.9] Intra-abdominal infection  Procedure(s) (LRB):  ABDOMINAL Wall DEBRIDEMENT (Left) 8 Days Post-Op  Precautions: Fall, Other (comment) (bilateral BKA)  Chart, occupational therapy assessment, plan of care, and goals were reviewed. ASSESSMENT  Patient continues with skilled OT services and is progressing towards goals. Patient motivated and eager to increase independence. Good participation in rolling, bed mobility, grooming and UE ADLs at edge of bed.  Demonstrated increased activity tolerance for activity, notable frustration due to colostomy bag leaking overnight and after therapy. Demonstrates decreased UE strength and sitting balance. Sitting balance very sensitive to firmness of bed and positioning at edge of bed due to bilateral BKA and decreased core strength. Will continue to follow and recommend rehab to address above. Current Level of Function Impacting Discharge (ADLs): CGA UE ADL's and grooming edge of bed, mod assist of 2 for sup to sit, mod assist of 1 sit to sup, supervision rolling    Other factors to consider for discharge: ? Colostomy leakage frequency, ? Last time left prosthesis worn         PLAN :  Patient continues to benefit from skilled intervention to address the above impairments. Continue treatment per established plan of care to address goals. Recommend with staff: bed in chair position 3 x's per day as tolerated    Recommend next OT session: UE strengthening, theraband, isometric, sitting balance    Recommendation for discharge: (in order for the patient to meet his/her long term goals)  Therapy 3 hours per day 5-7 days per week    This discharge recommendation:  Has been made in collaboration with the attending provider and/or case management    IF patient discharges home will need the following DME: none       SUBJECTIVE:   Patient stated I want to get stronger.     OBJECTIVE DATA SUMMARY:   Cognitive/Behavioral Status:  Neurologic State: (P) Alert; Appropriate for age  Orientation Level: (P) Oriented X4  Cognition: (P) Appropriate decision making; Appropriate for age attention/concentration; Appropriate safety awareness; Follows commands  Perception: Appears intact  Perseveration: No perseveration noted  Safety/Judgement: Fall prevention;Good awareness of safety precautions    Functional Mobility and Transfers for ADLs:  Bed Mobility:  Rolling: Supervision  Supine to Sit: Moderate assistance;Assist x2; Additional time (instructed on log roll)  Sit to Supine:  Moderate assistance;Assist x1;Additional time  Scooting: Minimum assistance;Bed Modified (in supine in reverse trend, edge of bed total assist to scoot)  Seated edge of bed lean to right elbow with min assist and push to sit with mod assist, lean to left elbow with min assist and able to push to sit with min assist, noted decreased sitting balance after leaning/weigh shifting left and right due to scooting forward and need to return to sidelying  for safety    Transfers:  Sit to Stand: Other (comment) (n/a)          Balance:  Sitting: Impaired  Sitting - Static: Fair (occasional) (dependent on positioning at edge of bed)  Sitting - Dynamic: Poor (constant support)  Standing:  (unable to don left prosthesis)    ADL Intervention:   Re-educated on role of OT, benefit of increased activity, positioning due to sacral ulcers, noted ulcer without bandage, RN informed and applied to prevent increased shearing during transfers edge of bed    Grooming  Grooming Assistance: Contact guard assistance  Position Performed: Seated edge of bed  Washing Face: Contact guard assistance  Brushing Teeth: Contact guard assistance        Upper Body Dressing Assistance  Dressing Assistance: Contact guard assistance (seated edge of bed)  Hospital Gown: Contact guard assistance       Toileting  Bowel Hygiene: Total assistance (dependent) (colostomy)  Patient reported colostomy leaking last night and noted to leak after transferring sup to sit 2 x's    Cognitive Retraining  Safety/Judgement: Fall prevention;Good awareness of safety precautions      Pain:  Not rated, abdominal, left distal BKA in sitting, had pain med prior    Activity Tolerance:   Fair    After treatment patient left in no apparent distress:   Supine in bed, Call bell within reach, and with wound care nurse    COMMUNICATION/COLLABORATION:   The patients plan of care was discussed with: Physical therapy assistant, Registered nurse, and wound care nurse. Case management .      Bradley Gatica Viktoriya OTR/L  Time Calculation: 32 mins

## 2022-10-08 LAB
ALBUMIN SERPL-MCNC: 1.8 G/DL (ref 3.5–5)
ANION GAP SERPL CALC-SCNC: 7 MMOL/L (ref 5–15)
BUN SERPL-MCNC: 59 MG/DL (ref 6–20)
BUN/CREAT SERPL: 21 (ref 12–20)
CALCIUM SERPL-MCNC: 8.1 MG/DL (ref 8.5–10.1)
CHLORIDE SERPL-SCNC: 114 MMOL/L (ref 97–108)
CO2 SERPL-SCNC: 26 MMOL/L (ref 21–32)
CREAT SERPL-MCNC: 2.75 MG/DL (ref 0.7–1.3)
ERYTHROCYTE [DISTWIDTH] IN BLOOD BY AUTOMATED COUNT: 21.1 % (ref 11.5–14.5)
FERRITIN SERPL-MCNC: 579 NG/ML (ref 26–388)
GLUCOSE SERPL-MCNC: 93 MG/DL (ref 65–100)
HCT VFR BLD AUTO: 26.5 % (ref 36.6–50.3)
HGB BLD-MCNC: 7.9 G/DL (ref 12.1–17)
IRON SATN MFR SERPL: 18 % (ref 20–50)
IRON SERPL-MCNC: 26 UG/DL (ref 35–150)
MCH RBC QN AUTO: 28.7 PG (ref 26–34)
MCHC RBC AUTO-ENTMCNC: 29.8 G/DL (ref 30–36.5)
MCV RBC AUTO: 96.4 FL (ref 80–99)
NRBC # BLD: 0 K/UL (ref 0–0.01)
NRBC BLD-RTO: 0 PER 100 WBC
PHOSPHATE SERPL-MCNC: 5.1 MG/DL (ref 2.6–4.7)
PLATELET # BLD AUTO: 503 K/UL (ref 150–400)
PMV BLD AUTO: 9.2 FL (ref 8.9–12.9)
POTASSIUM SERPL-SCNC: 3.8 MMOL/L (ref 3.5–5.1)
RBC # BLD AUTO: 2.75 M/UL (ref 4.1–5.7)
SODIUM SERPL-SCNC: 147 MMOL/L (ref 136–145)
TIBC SERPL-MCNC: 144 UG/DL (ref 250–450)
WBC # BLD AUTO: 9 K/UL (ref 4.1–11.1)

## 2022-10-08 PROCEDURE — 90935 HEMODIALYSIS ONE EVALUATION: CPT

## 2022-10-08 PROCEDURE — 74011250636 HC RX REV CODE- 250/636: Performed by: NURSE PRACTITIONER

## 2022-10-08 PROCEDURE — 74011000250 HC RX REV CODE- 250: Performed by: INTERNAL MEDICINE

## 2022-10-08 PROCEDURE — 74011250636 HC RX REV CODE- 250/636: Performed by: COLON & RECTAL SURGERY

## 2022-10-08 PROCEDURE — 74011250636 HC RX REV CODE- 250/636: Performed by: INTERNAL MEDICINE

## 2022-10-08 PROCEDURE — 80069 RENAL FUNCTION PANEL: CPT

## 2022-10-08 PROCEDURE — 83540 ASSAY OF IRON: CPT

## 2022-10-08 PROCEDURE — 85027 COMPLETE CBC AUTOMATED: CPT

## 2022-10-08 PROCEDURE — 74011000250 HC RX REV CODE- 250: Performed by: COLON & RECTAL SURGERY

## 2022-10-08 PROCEDURE — 74011250637 HC RX REV CODE- 250/637: Performed by: COLON & RECTAL SURGERY

## 2022-10-08 PROCEDURE — 74011250637 HC RX REV CODE- 250/637: Performed by: PHYSICIAN ASSISTANT

## 2022-10-08 PROCEDURE — P9047 ALBUMIN (HUMAN), 25%, 50ML: HCPCS | Performed by: INTERNAL MEDICINE

## 2022-10-08 PROCEDURE — 74011000258 HC RX REV CODE- 258: Performed by: NURSE PRACTITIONER

## 2022-10-08 PROCEDURE — 36415 COLL VENOUS BLD VENIPUNCTURE: CPT

## 2022-10-08 PROCEDURE — 82728 ASSAY OF FERRITIN: CPT

## 2022-10-08 PROCEDURE — 65270000046 HC RM TELEMETRY

## 2022-10-08 PROCEDURE — 74011250637 HC RX REV CODE- 250/637: Performed by: STUDENT IN AN ORGANIZED HEALTH CARE EDUCATION/TRAINING PROGRAM

## 2022-10-08 RX ORDER — DEXTROSE MONOHYDRATE 50 MG/ML
50 INJECTION, SOLUTION INTRAVENOUS CONTINUOUS
Status: DISPENSED | OUTPATIENT
Start: 2022-10-08 | End: 2022-10-08

## 2022-10-08 RX ADMIN — APIXABAN 5 MG: 5 TABLET, FILM COATED ORAL at 10:52

## 2022-10-08 RX ADMIN — DEXTROSE MONOHYDRATE 50 ML/HR: 50 INJECTION, SOLUTION INTRAVENOUS at 16:38

## 2022-10-08 RX ADMIN — ALBUMIN (HUMAN) 12.5 G: 0.25 INJECTION, SOLUTION INTRAVENOUS at 19:51

## 2022-10-08 RX ADMIN — SODIUM CHLORIDE, PRESERVATIVE FREE 10 ML: 5 INJECTION INTRAVENOUS at 07:21

## 2022-10-08 RX ADMIN — ALTEPLASE 1 MG: 2.2 INJECTION, POWDER, LYOPHILIZED, FOR SOLUTION INTRAVENOUS at 16:02

## 2022-10-08 RX ADMIN — PANTOPRAZOLE SODIUM 40 MG: 40 TABLET, DELAYED RELEASE ORAL at 07:20

## 2022-10-08 RX ADMIN — GABAPENTIN 100 MG: 100 CAPSULE ORAL at 10:52

## 2022-10-08 RX ADMIN — HYDROMORPHONE HYDROCHLORIDE 2 MG: 2 TABLET ORAL at 23:17

## 2022-10-08 RX ADMIN — MIRTAZAPINE 7.5 MG: 15 TABLET, FILM COATED ORAL at 23:15

## 2022-10-08 RX ADMIN — APIXABAN 5 MG: 5 TABLET, FILM COATED ORAL at 23:15

## 2022-10-08 RX ADMIN — GABAPENTIN 100 MG: 100 CAPSULE ORAL at 23:16

## 2022-10-08 RX ADMIN — MEROPENEM 500 MG: 500 INJECTION, POWDER, FOR SOLUTION INTRAVENOUS at 10:51

## 2022-10-08 RX ADMIN — ONDANSETRON 4 MG: 2 INJECTION INTRAMUSCULAR; INTRAVENOUS at 17:53

## 2022-10-08 RX ADMIN — ONDANSETRON 4 MG: 2 INJECTION INTRAMUSCULAR; INTRAVENOUS at 10:47

## 2022-10-08 RX ADMIN — FERROUS SULFATE TAB 325 MG (65 MG ELEMENTAL FE) 325 MG: 325 (65 FE) TAB at 16:34

## 2022-10-08 RX ADMIN — Medication 1 CAPSULE: at 10:52

## 2022-10-08 RX ADMIN — HYDROMORPHONE HYDROCHLORIDE 1 MG: 1 INJECTION, SOLUTION INTRAMUSCULAR; INTRAVENOUS; SUBCUTANEOUS at 17:38

## 2022-10-08 RX ADMIN — DIPHENHYDRAMINE HYDROCHLORIDE 12.5 MG: 50 INJECTION, SOLUTION INTRAMUSCULAR; INTRAVENOUS at 00:27

## 2022-10-08 RX ADMIN — GABAPENTIN 100 MG: 100 CAPSULE ORAL at 16:34

## 2022-10-08 RX ADMIN — FERROUS SULFATE TAB 325 MG (65 MG ELEMENTAL FE) 325 MG: 325 (65 FE) TAB at 07:20

## 2022-10-08 RX ADMIN — HYDROMORPHONE HYDROCHLORIDE 1 MG: 1 INJECTION, SOLUTION INTRAMUSCULAR; INTRAVENOUS; SUBCUTANEOUS at 13:56

## 2022-10-08 RX ADMIN — COLLAGENASE SANTYL: 250 OINTMENT TOPICAL at 10:51

## 2022-10-08 RX ADMIN — SERTRALINE 25 MG: 50 TABLET, FILM COATED ORAL at 10:53

## 2022-10-08 RX ADMIN — PANTOPRAZOLE SODIUM 40 MG: 40 TABLET, DELAYED RELEASE ORAL at 16:34

## 2022-10-08 RX ADMIN — HYDROMORPHONE HYDROCHLORIDE 1 MG: 1 INJECTION, SOLUTION INTRAMUSCULAR; INTRAVENOUS; SUBCUTANEOUS at 10:47

## 2022-10-08 NOTE — PROGRESS NOTES
[facility] [facility] Adult  Hospitalist Group                                                                                          Hospitalist Progress Note  Mac Porter MD  Answering service: [phone] from in house phone        Date of Service:  10/8/2022  NAME:  Kayla Pelletier  :  1970  MRN:  [id number]      Admission Summary: This is a 29-year-old man with a PMH of T2DM, BKA bilaterally, JEMIMA on CKD requiring HD, Respiratory Failure requiring intubation who presented at the ED from [address] [place] [address] with c/o abdominal pain. The patient was recently admitted to another hospital and underwent left below-knee amputation, hospitalization complicated with respiratory failure which required prolonged intubation- attributed to aspiration pneumonia, also developed lobulated effusion, for which the patient underwent decortication and right thoracotomy and acute renal failure for which he has been started on hemodialysis. He had a J-tube was placed for supplemental nutrition and was transferred to [address] [place] [address] for continuation of care. He was doing relatively well in [address] [place] [address] until the day of presentation at the ED when the patient complained of abdominal pain at the facility. CT scan of the abdomen and pelvis was obtained: shows evidence of bowel perforation, sent to [facility] fro further eval/tx. When the patient arrived at the emergency room, based on his clinical presentation and lab work, Code Sepsis was triggered. The patient received fluid therapy as per sepsis protocol. The emergency room physician consulted general surgeon on-call. The patient was seen by the surgeon and the patient is planned for immediate surgical intervention. No record of prior admission to this hospital.     Interval history / Subjective:   No acute events  NAD  pending placement/auth     Assessment & Plan:     Dislodged JG tube with surrounding peritonitis. Free air and perirectal fistula.   Patient was sent from [address] [place] [address] for intractable abdominal pain. Severe sepsis without septic shock due to intra-abdominal source of infection. .  -He was started on broad-spectrum empiric antibiotics on admission and underwent the following procedures:  -Gen Sx: 9/10 Lap take down and removal J-tube, Lap colostomy, I&D abdominal wall  -Colorectal Sx: 9/15 anorectal wound exam including rigid proctosigmoidoscopy  -ID were consulted and assisted with antibiotics management  Eraxis/Zosyn/Vancomycin completed 9/23/22, then PO Augmentin x4d and switched back to IV d/t persistent leukocytosis  Cont IV Merrem x2 weeks, last dose 10/13  Haley catheter in place  -C. difficile, blood culture negative  -CT chest 9/10: Small right basilar gas and fluid containing pleural collection with a  peripheral soft tissue rind. Finding may represent an empyema and clinical correlation is recommended. Recommend direct comparison to any additional prior imaging. Free intraperitoneal gas, seen on prior CT of the abdomen and pelvis. -CT abd/pel wo 9/14:No focal intra-abdominal fluid collection to suggest abscess. Free intraperitoneal gas consistent with recent intra-abdominal surgery. Interval improvement in rectal thickening. Prior CT dated September 9, 2022 demonstrated a probable right posterior rectal wall defect which is no longer visualized on today's examination. Persistent, small right basilar gas and fluid containing pleural collection, unchanged.  -wound vac, ostomy care    JEMIMA vs CKD requiring dialysis: CVC 9/16/22- IR  -Continued on hemodialysis on MWF schedule  -Access, permacath placed on 9/16  --10/05 Outpatient unit: [facility] MWF , TTS HD Currently, on MICHAEL     H/o Type 2 diabetes mellitus  S/p TPN  SSI not requiring, dc'd     AFIB: rate stable  -eliquis 5mg BID  -ECHO 9/26: Left Ventricle: The EF by visual approximation is 55 - 60%. Left ventricle size is normal. Normal wall thickness. Normal wall motion. Normal diastolic function.  Tricuspid Valve: Mildly elevated RVSP. The estimated RVSP is 42 mmHg     Empyema: noted: recent thoracotomy and prolonged intubation at [facility]. -CXR: Persistent right basilar airspace disease and right-sided loculated effusion/empyema. -CT chest 9/10: Small right basilar gas and fluid containing pleural collection with a  peripheral soft tissue rind. Finding may represent an empyema and clinical correlation is recommended. Recommend direct comparison to any additional prior imaging. Free intraperitoneal gas, seen on prior CT of the abdomen and pelvis. -Pulmonary consulted: noted \"percutaneous drainage would not be successful at removing fluid as I suppose it is very thick and organized at this time. Would only recommend following clinically and radiographically. If worsens or worsened right-sided effusion he would need to be reevaluated by his thoracic surgeon at [facility] for additional drainage. \"  -IV ABT's course as above     Acute blood loss anemia on chronic: stable  --on MICHAEL per Nephr     +Occult Blood: no melena, no n/v  -PPI  -iron supplements  -monitor Hgb, stable, transfuse to keep Hgb > 7.0     AMS: delirium, hallucinations ?  2/2 to toxic metabolic encephalopathy, hospital delirium, and opioid induced  -CT head : no acute intracranial abnormality.   -Alert and oriented since      Depression: continue zoloft    Thrombocytosis: noted reactive thrombocytosis, monitor platelets    Epigastric J site wound with surrounding abscess, POA  --Patient underwent incision and drainage on 9/10    Bilateral BKA, left BKA dehiscence and possible infection  -Vascular Sx followin/26-sutures removed and some eschar along incision line excised, open cavity laterally has old hematoma at base that was evacuated   -CT of the left BKA stump on  showed several irregular soft tissue defects overlying the amputation site with a 4.4 x 4.1 x 1.9 cm amorphous fluid collection with partial thick walled/rim-enhancing and internal gas locules. 9/29- Leg wound growing Pseudomonas  Vascular has seen  continue antibiotics as above    Anorectal wound, POA  --Followed by colorectal surgery. He is status post proctosigmoidoscopy on 9/15, there was communication of the distal rectal lumen with extraperitoneal pelvis. Acute pain syndrome. This is due to the multiple surgeries he had. Anticipate some degree of tolerance due to his continued exposure to opioids  --Current pain regimen includes scheduled gabapentin 100 mg 3 times daily  -- As needed Dilaudid    Hypernatremia/hyperchloremia  Will give gentle D5W IV, recheck in AM    DVT ppx: Eliquis  Code Status: Full Code  Ambulates:  nonambulatory  Discharge planning: [facility] pending [facility] Problems  Date Reviewed: 9/10/2022            Codes Class Noted POA    Injury to rectum without open wound into cavity ICD-10-CM: S36.60XA  ICD-9-CM: 863.45  9/20/2022 Yes        Open wound of anus ICD-10-CM: T64.952Q  ICD-9-CM: 863.89  9/20/2022 Yes        Severe protein-calorie malnutrition ([facility] [address] ICD-10-CM: E43  ICD-9-CM: 121  9/13/2022 Yes        * (Principal) Intra-abdominal infection ICD-10-CM: B99.9  ICD-9-CM: 136.9  9/10/2022 Yes       Review of Systems:   A comprehensive review of systems was negative except for that written in the HPI. Vital Signs:    Last 24hrs VS reviewed since prior progress note.  Most recent are:  Visit Vitals  /69   Pulse 72   Temp 98.4 °F (36.9 °C)   Resp 17   Ht 5' 7\" (1.702 m)   Wt 67.8 kg (149 lb 7.6 oz)   SpO2 97%   BMI 23.41 kg/m²         Intake/Output Summary (Last 24 hours) at 10/8/2022 1616  Last data filed at 10/8/2022 1104  Gross per 24 hour   Intake 240 ml   Output 850 ml   Net -610 ml        Physical Examination:     I had a face to face encounter with this patient and independently examined them on 10/8/2022 as outlined below:          Constitutional: Chronically ill-appearing, alert, NAD ENT:  Oral mucosa moist   Resp:  CTA bilaterally. No wheezing/rhonchi/rales. No accessory muscle use   CV:  RRR, no audible m/r/g    GI/: Abd wound vac in place, ostomy, nondistended    Musculoskeletal:  No edema, warm, bilateral BKA    Neurologic:  Skin:  Psych:  Moves all extremities. Awake and alert   multiple wounds, skin w/d, no jaundice   Calm, Not anxious or agitated            Data Review:    Review and/or order of clinical lab test      Labs:     Recent Labs     10/08/22  0451 10/06/22  0414   WBC 9.0 9.0   HGB 7.9* 7.8*   HCT 26.5* 24.8*   * 465*       Recent Labs     10/08/22  0451 10/06/22  0414   * 143   K 3.8 4.0   * 110*   CO2 26 24   BUN 59* 91*   CREA 2.75* 2.58*   GLU 93 98   CA 8.1* 8.9   MG  --  2.2   PHOS 5.1* 3.6       Recent Labs     10/08/22  0451 10/06/22  0414   ALT  --  28   AP  --  186*   TBILI  --  0.2   TP  --  6.3*   ALB 1.8* 1.8*   GLOB  --  4.5*       No results for input(s): INR, PTP, APTT, INREXT, INREXT in the last 72 hours. Recent Labs     10/08/22  0451   TIBC 144*   PSAT 18*   FERR 579*        Lab Results   Component Value Date/Time    Folate 4.1 (L) 09/10/2022 12:00 PM        No results for input(s): PH, PCO2, PO2 in the last 72 hours. No results for input(s): CPK, CKNDX, TROIQ in the last 72 hours.     No lab exists for component: CPKMB  Lab Results   Component Value Date/Time    Triglyceride 96 10/06/2022 04:14 AM     Lab Results   Component Value Date/Time    Glucose (POC) 95 10/06/2022 11:14 AM    Glucose (POC) 107 10/06/2022 12:35 AM    Glucose (POC) 99 10/05/2022 11:27 AM    Glucose (POC) 99 10/05/2022 07:02 AM    Glucose (POC) 86 10/04/2022 11:43 PM     No results found for: COLOR, APPRN, SPGRU, REFSG, WILL, PROTU, GLUCU, KETU, BILU, UROU, MISHA, LEUKU, GLUKE, EPSU, BACTU, WBCU, RBCU, CASTS, UCRY      Medications Reviewed:     Current Facility-Administered Medications   Medication Dose Route Frequency    alteplase (CATHFLO) 1 mg in sterile water (preservative free) 1 mL injection  1 mg InterCATHeter PRN    epoetin vera-epbx (RETACRIT) injection 20,000 Units  20,000 Units SubCUTAneous DIALYSIS TUE, THU & SAT    heparin (porcine) 1,000 unit/mL injection 1,800 Units  1,800 Units InterCATHeter DIALYSIS PRN    And    heparin (porcine) 1,000 unit/mL injection 1,700 Units  1,700 Units InterCATHeter DIALYSIS PRN    0.9% sodium chloride infusion 250 mL  250 mL IntraVENous PRN    albumin human 25% (BUMINATE) solution 50 g  50 g IntraVENous DIALYSIS PRN    HYDROmorphone (DILAUDID) injection 1 mg  1 mg IntraVENous Q3H PRN    meropenem (MERREM) 500 mg in 0.9% sodium chloride (MBP/ADV) 50 mL MBP  500 mg IntraVENous Q24H    0.9% sodium chloride infusion 250 mL  250 mL IntraVENous PRN    glucose chewable tablet 16 g  4 Tablet Oral PRN    glucagon (GLUCAGEN) injection 1 mg  1 mg IntraMUSCular PRN    dextrose 10 % infusion 0-250 mL  0-250 mL IntraVENous PRN    gabapentin (NEURONTIN) capsule 100 mg  100 mg Oral TID    ferrous sulfate tablet 325 mg  1 Tablet Oral BID WITH MEALS    HYDROmorphone (DILAUDID) tablet 2 mg  2 mg Oral Q4H PRN    HYDROmorphone (DILAUDID) tablet 4 mg  4 mg Oral Q4H PRN    hydrALAZINE (APRESOLINE) 20 mg/mL injection 10 mg  10 mg IntraVENous Q6H PRN    mirtazapine (REMERON) tablet 7.5 mg  7.5 mg Oral QHS    pantoprazole (PROTONIX) tablet 40 mg  40 mg Oral ACB&D    apixaban (ELIQUIS) tablet 5 mg  5 mg Oral BID    sertraline (ZOLOFT) tablet 25 mg  25 mg Oral DAILY    collagenase (SANTYL) 250 unit/gram ointment   Topical DAILY    diphenhydrAMINE (BENADRYL) injection 12.5 mg  12.5 mg IntraVENous Q6H PRN    sodium chloride (NS) flush 5-40 mL  5-40 mL IntraVENous Q8H    sodium chloride (NS) flush 5-40 mL  5-40 mL IntraVENous PRN    acetaminophen (TYLENOL) tablet 650 mg  650 mg Oral Q6H PRN    polyethylene glycol (MIRALAX) packet 17 g  17 g Oral DAILY PRN    ondansetron (ZOFRAN ODT) tablet 4 mg  4 mg Oral Q8H PRN    Or    ondansetron (ZOFRAN) injection 4 mg  4 mg IntraVENous Q6H PRN    L.acidophilus-paracasei-S.thermophil-bifidobacter (RISAQUAD) 8 billion cell capsule  1 Capsule Oral DAILY     ______________________________________________________________________  EXPECTED LENGTH OF STAY: 9d 14h  ACTUAL LENGTH OF STAY:          [address] MD

## 2022-10-08 NOTE — PROGRESS NOTES
Transition of Care Plan  RUR- 13% Moderate Risk  DISPOSITION: The disposition plan is to transition to [facility] - [address] - [phone] - pending review.  Outpatient - [facility] - [phone] - accepted patient. (M,W,F - 3:15pm - chair time)  Insurance authorization has not been initiated  F/U with PCP/Specialist    Transport: [facility] -[phone], schedule for     Patient has been recommend for IPR. Patient has been accepted to the [address]. The insurance authorization was initiated on 10-7-22. At 10:05am - CM contacted [address] coordinator at [facility] - [phone] to follow up regarding the status of the insurance authorization. CM left CM a VM and awaiting a callback. At 10:06am - CM contacted Camille admin coordinator at [address] - [phone] to follow up regarding the status of the insurance authorization. CM left CM a VM and awaiting a callback. At 10:08am - CM contacted Denise Marcos - [phone], admin coordinator at [facility], to follow up regarding the status of the insurance auth. Tera Evans reports that she does not have access to the [facility] [place] [place] location, she will contact the [facility] [place] [place] location and follow up with CM once she receives an update. At 11:18am - CM received a call from [facility] who reports that she has spoken to the coordinators at the [facility] who reports that they have not submitted for authorization at this time because they are awaiting more progress to be made by patient. [facility] reports that the unit CM can follow up with the coordinators at [facility] next week.     Nate Duarte, LEFTY, CRM, [id number]-e  Available in Perfect Serve

## 2022-10-09 LAB
ANION GAP SERPL CALC-SCNC: 7 MMOL/L (ref 5–15)
BUN SERPL-MCNC: 22 MG/DL (ref 6–20)
BUN/CREAT SERPL: 15 (ref 12–20)
CALCIUM SERPL-MCNC: 7.8 MG/DL (ref 8.5–10.1)
CHLORIDE SERPL-SCNC: 104 MMOL/L (ref 97–108)
CO2 SERPL-SCNC: 29 MMOL/L (ref 21–32)
CREAT SERPL-MCNC: 1.5 MG/DL (ref 0.7–1.3)
GLUCOSE SERPL-MCNC: 94 MG/DL (ref 65–100)
POTASSIUM SERPL-SCNC: 3.4 MMOL/L (ref 3.5–5.1)
SODIUM SERPL-SCNC: 140 MMOL/L (ref 136–145)

## 2022-10-09 PROCEDURE — 80048 BASIC METABOLIC PNL TOTAL CA: CPT

## 2022-10-09 PROCEDURE — 74011250636 HC RX REV CODE- 250/636: Performed by: NURSE PRACTITIONER

## 2022-10-09 PROCEDURE — 74011000258 HC RX REV CODE- 258: Performed by: NURSE PRACTITIONER

## 2022-10-09 PROCEDURE — 74011250636 HC RX REV CODE- 250/636: Performed by: COLON & RECTAL SURGERY

## 2022-10-09 PROCEDURE — 74011250637 HC RX REV CODE- 250/637: Performed by: STUDENT IN AN ORGANIZED HEALTH CARE EDUCATION/TRAINING PROGRAM

## 2022-10-09 PROCEDURE — 74011250637 HC RX REV CODE- 250/637: Performed by: PHYSICIAN ASSISTANT

## 2022-10-09 PROCEDURE — 36415 COLL VENOUS BLD VENIPUNCTURE: CPT

## 2022-10-09 PROCEDURE — 74011250636 HC RX REV CODE- 250/636: Performed by: INTERNAL MEDICINE

## 2022-10-09 PROCEDURE — 74011250637 HC RX REV CODE- 250/637: Performed by: COLON & RECTAL SURGERY

## 2022-10-09 PROCEDURE — 65270000046 HC RM TELEMETRY

## 2022-10-09 PROCEDURE — 74011250637 HC RX REV CODE- 250/637: Performed by: INTERNAL MEDICINE

## 2022-10-09 PROCEDURE — 74011000250 HC RX REV CODE- 250: Performed by: COLON & RECTAL SURGERY

## 2022-10-09 RX ADMIN — SODIUM CHLORIDE, PRESERVATIVE FREE 10 ML: 5 INJECTION INTRAVENOUS at 21:25

## 2022-10-09 RX ADMIN — EPOETIN ALFA-EPBX 20000 UNITS: 10000 INJECTION, SOLUTION INTRAVENOUS; SUBCUTANEOUS at 03:04

## 2022-10-09 RX ADMIN — DIPHENHYDRAMINE HYDROCHLORIDE 12.5 MG: 50 INJECTION, SOLUTION INTRAMUSCULAR; INTRAVENOUS at 21:24

## 2022-10-09 RX ADMIN — GABAPENTIN 100 MG: 100 CAPSULE ORAL at 09:06

## 2022-10-09 RX ADMIN — SODIUM CHLORIDE, PRESERVATIVE FREE 10 ML: 5 INJECTION INTRAVENOUS at 17:48

## 2022-10-09 RX ADMIN — COLLAGENASE SANTYL: 250 OINTMENT TOPICAL at 09:07

## 2022-10-09 RX ADMIN — FERROUS SULFATE TAB 325 MG (65 MG ELEMENTAL FE) 325 MG: 325 (65 FE) TAB at 17:48

## 2022-10-09 RX ADMIN — GABAPENTIN 100 MG: 100 CAPSULE ORAL at 21:24

## 2022-10-09 RX ADMIN — FERROUS SULFATE TAB 325 MG (65 MG ELEMENTAL FE) 325 MG: 325 (65 FE) TAB at 06:50

## 2022-10-09 RX ADMIN — GABAPENTIN 100 MG: 100 CAPSULE ORAL at 17:48

## 2022-10-09 RX ADMIN — HYDROMORPHONE HYDROCHLORIDE 1 MG: 1 INJECTION, SOLUTION INTRAMUSCULAR; INTRAVENOUS; SUBCUTANEOUS at 02:56

## 2022-10-09 RX ADMIN — APIXABAN 5 MG: 5 TABLET, FILM COATED ORAL at 09:06

## 2022-10-09 RX ADMIN — APIXABAN 5 MG: 5 TABLET, FILM COATED ORAL at 21:24

## 2022-10-09 RX ADMIN — PANTOPRAZOLE SODIUM 40 MG: 40 TABLET, DELAYED RELEASE ORAL at 17:48

## 2022-10-09 RX ADMIN — HYDROMORPHONE HYDROCHLORIDE 1 MG: 1 INJECTION, SOLUTION INTRAMUSCULAR; INTRAVENOUS; SUBCUTANEOUS at 19:48

## 2022-10-09 RX ADMIN — PANTOPRAZOLE SODIUM 40 MG: 40 TABLET, DELAYED RELEASE ORAL at 06:50

## 2022-10-09 RX ADMIN — POTASSIUM BICARBONATE 10 MEQ: 782 TABLET, EFFERVESCENT ORAL at 10:52

## 2022-10-09 RX ADMIN — ONDANSETRON 4 MG: 2 INJECTION INTRAMUSCULAR; INTRAVENOUS at 10:52

## 2022-10-09 RX ADMIN — Medication 1 CAPSULE: at 09:06

## 2022-10-09 RX ADMIN — MIRTAZAPINE 7.5 MG: 15 TABLET, FILM COATED ORAL at 21:24

## 2022-10-09 RX ADMIN — MEROPENEM 500 MG: 500 INJECTION, POWDER, FOR SOLUTION INTRAVENOUS at 09:02

## 2022-10-09 RX ADMIN — HYDROMORPHONE HYDROCHLORIDE 1 MG: 1 INJECTION, SOLUTION INTRAMUSCULAR; INTRAVENOUS; SUBCUTANEOUS at 13:44

## 2022-10-09 RX ADMIN — SERTRALINE 25 MG: 50 TABLET, FILM COATED ORAL at 09:06

## 2022-10-09 RX ADMIN — HYDROMORPHONE HYDROCHLORIDE 1 MG: 1 INJECTION, SOLUTION INTRAMUSCULAR; INTRAVENOUS; SUBCUTANEOUS at 06:53

## 2022-10-09 RX ADMIN — HYDROMORPHONE HYDROCHLORIDE 1 MG: 1 INJECTION, SOLUTION INTRAMUSCULAR; INTRAVENOUS; SUBCUTANEOUS at 10:01

## 2022-10-09 NOTE — PROGRESS NOTES
[id number] [facility] Adult  Hospitalist Group                                                                                          Hospitalist Progress Note  Apoorva Bailon MD  Answering service: [phone] from in house phone        Date of Service:  10/9/2022  NAME:  Krystyna Patel  :  1970  MRN:  [id number]      Admission Summary: This is a 56-year-old man with a PMH of T2DM, BKA bilaterally, JEMIMA on CKD requiring HD, Respiratory Failure requiring intubation who presented at the ED from [facility] with c/o abdominal pain. The patient was recently admitted to another hospital and underwent left below-knee amputation, hospitalization complicated with respiratory failure which required prolonged intubation- attributed to aspiration pneumonia, also developed lobulated effusion, for which the patient underwent decortication and right thoracotomy and acute renal failure for which he has been started on hemodialysis. He had a J-tube was placed for supplemental nutrition and was transferred to [facility] for continuation of care. He was doing relatively well in [facility] until the day of presentation at the ED when the patient complained of abdominal pain at the facility. CT scan of the abdomen and pelvis was obtained: shows evidence of bowel perforation, sent to [facility] fro further eval/tx. When the patient arrived at the emergency room, based on his clinical presentation and lab work, Code Sepsis was triggered. The patient received fluid therapy as per sepsis protocol. The emergency room physician consulted general surgeon on-call. The patient was seen by the surgeon and the patient is planned for immediate surgical intervention. No record of prior admission to this hospital.     Interval history / Subjective:   No acute events  Pain controlled  NAD  pending placement/auth     Assessment & Plan:     Dislodged JG tube with surrounding peritonitis. Free air and perirectal fistula.   Patient was sent from [facility] for intractable abdominal pain. Severe sepsis without septic shock due to intra-abdominal source of infection. .  -He was started on broad-spectrum empiric antibiotics on admission and underwent the following procedures:  -Gen Sx: 9/10 Lap take down and removal J-tube, Lap colostomy, I&D abdominal wall  -Colorectal Sx: 9/15 anorectal wound exam including rigid proctosigmoidoscopy  -ID were consulted and assisted with antibiotics management  Eraxis/Zosyn/Vancomycin completed 9/23/22, then PO Augmentin x4d and switched back to IV d/t persistent leukocytosis  Cont IV Merrem x2 weeks, last dose 10/13  Haley catheter in place  -C. difficile, blood culture negative  -CT chest 9/10: Small right basilar gas and fluid containing pleural collection with a  peripheral soft tissue rind. Finding may represent an empyema and clinical correlation is recommended. Recommend direct comparison to any additional prior imaging. Free intraperitoneal gas, seen on prior CT of the abdomen and pelvis. -CT abd/pel wo 9/14:No focal intra-abdominal fluid collection to suggest abscess. Free intraperitoneal gas consistent with recent intra-abdominal surgery. Interval improvement in rectal thickening. Prior CT dated September 9, 2022 demonstrated a probable right posterior rectal wall defect which is no longer visualized on today's examination. Persistent, small right basilar gas and fluid containing pleural collection, unchanged.  -wound vac, ostomy care    JEMIMA vs CKD requiring dialysis: CVC 9/16/22- IR  -Continued on hemodialysis on MWF schedule  -Access, permacath placed on 9/16  --10/05 Outpatient unit: [facility] MWF , TTS HD Currently, on [facility]EL     H/o Type 2 diabetes mellitus  S/p TPN  SSI not requiring, dc'd     AFIB: rate stable  -eliquis 5mg BID  -ECHO 9/26: Left Ventricle: The EF by visual approximation is 55 - 60%. Left ventricle size is normal. Normal wall thickness. Normal wall motion. Normal diastolic function.  Tricuspid Valve: Mildly elevated RVSP. The estimated RVSP is 42 mmHg     Empyema: noted: recent thoracotomy and prolonged intubation at [facility]. -CXR: Persistent right basilar airspace disease and right-sided loculated effusion/empyema. -CT chest 9/10: Small right basilar gas and fluid containing pleural collection with a  peripheral soft tissue rind. Finding may represent an empyema and clinical correlation is recommended. Recommend direct comparison to any additional prior imaging. Free intraperitoneal gas, seen on prior CT of the abdomen and pelvis. -Pulmonary consulted: noted \"percutaneous drainage would not be successful at removing fluid as I suppose it is very thick and organized at this time. Would only recommend following clinically and radiographically. If worsens or worsened right-sided effusion he would need to be reevaluated by his thoracic surgeon at [facility] for additional drainage. \"  -IV ABT's course as above     Acute blood loss anemia on chronic disease: stable  --on MICHAEL per Nephr     +Occult Blood: no melena, no n/v  -PPI  -iron supplements  -monitor Hgb, stable     AMS: delirium, hallucinations ?  2/2 to toxic metabolic encephalopathy, hospital delirium, and opioid induced  -CT head : no acute intracranial abnormality  -Alert and oriented since      Depression: continue zoloft    Thrombocytosis: noted reactive thrombocytosis, monitor    Epigastric J site wound with surrounding abscess, POA  --Patient underwent incision and drainage on 9/10    Bilateral BKA, left BKA dehiscence and possible infection  -Vascular Sx followin/26-sutures removed and some eschar along incision line excised, open cavity laterally has old hematoma at base that was evacuated   -CT of the left BKA stump on  showed several irregular soft tissue defects overlying the amputation site with a 4.4 x 4.1 x 1.9 cm amorphous fluid collection with partial thick walled/rim-enhancing and internal gas locules. 9/29- Leg wound growing Pseudomonas  Vascular has seen  continue antibiotics as above    Anorectal wound, POA  --Followed by colorectal surgery. He is status post proctosigmoidoscopy on 9/15, there was communication of the distal rectal lumen with extraperitoneal pelvis. Acute pain syndrome. This is due to the multiple surgeries he had. Anticipate some degree of tolerance due to his continued exposure to opioids  --Current pain regimen includes scheduled gabapentin 100 mg 3 times daily  -- As needed Dilaudid    Hypernatremia/hyperchloremia, resolved s/p D5W    Hypokalemia, replete      DVT ppx: Eliquis  Code Status: Full Code  Ambulates:  nonambulatory  Discharge planning: [facility] pending [facility] Problems  Date Reviewed: 9/10/2022            Codes Class Noted POA    Injury to rectum without open wound into cavity ICD-10-CM: S36.60XA  ICD-9-CM: 863.45  9/20/2022 Yes        Open wound of anus ICD-10-CM: N02.044B  ICD-9-CM: 863.89  9/20/2022 Yes        Severe protein-calorie malnutrition ([facility] [address] ICD-10-CM: E43  ICD-9-CM: 539  9/13/2022 Yes        * (Principal) Intra-abdominal infection ICD-10-CM: B99.9  ICD-9-CM: 136.9  9/10/2022 Yes       Review of Systems:   A comprehensive review of systems was negative except for that written in the HPI. Vital Signs:    Last 24hrs VS reviewed since prior progress note.  Most recent are:  Visit Vitals  /70   Pulse (!) 58   Temp 98.4 °F (36.9 °C)   Resp 18   Ht 5' 7\" (1.702 m)   Wt 67.8 kg (149 lb 7.6 oz)   SpO2 94%   BMI 23.41 kg/m²         Intake/Output Summary (Last 24 hours) at 10/9/2022 1014  Last data filed at 10/8/2022 2301  Gross per 24 hour   Intake 240 ml   Output 1950 ml   Net -1710 ml        Physical Examination:     I had a face to face encounter with this patient and independently examined them on 10/9/2022 as outlined below:          Constitutional: Chronically ill-appearing, alert, NAD   ENT:  Oral mucosa moist Resp: CTA bilaterally. No wheezing/rhonchi/rales. No accessory muscle use   CV:  RRR, no audible m/r/g    GI/: Abd wound vac in place, ostomy, nondistended    Musculoskeletal:  No edema, warm, bilateral BKA    Neurologic:  Skin:  Psych:  Moves all extremities. Awake and alert   multiple wounds, skin w/d, no jaundice   Calm, Not anxious or agitated            Data Review:    Review and/or order of clinical lab test      Labs:     Recent Labs     10/08/22  0451   WBC 9.0   HGB 7.9*   HCT 26.5*   *       Recent Labs     10/09/22  0241 10/08/22  0451    147*   K 3.4* 3.8    114*   CO2 29 26   BUN 22* 59*   CREA 1.50* 2.75*   GLU 94 93   CA 7.8* 8.1*   PHOS  --  5.1*       Recent Labs     10/08/22  0451   ALB 1.8*       No results for input(s): INR, PTP, APTT, INREXT, INREXT in the last 72 hours. Recent Labs     10/08/22  0451   TIBC 144*   PSAT 18*   FERR 579*        Lab Results   Component Value Date/Time    Folate 4.1 (L) 09/10/2022 12:00 PM        No results for input(s): PH, PCO2, PO2 in the last 72 hours. No results for input(s): CPK, CKNDX, TROIQ in the last 72 hours.     No lab exists for component: CPKMB  Lab Results   Component Value Date/Time    Triglyceride 96 10/06/2022 04:14 AM     Lab Results   Component Value Date/Time    Glucose (POC) 95 10/06/2022 11:14 AM    Glucose (POC) 107 10/06/2022 12:35 AM    Glucose (POC) 99 10/05/2022 11:27 AM    Glucose (POC) 99 10/05/2022 07:02 AM    Glucose (POC) 86 10/04/2022 11:43 PM     No results found for: COLOR, APPRN, SPGRU, REFSG, WILL, PROTU, GLUCU, KETU, BILU, UROU, MISHA, LEUKU, GLUKE, EPSU, BACTU, WBCU, RBCU, CASTS, UCRY      Medications Reviewed:     Current Facility-Administered Medications   Medication Dose Route Frequency    alteplase (CATHFLO) 1 mg in sterile water (preservative free) 1 mL injection  1 mg InterCATHeter PRN    epoetin vera-epbx (RETACRIT) injection 20,000 Units  20,000 Units SubCUTAneous DIALYSIS TUE, THU & SAT heparin (porcine) 1,000 unit/mL injection 1,800 Units  1,800 Units InterCATHeter DIALYSIS PRN    And    heparin (porcine) 1,000 unit/mL injection 1,700 Units  1,700 Units InterCATHeter DIALYSIS PRN    0.9% sodium chloride infusion 250 mL  250 mL IntraVENous PRN    albumin human 25% (BUMINATE) solution 50 g  50 g IntraVENous DIALYSIS PRN    HYDROmorphone (DILAUDID) injection 1 mg  1 mg IntraVENous Q3H PRN    meropenem (MERREM) 500 mg in 0.9% sodium chloride (MBP/ADV) 50 mL MBP  500 mg IntraVENous Q24H    0.9% sodium chloride infusion 250 mL  250 mL IntraVENous PRN    glucose chewable tablet 16 g  4 Tablet Oral PRN    glucagon (GLUCAGEN) injection 1 mg  1 mg IntraMUSCular PRN    dextrose 10 % infusion 0-250 mL  0-250 mL IntraVENous PRN    gabapentin (NEURONTIN) capsule 100 mg  100 mg Oral TID    ferrous sulfate tablet 325 mg  1 Tablet Oral BID WITH MEALS    HYDROmorphone (DILAUDID) tablet 2 mg  2 mg Oral Q4H PRN    HYDROmorphone (DILAUDID) tablet 4 mg  4 mg Oral Q4H PRN    hydrALAZINE (APRESOLINE) 20 mg/mL injection 10 mg  10 mg IntraVENous Q6H PRN    mirtazapine (REMERON) tablet 7.5 mg  7.5 mg Oral QHS    pantoprazole (PROTONIX) tablet 40 mg  40 mg Oral ACB&D    apixaban (ELIQUIS) tablet 5 mg  5 mg Oral BID    sertraline (ZOLOFT) tablet 25 mg  25 mg Oral DAILY    collagenase (SANTYL) 250 unit/gram ointment   Topical DAILY    diphenhydrAMINE (BENADRYL) injection 12.5 mg  12.5 mg IntraVENous Q6H PRN    sodium chloride (NS) flush 5-40 mL  5-40 mL IntraVENous Q8H    sodium chloride (NS) flush 5-40 mL  5-40 mL IntraVENous PRN    acetaminophen (TYLENOL) tablet 650 mg  650 mg Oral Q6H PRN    polyethylene glycol (MIRALAX) packet 17 g  17 g Oral DAILY PRN    ondansetron (ZOFRAN ODT) tablet 4 mg  4 mg Oral Q8H PRN    Or    ondansetron (ZOFRAN) injection 4 mg  4 mg IntraVENous Q6H PRN    L.acidophilus-paracasei-S.thermophil-bifidobacter (RISAQUAD) 8 billion cell capsule  1 Capsule Oral DAILY ______________________________________________________________________  EXPECTED LENGTH OF STAY: 9d 14h  ACTUAL LENGTH OF STAY:          Eduin Degroot MD

## 2022-10-09 NOTE — PROGRESS NOTES
0800 Bedside shift change report given to  Margo Rao  (oncoming nurse) by Chaya Yu (offgoing nurse). Report included the following information SBAR, Kardex, ED Summary, , Procedure Summary, Intake/Output, MAR, Accordion, Recent Results, Med Rec Status.

## 2022-10-09 NOTE — PROCEDURES
Hemodialysis / [phone]    Vitals Pre Post Assessment Pre Post   BP BP: (!) 142/63 (10/08/22 1919)   160/83 LOC A&Ox4, cooperative, bilateral BKA A&Ox4, cooperative, bilateral BKA   HR Pulse (Heart Rate): (!) 57 (10/08/22 2000)   54 Lungs Diminished bilaterally, denies shortness of breath Diminished bilaterally, denies shortness of breath   Resp Resp Rate: 18 (10/08/22 1919) 18 Cardiac Regular, S1, S2, denies chest pain Regular, S1, S2, denies chest pain   Temp Temp: 98.4 °F (36.9 °C) (10/08/22 1919) 98.1 Skin Mid abd colostomy,   R IJ permcath  Sacral wound  Mid abd colostomy,   R IJ permcath  Sacral woun   Weight Pre-Dialysis Weight: 64.5 kg (142 lb 3.2 oz) (09/19/22 0925)  Edema Generalized trace  Generalized trace   Tele status NSR NSR Pain Pain Intensity 1: 0 (10/07/22 2000) 0     Orders   Duration: Start: Karene Race End: 2220 Total: 3 hours   Dialyzer: Dialyzer/Set Up Inspection: Eitan Frias (10/08/22 1919)   K Bath: Dialysate K (mEq/L): 3 (10/08/22 1919)   Ca Bath: Dialysate CA (mEq/L): 2.5 (10/08/22 1919)   Na: Dialysate NA (mEq/L): 138 (10/08/22 1919)   Bicarb: Dialysate HCO3 (mEq/L): 35 (10/08/22 1919)   Target Fluid Removal: Goal/Amount of Fluid to Remove (mL): 1500 mL (10/08/22 1919)     Access   Type & Location: R IJ PC : Dressing CDI. No s/s of infection. Both lumens aspirate & flush well. Running well at . Dressing dated 10/4/22.         Comments:                                        Labs   HBsAg (Antigen) / date: 09/12/22 Negative                                          HBsAb (Antibody) / date: 09/12/22 Susceptible    Source:    Obtained/Reviewed  Critical Results Called HGB   Date Value Ref Range Status   10/08/2022 7.9 (L) 12.1 - 17.0 g/dL Final     Potassium   Date Value Ref Range Status   10/08/2022 3.8 3.5 - 5.1 mmol/L Final     Calcium   Date Value Ref Range Status   10/08/2022 8.1 (L) 8.5 - 10.1 MG/DL Final     BUN   Date Value Ref Range Status   10/08/2022 59 (H) 6 - 20 MG/DL Final Comment:     INVESTIGATED PER DELTA CHECK PROTOCOL     Creatinine   Date Value Ref Range Status   10/08/2022 2.75 (H) 0.70 - 1.30 MG/DL Final        Meds Given   Name Dose Route   Albumin 25% 12.5 g  IV   Heparin 1;1000  1800 Units venous  1700 Units arterial  IV hep lock           Adequacy / Fluid    Total Liters Process: 58.4 L   Net Fluid Removed: 1500 mL      Comments   Time Out Done:   (Time) @ 1900   Admitting Diagnosis: Intra-abdominal infection, perforated bowel   Consent obtained/signed: Informed Consent Verified: Yes (10/08/22 1919)   Nelwyn Harada / Khalif Brown # Machine Number: [id number] (10/08/22 1919)   Primary Nurse Rpt Pre: Genia Bahena RN   Primary Nurse Rpt Post: Sruthi Lozada RN   Pt Education: Ordered Dialysis Treatment   Care Plan: Catheter Infection Prevention   Pts outpatient clinic: N/A     Tx Summary   Comments:          SBAR received from Primary RN. Pt arrived to HD suite A&Ox4. Consent signed & on file. 1919: Each catheter limb disinfected per p&p, caps removed, hubs disinfected per p&p. Each lumen aspirated for blood return and flushed with Normal Saline per policy. VSS. Dialysis Tx initiated. 1951: Albumin 12.5 g IV administered to support BP during dialysis treatment. Lines patent and intact. 2030: Vital signs stable, pt resting quietly, lines visible and secure. 2130: Pt resting quietly, no complaints of pain, vitals stable, lines visible and intact. 2220: Tx ended. VSS. Each dialysis catheter limb disinfected per p&p, all possible blood returned per p&p, and each dialysis hub disinfected per p&p. Each lumen flushed, post dialysis catheter Heparin dwell instilled per order, and caps applied. Bed locked and in the lowest position, call bell and belongings in reach. SBAR given to Primary, RN. Patient is stable at time of their departure. All Dialysis related medications have been reviewed.

## 2022-10-10 LAB
ALBUMIN SERPL-MCNC: 1.9 G/DL (ref 3.5–5)
ANION GAP SERPL CALC-SCNC: 5 MMOL/L (ref 5–15)
BUN SERPL-MCNC: 32 MG/DL (ref 6–20)
BUN/CREAT SERPL: 13 (ref 12–20)
CALCIUM SERPL-MCNC: 7.9 MG/DL (ref 8.5–10.1)
CHLORIDE SERPL-SCNC: 109 MMOL/L (ref 97–108)
CO2 SERPL-SCNC: 27 MMOL/L (ref 21–32)
CREAT SERPL-MCNC: 2.49 MG/DL (ref 0.7–1.3)
ERYTHROCYTE [DISTWIDTH] IN BLOOD BY AUTOMATED COUNT: 20.2 % (ref 11.5–14.5)
GLUCOSE SERPL-MCNC: 88 MG/DL (ref 65–100)
HCT VFR BLD AUTO: 27.8 % (ref 36.6–50.3)
HGB BLD-MCNC: 8.5 G/DL (ref 12.1–17)
MAGNESIUM SERPL-MCNC: 2 MG/DL (ref 1.6–2.4)
MCH RBC QN AUTO: 29 PG (ref 26–34)
MCHC RBC AUTO-ENTMCNC: 30.6 G/DL (ref 30–36.5)
MCV RBC AUTO: 94.9 FL (ref 80–99)
NRBC # BLD: 0 K/UL (ref 0–0.01)
NRBC BLD-RTO: 0 PER 100 WBC
PHOSPHATE SERPL-MCNC: 5 MG/DL (ref 2.6–4.7)
PLATELET # BLD AUTO: 504 K/UL (ref 150–400)
PMV BLD AUTO: 9.4 FL (ref 8.9–12.9)
POTASSIUM SERPL-SCNC: 4.4 MMOL/L (ref 3.5–5.1)
RBC # BLD AUTO: 2.93 M/UL (ref 4.1–5.7)
SODIUM SERPL-SCNC: 141 MMOL/L (ref 136–145)
WBC # BLD AUTO: 9 K/UL (ref 4.1–11.1)

## 2022-10-10 PROCEDURE — 74011250637 HC RX REV CODE- 250/637: Performed by: INTERNAL MEDICINE

## 2022-10-10 PROCEDURE — 74011250637 HC RX REV CODE- 250/637: Performed by: PHYSICIAN ASSISTANT

## 2022-10-10 PROCEDURE — 74011250637 HC RX REV CODE- 250/637: Performed by: COLON & RECTAL SURGERY

## 2022-10-10 PROCEDURE — 97530 THERAPEUTIC ACTIVITIES: CPT

## 2022-10-10 PROCEDURE — 74011250637 HC RX REV CODE- 250/637: Performed by: STUDENT IN AN ORGANIZED HEALTH CARE EDUCATION/TRAINING PROGRAM

## 2022-10-10 PROCEDURE — 65270000046 HC RM TELEMETRY

## 2022-10-10 PROCEDURE — 36415 COLL VENOUS BLD VENIPUNCTURE: CPT

## 2022-10-10 PROCEDURE — 97535 SELF CARE MNGMENT TRAINING: CPT

## 2022-10-10 PROCEDURE — 74011250636 HC RX REV CODE- 250/636: Performed by: NURSE PRACTITIONER

## 2022-10-10 PROCEDURE — 74011250636 HC RX REV CODE- 250/636: Performed by: COLON & RECTAL SURGERY

## 2022-10-10 PROCEDURE — 85027 COMPLETE CBC AUTOMATED: CPT

## 2022-10-10 PROCEDURE — 83735 ASSAY OF MAGNESIUM: CPT

## 2022-10-10 PROCEDURE — 80069 RENAL FUNCTION PANEL: CPT

## 2022-10-10 PROCEDURE — 74011000258 HC RX REV CODE- 258: Performed by: NURSE PRACTITIONER

## 2022-10-10 PROCEDURE — 74011000250 HC RX REV CODE- 250: Performed by: COLON & RECTAL SURGERY

## 2022-10-10 RX ORDER — HYDROMORPHONE HYDROCHLORIDE 5 MG/5ML
2-4 SOLUTION ORAL
Status: DISCONTINUED | OUTPATIENT
Start: 2022-10-10 | End: 2022-10-13 | Stop reason: HOSPADM

## 2022-10-10 RX ADMIN — Medication 4 MG: at 23:12

## 2022-10-10 RX ADMIN — PANTOPRAZOLE SODIUM 40 MG: 40 TABLET, DELAYED RELEASE ORAL at 06:52

## 2022-10-10 RX ADMIN — ONDANSETRON 4 MG: 2 INJECTION INTRAMUSCULAR; INTRAVENOUS at 07:58

## 2022-10-10 RX ADMIN — MEROPENEM 500 MG: 500 INJECTION, POWDER, FOR SOLUTION INTRAVENOUS at 09:38

## 2022-10-10 RX ADMIN — SODIUM CHLORIDE, PRESERVATIVE FREE 10 ML: 5 INJECTION INTRAVENOUS at 17:47

## 2022-10-10 RX ADMIN — FERROUS SULFATE TAB 325 MG (65 MG ELEMENTAL FE) 325 MG: 325 (65 FE) TAB at 07:00

## 2022-10-10 RX ADMIN — DIPHENHYDRAMINE HYDROCHLORIDE 12.5 MG: 50 INJECTION, SOLUTION INTRAMUSCULAR; INTRAVENOUS at 23:36

## 2022-10-10 RX ADMIN — MIRTAZAPINE 7.5 MG: 15 TABLET, FILM COATED ORAL at 22:00

## 2022-10-10 RX ADMIN — GABAPENTIN 100 MG: 100 CAPSULE ORAL at 17:41

## 2022-10-10 RX ADMIN — Medication 4 MG: at 19:08

## 2022-10-10 RX ADMIN — APIXABAN 5 MG: 5 TABLET, FILM COATED ORAL at 22:00

## 2022-10-10 RX ADMIN — HYDROMORPHONE HYDROCHLORIDE 1 MG: 1 INJECTION, SOLUTION INTRAMUSCULAR; INTRAVENOUS; SUBCUTANEOUS at 05:25

## 2022-10-10 RX ADMIN — HYDROMORPHONE HYDROCHLORIDE 1 MG: 1 INJECTION, SOLUTION INTRAMUSCULAR; INTRAVENOUS; SUBCUTANEOUS at 10:03

## 2022-10-10 RX ADMIN — Medication 4 MG: at 13:58

## 2022-10-10 RX ADMIN — PANTOPRAZOLE SODIUM 40 MG: 40 TABLET, DELAYED RELEASE ORAL at 17:41

## 2022-10-10 RX ADMIN — ONDANSETRON 4 MG: 2 INJECTION INTRAMUSCULAR; INTRAVENOUS at 18:34

## 2022-10-10 RX ADMIN — APIXABAN 5 MG: 5 TABLET, FILM COATED ORAL at 09:37

## 2022-10-10 RX ADMIN — HYDROMORPHONE HYDROCHLORIDE 1 MG: 1 INJECTION, SOLUTION INTRAMUSCULAR; INTRAVENOUS; SUBCUTANEOUS at 02:10

## 2022-10-10 RX ADMIN — Medication 1 CAPSULE: at 09:37

## 2022-10-10 RX ADMIN — SERTRALINE 25 MG: 50 TABLET, FILM COATED ORAL at 09:37

## 2022-10-10 RX ADMIN — FERROUS SULFATE TAB 325 MG (65 MG ELEMENTAL FE) 325 MG: 325 (65 FE) TAB at 17:41

## 2022-10-10 RX ADMIN — GABAPENTIN 100 MG: 100 CAPSULE ORAL at 09:37

## 2022-10-10 RX ADMIN — SODIUM CHLORIDE, PRESERVATIVE FREE 10 ML: 5 INJECTION INTRAVENOUS at 22:02

## 2022-10-10 RX ADMIN — ACETAMINOPHEN 650 MG: 325 TABLET, FILM COATED ORAL at 22:00

## 2022-10-10 RX ADMIN — GABAPENTIN 100 MG: 100 CAPSULE ORAL at 22:00

## 2022-10-10 RX ADMIN — COLLAGENASE SANTYL: 250 OINTMENT TOPICAL at 17:40

## 2022-10-10 NOTE — PROGRESS NOTES
Comprehensive Nutrition Assessment    Type and Reason for Visit: Reassess    Nutrition Recommendations/Plan:   Continue with Regular diet  - will add double protein portions to hopefully improve his protein intake (he declines all ONS)       Malnutrition Assessment:  Malnutrition Status:  Severe malnutrition (09/13/22 1116)    Context:  Chronic illness     Findings of the 6 clinical characteristics of malnutrition:   Energy Intake:  Unable to assess  Weight Loss:  Unable to assess     Body Fat Loss:  Severe body fat loss, Triceps, Orbital   Muscle Mass Loss:  Severe muscle mass loss, Temples (temporalis), Clavicles (pectoralis &deltoids)  Fluid Accumulation:  No significant fluid accumulation,     Strength:  Not performed        Nutrition Assessment:    PMHx includes DM, bilateral BKAs, JEMIMA on HD  HPI: pt presented at the emergency room from [facility] with abdominal pain. . was recently admitted to another hospital and underwent L BKA, hospitalization c/b respiratory failure which required prolonged intubation. The respiratory failure was attributed to aspiration pneumonia as well as lobulated effusion, for which the patient underwent decortication and R thoracotomy. The patient also went into acute renal failure for which he has been started on HD. J-tube was placed for supplemental nutrition. The patient was subsequently transferred to [facility] for continuation of care. He was doing relatively well at [facility] until the day of presentation to our ER with c/o abdominal pain. CTAP showed evidence of bowel perforation. Admitted with pneumoperitoneum, abdominal wall abscess, severe sepsis, possible empyema, FOBT+, JEMIMA on HD, rectal fistula. Surgery consulted on admission. Underwent takedown and removal of J-tube, lap colostomy placement, and I&D of abdominal wall on 9/10/22. Recommends colorectal surgery eval.  CRS took pt to OR 9/15 for anorectal examination of wound, including rigid proctosigmoidoscopy.   PC placed 9/16 - no sign of renal recovery yet. HD MWF. Concern for BKA wound dehiscence - vascular eval, no surgical intervention needed at this time. Underwent abdominal wall debridement on 9/29 with general surgery. Abdominal wounds growing Pseudomonas. Recommendations are to continue with IV meropenem until 10/13    10/10:  Follow up. TPN was fully stopped 10/5. Currently on Regular diet. Spoke with pt at bedside, sitting up in chair. He states he ate 100% of eggs, 1 sausage, and OJ for breakfast this morning. States he loves eggs and would eat double portions of them if provided. Other meals he will eat well if it is something he likes. Had a menu in front of him and states he had just ordered his next 3 meals with the diet office, hopefully this will help encourage increased PO intakes. States his wife still brings him food on occasion if he does not like his meal here. Chart notes that he was c/o nausea this morning but he did not mention a c/o of this to me. Offered to provide him double protein portions, he states some meals he would like this but some would be too much. Will add to his diet order to hopefully improve his protein intake which I reemphasized with him. Reviewed all ONS options again with pt, he declines them all, saying he does not like any of them. Discussed possible snack options, pt agreeable to receive string cheese and PB crackers. Weight is only down 0.3 kg from assessment 1 week ago. Labs: Phos 5    10/4:  follow-up. Discussed with RN and MD.  Pt has some good meals - but those are mostly ones brought in from home/ fast- food. Ultimately, they are looking at d/c in next 1-2 days and TPN is not an appropriate long-term solution to his overall poor PO intake. Plan to wean TPN today and d/c before his is discharged. Encourage PO intake. 9/27: follow-up. Continues on TPN at goal (initiated 9/23, at goal by 9/25).   Phos low and BG running low normal.  Does have insulin and folic acid and thiamine in TPN. Nephrology following closely - remains on HD. Worsening leukocytosis - KUB negative, ID recommends CT of L stump. Given BG is well controlled, do recommend increasing dex to 20% to slightly increase calories - keep insulin same for mow.  8%AA, 20% dex @ 63 mL/hr with 250 mL 20% lipids daily provides 1762 mL, 2012 kcal, 120 gm pro, and 302 gm dex. 9/23: pt continues to eat very little, only bites if at all. Will not consume ONS. AMS/ delirium, concern for some hallucinations. Doubtful he would tolerate/ allow an NG.  PEG or Jtube not an option at this time with active abdominal infection. Weight trending down rapidly. High EEN with wounds and HD. Electrolyte abnormalities, folate deficiency. Pt is severely malnourished. Discussed with PA today, pt has access, so plan to start TPN. This was discussed with nephrology as well. For tonight, will start TPN of 5%AA, 15% dex @ 42 mL/hr.  250 mL 20% lipids added daily. Thiamine and folic acid included. Recommend goal of 8%AA, 18% dex @ 63 mL/hr with 250 mL 20% lipids daily to provide 1762 mL, 1909 kcal, 120 gm pro, and 272 gm dex (GIR 2.9 mg/kg/min). If allowed more volume, could better meet EEN, but limited with HD.        9/20: follow-up. Discussed in rounds. Hallucinations overnight thought to be d/t dilaudid. Still with significant pain. Events of past week added above. New A fib, pending cardiology consult. Met with pt in room. Weight down. Pt reports a good appetite/ intake, but this is not supported by documentation. Breakfast meal at bedside untouched. He states he was off for procedure (?unclear). Now too cold - awaiting lunch. Pt states he likes the taste of Nepro, but he cannot drink any of these ONS as they make him sick. When I asked him to elaborate, he states they cause bloating/ pain, and diarrhea. He tells me he usually eats more than 50% of his meal, sometimes all. This is unclear. He seems to have poor insight regarding nutrition status and ?current medical issues - stating once the pain in under control, he hopes to be up walking around which will probably make him hungrier. BG well controlled. K/Phos BNL. Has not been started on folic acid. Will address with hospitalist.           9/13: Pt was initially screen d/t diet order thickened clear liquids x 3 days. Upon review of [facility] records, pt was on pureed diet with mildly thick liquids. However, despite our diet order, pt had dried cereal (brought in from outside) he was eating yesterday and drinking thin liquids. RN stated he was tolerating fine. I addressed this with Dr. Meghan Linares yesterday and asked about SLP ava and he also did not seem that concerned as he also saw that pt was tolerating these items. I did reach out to speech and chart was reviewed, but given MD, surgery, and pulmonary all without concerns for aspiration or dysphagia, they did not see an indication for evaluation and to advance diet as tolerated as we usually would. I spoke with pt in room today. He states he eats chicken, burgers, steaks, etc - basically everything. He stated he coughed once and they placed him on a modified diet weeks ago, but has been doing fine eating whatever he wants. He said J-tube was placed (not sure why J-tube over PEG) for supplemental nutrition, but they only used it 3 times since he was still eating. Pt does have significant muscle wasting and fat loss. He states he used to weigh >350 lb and started to try and lose weight many years ago, however it is unclear how long ago. He stated more recently it has not been intentional weight loss, but he is unable to state when all this occurred. Wife not present at the time. He refused any ONS and just wants to eat. He did state he cannot eat a lot at once.   I suspect maybe tube feeds started to help promote wound healing of BKA and other Pis with poor intake, severe PCM, but this is still unclear. I reached to Dr. Radha Pena as surgery had said advance diet for past 2 days and it had not been done. Again, asked if he wanted SLP eval first and he declined, just to advance to full liquids and then regular as tolerated. Multiple PI wounds as below. Confirmed height of 5'7\" before amputations. Adjust IBW with bilat BKA ~ 130 lb. Suspect pt's admission weight of 149 lb is closer to his ABW. Therefore, he is ~114% of his adj IBW and will use for assessment purposes. NFPE reveals he is clearly malnourished and would not put him in \"overweight\" category despite what the flowsheets are classifying him as (even using admission weight of 149 lb). Nutritionally Significant Medications:  FeSu, Probiotic, Remeron      Estimated Daily Nutrient Needs:  Energy Requirements Based On: Kcal/kg  Weight Used for Energy Requirements: Admission (68 kg)  Energy (kcal/day): 1315-3467 (30-35 kcal/kg)  Weight Used for Protein Requirements: Admission  Protein (g/day): 100-120 (1.5-1.8 gm/kg or at least 20%)  Method Used for Fluid Requirements: Standard renal  Fluid (ml/day): 500 mL + OP    Nutrition Related Findings:   Edema: none  Last BM: 10/09/22, Soft    Wounds: Multiple, Pressure injury, Stage III, Partial thickness      Current Nutrition Therapies:  Diet: Regular  Supplements: Magic Cup  Meal intake: Patient Vitals for the past 168 hrs:   % Diet Eaten   10/08/22 1104 1 - 25%     Supplement intake: No data found. Nutrition Support: none      Anthropometric Measures:  Height: 5' 7\" (170.2 cm)  Ideal Body Weight (IBW): 148 lbs (67 kg)  Admission Body Weight: 149 lb 14.6 oz (68 kg)  Current Body Wt:  67.8 kg (149 lb 7.6 oz), 101 % IBW.  Bed scale  Current BMI (kg/m2): 23.4        Weight Adjustment: Amputation  Total Amputation Percentage: 11.8  Adjusted Ideal Body Weight (lbs) (Calculated): 130.5  Adjusted Ideal Body Weight (kg) (Calculated): 59.32 kg  Adjusted % IBW (Calculated): 115  Adjusted BMI (Calculated): 26.3  BMI Category: Normal weight (BMI 18.5-24.9) (or underweight - clinical judgement)    Wt Readings from Last 10 Encounters:   10/06/22 67.8 kg (149 lb 7.6 oz)   07/02/18 104 kg (229 lb 4.5 oz)           Nutrition Diagnosis:   Inadequate oral intake related to increased demand for energy/nutrients, renal dysfunction, acute injury/trauma as evidenced by intake 26-50%  Increased nutrient needs related to increased demand for energy/nutrients as evidenced by wounds, dialysis    Nutrition Interventions:   Food and/or Nutrient Delivery: Continue current diet  Nutrition Education/Counseling: No recommendations at this time  Coordination of Nutrition Care: Continue to monitor while inpatient       Goals:  Previous Goal Met: No progress toward goal(s)  Goals: other (specify)  Specify Other Goals: PO intake >/=75% of meals over next 7 days    Nutrition Monitoring and Evaluation:   Behavioral-Environmental Outcomes: None identified  Food/Nutrient Intake Outcomes: Food and nutrient intake  Physical Signs/Symptoms Outcomes: Biochemical data, GI status, Meal time behavior, Hemodynamic status, Nutrition focused physical findings, Weight, Skin    Discharge Planning:    Continue current diet    Terra Maloney RD  Available via [employer]IN

## 2022-10-10 NOTE — PROGRESS NOTES
Transition of Care: pending to [facility] Cathy Castillo ([facility] [phone]); pending acceptance to [facility] and pending insurance auth (which was not started on 10/7)       NOTE: patient also has OP HD set up for after rehab: Outpatient HD chair at [facility] chair time 3:15pm M/W/F schedule  [facility]  admissions  [phone]). [facility]- [phone]- contact- Sis Varela              Transport- [facility] is on willcall; patient is bilateral BKA      RUR: 12%     Main contact is wife- Citlaly Montaño- [phone]     Discharge pending:  -patient continues on IV antibiotics until 10/13/22  -patient continues on dialysis  -patient continues with wound care  -patient continues with progress in PT/OT  -pending medical progress and care recommendations      [id number]: PT/OT has seen patient this morning; faxing todays PT and OT notes plus hospitalist and consult notes to [facility] Dee Mac- [phone]; sent message via [employer] to Dean Yukalb Luis Fernandomary at [facility]; also spoke with Willem Lacey contactIris Abdi and gave her verbal update and sent update message via [employer]    1500: this CM called Catarina at [facility]; they are currently reviewing the new notes; this CM updated the patient at bedside; patient is sleeping; will try again later today    33 14 74: this CM updated patient on discharge plan and pending to [facility]; he verbalized understanding     CM following  Natalie Herrera RN            NOTE:     The patient has been hospitalized in the [place] area since the last week of June 2022. .  patient has bilateral BKA; This started at [facility]. The patient was transferred from [facility] to [facility] last week. Per the wife, \"He was only there 2-3 days and d/c'd to [facility]. The patient is no longer employed and receives disability. He lives with his wife (Lindsay)-m)[phone] in a one story home. He was independent in ADL's (prior to June of this year).    Following his first orthopedic surgery (r) AKA he received a (r) prosthetic device and ambulated with a cane.    Pharmacy:  [facility]    primary: [facility] policy # [id number]        Revision History

## 2022-10-10 NOTE — PROGRESS NOTES
Problem: Mobility Impaired (Adult and Pediatric)  Goal: *Acute Goals and Plan of Care (Insert Text)  Description: FUNCTIONAL STATUS PRIOR TO ADMISSION: The patient was functional at the wheelchair level and required minimal assistance for transfers to the chair. HOME SUPPORT PRIOR TO ADMISSION: The patient lived with wife, daughter and required up to moderate assistance  for tranfers bed <> w/c and ADLs. Patient has spent extensive period of time hospitalized recently and has been admitted from [facility]. Physical Therapy Goals    Upgraded 10/10/2022 - Weekly Re-assessment  1. Patient will move from supine to sit and sit to supine  in bed with min assist x 1 with use of bed rail within 7 day(s). 2.  Patient will transfer from bed to chair and chair to bed with min assist x 1 using the least restrictive device within 7 day(s). 3.  Patient will sit EOB x 30 minutes with intermittent UE support with standby guard only within 7 day(s). 4.  Patient will transfer sit to/from stand with mod assist x 2 from elevated bed surface and right leg prosthesis within 7 days. Goal is dependent on prosthetist being able to provide appropriate liner socks to ensure proper fit of prosthesis to begin weight bearing safely (monitor for any pressure areas). Upgraded 10/3/2022  1. Patient will roll left/right in bed for pressure relief mod I with use of bed rails within 7 day(s). GOAL MET 10/10/22  2. Patient will move from supine to sit and sit to supine  in bed with SBA within 7 day(s). 3.  Patient will transfer from bed to chair and chair to bed with moderate assistance  using the least restrictive device within 7 day(s). GOAL MET 10/10/22  4  Patient will sit EOB x 15 minutes without UE support with CGA-min A within 7 day(s). GOAL EXCEEDED 10/10/22      Weekly Reassessment 9/22/22 (goals continued)  Initiated 9/15/2022  1.   Patient will roll left/right in bed for pressure relief mod I with use of bed rails within 7 day(s). 2.  Patient will move from supine to sit and sit to supine  in bed with moderate assistance within 7 day(s). MET    3. Patient will transfer from bed to chair and chair to bed with moderate assistance  using the least restrictive device within 7 day(s). 4  Patient will sit EOB x 10 minutes without UE support with CGA-min A within 7 day(s). MET  10/10/2022 1122 by Alec Ospina PT  Outcome: Progressing Towards Goal     PHYSICAL THERAPY TREATMENT  Patient: Marylu Oconnor (72 y.o. male)  Date: 10/10/2022  Diagnosis: Intra-abdominal infection [B99.9] Intra-abdominal infection  Procedure(s) (LRB):  ABDOMINAL Wall DEBRIDEMENT (Left) 11 Days Post-Op  Precautions: Fall, Other (comment) (bilateral BKA)  Chart, physical therapy assessment, plan of care and goals were reviewed. ASSESSMENT  Patient continues with skilled PT services and is progressing towards goals. Pt with limited ability to assist with transfer back to bed due to height distance from drop arm bedside chair to hospital bed - higher surface. Patient also limited by discomfort from sitting in upholstered chair with waffle cushion vs. Medical pressure relieving cushion for use in w/c to allow for proper positioning. Current Level of Function Impacting Discharge (mobility/balance): Max assist x 2 for unlevel transfer - low to high    Other factors to consider for discharge: needs pre-prosthetic rehab for left BKA; strengthening and standing activities on right prosthesis          PLAN :  Patient continues to benefit from skilled intervention to address the above impairments. Continue treatment per established plan of care. to address goals.     Recommendation for discharge: (in order for the patient to meet his/her long term goals)  Therapy 3 hours per day 5-7 days per week    This discharge recommendation:  Has been made in collaboration with the attending provider and/or case management    IF patient discharges home will need the following DME: n/a       SUBJECTIVE:   Patient stated It felt great sitting up -- but I can't move uphill -  maybe we can deflate the mattress.     OBJECTIVE DATA SUMMARY:   Critical Behavior:  Neurologic State: Alert  Orientation Level: Oriented X4  Cognition: Appropriate decision making, Follows commands  Safety/Judgement: Fall prevention, Good awareness of safety precautions  Functional Mobility Training:  Bed Mobility:  Rolling: Supervision  Supine to Sit: Moderate assistance;Assist x2;Contact guard assistance (impacted by air mattress)  Sit to Supine: Other (comment) (pt remained in bedside chair)           Transfers:              Bed to Chair: Other (comment) (Chair to bed - unlevel transfer to bed (higher surface) - max assist x 2)                    Balance:  Sitting: Impaired  Sitting - Static: Good (unsupported)  Sitting - Dynamic: Fair (occasional)  Standing:  (unable to fully weight bearing in right prosthesis due to poor fit - pt donned prosthesis to assist with sliding board transfer)       Pain Rating:  C/o discomfort/pain from buttocks     Activity Tolerance:   Improving - out of bed x 2 hrs - would benefit from appropriate cushion    After treatment patient left in no apparent distress:   Supine in bed, Patient positioned in Right sidelying for pressure relief, Call bell within reach, and Side rails x 3    COMMUNICATION/COLLABORATION:   The patients plan of care was discussed with: Registered nurse.      Vladislav Serna, PT   Time Calculation: 15 mins

## 2022-10-10 NOTE — PROGRESS NOTES
Problem: Self Care Deficits Care Plan (Adult)  Goal: *Acute Goals and Plan of Care (Insert Text)  Description: FUNCTIONAL STATUS PRIOR TO ADMISSION: Prior to previous hospitalization, d/c to [facility], and current admission to [facility] pt was utilizing w/c for functional mobility and required minimal assistance for transfers. HOME SUPPORT: The patient lived with his wife and daughter and required assistance for all ADL/IADL tasks. Occupational Therapy Goals  Jony re-assessment 10/10/22- goals updated   1. Patient will perform wilbur area bathing at bed level with set up and supervision within 7 day(s). 2.  Patient will perform anterior neck to thigh bathing while with set up within 7 day(s). 3.  Patient will perform drop arm BSC via lateral transfers with moderate assistance within 7 day(s). 4.  Patient will perform all aspects of toileting with moderate assistance  within 7 day(s). 5.  Patient will participate in progressive upper extremity therapeutic exercise/activities with supervision/set-up for 10 minutes within 7 day(s). Initiated 9/15/2022, OT weekly re-assessment 9/22/22. Goals reviewed 10/10/22  1. Patient will perform basic grooming in unsupported sitting with minimal assistance/contact guard assist within 7 day(s). (Continue 9/22, 10/3). Met 10/10/22  2. Patient will perform anterior neck to thigh bathing while in unsupported sitting with minimal assistance/contact guard assist within 7 day(s). (Downgrade to mod A 9/22, continue 10/3). Met 10/10/22  3. Patient will perform drop arm BSC via lateral transfers with moderate assistance within 7 day(s). (Continue 9/22, 10/3). continue   4. Patient will perform all aspects of toileting with moderate assistance  within 7 day(s). (At bed level 9/22, 10/3). continue  5. Patient will participate in upper extremity therapeutic exercise/activities with supervision/set-up for 10 minutes within 7 day(s). (Continue 9/22, 10/3).  Ongoing- continue Outcome: Progressing Towards Goal   OCCUPATIONAL THERAPY TREATMENT/WEEKLY RE-ASSESSMENT  Patient: Jah Henning (77 y.o. male)  Date: 10/10/2022  Diagnosis: Intra-abdominal infection [B99.9] Intra-abdominal infection  Procedure(s) (LRB):  ABDOMINAL Wall DEBRIDEMENT (Left) 11 Days Post-Op  Precautions: Fall, Other (comment) (bilateral BKA)  Chart, occupational therapy assessment, plan of care, and goals were reviewed. ASSESSMENT  Patient continues with skilled OT services and is progressing towards goals. Patient's progress with therapy has been largely impacted by environmental constraints in the hospital setting (including mattress type, availability of appropriate chairs for transfers, etc). After trial during prior session, patient tolerated EOB activity with current mattress firmness setting adjusted (4 is optimal setting for future reference) in spite of sacral pain. Patient's balance was largely intact though some assist required to move back into upright position following wilbur area bathing. Patient transferred to drop arm recliner chair using transfer board. 2 person assist (Mod plus CGA) provided for safety and to ensure cushion remained in the appropriate place, and that board did not slide (board was very short). Patient was quite happy to be out of bed today. He maintains a motivated attitude toward his recovery and participated well in all activity in spite of pain. Today, he was pre-medicated prior to session. His ostomy bag stayed intact during all activity (this has been an issue and definitely impacted his ability to participate during prior sessions). Patient easily tolerated >30 min of activity today. Anticipate continued gains with intensive therapy services.  Discharge to IRF recommended when medically appropriate to maximize his recovery and facilitate ADL independence     Current Level of Function Impacting Discharge (ADLs): min to mod assist for UB bathing/dressing, mod assist for LB bathing/dressing. Max assist for toileting. Min A +Mod assist for transfer (using board)    Other factors to consider for discharge:          PLAN :  Goals have been updated based on progression since last assessment. Patient continues to benefit from skilled intervention to address the above impairments. Continue to follow patient 5 times a week to address goals. Recommend with staff: enable ADL participation     Recommend next OT session: bed level bathing, ADL at chair level, drop arm BSC transfer training (with board)    Recommendation for discharge: (in order for the patient to meet his/her long term goals)  Therapy 3 hours per day 5-7 days per week    This discharge recommendation:  Has been made in collaboration with the attending provider and/or case management    IF patient discharges home will need the following DME: patient owns DME required for discharge       SUBJECTIVE:   Patient stated I'd love to get out of this bed.     OBJECTIVE DATA SUMMARY:   Cognitive/Behavioral Status:  Neurologic State: Alert  Orientation Level: Oriented X4  Cognition: Appropriate decision making; Follows commands             Functional Mobility and Transfers for ADLs:  Bed Mobility:  Rolling: Supervision  Supine to Sit: Moderate assistance;Assist x2;Contact guard assistance (impacted by air mattress)  Sit to Supine: Other (comment) (pt remained in bedside chair)    Transfers:        Bed to Chair: Assist x2 (min assist to CGA x 1; mod assist x 1- using sliding board to drop arm bedside chair)    Balance:  Sitting: Impaired  Sitting - Static: Good (unsupported)  Sitting - Dynamic: Fair (occasional)  Standing:  (unable to fully weight bearing in right prosthesis due to poor fit - pt donned prosthesis to assist with sliding board transfer)    ADL Intervention:       Grooming  Grooming Assistance: Supervision  Position Performed: Seated edge of bed    Upper Body Bathing  Bathing Assistance: Minimum assistance (seconday to lines/leads)  Position Performed: Seated edge of bed         Lower Body Bathing  Bathing Assistance: Moderate assistance  Perineal  : Minimum assistance  Position Performed: Supine  Lower Body : Minimum assistance  Position Performed: Seated edge of bed  Assisted with RLE prosthetic donning      Pain:  8/10 pain in abdomen, sacrum, LLE    Activity Tolerance:   Good    After treatment patient left in no apparent distress:   Sitting in chair and Call bell within reach    COMMUNICATION/COLLABORATION:   The patients plan of care was discussed with: Physical therapist, Registered nurse, and Case management.      Kiersten Harden, OT  Time Calculation: 47 mins

## 2022-10-10 NOTE — PROGRESS NOTES
Nephrology Progress Note  Betty Morel  Date of Admission : 9/9/2022    CC:  Follow up for JEMIMA       Assessment and Plan     JEMIMA on HD: oliguria  - 2/2 ATN  - HD Schedule: TTS in hospital   - First dialysis 9/12/22  - Outpatient unit: [facility] MWF chair per CM notes  - Access: permacath placed on 9/16  - HD TTS for now   - labs on HD    Anemia:  - MICHAEL increased (10/6) 20 k TTS  - goal is for Hgb 10-11  - on oral iron  - check iron studies last completed 9/10    PAD s/p b/l BKA    Recent sepsis  - on Merrem 500 mg q 24 hrs per ID recommends 2 more weeks until 10/14    ABD wall abscess:  - J-tube removal, colostomy, I&D of abd wound  - TPN/ lipids per primary team, encouraging increase oral intake  - s/p I&D of abdominal abscess 9/29  - 10/5 Wound vac placed    Empyema  Sacral decub  Type 2DM  Afib       Interval History:  Seen and examined. awaiting placement     Current Medications: all current  Medications have been eviewed in EPIC  Review of Systems: A comprehensive review of systems was negative except for that written in the HPI. Objective:  Vitals:    Vitals:    10/10/22 0402 10/10/22 0607 10/10/22 0802 10/10/22 1131   BP: 116/66  (!) 151/68 (!) 104/53   Pulse: (!) 58 (!) 55 61 70   Resp: 18  16 16   Temp: 98.2 °F (36.8 °C)  97.8 °F (36.6 °C) 98.5 °F (36.9 °C)   TempSrc:       SpO2: 96%  95% 98%   Weight:       Height:         Intake and Output:  No intake/output data recorded. 10/08 1901 - 10/10 0700  In: -   Out: 2450     Physical Examination:  General: NAD oriented to person/place today  Neck:  Supple, no mass  Resp:  Lungs CTA B/L, no wheezing , normal respiratory effort  CV:  RRR,  no murmur or rub, no stump edema  GI:  Soft, NT, + Bowel sounds, + ostomy  Access:           [facility]    Lab Data Personally Reviewed: I have reviewed all the pertinent labs, microbiology data and radiology studies during assessment.     Recent Labs     10/10/22  0234 10/09/22  0241 10/08/22  0451    140 147* K 4.4 3.4* 3.8   * 104 114*   CO2 27 29 26   GLU 88 94 93   BUN 32* 22* 59*   CREA 2.49* 1.50* 2.75*   CA 7.9* 7.8* 8.1*   MG 2.0  --   --    PHOS 5.0*  --  5.1*   ALB 1.9*  --  1.8*       Recent Labs     10/10/22  0234 10/08/22  0451   WBC 9.0 9.0   HGB 8.5* 7.9*   HCT 27.8* 26.5*   * 503*       No results found for: SDES  Lab Results   Component Value Date/Time    Culture result: (A) 09/29/2022 09:21 PM     MODERATE PROTEUS MIRABILIS REFER TO [id number] FOR SENSITIVITIES    Culture result: MODERATE PSEUDOMONAS AERUGINOSA (A) 09/29/2022 09:21 PM    Culture result: NO ANAEROBES ISOLATED 09/29/2022 09:21 PM     Recent Results (from the past 24 hour(s))   RENAL FUNCTION PANEL    Collection Time: 10/10/22  2:34 AM   Result Value Ref Range    Sodium 141 136 - 145 mmol/L    Potassium 4.4 3.5 - 5.1 mmol/L    Chloride 109 (H) 97 - 108 mmol/L    CO2 27 21 - 32 mmol/L    Anion gap 5 5 - 15 mmol/L    Glucose 88 65 - 100 mg/dL    BUN 32 (H) 6 - 20 MG/DL    Creatinine 2.49 (H) 0.70 - 1.30 MG/DL    BUN/Creatinine ratio 13 12 - 20      eGFR 30 (L) >60 ml/min/1.73m2    Calcium 7.9 (L) 8.5 - 10.1 MG/DL    Phosphorus 5.0 (H) 2.6 - 4.7 MG/DL    Albumin 1.9 (L) 3.5 - 5.0 g/dL   MAGNESIUM    Collection Time: 10/10/22  2:34 AM   Result Value Ref Range    Magnesium 2.0 1.6 - 2.4 mg/dL   CBC W/O DIFF    Collection Time: 10/10/22  2:34 AM   Result Value Ref Range    WBC 9.0 4.1 - 11.1 K/uL    RBC 2.93 (L) 4.10 - 5.70 M/uL    HGB 8.5 (L) 12.1 - 17.0 g/dL    HCT 27.8 (L) 36.6 - 50.3 %    MCV 94.9 80.0 - 99.0 FL    MCH 29.0 26.0 - 34.0 PG    MCHC 30.6 30.0 - 36.5 g/dL    RDW 20.2 (H) 11.5 - 14.5 %    PLATELET 997 (H) 934 - 400 K/uL    MPV 9.4 8.9 - 12.9 FL    NRBC 0.0 0  WBC    ABSOLUTE NRBC 0.00 0.00 - 0.01 K/uL                 Ysabel Rashid MD  [facility]   [id number] [place] [address]  Phone - [phone]   Fax - [phone]  [email or web address]

## 2022-10-10 NOTE — PROGRESS NOTES
Vascular:    Left BKA wound slowly cleaning up with increased granulation. Still areas of eschar present medially. Continue wound care.

## 2022-10-10 NOTE — PROGRESS NOTES
Problem: Mobility Impaired (Adult and Pediatric)  Goal: *Acute Goals and Plan of Care (Insert Text)  Description: FUNCTIONAL STATUS PRIOR TO ADMISSION: The patient was functional at the wheelchair level and required minimal assistance for transfers to the chair. HOME SUPPORT PRIOR TO ADMISSION: The patient lived with wife, daughter and required up to moderate assistance  for tranfers bed <> w/c and ADLs. Patient has spent extensive period of time hospitalized recently and has been admitted from [facility]. Physical Therapy Goals    Upgraded 10/10/2022 - Weekly Re-assessment  1. Patient will move from supine to sit and sit to supine  in bed with min assist x 1 with use of bed rail within 7 day(s). 2.  Patient will transfer from bed to chair and chair to bed with min assist x 1 using the least restrictive device within 7 day(s). 3.  Patient will sit EOB x 30 minutes with intermittent UE support with standby guard only within 7 day(s). 4.  Patient will transfer sit to/from stand with mod assist x 2 from elevated bed surface and right leg prosthesis within 7 days. Goal is dependent on prosthetist being able to provide appropriate liner socks to ensure proper fit of prosthesis to begin weight bearing safely (monitor for any pressure areas). Upgraded 10/3/2022  1. Patient will roll left/right in bed for pressure relief mod I with use of bed rails within 7 day(s). GOAL MET 10/10/22  2. Patient will move from supine to sit and sit to supine  in bed with SBA within 7 day(s). 3.  Patient will transfer from bed to chair and chair to bed with moderate assistance  using the least restrictive device within 7 day(s). GOAL MET 10/10/22  4  Patient will sit EOB x 15 minutes without UE support with CGA-min A within 7 day(s). GOAL EXCEEDED 10/10/22      Weekly Reassessment 9/22/22 (goals continued)  Initiated 9/15/2022  1.   Patient will roll left/right in bed for pressure relief mod I with use of bed rails within 7 day(s). 2.  Patient will move from supine to sit and sit to supine  in bed with moderate assistance within 7 day(s). MET    3. Patient will transfer from bed to chair and chair to bed with moderate assistance  using the least restrictive device within 7 day(s). 4  Patient will sit EOB x 10 minutes without UE support with CGA-min A within 7 day(s). MET  Outcome: Progressing Towards Goal     PHYSICAL THERAPY TREATMENT: WEEKLY REASSESSMENT  Patient: Jeanne Kirk (45 y.o. male)  Date: 10/10/2022  Primary Diagnosis: Intra-abdominal infection [B99.9]  Procedure(s) (LRB):  ABDOMINAL Wall DEBRIDEMENT (Left) 11 Days Post-Op   Precautions:   Fall, Other (comment) (bilateral BKA)      ASSESSMENT  Patient continues with skilled PT services and is progressing towards goals. Despite long hospital stay, continued difficulty with colostomy bag with poor adherence and pain from anorectal wound pt progressing with sitting edge of bed endurance and today performed sliding board transfer with mod assist x 1 and min assist x 1. Prior to sliding board transfer, discussed with wound care RN - anorectal skin integrity with sliding board transfer and sitting in chair with waffle cushion - PT and RN in agreement as long as pt is properly positioned minimal risk to negatively impact wound healing. Pt continues strongly motivated and demonstrates potential to increase independence with transfers. Pt was able to ascencion right BKA prosthesis and achieved appropriate fit to prevent pistoning for minimal weight bearing during sliding board transfer. PT to contact prosthesis to obtain proper liner socks to progress to sit to stand (pending continued stable medical status - Prosthetist scheduled to take a mold for a new socket.)  Recommend inpatient rehab as pt has potential to become independent at wheelchair level and progress to gait training when left stump healed appropriately.     Patient's progression toward goals since last assessment: Mod assist x 2 supine to sit (from max assist), sitting edge of bed tolerance increased to 25 mins with standby guard to occ min assist; able to perform sliding board transfer to bedside chair with min to mod assist x 2     Current Level of Function Impacting Discharge (mobility/balance): as above    Functional Outcome Measure: The patient scored 35/100 on the Barthel Index outcome measure. Other factors to consider for discharge: Prior level of function indep w/c level and ambulatory with right BKA prosthesis         PLAN :  Goals have been updated based on progression since last assessment. Patient continues to benefit from skilled intervention to address the above impairments. Recommendations and Planned Interventions: bed mobility training, transfer training, pre -gait training (standing) , therapeutic exercises, patient and family training/education, and therapeutic activities      Frequency/Duration: Patient will be followed by physical therapy:  5 times a week to address goals. Recommendation for discharge: (in order for the patient to meet his/her long term goals)  Therapy 3 hours per day 5-7 days per week    This discharge recommendation:  Has been made in collaboration with the attending provider and/or case management    IF patient discharges home will need the following DME: n/a         SUBJECTIVE:   Patient stated It feels so good to be up --   I'm a very active and determined person - I will be walking again.     OBJECTIVE DATA SUMMARY:   HISTORY:    Past Medical History:   Diagnosis Date    Diabetes ([facility] [address]      Past Surgical History:   Procedure Laterality Date    IR INSERT TUNL CVC W/O PORT OVER 5 YR  9/16/2022       Personal factors and/or comorbidities impacting plan of care: as above    Home Situation  Home Environment: Private residence  Wheelchair Ramp: Yes  One/Two Story Residence: One story  Living Alone: No  Support Systems: Spouse/Significant Other  Patient Expects to be Discharged to[de-identified] Rehab Unit Subacute  Current DME Used/Available at Home: Wheelchair, María Wylie or Shower Type: Shower    EXAMINATION/PRESENTATION/DECISION MAKING:   Critical Behavior:  Neurologic State: Alert  Orientation Level: Oriented X4  Cognition: Appropriate decision making, Follows commands  Safety/Judgement: Fall prevention, Good awareness of safety precautions  Hearing: Auditory  Auditory Impairment: None  Skin:  Left stump - dressed - minimal drainage medially - compression stump sock over dressing; right BKA - no areas of breakdown or redness noted , anorectal wound covered with dressing    Range Of Motion:  AROM: Generally decreased, functional           PROM: Generally decreased, functional           Strength:    Strength: Generally decreased, functional                    Tone & Sensation:   Tone: Normal              Sensation: Impaired (paraesthesias bilateral LE stumps)               Coordination:  Coordination: Generally decreased, functional  Functional Mobility:  Bed Mobility:  Rolling: Supervision  Supine to Sit: Moderate assistance;Assist x2;Contact guard assistance (impacted by air mattress)  Sit to Supine: Other (comment) (pt remained in bedside chair)     Transfers:              Bed to Chair: Assist x2 (min assist to CGA x 1; mod assist x 1- using sliding board to drop arm bedside chair)              Balance:   Sitting: Impaired  Sitting - Static: Good (unsupported)  Sitting - Dynamic: Fair (occasional)  Standing:  (unable to fully weight bearing in right prosthesis due to poor fit - pt donned prosthesis to assist with sliding board transfer)              Therapeutic Exercises:   Pt performing bilateral leg exercise in supine and sitting - 10 reps x 2.      Functional Measure:  Barthel Index:    Bathin  Bladder: 10  Bowels: 0 (colostomy)  Groomin  Dressin  Feeding: 10  Mobility: 0  Stairs: 0  Toilet Use: 0 (colostomy)  Transfer (Bed to Chair and Back): 5  Total: 35/100 The Barthel ADL Index: Guidelines  1. The index should be used as a record of what a patient does, not as a record of what a patient could do. 2. The main aim is to establish degree of independence from any help, physical or verbal, however minor and for whatever reason. 3. The need for supervision renders the patient not independent. 4. A patient's performance should be established using the best available evidence. Asking the patient, friends/relatives and nurses are the usual sources, but direct observation and common sense are also important. However direct testing is not needed. 5. Usually the patient's performance over the preceding 24-48 hours is important, but occasionally longer periods will be relevant. 6. Middle categories imply that the patient supplies over 50 per cent of the effort. 7. Use of aids to be independent is allowed. Score Interpretation (from [address] [address])    Independent   60-79 Minimally independent   40-59 Partially dependent   20-39 Very dependent   <20 Totally dependent     -Luke Sands., Barthel, D.W. (1965). Functional evaluation: the Barthel Index. [facility] ([address] (1997). The Barthel activities of daily living index: self-reporting versus actual performance in the old (> or = 75 years). Journal of [address] [place] [address] 457), [facility], JSHANIF, Sushma Malcolm., [facility]. (1999). Measuring the change in disability after inpatient rehabilitation; comparison of the responsiveness of the Barthel Index and Functional [place] Measure. Journal of Neurology, Neurosurgery, and Psychiatry, 66(4), 423-006. Roel Heaton, N.J.A, ORION Chen, & Solomon Rodriguez MKeriA. (2004) Assessment of post-stroke quality of life in cost-effectiveness studies: The usefulness of the Barthel Index and the EuroQoL-5D.  Quality of Life Research, 13, 427-43          Pain Rating:  Pain - 8/10 prior to Dilaudid; after prior to transfer OOB     Activity Tolerance:   Improving - sat 25 mins edge of bed; tolerated transfer to bedside chair    After treatment patient left in no apparent distress:   Call bell within reach and Recliner chair - sitting upright to prevent pressure to buttocks breakdown; bilateral BKA elevated and supported on pillows to alleviate pressure to distal stumps     COMMUNICATION/EDUCATION:   The patients plan of care was discussed with: Occupational therapist, Registered nurse, and Case management. Fall prevention education was provided and the patient/caregiver indicated understanding., Patient/family have participated as able in goal setting and plan of care. , and Patient/family agree to work toward stated goals and plan of care.     Thank you for this referral.  Melony Small, PT   Time Calculation: 55 mins

## 2022-10-10 NOTE — PROGRESS NOTES
[address] [facility] Adult  Hospitalist Group                                                                                          Hospitalist Progress Note  Leonidas Gaffney MD  Answering service: [phone] from in house phone        Date of Service:  10/10/2022  NAME:  Cirilo Frias  :  1970  MRN:  [id number]      Admission Summary: This is a 48-year-old man with a PMH of T2DM, BKA bilaterally, JEMIMA on CKD requiring HD, Respiratory Failure requiring intubation who presented at the ED from [facility] with c/o abdominal pain. The patient was recently admitted to another hospital and underwent left below-knee amputation, hospitalization complicated with respiratory failure which required prolonged intubation- attributed to aspiration pneumonia, also developed lobulated effusion, for which the patient underwent decortication and right thoracotomy and acute renal failure for which he has been started on hemodialysis. He had a J-tube was placed for supplemental nutrition and was transferred to [facility] for continuation of care. He was doing relatively well in [facility] until the day of presentation at the ED when the patient complained of abdominal pain at the facility. CT scan of the abdomen and pelvis was obtained: shows evidence of bowel perforation, sent to [address] [place] [address] further eval/tx. When the patient arrived at the emergency room, based on his clinical presentation and lab work, Code Sepsis was triggered. The patient received fluid therapy as per sepsis protocol. The emergency room physician consulted general surgeon on-call. The patient was seen by the surgeon and the patient is planned for immediate surgical intervention. No record of prior admission to this hospital.     Interval history / Subjective:   No acute overnight events  Feels nauseated this morning  Pain controlled on IV dilaudid  Auth pending     Assessment & Plan:     Dislodged JG tube with surrounding peritonitis.   Free air and perirectal fistula. Patient was sent from [facility] for intractable abdominal pain. Severe sepsis without septic shock due to intra-abdominal source of infection. .  -He was started on broad-spectrum empiric antibiotics on admission and underwent the following procedures:  -Gen Sx: 9/10 Lap take down and removal J-tube, Lap colostomy, I&D abdominal wall  -Colorectal Sx: 9/15 anorectal wound exam including rigid proctosigmoidoscopy  -ID were consulted and assisted with antibiotics management  Eraxis/Zosyn/Vancomycin completed 9/23/22, then PO Augmentin x4d and switched back to IV d/t persistent leukocytosis  Cont IV Merrem x2 weeks, last dose 10/13  Haley catheter in place  -C. difficile, blood culture negative  -CT chest 9/10: Small right basilar gas and fluid containing pleural collection with a  peripheral soft tissue rind. Finding may represent an empyema and clinical correlation is recommended. Recommend direct comparison to any additional prior imaging. Free intraperitoneal gas, seen on prior CT of the abdomen and pelvis. -CT abd/pel wo 9/14:No focal intra-abdominal fluid collection to suggest abscess. Free intraperitoneal gas consistent with recent intra-abdominal surgery. Interval improvement in rectal thickening. Prior CT dated September 9, 2022 demonstrated a probable right posterior rectal wall defect which is no longer visualized on today's examination. Persistent, small right basilar gas and fluid containing pleural collection, unchanged.  -wound vac, ostomy care  -deescalate IV dilaudid to PO liquid, wean as able    JEMIMA vs CKD requiring dialysis: CVC 9/16/22- IR  -Continued on hemodialysis on MWF schedule  -Access, permacath placed on 9/16  --10/05 Outpatient unit: [facility] MWF , TTS HD Currently, on MICHAEL     H/o Type 2 diabetes mellitus  S/p TPN  SSI not requiring, dc'd     AFIB: rate stable  -eliquis 5mg BID  -ECHO 9/26: Left Ventricle: The EF by visual approximation is 55 - 60%.  Left ventricle size is normal. Normal wall thickness. Normal wall motion. Normal diastolic function. Tricuspid Valve: Mildly elevated RVSP. The estimated RVSP is 42 mmHg     Empyema: noted: recent thoracotomy and prolonged intubation at [facility]. -CXR: Persistent right basilar airspace disease and right-sided loculated effusion/empyema. -CT chest 9/10: Small right basilar gas and fluid containing pleural collection with a  peripheral soft tissue rind. Finding may represent an empyema and clinical correlation is recommended. Recommend direct comparison to any additional prior imaging. Free intraperitoneal gas, seen on prior CT of the abdomen and pelvis. -Pulmonary consulted: noted \"percutaneous drainage would not be successful at removing fluid as I suppose it is very thick and organized at this time. Would only recommend following clinically and radiographically. If worsens or worsened right-sided effusion he would need to be reevaluated by his thoracic surgeon at [facility] for additional drainage. \"  -IV ABT's course as above     Acute blood loss anemia on chronic disease: stable  --on MICHAEL per Nephr    +Occult Blood: no melena, no n/v  -PPI  -iron supplements  -monitor Hgb, stable     AMS: delirium, hallucinations ?  2/2 to toxic metabolic encephalopathy, hospital delirium, and opioid induced - resolved  -CT head : no acute intracranial abnormality  -Alert and oriented since      Depression: continue zoloft    Thrombocytosis: likely reactive thrombocytosis, monitor    Epigastric J site wound with surrounding abscess, POA  --Patient underwent incision and drainage on 9/10    Bilateral BKA, left BKA dehiscence and possible infection  -Vascular Sx followin/26-sutures removed and some eschar along incision line excised, open cavity laterally has old hematoma at base that was evacuated   -CT of the left BKA stump on  showed several irregular soft tissue defects overlying the amputation site with a 4.4 x 4.1 x 1.9 cm amorphous fluid collection with partial thick walled/rim-enhancing and internal gas locules. 9/29- Leg wound growing Pseudomonas  Vascular following- cont wound care  continue antibiotics as above    Anorectal wound, POA  --Followed by colorectal surgery. He is status post proctosigmoidoscopy on 9/15, there was communication of the distal rectal lumen with extraperitoneal pelvis. Acute pain syndrome. This is due to the multiple surgeries he had. Anticipate some degree of tolerance due to his continued exposure to opioids  --Current pain regimen includes scheduled gabapentin 100 mg 3 times daily  -- As needed Dilaudid- wean as able    Hypernatremia/hyperchloremia, improved s/p D5W  Monitor    Hypokalemia, repleted  Mag wnl      DVT ppx: Eliquis  Code Status: Full Code  Ambulates:  nonambulatory  Discharge planning: [facility] pending [facility] Problems  Date Reviewed: 9/10/2022            Codes Class Noted POA    Injury to rectum without open wound into cavity ICD-10-CM: S36.60XA  ICD-9-CM: 863.45  9/20/2022 Yes        Open wound of anus ICD-10-CM: W46.839P  ICD-9-CM: 863.89  9/20/2022 Yes        Severe protein-calorie malnutrition ([facility] [address] ICD-10-CM: E43  ICD-9-CM: 396  9/13/2022 Yes        * (Principal) Intra-abdominal infection ICD-10-CM: B99.9  ICD-9-CM: 136.9  9/10/2022 Yes       Review of Systems:   A comprehensive review of systems was negative except for that written in the HPI. Vital Signs:    Last 24hrs VS reviewed since prior progress note.  Most recent are:  Visit Vitals  BP (!) 151/68   Pulse 61   Temp 97.8 °F (36.6 °C)   Resp 16   Ht 5' 7\" (1.702 m)   Wt 67.8 kg (149 lb 7.6 oz)   SpO2 95%   BMI 23.41 kg/m²         Intake/Output Summary (Last 24 hours) at 10/10/2022 1025  Last data filed at 10/9/2022 1920  Gross per 24 hour   Intake --   Output 450 ml   Net -450 ml        Physical Examination:     I had a face to face encounter with this patient and independently examined them on 10/10/2022 as outlined below:          Constitutional: Chronically ill-appearing, alert, NAD but nauseated   Resp:  CTA bilaterally. No accessory muscle use   CV:  RRR, no audible m/r/g    GI/: Abd wound vac in place, ostomy, nondistended    Musculoskeletal:  No edema, warm, bilateral BKA    Neurologic:  Skin:  Psych:  Moves all extremities. Awake and alert   multiple wounds, skin w/d, no jaundice   Calm, Not anxious or agitated            Data Review:    Review and/or order of clinical lab test      Labs:     Recent Labs     10/10/22  0234 10/08/22  0451   WBC 9.0 9.0   HGB 8.5* 7.9*   HCT 27.8* 26.5*   * 503*       Recent Labs     10/10/22  0234 10/09/22  0241 10/08/22  0451    140 147*   K 4.4 3.4* 3.8   * 104 114*   CO2 27 29 26   BUN 32* 22* 59*   CREA 2.49* 1.50* 2.75*   GLU 88 94 93   CA 7.9* 7.8* 8.1*   MG 2.0  --   --    PHOS 5.0*  --  5.1*       Recent Labs     10/10/22  0234 10/08/22  0451   ALB 1.9* 1.8*       No results for input(s): INR, PTP, APTT, INREXT, INREXT in the last 72 hours. Recent Labs     10/08/22  0451   TIBC 144*   PSAT 18*   FERR 579*        Lab Results   Component Value Date/Time    Folate 4.1 (L) 09/10/2022 12:00 PM        No results for input(s): PH, PCO2, PO2 in the last 72 hours. No results for input(s): CPK, CKNDX, TROIQ in the last 72 hours.     No lab exists for component: CPKMB  Lab Results   Component Value Date/Time    Triglyceride 96 10/06/2022 04:14 AM     Lab Results   Component Value Date/Time    Glucose (POC) 95 10/06/2022 11:14 AM    Glucose (POC) 107 10/06/2022 12:35 AM    Glucose (POC) 99 10/05/2022 11:27 AM    Glucose (POC) 99 10/05/2022 07:02 AM    Glucose (POC) 86 10/04/2022 11:43 PM     No results found for: COLOR, APPRN, SPGRU, REFSG, WILL, PROTU, GLUCU, KETU, BILU, UROU, MISHA, LEUKU, GLUKE, EPSU, BACTU, WBCU, RBCU, CASTS, UCRY      Medications Reviewed:     Current Facility-Administered Medications   Medication Dose Route Frequency    HYDROmorphone (DILAUDID) 1 mg/mL oral solution 2-4 mg  2-4 mg Oral Q4H PRN    alteplase (CATHFLO) 1 mg in sterile water (preservative free) 1 mL injection  1 mg InterCATHeter PRN    epoetin vera-epbx (RETACRIT) injection 20,000 Units  20,000 Units SubCUTAneous DIALYSIS TUE, THU & SAT    heparin (porcine) 1,000 unit/mL injection 1,800 Units  1,800 Units InterCATHeter DIALYSIS PRN    And    heparin (porcine) 1,000 unit/mL injection 1,700 Units  1,700 Units InterCATHeter DIALYSIS PRN    0.9% sodium chloride infusion 250 mL  250 mL IntraVENous PRN    albumin human 25% (BUMINATE) solution 50 g  50 g IntraVENous DIALYSIS PRN    meropenem (MERREM) 500 mg in 0.9% sodium chloride (MBP/ADV) 50 mL MBP  500 mg IntraVENous Q24H    0.9% sodium chloride infusion 250 mL  250 mL IntraVENous PRN    glucose chewable tablet 16 g  4 Tablet Oral PRN    glucagon (GLUCAGEN) injection 1 mg  1 mg IntraMUSCular PRN    dextrose 10 % infusion 0-250 mL  0-250 mL IntraVENous PRN    gabapentin (NEURONTIN) capsule 100 mg  100 mg Oral TID    ferrous sulfate tablet 325 mg  1 Tablet Oral BID WITH MEALS    hydrALAZINE (APRESOLINE) 20 mg/mL injection 10 mg  10 mg IntraVENous Q6H PRN    mirtazapine (REMERON) tablet 7.5 mg  7.5 mg Oral QHS    pantoprazole (PROTONIX) tablet 40 mg  40 mg Oral ACB&D    apixaban (ELIQUIS) tablet 5 mg  5 mg Oral BID    sertraline (ZOLOFT) tablet 25 mg  25 mg Oral DAILY    collagenase (SANTYL) 250 unit/gram ointment   Topical DAILY    diphenhydrAMINE (BENADRYL) injection 12.5 mg  12.5 mg IntraVENous Q6H PRN    sodium chloride (NS) flush 5-40 mL  5-40 mL IntraVENous Q8H    sodium chloride (NS) flush 5-40 mL  5-40 mL IntraVENous PRN    acetaminophen (TYLENOL) tablet 650 mg  650 mg Oral Q6H PRN    polyethylene glycol (MIRALAX) packet 17 g  17 g Oral DAILY PRN    ondansetron (ZOFRAN ODT) tablet 4 mg  4 mg Oral Q8H PRN    Or    ondansetron (ZOFRAN) injection 4 mg  4 mg IntraVENous Q6H PRN L.acidophilus-paracasei-S.thermophil-bifidobacter (RISAQUAD) 8 billion cell capsule  1 Capsule Oral DAILY     ______________________________________________________________________  EXPECTED LENGTH OF STAY: 9d 14h  ACTUAL LENGTH OF STAY:          30                 Everardo Mejia MD

## 2022-10-11 LAB
ALBUMIN SERPL-MCNC: 1.8 G/DL (ref 3.5–5)
ANION GAP SERPL CALC-SCNC: 8 MMOL/L (ref 5–15)
BUN SERPL-MCNC: 45 MG/DL (ref 6–20)
BUN/CREAT SERPL: 13 (ref 12–20)
CALCIUM SERPL-MCNC: 8.1 MG/DL (ref 8.5–10.1)
CHLORIDE SERPL-SCNC: 111 MMOL/L (ref 97–108)
CO2 SERPL-SCNC: 23 MMOL/L (ref 21–32)
CREAT SERPL-MCNC: 3.47 MG/DL (ref 0.7–1.3)
GLUCOSE SERPL-MCNC: 82 MG/DL (ref 65–100)
PHOSPHATE SERPL-MCNC: 6.1 MG/DL (ref 2.6–4.7)
POTASSIUM SERPL-SCNC: 4.1 MMOL/L (ref 3.5–5.1)
SODIUM SERPL-SCNC: 142 MMOL/L (ref 136–145)

## 2022-10-11 PROCEDURE — 97535 SELF CARE MNGMENT TRAINING: CPT

## 2022-10-11 PROCEDURE — 74011250636 HC RX REV CODE- 250/636: Performed by: INTERNAL MEDICINE

## 2022-10-11 PROCEDURE — P9047 ALBUMIN (HUMAN), 25%, 50ML: HCPCS | Performed by: INTERNAL MEDICINE

## 2022-10-11 PROCEDURE — 97530 THERAPEUTIC ACTIVITIES: CPT

## 2022-10-11 PROCEDURE — 65270000029 HC RM PRIVATE

## 2022-10-11 PROCEDURE — 77030018717 HC DRSG GRNUFM KCON -B

## 2022-10-11 PROCEDURE — 74011250636 HC RX REV CODE- 250/636: Performed by: COLON & RECTAL SURGERY

## 2022-10-11 PROCEDURE — 74011250637 HC RX REV CODE- 250/637: Performed by: STUDENT IN AN ORGANIZED HEALTH CARE EDUCATION/TRAINING PROGRAM

## 2022-10-11 PROCEDURE — 74011250637 HC RX REV CODE- 250/637: Performed by: COLON & RECTAL SURGERY

## 2022-10-11 PROCEDURE — 80069 RENAL FUNCTION PANEL: CPT

## 2022-10-11 PROCEDURE — 74011250636 HC RX REV CODE- 250/636: Performed by: NURSE PRACTITIONER

## 2022-10-11 PROCEDURE — 77030037877 HC DRSG MEPILEX >48IN BORD MOLN -A

## 2022-10-11 PROCEDURE — 74011000250 HC RX REV CODE- 250: Performed by: COLON & RECTAL SURGERY

## 2022-10-11 PROCEDURE — 36415 COLL VENOUS BLD VENIPUNCTURE: CPT

## 2022-10-11 PROCEDURE — 74011250637 HC RX REV CODE- 250/637: Performed by: PHYSICIAN ASSISTANT

## 2022-10-11 PROCEDURE — 74011250637 HC RX REV CODE- 250/637: Performed by: INTERNAL MEDICINE

## 2022-10-11 PROCEDURE — 2709999900 HC NON-CHARGEABLE SUPPLY

## 2022-10-11 PROCEDURE — 74011000258 HC RX REV CODE- 258: Performed by: NURSE PRACTITIONER

## 2022-10-11 PROCEDURE — 97605 NEG PRS WND THER DME<=50SQCM: CPT

## 2022-10-11 RX ADMIN — GABAPENTIN 100 MG: 100 CAPSULE ORAL at 22:33

## 2022-10-11 RX ADMIN — APIXABAN 5 MG: 5 TABLET, FILM COATED ORAL at 08:12

## 2022-10-11 RX ADMIN — MIRTAZAPINE 7.5 MG: 15 TABLET, FILM COATED ORAL at 22:33

## 2022-10-11 RX ADMIN — SERTRALINE 25 MG: 50 TABLET, FILM COATED ORAL at 08:12

## 2022-10-11 RX ADMIN — ALBUMIN (HUMAN) 12.5 G: 0.25 INJECTION, SOLUTION INTRAVENOUS at 18:43

## 2022-10-11 RX ADMIN — GABAPENTIN 100 MG: 100 CAPSULE ORAL at 16:05

## 2022-10-11 RX ADMIN — Medication 4 MG: at 13:33

## 2022-10-11 RX ADMIN — COLLAGENASE SANTYL: 250 OINTMENT TOPICAL at 08:13

## 2022-10-11 RX ADMIN — APIXABAN 5 MG: 5 TABLET, FILM COATED ORAL at 22:33

## 2022-10-11 RX ADMIN — FERROUS SULFATE TAB 325 MG (65 MG ELEMENTAL FE) 325 MG: 325 (65 FE) TAB at 07:23

## 2022-10-11 RX ADMIN — SODIUM CHLORIDE, PRESERVATIVE FREE 10 ML: 5 INJECTION INTRAVENOUS at 22:35

## 2022-10-11 RX ADMIN — ALBUMIN (HUMAN) 12.5 G: 0.25 INJECTION, SOLUTION INTRAVENOUS at 19:15

## 2022-10-11 RX ADMIN — PANTOPRAZOLE SODIUM 40 MG: 40 TABLET, DELAYED RELEASE ORAL at 07:23

## 2022-10-11 RX ADMIN — MEROPENEM 500 MG: 500 INJECTION, POWDER, FOR SOLUTION INTRAVENOUS at 08:13

## 2022-10-11 RX ADMIN — Medication 4 MG: at 03:40

## 2022-10-11 RX ADMIN — DIPHENHYDRAMINE HYDROCHLORIDE 12.5 MG: 50 INJECTION, SOLUTION INTRAMUSCULAR; INTRAVENOUS at 22:34

## 2022-10-11 RX ADMIN — Medication 4 MG: at 09:13

## 2022-10-11 RX ADMIN — Medication 1 CAPSULE: at 08:12

## 2022-10-11 RX ADMIN — HEPARIN SODIUM 1700 UNITS: 1000 INJECTION INTRAVENOUS; SUBCUTANEOUS at 21:09

## 2022-10-11 RX ADMIN — GABAPENTIN 100 MG: 100 CAPSULE ORAL at 08:12

## 2022-10-11 RX ADMIN — HEPARIN SODIUM 1800 UNITS: 1000 INJECTION INTRAVENOUS; SUBCUTANEOUS at 21:10

## 2022-10-11 RX ADMIN — FERROUS SULFATE TAB 325 MG (65 MG ELEMENTAL FE) 325 MG: 325 (65 FE) TAB at 16:05

## 2022-10-11 RX ADMIN — PANTOPRAZOLE SODIUM 40 MG: 40 TABLET, DELAYED RELEASE ORAL at 16:05

## 2022-10-11 RX ADMIN — Medication 4 MG: at 22:55

## 2022-10-11 NOTE — PROCEDURES
[facility]                      [phone]  Vitals Pre Post Assessment Pre Post   BP BP: (!) 97/44 (10/11/22 1900)   163/60 LOC AOX4 AOX4   HR Pulse (Heart Rate): 60 (10/11/22 1900) 63 Lungs CTA, denies SOB CTA   Resp Resp Rate: 18 (10/11/22 1146) 18 Cardiac NRR NRR   Temp Temp: 99 °F (37.2 °C) (10/11/22 1820) 98.8 Skin WDI WDI   Weight 67.8 kg 66.8 kg Edema NO NO   Pain Pain Intensity 1: 7 (10/11/22 1333) 3 Tele Status Telemonitor elemonitor     Orders   Duration: Start: 1023 End: 2120 Total: 3   Dialyzer: Dialyzer/Set Up Inspection: Mila Segovia (10/11/22 1820)   K Bath: Dialysate K (mEq/L): 2 (10/11/22 1820)   Ca Bath: Dialysate CA (mEq/L): 2.5 (10/11/22 1820)   Na: Dialysate NA (mEq/L): 140 (10/11/22 1820)   Bicarb:  35   Target Fluid Removal: Goal/Amount of Fluid to Remove (mL): 1500 mL (10/08/22 1919)     Access   Type & Location: Right Tunneled CVC   Comments:Access Dressing is clean and intact, no evidence of used and dated. ( 10/6/2022)  Each catheter limb disinfected per p&p, caps removed, hubs disinfected per p&p. Both lumen flushes with mild resistance in both lumen.  Running @ 320-350 BFR                         Labs   HBsAg (Antigen) / date:                 Negative (9/12/2022)             HBsAb (Antibody) / date:                 Susceptible(9/12/2022)   Source: epic   Obtained/Reviewed  Critical Results Called HGB   Date Value Ref Range Status   10/10/2022 8.5 (L) 12.1 - 17.0 g/dL Final     Potassium   Date Value Ref Range Status   10/11/2022 4.1 3.5 - 5.1 mmol/L Final     Calcium   Date Value Ref Range Status   10/11/2022 8.1 (L) 8.5 - 10.1 MG/DL Final     BUN   Date Value Ref Range Status   10/11/2022 45 (H) 6 - 20 MG/DL Final     Creatinine   Date Value Ref Range Status   10/11/2022 3.47 (H) 0.70 - 1.30 MG/DL Final     Comment:     INVESTIGATED PER DELTA CHECK PROTOCOL        Meds Given   Name Dose Route   Albumin 12.5g IVD   Heparin 1.7arterial  1.8 venous Catheter dwell Adequacy / Fluid    Total Liters Process:                 53  Liters   Net Fluid Removed:                1000    ml      Comments   Time Out Done: 2210   Admitting Diagnosis: JEMIMA; Intra-abdominal infection, perforated bowel   Consent obtained/signed: Informed Consent Verified: Yes (10/11/22 1820)   Machine / RO # Machine Number: [id number] (10/11/22 2991)   Primary Nurse Rpt Pre: Johnnie Berry RN   Primary Nurse Rpt Post: Johnnie Berry RN   Pt Education: HD procedure   Care Plan: HD as ordered   Pts outpatient clinic: N/A     Tx Summary   Comments:   7716: Time out done, order parameters checked, Dialysis related medications and consent have been reviewed. Treatment started with slow flow, gradually increased to ordered BF. Monitored closely. Lines visible and secured at all time. Arterial insufficiency noted, lines reversed. Extracorporeal lines flushed with 100CC NS to monitor for clots. Patients vitally stable. 1835: Blood pressure dropped to 94/47. Albumin 12.5 g IV administered  2120:Treatment completed, all blood returned. Each dialysis catheter limb disinfected per p&p,  post dialysis catheter dwell instilled per order, and caps applied. Dressing kept intact and dated. Treatment tolerated well with albumin support  Comfort and care provided, needs attended, questions answered. Bed to lowest position.   Reports given to Garcia Aguilar

## 2022-10-11 NOTE — WOUND CARE
Wound/Ostomy Visit     Follow up visit with Otto Desir NP. Application of NPWT to left abdominal wound. Surgery: Colostomy  Date of Surgery: 9.10.22      Surgeon: Dr. Rina Lui Location: left quadrant  Pouch in place     Left abdomen, open surgical wound/ former J tube site:  3.5 x 6.8 x 2 cm; 4.5 tunnel at 3 o'clock; undermining 2.5 cm 12-7 o'clock;  85% red 15% tan (around undermining); improved induration to wilbur wound; irrigated with VASHE; applied Santyl; packed with VASHE moistened packing; initiated NPWT at 125 mmHg using one continuous gauze and 1 black foam.            Right abdominal, adjacent to former trocar site with ulceration:  0.8 x 0.8 x 0.1 cm; 50% red 50% yellow; no odor or erythema. Left colostomy   Stoma flush and  in deep crevice with erythema to wilbur stoma skin  Pouched using 2 piece pouch with raised ring and strip barrier paste. Sacrum, Pressure Injury Unstageable:  4 x 3.5 x 3 cm; 100% yellow. Small serous exudate; no odor; tender to touch; wilbur wound blanching red erythema. Left buttock, Pressure Injury Stage 3:  2 x 2 x 0.1 cm; 100% red; scant serous exudate; no odor; tender to touch; wilbur wound blanching pink erythema. Resurfaced pink to left and right buttocks. POA Left BKA site, 2 sites with bridge of intact skin: 7 x 2 x 1 cm and 3 x 8 x 5 cm; 25% yellow/brown 75% red; The depth is on the lateral side of the incision. Periwound has a 3.2 x 1.6 x 0 cm 100% dry black/brown area of eschar and a 1 x 1.5 x 0.1 cm 100% tan/brown devitatized area. Cleansed with VASHE; applied Santyl filled depth with VASHE moistened gauze and covered rest of incision with VASHE moistened gauze, dry gauze and ABD pad; covered with roll gauze and ace wrap. Recommendations:   Left abdominal wound:    VAC (NPWT) Dressing Orders:  VAC Settings: 125 mmHg continuous/low suction   Dressing change twice weekly by WOCN.   Change canisters when full and measure output q shift. (located  or [facility] supply room). For sudden development of blood or an increased amount of huy bleedin.  Immediately turn off VAC system. 2.  Leave dressing in place. 3.  Hold pressure over wound. 4.  Call physician. If vacuum fails to maintain seal or unable to manage alarm for clogged tubing for >2hrs:              1.  Contact Physician               2.  Cleanse wound with normal saline. 3.  Saline moistened fluff gauze to base. 4.  Cover with dry dressing. 5.  Secure with transparent film. 6.  Change q 8 hours and as needed. 7.  Notify Physician and WOCN that wound VAC dressing needs to be reapplied. If patient is discharged from our facility:              1.  Remove all of equipment and sponge. 2.  Place moist dressing. 3.  Put VAC system and power cord in clear bag in utility room. (no red bags)              4. Please call [employer] to  system and stop billing @ [phone]  For procedures or when pt is off the floor:               Battery backup on our current inpatient systems lasts for 4 hours and may be                   used to prevent interruption of treatment- VAC should NOT be turned off. If disconnecting for more than 2 hours, with discharge, or a pt is admitted with a VAC:  Discontinue the therapy/ begin wound care orders above, return any outpatient equipment to family or transferring facility, and notify the [address] [address] [place] [address] Nurse and ordering practitioner. Sacrum and buttocks:  Daily cleanse with saline; apply Santyl and saline moist gauze; cover with dry dressing. Right upper back wounds:  Daily cleanse with saline; apply Santyl; cover with dry dressing. Left BKA site:  Daily cleanse with saline; apply Santyl; pack dehisced area with VASHE moistened roll gauze; cover with Vashe moist gauze and dry dressing.   5.   Empty appliance when 1/3 full and PRN. Encourage patient/family to notify nurse and  assist w/ pouch emptying to promote self-care. Change appliance twice weekly and PRN for leaking ASAP. DO NOT REINFORCE LEAKS to avoid skin breakdown. Transition of Care: Will follow routinely for VAC changes Tues and Friday. Discharge disposition plan is to go to [facility].      ARNOLDO VannN DOMINGA [facility]  Available on Perfect [employer]  Office [phone]

## 2022-10-11 NOTE — PROGRESS NOTES
Nephrology Progress Note  Krystyna Patel  Date of Admission : 9/9/2022    CC:  Follow up for JEMIMA       Assessment and Plan     JEMIMA on HD: oliguria  - 2/2 ATN  - HD Schedule: TTS in hospital   - First dialysis 9/12/22  - Outpatient unit: [facility] MWF chair per CM notes  - Access: permacath placed on 9/16  - HD TTS for now     Anemia:  - MICHAEL increased (10/6) 20 k TTS  - goal is for Hgb 10-11  - on oral iron  - check iron studies last completed 9/10    PAD s/p b/l BKA    Recent sepsis  - on Merrem 500 mg q 24 hrs per ID recommends 2 more weeks until 10/14    ABD wall abscess:  - J-tube removal, colostomy, I&D of abd wound  - TPN/ lipids per primary team, encouraging increase oral intake  - s/p I&D of abdominal abscess 9/29  - 10/5 Wound vac placed    Empyema  Sacral decub  Type 2DM  Afib       Interval History:  Seen and examined. awaiting placement     Current Medications: all current  Medications have been eviewed in EPIC  Review of Systems: A comprehensive review of systems was negative except for that written in the HPI. Objective:  Vitals:    Vitals:    10/11/22 0417 10/11/22 0600 10/11/22 0730 10/11/22 1000   BP: 113/65  (!) 117/55    Pulse: 60 (!) 57 (!) 57 (!) 58   Resp: 18  20    Temp: 98.3 °F (36.8 °C)  98.1 °F (36.7 °C)    TempSrc:       SpO2: 97%  96%    Weight:       Height:         Intake and Output:  10/11 0701 - 10/11 1900  In: -   Out: 50   10/09 1901 - 10/11 0700  In: 200 [P.O.:200]  Out: 500 [Urine:100]    Physical Examination:  General: NAD oriented to person/place today  Neck:  Supple, no mass  Resp:  Lungs CTA B/L, no wheezing , normal respiratory effort  CV:  RRR,  no murmur or rub, no stump edema  GI:  Soft, NT, + Bowel sounds, + ostomy  Access:           [facility] PC    Lab Data Personally Reviewed: I have reviewed all the pertinent labs, microbiology data and radiology studies during assessment.     Recent Labs     10/11/22  0336 10/10/22  0234 10/09/22  0241    141 140   K 4.1 4.4 3.4*   * 109* 104   CO2 23 27 29   GLU 82 88 94   BUN 45* 32* 22*   CREA 3.47* 2.49* 1.50*   CA 8.1* 7.9* 7.8*   MG  --  2.0  --    PHOS 6.1* 5.0*  --    ALB 1.8* 1.9*  --        Recent Labs     10/10/22  0234   WBC 9.0   HGB 8.5*   HCT 27.8*   *       No results found for: SDES  Lab Results   Component Value Date/Time    Culture result: (A) 09/29/2022 09:21 PM     MODERATE PROTEUS MIRABILIS REFER TO [id number] FOR SENSITIVITIES    Culture result: MODERATE PSEUDOMONAS AERUGINOSA (A) 09/29/2022 09:21 PM    Culture result: NO ANAEROBES ISOLATED 09/29/2022 09:21 PM     Recent Results (from the past 24 hour(s))   RENAL FUNCTION PANEL    Collection Time: 10/11/22  3:36 AM   Result Value Ref Range    Sodium 142 136 - 145 mmol/L    Potassium 4.1 3.5 - 5.1 mmol/L    Chloride 111 (H) 97 - 108 mmol/L    CO2 23 21 - 32 mmol/L    Anion gap 8 5 - 15 mmol/L    Glucose 82 65 - 100 mg/dL    BUN 45 (H) 6 - 20 MG/DL    Creatinine 3.47 (H) 0.70 - 1.30 MG/DL    BUN/Creatinine ratio 13 12 - 20      eGFR 20 (L) >60 ml/min/1.73m2    Calcium 8.1 (L) 8.5 - 10.1 MG/DL    Phosphorus 6.1 (H) 2.6 - 4.7 MG/DL    Albumin 1.8 (L) 3.5 - 5.0 g/dL                 Sulma Gordon MD  [facility]   [id number] [place] [address]  Phone - [phone]   Fax - [phone]  [email or web address]

## 2022-10-11 NOTE — PROGRESS NOTES
General Surgery Daily Progress Note    Admit Date: 2022  Post-Operative Day: 12 Days Post-Op from Procedure(s):  ABDOMINAL Wall DEBRIDEMENT     Subjective:     Last 24 hrs: wound care observed; pt having some discomfort       Objective:     Blood pressure (!) 139/58, pulse 68, temperature 98.9 °F (37.2 °C), resp. rate 18, height 5' 7\" (1.702 m), weight 149 lb 7.6 oz (67.8 kg), SpO2 99 %. Temp (24hrs), Av.5 °F (36.9 °C), Min:98.1 °F (36.7 °C), Max:98.9 °F (37.2 °C)      _____________________  Physical Exam:     Alert and Oriented, x3, in pain  Cardiovascular: RRR, no peripheral edema  Abdomen: J tube site w/ wound w/ pink tissue; tracks 2-3cm at top; wound vac applied    Assessment:   Principal Problem:    Intra-abdominal infection (9/10/2022)    Active Problems:    Severe protein-calorie malnutrition ([facility] [address] (2022)      Injury to rectum without open wound into cavity (2022)      Open wound of anus (2022)            Plan:     Next wound vac change Friday  Cont abx  D/c dispo: [facility]    Data Review:    Recent Labs     10/10/22  0234   WBC 9.0   HGB 8.5*   HCT 27.8*   *     Recent Labs     10/11/22  0336 10/10/22  0234 10/09/22  0241    141 140   K 4.1 4.4 3.4*   * 109* 104   CO2 23 27 29   GLU 82 88 94   BUN 45* 32* 22*   CREA 3.47* 2.49* 1.50*   CA 8.1* 7.9* 7.8*   MG  --  2.0  --    PHOS 6.1* 5.0*  --    ALB 1.8* 1.9*  --      No results for input(s): AML, LPSE in the last 72 hours.         ______________________  Medications:    Current Facility-Administered Medications   Medication Dose Route Frequency    HYDROmorphone (DILAUDID) 1 mg/mL oral solution 2-4 mg  2-4 mg Oral Q4H PRN    alteplase (CATHFLO) 1 mg in sterile water (preservative free) 1 mL injection  1 mg InterCATHeter PRN    epoetin vera-epbx (RETACRIT) injection 20,000 Units  20,000 Units SubCUTAneous DIALYSIS TUE, THU & SAT    heparin (porcine) 1,000 unit/mL injection 1,800 Units  1,800 Units InterCATHeter DIALYSIS PRN    And    heparin (porcine) 1,000 unit/mL injection 1,700 Units  1,700 Units InterCATHeter DIALYSIS PRN    0.9% sodium chloride infusion 250 mL  250 mL IntraVENous PRN    albumin human 25% (BUMINATE) solution 50 g  50 g IntraVENous DIALYSIS PRN    meropenem (MERREM) 500 mg in 0.9% sodium chloride (MBP/ADV) 50 mL MBP  500 mg IntraVENous Q24H    0.9% sodium chloride infusion 250 mL  250 mL IntraVENous PRN    glucose chewable tablet 16 g  4 Tablet Oral PRN    glucagon (GLUCAGEN) injection 1 mg  1 mg IntraMUSCular PRN    dextrose 10 % infusion 0-250 mL  0-250 mL IntraVENous PRN    gabapentin (NEURONTIN) capsule 100 mg  100 mg Oral TID    ferrous sulfate tablet 325 mg  1 Tablet Oral BID WITH MEALS    hydrALAZINE (APRESOLINE) 20 mg/mL injection 10 mg  10 mg IntraVENous Q6H PRN    mirtazapine (REMERON) tablet 7.5 mg  7.5 mg Oral QHS    pantoprazole (PROTONIX) tablet 40 mg  40 mg Oral ACB&D    apixaban (ELIQUIS) tablet 5 mg  5 mg Oral BID    sertraline (ZOLOFT) tablet 25 mg  25 mg Oral DAILY    collagenase (SANTYL) 250 unit/gram ointment   Topical DAILY    diphenhydrAMINE (BENADRYL) injection 12.5 mg  12.5 mg IntraVENous Q6H PRN    sodium chloride (NS) flush 5-40 mL  5-40 mL IntraVENous Q8H    sodium chloride (NS) flush 5-40 mL  5-40 mL IntraVENous PRN    acetaminophen (TYLENOL) tablet 650 mg  650 mg Oral Q6H PRN    polyethylene glycol (MIRALAX) packet 17 g  17 g Oral DAILY PRN    ondansetron (ZOFRAN ODT) tablet 4 mg  4 mg Oral Q8H PRN    Or    ondansetron (ZOFRAN) injection 4 mg  4 mg IntraVENous Q6H PRN    L.acidophilus-paracasei-S.thermophil-bifidobacter (RISAQUAD) 8 billion cell capsule  1 Capsule Oral DAILY       Kareem Campbell, NAN  10/11/2022

## 2022-10-11 NOTE — PROGRESS NOTES
[facility] Adult  Hospitalist Group                                                                                          Hospitalist Progress Note  Dolly Cameron MD  Answering service: [phone] from in house phone        Date of Service:  10/11/2022  NAME:  Jah Henning  :  1970  MRN:  [id number]      Admission Summary: This is a 79-year-old man with a PMH of T2DM, BKA bilaterally, JEMIMA on CKD requiring HD, Respiratory Failure requiring intubation who presented at the ED from [address] [place] [address] with c/o abdominal pain. The patient was recently admitted to another hospital and underwent left below-knee amputation, hospitalization complicated with respiratory failure which required prolonged intubation- attributed to aspiration pneumonia, also developed lobulated effusion, for which the patient underwent decortication and right thoracotomy and acute renal failure for which he has been started on hemodialysis. He had a J-tube was placed for supplemental nutrition and was transferred to [address] [place] [address] for continuation of care. He was doing relatively well in [address] [place] [address] until the day of presentation at the ED when the patient complained of abdominal pain at the facility. CT scan of the abdomen and pelvis was obtained: shows evidence of bowel perforation, sent to [facility] fro further eval/tx. When the patient arrived at the emergency room, based on his clinical presentation and lab work, Code Sepsis was triggered. The patient received fluid therapy as per sepsis protocol. The emergency room physician consulted general surgeon on-call. The patient was seen by the surgeon and the patient is planned for immediate surgical intervention. No record of prior admission to this hospital.     Interval history / Subjective:   No acute overnight events  [facility] [place] [place] discussed with      Assessment & Plan:     Rajan  tube with surrounding peritonitis.   Free air and perirectal fistula. Patient was sent from [address] [place] [address] for intractable abdominal pain. Severe sepsis without septic shock due to intra-abdominal source of infection. .  -He was started on broad-spectrum empiric antibiotics on admission and underwent the following procedures:  -Gen Sx: 9/10 Lap take down and removal J-tube, Lap colostomy, I&D abdominal wall  -Colorectal Sx: 9/15 anorectal wound exam including rigid proctosigmoidoscopy  -ID were consulted and assisted with antibiotics management  Eraxis/Zosyn/Vancomycin completed 9/23/22, then PO Augmentin x4d and switched back to IV d/t persistent leukocytosis  Cont IV Merrem x2 weeks, last dose 10/13  Haley catheter in place  -C. difficile, blood culture negative  -CT chest 9/10: Small right basilar gas and fluid containing pleural collection with a  peripheral soft tissue rind. Finding may represent an empyema and clinical correlation is recommended. Recommend direct comparison to any additional prior imaging. Free intraperitoneal gas, seen on prior CT of the abdomen and pelvis. -CT abd/pel wo 9/14:No focal intra-abdominal fluid collection to suggest abscess. Free intraperitoneal gas consistent with recent intra-abdominal surgery. Interval improvement in rectal thickening. Prior CT dated September 9, 2022 demonstrated a probable right posterior rectal wall defect which is no longer visualized on today's examination. Persistent, small right basilar gas and fluid containing pleural collection, unchanged.  -wound vac, ostomy care  -deescalate IV dilaudid to PO liquid, wean as able    JEMIMA vs CKD requiring dialysis: CVC 9/16/22- IR  -Continued on hemodialysis on MWF schedule  -Access, permacath placed on 9/16  -0/05 Outpatient unit: [facility] MWF , TTS HD Currently, on [facility]     H/o Type 2 diabetes mellitus  S/p TPN  SSI not requiring, dc'd     AFIB: rate stable  -eliquis 5mg BID  -ECHO 9/26: Left Ventricle: The EF by visual approximation is 55 - 60%.  Left ventricle size is normal. Normal wall thickness. Normal wall motion. Normal diastolic function. Tricuspid Valve: Mildly elevated RVSP. The estimated RVSP is 42 mmHg     Empyema: noted: recent thoracotomy and prolonged intubation at [facility]. -CXR: Persistent right basilar airspace disease and right-sided loculated effusion/empyema. -CT chest 9/10: Small right basilar gas and fluid containing pleural collection with a  peripheral soft tissue rind. Finding may represent an empyema and clinical correlation is recommended. Recommend direct comparison to any additional prior imaging. Free intraperitoneal gas, seen on prior CT of the abdomen and pelvis. -Pulmonary consulted: noted \"percutaneous drainage would not be successful at removing fluid as I suppose it is very thick and organized at this time. Would only recommend following clinically and radiographically. If worsens or worsened right-sided effusion he would need to be reevaluated by his thoracic surgeon at [facility] for additional drainage. \"  -IV ABT's course as above     Acute blood loss anemia on chronic disease: stable  --on MICHAEL per Nephr    +Occult Blood: no melena, no n/v  -PPI, iron supplements  -monitor Hgb, stable     Delirium, hallucinations ?  2/2 to toxic metabolic encephalopathy, hospital delirium, and opioid induced - resolved  -CT head : no acute intracranial abnormality  -Alert and oriented since      Depression: continue zoloft    Thrombocytosis: likely reactive thrombocytosis, monitor    Epigastric J site wound with surrounding abscess, POA  --Patient underwent incision and drainage on 9/10    Bilateral BKA, left BKA dehiscence and possible infection  -Vascular Sx followin/26-sutures removed and some eschar along incision line excised, open cavity laterally has old hematoma at base that was evacuated   -CT of the left BKA stump on  showed several irregular soft tissue defects overlying the amputation site with a 4.4 x 4.1 x 1.9 cm amorphous fluid collection with partial thick walled/rim-enhancing and internal gas locules. 9/29- Leg wound growing Pseudomonas  Vascular following- cont wound care  continue antibiotics as above    Anorectal wound, POA  --Followed by colorectal surgery. He is status post proctosigmoidoscopy on 9/15, there was communication of the distal rectal lumen with extraperitoneal pelvis. Acute pain syndrome. This is due to the multiple surgeries he had. Anticipate some degree of tolerance due to his continued exposure to opioids  --Current pain regimen includes scheduled gabapentin 100 mg 3 times daily  -- As needed Dilaudid- wean as able    Hypernatremia/hyperchloremia, improved s/p D5W  Monitor    Hypokalemia, repleted  Mag wnl      DVT ppx: Eliquis  Code Status: Full Code  Ambulates:  nonambulatory  Discharge planning: [facility] pending [facility] Problems  Date Reviewed: 9/10/2022            Codes Class Noted POA    Injury to rectum without open wound into cavity ICD-10-CM: S36.60XA  ICD-9-CM: 863.45  9/20/2022 Yes        Open wound of anus ICD-10-CM: A32.748N  ICD-9-CM: 863.89  9/20/2022 Yes        Severe protein-calorie malnutrition ([facility] [address] ICD-10-CM: E43  ICD-9-CM: 057  9/13/2022 Yes        * (Principal) Intra-abdominal infection ICD-10-CM: B99.9  ICD-9-CM: 136.9  9/10/2022 Yes       Review of Systems:   A comprehensive review of systems was negative except for that written in the HPI. Vital Signs:    Last 24hrs VS reviewed since prior progress note.  Most recent are:  Visit Vitals  BP (!) 139/58   Pulse 67   Temp 98.9 °F (37.2 °C)   Resp 18   Ht 5' 7\" (1.702 m)   Wt 67.8 kg (149 lb 7.6 oz)   SpO2 99%   BMI 23.41 kg/m²         Intake/Output Summary (Last 24 hours) at 10/11/2022 1502  Last data filed at 10/11/2022 1217  Gross per 24 hour   Intake 200 ml   Output 500 ml   Net -300 ml        Physical Examination:     I had a face to face encounter with this patient and independently examined them on 10/11/2022 as outlined below:          Constitutional: Chronically ill-appearing, alert, NAD but nauseated   Resp:  CTA bilaterally. No accessory muscle use   CV:  RRR, no audible m/r/g    GI/: Abd wound vac in place, ostomy, nondistended    Musculoskeletal:  No edema, warm, bilateral BKA    Neurologic:  Skin:  Psych:  Moves all extremities. Awake and alert   multiple wounds, skin w/d, no jaundice   Calm, Not anxious or agitated            Data Review:    Review and/or order of clinical lab test      Labs:     Recent Labs     10/10/22  0234   WBC 9.0   HGB 8.5*   HCT 27.8*   *       Recent Labs     10/11/22  0336 10/10/22  0234 10/09/22  0241    141 140   K 4.1 4.4 3.4*   * 109* 104   CO2 23 27 29   BUN 45* 32* 22*   CREA 3.47* 2.49* 1.50*   GLU 82 88 94   CA 8.1* 7.9* 7.8*   MG  --  2.0  --    PHOS 6.1* 5.0*  --        Recent Labs     10/11/22  0336 10/10/22  0234   ALB 1.8* 1.9*       No results for input(s): INR, PTP, APTT, INREXT, INREXT in the last 72 hours. No results for input(s): FE, TIBC, PSAT, FERR in the last 72 hours. Lab Results   Component Value Date/Time    Folate 4.1 (L) 09/10/2022 12:00 PM        No results for input(s): PH, PCO2, PO2 in the last 72 hours. No results for input(s): CPK, CKNDX, TROIQ in the last 72 hours.     No lab exists for component: CPKMB  Lab Results   Component Value Date/Time    Triglyceride 96 10/06/2022 04:14 AM     Lab Results   Component Value Date/Time    Glucose (POC) 95 10/06/2022 11:14 AM    Glucose (POC) 107 10/06/2022 12:35 AM    Glucose (POC) 99 10/05/2022 11:27 AM    Glucose (POC) 99 10/05/2022 07:02 AM    Glucose (POC) 86 10/04/2022 11:43 PM     No results found for: COLOR, APPRN, SPGRU, REFSG, WILL, PROTU, GLUCU, KETU, BILU, UROU, MISHA, LEUKU, GLUKE, EPSU, BACTU, WBCU, RBCU, CASTS, UCRY      Medications Reviewed:     Current Facility-Administered Medications   Medication Dose Route Frequency HYDROmorphone (DILAUDID) 1 mg/mL oral solution 2-4 mg  2-4 mg Oral Q4H PRN    alteplase (CATHFLO) 1 mg in sterile water (preservative free) 1 mL injection  1 mg InterCATHeter PRN    epoetin vera-epbx (RETACRIT) injection 20,000 Units  20,000 Units SubCUTAneous DIALYSIS TUE, THU & SAT    heparin (porcine) 1,000 unit/mL injection 1,800 Units  1,800 Units InterCATHeter DIALYSIS PRN    And    heparin (porcine) 1,000 unit/mL injection 1,700 Units  1,700 Units InterCATHeter DIALYSIS PRN    0.9% sodium chloride infusion 250 mL  250 mL IntraVENous PRN    albumin human 25% (BUMINATE) solution 50 g  50 g IntraVENous DIALYSIS PRN    meropenem (MERREM) 500 mg in 0.9% sodium chloride (MBP/ADV) 50 mL MBP  500 mg IntraVENous Q24H    0.9% sodium chloride infusion 250 mL  250 mL IntraVENous PRN    glucose chewable tablet 16 g  4 Tablet Oral PRN    glucagon (GLUCAGEN) injection 1 mg  1 mg IntraMUSCular PRN    dextrose 10 % infusion 0-250 mL  0-250 mL IntraVENous PRN    gabapentin (NEURONTIN) capsule 100 mg  100 mg Oral TID    ferrous sulfate tablet 325 mg  1 Tablet Oral BID WITH MEALS    hydrALAZINE (APRESOLINE) 20 mg/mL injection 10 mg  10 mg IntraVENous Q6H PRN    mirtazapine (REMERON) tablet 7.5 mg  7.5 mg Oral QHS    pantoprazole (PROTONIX) tablet 40 mg  40 mg Oral ACB&D    apixaban (ELIQUIS) tablet 5 mg  5 mg Oral BID    sertraline (ZOLOFT) tablet 25 mg  25 mg Oral DAILY    collagenase (SANTYL) 250 unit/gram ointment   Topical DAILY    diphenhydrAMINE (BENADRYL) injection 12.5 mg  12.5 mg IntraVENous Q6H PRN    sodium chloride (NS) flush 5-40 mL  5-40 mL IntraVENous Q8H    sodium chloride (NS) flush 5-40 mL  5-40 mL IntraVENous PRN    acetaminophen (TYLENOL) tablet 650 mg  650 mg Oral Q6H PRN    polyethylene glycol (MIRALAX) packet 17 g  17 g Oral DAILY PRN    ondansetron (ZOFRAN ODT) tablet 4 mg  4 mg Oral Q8H PRN    Or    ondansetron (ZOFRAN) injection 4 mg  4 mg IntraVENous Q6H PRN L.acidophilus-paracasei-S.thermophil-bifidobacter (RISAQUAD) 8 billion cell capsule  1 Capsule Oral DAILY     ______________________________________________________________________  EXPECTED LENGTH OF STAY: 9d 14h  ACTUAL LENGTH OF STAY:          Ryan Nielsen MD

## 2022-10-11 NOTE — PROGRESS NOTES
Problem: Mobility Impaired (Adult and Pediatric)  Goal: *Acute Goals and Plan of Care (Insert Text)  Description: FUNCTIONAL STATUS PRIOR TO ADMISSION: The patient was functional at the wheelchair level and required minimal assistance for transfers to the chair. HOME SUPPORT PRIOR TO ADMISSION: The patient lived with wife, daughter and required up to moderate assistance  for tranfers bed <> w/c and ADLs. Patient has spent extensive period of time hospitalized recently and has been admitted from [facility]. Physical Therapy Goals    Upgraded 10/10/2022 - Weekly Re-assessment  1. Patient will move from supine to sit and sit to supine  in bed with min assist x 1 with use of bed rail within 7 day(s). 2.  Patient will transfer from bed to chair and chair to bed with min assist x 1 using the least restrictive device within 7 day(s). 3.  Patient will sit EOB x 30 minutes with intermittent UE support with standby guard only within 7 day(s). 4.  Patient will transfer sit to/from stand with mod assist x 2 from elevated bed surface and right leg prosthesis within 7 days. Goal is dependent on prosthetist being able to provide appropriate liner socks to ensure proper fit of prosthesis to begin weight bearing safely (monitor for any pressure areas). Upgraded 10/3/2022  1. Patient will roll left/right in bed for pressure relief mod I with use of bed rails within 7 day(s). GOAL MET 10/10/22  2. Patient will move from supine to sit and sit to supine  in bed with SBA within 7 day(s). 3.  Patient will transfer from bed to chair and chair to bed with moderate assistance  using the least restrictive device within 7 day(s). GOAL MET 10/10/22  4  Patient will sit EOB x 15 minutes without UE support with CGA-min A within 7 day(s). GOAL EXCEEDED 10/10/22      Weekly Reassessment 9/22/22 (goals continued)  Initiated 9/15/2022  1.   Patient will roll left/right in bed for pressure relief mod I with use of bed rails within 7 day(s). 2.  Patient will move from supine to sit and sit to supine  in bed with moderate assistance within 7 day(s). MET    3. Patient will transfer from bed to chair and chair to bed with moderate assistance  using the least restrictive device within 7 day(s). 4  Patient will sit EOB x 10 minutes without UE support with CGA-min A within 7 day(s). MET  Outcome: Progressing Towards Goal     PHYSICAL THERAPY TREATMENT  Patient: Karli Matthew (89 y.o. male)  Date: 10/11/2022  Diagnosis: Intra-abdominal infection [B99.9] Intra-abdominal infection  Procedure(s) (LRB):  ABDOMINAL Wall DEBRIDEMENT (Left) 12 Days Post-Op  Precautions: Fall, Other (comment) (bilateral BKA)  Chart, physical therapy assessment, plan of care and goals were reviewed. ASSESSMENT  Patient continues with skilled PT services and is progressing towards goals. Upon arrival pt sitting edge of bed performing ADL's. Pt agreeable to attempt standing on right BKA prosthesis. Pt required assist to ascencion prosthesis and achieved adequate fit for standing trials. Pt was able to clear buttocks using UE to pull up to stand with max assist x 2. Standing limited due to right BKA weakness - prolonged hospital stay. Pt agreeable and motivated to perform BKA exercise bilaterally in preparation for pre-gait activities - standing. Pt needs inpatient rehab to begin independent at w/c level, progress exercise and obtain appropriate DME while awaiting fitting of left BKA. Current Level of Function Impacting Discharge (mobility/balance): Sit to supine - mod assist x 1; Sit to stand max assist x 2 ; sitting edge of bed with standby guard    Other factors to consider for discharge:  pt motivation, prior level of function following right BKA, supportive family          PLAN :  Patient continues to benefit from skilled intervention to address the above impairments. Continue treatment per established plan of care. to address goals.     Recommendation for discharge: (in order for the patient to meet his/her long term goals)  Therapy 3 hours per day 5-7 days per week    This discharge recommendation:  Has been made in collaboration with the attending provider and/or case management    IF patient discharges home will need the following DME: n/a       SUBJECTIVE:   Patient stated I'll try standing up.     OBJECTIVE DATA SUMMARY:   Critical Behavior:  Neurologic State: Alert  Orientation Level: Oriented X4  Cognition: Follows commands  Safety/Judgement: Awareness of environment  Functional Mobility Training:  Bed Mobility:     Supine to Sit: Other (comment) (pt sitting edge of bed indep -)  Sit to Supine: Moderate assistance;Assist x1  Scooting: Contact guard assistance (scooting forward at edge of bed - sitting;moving up in bed indep using both arms bed flat)        Transfers:  Sit to Stand: Maximum assistance;Assist x2;Adaptive equipment (pulling up using bar on back of recliner; able to clear buttocks) ; right BKA prosthesis on  Stand to Sit: Maximum assistance;Assist x2        Bed to Chair: Other (comment) (pt declined secondary to pain from anorectal)                    Balance:  Sitting: Impaired  Sitting - Static: Good (unsupported)  Sitting - Dynamic: Fair (occasional)  Standing: Impaired; With support  Standing - Static: Fair;Poor;Constant support  Standing - Dynamic : Not tested        Therapeutic Exercises:   Pt given and reviewed handouts for sidelying hip/knee flexion/extension, hip abduction/adduction ; supine isometric quad set and hip adduction; supine active knee flexion/extension  and supine pelvic tilt. Pt instructed to perform - 5 - 10 reps as tolerated 3 times a day - increasing reps as able. Also in sitting - knee flexion and extension - can be performed sitting edge of bed with contact guard.     Pain Rating:  Anorectal pain - pt reports constant - 7 - 8/10 despite pain medicine - increasing with sitting edge of bed and during transition supine to/from sit. Activity Tolerance:   Improving - pt motivated to participate - limited by lack of appropriate DME in acute setting - appropriate w/c and pressure relieving cushion. After treatment patient left in no apparent distress:   Patient positioned in right sidelying for pressure relief; call bell within reach     COMMUNICATION/COLLABORATION:   The patients plan of care was discussed with: Occupational therapist, Registered nurse, and Case management.      Mauro Kocher, PT   Time Calculation: 30 mins

## 2022-10-11 NOTE — PROGRESS NOTES
Transition of Care: pending to [facility]y Castillo ([facility] [phone]); pending acceptance to [facility] and pending insurance auth (has not been started yet)        NOTE: patient also has OP HD set up for after rehab: Outpatient HD chair at [facility] chair time 3:15pm M/W/F schedule  Valentin Thompson ([address]  admissions  [phone]). [facility]- [phone]- contact- Sis Key              Transport- [facility] is on willcall; patient is bilateral BKA      RUR: 13%     Main contact is wife- Citlaly Montaño- [phone]     Discharge pending:  -patient continues on IV antibiotics until 10/13/22  -patient continues on dialysis  -patient continues with wound care  -patient continues with progress in PT/OT  -pending medical progress and care recommendations      [id number]: this CM called Catarina twice and spoke to her the second time; she states that she will \"working up \" this referral today and for this CM to fax over all the last clinicals including PT/OT, consults, wound care, nutrition, hospitalist; dialysis; all these clinicals faxed to [phone]; this CM also sent Treato message to [facility] with update    [phone]: this CM updated patient at bedside on discharge plan to [facility]; he verbalized understanding    [phone]: faxed the kardex to [facility]     CM following  Natalie Herrera RN            NOTE:     The patient has been hospitalized in the [place] area since the last week of June 2022. .  patient has bilateral BKA; This started at [facility]. The patient was transferred from [facility] to [place] [address] last week. Per the wife, \"He was only there 2-3 days and d/c'd to [address] [place] [address]. The patient is no longer employed and receives disability. He lives with his wife (Lindsay)-m)[phone] in a one story home. He was independent in ADL's (prior to June of this year).    Following his first orthopedic surgery (r) AKA he received a (r) prosthetic device and ambulated with a cane.    Pharmacy:  [facility]    primary: MALL[facility] policy # [id number]         Revision History                                                                                                                          Revision History

## 2022-10-11 NOTE — PROGRESS NOTES
Problem: Self Care Deficits Care Plan (Adult)  Goal: *Acute Goals and Plan of Care (Insert Text)  Description: FUNCTIONAL STATUS PRIOR TO ADMISSION: Prior to previous hospitalization, d/c to [facility], and current admission to [facility] pt was utilizing w/c for functional mobility and required minimal assistance for transfers. HOME SUPPORT: The patient lived with his wife and daughter and required assistance for all ADL/IADL tasks. Occupational Therapy Goals  Jony re-assessment 10/10/22- goals updated   1. Patient will perform wilbur area bathing at bed level with set up and supervision within 7 day(s). 2.  Patient will perform anterior neck to thigh bathing while with set up within 7 day(s). 3.  Patient will perform drop arm BSC via lateral transfers with moderate assistance within 7 day(s). 4.  Patient will perform all aspects of toileting with moderate assistance  within 7 day(s). 5.  Patient will participate in progressive upper extremity therapeutic exercise/activities with supervision/set-up for 10 minutes within 7 day(s). Initiated 9/15/2022, OT weekly re-assessment 9/22/22. Goals reviewed 10/10/22  1. Patient will perform basic grooming in unsupported sitting with minimal assistance/contact guard assist within 7 day(s). (Continue 9/22, 10/3). Met 10/10/22  2. Patient will perform anterior neck to thigh bathing while in unsupported sitting with minimal assistance/contact guard assist within 7 day(s). (Downgrade to mod A 9/22, continue 10/3). Met 10/10/22  3. Patient will perform drop arm BSC via lateral transfers with moderate assistance within 7 day(s). (Continue 9/22, 10/3). continue   4. Patient will perform all aspects of toileting with moderate assistance  within 7 day(s). (At bed level 9/22, 10/3). continue  5. Patient will participate in upper extremity therapeutic exercise/activities with supervision/set-up for 10 minutes within 7 day(s). (Continue 9/22, 10/3).  Ongoing- continue Outcome: Progressing Towards Goal     OCCUPATIONAL THERAPY TREATMENT  Patient: Darrin Mitchell (67 y.o. male)  Date: 10/11/2022  Diagnosis: Intra-abdominal infection [B99.9] Intra-abdominal infection  Procedure(s) (LRB):  ABDOMINAL Wall DEBRIDEMENT (Left) 12 Days Post-Op  Precautions: Fall, Other (comment) (bilateral BKA)  Chart, occupational therapy assessment, plan of care, and goals were reviewed. ASSESSMENT  Patient continues with skilled OT services and is progressing towards goals. Pt's performance of ADL/IADL tasks continues to be limited at this time by impaired balance (sitting and standing), activity tolerance (but improving), generalized weakness, and sacral pain. Pt received supine and agreeable to participation in therapy session. He demonstrated bed mobility with mod A and verbal cues. Seated simple grooming performed with set-up assistance. Seated LB dressing completed with max A (to don prosthesis and prosthetic sock) d/t impaired LB reach and dynamic sitting balance. Pt completed multiple standing trials from EOB with max A x2 and use of back of recliner for BUE support (to pull up). As pt with significant fatigue and sacral pain, pt returned to sidelying and positioned for comfort. HOB elevated and pt encouraged for continued active participation in and completion of ADL tasks throughout day/evening. Continue to recommend IPR at d/c as pt is motivated to progress towards independence in ADL/IADL tasks, is well below his true baseline, and is consistently demonstrating progress towards goals/tolerating >30 minutes of acute OT services.      Current Level of Function Impacting Discharge (ADLs): max A x2 seated LB dressing, set-up seated grooming tasks, mod A bed mobility and max A x2 sit<> prep for ADL tasks     Other factors to consider for discharge: PLOF, complex medical course, bilateral BKAs, motivated to progress towards independence          PLAN :  Patient continues to benefit from skilled intervention to address the above impairments. Continue treatment per established plan of care to address goals. Recommendation for discharge: (in order for the patient to meet his/her long term goals)  Therapy 3 hours per day 5-7 days per week    This discharge recommendation:  Has been made in collaboration with the attending provider and/or case management    IF patient discharges home will need the following DME: TBD       SUBJECTIVE:   Patient stated I'd like to try to stand up today and see what I can do.     OBJECTIVE DATA SUMMARY:   Cognitive/Behavioral Status:  Neurologic State: Alert  Orientation Level: Oriented X4  Cognition: Follows commands  Perception: Appears intact  Perseveration: No perseveration noted  Safety/Judgement: Awareness of environment    Functional Mobility and Transfers for ADLs:  Bed Mobility:  Supine to Sit: Moderate assistance;Assist x1;Additional time;Bed Modified  Sit to Supine: Moderate assistance;Assist x1  Scooting: Contact guard assistance (at EOB)    Transfers:  Sit to Stand: Maximum assistance;Assist x2          Balance:  Sitting: Impaired  Sitting - Static: Good (unsupported)  Sitting - Dynamic: Fair (occasional)  Standing: Impaired; With support  Standing - Static: Fair;Poor;Constant support  Standing - Dynamic : Not tested    ADL Intervention:       Grooming  Grooming Assistance: Set-up  Position Performed: Seated edge of bed  Brushing Teeth: Set-up         Type of Bath: Chlorhexidine (CHG)              Lower Body Dressing Assistance  Dressing Assistance: Maximum assistance (to don prosthetic limb sock and prosthetic)  Position Performed: Seated edge of bed  Cues: Verbal cues provided;Physical assistance         Cognitive Retraining  Safety/Judgement: Awareness of environment      Pain:  Pt reporting pain at/near sacrum, RN notified     Activity Tolerance:   Good and Fair, steadily improving     After treatment patient left in no apparent distress:   Call bell within reach and Side rails x 3, pt positioned in sidelying to R    COMMUNICATION/COLLABORATION:   The patients plan of care was discussed with: Physical therapist, Registered nurse, and Case management.      ANSLEY Orona, OTR/L  Time Calculation: 38 mins

## 2022-10-12 PROCEDURE — 74011250637 HC RX REV CODE- 250/637: Performed by: INTERNAL MEDICINE

## 2022-10-12 PROCEDURE — 74011250637 HC RX REV CODE- 250/637: Performed by: COLON & RECTAL SURGERY

## 2022-10-12 PROCEDURE — 74011000258 HC RX REV CODE- 258: Performed by: NURSE PRACTITIONER

## 2022-10-12 PROCEDURE — 97535 SELF CARE MNGMENT TRAINING: CPT

## 2022-10-12 PROCEDURE — 65270000029 HC RM PRIVATE

## 2022-10-12 PROCEDURE — 74011250636 HC RX REV CODE- 250/636: Performed by: INTERNAL MEDICINE

## 2022-10-12 PROCEDURE — 74011000250 HC RX REV CODE- 250: Performed by: COLON & RECTAL SURGERY

## 2022-10-12 PROCEDURE — 97530 THERAPEUTIC ACTIVITIES: CPT

## 2022-10-12 PROCEDURE — 74011250636 HC RX REV CODE- 250/636: Performed by: NURSE PRACTITIONER

## 2022-10-12 PROCEDURE — 74011250636 HC RX REV CODE- 250/636: Performed by: COLON & RECTAL SURGERY

## 2022-10-12 PROCEDURE — 74011250637 HC RX REV CODE- 250/637: Performed by: PHYSICIAN ASSISTANT

## 2022-10-12 PROCEDURE — 74011250637 HC RX REV CODE- 250/637: Performed by: STUDENT IN AN ORGANIZED HEALTH CARE EDUCATION/TRAINING PROGRAM

## 2022-10-12 RX ADMIN — PANTOPRAZOLE SODIUM 40 MG: 40 TABLET, DELAYED RELEASE ORAL at 07:31

## 2022-10-12 RX ADMIN — GABAPENTIN 100 MG: 100 CAPSULE ORAL at 16:36

## 2022-10-12 RX ADMIN — PANTOPRAZOLE SODIUM 40 MG: 40 TABLET, DELAYED RELEASE ORAL at 16:36

## 2022-10-12 RX ADMIN — EPOETIN ALFA-EPBX 20000 UNITS: 10000 INJECTION, SOLUTION INTRAVENOUS; SUBCUTANEOUS at 03:02

## 2022-10-12 RX ADMIN — Medication 4 MG: at 03:02

## 2022-10-12 RX ADMIN — ONDANSETRON 4 MG: 2 INJECTION INTRAMUSCULAR; INTRAVENOUS at 08:16

## 2022-10-12 RX ADMIN — GABAPENTIN 100 MG: 100 CAPSULE ORAL at 22:04

## 2022-10-12 RX ADMIN — DIPHENHYDRAMINE HYDROCHLORIDE 12.5 MG: 50 INJECTION, SOLUTION INTRAMUSCULAR; INTRAVENOUS at 22:10

## 2022-10-12 RX ADMIN — COLLAGENASE SANTYL: 250 OINTMENT TOPICAL at 08:08

## 2022-10-12 RX ADMIN — SERTRALINE 25 MG: 50 TABLET, FILM COATED ORAL at 08:08

## 2022-10-12 RX ADMIN — ONDANSETRON 4 MG: 2 INJECTION INTRAMUSCULAR; INTRAVENOUS at 21:54

## 2022-10-12 RX ADMIN — Medication 1 CAPSULE: at 08:08

## 2022-10-12 RX ADMIN — SODIUM CHLORIDE, PRESERVATIVE FREE 10 ML: 5 INJECTION INTRAVENOUS at 22:05

## 2022-10-12 RX ADMIN — Medication 4 MG: at 16:39

## 2022-10-12 RX ADMIN — Medication 4 MG: at 22:04

## 2022-10-12 RX ADMIN — APIXABAN 5 MG: 5 TABLET, FILM COATED ORAL at 22:04

## 2022-10-12 RX ADMIN — MIRTAZAPINE 7.5 MG: 15 TABLET, FILM COATED ORAL at 22:04

## 2022-10-12 RX ADMIN — APIXABAN 5 MG: 5 TABLET, FILM COATED ORAL at 08:08

## 2022-10-12 RX ADMIN — FERROUS SULFATE TAB 325 MG (65 MG ELEMENTAL FE) 325 MG: 325 (65 FE) TAB at 07:31

## 2022-10-12 RX ADMIN — IRON SUCROSE 300 MG: 20 INJECTION, SOLUTION INTRAVENOUS at 14:02

## 2022-10-12 RX ADMIN — GABAPENTIN 100 MG: 100 CAPSULE ORAL at 08:08

## 2022-10-12 RX ADMIN — MEROPENEM 500 MG: 500 INJECTION, POWDER, FOR SOLUTION INTRAVENOUS at 09:28

## 2022-10-12 RX ADMIN — FERROUS SULFATE TAB 325 MG (65 MG ELEMENTAL FE) 325 MG: 325 (65 FE) TAB at 16:36

## 2022-10-12 RX ADMIN — Medication 4 MG: at 12:12

## 2022-10-12 NOTE — PROGRESS NOTES
Problem: Self Care Deficits Care Plan (Adult)  Goal: *Acute Goals and Plan of Care (Insert Text)  Description: FUNCTIONAL STATUS PRIOR TO ADMISSION: Prior to previous hospitalization, d/c to [facility], and current admission to [facility] pt was utilizing w/c for functional mobility and required minimal assistance for transfers. HOME SUPPORT: The patient lived with his wife and daughter and required assistance for all ADL/IADL tasks. Occupational Therapy Goals  Jony re-assessment 10/10/22- goals updated   1. Patient will perform wilbur area bathing at bed level with set up and supervision within 7 day(s). 2.  Patient will perform anterior neck to thigh bathing while with set up within 7 day(s). 3.  Patient will perform drop arm BSC via lateral transfers with moderate assistance within 7 day(s). 4.  Patient will perform all aspects of toileting with moderate assistance  within 7 day(s). 5.  Patient will participate in progressive upper extremity therapeutic exercise/activities with supervision/set-up for 10 minutes within 7 day(s). Initiated 9/15/2022, OT weekly re-assessment 9/22/22. Goals reviewed 10/10/22  1. Patient will perform basic grooming in unsupported sitting with minimal assistance/contact guard assist within 7 day(s). (Continue 9/22, 10/3). Met 10/10/22  2. Patient will perform anterior neck to thigh bathing while in unsupported sitting with minimal assistance/contact guard assist within 7 day(s). (Downgrade to mod A 9/22, continue 10/3). Met 10/10/22  3. Patient will perform drop arm BSC via lateral transfers with moderate assistance within 7 day(s). (Continue 9/22, 10/3). continue   4. Patient will perform all aspects of toileting with moderate assistance  within 7 day(s). (At bed level 9/22, 10/3). continue  5. Patient will participate in upper extremity therapeutic exercise/activities with supervision/set-up for 10 minutes within 7 day(s). (Continue 9/22, 10/3).  Ongoing- continue Outcome: Progressing Towards Goal  OCCUPATIONAL THERAPY TREATMENT  Patient: Milena Soni (89 y.o. male)  Date: 10/12/2022  Diagnosis: Intra-abdominal infection [B99.9] Intra-abdominal infection  Procedure(s) (LRB):  ABDOMINAL Wall DEBRIDEMENT (Left) 13 Days Post-Op  Precautions: Fall, Other (comment) (bilateral BKA)  Chart, occupational therapy assessment, plan of care, and goals were reviewed. ASSESSMENT  Patient continues with skilled OT services and is progressing towards goals. AAOx4, limited today by nausea. Overall he continues to be diminished in strength, endurance, and ability to perform functional mobility and self-care from baseline. Limited by abdominal pain with movement. Despite limitations he remains motivated to return to [facility] and work with therapy. Today he was able to complete general education on UE ROM and endurance exercises as well as some bed level positioning and ROM of BLEs. Based on observations he remains max A to mod A for dressing and bathing tasks, set/up to min A for grooming and feeding activities. Continue to strongly recommend IPR, pt is motivated and able to complete a variety of activities to tolerate 3 hours of therapy daily. Acute care OT will continue to follow. Current Level of Function Impacting Discharge (ADLs): Max to mod A    Other factors to consider for discharge: high PLOF, prolonged hospitalization, supportive family          PLAN :  Patient continues to benefit from skilled intervention to address the above impairments. Continue treatment per established plan of care to address goals.     Recommend with staff: encourage participation with self care as tolerated     Recommend next OT session: EOB    Recommendation for discharge: (in order for the patient to meet his/her long term goals)  Therapy 3 hours per day 5-7 days per week    This discharge recommendation:  Has been made in collaboration with the attending provider and/or case management    IF patient discharges home will need the following DME: TBD       SUBJECTIVE:   Patient stated I need to get better, I have a lot of life to live.     OBJECTIVE DATA SUMMARY:   Cognitive/Behavioral Status:  Neurologic State: Alert  Orientation Level: Oriented X4  Cognition: Appropriate decision making; Appropriate for age attention/concentration             Functional Mobility and Transfers for ADLs:  Bed Mobility:  Rolling: Contact guard assistance;Supervision    ADL Intervention:  Feeding  Feeding Assistance: Set-up    Grooming  Grooming Assistance: Set-up  Washing Face: Set-up    Discussion with pt on importance of AROm and movement in the bed. The patient verbalized understanding. We also discussed positioning in bed for comfort and care. Cognitive Retraining  Orientation Retraining: Situation    Therapeutic Exercises:   Worked on endurance based exercises of BUEs and BLEs due to therabands \"going missing\" often. Discussed importance of prolonged movement. Pt completed about 10 minutes of movement throughout session. AROM: R and LUE: shoulder FF against gravity, 20 reps per side. Instructed to compelte for comercial break or song during the day, 1-2x to work on strength/endurance  Cardio Vascular Endurance: BUE shadow boxing, discussed with patient, he is able to teach back but not tolerating full 1-2 min due to nausea   AROM: BLE: hip abduction and FF- 20 reps, discussion of high reps when working against gravity but without weights     Pain:  Abdominal, has pain medication but not taken due to nausea and fear of throwing up    Activity Tolerance:   Good    After treatment patient left in no apparent distress:   Supine in bed, Patient positioned in right sidelying for pressure relief, Call bell within reach, and Side rails x 3    COMMUNICATION/COLLABORATION:   The patients plan of care was discussed with: Physical therapist and Registered nurse.      Tomy Peraza  Time Calculation: 30 mins

## 2022-10-12 NOTE — PROGRESS NOTES
[facility] Adult  Hospitalist Group                                                                                          Hospitalist Progress Note  Frank Baker MD  Answering service: [phone] from in house phone        Date of Service:  10/12/2022  NAME:  Gracie Carmona  :  1970  MRN:  [id number]      Admission Summary: This is a 48-year-old man with a PMH of T2DM, BKA bilaterally, JEMIMA on CKD requiring HD, Respiratory Failure requiring intubation who presented at the ED from [facility] with c/o abdominal pain. The patient was recently admitted to another hospital and underwent left below-knee amputation, hospitalization complicated with respiratory failure which required prolonged intubation- attributed to aspiration pneumonia, also developed lobulated effusion, for which the patient underwent decortication and right thoracotomy and acute renal failure for which he has been started on hemodialysis. He had a J-tube was placed for supplemental nutrition and was transferred to [facility] for continuation of care. He was doing relatively well in [facility] until the day of presentation at the ED when the patient complained of abdominal pain at the facility. CT scan of the abdomen and pelvis was obtained: shows evidence of bowel perforation, sent to [facility] fro further eval/tx. When the patient arrived at the emergency room, based on his clinical presentation and lab work, Code Sepsis was triggered. The patient received fluid therapy as per sepsis protocol. The emergency room physician consulted general surgeon on-call. The patient was seen by the surgeon and the patient is planned for immediate surgical intervention.   No record of prior admission to this hospital.     Interval history / Subjective:   No acute overnight events   [facility] for [facility] at [address] [place] [address] discussed with   Pt c/o pain want to talk to colo rectal  See wound care note for details wound care note      Assessment & Plan:     Dislodged JG tube with surrounding peritonitis. Free air and perirectal fistula. Patient was sent from [facility] for intractable abdominal pain. Severe sepsis without septic shock due to intra-abdominal source of infection. .  -He was started on broad-spectrum empiric antibiotics on admission and underwent the following procedures:  -Gen Sx: 9/10 Lap take down and removal J-tube, Lap colostomy, I&D abdominal wall  -Colorectal Sx: 9/15 anorectal wound exam including rigid proctosigmoidoscopy  -ID were consulted and assisted with antibiotics management  Eraxis/Zosyn/Vancomycin completed 9/23/22, then PO Augmentin x4d and switched back to IV d/t persistent leukocytosis  Cont IV Merrem x2 weeks, last dose 10/13  Haley catheter in place  -C. difficile, blood culture negative  -CT chest 9/10: Small right basilar gas and fluid containing pleural collection with a  peripheral soft tissue rind. Finding may represent an empyema and clinical correlation is recommended. Recommend direct comparison to any additional prior imaging. Free intraperitoneal gas, seen on prior CT of the abdomen and pelvis. -CT abd/pel wo 9/14:No focal intra-abdominal fluid collection to suggest abscess. Free intraperitoneal gas consistent with recent intra-abdominal surgery. Interval improvement in rectal thickening. Prior CT dated September 9, 2022 demonstrated a probable right posterior rectal wall defect which is no longer visualized on today's examination.  Persistent, small right basilar gas and fluid containing pleural collection, unchanged.  -wound vac, ostomy care  -deescalate IV dilaudid to PO liquid, wean as able    JEMIMA vs CKD requiring dialysis: CVC 9/16/22- IR  -Continued on hemodialysis on MWF schedule  -Access, permacath placed on 9/16  -Outpatient unit: [facility] MWF , TTS HD Currently, on MICHAEL     H/o Type 2 diabetes mellitus  S/p TPN  SSI not requiring, dc'd     AFIB: rate stable  -eliquis 5mg BID  -ECHO : Left Ventricle: The EF by visual approximation is 55 - 60%. Left ventricle size is normal. Normal wall thickness. Normal wall motion. Normal diastolic function. Tricuspid Valve: Mildly elevated RVSP. The estimated RVSP is 42 mmHg     Empyema: noted: recent thoracotomy and prolonged intubation at [facility]. -CXR: Persistent right basilar airspace disease and right-sided loculated effusion/empyema. -CT chest 9/10: Small right basilar gas and fluid containing pleural collection with a  peripheral soft tissue rind. Finding may represent an empyema and clinical correlation is recommended. Recommend direct comparison to any additional prior imaging. Free intraperitoneal gas, seen on prior CT of the abdomen and pelvis. -Pulmonary consulted: noted \"percutaneous drainage would not be successful at removing fluid as I suppose it is very thick and organized at this time. Would only recommend following clinically and radiographically. If worsens or worsened right-sided effusion he would need to be reevaluated by his thoracic surgeon at [facility] for additional drainage. \"  -IV ABT's course as above     Acute blood loss anemia on chronic disease: stable  --on MICHAEL per Nephrology    +Occult Blood: no melena, no n/v  -PPI, iron supplements  -monitor Hgb, stable     Delirium, hallucinations ?  2/2 to toxic metabolic encephalopathy, hospital delirium, and opioid induced - resolved  -CT head : no acute intracranial abnormality  -Alert and oriented since      Depression: continue zoloft    Thrombocytosis: likely reactive thrombocytosis, monitor    Epigastric J site wound with surrounding abscess, POA  --Patient underwent incision and drainage on 9/10    Bilateral BKA, left BKA dehiscence and possible infection  -Vascular Sx followin/26-sutures removed and some eschar along incision line excised, open cavity laterally has old hematoma at base that was evacuated   -CT of the left BKA stump on 9/27 showed several irregular soft tissue defects overlying the amputation site with a 4.4 x 4.1 x 1.9 cm amorphous fluid collection with partial thick walled/rim-enhancing and internal gas locules. 9/29- Leg wound growing Pseudomonas  Vascular following- cont wound care  continue antibiotics as above  last dose 10/13    Anorectal wound, POA  --Followed by colorectal surgery. He is status post proctosigmoidoscopy on 9/15, there was communication of the distal rectal lumen with extraperitoneal pelvis. Acute pain syndrome. This is due to the multiple surgeries he had. Anticipate some degree of tolerance due to his continued exposure to opioids  --Current pain regimen includes scheduled gabapentin 100 mg 3 times daily  --As needed Dilaudid- wean as able    Hypernatremia/hyperchloremia, improved s/p D5W  Monitor    Hypokalemia, repleted  Mag wnl      DVT ppx: Eliquis  Code Status: Full Code  Ambulates:  nonambulatory  Discharge planning: [facility] pending [facility] Problems  Date Reviewed: 9/10/2022            Codes Class Noted POA    Injury to rectum without open wound into cavity ICD-10-CM: S36.60XA  ICD-9-CM: 863.45  9/20/2022 Yes        Open wound of anus ICD-10-CM: U44.913X  ICD-9-CM: 863.89  9/20/2022 Yes        Severe protein-calorie malnutrition ([facility] [address] ICD-10-CM: E43  ICD-9-CM: 638  9/13/2022 Yes        * (Principal) Intra-abdominal infection ICD-10-CM: B99.9  ICD-9-CM: 136.9  9/10/2022 Yes       Review of Systems:   A comprehensive review of systems was negative except for that written in the HPI. Vital Signs:    Last 24hrs VS reviewed since prior progress note.  Most recent are:  Visit Vitals  BP (!) 140/64   Pulse (!) 58   Temp 99 °F (37.2 °C)   Resp 16   Ht 5' 7\" (1.702 m)   Wt 67.8 kg (149 lb 7.6 oz)   SpO2 99%   BMI 23.41 kg/m²         Intake/Output Summary (Last 24 hours) at 10/12/2022 1023  Last data filed at 10/12/2022 0055  Gross per 24 hour   Intake --   Output 1575 ml   Net -1575 ml        Physical Examination:     I had a face to face encounter with this patient and independently examined them on 10/12/2022 as outlined below:          Constitutional: Chronically ill-appearing, alert, NAD but nauseated   Resp:  CTA bilaterally. No accessory muscle use   CV:  RRR, no audible m/r/g    GI/: Abd wound vac in place, ostomy, nondistended    Musculoskeletal:  No edema, warm, bilateral BKA    Neurologic:  Skin:  Psych:  Moves all extremities. Awake and alert   multiple wounds, skin w/d, no jaundice   Calm, Not anxious or agitated            Data Review:    Review and/or order of clinical lab test      Labs:     Recent Labs     10/10/22  0234   WBC 9.0   HGB 8.5*   HCT 27.8*   *       Recent Labs     10/11/22  0336 10/10/22  0234    141   K 4.1 4.4   * 109*   CO2 23 27   BUN 45* 32*   CREA 3.47* 2.49*   GLU 82 88   CA 8.1* 7.9*   MG  --  2.0   PHOS 6.1* 5.0*       Recent Labs     10/11/22  0336 10/10/22  0234   ALB 1.8* 1.9*       No results for input(s): INR, PTP, APTT, INREXT, INREXT in the last 72 hours. No results for input(s): FE, TIBC, PSAT, FERR in the last 72 hours. Lab Results   Component Value Date/Time    Folate 4.1 (L) 09/10/2022 12:00 PM        No results for input(s): PH, PCO2, PO2 in the last 72 hours. No results for input(s): CPK, CKNDX, TROIQ in the last 72 hours.     No lab exists for component: CPKMB  Lab Results   Component Value Date/Time    Triglyceride 96 10/06/2022 04:14 AM     Lab Results   Component Value Date/Time    Glucose (POC) 95 10/06/2022 11:14 AM    Glucose (POC) 107 10/06/2022 12:35 AM    Glucose (POC) 99 10/05/2022 11:27 AM    Glucose (POC) 99 10/05/2022 07:02 AM    Glucose (POC) 86 10/04/2022 11:43 PM     No results found for: COLOR, APPRN, SPGRU, REFSG, WILL, PROTU, GLUCU, KETU, BILU, UROU, MISHA, LEUKU, GLUKE, EPSU, BACTU, WBCU, RBCU, CASTS, UCRY      Medications Reviewed:     Current Facility-Administered Medications Medication Dose Route Frequency    HYDROmorphone (DILAUDID) 1 mg/mL oral solution 2-4 mg  2-4 mg Oral Q4H PRN    alteplase (CATHFLO) 1 mg in sterile water (preservative free) 1 mL injection  1 mg InterCATHeter PRN    epoetin vera-epbx (RETACRIT) injection 20,000 Units  20,000 Units SubCUTAneous DIALYSIS TUE, THU & SAT    heparin (porcine) 1,000 unit/mL injection 1,800 Units  1,800 Units InterCATHeter DIALYSIS PRN    And    heparin (porcine) 1,000 unit/mL injection 1,700 Units  1,700 Units InterCATHeter DIALYSIS PRN    0.9% sodium chloride infusion 250 mL  250 mL IntraVENous PRN    albumin human 25% (BUMINATE) solution 50 g  50 g IntraVENous DIALYSIS PRN    meropenem (MERREM) 500 mg in 0.9% sodium chloride (MBP/ADV) 50 mL MBP  500 mg IntraVENous Q24H    0.9% sodium chloride infusion 250 mL  250 mL IntraVENous PRN    glucose chewable tablet 16 g  4 Tablet Oral PRN    glucagon (GLUCAGEN) injection 1 mg  1 mg IntraMUSCular PRN    dextrose 10 % infusion 0-250 mL  0-250 mL IntraVENous PRN    gabapentin (NEURONTIN) capsule 100 mg  100 mg Oral TID    ferrous sulfate tablet 325 mg  1 Tablet Oral BID WITH MEALS    hydrALAZINE (APRESOLINE) 20 mg/mL injection 10 mg  10 mg IntraVENous Q6H PRN    mirtazapine (REMERON) tablet 7.5 mg  7.5 mg Oral QHS    pantoprazole (PROTONIX) tablet 40 mg  40 mg Oral ACB&D    apixaban (ELIQUIS) tablet 5 mg  5 mg Oral BID    sertraline (ZOLOFT) tablet 25 mg  25 mg Oral DAILY    collagenase (SANTYL) 250 unit/gram ointment   Topical DAILY    diphenhydrAMINE (BENADRYL) injection 12.5 mg  12.5 mg IntraVENous Q6H PRN    sodium chloride (NS) flush 5-40 mL  5-40 mL IntraVENous Q8H    sodium chloride (NS) flush 5-40 mL  5-40 mL IntraVENous PRN    acetaminophen (TYLENOL) tablet 650 mg  650 mg Oral Q6H PRN    polyethylene glycol (MIRALAX) packet 17 g  17 g Oral DAILY PRN    ondansetron (ZOFRAN ODT) tablet 4 mg  4 mg Oral Q8H PRN    Or    ondansetron (ZOFRAN) injection 4 mg  4 mg IntraVENous Q6H PRN L.acidophilus-paracasei-S.thermophil-bifidobacter (RISAQUAD) 8 billion cell capsule  1 Capsule Oral DAILY     ______________________________________________________________________  EXPECTED LENGTH OF STAY: 9d 14h  ACTUAL LENGTH OF STAY:          28                 Ganesh Degroot MD

## 2022-10-12 NOTE — PROGRESS NOTES
Nephrology Progress Note  Haze Dakin  Date of Admission : 9/9/2022    CC:  Follow up for JEMIMA       Assessment and Plan     JEMIMA on HD: oliguria  - 2/2 ATN  - HD Schedule: TTS in hospital   - First dialysis 9/12/22  - Outpatient unit: [facility] chair per CM notes  - Access: permacath placed on 9/16  - HD TTS for now     Anemia:  - MICHAEL increased (10/6) 20 k TTS  - goal is for Hgb 10-11  -Iron deficiency:Ordered IV Venofer    PAD s/p b/l BKA    Recent sepsis  - on Merrem 500 mg q 24 hrs per ID recommends 2 more weeks until 10/14    ABD wall abscess:  - J-tube removal, colostomy, I&D of abd wound  - TPN/ lipids per primary team, encouraging increase oral intake  - s/p I&D of abdominal abscess 9/29  - 10/5 Wound vac placed    Empyema  Sacral decub  Type 2DM  Afib       Interval History:  Seen and examined. awaiting placement   Repeat iron profile shows iron deficiency. Current Medications: all current  Medications have been eviewed in EPIC  Review of Systems: A comprehensive review of systems was negative except for that written in the HPI. Objective:  Vitals:    Vitals:    10/12/22 0355 10/12/22 0554 10/12/22 0737 10/12/22 1000   BP:   (!) 140/64    Pulse: 60 (!) 56 (!) 58 60   Resp:   16    Temp:   99 °F (37.2 °C)    TempSrc:       SpO2:   99%    Weight:       Height:         Intake and Output:  No intake/output data recorded. 10/10 1901 - 10/12 0700  In: -   Out: 6458 [Urine:100]    Physical Examination:  General: NAD oriented to person/place today  Neck:  Supple, no mass  Resp:  Lungs CTA B/L, no wheezing , normal respiratory effort  CV:  RRR,  no murmur or rub, no stump edema  GI:  Soft, NT, + Bowel sounds, + ostomy  Access:           [facility]    Lab Data Personally Reviewed: I have reviewed all the pertinent labs, microbiology data and radiology studies during assessment.     Recent Labs     10/11/22  0336 10/10/22  0234    141   K 4.1 4.4   * 109*   CO2 23 27   GLU 82 88   BUN 45* 32*   CREA 3.47* 2.49*   CA 8.1* 7.9*   MG  --  2.0   PHOS 6.1* 5.0*   ALB 1.8* 1.9*       Recent Labs     10/10/22  0234   WBC 9.0   HGB 8.5*   HCT 27.8*   *       No results found for: SDES  Lab Results   Component Value Date/Time    Culture result: (A) 09/29/2022 09:21 PM     MODERATE PROTEUS MIRABILIS REFER TO [id number] FOR SENSITIVITIES    Culture result: MODERATE PSEUDOMONAS AERUGINOSA (A) 09/29/2022 09:21 PM    Culture result: NO ANAEROBES ISOLATED 09/29/2022 09:21 PM     No results found for this or any previous visit (from the past 24 hour(s)). Alina Bradley MD  [address]  Phone - [phone]   Fax - [phone]  www. [facility]

## 2022-10-12 NOTE — PROGRESS NOTES
Colorectal Surgery Note    I received a message from Dr. Greg Giles that Mr. Rebecca Mooney wanted to talk to me, so I went to see him. The patient is anticipating being transferred soon to an inpatient rehabilitation facility [place] [facility] closer [place] he [place]. He asked how long he will have to keep the colostomy, and I explained that eventually the rectum will need to be reevaluated to see whether it has healed and then, if it has, the colostomy could potentially be taken down. Regarding timing, I informed him that at least three months should probably elapse before any elective or semi-electively reentry into the abdomen. I recommended that he establish relationships with a surgeon and a gastroenterologist in his home area and that before undergoing the takedown of the colostomy he have a full evaluation of the colon to rule out any pathology that might influence the plan.

## 2022-10-12 NOTE — PROGRESS NOTES
Transition of Care: [facility] Beto Reddy has accepted; pending insurance auth which has been started on 10/12     Camille ([facility] [phone])     NOTE: patient also has OP HD set up for after rehab: Outpatient HD chair at [facility] chair time 3:15pm M/W/F schedule  [facility] Brito [facility]  admissions  [phone]). [facility]- [phone]- contact- Sis Varela       Transport- [facility] is on willcall; patient is bilateral BKA -needs stretcher     RUR: 13%     Main contact is wife- Regi Joiner- [phone]     Discharge pending:  -patient continues on IV antibiotics until 10/13/22  -patient continues on dialysis  -patient continues with wound care  -patient continues with progress in PT/OT  -pending medical progress and care recommendations     1200: this CM called Catarina at [facility] to followup on status of referral; no answer; left  for call back; this [phone] care and dialysis updates; also sent careport message to Damion Burgess 1154 with no pending discharge date update    1350: Catarina from [facility] left this  phone message that the insurance authorization has been started    [phone]: this CM updated patient at bedside; he verbalized understanding     CM following  Cony Ruiz, DOMINGA            NOTE:     The patient has been hospitalized in the [place] area since the last week of June 2022. .  patient has bilateral BKA; This started at [facility]. The patient was transferred from [facility] to [facility] last week. Per the wife, \"He was only there 2-3 days and d/c'd to [facility]. The patient is no longer employed and receives disability. He lives with his wife (Lindsay)-([phone] in a one story home. He was independent in ADL's (prior to June of this year). Following his first orthopedic surgery (r) AKA he received a (r) prosthetic device and ambulated with a cane.    Pharmacy:  [facility]    primary: MALLORY policy # [id number]         Revision History                                                                                                                            Revision History                                                                        Revision History

## 2022-10-13 VITALS
BODY MASS INDEX: 23.46 KG/M2 | OXYGEN SATURATION: 96 % | TEMPERATURE: 98.8 F | RESPIRATION RATE: 16 BRPM | DIASTOLIC BLOOD PRESSURE: 66 MMHG | SYSTOLIC BLOOD PRESSURE: 129 MMHG | WEIGHT: 149.47 LBS | HEIGHT: 67 IN | HEART RATE: 62 BPM

## 2022-10-13 LAB
HBV SURFACE AG SER QL: <0.1 INDEX
HBV SURFACE AG SER QL: NEGATIVE

## 2022-10-13 PROCEDURE — 74011250636 HC RX REV CODE- 250/636: Performed by: INTERNAL MEDICINE

## 2022-10-13 PROCEDURE — 74011250636 HC RX REV CODE- 250/636: Performed by: NURSE PRACTITIONER

## 2022-10-13 PROCEDURE — 90935 HEMODIALYSIS ONE EVALUATION: CPT

## 2022-10-13 PROCEDURE — 2709999900 HC NON-CHARGEABLE SUPPLY

## 2022-10-13 PROCEDURE — 87340 HEPATITIS B SURFACE AG IA: CPT

## 2022-10-13 PROCEDURE — 74011250637 HC RX REV CODE- 250/637: Performed by: COLON & RECTAL SURGERY

## 2022-10-13 PROCEDURE — 74011250637 HC RX REV CODE- 250/637: Performed by: PHYSICIAN ASSISTANT

## 2022-10-13 PROCEDURE — 51798 US URINE CAPACITY MEASURE: CPT

## 2022-10-13 PROCEDURE — 74011000250 HC RX REV CODE- 250: Performed by: COLON & RECTAL SURGERY

## 2022-10-13 PROCEDURE — 74011250637 HC RX REV CODE- 250/637: Performed by: STUDENT IN AN ORGANIZED HEALTH CARE EDUCATION/TRAINING PROGRAM

## 2022-10-13 PROCEDURE — 36415 COLL VENOUS BLD VENIPUNCTURE: CPT

## 2022-10-13 PROCEDURE — 74011250637 HC RX REV CODE- 250/637: Performed by: INTERNAL MEDICINE

## 2022-10-13 PROCEDURE — 74011000250 HC RX REV CODE- 250

## 2022-10-13 PROCEDURE — 74011000258 HC RX REV CODE- 258: Performed by: NURSE PRACTITIONER

## 2022-10-13 PROCEDURE — 77030037255 HC PCH OST FLX SENSURA COLO -A

## 2022-10-13 RX ORDER — BACITRACIN 500 UNIT/G
PACKET (EA) TOPICAL
Status: COMPLETED
Start: 2022-10-13 | End: 2022-10-13

## 2022-10-13 RX ORDER — SERTRALINE HYDROCHLORIDE 25 MG/1
25 TABLET, FILM COATED ORAL DAILY
Qty: 30 TABLET | Refills: 0 | Status: SHIPPED | OUTPATIENT
Start: 2022-10-14 | End: 2022-11-13

## 2022-10-13 RX ORDER — LANOLIN ALCOHOL/MO/W.PET/CERES
325 CREAM (GRAM) TOPICAL
Qty: 30 TABLET | Refills: 0 | Status: SHIPPED | OUTPATIENT
Start: 2022-10-13 | End: 2022-11-12

## 2022-10-13 RX ORDER — GABAPENTIN 100 MG/1
100 CAPSULE ORAL 3 TIMES DAILY
Qty: 90 CAPSULE | Refills: 0 | Status: SHIPPED | OUTPATIENT
Start: 2022-10-13 | End: 2022-11-12

## 2022-10-13 RX ADMIN — MEROPENEM 500 MG: 500 INJECTION, POWDER, FOR SOLUTION INTRAVENOUS at 13:36

## 2022-10-13 RX ADMIN — SODIUM CHLORIDE, PRESERVATIVE FREE 10 ML: 5 INJECTION INTRAVENOUS at 06:29

## 2022-10-13 RX ADMIN — Medication 2 MG: at 14:09

## 2022-10-13 RX ADMIN — IRON SUCROSE 300 MG: 20 INJECTION, SOLUTION INTRAVENOUS at 13:45

## 2022-10-13 RX ADMIN — SERTRALINE 25 MG: 50 TABLET, FILM COATED ORAL at 09:06

## 2022-10-13 RX ADMIN — Medication 4 MG: at 15:58

## 2022-10-13 RX ADMIN — PANTOPRAZOLE SODIUM 40 MG: 40 TABLET, DELAYED RELEASE ORAL at 06:30

## 2022-10-13 RX ADMIN — COLLAGENASE SANTYL: 250 OINTMENT TOPICAL at 13:57

## 2022-10-13 RX ADMIN — SODIUM CHLORIDE, PRESERVATIVE FREE 10 ML: 5 INJECTION INTRAVENOUS at 14:03

## 2022-10-13 RX ADMIN — GABAPENTIN 100 MG: 100 CAPSULE ORAL at 09:06

## 2022-10-13 RX ADMIN — APIXABAN 5 MG: 5 TABLET, FILM COATED ORAL at 13:58

## 2022-10-13 RX ADMIN — FERROUS SULFATE TAB 325 MG (65 MG ELEMENTAL FE) 325 MG: 325 (65 FE) TAB at 06:31

## 2022-10-13 RX ADMIN — Medication 1 PACKET: at 16:00

## 2022-10-13 RX ADMIN — Medication 1 CAPSULE: at 09:06

## 2022-10-13 RX ADMIN — GABAPENTIN 100 MG: 100 CAPSULE ORAL at 17:00

## 2022-10-13 RX ADMIN — Medication 4 MG: at 06:30

## 2022-10-13 RX ADMIN — PANTOPRAZOLE SODIUM 40 MG: 40 TABLET, DELAYED RELEASE ORAL at 17:00

## 2022-10-13 RX ADMIN — FERROUS SULFATE TAB 325 MG (65 MG ELEMENTAL FE) 325 MG: 325 (65 FE) TAB at 17:00

## 2022-10-13 NOTE — PROGRESS NOTES
Wound Care:     Patient being discharge to [facility] in [facility] this evening. NPWT discontinued on left abdominal wound. Very tender to touch. Patient was premedicated prior. Wet to dry dressing applied. Assisted in packing wound care supplies. Patient also has information regarding ostomy supplies. Did not change any other dressings since C/D/I.

## 2022-10-13 NOTE — PROGRESS NOTES
Physical Therapy 10/13/2022    Chart reviewed, pt off floor for dialysis, pt discussed with Case Management - pt to transfer to inpatient rehab today ---     Braden Puckett, PT

## 2022-10-13 NOTE — DISCHARGE SUMMARY
Physician Discharge Summary     Patient ID:    Neto Duran  [id number]  1970    Admit date: 9/9/2022    Discharge date and time: 10/13/2022    Hospital Diagnoses and Treatment Rendered:   Baby General tube with surrounding peritonitis. Free air and perirectal fistula. Patient was sent from [facility] for intractable abdominal pain. Severe sepsis without septic shock due to intra-abdominal source of infection. .  -He was started on broad-spectrum empiric antibiotics on admission and underwent the following procedures:  - Gen Sx: 9/10 Lap take down and removal J-tube, Lap colostomy, I&D abdominal wall  - Colorectal Sx: 9/15 anorectal wound exam including rigid proctosigmoidoscopy  - ID consulted and assisted with antibiotics management  - ABX: Eraxis/Zosyn/Vancomycin completed 9/23/22, then PO Augmentin x4d and switched back to IV d/t persistent leukocytosis  - Complete IV Merrem x2 weeks, last dose today 10/13  - Haley catheter in place, will remove prior to discharge  - C. difficile, blood culture negative  - CT chest 9/10: Small right basilar gas and fluid containing pleural collection with a  peripheral soft tissue rind. Finding may represent an empyema and clinical correlation is recommended. Recommend direct comparison to any additional prior imaging. Free intraperitoneal gas, seen on prior CT of the abdomen and pelvis. - CT abd/pel wo 9/14:No focal intra-abdominal fluid collection to suggest abscess. Free intraperitoneal gas consistent with recent intra-abdominal surgery. Interval improvement in rectal thickening. Prior CT dated September 9, 2022 demonstrated a probable right posterior rectal wall defect which is no longer visualized on today's examination. Persistent, small right basilar gas and fluid containing pleural collection, unchanged.   - Will remove wound vac prior to discharge  - Ostomy care     JEMIMA vs CKD requiring dialysis: CVC 9/16/22- IR  - Continued on hemodialysis on MWF schedule  - Access, permacath placed on 9/16  - Outpatient unit: [facility] MWF , TTS HD Currently, on MICHAEL     H/o Type 2 diabetes mellitus  - S/p TPN  - SSI dc'd, patient hasn't required     AFIB: rate stable  - Eliquis 5mg BID  - ECHO 9/26: Left Ventricle: The EF by visual approximation is 55 - 60%. Left ventricle size is normal. Normal wall thickness. Normal wall motion. Normal diastolic function. Tricuspid Valve: Mildly elevated RVSP. The estimated RVSP is 42 mmHg     Empyema: noted: recent thoracotomy and prolonged intubation at [facility]. - CXR9/19: Persistent right basilar airspace disease and right-sided loculated effusion/empyema.  - CT chest 9/10: Small right basilar gas and fluid containing pleural collection with a  peripheral soft tissue rind. Finding may represent an empyema and clinical correlation is recommended. Recommend direct comparison to any additional prior imaging. Free intraperitoneal gas, seen on prior CT of the abdomen and pelvis. - Pulmonary consulted: noted \"percutaneous drainage would not be successful at removing fluid as I suppose it is very thick and organized at this time. Would only recommend following clinically and radiographically. If worsens or worsened right-sided effusion he would need to be reevaluated by his thoracic surgeon at [facility] for additional drainage. \"  - IV ABT's course as above     Acute blood loss anemia on chronic disease: stable  -- on MICHAEL per Nephrology     +Occult Blood: no melena, no n/v  - PPI, iron supplements  - monitor Hgb, stable     Delirium, hallucinations ?  2/2 to toxic metabolic encephalopathy, hospital delirium, and opioid induced - resolved  - CT head 9/23: no acute intracranial abnormality  - Alert and oriented since 9/28     Depression: continue zoloft     Thrombocytosis: likely reactive thrombocytosis, monitor     Epigastric J site wound with surrounding abscess, POA  --Patient underwent incision and drainage on 9/10     Bilateral BKA, left BKA dehiscence and possible infection  -Vascular Sx followin/26-sutures removed and some eschar along incision line excised, open cavity laterally has old hematoma at base that was evacuated   -CT of the left BKA stump on  showed several irregular soft tissue defects overlying the amputation site with a 4.4 x 4.1 x 1.9 cm amorphous fluid collection with partial thick walled/rim-enhancing and internal gas locules. - Leg wound growing Pseudomonas  - Vascular following- cont wound care  - continue antibiotics as above  last dose 10/13     Anorectal wound, POA  --Followed by colorectal surgery. He is status post proctosigmoidoscopy on 9/15, there was communication of the distal rectal lumen with extraperitoneal pelvis. Acute pain syndrome. This is due to the multiple surgeries he had.   Anticipate some degree of tolerance due to his continued exposure to opioids  --Current pain regimen includes scheduled gabapentin 100 mg 3 times daily  --As needed Dilaudid- wean as able     Hypernatremia/hyperchloremia, improved s/p D5W  - Monitor     Hypokalemia, repleted  - Mag wnl     DVT ppx: Eliquis  Code Status: Full Code  Plan discussed with patient, case management  Discharge planning: [facility]          Chronic Diagnoses:    Problem List as of 10/13/2022 Date Reviewed: 9/10/2022            Codes Class Noted - Resolved    Injury to rectum without open wound into cavity ICD-10-CM: S36.60XA  ICD-9-CM: 863.45  2022 - Present        Open wound of anus ICD-10-CM: W06.030N  ICD-9-CM: 863.89  2022 - Present        Severe protein-calorie malnutrition ([facility] [address] ICD-10-CM: E43  ICD-9-CM: 262  2022 - Present        * (Principal) Intra-abdominal infection ICD-10-CM: B99.9  ICD-9-CM: 136.9  9/10/2022 - Present        Gas gangrene ([facility] [address] ICD-10-CM: A48.0  ICD-9-CM: 040.0  2018 - Present        Uncontrolled diabetes mellitus ICD-10-CM: [id number]  ICD-9-CM: 250.02  7/2/2018 - Present        Diabetic foot infection [facility]) ICD-10-CM: E11.628, L08.9  ICD-9-CM: 250.80, 686.9  7/2/2018 - Present           Discharge Medications:   Current Discharge Medication List        START taking these medications    Details   apixaban (ELIQUIS) 5 mg tablet Take 1 Tablet by mouth two (2) times a day for 30 days. Qty: 60 Tablet, Refills: 0  Start date: 10/13/2022, End date: 11/12/2022      ferrous sulfate 325 mg (65 mg iron) tablet Take 1 Tablet by mouth Daily (before breakfast) for 30 days. Qty: 30 Tablet, Refills: 0  Start date: 10/13/2022, End date: 11/12/2022      gabapentin (NEURONTIN) 100 mg capsule Take 1 Capsule by mouth three (3) times daily for 30 days. Max Daily Amount: 300 mg. Qty: 90 Capsule, Refills: 0  Start date: 10/13/2022, End date: 11/12/2022    Associated Diagnoses: Type 2 diabetes mellitus with other specified complication, unspecified whether long term insulin use ([facility] [address] Generalized abdominal pain      sertraline (ZOLOFT) 25 mg tablet Take 1 Tablet by mouth daily for 30 days. Qty: 30 Tablet, Refills: 0  Start date: 10/14/2022, End date: 11/13/2022           CONTINUE these medications which have NOT CHANGED    Details   acetaminophen (TYLENOL) 325 mg tablet Take 2 Tabs by mouth every four (4) hours as needed. Qty: 30 Tab, Refills: 0      insulin lispro (HUMALOG) 100 unit/mL injection 9 units Breakfast, 11 units with lunch and 9  Units at dinner  Qty: 1 Vial, Refills: 0      insulin glargine (LANTUS) 100 unit/mL injection 26 units QHS SC  Qty: 1 Vial, Refills: 0      bisacodyl (DULCOLAX) 5 mg EC tablet Take 2 Tabs by mouth daily as needed for Constipation. Qty: 30 Tab, Refills: 0           STOP taking these medications       ertapenem 1 gram 1 g IVPB Comments:   Reason for Stopping: Follow up Care:    1. Other, Phys, MD in 1-2 weeks. Please call to set up an appointment shortly after discharge.       Diet:  Regular Diet    Disposition:  [facility]. Advanced Directive:   FULL x   DNR      Discharge Exam:  See today's note. CONSULTATIONS: ID, GI, and General Surgery    Significant Diagnostic Studies:   9/9/2022: BUN 12 MG/DL (Ref range: 6 - 20 MG/DL); Calcium 7.7 MG/DL (L; Ref range: 8.5 - 10.1 MG/DL); CO2 35 mmol/L (H; Ref range: 21 - 32 mmol/L); Creatinine 1.80 MG/DL (H; Ref range: 0.70 - 1.30 MG/DL); Glucose 133 mg/dL (H; Ref range: 65 - 100 mg/dL); HCT 27.7 % (L; Ref range: 36.6 - 50.3 %); HGB 8.7 g/dL (L; Ref range: 12.1 - 17.0 g/dL); Potassium 3.4 mmol/L (L; Ref range: 3.5 - 5.1 mmol/L); Sodium 135 mmol/L (L; Ref range: 136 - 145 mmol/L)  9/10/2022: BUN 17 MG/DL (Ref range: 6 - 20 MG/DL); Calcium 7.4 MG/DL (L; Ref range: 8.5 - 10.1 MG/DL); CO2 27 mmol/L (Ref range: 21 - 32 mmol/L); Creatinine 2.16 MG/DL (H; Ref range: 0.70 - 1.30 MG/DL); Glucose 95 mg/dL (Ref range: 65 - 100 mg/dL); HCT 22.5 % (L; Ref range: 36.6 - 50.3 %); HCT 23.5 % (L; Ref range: 36.6 - 50.3 %); HGB 6.9 g/dL (L; Ref range: 12.1 - 17.0 g/dL); HGB 7.6 g/dL (L; Ref range: 12.1 - 17.0 g/dL); Potassium 3.0 mmol/L (L; Ref range: 3.5 - 5.1 mmol/L); Sodium 140 mmol/L (Ref range: 136 - 145 mmol/L)  No results for input(s): WBC, HGB, HCT, PLT, HGBEXT, HCTEXT, PLTEXT in the last 72 hours. Recent Labs     10/11/22  0336      K 4.1   *   CO2 23   BUN 45*   CREA 3.47*   GLU 82   CA 8.1*   PHOS 6.1*     Recent Labs     10/11/22  0336   ALB 1.8*     No results for input(s): INR, PTP, APTT, INREXT in the last 72 hours. No results for input(s): FE, TIBC, PSAT, FERR in the last 72 hours. No results for input(s): PH, PCO2, PO2 in the last 72 hours. No results for input(s): CPK, CKMB in the last 72 hours.     No lab exists for component: TROPONINI  No components found for: Madhu Kannan        Signed:  Yasmani Colmenares PA-C  10/13/2022  2:40 PM

## 2022-10-13 NOTE — PROGRESS NOTES
Bedside shift change report given to [facility], RN (oncoming nurse) by [facility], RN (offgoing nurse). Report included the following information SBAR, Kardex, ED Summary, OR Summary, Procedure Summary, Intake/Output, MAR, Recent Results, and Med Rec Status.

## 2022-10-13 NOTE — PROGRESS NOTES
Haley catheter removed by rad tech after approval from IR doc, Red Sparks MD, prior to pt discharge.

## 2022-10-13 NOTE — ADVANCED PRACTICE NURSE
Hemodialysis / [phone]    Vitals Pre Post Assessment Pre Post   BP BP: 134/78 (10/13/22 1200) 140/70 LOC A&O x 3 A&O x 3   HR Pulse (Heart Rate): (!) 52 (10/13/22 1201)   53 Lungs clear clear   Resp Resp Rate: 20 (10/13/22 1200) 16 Cardiac regular regular   Temp Temp: 98.3 °F (36.8 °C) (10/13/22 1200) 98.2 Skin warm warm   Weight Pre-Dialysis Weight: 68.8 kg (151 lb 10.8 oz) (10/13/22 0940)  Edema No edema No edema   Tele status remote remote Pain Pain Intensity 1: 7 (10/12/22 2010) No pain      Orders   Duration: Start: 0945 End: 2480 Total: 3 hr   Dialyzer: Dialyzer/Set Up Inspection: Brandon Gillespie ([id number]) (10/13/22 0940)   K Bath: Dialysate K (mEq/L): 2 (10/13/22 0940)   Ca Bath: Dialysate CA (mEq/L): 2.5 (10/13/22 0940)   Na: Dialysate NA (mEq/L): 140 (10/13/22 0940)   Bicarb: Dialysate HCO3 (mEq/L): 35 (10/13/22 0940)   Target Fluid Removal: Goal/Amount of Fluid to Remove (mL): 1000 mL (10/13/22 0940)     Access   Type & Location: [facility]   Comments:       in place, patent, dressing dry and intact, no S/S of infection. Labs   HBsAg (Antigen) / date:    10/13/22 Negative                                           HBsAb (Antibody) / date: 09/12/22 [facility].    Source: [facility]   Obtained/Reviewed  Critical Results Called HGB   Date Value Ref Range Status   10/10/2022 8.5 (L) 12.1 - 17.0 g/dL Final     Potassium   Date Value Ref Range Status   10/11/2022 4.1 3.5 - 5.1 mmol/L Final     Calcium   Date Value Ref Range Status   10/11/2022 8.1 (L) 8.5 - 10.1 MG/DL Final     BUN   Date Value Ref Range Status   10/11/2022 45 (H) 6 - 20 MG/DL Final     Creatinine   Date Value Ref Range Status   10/11/2022 3.47 (H) 0.70 - 1.30 MG/DL Final     Comment:     INVESTIGATED PER DELTA CHECK PROTOCOL        Meds Given   Name Dose Route                    Adequacy / Fluid    Total Liters Process: 56 L   Net Fluid Removed: 500 ml      Comments   Time Out Done:   (Time) Yes, 6255 Admitting Diagnosis: Renal failure   Consent obtained/signed: Informed Consent Verified: Yes (10/13/22 0940)   Machine / RO # Machine Number: [id number] (10/13/22 0940)   Primary Nurse Rpt Pre: Abhilash Patel RN   Primary Nurse Rpt PostAdalid Torres RN   Pt Education: Yes, r/t dialysis process   Care Plan: Continue HD as ordered   Pts outpatient clinic:      Tx Summary   Comments:         tolerated tx well, at end, all blood in circuit returned with 300 ml NS, RIJ cath in place, patent, dressing dry and intact, no S/S of infection

## 2022-10-13 NOTE — ADT AUTH CERT NOTES
Wound and Skin Management GRG - Care Day 33 (10/12/2022) by Catalina Sousa       Review Status Review Entered   Completed 10/12/2022 1125       Created By   Catalina Sousa      Criteria Review      Care Day: 35 Care Date: 10/12/2022 Level of Care: Inpatient Floor    Guideline Day 3    Level Of Care    (X) * Activity level acceptable    10/12/2022 11:25:51 EDT by Catalina Sousa      with assistance    Clinical Status    ( ) * Temperature status acceptable    ( ) * No infection, or status acceptable    (X) * Pain and nausea absent or adequately managed    10/12/2022 11:25:51 EDT by Catalina Sousa      managed    ( ) * Wound and ulcer problems absent or status acceptable    (X) * Burn injury absent or acceptable for next level of care    (X) * Compartment syndrome absent    ( ) * Skin disease absent or acceptable for next level of care    ( ) * General Discharge Criteria met    Interventions    (X) * Intake acceptable    10/12/2022 11:25:51 EDT by Catalina Sousa      regular diet    ( ) * No inpatient interventions needed    * Milestone   Additional Notes   Inpatient  10/12/22 - Remote Telemetry unit   34-33-/95, 99% on RA   Eliquis 5mg BID, Retacrit 20,000u T/Th/Sa, Neurontin 100mg TID, Dilaudid 4mg PO x 1, Merrem 500mg IV QD, Remeron 7.5mg QHS, Protonix 40mg PO BID, Zoloft 25mg QD, Zofran 4mg IV x 1   Orders: droplet & enteric isolation, fall precautions, Gen Surg following, oximetry spot check prn, regular diet, wound vac care, would care per orders, HD T/Th/Sa, PT/OT, ostomy care, daily weight, I&O, elevate HOB, activity as tolerated with assistance   Hospitalist progress note 10/12/22:   Interval history / Subjective:   No acute overnight events    [facility] discussed with    Pt c/o pain want to talk to colo rectal   See wound care note for details wound care note        Constitutional: Chronically ill-appearing, alert, NAD but nauseated   Resp: CTA bilaterally. No accessory muscle use   CV: RRR, no audible m/r/g    GI/: Abd wound vac in place, ostomy, nondistended    Musculoskeletal: No edema, warm, bilateral BKA    Neurologic:   Skin:   Psych: Moves all extremities. Awake and alert    multiple wounds, skin w/d, no jaundice    Calm, Not anxious or agitated                              Assessment & Plan:       Dislodged JG tube with surrounding peritonitis. Free air and perirectal fistula. Patient was sent from [address] [place] [address] for intractable abdominal pain. Severe sepsis without septic shock due to intra-abdominal source of infection. .   -He was started on broad-spectrum empiric antibiotics on admission and underwent the following procedures:   -Gen Sx: 9/10 Lap take down and removal J-tube, Lap colostomy, I&D abdominal wall   -Colorectal Sx: 9/15 anorectal wound exam including rigid proctosigmoidoscopy   -ID were consulted and assisted with antibiotics management   Eraxis/Zosyn/Vancomycin completed 9/23/22, then PO Augmentin x4d and switched back to IV d/t persistent leukocytosis   Cont IV Merrem x2 weeks, last dose 10/13   Haley catheter in place   -C. difficile, blood culture negative   -CT chest 9/10: Small right basilar gas and fluid containing pleural collection with a   peripheral soft tissue rind. Finding may represent an empyema and clinical correlation is recommended. Recommend direct comparison to any additional prior imaging. Free intraperitoneal gas, seen on prior CT of the abdomen and pelvis. -CT abd/pel wo 9/14:No focal intra-abdominal fluid collection to suggest abscess. Free intraperitoneal gas consistent with recent intra-abdominal surgery. Interval improvement in rectal thickening. Prior CT dated September 9, 2022 demonstrated a probable right posterior rectal wall defect which is no longer visualized on today's examination.  Persistent, small right basilar gas and fluid containing pleural collection, unchanged.   -wound vac, ostomy care -deescalate IV dilaudid to PO liquid, wean as able       JEMIMA vs CKD requiring dialysis: CVC 9/16/22- IR   -Continued on hemodialysis on MWF schedule   -Access, permacath placed on 9/16   -Outpatient unit: [facility] MWF , TTS HD Currently, on MICHAEL       H/o Type 2 diabetes mellitus   S/p TPN   SSI not requiring, dc'd       AFIB: rate stable   -eliquis 5mg BID   -ECHO 9/26: Left Ventricle: The EF by visual approximation is 55 - 60%. Left ventricle size is normal. Normal wall thickness. Normal wall motion. Normal diastolic function. Tricuspid Valve: Mildly elevated RVSP. The estimated RVSP is 42 mmHg       Empyema: noted: recent thoracotomy and prolonged intubation at [facility]. -CXR9/19: Persistent right basilar airspace disease and right-sided loculated effusion/empyema. -CT chest 9/10: Small right basilar gas and fluid containing pleural collection with a   peripheral soft tissue rind. Finding may represent an empyema and clinical correlation is recommended. Recommend direct comparison to any additional prior imaging. Free intraperitoneal gas, seen on prior CT of the abdomen and pelvis. -Pulmonary consulted: noted \"percutaneous drainage would not be successful at removing fluid as I suppose it is very thick and organized at this time. Would only recommend following clinically and radiographically. If worsens or worsened right-sided effusion he would need to be reevaluated by his thoracic surgeon at [facility] for additional drainage. \"   -IV ABT's course as above       Acute blood loss anemia on chronic disease: stable   --on MICHAEL per Nephrology       +Occult Blood: no melena, no n/v   -PPI, iron supplements   -monitor Hgb, stable       Delirium, hallucinations ?  2/2 to toxic metabolic encephalopathy, hospital delirium, and opioid induced - resolved   -CT head 9/23: no acute intracranial abnormality   -Alert and oriented since 9/28       Depression: continue zoloft       Thrombocytosis: likely reactive thrombocytosis, monitor       Epigastric J site wound with surrounding abscess, POA   --Patient underwent incision and drainage on 9/10       Bilateral BKA, left BKA dehiscence and possible infection   -Vascular Sx followin/26-sutures removed and some eschar along incision line excised, open cavity laterally has old hematoma at base that was evacuated    -CT of the left BKA stump on  showed several irregular soft tissue defects overlying the amputation site with a 4.4 x 4.1 x 1.9 cm amorphous fluid collection with partial thick walled/rim-enhancing and internal gas locules. - Leg wound growing Pseudomonas   Vascular following- cont wound care   continue antibiotics as above  last dose 10/13       Anorectal wound, POA   --Followed by colorectal surgery. He is status post proctosigmoidoscopy on 9/15, there was communication of the distal rectal lumen with extraperitoneal pelvis. Acute pain syndrome. This is due to the multiple surgeries he had. Anticipate some degree of tolerance due to his continued exposure to opioids   --Current pain regimen includes scheduled gabapentin 100 mg 3 times daily   --As needed Dilaudid- wean as able       Hypernatremia/hyperchloremia, improved s/p D5W   Monitor       Hypokalemia, repleted   Mag wnl           DVT ppx: Eliquis      Ambulates:  nonambulatory   Discharge planning: [facility]       Wound care progress note :   Application of NPWT to left abdominal wound.        Surgery: Colostomy   Date of Surgery: 9.10.22      Surgeon: Dr. Marck Sheehan Location: left quadrant   Pouch in place       Left abdomen, open surgical wound/ former J tube site:  3.5 x 6.8 x 2 cm; 4.5 tunnel at 3 o'clock; undermining 2.5 cm 12-7 o'clock;  85% red 15% tan (around undermining); improved induration to wilbur wound; irrigated with VASHE; applied Santyl; packed with VASHE moistened packing; initiated NPWT at 125 mmHg using one continuous gauze and 1 black foam.   Right abdominal, adjacent to former trocar site with ulceration:  0.8 x 0.8 x 0.1 cm; 50% red 50% yellow; no odor or erythema. Left colostomy    Stoma flush and  in deep crevice with erythema to wilbur stoma skin   Pouched using 2 piece pouch with raised ring and strip barrier paste. Sacrum, Pressure Injury Unstageable:  4 x 3.5 x 3 cm; 100% yellow. Small serous exudate; no odor; tender to touch; wilbur wound blanching red erythema. Left buttock, Pressure Injury Stage 3:  2 x 2 x 0.1 cm; 100% red; scant serous exudate; no odor; tender to touch; wilbur wound blanching pink erythema. Resurfaced pink to left and right buttocks. POA Left BKA site, 2 sites with bridge of intact skin: 7 x 2 x 1 cm and 3 x 8 x 5 cm; 25% yellow/brown 75% red; The depth is on the lateral side of the incision. Periwound has a 3.2 x 1.6 x 0 cm 100% dry black/brown area of eschar and a 1 x 1.5 x 0.1 cm 100% tan/brown devitatized area. Cleansed with VASHE; applied Santyl filled depth with VASHE moistened gauze and covered rest of incision with VASHE moistened gauze, dry gauze and ABD pad; covered with roll gauze and ace wrap. Recommendations:   1. Left abdominal wound:     VAC (NPWT) Dressing Orders:   VAC Settings: 125 mmHg continuous/low suction    Dressing change twice weekly by WOCN. Change canisters when full and measure output q shift. (located  or [facility] supply room). For sudden development of blood or an increased amount of huy bleedin.  Immediately turn off VAC system. 2.  Leave dressing in place. 3.  Hold pressure over wound. 4.  Call physician. If vacuum fails to maintain seal or unable to manage alarm for clogged tubing for >2hrs:              1.  Contact Physician                2.  Cleanse wound with normal saline. 3.  Saline moistened fluff gauze to base. 4.  Cover with dry dressing. 5.  Secure with transparent film. 6.  Change q 8 hours and as needed. 7.  Notify Physician and WOCN that wound VAC dressing needs to be reapplied. If patient is discharged from our facility:               1.  Remove all of equipment and sponge. 2.  Place moist dressing. 3.  Put VAC system and power cord in clear bag in utility room. (no red bags)               4. Please call [employer] to  system and stop billing @ [phone]   For procedures or when pt is off the floor:                Battery backup on our current inpatient systems lasts for 4 hours and may be                   used to prevent interruption of treatment- VAC should NOT be turned off. If disconnecting for more than 2 hours, with discharge, or a pt is admitted with a VAC:   Discontinue the therapy/ begin wound care orders above, return any outpatient equipment to family or transferring facility, and notify the [id number] [facility] Nurse and ordering practitioner. 2. Sacrum and buttocks:  Daily cleanse with saline; apply Santyl and saline moist gauze; cover with dry dressing. 3. Right upper back wounds:  Daily cleanse with saline; apply Santyl; cover with dry dressing. 4. Left BKA site:  Daily cleanse with saline; apply Santyl; pack dehisced area with VASHE moistened roll gauze; cover with Vashe moist gauze and dry dressing. 5.   Empty appliance when 1/3 full and PRN. Encourage patient/family to notify nurse and   assist w/ pouch emptying to promote self-care. Change appliance twice weekly and PRN for leaking ASAP. DO NOT REINFORCE LEAKS to avoid skin breakdown. Transition of Care: Will follow routinely for VAC changes Tues and Friday. Discharge disposition plan is to go to [facility].                Wound and Skin Management GRG - Care Day 32 (10/11/2022) by Luna Lui       Review Status Review Entered   Completed 10/11/2022 1425       Created By   Luna Lui Criteria Review      Care Day: 32 Care Date: 10/11/2022 Level of Care: Inpatient Floor    Guideline Day 3    Level Of Care    (X) * Activity level acceptable    10/11/2022 14:24:49 EDT by Jen Sheffield      with assistance    Clinical Status    (X) * Temperature status acceptable    10/11/2022 14:24:49 EDT by Jen Sheffield      afebrile    ( ) * No infection, or status acceptable    (X) * Pain and nausea absent or adequately managed    10/11/2022 14:24:49 EDT by Jen Sheffield      managed    ( ) * Wound and ulcer problems absent or status acceptable    (X) * Burn injury absent or acceptable for next level of care    (X) * Compartment syndrome absent    ( ) * Skin disease absent or acceptable for next level of care    ( ) * General Discharge Criteria met    Interventions    (X) * Intake acceptable    10/11/2022 14:24:49 EDT by Jen Sheffield      regular diet    ( ) * No inpatient interventions needed    * Milestone   Additional Notes   Inpatient  10/11/22 - Remote Telemetry unit   98.1-57-/55, 96% on RA   Cl 111, BUN 45, Cr 3.47, GFR 20, Ca 8.1, Phos 6.1, Alb 1.8   Eliquis 5mg BID, Retacrit 20,000u T/Th/Sa, Neurontin 100mg TID, Dilaudid 4mg PO x 3, Merrem 500mg IV QD, Remeron 7.5mg QHS, Protonix 40mg PO BID, Zoloft 25mg QD   Orders: droplet & enteric isolation, fall precautions, Gen Surg following, oximetry spot check prn, regular diet, wound vac care, would care per orders, HD T/Th/Sa, PT/OT, ostomy care, daily weight, I&O, elevate HOB, activity as tolerated with assistance   General Surgery progress note 10/11/22:   Subjective:       Last 24 hrs: wound care observed; pt having some discomfort       Alert and Oriented, x3, in pain   Cardiovascular: RRR, no peripheral edema   Abdomen: J tube site w/ wound w/ pink tissue; tracks 2-3cm at top; wound vac applied       Assessment:   Principal Problem:     Intra-abdominal infection (9/10/2022)       Active Problems:     Severe protein-calorie malnutrition ([address] (9/13/2022)         Injury to rectum without open wound into cavity (9/20/2022)         Open wound of anus (9/20/2022)                       Plan:       Next wound vac change Friday   Cont abx   D/c dispo: [facility]       Nephrology progress note 10/11/22:   Assessment and Plan       JEMIMA on HD: oliguria   - 2/2 ATN   - HD Schedule: TTS in hospital    - First dialysis 9/12/22   - Outpatient unit: [facility] MWF chair per CM notes   - Access: permacath placed on 9/16   - HD TTS for now        Anemia:   - MICHAEL increased (10/6) 20 k TTS   - goal is for Hgb 10-11   - on oral iron   - check iron studies last completed 9/10       PAD s/p b/l BKA       Recent sepsis   - on Merrem 500 mg q 24 hrs per ID recommends 2 more weeks until 10/14       ABD wall abscess:   - J-tube removal, colostomy, I&D of abd wound   - TPN/ lipids per primary team, encouraging increase oral intake   - s/p I&D of abdominal abscess 9/29   - 10/5 Wound vac placed       Empyema   Sacral decub   Type 2DM   Afib              Wound and Skin Management GRG - Care Day 31 (10/10/2022) by Neel Reilly       Review Status Review Entered   Completed 10/11/2022 1241       Created By   [id number] [facility], [address] [place] [address] Day: 31 Care Date: 10/10/2022 Level of Care: Telemetry    Guideline Day 3    Level Of Care    (X) * Activity level acceptable    10/11/2022 12:40:55 EDT by Jordy Oh      turns self    (X) Floor to discharge    10/11/2022 12:40:55 EDT by Jordy Oh      inpatient remote telemetry    Clinical Status    (X) * Temperature status acceptable    10/11/2022 12:40:55 EDT by Jordy Oh      98.2 °F, 58, 116/66, 18, 96% RA    (X) * No infection, or status acceptable    10/11/2022 12:40:55 EDT by Jordy Oh      WBC: 9.0 (normal); continues antibiotic    ( ) * Pain and nausea absent or adequately managed    10/11/2022 12:40:55 EDT by Jordy Oh      reports pain scale 10/10  pain location: legs ( ) * Wound and ulcer problems absent or status acceptable    10/11/2022 12:40:55 EDT by Skyla Rose      Left BKA wound slowly cleaning up with increased granulation. Still areas of eschar present medially. (X) * Burn injury absent or acceptable for next level of care    10/11/2022 12:40:55 EDT by Skyla Rose      absent    (X) * Compartment syndrome absent    10/11/2022 12:40:55 EDT by Skyla Rose      absent    (X) * Skin disease absent or acceptable for next level of care    10/11/2022 12:40:55 EDT by Skyla Rose      absent    ( ) * General Discharge Criteria met    Interventions    (X) * Intake acceptable    10/11/2022 12:41:40 EDT by Skyla Rose      protoix 40mg BID PO, zoloft 25mg QD PO    10/11/2022 12:40:55 EDT by Skyla Rose      ADULT DIET Regular; Low Potassium (Less than 3000 mg/day)     med: tylenol 650mg q6 prn PO 1x, eliquis 5mg BID PO, ferrous sulfate 325mg BID PO, neurontin 100mg TID PO, dilaudid 4mg q4 prn PO 3x, risaquad 1cap QD PO, remeron 7.5mg QD PO,    ( ) * No inpatient interventions needed    10/11/2022 12:40:55 EDT by Skyla Rose      benadryl 12.5mg q6 prn IV 1x, merrem 500mg q24 IV, zofran 4mg q6 prn IV 2x, dilaudid 1mg q3 prn IV 3x    * Milestone   Additional Notes    DATE: 10/10/2022      Pertinent Updates:   -Feels nauseated this morning   -Pain controlled on IV dilaudid   -Auth pending   -Left BKA wound slowly cleaning up with increased granulation. Still areas of eschar present medially.     -deescalate IV dilaudid to PO liquid, wean as able      Abnl/Pertinent Labs/Radiology/Diagnostic Studies:   RBC: 2.93 (L)   HGB: 8.5 (L)   HCT: 27.8 (L)   RDW: 20.2 (H)   PLATELET: 127 (H)   Chloride: 109 (H)   BUN: 32 (H)   Creatinine: 2.49 (H)   Calcium: 7.9 (L)   Phosphorus: 5.0 (H)   Albumin: 1.9 (L)   eGFR: 30 (L)      Physical Exam:   Same as yesterday      MD Consults/Assessments & Plans:   **Vascular Surgery**   Left BKA wound slowly cleaning up with increased granulation. Still areas of eschar present medially. Continue wound care      **Hospitalist**   Assessment & Plan:   Dislodged JG tube with surrounding peritonitis. Free air and perirectal fistula. Patient was sent from [address] [place] [address] for intractable abdominal pain. Severe sepsis without septic shock due to intra-abdominal source of infection. .   -He was started on broad-spectrum empiric antibiotics on admission and underwent the following procedures:   -Gen Sx: 9/10 Lap take down and removal J-tube, Lap colostomy, I&D abdominal wall   -Colorectal Sx: 9/15 anorectal wound exam including rigid proctosigmoidoscopy   -ID were consulted and assisted with antibiotics management   Eraxis/Zosyn/Vancomycin completed 9/23/22, then PO Augmentin x4d and switched back to IV d/t persistent leukocytosis   Cont IV Merrem x2 weeks, last dose 10/13   Haley catheter in place   -C. difficile, blood culture negative   -CT chest 9/10: Small right basilar gas and fluid containing pleural collection with a   peripheral soft tissue rind. Finding may represent an empyema and clinical correlation is recommended. Recommend direct comparison to any additional prior imaging. Free intraperitoneal gas, seen on prior CT of the abdomen and pelvis. -CT abd/pel wo 9/14:No focal intra-abdominal fluid collection to suggest abscess. Free intraperitoneal gas consistent with recent intra-abdominal surgery. Interval improvement in rectal thickening. Prior CT dated September 9, 2022 demonstrated a probable right posterior rectal wall defect which is no longer visualized on today's examination.  Persistent, small right basilar gas and fluid containing pleural collection, unchanged.   -wound vac, ostomy care   -deescalate IV dilaudid to PO liquid, wean as able       JEMIMA vs CKD requiring dialysis: CVC 9/16/22- IR   -Continued on hemodialysis on MWF schedule   -Access, permacath placed on 9/16   --10/05 Outpatient unit: [facility] Currently, on MICHAEL       H/o Type 2 diabetes mellitus   S/p TPN   SSI not requiring, dc'd       AFIB: rate stable   -eliquis 5mg BID   -ECHO : Left Ventricle: The EF by visual approximation is 55 - 60%. Left ventricle size is normal. Normal wall thickness. Normal wall motion. Normal diastolic function. Tricuspid Valve: Mildly elevated RVSP. The estimated RVSP is 42 mmHg       Empyema: noted: recent thoracotomy and prolonged intubation at [facility]. -CXR: Persistent right basilar airspace disease and right-sided loculated effusion/empyema. -CT chest 9/10: Small right basilar gas and fluid containing pleural collection with a   peripheral soft tissue rind. Finding may represent an empyema and clinical correlation is recommended. Recommend direct comparison to any additional prior imaging. Free intraperitoneal gas, seen on prior CT of the abdomen and pelvis. -Pulmonary consulted: noted \"percutaneous drainage would not be successful at removing fluid as I suppose it is very thick and organized at this time. Would only recommend following clinically and radiographically. If worsens or worsened right-sided effusion he would need to be reevaluated by his thoracic surgeon at [facility] for additional drainage. \"   -IV ABT's course as above       Acute blood loss anemia on chronic disease: stable   --on MICHAEL per Nephr       +Occult Blood: no melena, no n/v   -PPI   -iron supplements   -monitor Hgb, stable      Depression: continue zoloft       Thrombocytosis: likely reactive thrombocytosis, monitor       Epigastric J site wound with surrounding abscess, POA   --Patient underwent incision and drainage on 9/10       Bilateral BKA, left BKA dehiscence and possible infection   -Vascular Sx followin/26-sutures removed and some eschar along incision line excised, open cavity laterally has old hematoma at base that was evacuated    -CT of the left BKA stump on  showed several irregular soft tissue defects overlying the amputation site with a 4.4 x 4.1 x 1.9 cm amorphous fluid collection with partial thick walled/rim-enhancing and internal gas locules. 9/29- Leg wound growing Pseudomonas   Vascular following- cont wound care   continue antibiotics as above       Anorectal wound, POA   --Followed by colorectal surgery. He is status post proctosigmoidoscopy on 9/15, there was communication of the distal rectal lumen with extraperitoneal pelvis. Acute pain syndrome. This is due to the multiple surgeries he had. Anticipate some degree of tolerance due to his continued exposure to opioids   --Current pain regimen includes scheduled gabapentin 100 mg 3 times daily   -- As needed Dilaudid- wean as able       Hypernatremia/hyperchloremia, improved s/p D5W   Monitor      **Nephrology**   Assessment and Plan   JEMIMA on HD: oliguria   - 2/2 ATN   - HD Schedule: TTS in hospital    - First dialysis 9/12/22   - Outpatient unit: [facility] chair per CM notes   - Access: permacath placed on 9/16   - HD TTS for now    - labs on HD       Anemia:   - MICHAEL increased (10/6) 20 k TTS   - goal is for Hgb 10-11   - on oral iron   - check iron studies last completed 9/10       PAD s/p b/l BKA       Recent sepsis   - on Merrem 500 mg q 24 hrs per ID recommends 2 more weeks until 10/14       ABD wall abscess:   - J-tube removal, colostomy, I&D of abd wound   - TPN/ lipids per primary team, encouraging increase oral intake   - s/p I&D of abdominal abscess 9/29   - 10/5 Wound vac placed      **Nutrition**   Nutrition Recommendations/Plan:    Continue with Regular diet  - will add double protein portions to hopefully improve his protein intake (he declines all ONS)          PT/OT/SLP/CM Assessments or Notes:   **Physical therapy**   ASSESSMENT   today performed sliding board transfer with mod assist x 1 and min assist x 1.   Prior to sliding board transfer, discussed with wound care RN - anorectal skin integrity with sliding board transfer and sitting in chair with waffle cushion  Pt continues strongly motivated and demonstrates potential to increase independence with transfers. Pt was able to ascencion right BKA prosthesis and achieved appropriate fit to prevent pistoning for minimal weight bearing during sliding board transfer. PT to contact prosthesis to obtain proper liner socks to progress to sit to stand (pending continued stable medical status - Prosthetist scheduled to take a mold for a new socket.)  Recommend inpatient rehab as pt has potential to become independent at wheelchair level and progress to gait training when left stump healed appropriately. PLAN :   Goals have been updated based on progression since last assessment. Patient continues to benefit from skilled intervention to address the above impairments. Recommendations and Planned Interventions: bed mobility training, transfer training, pre -gait training (standing) , therapeutic exercises, patient and family training/education, and therapeutic activities        ADDENDUM:   ASSESSMENT   Pt with limited ability to assist with transfer back to bed due to height distance from drop arm bedside chair to hospital bed - higher surface. Patient also limited by discomfort from sitting in upholstered chair with waffle cushion vs. Medical pressure relieving cushion for use in w/c to allow for proper positioning. PLAN :   Patient continues to benefit from skilled intervention to address the above impairments. Continue treatment per established plan of care. to address goals. **Occupational therapy**   ASSESSMENT   Patient's progress with therapy has been largely impacted by environmental constraints in the hospital setting (including mattress type, availability of appropriate chairs for transfers, etc).   After trial during prior session, patient tolerated EOB activity with current mattress firmness setting adjusted (4 is optimal setting for future reference) in spite of sacral pain. Patient's balance was largely intact though some assist required to move back into upright position following wilbur area bathing. Patient transferred to drop arm recliner chair using transfer board. 2 person assist (Mod plus CGA) provided for safety and to ensure cushion remained in the appropriate place, and that board did not slide (board was very short). Patient was quite happy to be out of bed today. He maintains a motivated attitude toward his recovery and participated well in all activity in spite of pain. PLAN :   Goals have been updated based on progression since last assessment. Patient continues to benefit from skilled intervention to address the above impairments. Continue to follow patient 5 times a week to address goals.            Wound [facility] Skin Management [facility] - Care Day 30 (10/9/2022) by Yadiel Galvez Status Review Entered   Completed 10/11/2022 1224       Created By   Claudia Campoverde, [address] [place] [address] Day: 30 Care Date: 10/9/2022 Level of Care: Telemetry    Guideline Day 3    Level Of Care    (X) * Activity level acceptable    10/11/2022 12:24:13 EDT by Jonathan Bnudy      turn self    (X) Floor to discharge    10/11/2022 12:24:13 EDT by Jonathan Bundy      inpatient remote telemetry    Clinical Status    (X) * Temperature status acceptable    10/11/2022 12:24:13 EDT by Jonathan Bundy      98.4 °F, 54, 121/54, 18, 94% RA    (X) * No infection, or status acceptable    10/11/2022 12:24:13 EDT by Jonathan Bundy      none noted today    ( ) * Pain and nausea absent or adequately managed    10/11/2022 12:24:13 EDT by Jonathan Bundy      reports pain scale 8/10    ( ) * Wound and ulcer problems absent or status acceptable    10/11/2022 12:24:13 EDT by Jonathan Bundy      multiple wounds, skin w/d, no jaundice    (X) * Burn injury absent or acceptable for next level of care    10/11/2022 12:24:13 EDT by Claudia Campoverde, Tyler      absent    (X) * Compartment syndrome absent    10/11/2022 12:24:13 EDT by Conchita Garvin      absent    (X) * Skin disease absent or acceptable for next level of care    10/11/2022 12:24:13 EDT by Conchita Garvin      absent    ( ) * General Discharge Criteria met    Interventions    (X) * Intake acceptable    10/11/2022 12:24:13 EDT by Conchita Garvin      ADULT DIET Regular; Low Potassium (Less than 3000 mg/day)     med: eliquis 5mg BID PO, ferrous sulfate 325mg BID PO, neurontin 100mg TID PO, risaquad 1cap QD PO, remeron 7.5mg QD PO, protoix 40mg BID PO, zoloft 25mg QD PO, effer-k 10meq PO 1x,    ( ) * No inpatient interventions needed    10/11/2022 12:24:13 EDT by Conchita Garvin      benadryl 12.5mg q6 prn IV 1x, retacrit 20,000unit SQ 1x, merrem 500mg q24 IV, zofran 4mg q6 prn IV 1x, dilaudid 1mg q3 prn IV 5x    * Milestone   Additional Notes    DATE: 10/09/2022      Pertinent Updates:   -Virgie Hicks still pending      Abnl/Pertinent Labs/Radiology/Diagnostic Studies:   Potassium: 3.4 (L)   BUN: 22 (H)   Creatinine: 1.50 (H)   Calcium: 7.8 (L)   eGFR: 56 (L)      Physical Exam:   Same as yesterday      MD Consults/Assessments & Plans:   **Hospitalist**   Assessment & Plan:   Dislodged JG tube with surrounding peritonitis. Free air and perirectal fistula. Patient was sent from [facility] for intractable abdominal pain. Severe sepsis without septic shock due to intra-abdominal source of infection. .   -He was started on broad-spectrum empiric antibiotics on admission and underwent the following procedures:   -Gen Sx: 9/10 Lap take down and removal J-tube, Lap colostomy, I&D abdominal wall   -Colorectal Sx: 9/15 anorectal wound exam including rigid proctosigmoidoscopy   -ID were consulted and assisted with antibiotics management   Eraxis/Zosyn/Vancomycin completed 9/23/22, then PO Augmentin x4d and switched back to IV d/t persistent leukocytosis   Cont IV Merrem x2 weeks, last dose 10/13   Haley catheter in place   -C. difficile, blood culture negative   -CT chest 9/10: Small right basilar gas and fluid containing pleural collection with a   peripheral soft tissue rind. Finding may represent an empyema and clinical correlation is recommended. Recommend direct comparison to any additional prior imaging. Free intraperitoneal gas, seen on prior CT of the abdomen and pelvis. -CT abd/pel wo 9/14:No focal intra-abdominal fluid collection to suggest abscess. Free intraperitoneal gas consistent with recent intra-abdominal surgery. Interval improvement in rectal thickening. Prior CT dated September 9, 2022 demonstrated a probable right posterior rectal wall defect which is no longer visualized on today's examination. Persistent, small right basilar gas and fluid containing pleural collection, unchanged.   -wound vac, ostomy care       JEMIMA vs CKD requiring dialysis: CVC 9/16/22- IR   -Continued on hemodialysis on MWF schedule   -Access, permacath placed on 9/16   --10/05 Outpatient unit: [facility] MWF , TTS HD Currently, on MICHAEL       H/o Type 2 diabetes mellitus   S/p TPN   SSI not requiring, dc'd       AFIB: rate stable   -eliquis 5mg BID   -ECHO 9/26: Left Ventricle: The EF by visual approximation is 55 - 60%. Left ventricle size is normal. Normal wall thickness. Normal wall motion. Normal diastolic function. Tricuspid Valve: Mildly elevated RVSP. The estimated RVSP is 42 mmHg       Empyema: noted: recent thoracotomy and prolonged intubation at [facility]. -CXR9/19: Persistent right basilar airspace disease and right-sided loculated effusion/empyema. -CT chest 9/10: Small right basilar gas and fluid containing pleural collection with a   peripheral soft tissue rind. Finding may represent an empyema and clinical correlation is recommended. Recommend direct comparison to any additional prior imaging. Free intraperitoneal gas, seen on prior CT of the abdomen and pelvis.    -Pulmonary consulted: noted \"percutaneous drainage would not be successful at removing fluid as I suppose it is very thick and organized at this time. Would only recommend following clinically and radiographically. If worsens or worsened right-sided effusion he would need to be reevaluated by his thoracic surgeon at [facility] for additional drainage. \"   -IV ABT's course as above       Acute blood loss anemia on chronic disease: stable   --on MICHAEL per Nephr       +Occult Blood: no melena, no n/v   -PPI   -iron supplements   -monitor Hgb, stable       AMS: delirium, hallucinations ? / to toxic metabolic encephalopathy, hospital delirium, and opioid induced   -CT head : no acute intracranial abnormality   -Alert and oriented since        Depression: continue zoloft       Thrombocytosis: noted reactive thrombocytosis, monitor       Epigastric J site wound with surrounding abscess, POA   --Patient underwent incision and drainage on 9/10       Bilateral BKA, left BKA dehiscence and possible infection   -Vascular Sx followin/26-sutures removed and some eschar along incision line excised, open cavity laterally has old hematoma at base that was evacuated    -CT of the left BKA stump on  showed several irregular soft tissue defects overlying the amputation site with a 4.4 x 4.1 x 1.9 cm amorphous fluid collection with partial thick walled/rim-enhancing and internal gas locules. - Leg wound growing Pseudomonas   Vascular has seen   continue antibiotics as above       Anorectal wound, POA   --Followed by colorectal surgery. He is status post proctosigmoidoscopy on 9/15, there was communication of the distal rectal lumen with extraperitoneal pelvis. Acute pain syndrome. This is due to the multiple surgeries he had.   Anticipate some degree of tolerance due to his continued exposure to opioids   --Current pain regimen includes scheduled gabapentin 100 mg 3 times daily   -- As needed Dilaudid

## 2022-10-13 NOTE — PROGRESS NOTES
Transition of Care: [facility] Nirmala Sanderson has accepted today 10/13; insurance has been authorized today 10/13      Camille ([facility] Rehab [phone])     NOTE: patient also has OP HD set up for after rehab: Outpatient HD chair at [facility], [place], operated by [facility] chair time 3:15pm M/W/F schedule  Inderjit Barba [facility]  admissions  [phone]). [facility] [place], operated by [facility]- [phone]- contact- Sis Varela        Transport- [facility] is scheduled for 4pm transport today 10/13    RUR: 12%     Main contact is wifeLeida Silver- [phone]: Catarina at [facility] called to inform that insurance was authorized and patient can go to [facility] today 10/13; this CM perfectedserved attending to inform and updated RN at bedside; updated patient at bedside and called his wife on phone to inform; they verbalized understanding    [phone]: sent [employer] message to [facility] updating them of patient discharging today to [facility]; they are requesting dc summary and clinicals uploaded to [address] [place] [address]: this CM faxed discharge summary, discharge instructions, wound care note, todays dialysis note to [facility]; also uploaded all to [employer] per Cierra request      Inpatient [address] [facility] to Hospital Transition of Care Note /Discharge Note       EMTALA filed and ready for MD to sign at bedside chart. Accepting Physician: Elliot Tyson    Discharging Physician: CAROLE Robertson    Accepting Representative: Arthur Schofield    Accepting Facility:/[facility]    RN call to report: [phone]    Transport: [facility] phone [phone]      time: 1600    Ambulance packet at bedside chart. Family notified: CM called the family member (wife) and she is in agreement with the discharge plan. The attending physician and the primary nurse were notified of the plan.        CM following  Sarah Ervin RN    Packet and Emtala form on patient chart            NOTE:     The patient has been hospitalized in the [place] area since the last week of June 2022. .  patient has bilateral BKA; This started at [facility]. The patient was transferred from [facility] to [facility] last week. Per the wife, \"He was only there 2-3 days and d/c'd to [facility]. The patient is no longer employed and receives disability. He lives with his wife (Lindsay)-([phone] in a one story home. He was independent in ADL's (prior to June of this year). Following his first orthopedic surgery (r) AKA he received a (r) prosthetic device and ambulated with a cane.    Pharmacy:  [facility]    primary: MALLORY policy # [id number]         Revision History                                                                                                                            Revision History                                                                          Revision History                                                              Revision History

## 2022-10-13 NOTE — DISCHARGE INSTRUCTIONS
Discharge SNF/Rehab Instructions/LTAC       PATIENT ID: Neto Duran  MRN: [id number]   YOB: 1970    DATE OF ADMISSION: 9/9/2022  8:00 PM    DATE OF DISCHARGE: 10/13/2022    PRIMARY CARE PROVIDER: Enedina Barksdale MD       ATTENDING PHYSICIAN: Diego Lester MD  DISCHARGING PROVIDER: Rickey Bynum PA-C     To contact this individual call [phone] and ask the  to page. If unavailable ask to be transferred the Adult Hospitalist Department. CONSULTATIONS: IP CONSULT TO GENERAL SURGERY  IP CONSULT TO VASCULAR SURGERY  IP CONSULT TO GENERAL SURGERY  IP CONSULT TO NEPHROLOGY  IP CONSULT TO INFECTIOUS DISEASES  IP CONSULT TO PULMONOLOGY    PROCEDURES/SURGERIES: Procedure(s):  ABDOMINAL Wall DEBRIDEMENT    ADMITTING [address] [place] [address] COURSE:   Intra-abdominal infection        DISCHARGE DIAGNOSES / PLAN:       Dislodged JG tube with surrounding peritonitis. Free air and perirectal fistula. Patient was sent from [facility] for intractable abdominal pain. Severe sepsis without septic shock due to intra-abdominal source of infection. .  -He was started on broad-spectrum empiric antibiotics on admission and underwent the following procedures:  - Gen Sx: 9/10 Lap take down and removal J-tube, Lap colostomy, I&D abdominal wall  - Colorectal Sx: 9/15 anorectal wound exam including rigid proctosigmoidoscopy  - ID consulted and assisted with antibiotics management  - ABX: Eraxis/Zosyn/Vancomycin completed 9/23/22, then PO Augmentin x4d and switched back to IV d/t persistent leukocytosis  - Complete IV Merrem x2 weeks, last dose today 10/13  - Haley catheter in place, will remove prior to discharge  - C. difficile, blood culture negative  - CT chest 9/10: Small right basilar gas and fluid containing pleural collection with a  peripheral soft tissue rind. Finding may represent an empyema and clinical correlation is recommended. Recommend direct comparison to any additional prior imaging.  Free intraperitoneal gas, seen on prior CT of the abdomen and pelvis. - CT abd/pel wo 9/14:No focal intra-abdominal fluid collection to suggest abscess. Free intraperitoneal gas consistent with recent intra-abdominal surgery. Interval improvement in rectal thickening. Prior CT dated September 9, 2022 demonstrated a probable right posterior rectal wall defect which is no longer visualized on today's examination. Persistent, small right basilar gas and fluid containing pleural collection, unchanged. - Will remove wound vac prior to discharge  - Ostomy care     JEMIMA vs CKD requiring dialysis: [facility] 9/16/22- IR  - Continued on hemodialysis on MWF schedule  - Access, permacath placed on 9/16  - Outpatient unit: [facility] MWF , TTS HD Currently, on MICHAEL     H/o Type 2 diabetes mellitus  - S/p TPN  - SSI dc'd, patient hasn't required     AFIB: rate stable  - Eliquis 5mg BID  - ECHO 9/26: Left Ventricle: The EF by visual approximation is 55 - 60%. Left ventricle size is normal. Normal wall thickness. Normal wall motion. Normal diastolic function. Tricuspid Valve: Mildly elevated RVSP. The estimated RVSP is 42 mmHg     Empyema: noted: recent thoracotomy and prolonged intubation at [facility]. - CXR9/19: Persistent right basilar airspace disease and right-sided loculated effusion/empyema.  - CT chest 9/10: Small right basilar gas and fluid containing pleural collection with a  peripheral soft tissue rind. Finding may represent an empyema and clinical correlation is recommended. Recommend direct comparison to any additional prior imaging. Free intraperitoneal gas, seen on prior CT of the abdomen and pelvis. - Pulmonary consulted: noted \"percutaneous drainage would not be successful at removing fluid as I suppose it is very thick and organized at this time. Would only recommend following clinically and radiographically.   If worsens or worsened right-sided effusion he would need to be reevaluated by his thoracic surgeon at [facility] for additional drainage. \"  - IV ABT's course as above     Acute blood loss anemia on chronic disease: stable  -- on MICHAEL per Nephrology     +Occult Blood: no melena, no n/v  - PPI, iron supplements  - monitor Hgb, stable     Delirium, hallucinations ? 2/2 to toxic metabolic encephalopathy, hospital delirium, and opioid induced - resolved  - CT head : no acute intracranial abnormality  - Alert and oriented since      Depression: continue zoloft     Thrombocytosis: likely reactive thrombocytosis, monitor     Epigastric J site wound with surrounding abscess, POA  --Patient underwent incision and drainage on 9/10     Bilateral BKA, left BKA dehiscence and possible infection  -Vascular Sx followin/26-sutures removed and some eschar along incision line excised, open cavity laterally has old hematoma at base that was evacuated   -CT of the left BKA stump on  showed several irregular soft tissue defects overlying the amputation site with a 4.4 x 4.1 x 1.9 cm amorphous fluid collection with partial thick walled/rim-enhancing and internal gas locules. - Leg wound growing Pseudomonas  - Vascular following- cont wound care  - continue antibiotics as above  last dose 10/13     Anorectal wound, POA  --Followed by colorectal surgery. He is status post proctosigmoidoscopy on 9/15, there was communication of the distal rectal lumen with extraperitoneal pelvis. Acute pain syndrome. This is due to the multiple surgeries he had.   Anticipate some degree of tolerance due to his continued exposure to opioids  --Current pain regimen includes scheduled gabapentin 100 mg 3 times daily  --As needed Dilaudid- wean as able     Hypernatremia/hyperchloremia, improved s/p D5W  - Monitor     Hypokalemia, repleted  - Mag wnl          PENDING TEST RESULTS:   At the time of discharge the following test results are still pending:     FOLLOW UP APPOINTMENTS:    Follow-up Information       Follow up With Specialties Details Why Contact Info    Erin, MD [facility]   Patient can only remember the practice name and not the physician      [facility] [address] to for rehab [address] [place] [id number] [facility],[address] location for outpatient dialysis  Go to Outpatient hemodialysis chair at [facility] chair time 3:15pm M/W/F schedule    [facility]  admissions  [phone]). [facility]- [phone]- contact- [address] [facility] location [phone] contact- Sis    to start after rehab at [facility] [address] [facility] [address]:     DIET: Renal Diet      ACTIVITY: Activity as tolerated    WOUND CARE: See wound care note    EQUIPMENT needed: see PT/OT notes      DISCHARGE MEDICATIONS:   See Medication Reconciliation Form      NOTIFY YOUR PHYSICIAN FOR ANY OF THE FOLLOWING:   Fever over 101 degrees for 24 hours. Chest pain, shortness of breath, fever, chills, nausea, vomiting, diarrhea, change in mentation, falling, weakness, bleeding. Severe pain or pain not relieved by medications. Or, any other signs or symptoms that you may have questions about. DISPOSITION:    Home With:   OT  PT  HH  RN       SNF/Inpatient Rehab/LTAC    Independent/assisted living    Hospice    Other:       PATIENT CONDITION AT DISCHARGE:     Functional status    Poor     Deconditioned     Independent      Cognition     Lucid     Forgetful     Dementia      Catheters/lines (plus indication)    Romero     PICC     PEG     None      Code status     Full code     DNR      PHYSICAL EXAMINATION AT DISCHARGE:     Constitutional: Chronically ill-appearing, alert, NAD but nauseated   Resp:  CTA bilaterally.  No accessory muscle use   CV:  RRR, no audible m/r/g    GI/: Abd wound vac in place, ostomy in place, site clean, brown output, nondistended    Musculoskeletal:  No edema, warm, bilateral BKA Neurologic:  Skin:  Psych:  Moves all extremities.  Awake and alert   multiple wounds, skin w/d, no jaundice   Calm, Not anxious or agitated         CHRONIC MEDICAL DIAGNOSES:  Problem List as of 10/13/2022 Date Reviewed: 9/10/2022            Codes Class Noted - Resolved    Injury to rectum without open wound into cavity ICD-10-CM: S36.60XA  ICD-9-CM: 863.45  9/20/2022 - Present        Open wound of anus ICD-10-CM: D60.975O  ICD-9-CM: 863.89  9/20/2022 - Present        Severe protein-calorie malnutrition ([facility] ICD-10-CM: E43  ICD-9-CM: 262  9/13/2022 - Present        * (Principal) Intra-abdominal infection ICD-10-CM: B99.9  ICD-9-CM: 136.9  9/10/2022 - Present        Gas gangrene ([facility] ICD-10-CM: A48.0  ICD-9-CM: 040.0  7/2/2018 - Present        Uncontrolled diabetes mellitus ICD-10-CM: [id number]  ICD-9-CM: 250.02  7/2/2018 - Present        Diabetic foot infection ([facility] ICD-10-CM: E11.628, L08.9  ICD-9-CM: 250.80, 686.9  7/2/2018 - Present             CDMP Checked:   Yes x     PROBLEM LIST Updated:  Yes x         Signed:   Brandi Darling PA-C  10/13/2022  2:46 PM

## 2022-10-13 NOTE — PROGRESS NOTES
Patient off the floor for HD. Planned to dc to rehab today.      Will follow up later today v tomorrow    Thank you  Misael Hussein MS OTR/L

## 2022-10-13 NOTE — PROGRESS NOTES
Nephrology Progress Note  Colletta Clamp  Date of Admission : 9/9/2022    CC:  Follow up for JEMIMA       Assessment and Plan     JEMIMA on HD: oliguria  - 2/2 ATN  - HD Schedule: TTS in hospital   - First dialysis 9/12/22  - Outpatient unit: [facility] chair per CM notes  - Access: permacath placed on 9/16  - HD TTS for now     Anemia:  -Continue MICHAEL  - S/p IV Venofer    PAD s/p b/l BKA    Recent sepsis  -Meropenem to stop tomorrow    ABD wall abscess:  - J-tube removal, colostomy, I&D of abd wound  - TPN/ lipids per primary team, encouraging increase oral intake  - s/p I&D of abdominal abscess 9/29  - 10/5 Wound vac placed    Empyema  Sacral decub  Type 2DM  Afib       Interval History: In and examined on dialysis. Reports doing well. Abdominal pain is better  Hemodynamically stable with slightly elevated blood pressure. Current Medications: all current  Medications have been eviewed in EPIC  Review of Systems: A comprehensive review of systems was negative except for that written in the HPI. Objective:  Vitals:    Vitals:    10/13/22 0352 10/13/22 0557 10/13/22 0749 10/13/22 1000   BP:   (!) 156/87    Pulse: (!) 57 64 (!) 56 (!) 59   Resp:   16    Temp:   98.2 °F (36.8 °C)    TempSrc:       SpO2:   96%    Weight:       Height:         Intake and Output:  No intake/output data recorded. 10/11 1901 - 10/13 0700  In: -   Out: 1775     Physical Examination:  General: NAD oriented to person/place today  Neck:  Supple, no mass  Resp:  Lungs CTA B/L, no wheezing , normal respiratory effort  CV:  RRR,  no murmur or rub, no stump edema  GI:  Soft, NT, + Bowel sounds, + ostomy  Access:           [facility]    Lab Data Personally Reviewed: I have reviewed all the pertinent labs, microbiology data and radiology studies during assessment.     Recent Labs     10/11/22  0336      K 4.1   *   CO2 23   GLU 82   BUN 45*   CREA 3.47*   CA 8.1*   PHOS 6.1*   ALB 1.8*       No results for input(s): WBC, HGB, HCT, PLT, HGBEXT, HCTEXT, PLTEXT, HGBEXT, HCTEXT, PLTEXT in the last 72 hours. No results found for: SDES  Lab Results   Component Value Date/Time    Culture result: (A) 09/29/2022 09:21 PM     MODERATE PROTEUS MIRABILIS REFER TO [id number] FOR SENSITIVITIES    Culture result: MODERATE PSEUDOMONAS AERUGINOSA (A) 09/29/2022 09:21 PM    Culture result: NO ANAEROBES ISOLATED 09/29/2022 09:21 PM     Recent Results (from the past 24 hour(s))   HEP B SURFACE AG    Collection Time: 10/13/22  7:01 AM   Result Value Ref Range    Hepatitis B surface Ag <0.10 Index    Hep B surface Ag Interp. Negative NEG                     Leatha Kolb MD  [address]  Phone - [phone]   Fax - [phone]  [email or web address]

## 2022-10-14 NOTE — PROGRESS NOTES
TRANSFER - OUT REPORT:    Verbal report given to DOMINGA Rodas (name) on Carlie Flower  being transferred to [facility] and [facility] (Room 99A) for routine progression of care       Report consisted of patients Situation, Background, Assessment and   Recommendations(SBAR). Information from the following report(s) SBAR was reviewed with the receiving nurse. Lines:       Opportunity for questions and clarification was provided.       Patient transported with:   [facility]

## 2023-02-02 NOTE — CONSULTS
Palliative Medicine Consult  Christofer: [phone] ([phone])    Patient Name: Cirilo Frias  YOB: 1970    Date of Initial Consult: 9/14/22  Reason for Consult: Other  Requesting Provider: Karlie Fair    Primary Care Physician: Erin, MD Kenroy     SUMMARY:   Cirilo Frias \"JESUS\" is a 45 y.o. with a past history of DM, peripheral artery disease w/ b/l BKA who was admitted on 9/9/2022 from [facility] with abdominal pain. Had recent prolonged hospital stay at [address] [place] [address] (only at [facility] for several days) where he had LLE amputation, complicated by resp failure, aspiration PNA, effusion that required decortication and R thoracotomy and prolonged intubation. Also w/ JEMIMA and started on dialysis, J tube placed for TFs. Concern for bowel perforation- taken to surgery and found to have abdominal wall abscess from leaking J tube and perirectal fistula w/p laparoscopy colostomy, removal of J tube and I&D of abdominal wall. On IV abx , other issues incl sacral decub and skin breakdown at [address] [place] [address] sites. Current medical issues leading to Palliative Medicine involvement include: overwhelming sx. Pain control has been difficult. No hx of chronic pain or chronic opioid use-  reviewed. Social:  to Jamil- prior to June 2022 was able to do all ADLs. They have 1 daughter and 1 grandchild. PALLIATIVE DIAGNOSES:   Acute abdominal pain s/p surgery for abscess and colectomy - slowly improving  Loose stool w/ abx   Rectal pain - may require anorectal exam under anesthesia  Debility/generalized weakness after prolonged hospital stay       PLAN:   Opioid safety discussed yesterday and today added to it that these medications are for his acute and surgical pain that will resolve- he is not going to need to take these medications for a prolonged period of time. Pt will need rehab after hospital stay - they will be able to taper off opioids. Pt understands and agrees with plan.    Continues to report pain, but does feel that the \"spikes\" of pain not as intense w/ the scheduled po Dilaudid. Note that in the past 24h he has used 6 doses of the 1mg IV  Dilaudid rather than 8 doses which he used the previous 24h. No sedation , confusion, decr in respirations or other side effects. While relatively opioid naive before stay at [facility], I think he developed some tolerance while getting opioids in the ICU. Patient with multiple reasons for severe and acute pain although I would not rely on his numeric pain score alone, would also look at nonverbal pain signs. Acute pain:   Incr the scheduled Dilaudid liquid  4mg po every 4h while awake. Hold for sedation, confusion, RR <12, SBP >95 and patient may refuse. May give along w/ IV Dilaudid as long as doses are >30 min apart. Cont current dose of Dilaudid 0.5-1mg IV every 2h prn. Would not anticipate needing to increase opioids further. Primary team to continue to manage, as our team does typically not manage non malignant pain but here to assist if we can. No bowel regimen while on abx- already w/ loose stool. Monitor for constipation.   Communicated plan of care with: Palliative ID[facility] Team incl Dr Mariya Jimenes and floor      GOALS OF CARE / TREATMENT PREFERENCES:     GOALS OF CARE:  Patient/Health Care Proxy Stated Goals: Prolong life    TREATMENT PREFERENCES:   Code Status: Full Code    Patient and family's personal goals include: recover       Primary Decision MakerBelita Deo - Spouse - [phone]    Advance Care Planning:  [x] The [facility] Interdisciplinary Team has updated the ACP Navigator with Margaret and Patient Capacity      Advance Care Planning 7/2/2018   Patient's Healthcare Decision Maker is: Legal Next of Lazaro 68   Primary Decision Maker Name James Michelle   Primary Decision Maker Phone Number [phone]   Primary Decision Maker Relationship to Patient Spouse   Confirm Advance Directive None   Patient Would Like to Complete Advance Directive No       Medical Interventions: Full interventions       Other:    As far as possible, the palliative care team has discussed with patient / health care proxy about goals of care / treatment preferences for patient. HISTORY:     History obtained from: Pt, chart, staff    CHIEF COMPLAINT: pain     HPI/SUBJECTIVE:    The patient is:   [x] Verbal and participatory  [] Non-participatory due to:     Pt was able to eat most all of his dinner. Now npo as going to the OR w/ Dr Cross Factor later today.      Clinical Pain Assessment (nonverbal scale for severity on nonverbal patients):   Clinical Pain Assessment  Severity: 8  Location: abdomen, rectum  Character: sharp, stabbing  Duration: a few weeks  Effect: harder to sleep and move  Factors: better w/ medications, worse w/ exam  Frequency: constant     Activity (Movement): Lying quietly, normal position    Duration: for how long has pt been experiencing pain (e.g., 2 days, 1 month, years)  Frequency: how often pain is an issue (e.g., several times per day, once every few days, constant)     FUNCTIONAL ASSESSMENT:     Palliative Performance Scale (PPS):  PPS: 50       PSYCHOSOCIAL/SPIRITUAL SCREENING:     Palliative IDT has assessed this patient for cultural preferences / practices and a referral made as appropriate to needs (Cultural Services, Patient Advocacy, Ethics, etc.)    Any spiritual / Jainism concerns:  [] Yes /  [x] No   If \"Yes\" to discuss with pastoral care during IDT     Does caregiver feel burdened by caring for their loved one:   [] Yes /  [x] No /  [] No Caregiver Present/Available [] No Caregiver [] Pt Lives at Facility  If \"Yes\" to discuss with social work during IDT    Anticipatory grief assessment:   [x] Normal  / [] Maladaptive     If \"Maladaptive\" to discuss with social work during IDT    ESAS Anxiety: Anxiety: 0    ESAS Depression: Depression: 0        REVIEW OF SYSTEMS:     Positive and pertinent negative findings in ROS are noted above in HPI.  The following systems were [x] reviewed / [] unable to be reviewed as noted in HPI  Other findings are noted below. Systems: constitutional, ears/nose/mouth/throat, respiratory, gastrointestinal, genitourinary, musculoskeletal, integumentary, neurologic, psychiatric, endocrine. Positive findings noted below. Modified ESAS Completed by: provider   Fatigue: 5 Drowsiness: 0   Depression: 0 Pain: 8   Anxiety: 0 Nausea: 0   Anorexia: 7 Dyspnea: 0     Constipation: No     Stool Occurrence(s): 1        PHYSICAL EXAM:     From RN flowsheet:  Wt Readings from Last 3 Encounters:   09/15/22 164 lb 0.4 oz (74.4 kg)   07/02/18 229 lb 4.5 oz (104 kg)     Blood pressure (!) 136/59, pulse 70, temperature 99.5 °F (37.5 °C), resp. rate 17, height 5' 7\" (1.702 m), weight 164 lb 0.4 oz (74.4 kg), SpO2 98 %. Pain Scale 1: Numeric (0 - 10)  Pain Intensity 1: 10  Pain Onset 1: post op  Pain Location 1: Abdomen, Rectal  Pain Orientation 1:  (general)  Pain Description 1: Aching, Sore  Pain Intervention(s) 1: Medication (see MAR)  Last bowel movement, if known: today    Constitutional: awake, alert, oriented, no sedation or confusion   Eyes: pupils equal, anicteric  ENMT: no nasal discharge, moist mucous membranes  Cardiovascular: regular rhythm  Respiratory: breathing not labored, symmetric  Gastrointestinal: soft, non distended, ostomy bag in place w/ loose stool, incisions bandaged, TTP diffusely   Musculoskeletal: b/l BKA, LLE w/ bandage  Skin: warm, dry  Neurologic: following commands, moving all extremities  Psychiatric: full affect, no hallucinations  :       HISTORY:     Principal Problem:    Intra-abdominal infection (9/10/2022)    Active Problems:    Severe protein-calorie malnutrition ([facility] [address] (9/13/2022)    Past Medical History:   Diagnosis Date    Diabetes ([facility] [address]       History reviewed. No pertinent surgical history. History reviewed. No pertinent family history. History reviewed, no pertinent family history.   Social History     Tobacco Use    Smoking status: Never    Smokeless tobacco: Never   Substance Use Topics    Alcohol use: No     Allergies   Allergen Reactions    Bee Sting [Sting, Bee] Unknown (comments)      Current Facility-Administered Medications   Medication Dose Route Frequency    potassium phosphate 15 mmol in 0.9% sodium chloride 250 mL infusion   IntraVENous CONTINUOUS    [START ON 9/16/2022] Vancomycin - level due 9/16 with AM labs prior to HD. RN please draw. Thanks!    Other ONCE    HYDROmorphone (DILAUDID) 1 mg/mL oral solution 4 mg  4 mg Oral Q4HWA    anidulafungin (ERAXIS) 100 mg in 0.9% sodium chloride 130 mL IVPB  100 mg IntraVENous Q24H    LORazepam (ATIVAN) tablet 0.25 mg  0.25 mg Oral QHS PRN    sertraline (ZOLOFT) tablet 25 mg  25 mg Oral DAILY    epoetin vera-epbx (RETACRIT) injection 10,000 Units  10,000 Units SubCUTAneous DIALYSIS MON, WED & FRI    piperacillin-tazobactam (ZOSYN) 3.375 g in 0.9% sodium chloride (MBP/ADV) 100 mL MBP  3.375 g IntraVENous Q12H    HYDROmorphone (DILAUDID) injection 0.5-1 mg  0.5-1 mg IntraVENous Q2H PRN    collagenase (SANTYL) 250 unit/gram ointment   Topical DAILY    0.9% sodium chloride infusion 250 mL  250 mL IntraVENous PRN    glucose chewable tablet 16 g  4 Tablet Oral PRN    glucagon (GLUCAGEN) injection 1 mg  1 mg IntraMUSCular PRN    dextrose 10 % infusion 0-250 mL  0-250 mL IntraVENous PRN    ondansetron (ZOFRAN) injection 4 mg  4 mg IntraVENous Q4H PRN    diphenhydrAMINE (BENADRYL) injection 12.5 mg  12.5 mg IntraVENous Q6H PRN    sodium chloride (NS) flush 5-40 mL  5-40 mL IntraVENous Q8H    sodium chloride (NS) flush 5-40 mL  5-40 mL IntraVENous PRN    acetaminophen (TYLENOL) tablet 650 mg  650 mg Oral Q6H PRN    Or    acetaminophen (TYLENOL) suppository 650 mg  650 mg Rectal Q6H PRN    polyethylene glycol (MIRALAX) packet 17 g  17 g Oral DAILY PRN    ondansetron (ZOFRAN ODT) tablet 4 mg  4 mg Oral Q8H PRN    Or    ondansetron (ZOFRAN) injection 4 mg  4 mg IntraVENous Q6H PRN    L.acidophilus-paracasei-S.thermophil-bifidobacter (RISAQUAD) 8 billion cell capsule  1 Capsule Oral DAILY    insulin lispro (HUMALOG) injection   SubCUTAneous AC&HS    glucose chewable tablet 16 g  4 Tablet Oral PRN    glucagon (GLUCAGEN) injection 1 mg  1 mg IntraMUSCular PRN    pantoprazole (PROTONIX) 40 mg in 0.9% sodium chloride 10 mL injection  40 mg IntraVENous Q12H    dextrose 10 % infusion 0-250 mL  0-250 mL IntraVENous PRN    Vancomycin - pharmacy to dose   Other Rx Dosing/Monitoring    0.9% sodium chloride infusion 250 mL  250 mL IntraVENous PRN          LAB AND IMAGING FINDINGS:     Lab Results   Component Value Date/Time    WBC 16.9 (H) 09/15/2022 02:24 AM    HGB 7.2 (L) 09/15/2022 02:24 AM    PLATELET 451 (H) 07/36/1722 02:24 AM     Lab Results   Component Value Date/Time    Sodium 142 09/15/2022 02:24 AM    Potassium 3.4 (L) 09/15/2022 02:24 AM    Chloride 107 09/15/2022 02:24 AM    CO2 29 09/15/2022 02:24 AM    BUN 11 09/15/2022 02:24 AM    Creatinine 1.81 (H) 09/15/2022 02:24 AM    Calcium 7.7 (L) 09/15/2022 02:24 AM    Magnesium 1.7 09/15/2022 02:24 AM    Phosphorus 1.2 (L) 09/15/2022 02:24 AM      Lab Results   Component Value Date/Time    Alk.  phosphatase 86 09/10/2022 12:00 PM    Protein, total 5.8 (L) 09/10/2022 12:00 PM    Albumin 1.7 (L) 09/10/2022 12:00 PM    Globulin 4.1 (H) 09/10/2022 12:00 PM     Lab Results   Component Value Date/Time    INR 1.1 09/15/2022 02:24 AM    Prothrombin time 11.9 (H) 09/15/2022 02:24 AM      Lab Results   Component Value Date/Time    Iron 12 (L) 09/10/2022 12:00 PM    Iron 12 (L) 09/10/2022 12:00 PM    TIBC 141 (L) 09/10/2022 12:00 PM    Iron % saturation 9 (L) 09/10/2022 12:00 PM    Ferritin 715 (H) 09/10/2022 12:00 PM      No results found for: PH, PCO2, PO2  No components found for: GLPOC   No results found for: CPK, CKMB             Total time:   Counseling / coordination time, spent as noted above:   > 50% counseling / coordination?: Prolonged service was provided for  []30 min   []75 min in face to face time in the presence of the patient, spent as noted above. Time Start:   Time End:   Note: this can only be billed with 31229 (initial) or 17190 (follow up). If multiple start / stop times, list each separately. 36.3

## 2023-05-31 NOTE — PROGRESS NOTES
Problem: Self Care Deficits Care Plan (Adult)  Goal: *Acute Goals and Plan of Care (Insert Text)  Description: FUNCTIONAL STATUS PRIOR TO ADMISSION: Prior to previous hospitalization, d/c to [facility], and current admission to [facility] pt was utilizing w/c for functional mobility and required minimal assistance for transfers. HOME SUPPORT: The patient lived with his wife and daughter and required assistance for all ADL/IADL tasks. Occupational Therapy Goals  Initiated 9/15/2022, OT weekly re-assessment 9/22  1. Patient will perform basic grooming in unsupported sitting with minimal assistance/contact guard assist within 7 day(s). (Continue 9/22)  2. Patient will perform anterior neck to thigh bathing while in unsupported sitting with minimal assistance/contact guard assist within 7 day(s). (Downgrade to mod A 9/22)  3. Patient will perform drop arm BSC via lateral transfers with moderate assistance within 7 day(s). (Continue 9/22)  4. Patient will perform all aspects of toileting with moderate assistance  within 7 day(s). (At bed level 9/22)  5. Patient will participate in upper extremity therapeutic exercise/activities with supervision/set-up for 10 minutes within 7 day(s). (Continue 9/22)      Outcome: Progressing Towards Goal    OCCUPATIONAL THERAPY TREATMENT  Patient: Vaibhav Porter (89 y.o. male)  Date: 9/27/2022  Diagnosis: Intra-abdominal infection [B99.9] Intra-abdominal infection  Procedure(s) (LRB):  EUA/ PROCTOSIGMOIDOSCOPY (N/A) 12 Days Post-Op  Precautions: Fall, Other (comment) (bilateral BKA)  Chart, occupational therapy assessment, plan of care, and goals were reviewed. ASSESSMENT  Patient continues with skilled OT services and is progressing towards goals. Pt agreeable to working with therapy after explanation of goal for rehab, mobility and ADL progression. Pt pre medicated with (IV and oral Dilaudid) prior to session.  With [facility] raised to 60* and pt in R side lying, pt tolerated propping on R elbow and attempting to sit fully upright (but not able to fully achieve) for 10 minutes with CGA to mod A. Pt with increased pain with WB on sacrum, however, very motivated to work with therapy. Issued red theraband and tied to bed rail for pt to complete UE exercises in prep for transfers and ADLs    Current Level of Function Impacting Discharge (ADLs): CGA to mod A upright posture, setup to total A ADLs    Other factors to consider for discharge: B BKA         PLAN :  Patient continues to benefit from skilled intervention to address the above impairments. Continue treatment per established plan of care to address goals. Recommend with staff: encourage assist with turning, ADLs    Recommend next OT session: EOB as able, UE exercises    Recommendation for discharge: (in order for the patient to meet his/her long term goals)  Working towards tolerating IPR    This discharge recommendation:  A follow-up discussion with the attending provider and/or case management is planned    IF patient discharges home will need the following DME: TBD       SUBJECTIVE:   Patient stated the pain is terrible.     OBJECTIVE DATA SUMMARY:   Cognitive/Behavioral Status:                      Functional Mobility and Transfers for ADLs:  Bed Mobility:  Supine to Sit: Adaptive equipment;Maximum assistance;Assist x2 (pt in sideline. elevated HOB. pt utilizing UE to move trunk off the bed with assistance of 2. pt able to move trunk off bed but unable to tolerate full sitting due to pain. pt able to hold approximatly 10 min before rest. pt regained position for ostomy)    Transfers: Therapeutic Exercises:   Pt was issued red theraband and tied to bed rail. Pt is familiar with exercises from previous rehab stay.     Pain:  Increased pain at sacral, stomach    Activity Tolerance:   Good    After treatment patient left in no apparent distress:   Supine in bed and Call bell within reach    COMMUNICATION/COLLABORATION:   The patients plan of care was discussed with: Physical therapist and Registered nurse.      Natalia Philip OT  Time Calculation: 34 mins (0) blue, pale

## 2023-07-02 NOTE — PROGRESS NOTES
ID Progress Note  10/5/2022    Subjective:     Unable to get into comfortable position    Review of Systems:            Symptom Y/N Comments   Symptom Y/N Comments   Fever/Chills n      Chest Pain n       Poor Appetite       Edema        Cough       Abdominal Pain  n      Sputum       Joint Pain        SOB/ANDREWS  n     Pruritis/Rash        Nausea/vomit  n     Tolerating PT/OT        Diarrhea       Tolerating Diet        Constipation       Other           Could NOT obtain due to:       Objective:     Vitals: Visit Vitals  BP (!) 116/39 (BP 1 Location: Left upper arm, BP Patient Position: Lying right side)   Pulse 76   Temp 98.6 °F (37 °C)   Resp 10   Ht 5' 7\" (1.702 m)   Wt 70 kg (154 lb 5.2 oz)   SpO2 100%   BMI 24.17 kg/m²          Tmax:  Temp (24hrs), Av.6 °F (37 °C), Min:98.3 °F (36.8 °C), Max:99.1 °F (37.3 °C)      PHYSICAL EXAM:  General: Chronically ill appearing. WD, WN. Alert, cooperative, no acute distress    EENT:  Anicteric sclerae. Dry mucous membrane  Resp:  CTA bilaterally, no wheezing or rales. No accessory muscle use  CV:  Regular  rhythm  GI:  Soft, Non distended, Non tender. +Bowel sounds, colostomy in place, previous J tube site dressing dry and intact  Neurologic:  Alert and oriented X self, normal speech,   Psych:   Fair insight. Not anxious nor agitated  Skin:  No rashes.   No jaundice    Pressure injury; sacrum, right buttock,   left BKA stump site; old blood drainage noted it and intact  Right BKA stump; intact      Labs:   Lab Results   Component Value Date/Time    WBC 10.8 10/04/2022 01:11 AM    HGB 7.7 (L) 10/04/2022 01:11 AM    HCT 24.9 (L) 10/04/2022 01:11 AM    PLATELET 572 (H)  01:11 AM    MCV 94.3 10/04/2022 01:11 AM     Lab Results   Component Value Date/Time    Sodium 137 10/04/2022 01:11 AM    Potassium 3.6 10/04/2022 01:11 AM    Chloride 105 10/04/2022 01:11 AM    CO2 21 10/04/2022 01:11 AM    Anion gap 11 10/04/2022 01:11 AM    Glucose 78 10/04/2022 01:11 AM    BUN 99 (H) 10/04/2022 01:11 AM    Creatinine 2.71 (H) 10/04/2022 01:11 AM    BUN/Creatinine ratio 37 (H) 10/04/2022 01:11 AM    GFR est AA 36 (L) 10/03/2022 04:50 AM    GFR est non-AA 30 (L) 10/03/2022 04:50 AM    Calcium 8.3 (L) 10/04/2022 01:11 AM    Bilirubin, total 0.2 10/04/2022 01:11 AM    Alk. phosphatase 160 (H) 10/04/2022 01:11 AM    Protein, total 5.3 (L) 10/04/2022 01:11 AM    Albumin 1.5 (L) 10/04/2022 01:11 AM    Globulin 3.8 10/04/2022 01:11 AM    A-G Ratio 0.4 (L) 10/04/2022 01:11 AM    ALT (SGPT) 21 10/04/2022 01:11 AM     Assessment:      Dislodged feeding tube/peritonitis   Abdominal wound, s/p abdominal wall debridement (9/29)  Free air and perirectal fistula  S/p laparoscopy procedure; take down and removal of J tube, colostomy placement, and I & D of abdominal wall (9/10)  Left BKA stump infection  Recent right thoracotomy  Hx Bilateral BKAs   - afebrile, wbc normal     Blood cx (9/9, 9/16) no growth, (9/27) no growth so far     Wound cx from left bka stump (9/27) pseudomonas aeruginosa     Wound cx from previous J-tube & intra-op cx (9/29) pseudomonas aeruginosa, proteus mirabilis       CT of LLE (9/28)  Partially encapsulated fluid collection with a few associated gas locules overlying the dictation margin. Infection/abscess cannot be excluded based on imaging alone. No CT evidence of acute osteomyelitis. Several gas locules along the left spermatic cord, correlate clinically for signs of infection in this location. Objective:      Completed 2 weeks of IV Eraxis, zosyn, and vancomycin as of 9/23 then switched to   PO Augmentin between 9/24 to 9/28. ABX therapy changed back to IV due to persistent leukocytosis. Continue with IV Merrem, recommend to complete total 2 weeks.  Last dose 10/13  Pt will need Nunu catheter to complete the IV abx therapy, nunu catheter placement arrangement per primary team  Discharge IV abx order in place 10/14  Appreciate wound care team's input  Vascular/general surgery following    Fever work up if temp >= 100.4    Above plan of care discussed and agreed with Dr. Virgilio Singer    Pt will be seen as need. Please contact us with any questions or concerns.             Paco Ortega NP Him/He

## (undated) DEVICE — SPONGE GZ W4XL4IN COT 12 PLY TYP VII WVN C FLD DSGN

## (undated) DEVICE — SUTURE PDS II SZ 1 L96IN ABSRB VLT TP-1 L65MM 1/2 CIR Z880G

## (undated) DEVICE — GENERAL LAPAROSCOPY - SMH: Brand: MEDLINE INDUSTRIES, INC.

## (undated) DEVICE — 3M™ TEGADERM™ TRANSPARENT FILM DRESSING FRAME STYLE, 1626W, 4 IN X 4-3/4 IN (10 CM X 12 CM), 50/CT 4CT/CASE: Brand: 3M™ TEGADERM™

## (undated) DEVICE — TOWEL SURG W17XL27IN STD BLU COT NONFENESTRATED PREWASHED

## (undated) DEVICE — REM POLYHESIVE ADULT PATIENT RETURN ELECTRODE: Brand: VALLEYLAB

## (undated) DEVICE — SPONGE,LAP,18"X18",XR,ST,5/TRAY: Brand: MEDLINE

## (undated) DEVICE — SUTURE VCRL SZ 3-0 L27IN ABSRB UD L26MM SH 1/2 CIR J416H

## (undated) DEVICE — PREMIUM WET SKIN PREP TRAY: Brand: MEDLINE INDUSTRIES, INC.

## (undated) DEVICE — SOLUTION IRRIG 1000ML 0.9% SOD CHL USP POUR PLAS BTL

## (undated) DEVICE — STAPLER INT L340MM 45MM STD 12 FIRING B FRM PWR + GRIPPING

## (undated) DEVICE — GARMENT,MEDLINE,DVT,INT,CALF,MED, GEN2: Brand: MEDLINE

## (undated) DEVICE — DRAPE,C-SECTION,FEN,POUCH,WIRE: Brand: MEDLINE

## (undated) DEVICE — SKIN TEMPERATURE SENSOR: Brand: DEROYAL

## (undated) DEVICE — Device

## (undated) DEVICE — PREP SKN CHLRAPRP APL 26ML STR --

## (undated) DEVICE — PACK,LAPAROTOMY,2 REINFORCED GOWNS: Brand: MEDLINE

## (undated) DEVICE — SUTURE MCRYL SZ 4-0 L27IN ABSRB UD L19MM PS-2 1/2 CIR PRIM Y426H

## (undated) DEVICE — SYR 10ML LUER LOK 1/5ML GRAD --

## (undated) DEVICE — BAG SPEC REM 224ML W4XL6IN DIA10MM 1 HND GYN DISP ENDOPCH

## (undated) DEVICE — PACK,BASIC,SIRUS,V: Brand: MEDLINE

## (undated) DEVICE — STAPLER INT L34CM 60MM LNG ENDOSCP ARTC PWR + ECHELON FLX

## (undated) DEVICE — HYPODERMIC SAFETY NEEDLE: Brand: MAGELLAN

## (undated) DEVICE — GLOVE SURG SZ 75 L1212IN FNGR THK138MIL BRN LTX FREE

## (undated) DEVICE — PENCIL SMK EVAC 10 FT BLADE ELECTRD ROCKER FOR TELSCP

## (undated) DEVICE — YANKAUER,TAPERED BULBOUS TIP,W/O VENT: Brand: MEDLINE

## (undated) DEVICE — RELOAD STPL L60MM H1.5-3.6MM REG TISS BLU GRIPPING SURF B

## (undated) DEVICE — BASIN ST MAJOR-NO CAUTERY: Brand: MEDLINE INDUSTRIES, INC.

## (undated) DEVICE — GLOVE SURG SZ 65 L12IN FNGR THK94MIL STD WHT LTX FREE

## (undated) DEVICE — BANDAGE COMPR TBLR K1 20 MRX5.5 CM 3 PLY QUIKCLOT TG

## (undated) DEVICE — RELOAD STPL L45MM H1.5-3.6MM REG TISS BLU GRIPPING SURF B

## (undated) DEVICE — MASTISOL ADHESIVE LIQ 2/3ML

## (undated) DEVICE — ROCKER SWITCH PENCIL BLADE ELECTRODE, HOLSTER: Brand: EDGE

## (undated) DEVICE — APPLIER CLP M/L SHFT DIA5MM 15 LIG LIGAMAX 5

## (undated) DEVICE — PAD,NON-ADHERENT,W/AD,3X4,ST,LF,1/PK: Brand: MEDLINE

## (undated) DEVICE — DRESSING,GAUZE,XEROFORM,CURAD,5"X9",ST: Brand: CURAD

## (undated) DEVICE — HYPODERMIC SAFETY NEEDLE: Brand: MONOJECT

## (undated) DEVICE — TROCAR: Brand: KII FIOS FIRST ENTRY

## (undated) DEVICE — GLOVE ORANGE PI 7 1/2   MSG9075

## (undated) DEVICE — SHEAR HARMONIC ACET 5MMX36CM -- ACE PLUS

## (undated) DEVICE — SUTURE VCRL SZ 3-0 L54IN ABSRB UD LIGAPAK REEL POLYGLACTIN J285G

## (undated) DEVICE — DRAPE FLD WRM W44XL66IN C6L FOR INTRATEMP SYS THERMABASIN

## (undated) DEVICE — POSITIONER HD REST FOAM CMFRT TCH

## (undated) DEVICE — 4-PORT MANIFOLD: Brand: NEPTUNE 2

## (undated) DEVICE — SUTURE SZ 0 27IN 5/8 CIR UR-6  TAPER PT VIOLET ABSRB VICRYL J603H

## (undated) DEVICE — TAPE,CLOTH/SILK,CURAD,3"X10YD,LF,40/CS: Brand: CURAD

## (undated) DEVICE — PAD,ABDOMINAL,5"X9",ST,LF,25/BX: Brand: MEDLINE INDUSTRIES, INC.

## (undated) DEVICE — TROCAR: Brand: KII® SLEEVE